# Patient Record
Sex: FEMALE | Race: BLACK OR AFRICAN AMERICAN | NOT HISPANIC OR LATINO | Employment: OTHER | URBAN - METROPOLITAN AREA
[De-identification: names, ages, dates, MRNs, and addresses within clinical notes are randomized per-mention and may not be internally consistent; named-entity substitution may affect disease eponyms.]

---

## 2017-01-06 ENCOUNTER — TELEPHONE (OUTPATIENT)
Dept: FAMILY MEDICINE | Facility: CLINIC | Age: 57
End: 2017-01-06

## 2017-01-06 DIAGNOSIS — E11.9 TYPE II OR UNSPECIFIED TYPE DIABETES MELLITUS WITHOUT MENTION OF COMPLICATION, NOT STATED AS UNCONTROLLED: ICD-10-CM

## 2017-01-06 DIAGNOSIS — E78.5 ELEVATED LIPIDS: Primary | ICD-10-CM

## 2017-01-06 RX ORDER — PRAVASTATIN SODIUM 20 MG/1
20 TABLET ORAL DAILY
COMMUNITY
End: 2017-01-11 | Stop reason: SDUPTHER

## 2017-01-06 NOTE — TELEPHONE ENCOUNTER
Blood tests okay except lipids elevated for diabetic.  Recommend start pravastatin 20 mg daily.  Continue other medication as currently.  Repeat lipid panel, ALT, urine microalbumin , BMP in 3 months.  Repeat hemoglobin A1c end of June. My nurse will contact you to arrange.   Thanks,   Dr. Peterson

## 2017-01-11 RX ORDER — PRAVASTATIN SODIUM 20 MG/1
20 TABLET ORAL DAILY
Qty: 30 TABLET | Refills: 11 | Status: SHIPPED | OUTPATIENT
Start: 2017-01-11 | End: 2017-02-01 | Stop reason: SDUPTHER

## 2017-01-11 NOTE — TELEPHONE ENCOUNTER
This was ordered through a result note   She is currently taking the lipitor  Please review results to see what changes she should make

## 2017-01-25 RX ORDER — AMLODIPINE BESYLATE 5 MG/1
5 TABLET ORAL DAILY
Qty: 90 TABLET | Refills: 0 | Status: SHIPPED | OUTPATIENT
Start: 2017-01-25 | End: 2017-02-01 | Stop reason: SDUPTHER

## 2017-01-25 NOTE — TELEPHONE ENCOUNTER
----- Message from [place] sent at 1/25/2017 12:53 PM CST -----  Contact: Debbie jenkins Mail order [phone]   Would  Like a refill for Rx Medication on patient's Amlodipine 5 mg.  Please call back  @ [phone].  Thanks-[facility]

## 2017-01-26 RX ORDER — AMLODIPINE BESYLATE 5 MG/1
TABLET ORAL
Qty: 28 TABLET | OUTPATIENT
Start: 2017-01-26

## 2017-01-27 ENCOUNTER — PATIENT MESSAGE (OUTPATIENT)
Dept: ORTHOPEDICS | Facility: CLINIC | Age: 57
End: 2017-01-27

## 2017-01-27 ENCOUNTER — PATIENT MESSAGE (OUTPATIENT)
Dept: FAMILY MEDICINE | Facility: CLINIC | Age: 57
End: 2017-01-27

## 2017-01-30 DIAGNOSIS — M25.561 CHRONIC PAIN OF BOTH KNEES: ICD-10-CM

## 2017-01-30 DIAGNOSIS — M17.0 PRIMARY OSTEOARTHRITIS OF BOTH KNEES: ICD-10-CM

## 2017-01-30 DIAGNOSIS — M21.169 ACQUIRED VARUS DEFORMITY OF KNEE, UNSPECIFIED LATERALITY: ICD-10-CM

## 2017-01-30 DIAGNOSIS — M19.012 ARTHRITIS OF LEFT ACROMIOCLAVICULAR JOINT: ICD-10-CM

## 2017-01-30 DIAGNOSIS — M25.562 CHRONIC PAIN OF BOTH KNEES: ICD-10-CM

## 2017-01-30 DIAGNOSIS — G89.29 CHRONIC PAIN OF BOTH KNEES: ICD-10-CM

## 2017-01-30 RX ORDER — NAPROXEN 500 MG/1
500 TABLET ORAL 2 TIMES DAILY WITH MEALS
Qty: 60 TABLET | Refills: 3 | Status: SHIPPED | OUTPATIENT
Start: 2017-01-30 | End: 2017-04-05 | Stop reason: SDUPTHER

## 2017-01-30 RX ORDER — AMLODIPINE BESYLATE 5 MG/1
5 TABLET ORAL DAILY
Qty: 90 TABLET | Refills: 0 | OUTPATIENT
Start: 2017-01-30

## 2017-02-01 DIAGNOSIS — M17.0 PRIMARY OSTEOARTHRITIS OF BOTH KNEES: ICD-10-CM

## 2017-02-01 DIAGNOSIS — M25.562 CHRONIC PAIN OF BOTH KNEES: ICD-10-CM

## 2017-02-01 DIAGNOSIS — M21.169 ACQUIRED VARUS DEFORMITY OF KNEE, UNSPECIFIED LATERALITY: ICD-10-CM

## 2017-02-01 DIAGNOSIS — M19.012 ARTHRITIS OF LEFT ACROMIOCLAVICULAR JOINT: ICD-10-CM

## 2017-02-01 DIAGNOSIS — G89.29 CHRONIC PAIN OF BOTH KNEES: ICD-10-CM

## 2017-02-01 DIAGNOSIS — M25.561 CHRONIC PAIN OF BOTH KNEES: ICD-10-CM

## 2017-02-01 RX ORDER — NAPROXEN 500 MG/1
500 TABLET ORAL 2 TIMES DAILY WITH MEALS
Qty: 60 TABLET | Refills: 3 | Status: CANCELLED | OUTPATIENT
Start: 2017-02-01

## 2017-02-01 RX ORDER — CYCLOBENZAPRINE HCL 10 MG
10 TABLET ORAL 3 TIMES DAILY PRN
Qty: 30 TABLET | Refills: 1 | Status: SHIPPED | OUTPATIENT
Start: 2017-02-01 | End: 2017-03-02 | Stop reason: SDUPTHER

## 2017-02-01 RX ORDER — METFORMIN HYDROCHLORIDE 1000 MG/1
1000 TABLET ORAL 2 TIMES DAILY WITH MEALS
Qty: 180 TABLET | Refills: 1 | Status: SHIPPED | OUTPATIENT
Start: 2017-02-01 | End: 2017-12-22 | Stop reason: SDUPTHER

## 2017-02-01 RX ORDER — PRAVASTATIN SODIUM 20 MG/1
20 TABLET ORAL DAILY
Qty: 90 TABLET | Refills: 1 | Status: SHIPPED | OUTPATIENT
Start: 2017-02-01 | End: 2017-05-01 | Stop reason: DRUGHIGH

## 2017-02-01 RX ORDER — FLUTICASONE PROPIONATE 50 MCG
SPRAY, SUSPENSION (ML) NASAL
Qty: 16 G | Refills: 1 | Status: SHIPPED | OUTPATIENT
Start: 2017-02-01 | End: 2017-03-02 | Stop reason: SDUPTHER

## 2017-02-01 RX ORDER — LANCETS
EACH MISCELLANEOUS
Qty: 100 EACH | Refills: 1 | Status: SHIPPED | OUTPATIENT
Start: 2017-02-01 | End: 2017-07-07

## 2017-02-01 RX ORDER — AMLODIPINE BESYLATE 5 MG/1
5 TABLET ORAL DAILY
Qty: 90 TABLET | Refills: 1 | Status: SHIPPED | OUTPATIENT
Start: 2017-02-01 | End: 2017-07-30 | Stop reason: SDUPTHER

## 2017-02-01 RX ORDER — GABAPENTIN 600 MG/1
600 TABLET ORAL 3 TIMES DAILY
Qty: 270 TABLET | Refills: 1 | Status: SHIPPED | OUTPATIENT
Start: 2017-02-01 | End: 2017-12-22 | Stop reason: SDUPTHER

## 2017-02-01 NOTE — TELEPHONE ENCOUNTER
Zakia Price would like a refill of the following medications:   gabapentin (NEURONTIN) 600 MG tablet [Jessika Quintero, NP]   lancets Misc [Jessika Quintero NP]   amlodipine (NORVASC) 5 MG tablet [Jessika Quintero NP]     Preferred pharmacy: [address]

## 2017-02-08 ENCOUNTER — OFFICE VISIT (OUTPATIENT)
Dept: ORTHOPEDICS | Facility: CLINIC | Age: 57
End: 2017-02-08
Payer: COMMERCIAL

## 2017-02-08 VITALS
HEART RATE: 86 BPM | HEIGHT: 68 IN | BODY MASS INDEX: 44.41 KG/M2 | WEIGHT: 293 LBS | DIASTOLIC BLOOD PRESSURE: 88 MMHG | SYSTOLIC BLOOD PRESSURE: 146 MMHG

## 2017-02-08 DIAGNOSIS — M25.562 CHRONIC PAIN OF BOTH KNEES: ICD-10-CM

## 2017-02-08 DIAGNOSIS — E11.42 DIABETIC POLYNEUROPATHY ASSOCIATED WITH TYPE 2 DIABETES MELLITUS: ICD-10-CM

## 2017-02-08 DIAGNOSIS — E66.01 OBESITY, MORBID, BMI 50 OR HIGHER: Primary | ICD-10-CM

## 2017-02-08 DIAGNOSIS — E11.40 TYPE 2 DIABETES MELLITUS WITH DIABETIC NEUROPATHY, WITHOUT LONG-TERM CURRENT USE OF INSULIN: ICD-10-CM

## 2017-02-08 DIAGNOSIS — E78.2 COMBINED HYPERLIPIDEMIA ASSOCIATED WITH TYPE 2 DIABETES MELLITUS: ICD-10-CM

## 2017-02-08 DIAGNOSIS — G89.29 CHRONIC PAIN OF BOTH KNEES: ICD-10-CM

## 2017-02-08 DIAGNOSIS — I15.2 HYPERTENSION ASSOCIATED WITH DIABETES: ICD-10-CM

## 2017-02-08 DIAGNOSIS — M25.561 CHRONIC PAIN OF BOTH KNEES: ICD-10-CM

## 2017-02-08 DIAGNOSIS — E11.69 COMBINED HYPERLIPIDEMIA ASSOCIATED WITH TYPE 2 DIABETES MELLITUS: ICD-10-CM

## 2017-02-08 DIAGNOSIS — M17.0 PRIMARY OSTEOARTHRITIS OF BOTH KNEES: ICD-10-CM

## 2017-02-08 DIAGNOSIS — E11.59 HYPERTENSION ASSOCIATED WITH DIABETES: ICD-10-CM

## 2017-02-08 PROCEDURE — 3077F SYST BP >= 140 MM HG: CPT | Mod: S$GLB,,, | Performed by: ORTHOPAEDIC SURGERY

## 2017-02-08 PROCEDURE — 3079F DIAST BP 80-89 MM HG: CPT | Mod: S$GLB,,, | Performed by: ORTHOPAEDIC SURGERY

## 2017-02-08 PROCEDURE — 99213 OFFICE O/P EST LOW 20 MIN: CPT | Mod: 25,S$GLB,, | Performed by: ORTHOPAEDIC SURGERY

## 2017-02-08 PROCEDURE — 3044F HG A1C LEVEL LT 7.0%: CPT | Mod: S$GLB,,, | Performed by: ORTHOPAEDIC SURGERY

## 2017-02-08 PROCEDURE — 3060F POS MICROALBUMINURIA REV: CPT | Mod: S$GLB,,, | Performed by: ORTHOPAEDIC SURGERY

## 2017-02-08 PROCEDURE — 99999 PR PBB SHADOW E&M-EST. PATIENT-LVL III: CPT | Mod: PBBFAC,,, | Performed by: ORTHOPAEDIC SURGERY

## 2017-02-08 PROCEDURE — 2022F DILAT RTA XM EVC RTNOPTHY: CPT | Mod: S$GLB,,, | Performed by: ORTHOPAEDIC SURGERY

## 2017-02-08 PROCEDURE — 20610 DRAIN/INJ JOINT/BURSA W/O US: CPT | Mod: RT,S$GLB,, | Performed by: ORTHOPAEDIC SURGERY

## 2017-02-08 RX ORDER — TRIAMCINOLONE ACETONIDE 40 MG/ML
80 INJECTION, SUSPENSION INTRA-ARTICULAR; INTRAMUSCULAR
Status: DISCONTINUED | OUTPATIENT
Start: 2017-02-08 | End: 2017-02-08 | Stop reason: HOSPADM

## 2017-02-08 RX ADMIN — TRIAMCINOLONE ACETONIDE 80 MG: 40 INJECTION, SUSPENSION INTRA-ARTICULAR; INTRAMUSCULAR at 11:02

## 2017-02-08 NOTE — PROCEDURES
Large Joint Aspiration/Injection  Date/Time: 2/8/2017 11:35 AM  Performed by: CAMILA MARKHAM  Authorized by: CAMILA MARKHAM     Consent Done?:  Yes (Verbal)  Indications:  Pain  Procedure site marked: Yes    Timeout: Prior to procedure the correct patient, procedure, and site was verified      Location:  Knee  Site:  R knee  Prep: Patient was prepped and draped in usual sterile fashion    Ultrasonic Guidance for needle placement: No  Needle size:  22 G  Approach:  Anterolateral  Medications:  80 mg triamcinolone acetonide 40 mg/mL  Patient tolerance:  Patient tolerated the procedure well with no immediate complications

## 2017-02-08 NOTE — MR AVS SNAPSHOT
[facility]  [address] [address]  Phone: [phone]                  Zakia Price   2017 10:45 AM   Office Visit    Description:  Female : 1960   Provider:  Gonzalo Roberts MD   Department:  [facility]           Reason for Visit     Left Knee - Pain     Right Knee - Pain           Diagnoses this Visit        Comments    Obesity, morbid, BMI 50 or higher    -  Primary     Diabetic polyneuropathy associated with type 2 diabetes mellitus         Type 2 diabetes mellitus with diabetic neuropathy, without long-term current use of insulin         Hypertension associated with diabetes         Combined hyperlipidemia associated with type 2 diabetes mellitus         Primary osteoarthritis of both knees                To Do List           Future Appointments        Provider Department Dept Phone    2017 10:45 AM Gonzalo Roberts MD [facility] [phone]    2/15/2017 10:00 AM Gonzalo Roberts MD [facility] [phone]    3/20/2017 9:00 AM Gurdeep Fontana DO [facility] Endocrinology [phone]    2017 9:15 AM LABORATORY, [place] [facility] [phone]    2017 9:20 AM SPECIMEN, [place] [facility] [phone]      Goals (5 Years of Data)     None      [facility]     [facility] -  Assistance  Registered nurses in the [facility] provide clinical advisement, health education, appointment booking, and other advisory services.  Call for this free service at [phone].             Medications           Message regarding Medications     Verify the changes and/or additions to your medication regime listed below are the same as discussed with your clinician today.  If any of these changes or additions are incorrect, please notify your healthcare provider.             Verify that the below list of medications is an accurate  "representation of the medications you are currently taking.  If none reported, the list may be blank. If incorrect, please contact your healthcare provider. Carry this list with you in case of emergency.           Current Medications     acetaminophen (TYLENOL) 500 MG tablet Take 2 tablets (1,000 mg total) by mouth every 6 (six) hours as needed for Pain.    amlodipine (NORVASC) 5 MG tablet Take 1 tablet (5 mg total) by mouth once daily.    blood sugar diagnostic (CLEVER CHOICE VOICE+ TEST) Strp TEST BLOOD SUGARS ONE TIME DAILY    cyclobenzaprine (FLEXERIL) 10 MG tablet Take 1 tablet (10 mg total) by mouth 3 (three) times daily as needed.    diphenhydrAMINE (BENADRYL) 25 mg capsule Take 1 each (25 mg total) by mouth every 6 (six) hours as needed for Itching.    fluticasone (FLONASE) 50 mcg/actuation nasal spray Use 2 sprays to each nostril daily    gabapentin (NEURONTIN) 600 MG tablet Take 1 tablet (600 mg total) by mouth 3 (three) times daily.    lancets Misc As directed    levothyroxine (SYNTHROID) 100 MCG tablet Take 100 mcg by mouth once daily.    metformin (GLUCOPHAGE) 1000 MG tablet Take 1 tablet (1,000 mg total) by mouth 2 (two) times daily with meals.    naproxen (NAPROSYN) 500 MG tablet Take 1 tablet (500 mg total) by mouth 2 (two) times daily with meals.    pravastatin (PRAVACHOL) 20 MG tablet Take 1 tablet (20 mg total) by mouth once daily.           Clinical Reference Information           Your Vitals Were     BP Pulse Height Weight BMI    146/88 86 5' 8" (1.727 m) 166.4 kg (366 lb 12.8 oz) 55.77 kg/m2      Blood Pressure          Most Recent Value    BP  (!)  146/88      Allergies as of 2/8/2017     Lisinopril    Codeine      Immunizations Administered on Date of Encounter - 2/8/2017     None      Orders Placed During Today's Visit      Normal Orders This Visit    Ambulatory consult to Nutrition Services       Language Assistance Services     ATTENTION: Language assistance services are available, free of " charge. Please call [phone].      ATENCIÓN: Si habla español, tiene a blanco disposición servicios gratuitos de asistencia lingüística. Llame al [phone].     CHÚ Ý: N?u b?n nói Ti?ng Vi?t, có các d?ch v? h? tr? ngôn ng? mi?n phí dành cho b?n. G?i s? [phone].         [facility] complies with applicable Federal civil rights laws and does not discriminate on the basis of race, color, national origin, age, disability, or sex.

## 2017-02-08 NOTE — PROGRESS NOTES
CC:Bilateral KNEE pain    HISTORY OF PRESENT ILLNESS:  Zakia Price is a 57 y.o. right hand dominant Female who is here for her f/u with right > left knee pain.     History ispositive fortrauma with falls in the past. No surgeries in the past.    Today the patient rates pain at a 0-5/10 on visual analog scale.     She reports that the pain is worse with standing, walking, stairs, rising from a seated position.  It does currently wake the patient at night.    negative for mechanical symptoms, +/-instability, swelling at times and activity related    It does affect the performance of ADLs.     Occupation:   With some limitations secondary to knee pain    Past Medical History   Diagnosis Date    Diabetes mellitus, type 2     Diabetic neuropathy 1/27/2014    DJD (degenerative joint disease) of knee     DVT (deep venous thrombosis) around 1990's     in leg, is on no anticoagulant therapy presently    GERD (gastroesophageal reflux disease)     Hypertension associated with diabetes     Multinodular goiter     Multinodular goiter     Obesity, morbid, BMI 50 or higher     Sleep apnea      has no CPAP       Past Surgical History   Procedure Laterality Date    Hysterectomy      Tonsillectomy      Wrist fracture surgery       left    Shoulder arthroscopy      Thyroidectomy, partial Right      and transplatation of right superior parathyroid gland to the sternocleidomastoid muscle        Family History   Problem Relation Age of Onset    Leukemia Son     Diabetes Mother     Hypertension Mother     Heart disease Mother 50    Cancer Father     Cancer Brother          Current Outpatient Prescriptions:     acetaminophen (TYLENOL) 500 MG tablet, Take 2 tablets (1,000 mg total) by mouth every 6 (six) hours as needed for Pain., Disp: , Rfl:     amlodipine (NORVASC) 5 MG tablet, Take 1 tablet (5 mg total) by mouth once daily., Disp: 90 tablet, Rfl: 1    blood sugar diagnostic (CLEVER CHOICE  VOICE+ TEST) Strp, TEST BLOOD SUGARS ONE TIME DAILY, Disp: 100 strip, Rfl: 1    cyclobenzaprine (FLEXERIL) 10 MG tablet, Take 1 tablet (10 mg total) by mouth 3 (three) times daily as needed., Disp: 30 tablet, Rfl: 1    diphenhydrAMINE (BENADRYL) 25 mg capsule, Take 1 each (25 mg total) by mouth every 6 (six) hours as needed for Itching., Disp: , Rfl: 0    fluticasone (FLONASE) 50 mcg/actuation nasal spray, Use 2 sprays to each nostril daily, Disp: 16 g, Rfl: 1    gabapentin (NEURONTIN) 600 MG tablet, Take 1 tablet (600 mg total) by mouth 3 (three) times daily., Disp: 270 tablet, Rfl: 1    lancets Misc, As directed, Disp: 100 each, Rfl: 1    levothyroxine (SYNTHROID) 100 MCG tablet, Take 100 mcg by mouth once daily., Disp: , Rfl:     metformin (GLUCOPHAGE) 1000 MG tablet, Take 1 tablet (1,000 mg total) by mouth 2 (two) times daily with meals., Disp: 180 tablet, Rfl: 1    naproxen (NAPROSYN) 500 MG tablet, Take 1 tablet (500 mg total) by mouth 2 (two) times daily with meals., Disp: 60 tablet, Rfl: 3    pravastatin (PRAVACHOL) 20 MG tablet, Take 1 tablet (20 mg total) by mouth once daily., Disp: 90 tablet, Rfl: 1    Review of patient's allergies indicates:   Allergen Reactions    Lisinopril Swelling     angioedema    Codeine Nausea Only and Rash       Review of Systems   HENT:        ETHAN currently not on CPAP   Cardiovascular:        HTN   Gastrointestinal: Positive for heartburn.   Musculoskeletal: Positive for joint pain.   Endo/Heme/Allergies:        Diabetes Type II   All other systems reviewed and are negative.      OBJECTIVE:  Vitals:    02/08/17 1016   BP: (!) 146/88   Pulse: 86       PHYSICAL EXAMINATION    General:  The patient is alert and oriented x 3.  Mood is pleasant.  Observation of ears, eyes and nose reveal no gross abnormalities.  No labored breathing observed.    Both KNEE EXAMINATION     OBSERVATION / INSPECTION   Gait:   Antalgic   Alignment:  Varus  Scars:   None   Muscle  atrophy: Mild  Effusion:  None   Warmth:  None   Discoloration:   none     TENDERNESS / CREPITUS (T / C):  R>L         T / C      T / C   Patella   ++ / -   Lateral joint line   - / -    Peripatellar medial  ++  Medial joint line    + / -    Peripatellar lateral ++  Medial plica   - / -    Patellar tendon -   Popliteal fossa   - / -    Quad tendon   -   Gastrocnemius   -   Prepatellar Bursa - / -   Quadricep   -   Tibial tubercle  -  Thigh/hamstring  -   Pes anserine/HS -  Fibula    -   ITB   - / -  Tibia     -   Tib/fib joint  - / -  LCL    -     MFC   - / -   MCL: Proximal  -    LFC   - / -    Distal   -          ROM: (* = pain)  PASSIVE  ACTIVE    Left :   0 / 100   0 / 100  *   Right :    0 / 110   0 / 110  **    Patellofemoral examination:  See above noted areas of tenderness.   Patella position    Subluxation / dislocation: Centered           Sup. / Inf;   Normal   Crepitus (PF):    +++   Patellar Mobility:       Medial-lateral:   absent   Superior-inferior:  absent    Inferior tilt   Normal    Patellar tendon:  Normal   Lateral tilt:    Normal   J-sign:     None   Patellofemoral grind:   +++      MENISCAL SIGNS:     Pain on terminal extension:  -  Pain on terminal flexion:  +  Ashs maneuver:  - for pain      LIGAMENT EXAMINATION:  ACL / Lachman:  normal (-1 to 2mm)    PCL-Post.  drawer: normal 0 to 2mm  MCL- Valgus:  Tight MCL  LCL- Varus:  normal 0 to 2mm  Pivot shift: normal (Equal)       STRENGTH: (* = with pain) PAINFUL SIDE   Quadricep   5/5   Hamstrin/5    EXTREMITY NEURO-VASCULAR EXAMINATION:   Sensation:  Grossly intact to light touch all dermatomal regions.   Motor Function:  Fully intact motor function at hip, knee, foot and ankle     Vascular status:  DP and PT pulses 2+, brisk capillary refill, symmetric.     Xrays: Reviewed personally by me (standing AP/flexion, lateral, sunrise) show:   Findings: Findings:  AP and PA flexion standing views of both knees as well as a lateral and  Merchant views of the both knees were obtained.  Compared to January 27, 2014. Again noted is advanced tricompartment degenerative change in both knees without superimposed acute osseous abnormalities. Stable calcific densities in the suprapatellar spaces and popliteal fossae suggestive of osteocartilaginous loose bodies.     ASSESSMENT:    Bilateral Knee Osteoarthritis Endstage  Varus Deformity    PLAN:   Right knee injection  Naproxen 500mg PO BIDWM  The patient was counseled today regarding her diagnostic study results and the different treatment options. I spent >50% of the time discussing conservative vs. Operative options with the patient as well as risks and benefits of the different options.  I spent 15 minutes with the patient discussing the need to reduce weight and maintain weight loss. Obesity can exacerbate the musculoskeletal symptoms that the patient is having. We discussed some initial dietary and activity options, but we will place a nutrition consult as well as have the patient f/u with their PCP in order to develop a plan to assist with developing a healthy weight loss plan.  Physical Therapy/Wellness program referral to [facility]  F/U next week for left knee injection  All questions were answered, pt will contact us for questions or concerns in the interim.

## 2017-02-15 ENCOUNTER — OFFICE VISIT (OUTPATIENT)
Dept: ORTHOPEDICS | Facility: CLINIC | Age: 57
End: 2017-02-15
Payer: COMMERCIAL

## 2017-02-15 VITALS
HEART RATE: 92 BPM | BODY MASS INDEX: 44.41 KG/M2 | DIASTOLIC BLOOD PRESSURE: 86 MMHG | WEIGHT: 293 LBS | SYSTOLIC BLOOD PRESSURE: 134 MMHG | HEIGHT: 68 IN

## 2017-02-15 DIAGNOSIS — M25.561 CHRONIC PAIN OF BOTH KNEES: ICD-10-CM

## 2017-02-15 DIAGNOSIS — G89.29 CHRONIC PAIN OF BOTH KNEES: ICD-10-CM

## 2017-02-15 DIAGNOSIS — M17.0 PRIMARY OSTEOARTHRITIS OF BOTH KNEES: Primary | ICD-10-CM

## 2017-02-15 DIAGNOSIS — M25.562 CHRONIC PAIN OF BOTH KNEES: ICD-10-CM

## 2017-02-15 PROCEDURE — 3079F DIAST BP 80-89 MM HG: CPT | Mod: S$GLB,,, | Performed by: ORTHOPAEDIC SURGERY

## 2017-02-15 PROCEDURE — 99213 OFFICE O/P EST LOW 20 MIN: CPT | Mod: 25,S$GLB,, | Performed by: ORTHOPAEDIC SURGERY

## 2017-02-15 PROCEDURE — 3075F SYST BP GE 130 - 139MM HG: CPT | Mod: S$GLB,,, | Performed by: ORTHOPAEDIC SURGERY

## 2017-02-15 PROCEDURE — 99999 PR PBB SHADOW E&M-EST. PATIENT-LVL III: CPT | Mod: PBBFAC,,, | Performed by: ORTHOPAEDIC SURGERY

## 2017-02-15 PROCEDURE — 20610 DRAIN/INJ JOINT/BURSA W/O US: CPT | Mod: LT,S$GLB,, | Performed by: ORTHOPAEDIC SURGERY

## 2017-02-15 RX ORDER — TRIAMCINOLONE ACETONIDE 40 MG/ML
80 INJECTION, SUSPENSION INTRA-ARTICULAR; INTRAMUSCULAR
Status: DISCONTINUED | OUTPATIENT
Start: 2017-02-15 | End: 2017-02-15 | Stop reason: HOSPADM

## 2017-02-15 RX ADMIN — TRIAMCINOLONE ACETONIDE 80 MG: 40 INJECTION, SUSPENSION INTRA-ARTICULAR; INTRAMUSCULAR at 10:02

## 2017-02-15 NOTE — PROGRESS NOTES
CC:Bilateral KNEE pain    HISTORY OF PRESENT ILLNESS:  Zakia Price is a 57 y.o. right hand dominant Female who is here for her f/u with left > right knee pain. The right knee was injected last week and feels much better. She is here for her scheduled left knee injection.    History ispositive fortrauma with falls in the past. No surgeries in the past.    Today the patient rates pain at a 0-8/10 on visual analog scale.     She reports that the pain is worse with standing, walking, stairs, rising from a seated position.  It does currently wake the patient at night.    negative for mechanical symptoms, +/-instability, swelling at times and activity related    It does affect the performance of ADLs.     Occupation:   With some limitations secondary to knee pain    Past Medical History   Diagnosis Date    Diabetes mellitus, type 2     Diabetic neuropathy 1/27/2014    DJD (degenerative joint disease) of knee     DVT (deep venous thrombosis) around 1990's     in leg, is on no anticoagulant therapy presently    GERD (gastroesophageal reflux disease)     Hypertension associated with diabetes     Multinodular goiter     Multinodular goiter     Obesity, morbid, BMI 50 or higher     Sleep apnea      has no CPAP       Past Surgical History   Procedure Laterality Date    Hysterectomy      Tonsillectomy      Wrist fracture surgery       left    Shoulder arthroscopy      Thyroidectomy, partial Right      and transplatation of right superior parathyroid gland to the sternocleidomastoid muscle        Family History   Problem Relation Age of Onset    Leukemia Son     Diabetes Mother     Hypertension Mother     Heart disease Mother 50    Cancer Father     Cancer Brother          Current Outpatient Prescriptions:     acetaminophen (TYLENOL) 500 MG tablet, Take 2 tablets (1,000 mg total) by mouth every 6 (six) hours as needed for Pain., Disp: , Rfl:     amlodipine (NORVASC) 5 MG tablet, Take 1  tablet (5 mg total) by mouth once daily., Disp: 90 tablet, Rfl: 1    blood sugar diagnostic (CLEVER CHOICE VOICE+ TEST) Strp, TEST BLOOD SUGARS ONE TIME DAILY, Disp: 100 strip, Rfl: 1    cyclobenzaprine (FLEXERIL) 10 MG tablet, Take 1 tablet (10 mg total) by mouth 3 (three) times daily as needed., Disp: 30 tablet, Rfl: 1    diphenhydrAMINE (BENADRYL) 25 mg capsule, Take 1 each (25 mg total) by mouth every 6 (six) hours as needed for Itching., Disp: , Rfl: 0    fluticasone (FLONASE) 50 mcg/actuation nasal spray, Use 2 sprays to each nostril daily, Disp: 16 g, Rfl: 1    gabapentin (NEURONTIN) 600 MG tablet, Take 1 tablet (600 mg total) by mouth 3 (three) times daily., Disp: 270 tablet, Rfl: 1    lancets Misc, As directed, Disp: 100 each, Rfl: 1    levothyroxine (SYNTHROID) 100 MCG tablet, Take 100 mcg by mouth once daily., Disp: , Rfl:     metformin (GLUCOPHAGE) 1000 MG tablet, Take 1 tablet (1,000 mg total) by mouth 2 (two) times daily with meals., Disp: 180 tablet, Rfl: 1    naproxen (NAPROSYN) 500 MG tablet, Take 1 tablet (500 mg total) by mouth 2 (two) times daily with meals., Disp: 60 tablet, Rfl: 3    pravastatin (PRAVACHOL) 20 MG tablet, Take 1 tablet (20 mg total) by mouth once daily., Disp: 90 tablet, Rfl: 1    Review of patient's allergies indicates:   Allergen Reactions    Lisinopril Swelling     angioedema    Codeine Nausea Only and Rash       Review of Systems   HENT:        ETHAN currently not on CPAP   Cardiovascular:        HTN   Gastrointestinal: Positive for heartburn.   Musculoskeletal: Positive for joint pain.   Endo/Heme/Allergies:        Diabetes Type II   All other systems reviewed and are negative.      OBJECTIVE:  Vitals:    02/15/17 1006   BP: 134/86   Pulse: 92       PHYSICAL EXAMINATION    General:  The patient is alert and oriented x 3.  Mood is pleasant.  Observation of ears, eyes and nose reveal no gross abnormalities.  No labored breathing observed.    Both KNEE EXAMINATION      OBSERVATION / INSPECTION   Gait:   Antalgic   Alignment:  Varus  Scars:   None   Muscle atrophy: Mild  Effusion:  None   Warmth:  None   Discoloration:   none     TENDERNESS / CREPITUS (T / C):  L>R on exam today        T / C      T / C   Patella   ++ / -   Lateral joint line   - / -    Peripatellar medial  ++  Medial joint line    + / -    Peripatellar lateral ++  Medial plica   - / -    Patellar tendon -   Popliteal fossa   - / -    Quad tendon   -   Gastrocnemius   -   Prepatellar Bursa - / -   Quadricep   -   Tibial tubercle  -  Thigh/hamstring  -   Pes anserine/HS -  Fibula    -   ITB   - / -  Tibia     -   Tib/fib joint  - / -  LCL    -     MFC   - / -   MCL: Proximal  -    LFC   - / -    Distal   -          ROM: (* = pain)  PASSIVE  ACTIVE    Left :   0 / 100   0 / 100  **   Right :    0 / 110   0 / 110  *    Patellofemoral examination:  See above noted areas of tenderness.   Patella position    Subluxation / dislocation: Centered           Sup. / Inf;   Normal   Crepitus (PF):    +++   Patellar Mobility:       Medial-lateral:   absent   Superior-inferior:  absent    Inferior tilt   Normal    Patellar tendon:  Normal   Lateral tilt:    Normal   J-sign:     None   Patellofemoral grind:   +++      MENISCAL SIGNS:     Pain on terminal extension:  -  Pain on terminal flexion:  +  Ashs maneuver:  - for pain      LIGAMENT EXAMINATION:  ACL / Lachman:  normal (-1 to 2mm)    PCL-Post.  drawer: normal 0 to 2mm  MCL- Valgus:  Tight MCL  LCL- Varus:  normal 0 to 2mm  Pivot shift: normal (Equal)       STRENGTH: (* = with pain) PAINFUL SIDE   Quadricep   5/5   Hamstrin/5    EXTREMITY NEURO-VASCULAR EXAMINATION:   Sensation:  Grossly intact to light touch all dermatomal regions.   Motor Function:  Fully intact motor function at hip, knee, foot and ankle     Vascular status:  DP and PT pulses 2+, brisk capillary refill, symmetric.     Xrays: Reviewed personally by me (standing AP/flexion, lateral,  sunrise) show:   Findings: Findings:  AP and PA flexion standing views of both knees as well as a lateral and Merchant views of the both knees were obtained.  Compared to January 27, 2014. Again noted is advanced tricompartment degenerative change in both knees without superimposed acute osseous abnormalities. Stable calcific densities in the suprapatellar spaces and popliteal fossae suggestive of osteocartilaginous loose bodies.     ASSESSMENT:    Bilateral Knee Osteoarthritis Endstage  Varus Deformity    PLAN:   left knee injection  Naproxen 500mg PO BIDWM  F/U in 3 months or sooner if neded  All questions were answered, pt will contact us for questions or concerns in the interim.

## 2017-02-15 NOTE — MR AVS SNAPSHOT
[facility]  [address] [address]  Phone: [phone]                  Zakia Price   2/15/2017 10:00 AM   Office Visit    Description:  Female : 1960   Provider:  Gonzalo Roberts MD   Department:  [facility]           Reason for Visit     Left Knee - Pain                To Do List           Future Appointments        Provider Department Dept Phone    3/20/2017 9:00 AM Gurdeep Fontana DO [place] [phone]    2017 9:15 AM LABORATORY, [place] [facility] [phone]    2017 9:20 AM SPECIMEN, [place] [facility] [phone]    2017 9:45 AM Gonzalo Roberts MD [facility] [phone]    2017 9:45 AM LABORATORY, [place] [facility] [phone]      Goals (5 Years of Data)     None      [facility]     [facility] Care [facility] -  Assistance  Registered nurses in the [facility] provide clinical advisement, health education, appointment booking, and other advisory services.  Call for this free service at [phone].             Medications           Message regarding Medications     Verify the changes and/or additions to your medication regime listed below are the same as discussed with your clinician today.  If any of these changes or additions are incorrect, please notify your healthcare provider.             Verify that the below list of medications is an accurate representation of the medications you are currently taking.  If none reported, the list may be blank. If incorrect, please contact your healthcare provider. Carry this list with you in case of emergency.           Current Medications     acetaminophen (TYLENOL) 500 MG tablet Take 2 tablets (1,000 mg total) by mouth every 6 (six) hours as needed for Pain.    amlodipine (NORVASC) 5 MG tablet Take 1 tablet (5 mg total) by mouth once daily.    blood  "sugar diagnostic (CLEVER CHOICE VOICE+ TEST) Strp TEST BLOOD SUGARS ONE TIME DAILY    cyclobenzaprine (FLEXERIL) 10 MG tablet Take 1 tablet (10 mg total) by mouth 3 (three) times daily as needed.    diphenhydrAMINE (BENADRYL) 25 mg capsule Take 1 each (25 mg total) by mouth every 6 (six) hours as needed for Itching.    fluticasone (FLONASE) 50 mcg/actuation nasal spray Use 2 sprays to each nostril daily    gabapentin (NEURONTIN) 600 MG tablet Take 1 tablet (600 mg total) by mouth 3 (three) times daily.    lancets Misc As directed    levothyroxine (SYNTHROID) 100 MCG tablet Take 100 mcg by mouth once daily.    metformin (GLUCOPHAGE) 1000 MG tablet Take 1 tablet (1,000 mg total) by mouth 2 (two) times daily with meals.    naproxen (NAPROSYN) 500 MG tablet Take 1 tablet (500 mg total) by mouth 2 (two) times daily with meals.    pravastatin (PRAVACHOL) 20 MG tablet Take 1 tablet (20 mg total) by mouth once daily.           Clinical Reference Information           Your Vitals Were     Height Weight BMI          5' 8" (1.727 m) 166 kg (366 lb) 55.65 kg/m2        Allergies as of 2/15/2017     Lisinopril    Codeine      Immunizations Administered on Date of Encounter - 2/15/2017     None      Language Assistance Services     ATTENTION: Language assistance services are available, free of charge. Please call [phone].      ATENCIÓN: Si habla jose g, tiene a blanco disposición servicios gratuitos de asistencia lingüística. Llame al [phone].     [facility] Ý: N?u b?n nói Ti?ng Vi?t, có các d?ch v? h? tr? ngôn ng? mi?n phí dành cho b?n. G?i s? [phone].         [facility] complies with applicable Federal civil rights laws and does not discriminate on the basis of race, color, national origin, age, disability, or sex.        "

## 2017-02-15 NOTE — PROCEDURES
Large Joint Aspiration/Injection  Date/Time: 2/15/2017 10:21 AM  Performed by: CAMILA MARKHAM  Authorized by: CAMILA MARKHAM     Consent Done?:  Yes (Verbal)  Indications:  Pain  Procedure site marked: Yes    Timeout: Prior to procedure the correct patient, procedure, and site was verified      Location:  Knee  Site:  L knee  Prep: Patient was prepped and draped in usual sterile fashion    Ultrasonic Guidance for needle placement: No  Needle size:  22 G  Approach:  Anterolateral  Medications:  80 mg triamcinolone acetonide 40 mg/mL  Patient tolerance:  Patient tolerated the procedure well with no immediate complications

## 2017-03-02 RX ORDER — CYCLOBENZAPRINE HCL 10 MG
10 TABLET ORAL 3 TIMES DAILY PRN
Qty: 30 TABLET | Refills: 5 | Status: SHIPPED | OUTPATIENT
Start: 2017-03-02 | End: 2018-04-27 | Stop reason: SDUPTHER

## 2017-03-02 RX ORDER — FLUTICASONE PROPIONATE 50 MCG
SPRAY, SUSPENSION (ML) NASAL
Qty: 16 G | Refills: 5 | Status: SHIPPED | OUTPATIENT
Start: 2017-03-02 | End: 2019-12-04 | Stop reason: SDUPTHER

## 2017-03-03 ENCOUNTER — TELEPHONE (OUTPATIENT)
Dept: FAMILY MEDICINE | Facility: CLINIC | Age: 57
End: 2017-03-03

## 2017-03-03 NOTE — TELEPHONE ENCOUNTER
----- Message from Nick Avila sent at 3/3/2017  8:50 AM CST -----  Contact: [facility]  Caller request call from nurse to get the brand of lancets pt needs and the directions, ..    [facility] - [address]  [facility] [address]  Phone: [phone] Fax: [phone]

## 2017-03-20 ENCOUNTER — OFFICE VISIT (OUTPATIENT)
Dept: ENDOCRINOLOGY | Facility: CLINIC | Age: 57
End: 2017-03-20
Payer: COMMERCIAL

## 2017-03-20 ENCOUNTER — PATIENT MESSAGE (OUTPATIENT)
Dept: ADMINISTRATIVE | Facility: OTHER | Age: 57
End: 2017-03-20

## 2017-03-20 ENCOUNTER — LAB VISIT (OUTPATIENT)
Dept: LAB | Facility: HOSPITAL | Age: 57
End: 2017-03-20
Attending: INTERNAL MEDICINE
Payer: COMMERCIAL

## 2017-03-20 VITALS
WEIGHT: 293 LBS | SYSTOLIC BLOOD PRESSURE: 128 MMHG | BODY MASS INDEX: 43.4 KG/M2 | HEART RATE: 65 BPM | HEIGHT: 69 IN | DIASTOLIC BLOOD PRESSURE: 70 MMHG

## 2017-03-20 DIAGNOSIS — E11.9 TYPE 2 DIABETES MELLITUS WITHOUT COMPLICATION, WITHOUT LONG-TERM CURRENT USE OF INSULIN: ICD-10-CM

## 2017-03-20 DIAGNOSIS — E03.9 HYPOTHYROIDISM, UNSPECIFIED TYPE: Primary | ICD-10-CM

## 2017-03-20 DIAGNOSIS — E03.9 HYPOTHYROIDISM, UNSPECIFIED TYPE: ICD-10-CM

## 2017-03-20 LAB
ALBUMIN SERPL BCP-MCNC: 3.3 G/DL
ALP SERPL-CCNC: 77 U/L
ALT SERPL W/O P-5'-P-CCNC: 14 U/L
ANION GAP SERPL CALC-SCNC: 10 MMOL/L
AST SERPL-CCNC: 15 U/L
BILIRUB SERPL-MCNC: 0.4 MG/DL
BUN SERPL-MCNC: 18 MG/DL
CALCIUM SERPL-MCNC: 8.5 MG/DL
CHLORIDE SERPL-SCNC: 109 MMOL/L
CO2 SERPL-SCNC: 23 MMOL/L
CREAT SERPL-MCNC: 0.8 MG/DL
EST. GFR  (AFRICAN AMERICAN): >60 ML/MIN/1.73 M^2
EST. GFR  (NON AFRICAN AMERICAN): >60 ML/MIN/1.73 M^2
GLUCOSE SERPL-MCNC: 88 MG/DL
POTASSIUM SERPL-SCNC: 4.2 MMOL/L
PROT SERPL-MCNC: 6.7 G/DL
SODIUM SERPL-SCNC: 142 MMOL/L
TSH SERPL DL<=0.005 MIU/L-ACNC: 2.4 UIU/ML

## 2017-03-20 PROCEDURE — 3078F DIAST BP <80 MM HG: CPT | Mod: S$GLB,,, | Performed by: INTERNAL MEDICINE

## 2017-03-20 PROCEDURE — 3044F HG A1C LEVEL LT 7.0%: CPT | Mod: S$GLB,,, | Performed by: INTERNAL MEDICINE

## 2017-03-20 PROCEDURE — 99999 PR PBB SHADOW E&M-EST. PATIENT-LVL II: CPT | Mod: PBBFAC,,, | Performed by: INTERNAL MEDICINE

## 2017-03-20 PROCEDURE — 84443 ASSAY THYROID STIM HORMONE: CPT

## 2017-03-20 PROCEDURE — 99204 OFFICE O/P NEW MOD 45 MIN: CPT | Mod: S$GLB,,, | Performed by: INTERNAL MEDICINE

## 2017-03-20 PROCEDURE — 3074F SYST BP LT 130 MM HG: CPT | Mod: S$GLB,,, | Performed by: INTERNAL MEDICINE

## 2017-03-20 PROCEDURE — 2022F DILAT RTA XM EVC RTNOPTHY: CPT | Mod: S$GLB,,, | Performed by: INTERNAL MEDICINE

## 2017-03-20 PROCEDURE — 1160F RVW MEDS BY RX/DR IN RCRD: CPT | Mod: S$GLB,,, | Performed by: INTERNAL MEDICINE

## 2017-03-20 PROCEDURE — 80053 COMPREHEN METABOLIC PANEL: CPT

## 2017-03-20 PROCEDURE — 3060F POS MICROALBUMINURIA REV: CPT | Mod: S$GLB,,, | Performed by: INTERNAL MEDICINE

## 2017-03-20 PROCEDURE — 36415 COLL VENOUS BLD VENIPUNCTURE: CPT | Mod: PO

## 2017-03-20 RX ORDER — PANTOPRAZOLE SODIUM 40 MG/1
40 TABLET, DELAYED RELEASE ORAL DAILY
COMMUNITY
End: 2017-04-12 | Stop reason: SDUPTHER

## 2017-03-20 RX ORDER — LEVOTHYROXINE SODIUM 100 UG/1
100 TABLET ORAL DAILY
Qty: 30 TABLET | Refills: 6 | Status: SHIPPED | OUTPATIENT
Start: 2017-03-20 | End: 2017-04-12 | Stop reason: SDUPTHER

## 2017-03-20 NOTE — MR AVS SNAPSHOT
[facility]  [address]  [facility] [address]  Phone: [phone]  Fax: [phone]                  Zakia Price   3/20/2017 9:00 AM   Office Visit    Description:  Female : 1960   Provider:  Gurdeep Fontana DO   Department:  [place] [facility]           Diagnoses this Visit        Comments    Hypothyroidism, unspecified type    -  Primary     Type 2 diabetes mellitus without complication, without long-term current use of insulin                To Do List           Future Appointments        Provider Department Dept Phone    3/20/2017 10:45 AM LAB, [place] [facility] [phone]    3/20/2017 11:00 AM URINE [facility] [phone]    2017 9:15 AM LABORATORY, [place] [facility] [phone]    2017 9:20 AM SPECIMEN, [place] [facility] [phone]    2017 9:45 AM Gonzalo Roberts MD [facility] [phone]      Goals (5 Years of Data)     None      [facility]     [facility] Care [facility] -  Assistance  Registered nurses in the [facility] provide clinical advisement, health education, appointment booking, and other advisory services.  Call for this free service at [phone].             Medications           Message regarding Medications     Verify the changes and/or additions to your medication regime listed below are the same as discussed with your clinician today.  If any of these changes or additions are incorrect, please notify your healthcare provider.             Verify that the below list of medications is an accurate representation of the medications you are currently taking.  If none reported, the list may be blank. If incorrect, please contact your healthcare provider. Carry this list with you in case of emergency.           Current Medications     acetaminophen (TYLENOL) 500 MG tablet Take 2 tablets (1,000 mg  "total) by mouth every 6 (six) hours as needed for Pain.    amlodipine (NORVASC) 5 MG tablet Take 1 tablet (5 mg total) by mouth once daily.    blood sugar diagnostic (CLEVER CHOICE VOICE+ TEST) Strp TEST BLOOD SUGARS ONE TIME DAILY    cyclobenzaprine (FLEXERIL) 10 MG tablet Take 1 tablet (10 mg total) by mouth 3 (three) times daily as needed.    diphenhydrAMINE (BENADRYL) 25 mg capsule Take 1 each (25 mg total) by mouth every 6 (six) hours as needed for Itching.    fluticasone (FLONASE) 50 mcg/actuation nasal spray Use 2 sprays to each nostril daily    gabapentin (NEURONTIN) 600 MG tablet Take 1 tablet (600 mg total) by mouth 3 (three) times daily.    lancets Misc As directed    levothyroxine (SYNTHROID) 100 MCG tablet Take 100 mcg by mouth once daily.    metformin (GLUCOPHAGE) 1000 MG tablet Take 1 tablet (1,000 mg total) by mouth 2 (two) times daily with meals.    naproxen (NAPROSYN) 500 MG tablet Take 1 tablet (500 mg total) by mouth 2 (two) times daily with meals.    pantoprazole (PROTONIX) 40 MG tablet Take 40 mg by mouth once daily.    pravastatin (PRAVACHOL) 20 MG tablet Take 1 tablet (20 mg total) by mouth once daily.           Clinical Reference Information           Your Vitals Were     BP Pulse Height Weight BMI    128/70 (BP Location: Left arm, Patient Position: Sitting, BP Method: Manual) 65 5' 8.5" (1.74 m) 165.6 kg (365 lb 1.3 oz) 54.7 kg/m2      Blood Pressure          Most Recent Value    BP  128/70      Allergies as of 3/20/2017     Lisinopril    Codeine      Immunizations Administered on Date of Encounter - 3/20/2017     None      Orders Placed During Today's Visit     Future Labs/Procedures Expected by Expires    Comprehensive metabolic panel  3/20/2017 3/21/2018    Microalbumin/creatinine urine ratio  3/20/2017 3/21/2018    TSH  3/20/2017 3/20/2018    TSH  3/20/2017 3/20/2018      Language Assistance Services     ATTENTION: Language assistance services are available, free of charge. Please call " [phone].      ATENCIÓN: Si habla español, tiene a blanco disposición servicios gratuitos de asistencia lingüística. Llame al [phone].     CHÚ Ý: N?u b?n nói Ti?ng Vi?t, có các d?ch v? h? tr? ngôn ng? mi?n phí dành cho b?n. G?i s? [phone].         [facility] complies with applicable Federal civil rights laws and does not discriminate on the basis of race, color, national origin, age, disability, or sex.

## 2017-03-20 NOTE — PROGRESS NOTES
CHIEF COMPLAINT: Hypothyroid  57 year old being seen as a new patient. On synthroid 100 mcg. RIGHT LOBECTOMY 7/15/16 with implant of parathyroid in right SCM. Surgery due to dominant right nodule. She does take synthroid by itself. Occasional palpitations. Overall feeling well after surgery. No difficulty swallowing.     States had an eye exam in July at [facility].       PAST MEDICAL HISTORY/PAST SURGICAL HISTORY:  Reviewed in [facility]    SOCIAL HISTORY: No T/A    FAMILY HISTORY:  No known thyroid disease. + Dm 2    MEDICATIONS/ALLERGIES: The patient's MedCard has been updated and reviewed.      ROS:   Constitutional: No recent significant weight change  Eyes: No recent visual changes  ENT: No dysphagia  Cardiovascular: Denies current anginal symptoms  Respiratory: Denies current respiratory difficulty  Gastrointestinal: Denies recent bowel disturbances  GenitoUrinary - No dysuria  Skin: No new skin rash  Neurologic: No focal neurologic complaints  Remainder ROS negative        PE:    GENERAL: Well developed, well nourished.  PSYCH:  appropriate mood and affect  EYES:  PERRL, EOM intact.  ENT: Nares patent, oropharynx clear, mucosa pink,   NECK: Supple, trachea midline, No palpable thyroid nodules. No LAD. Scar intact  CHEST: Resp even and unlabored, CTA bilateral.  CARDIAC: RRR, S1, S2 heard, no murmurs, rubs, S3, or S4  ABDOMEN: Soft, non-tender, non-distended;  No organomegaly  VASCULAR:  DP pulses +2/4 bilaterally, no edema  NEURO: Gait steady, CN II-VII grossly intact  SKIN: No areas of breakdown, no acanthosis nigracans.    LABS   1/31/17  TSH 3.73    9/14/16  TSH 14.02  FT4 0.7  FT3 2.6      Pre Surgical US:  Findings: The left lobe measure 1.3 x 1.2 x 1.1 cm in diameter.  It appeared to be homogeneous in echogenicity without a focal mass.  The right lobe is heterogeneous in echogenicity and there was a 5.6 x 2.7 x 4 cm predominantly solid nodule in occupying most of the right lobe of the thyroid.  The  right lobe measures 6.6 x 2.9 x 4 cm in diameter.  The isthmus is 7 mm in AP diameter.       Impression       Dominant nodule in the right lobe of the thyroid gland.  Further evaluation with thyroid scan and uptake to exclude a cold nodule is suggested.           ASSESSMENT/PLAN:  1. Hypothyroid- S/P Right lobectomy. ALso had autotransplant of parathyroid in [facility]. Discussed how to take thyroid medication. She is having some palpitations and hair loss. Check TSH. Also check CMP    2. DM 2- being followed by PCP. Updated HM with eye exam. Will also get urine micro since she is due      FOLLOWUP  Today TSH, CMP, urine micro  F/U 6 months- TSH

## 2017-03-20 NOTE — TELEPHONE ENCOUNTER
----- Message from Dinah Olguin sent at 3/20/2017 10:38 AM CDT -----  Contact: [phone]  pt at check out desk  [3/20/2017 10:36 AM]   Zakia Price saw Dr Fontana this morning and she forgot to ask for a refill on her Synthroid. [facility] in [place] is her pharmacy.  her phone # is  [phone] if you need to call her

## 2017-03-20 NOTE — LETTER
March 20, 2017      Rafa Peterson MD  [address] [facility] [address]           [place] [facility] [address] [address] [place] [address]  Phone: [phone]  Fax: [phone]          Patient: Zakia Price   MR Number: [id number]   YOB: 1960   Date of Visit: 3/20/2017       Dear Dr. Rafa Peterson:    Thank you for referring Zakia Price to me for evaluation. Attached you will find relevant portions of my assessment and plan of care.    If you have questions, please do not hesitate to call me. I look forward to following Zakia Price along with you.    Sincerely,    DO Marga Ellsworthosure  CC:  No Recipients    If you would like to receive this communication electronically, please contact [email or web address] or [phone] to request more information on [facility] access.    For providers and/or their staff who would like to refer a patient to [facility], please contact us through our one-stop-shop provider referral line, [facility], at [phone].    If you feel you have received this communication in error or would no longer like to receive these types of communications, please e-mail [email or web address]

## 2017-03-22 ENCOUNTER — TELEPHONE (OUTPATIENT)
Dept: ENDOCRINOLOGY | Facility: CLINIC | Age: 57
End: 2017-03-22

## 2017-04-05 DIAGNOSIS — M25.561 CHRONIC PAIN OF BOTH KNEES: ICD-10-CM

## 2017-04-05 DIAGNOSIS — M17.0 PRIMARY OSTEOARTHRITIS OF BOTH KNEES: ICD-10-CM

## 2017-04-05 DIAGNOSIS — M19.012 ARTHRITIS OF LEFT ACROMIOCLAVICULAR JOINT: ICD-10-CM

## 2017-04-05 DIAGNOSIS — M21.169 ACQUIRED VARUS DEFORMITY OF KNEE, UNSPECIFIED LATERALITY: ICD-10-CM

## 2017-04-05 DIAGNOSIS — G89.29 CHRONIC PAIN OF BOTH KNEES: ICD-10-CM

## 2017-04-05 DIAGNOSIS — M25.562 CHRONIC PAIN OF BOTH KNEES: ICD-10-CM

## 2017-04-05 RX ORDER — NAPROXEN 500 MG/1
500 TABLET ORAL 2 TIMES DAILY WITH MEALS
Qty: 60 TABLET | Refills: 3 | Status: SHIPPED | OUTPATIENT
Start: 2017-04-05 | End: 2017-08-29 | Stop reason: SDUPTHER

## 2017-04-11 ENCOUNTER — LAB VISIT (OUTPATIENT)
Dept: LAB | Facility: HOSPITAL | Age: 57
End: 2017-04-11
Attending: FAMILY MEDICINE
Payer: COMMERCIAL

## 2017-04-11 ENCOUNTER — TELEPHONE (OUTPATIENT)
Dept: FAMILY MEDICINE | Facility: CLINIC | Age: 57
End: 2017-04-11

## 2017-04-11 DIAGNOSIS — E11.9 TYPE II OR UNSPECIFIED TYPE DIABETES MELLITUS WITHOUT MENTION OF COMPLICATION, NOT STATED AS UNCONTROLLED: ICD-10-CM

## 2017-04-11 DIAGNOSIS — L60.0 INGROWING TOENAIL: Primary | ICD-10-CM

## 2017-04-11 DIAGNOSIS — E78.5 ELEVATED LIPIDS: ICD-10-CM

## 2017-04-11 LAB
ALT SERPL W/O P-5'-P-CCNC: 16 U/L
ANION GAP SERPL CALC-SCNC: 9 MMOL/L
BUN SERPL-MCNC: 16 MG/DL
CALCIUM SERPL-MCNC: 9.2 MG/DL
CHLORIDE SERPL-SCNC: 106 MMOL/L
CHOLEST/HDLC SERPL: 2.7 {RATIO}
CO2 SERPL-SCNC: 27 MMOL/L
CREAT SERPL-MCNC: 0.8 MG/DL
EST. GFR  (AFRICAN AMERICAN): >60 ML/MIN/1.73 M^2
EST. GFR  (NON AFRICAN AMERICAN): >60 ML/MIN/1.73 M^2
GLUCOSE SERPL-MCNC: 79 MG/DL
HDL/CHOLESTEROL RATIO: 37.7 %
HDLC SERPL-MCNC: 191 MG/DL
HDLC SERPL-MCNC: 72 MG/DL
LDLC SERPL CALC-MCNC: 105 MG/DL
NONHDLC SERPL-MCNC: 119 MG/DL
POTASSIUM SERPL-SCNC: 4.1 MMOL/L
SODIUM SERPL-SCNC: 142 MMOL/L
TRIGL SERPL-MCNC: 70 MG/DL

## 2017-04-11 PROCEDURE — 80061 LIPID PANEL: CPT

## 2017-04-11 PROCEDURE — 36415 COLL VENOUS BLD VENIPUNCTURE: CPT | Mod: PO

## 2017-04-11 PROCEDURE — 80048 BASIC METABOLIC PNL TOTAL CA: CPT

## 2017-04-11 PROCEDURE — 84460 ALANINE AMINO (ALT) (SGPT): CPT

## 2017-04-11 NOTE — TELEPHONE ENCOUNTER
Patient has an infected ingrown toenail and is asking for a podiatry referral, next podiatry appt is not until 4/28/17, do you want to work her in one day this week?

## 2017-04-11 NOTE — TELEPHONE ENCOUNTER
If this is recurrent recommend see podiatry otherwise can schedule appointment here with me or podiatry.  May want to see if podiatry has appointment elsewhere sooner as well.

## 2017-04-11 NOTE — TELEPHONE ENCOUNTER
----- Message from Peggy Almonte sent at 4/11/2017  8:58 AM CDT -----  Contact: Patient  Patient would like a referral for [facility] here in [facility].  Please call and advise @ [phone].  Lea/ROOPA

## 2017-04-12 RX ORDER — PANTOPRAZOLE SODIUM 40 MG/1
40 TABLET, DELAYED RELEASE ORAL DAILY
Qty: 90 TABLET | Refills: 3 | Status: SHIPPED | OUTPATIENT
Start: 2017-04-12 | End: 2018-04-27 | Stop reason: SDUPTHER

## 2017-04-12 RX ORDER — LEVOTHYROXINE SODIUM 100 UG/1
100 TABLET ORAL DAILY
Qty: 90 TABLET | Refills: 3 | Status: SHIPPED | OUTPATIENT
Start: 2017-04-12 | End: 2018-03-26 | Stop reason: SDUPTHER

## 2017-04-12 NOTE — TELEPHONE ENCOUNTER
Patient informed, she will find a podiatrist in [place] and call with information as next available DC is 5/16 and she does not want to go to [place].

## 2017-05-01 RX ORDER — PRAVASTATIN SODIUM 40 MG/1
40 TABLET ORAL DAILY
Qty: 90 TABLET | Refills: 3 | Status: SHIPPED | OUTPATIENT
Start: 2017-05-01 | End: 2017-07-07 | Stop reason: DRUGHIGH

## 2017-05-01 NOTE — TELEPHONE ENCOUNTER
----- Message from Mariel Josue sent at 5/1/2017  2:32 PM CDT -----  Patient calling to get test results. Please adv/call [phone].[email or web address]

## 2017-05-01 NOTE — TELEPHONE ENCOUNTER
----- Message from Rafa Peterson MD sent at 4/28/2017  3:24 PM CDT -----  Lipids above goal.  Recommend increase pravastatin 40 mg daily.  Check lipid, ALT with her next hemoglobin A1c in June. My nurse will contact you to arrange.  Thanks,  Dr. Peterson    Results released on [facility]

## 2017-05-10 ENCOUNTER — TELEPHONE (OUTPATIENT)
Dept: FAMILY MEDICINE | Facility: CLINIC | Age: 57
End: 2017-05-10

## 2017-05-10 NOTE — TELEPHONE ENCOUNTER
----- Message from Bela Silva sent at 5/10/2017 10:27 AM CDT -----  Contact: [facility]  Altaf called to speak with the nurse; the patient states that she is having issues with her current diabetic meter. The patient wants the doctor to pick one for her. He can be contacted at [phone].    Fax number [phone].    Thanks,  Bela

## 2017-05-10 NOTE — TELEPHONE ENCOUNTER
Left message on voice mail to return call, she needs to check with her insurance company to see which meter is covered and then call with information so RX can be sent to pharmacy.  (attempted to call pharmacy, kept transferring to different people and was placed on hold too long)

## 2017-05-17 ENCOUNTER — OFFICE VISIT (OUTPATIENT)
Dept: ORTHOPEDICS | Facility: CLINIC | Age: 57
End: 2017-05-17
Payer: COMMERCIAL

## 2017-05-17 VITALS
BODY MASS INDEX: 43.4 KG/M2 | HEART RATE: 70 BPM | SYSTOLIC BLOOD PRESSURE: 136 MMHG | WEIGHT: 293 LBS | HEIGHT: 69 IN | DIASTOLIC BLOOD PRESSURE: 82 MMHG

## 2017-05-17 DIAGNOSIS — M21.169 ACQUIRED VARUS DEFORMITY OF KNEE, UNSPECIFIED LATERALITY: ICD-10-CM

## 2017-05-17 DIAGNOSIS — M17.0 PRIMARY OSTEOARTHRITIS OF BOTH KNEES: Primary | ICD-10-CM

## 2017-05-17 DIAGNOSIS — M25.561 CHRONIC PAIN OF BOTH KNEES: ICD-10-CM

## 2017-05-17 DIAGNOSIS — G89.29 CHRONIC PAIN OF BOTH KNEES: ICD-10-CM

## 2017-05-17 DIAGNOSIS — M25.562 CHRONIC PAIN OF BOTH KNEES: ICD-10-CM

## 2017-05-17 PROCEDURE — 1160F RVW MEDS BY RX/DR IN RCRD: CPT | Mod: S$GLB,,, | Performed by: ORTHOPAEDIC SURGERY

## 2017-05-17 PROCEDURE — 3075F SYST BP GE 130 - 139MM HG: CPT | Mod: S$GLB,,, | Performed by: ORTHOPAEDIC SURGERY

## 2017-05-17 PROCEDURE — 99999 PR PBB SHADOW E&M-EST. PATIENT-LVL III: CPT | Mod: PBBFAC,,, | Performed by: ORTHOPAEDIC SURGERY

## 2017-05-17 PROCEDURE — 20610 DRAIN/INJ JOINT/BURSA W/O US: CPT | Mod: LT,S$GLB,, | Performed by: ORTHOPAEDIC SURGERY

## 2017-05-17 PROCEDURE — 99213 OFFICE O/P EST LOW 20 MIN: CPT | Mod: 25,S$GLB,, | Performed by: ORTHOPAEDIC SURGERY

## 2017-05-17 PROCEDURE — 3079F DIAST BP 80-89 MM HG: CPT | Mod: S$GLB,,, | Performed by: ORTHOPAEDIC SURGERY

## 2017-05-17 RX ORDER — TRIAMCINOLONE ACETONIDE 40 MG/ML
80 INJECTION, SUSPENSION INTRA-ARTICULAR; INTRAMUSCULAR
Status: DISCONTINUED | OUTPATIENT
Start: 2017-05-17 | End: 2017-05-17 | Stop reason: HOSPADM

## 2017-05-17 RX ADMIN — TRIAMCINOLONE ACETONIDE 80 MG: 40 INJECTION, SUSPENSION INTRA-ARTICULAR; INTRAMUSCULAR at 10:05

## 2017-05-17 NOTE — PROCEDURES
Large Joint Aspiration/Injection  Date/Time: 5/17/2017 10:03 AM  Performed by: CAMILA MARKHAM  Authorized by: CAMILA MARKHAM     Consent Done?:  Yes (Verbal)  Indications:  Pain  Procedure site marked: Yes    Timeout: Prior to procedure the correct patient, procedure, and site was verified      Location:  Knee  Site:  L knee  Prep: Patient was prepped and draped in usual sterile fashion    Ultrasonic Guidance for needle placement: No  Needle size:  22 G  Approach:  Anterolateral  Medications:  80 mg triamcinolone acetonide 40 mg/mL  Patient tolerance:  Patient tolerated the procedure well with no immediate complications

## 2017-05-17 NOTE — MR AVS SNAPSHOT
[facility]  [address] [facility] [address]  Phone: [phone]                  Zakia Price   2017 9:45 AM   Office Visit    Description:  Female : 1960   Provider:  Gonzalo Roberts MD   Department:  [facility]           Reason for Visit     Left Knee - Pain     Right Knee - Pain           Diagnoses this Visit        Comments    Primary osteoarthritis of both knees    -  Primary     Chronic pain of both knees         Acquired varus deformity of knee, unspecified laterality                To Do List           Future Appointments        Provider Department Dept Phone    2017 9:30 AM Gonzalo Roberts MD [facility] [phone]    2017 10:30 AM Atul Glynn MD [facility] [phone]    2017 9:45 AM LABORATORY, [place] [facility] [phone]    2017 8:25 AM LABORATORY, [place] [facility] [phone]    2017 9:30 AM Gurdeep Fontana DO [facility] [phone]      Goals (5 Years of Data)     None      Follow-Up and Disposition     Return in about 1 week (around 2017) for right knee injection.    Follow-up and Disposition History      [facility]     [facility] Nurse Care [facility] -  Assistance  Unless otherwise directed by your provider, please contact [facility], our nurse care line that is available for  assistance.     Registered nurses in the [facility] provide: appointment scheduling, clinical advisement, health education, and other advisory services.  Call: [phone] (toll free)               Medications           Message regarding Medications     Verify the changes and/or additions to your medication regime listed below are the same as discussed with your clinician today.  If any of these changes or additions are incorrect, please notify your healthcare provider.        These medications were  "administered today        Dose Freq    triamcinolone acetonide injection 80 mg 80 mg     Sig: Inject 80 mg into the articular space.    Class: Normal    Route: Intra-articular           Verify that the below list of medications is an accurate representation of the medications you are currently taking.  If none reported, the list may be blank. If incorrect, please contact your healthcare provider. Carry this list with you in case of emergency.           Current Medications     acetaminophen (TYLENOL) 500 MG tablet Take 2 tablets (1,000 mg total) by mouth every 6 (six) hours as needed for Pain.    amlodipine (NORVASC) 5 MG tablet Take 1 tablet (5 mg total) by mouth once daily.    blood sugar diagnostic (CLEVER CHOICE VOICE+ TEST) Strp TEST BLOOD SUGARS ONE TIME DAILY    cyclobenzaprine (FLEXERIL) 10 MG tablet Take 1 tablet (10 mg total) by mouth 3 (three) times daily as needed.    diphenhydrAMINE (BENADRYL) 25 mg capsule Take 1 each (25 mg total) by mouth every 6 (six) hours as needed for Itching.    fluticasone (FLONASE) 50 mcg/actuation nasal spray Use 2 sprays to each nostril daily    gabapentin (NEURONTIN) 600 MG tablet Take 1 tablet (600 mg total) by mouth 3 (three) times daily.    lancets Misc As directed    levothyroxine (SYNTHROID) 100 MCG tablet Take 1 tablet (100 mcg total) by mouth once daily.    metformin (GLUCOPHAGE) 1000 MG tablet Take 1 tablet (1,000 mg total) by mouth 2 (two) times daily with meals.    naproxen (NAPROSYN) 500 MG tablet Take 1 tablet (500 mg total) by mouth 2 (two) times daily with meals.    pantoprazole (PROTONIX) 40 MG tablet Take 1 tablet (40 mg total) by mouth once daily.    pravastatin (PRAVACHOL) 40 MG tablet Take 1 tablet (40 mg total) by mouth once daily.           Clinical Reference Information           Your Vitals Were     BP Pulse Height Weight BMI    136/82 70 5' 8.5" (1.74 m) 162.4 kg (358 lb) 53.64 kg/m2      Blood Pressure          Most Recent Value    BP  136/82    "   Allergies as of 5/17/2017     Lisinopril    Codeine      Immunizations Administered on Date of Encounter - 5/17/2017     None      Orders Placed During Today's Visit      Normal Orders This Visit    Large Joint Aspiration/Injection       Administrations This Visit     triamcinolone acetonide injection 80 mg     Admin Date Action Dose Route Administered By             05/17/2017 Given 80 mg Intra-articular Gonzalo Roberts MD                      Language Assistance Services     ATTENTION: Language assistance services are available, free of charge. Please call [phone].      ATENCIÓN: Si ashia araiza, tiene a blanco disposición servicios gratuitos de asistencia lingüística. Llame al [phone].     CHÚ Ý: N?u b?n nói Ti?ng Vi?t, có các d?ch v? h? tr? ngôn ng? mi?n phí dành cho b?n. G?i s? [phone].         [facility] complies with applicable Federal civil rights laws and does not discriminate on the basis of race, color, national origin, age, disability, or sex.

## 2017-05-17 NOTE — PROGRESS NOTES
CC:Bilateral KNEE pain    HISTORY OF PRESENT ILLNESS:  Zakia Price is a 57 y.o. right hand dominant Female who is here for her f/u with left > right knee pain. She would like to have her left knee injected today.    History ispositive fortrauma with falls in the past. No surgeries in the past.    Today the patient rates pain at a 0-8/10 on visual analog scale.     She reports that the pain is worse with standing, walking, stairs, rising from a seated position.  It does currently wake the patient at night.    negative for mechanical symptoms, +/-instability, swelling at times and activity related    It does affect the performance of ADLs.     Occupation:   With some limitations secondary to knee pain    Past Medical History:   Diagnosis Date    Diabetes mellitus, type 2     Diabetic neuropathy 1/27/2014    DJD (degenerative joint disease) of knee     DVT (deep venous thrombosis) around 1990's    in leg, is on no anticoagulant therapy presently    GERD (gastroesophageal reflux disease)     Hypertension associated with diabetes     Multinodular goiter     Obesity, morbid, BMI 50 or higher     Sleep apnea     has no CPAP       Past Surgical History:   Procedure Laterality Date    HYSTERECTOMY      SHOULDER ARTHROSCOPY      THYROIDECTOMY, PARTIAL Right     and transplatation of right superior parathyroid gland to the sternocleidomastoid muscle     TONSILLECTOMY      WRIST FRACTURE SURGERY      left       Family History   Problem Relation Age of Onset    Leukemia Son     Diabetes Mother     Hypertension Mother     Heart disease Mother 50    Cancer Father     Cancer Brother          Current Outpatient Prescriptions:     acetaminophen (TYLENOL) 500 MG tablet, Take 2 tablets (1,000 mg total) by mouth every 6 (six) hours as needed for Pain., Disp: , Rfl:     amlodipine (NORVASC) 5 MG tablet, Take 1 tablet (5 mg total) by mouth once daily., Disp: 90 tablet, Rfl: 1    blood sugar  diagnostic (CLEVER CHOICE VOICE+ TEST) Strp, TEST BLOOD SUGARS ONE TIME DAILY, Disp: 100 strip, Rfl: 1    cyclobenzaprine (FLEXERIL) 10 MG tablet, Take 1 tablet (10 mg total) by mouth 3 (three) times daily as needed., Disp: 30 tablet, Rfl: 5    diphenhydrAMINE (BENADRYL) 25 mg capsule, Take 1 each (25 mg total) by mouth every 6 (six) hours as needed for Itching., Disp: , Rfl: 0    fluticasone (FLONASE) 50 mcg/actuation nasal spray, Use 2 sprays to each nostril daily, Disp: 16 g, Rfl: 5    gabapentin (NEURONTIN) 600 MG tablet, Take 1 tablet (600 mg total) by mouth 3 (three) times daily., Disp: 270 tablet, Rfl: 1    lancets Misc, As directed, Disp: 100 each, Rfl: 1    levothyroxine (SYNTHROID) 100 MCG tablet, Take 1 tablet (100 mcg total) by mouth once daily., Disp: 90 tablet, Rfl: 3    metformin (GLUCOPHAGE) 1000 MG tablet, Take 1 tablet (1,000 mg total) by mouth 2 (two) times daily with meals., Disp: 180 tablet, Rfl: 1    naproxen (NAPROSYN) 500 MG tablet, Take 1 tablet (500 mg total) by mouth 2 (two) times daily with meals., Disp: 60 tablet, Rfl: 3    pantoprazole (PROTONIX) 40 MG tablet, Take 1 tablet (40 mg total) by mouth once daily., Disp: 90 tablet, Rfl: 3    pravastatin (PRAVACHOL) 40 MG tablet, Take 1 tablet (40 mg total) by mouth once daily., Disp: 90 tablet, Rfl: 3    Review of patient's allergies indicates:   Allergen Reactions    Lisinopril Swelling     angioedema    Codeine Nausea Only and Rash       Review of Systems   Constitutional: Positive for weight loss. Negative for diaphoresis and fever.   HENT: Positive for ear pain. Negative for hearing loss, nosebleeds and tinnitus.         ETHAN currently not on CPAP   Eyes: Positive for redness. Negative for pain.   Respiratory: Negative for cough and shortness of breath.    Cardiovascular: Negative for chest pain and palpitations.        HTN   Gastrointestinal: Positive for heartburn. Negative for blood in stool, constipation, diarrhea, nausea  and vomiting.   Genitourinary: Negative for dysuria, frequency and hematuria.   Musculoskeletal: Positive for joint pain and myalgias. Negative for back pain.   Skin: Negative for itching and rash.   Neurological: Positive for headaches. Negative for dizziness, tingling, seizures and weakness.   Endo/Heme/Allergies: Positive for environmental allergies.        Diabetes Type II   Psychiatric/Behavioral: The patient is not nervous/anxious.    All other systems reviewed and are negative.      OBJECTIVE:  Vitals:    05/17/17 0949   BP: 136/82   Pulse: 70       PHYSICAL EXAMINATION    General:  The patient is alert and oriented x 3.  Mood is pleasant.  Observation of ears, eyes and nose reveal no gross abnormalities.  No labored breathing observed.    Both KNEE EXAMINATION     OBSERVATION / INSPECTION   Gait:   Antalgic   Alignment:  Varus  Scars:   None   Muscle atrophy: Mild  Effusion:  None   Warmth:  None   Discoloration:   none     TENDERNESS / CREPITUS (T / C):  L>R on exam today        T / C      T / C   Patella   ++ / -   Lateral joint line   - / -    Peripatellar medial  ++  Medial joint line    + / -    Peripatellar lateral ++  Medial plica   - / -    Patellar tendon -   Popliteal fossa   - / -    Quad tendon   -   Gastrocnemius   -   Prepatellar Bursa - / -   Quadricep   -   Tibial tubercle  -  Thigh/hamstring  -   Pes anserine/HS -  Fibula    -   ITB   - / -  Tibia     -   Tib/fib joint  - / -  LCL    -     MFC   - / -   MCL: Proximal  -    LFC   - / -    Distal   -          ROM: (* = pain)  PASSIVE  ACTIVE    Left :   0 / 100   0 / 100  **   Right :    0 / 110   0 / 110  *    Patellofemoral examination:  See above noted areas of tenderness.   Patella position    Subluxation / dislocation: Centered           Sup. / Inf;   Normal   Crepitus (PF):    +++   Patellar Mobility:       Medial-lateral:   absent   Superior-inferior:  absent    Inferior tilt   Normal    Patellar tendon:  Normal   Lateral  tilt:    Normal   J-sign:     None   Patellofemoral grind:   +++      MENISCAL SIGNS:     Pain on terminal extension:  -  Pain on terminal flexion:  +  Ashs maneuver:  - for pain      LIGAMENT EXAMINATION:  ACL / Lachman:  normal (-1 to 2mm)    PCL-Post.  drawer: normal 0 to 2mm  MCL- Valgus:  Tight MCL  LCL- Varus:  normal 0 to 2mm  Pivot shift: normal (Equal)       STRENGTH: (* = with pain) PAINFUL SIDE   Quadricep   5/5   Hamstrin/5    EXTREMITY NEURO-VASCULAR EXAMINATION:   Sensation:  Grossly intact to light touch all dermatomal regions.   Motor Function:  Fully intact motor function at hip, knee, foot and ankle     Vascular status:  DP and PT pulses 2+, brisk capillary refill, symmetric.     Xrays: Reviewed personally by me (standing AP/flexion, lateral, sunrise) show:   Findings: Findings:  AP and PA flexion standing views of both knees as well as a lateral and Merchant views of the both knees were obtained.  Compared to 2014. Again noted is advanced tricompartment degenerative change in both knees without superimposed acute osseous abnormalities. Stable calcific densities in the suprapatellar spaces and popliteal fossae suggestive of osteocartilaginous loose bodies.     ASSESSMENT:    Bilateral Knee Osteoarthritis Endstage  Varus Deformity    PLAN:   left knee injection  Naproxen 500mg PO BIDWM  F/U in one week for right knee  All questions were answered, pt will contact us for questions or concerns in the interim.      Answers for HPI/ROS submitted by the patient on 2017   Leg pain  unexpected weight change: No  appetite change : No  sleep disturbance: No  IMMUNOCOMPROMISED: No  dysphoric mood: No  visual disturbance: No  sinus pressure : Yes  food allergies: No  difficulty urinating: No  painful intercourse: No  numbness: Yes  joint swelling: Yes  Pain Chronicity: chronic  History of trauma: Yes  Onset: 1 to 4 weeks ago  Frequency: constantly  Progression since onset:  unchanged  Injury mechanism: falling  injury location: at work  pain- numeric: 4/10  pain location: left knee, right knee  pain quality: aching, tight  Radiating Pain: No  If your pain is radiating, to what part of the body?: left knee, right knee  Aggravating factors: walking  inability to bear weight: Yes  joint locking: No  limited range of motion: Yes  stiffness: No  Treatments tried: injection treatment, OTC pain meds  physical therapy: not tried  Improvement on treatment: moderate

## 2017-05-22 ENCOUNTER — TELEPHONE (OUTPATIENT)
Dept: FAMILY MEDICINE | Facility: CLINIC | Age: 57
End: 2017-05-22

## 2017-05-23 ENCOUNTER — TELEPHONE (OUTPATIENT)
Dept: FAMILY MEDICINE | Facility: CLINIC | Age: 57
End: 2017-05-23

## 2017-05-23 DIAGNOSIS — E11.9 TYPE 2 DIABETES MELLITUS WITHOUT COMPLICATION, WITHOUT LONG-TERM CURRENT USE OF INSULIN: Primary | ICD-10-CM

## 2017-05-23 NOTE — TELEPHONE ENCOUNTER
----- Message from [place] sent at 5/23/2017 10:33 AM CDT -----  Contact: [facility] [phone] Ref#[id number]  Would like to verify if fax was received for a New Rx request for test strips, lancets and meter.  Please fax request to Fax to [phone].  Please call back  [phone]- Ref# [id number]. x-[facility]

## 2017-05-24 ENCOUNTER — OFFICE VISIT (OUTPATIENT)
Dept: ORTHOPEDICS | Facility: CLINIC | Age: 57
End: 2017-05-24
Payer: COMMERCIAL

## 2017-05-24 VITALS
SYSTOLIC BLOOD PRESSURE: 140 MMHG | DIASTOLIC BLOOD PRESSURE: 88 MMHG | HEIGHT: 69 IN | BODY MASS INDEX: 43.4 KG/M2 | WEIGHT: 293 LBS | HEART RATE: 90 BPM

## 2017-05-24 DIAGNOSIS — M17.0 PRIMARY OSTEOARTHRITIS OF BOTH KNEES: Primary | ICD-10-CM

## 2017-05-24 DIAGNOSIS — M25.562 CHRONIC PAIN OF BOTH KNEES: ICD-10-CM

## 2017-05-24 DIAGNOSIS — M25.561 CHRONIC PAIN OF BOTH KNEES: ICD-10-CM

## 2017-05-24 DIAGNOSIS — G89.29 CHRONIC PAIN OF BOTH KNEES: ICD-10-CM

## 2017-05-24 DIAGNOSIS — M21.169 ACQUIRED VARUS DEFORMITY OF KNEE, UNSPECIFIED LATERALITY: ICD-10-CM

## 2017-05-24 PROCEDURE — 1160F RVW MEDS BY RX/DR IN RCRD: CPT | Mod: S$GLB,,, | Performed by: ORTHOPAEDIC SURGERY

## 2017-05-24 PROCEDURE — 20610 DRAIN/INJ JOINT/BURSA W/O US: CPT | Mod: S$GLB,,, | Performed by: ORTHOPAEDIC SURGERY

## 2017-05-24 PROCEDURE — 3079F DIAST BP 80-89 MM HG: CPT | Mod: S$GLB,,, | Performed by: ORTHOPAEDIC SURGERY

## 2017-05-24 PROCEDURE — 99999 PR PBB SHADOW E&M-EST. PATIENT-LVL III: CPT | Mod: PBBFAC,,, | Performed by: ORTHOPAEDIC SURGERY

## 2017-05-24 PROCEDURE — 99213 OFFICE O/P EST LOW 20 MIN: CPT | Mod: 25,S$GLB,, | Performed by: ORTHOPAEDIC SURGERY

## 2017-05-24 PROCEDURE — 3077F SYST BP >= 140 MM HG: CPT | Mod: S$GLB,,, | Performed by: ORTHOPAEDIC SURGERY

## 2017-05-24 RX ORDER — TRIAMCINOLONE ACETONIDE 40 MG/ML
80 INJECTION, SUSPENSION INTRA-ARTICULAR; INTRAMUSCULAR
Status: DISCONTINUED | OUTPATIENT
Start: 2017-05-24 | End: 2017-05-24 | Stop reason: HOSPADM

## 2017-05-24 RX ADMIN — TRIAMCINOLONE ACETONIDE 80 MG: 40 INJECTION, SUSPENSION INTRA-ARTICULAR; INTRAMUSCULAR at 10:05

## 2017-05-24 NOTE — PROCEDURES
Large Joint Aspiration/Injection  Date/Time: 5/24/2017 10:19 AM  Performed by: CAMILA MARKHAM  Authorized by: CAMILA MARKHAM     Consent Done?:  Yes (Verbal)  Indications:  Pain  Procedure site marked: Yes    Timeout: Prior to procedure the correct patient, procedure, and site was verified      Location:  Knee  Site:  R knee  Prep: Patient was prepped and draped in usual sterile fashion    Ultrasonic Guidance for needle placement: No  Needle size:  22 G  Approach:  Anterolateral  Medications:  80 mg triamcinolone acetonide 40 mg/mL  Patient tolerance:  Patient tolerated the procedure well with no immediate complications

## 2017-05-24 NOTE — TELEPHONE ENCOUNTER
Fax received, will get RX printed, signed, then will fax to [facility], patient not using [facility] anymore

## 2017-05-24 NOTE — PROGRESS NOTES
CC:Bilateral KNEE pain (right knee is worse today)    HISTORY OF PRESENT ILLNESS:  Zakia Price is a 57 y.o. right hand dominant Female who is here for her f/u. She would like to have her right knee injected today. She had her left knee injected last week and is doing better in regards to that.    History ispositive fortrauma with falls in the past. No surgeries in the past.    Today the patient rates pain at a 0-10/10 on visual analog scale.     She reports that the pain is worse with standing, walking, stairs, rising from a seated position.  It does currently wake the patient at night.    negative for mechanical symptoms, +/-instability, swelling at times and activity related    It does affect the performance of ADLs.     Occupation:   With some limitations secondary to knee pain    Past Medical History:   Diagnosis Date    Diabetes mellitus, type 2     Diabetic neuropathy 1/27/2014    DJD (degenerative joint disease) of knee     DVT (deep venous thrombosis) around 1990's    in leg, is on no anticoagulant therapy presently    GERD (gastroesophageal reflux disease)     Hypertension associated with diabetes     Multinodular goiter     Obesity, morbid, BMI 50 or higher     Sleep apnea     has no CPAP       Past Surgical History:   Procedure Laterality Date    HYSTERECTOMY      SHOULDER ARTHROSCOPY      THYROIDECTOMY, PARTIAL Right     and transplatation of right superior parathyroid gland to the sternocleidomastoid muscle     TONSILLECTOMY      WRIST FRACTURE SURGERY      left       Family History   Problem Relation Age of Onset    Leukemia Son     Diabetes Mother     Hypertension Mother     Heart disease Mother 50    Cancer Father     Cancer Brother          Current Outpatient Prescriptions:     acetaminophen (TYLENOL) 500 MG tablet, Take 2 tablets (1,000 mg total) by mouth every 6 (six) hours as needed for Pain., Disp: , Rfl:     amlodipine (NORVASC) 5 MG tablet, Take 1  tablet (5 mg total) by mouth once daily., Disp: 90 tablet, Rfl: 1    blood sugar diagnostic (CLEVER CHOICE VOICE+ TEST) Strp, TEST BLOOD SUGARS ONE TIME DAILY, Disp: 100 strip, Rfl: 1    cyclobenzaprine (FLEXERIL) 10 MG tablet, Take 1 tablet (10 mg total) by mouth 3 (three) times daily as needed., Disp: 30 tablet, Rfl: 5    diphenhydrAMINE (BENADRYL) 25 mg capsule, Take 1 each (25 mg total) by mouth every 6 (six) hours as needed for Itching., Disp: , Rfl: 0    fluticasone (FLONASE) 50 mcg/actuation nasal spray, Use 2 sprays to each nostril daily, Disp: 16 g, Rfl: 5    gabapentin (NEURONTIN) 600 MG tablet, Take 1 tablet (600 mg total) by mouth 3 (three) times daily., Disp: 270 tablet, Rfl: 1    lancets Misc, As directed, Disp: 100 each, Rfl: 1    levothyroxine (SYNTHROID) 100 MCG tablet, Take 1 tablet (100 mcg total) by mouth once daily., Disp: 90 tablet, Rfl: 3    metformin (GLUCOPHAGE) 1000 MG tablet, Take 1 tablet (1,000 mg total) by mouth 2 (two) times daily with meals., Disp: 180 tablet, Rfl: 1    naproxen (NAPROSYN) 500 MG tablet, Take 1 tablet (500 mg total) by mouth 2 (two) times daily with meals., Disp: 60 tablet, Rfl: 3    pantoprazole (PROTONIX) 40 MG tablet, Take 1 tablet (40 mg total) by mouth once daily., Disp: 90 tablet, Rfl: 3    pravastatin (PRAVACHOL) 40 MG tablet, Take 1 tablet (40 mg total) by mouth once daily., Disp: 90 tablet, Rfl: 3    Review of patient's allergies indicates:   Allergen Reactions    Lisinopril Swelling     angioedema    Codeine Nausea Only and Rash       Review of Systems   Constitutional: Positive for weight loss. Negative for diaphoresis and fever.   HENT: Positive for ear pain. Negative for hearing loss, nosebleeds and tinnitus.         EHTAN currently not on CPAP   Eyes: Positive for redness. Negative for pain.   Respiratory: Negative for cough and shortness of breath.    Cardiovascular: Negative for chest pain and palpitations.        HTN   Gastrointestinal:  Positive for heartburn. Negative for blood in stool, constipation, diarrhea, nausea and vomiting.   Genitourinary: Negative for dysuria, frequency and hematuria.   Musculoskeletal: Positive for joint pain and myalgias. Negative for back pain.   Skin: Negative for itching and rash.   Neurological: Positive for headaches. Negative for dizziness, tingling, seizures and weakness.   Endo/Heme/Allergies: Positive for environmental allergies.        Diabetes Type II   Psychiatric/Behavioral: The patient is not nervous/anxious.    All other systems reviewed and are negative.      OBJECTIVE:  Vitals:    05/24/17 0937   BP: (!) 140/88   Pulse: 90       PHYSICAL EXAMINATION    General:  The patient is alert and oriented x 3.  Mood is pleasant.  Observation of ears, eyes and nose reveal no gross abnormalities.  No labored breathing observed.    Both KNEE EXAMINATION     OBSERVATION / INSPECTION   Gait:   Antalgic   Alignment:  Varus  Scars:   None   Muscle atrophy: Mild  Effusion:  None   Warmth:  None   Discoloration:   none     TENDERNESS / CREPITUS (T / C):  R>L on exam today        T / C      T / C   Patella   ++ / -   Lateral joint line   - / -    Peripatellar medial  ++  Medial joint line    + / -    Peripatellar lateral ++  Medial plica   - / -    Patellar tendon -   Popliteal fossa   - / -    Quad tendon   -   Gastrocnemius   -   Prepatellar Bursa - / -   Quadricep   -   Tibial tubercle  -  Thigh/hamstring  -   Pes anserine/HS -  Fibula    -   ITB   - / -  Tibia     -   Tib/fib joint  - / -  LCL    -     MFC   - / -   MCL: Proximal  -    LFC   - / -    Distal   -          ROM: (* = pain)  PASSIVE  ACTIVE    Left :   0 / 100   0 / 100  **   Right :    0 / 110   0 / 110  *    Patellofemoral examination:  See above noted areas of tenderness.   Patella position    Subluxation / dislocation: Centered           Sup. / Inf;   Normal   Crepitus (PF):    +++   Patellar  Mobility:       Medial-lateral:   absent   Superior-inferior:  absent    Inferior tilt   Normal    Patellar tendon:  Normal   Lateral tilt:    Normal   J-sign:     None   Patellofemoral grind:   +++      MENISCAL SIGNS:     Pain on terminal extension:  -  Pain on terminal flexion:  +  Ashs maneuver:  - for pain      LIGAMENT EXAMINATION:  ACL / Lachman:  normal (-1 to 2mm)    PCL-Post.  drawer: normal 0 to 2mm  MCL- Valgus:  Tight MCL  LCL- Varus:  normal 0 to 2mm  Pivot shift: normal (Equal)       STRENGTH: (* = with pain) PAINFUL SIDE   Quadricep   5/5   Hamstrin/5    EXTREMITY NEURO-VASCULAR EXAMINATION:   Sensation:  Grossly intact to light touch all dermatomal regions.   Motor Function:  Fully intact motor function at hip, knee, foot and ankle     Vascular status:  DP and PT pulses 2+, brisk capillary refill, symmetric.     Xrays: Reviewed personally by me (standing AP/flexion, lateral, sunrise) show:   Findings: Findings:  AP and PA flexion standing views of both knees as well as a lateral and Merchant views of the both knees were obtained.  Compared to 2014. Again noted is advanced tricompartment degenerative change in both knees without superimposed acute osseous abnormalities. Stable calcific densities in the suprapatellar spaces and popliteal fossae suggestive of osteocartilaginous loose bodies.     ASSESSMENT:    Bilateral Knee Osteoarthritis Endstage  Varus Deformity    PLAN:   Right knee injection  Naproxen 500mg PO BIDWM  F/U PRN; can have another injection in three months  All questions were answered, pt will contact us for questions or concerns in the interim.

## 2017-05-26 DIAGNOSIS — M79.671 BILATERAL FOOT PAIN: Primary | ICD-10-CM

## 2017-05-26 DIAGNOSIS — M79.672 BILATERAL FOOT PAIN: Primary | ICD-10-CM

## 2017-05-30 ENCOUNTER — OFFICE VISIT (OUTPATIENT)
Dept: ORTHOPEDICS | Facility: CLINIC | Age: 57
End: 2017-05-30
Payer: COMMERCIAL

## 2017-05-30 ENCOUNTER — HOSPITAL ENCOUNTER (OUTPATIENT)
Dept: RADIOLOGY | Facility: HOSPITAL | Age: 57
Discharge: HOME | End: 2017-05-30
Attending: ORTHOPAEDIC SURGERY
Payer: COMMERCIAL

## 2017-05-30 VITALS — BODY MASS INDEX: 43.4 KG/M2 | WEIGHT: 293 LBS | HEIGHT: 69 IN

## 2017-05-30 DIAGNOSIS — M20.11 HALLUX VALGUS, ACQUIRED, BILATERAL: ICD-10-CM

## 2017-05-30 DIAGNOSIS — M79.671 BILATERAL FOOT PAIN: ICD-10-CM

## 2017-05-30 DIAGNOSIS — E11.42 DIABETIC POLYNEUROPATHY ASSOCIATED WITH TYPE 2 DIABETES MELLITUS: Primary | ICD-10-CM

## 2017-05-30 DIAGNOSIS — M79.672 BILATERAL FOOT PAIN: ICD-10-CM

## 2017-05-30 DIAGNOSIS — M20.12 HALLUX VALGUS, ACQUIRED, BILATERAL: ICD-10-CM

## 2017-05-30 PROCEDURE — 73630 X-RAY EXAM OF FOOT: CPT | Mod: 26,50,, | Performed by: RADIOLOGY

## 2017-05-30 PROCEDURE — 99244 OFF/OP CNSLTJ NEW/EST MOD 40: CPT | Mod: S$GLB,,, | Performed by: ORTHOPAEDIC SURGERY

## 2017-05-30 PROCEDURE — 99999 PR PBB SHADOW E&M-EST. PATIENT-LVL II: CPT | Mod: PBBFAC,,, | Performed by: ORTHOPAEDIC SURGERY

## 2017-05-30 PROCEDURE — 73630 X-RAY EXAM OF FOOT: CPT | Mod: 50,TC,PO

## 2017-05-30 NOTE — LETTER
May 30, 2017      Gonzalo Roberts MD  [address] [facility] [address] [place] [address]           [facility]  [address] [facility] [address] [place] [address]  Phone: [phone]          Patient: Zakia Price   MR Number: [id number]   YOB: 1960   Date of Visit: 5/30/2017       Dear Dr. Gonzalo Roberts:    Thank you for referring Zakia Price to me for evaluation. Attached you will find relevant portions of my assessment and plan of care.    If you have questions, please do not hesitate to call me. I look forward to following Zakia Price along with you.    Sincerely,    Atul Glynn MD    Enclosure  CC:  No Recipients    If you would like to receive this communication electronically, please contact [email or web address] or [phone] to request more information on [employer] access.    For providers and/or their staff who would like to refer a patient to [facility], please contact us through our one-stop-shop provider referral line, [facility], at [phone].    If you feel you have received this communication in error or would no longer like to receive these types of communications, please e-mail [email or web address]

## 2017-06-23 ENCOUNTER — LAB VISIT (OUTPATIENT)
Dept: LAB | Facility: HOSPITAL | Age: 57
End: 2017-06-23
Attending: FAMILY MEDICINE
Payer: COMMERCIAL

## 2017-06-23 ENCOUNTER — OFFICE VISIT (OUTPATIENT)
Dept: FAMILY MEDICINE | Facility: CLINIC | Age: 57
End: 2017-06-23
Payer: OTHER MISCELLANEOUS

## 2017-06-23 VITALS
DIASTOLIC BLOOD PRESSURE: 88 MMHG | HEART RATE: 84 BPM | TEMPERATURE: 98 F | WEIGHT: 293 LBS | SYSTOLIC BLOOD PRESSURE: 138 MMHG | BODY MASS INDEX: 44.41 KG/M2 | HEIGHT: 68 IN

## 2017-06-23 DIAGNOSIS — E11.9 TYPE 2 DIABETES MELLITUS WITHOUT COMPLICATIONS: ICD-10-CM

## 2017-06-23 DIAGNOSIS — E78.5 ELEVATED LIPIDS: ICD-10-CM

## 2017-06-23 DIAGNOSIS — S80.12XA TRAUMATIC HEMATOMA OF LEFT LOWER LEG, INITIAL ENCOUNTER: ICD-10-CM

## 2017-06-23 DIAGNOSIS — S80.12XA CONTUSION OF LEFT LOWER LEG, INITIAL ENCOUNTER: Primary | ICD-10-CM

## 2017-06-23 LAB
ALT SERPL W/O P-5'-P-CCNC: 14 U/L
CHOLEST/HDLC SERPL: 3 {RATIO}
ESTIMATED AVG GLUCOSE: 111 MG/DL
HBA1C MFR BLD HPLC: 5.5 %
HDL/CHOLESTEROL RATIO: 33.5 %
HDLC SERPL-MCNC: 188 MG/DL
HDLC SERPL-MCNC: 63 MG/DL
LDLC SERPL CALC-MCNC: 110.8 MG/DL
NONHDLC SERPL-MCNC: 125 MG/DL
TRIGL SERPL-MCNC: 71 MG/DL

## 2017-06-23 PROCEDURE — 99999 PR PBB SHADOW E&M-EST. PATIENT-LVL III: CPT | Mod: PBBFAC,,, | Performed by: FAMILY MEDICINE

## 2017-06-23 PROCEDURE — 80061 LIPID PANEL: CPT

## 2017-06-23 PROCEDURE — 36415 COLL VENOUS BLD VENIPUNCTURE: CPT | Mod: PO

## 2017-06-23 PROCEDURE — 83036 HEMOGLOBIN GLYCOSYLATED A1C: CPT

## 2017-06-23 PROCEDURE — 84460 ALANINE AMINO (ALT) (SGPT): CPT

## 2017-06-23 PROCEDURE — 99203 OFFICE O/P NEW LOW 30 MIN: CPT | Mod: S$GLB,,, | Performed by: FAMILY MEDICINE

## 2017-06-23 NOTE — PROGRESS NOTES
Workman's comp visit, date of injury June 2 2017    On June 2 patient states that she fell at work and knocked over a school desk which hit her left shin.  Since then she's had some swelling at the area.  She did go to the emergency room and had negative x-ray and ultrasound for DVT.  They felt she had a hematoma at the area.  Area does seem to be decreasing in size slowly.  Slight discomfort with direct palpitation otherwise asymptomatic.  She has been working.    ED Provider Notes - Rhiannon Wilhelm MD - 06/08/2017 6:17 PM CDT  Formatting of this note may be different from the original.    Triage Note Reviewed    History     Chief Complaint   Patient presents with    Leg Pain     Patient is a 57 y.o. female presenting with leg pain. The history is provided by the patient.   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 She don't be fine. There is some more bruising left lower leg, this increasing swelling, is still throbbing, it hurts to walk somewhat at times, and is more bruising of the left side, everybody said that she needs to come in to make sure she doesn't have a blood clot  She says she has walked on a broken bone in the past without being aware of it    No fever or chills shortness of breath chest pain abdominal pain no numbness, no cellulitis, no open wound, awake alert    Review of Systems   Constitutional: Negative for chills and fever.   Respiratory: Negative for shortness of breath.   Cardiovascular: Negative for chest pain.   Gastrointestinal: Negative for abdominal pain.   Musculoskeletal: Positive for arthralgias.   Skin: Positive for color change (bruising of different colors left lower leg medial aspect).   Neurological: Negative for numbness.   Hematological: Bruises/bleeds easily.         Past Medical History:  "  Diagnosis Date    Arthritis    Diabetes mellitus    GERD (gastroesophageal reflux disease)    Hypertension    Sleep apnea   not bad; no machine was ordered    Type II diabetes mellitus     Past Surgical History:   Procedure Laterality Date    Hand surgery    Hysterectomy    Thyroidectomy Right 07/2016    Tonsillectomy     Family History   Problem Relation Age of Onset    Leukemia Son    Diabetes Mother     Social History   Substance Use Topics    Smoking status: Never Smoker    Smokeless tobacco: Never Used    Alcohol use No     Physical Exam     Visit Vitals    /80    Pulse 82    Temp 98.2 °F (36.8 °C)    Resp 18    Ht 5' 8.5" (1.74 m)    Wt (!) 346 lb 9.6 oz (157.2 kg)    SpO2 98%    BMI 51.93 kg/m2     Physical Exam   Constitutional: She is oriented to person, place, and time. She appears well-developed and well-nourished.   Adult female awake alert cooperative and excellent historian   Cardiovascular: Normal rate, regular rhythm and normal heart sounds.   Pulmonary/Chest: Effort normal and breath sounds normal.   Musculoskeletal:   Marked subacute bruising left medial lower leg, with a large area of some increasing swelling, not fluctuant, no abscess, no pulsatile mass noted. No lateral calf pain or swelling. No neck back and hip thigh the left ankle or foot pain, just pain in the left lower leg   Neurological: She is alert and oriented to person, place, and time.   Skin: Skin is warm and dry.   Psychiatric: Her behavior is normal.   Nursing note and vitals reviewed.    ED Course   Labs Reviewed - No data to display    Lab Results for last 36Hrs:  No results found for this or any previous visit (from the past 36 hour(s)).    Diagnostic Results for last 36Hrs:  Us Lower Extremity Veins Left  XR Tibia Fibula Left AP And Lateral6/8/2017  [facility]  Result Narrative   REASON FOR EXAM: desk fell on left lower leg in the past week, increasing bruising and swelling and some " "discomfort     TECHNICAL FACTORS: Two views    COMPARISON: None    FINDINGS: There is no evidence of acute fracture. There is no evidence of subluxation. Moderate osteoarthritis is noted in the knee with mild osteoarthritis in the ankle. No significant soft tissue swelling is identified. The chronic ossification is   incidentally noted at the tip of the lateral malleolus. Calcaneal enthesophytes are noted.    IMPRESSION:  No acute findings.         Electronically signed by Joon Rider MD on 6/8/2017 8:52 PM   Status Results Details       Hospital Encounter on 6/8/2017  [facility]")' href="[email or web address]/">Encounter Summary   US Lower Extremity Veins Left6/8/2017  [facility]  Result Narrative   REASON FOR EXAM: Fell on her left lower leg about a week ago, increasing lower leg swelling and bruising     TECHNICAL FACTORS: B-mode and Doppler sonographic images were obtained of the left common femoral, femoral, popliteal, posterior tibial, peroneal, and proximal great saphenous and profunda femoral veins and right common femoral vein for assessment of   patency.    TECHNOLOGIST: Analilia Ballard    COMPARISON: None    FINDINGS:   Within the left lower extremity and contralateral right common femoral vein, all visualized veins demonstrate compressibility, no internal echoes, and phasic flow with normal augmentation. Study was limited by body habitus.    IMPRESSION:  No evidence of deep venous thrombosis of the left lower extremity.               Electronically signed by Joon Rider MD on 6/8/2017 7:51 PM       Ultrasound left lower leg and x-ray left tib-fib    Ultrasound left lower leg read by radiologist as no DVT    Procedures    ED Course     MDM  Number of Diagnoses or Management Options    Amount and/or Complexity of Data Reviewed  Tests in the radiology section of CPT®: ordered and reviewed    ED Critical Care " Time    Diagnosis:    Subacute left lower leg hematoma. Muscle strain. Arthritis, arthralgia      Rhiannon Wilhelm MD  06/08/17 2058       Past Medical History:  Past Medical History:   Diagnosis Date    Diabetes mellitus, type 2     Diabetic neuropathy 1/27/2014    DJD (degenerative joint disease) of knee     DVT (deep venous thrombosis) around 1990's    in leg, is on no anticoagulant therapy presently    GERD (gastroesophageal reflux disease)     Hypertension associated with diabetes     Multinodular goiter     Obesity, morbid, BMI 50 or higher     Sleep apnea     has no CPAP     Past Surgical History:   Procedure Laterality Date    HYSTERECTOMY      SHOULDER ARTHROSCOPY      THYROIDECTOMY, PARTIAL Right     and transplatation of right superior parathyroid gland to the sternocleidomastoid muscle     TONSILLECTOMY      WRIST FRACTURE SURGERY      left     Social History     Social History    Marital status: Single     Spouse name: N/A    Number of children: N/A    Years of education: N/A     Occupational History    Not on file.     Social History Main Topics    Smoking status: Never Smoker    Smokeless tobacco: Never Used    Alcohol use No    Drug use: No    Sexual activity: No     Other Topics Concern    Not on file     Social History Narrative    No narrative on file     Family History   Problem Relation Age of Onset    Leukemia Son     Diabetes Mother     Hypertension Mother     Heart disease Mother 50    Cancer Father     Cancer Brother      Review of patient's allergies indicates:   Allergen Reactions    Lisinopril Swelling     angioedema    Codeine Nausea Only and Rash     Current Outpatient Prescriptions on File Prior to Visit   Medication Sig Dispense Refill    acetaminophen (TYLENOL) 500 MG tablet Take 2 tablets (1,000 mg total) by mouth every 6 (six) hours as needed for Pain.      amlodipine (NORVASC) 5 MG tablet Take 1 tablet (5 mg total) by mouth once daily. 90 tablet 1  "   blood sugar diagnostic (CLEVER CHOICE VOICE+ TEST) Strp TEST BLOOD SUGARS ONE TIME DAILY 100 strip 1    cyclobenzaprine (FLEXERIL) 10 MG tablet Take 1 tablet (10 mg total) by mouth 3 (three) times daily as needed. 30 tablet 5    diphenhydrAMINE (BENADRYL) 25 mg capsule Take 1 each (25 mg total) by mouth every 6 (six) hours as needed for Itching.  0    fluticasone (FLONASE) 50 mcg/actuation nasal spray Use 2 sprays to each nostril daily 16 g 5    gabapentin (NEURONTIN) 600 MG tablet Take 1 tablet (600 mg total) by mouth 3 (three) times daily. 270 tablet 1    lancets Misc As directed 100 each 1    levothyroxine (SYNTHROID) 100 MCG tablet Take 1 tablet (100 mcg total) by mouth once daily. 90 tablet 3    metformin (GLUCOPHAGE) 1000 MG tablet Take 1 tablet (1,000 mg total) by mouth 2 (two) times daily with meals. 180 tablet 1    naproxen (NAPROSYN) 500 MG tablet Take 1 tablet (500 mg total) by mouth 2 (two) times daily with meals. 60 tablet 3    pantoprazole (PROTONIX) 40 MG tablet Take 1 tablet (40 mg total) by mouth once daily. 90 tablet 3    pravastatin (PRAVACHOL) 40 MG tablet Take 1 tablet (40 mg total) by mouth once daily. 90 tablet 3     No current facility-administered medications on file prior to visit.            ROS:  GENERAL: No fever, chills,  or significant weight changes.   CARDIOVASCULAR: Denies chest pain, PND, orthopnea or reduced exercise tolerance.  ABDOMEN: Appetite fine. Denies diarrhea, abdominal pain, hematemesis or blood in stool.  URINARY: No flank pain, dysuria or hematuria.      OBJECTIVE:     Vitals:    06/23/17 1057   BP: 138/88   Pulse: 84   Temp: 98.3 °F (36.8 °C)   Weight: (!) 157.5 kg (347 lb 1.8 oz)   Height: 5' 8" (1.727 m)     Wt Readings from Last 3 Encounters:   06/23/17 (!) 157.5 kg (347 lb 1.8 oz)   05/30/17 (!) 158.8 kg (350 lb)   05/24/17 (!) 158.9 kg (350 lb 6.4 oz)     APPEARANCE: Well nourished, well developed, in no acute distress.    HEAD: Normocephalic.  " Atraumatic.   EYES:   Right eye: Pupil reactive.  Conjunctiva clear.    Left eye: Pupil reactive.  Conjunctiva clear.    CARDIOVASCULAR: Normal rate.  Regular rhythm.  No murmurs.  No rub.  No gallops.  PERIPHERAL VASCULAR: No cyanosis.  No clubbing.  Trace left lower extremity edema.  She does have palpable hematoma over the shin.  Nontender.  No fluctuance.  NEUROLOGIC: No focal findings.  MENTAL STATUS: Alert.  Oriented x 3.      Zakia was seen today for leg swelling.    Diagnoses and all orders for this visit:    Contusion of left lower leg, initial encounter    Traumatic hematoma of left lower leg, initial encounter      Advised that it may take several months for the hematoma to resolve.  No restrictions with regards to work.  Follow-up as needed.

## 2017-07-07 RX ORDER — LANCETS
EACH MISCELLANEOUS
Qty: 100 EACH | Status: SHIPPED | OUTPATIENT
Start: 2017-07-07 | End: 2020-10-28

## 2017-07-07 RX ORDER — PRAVASTATIN SODIUM 80 MG/1
80 TABLET ORAL DAILY
Qty: 90 TABLET | Refills: 3 | Status: SHIPPED | OUTPATIENT
Start: 2017-07-07 | End: 2018-06-22 | Stop reason: SDUPTHER

## 2017-07-07 RX ORDER — LANCETS
EACH MISCELLANEOUS
COMMUNITY
End: 2017-07-07 | Stop reason: SDUPTHER

## 2017-07-11 ENCOUNTER — TELEPHONE (OUTPATIENT)
Dept: FAMILY MEDICINE | Facility: CLINIC | Age: 57
End: 2017-07-11

## 2017-07-11 NOTE — TELEPHONE ENCOUNTER
----- Message from Jessica Martin sent at 7/11/2017 10:16 AM CDT -----  Contact: Lindy/Rosemarie  Calling to verify dosage of Pravastatin if it is 20 mg or 40 mg for patient. Please call Lindy @ [phone]. Thanks, tiffany

## 2017-07-12 ENCOUNTER — PATIENT MESSAGE (OUTPATIENT)
Dept: FAMILY MEDICINE | Facility: CLINIC | Age: 57
End: 2017-07-12

## 2017-07-12 ENCOUNTER — TELEPHONE (OUTPATIENT)
Dept: FAMILY MEDICINE | Facility: CLINIC | Age: 57
End: 2017-07-12

## 2017-07-12 NOTE — TELEPHONE ENCOUNTER
----- Message from Bela Silva sent at 7/12/2017 11:45 AM CDT -----  Contact: Patient  Patient called to speak with the nurse; she stated that the pharmacy has been trying to call the office about a medication where the dosage was changed.    [address]  [facility] [address]  Phone: [phone] Fax: [phone]    She can be contacted at [phone].    Thanks,  Bela

## 2017-07-13 ENCOUNTER — PATIENT MESSAGE (OUTPATIENT)
Dept: ORTHOPEDICS | Facility: CLINIC | Age: 57
End: 2017-07-13

## 2017-07-20 ENCOUNTER — PATIENT MESSAGE (OUTPATIENT)
Dept: ORTHOPEDICS | Facility: CLINIC | Age: 57
End: 2017-07-20

## 2017-07-31 RX ORDER — AMLODIPINE BESYLATE 5 MG/1
5 TABLET ORAL DAILY
Qty: 90 TABLET | Refills: 1 | Status: SHIPPED | OUTPATIENT
Start: 2017-07-31 | End: 2018-01-25 | Stop reason: SDUPTHER

## 2017-08-29 DIAGNOSIS — M21.169 ACQUIRED VARUS DEFORMITY OF KNEE, UNSPECIFIED LATERALITY: ICD-10-CM

## 2017-08-29 DIAGNOSIS — M25.561 CHRONIC PAIN OF BOTH KNEES: ICD-10-CM

## 2017-08-29 DIAGNOSIS — G89.29 CHRONIC PAIN OF BOTH KNEES: ICD-10-CM

## 2017-08-29 DIAGNOSIS — M17.0 PRIMARY OSTEOARTHRITIS OF BOTH KNEES: ICD-10-CM

## 2017-08-29 DIAGNOSIS — M19.012 ARTHRITIS OF LEFT ACROMIOCLAVICULAR JOINT: ICD-10-CM

## 2017-08-29 DIAGNOSIS — M79.642 LEFT HAND PAIN: Primary | ICD-10-CM

## 2017-08-29 DIAGNOSIS — M25.562 CHRONIC PAIN OF BOTH KNEES: ICD-10-CM

## 2017-08-30 ENCOUNTER — OFFICE VISIT (OUTPATIENT)
Dept: ORTHOPEDICS | Facility: CLINIC | Age: 57
End: 2017-08-30
Payer: COMMERCIAL

## 2017-08-30 ENCOUNTER — HOSPITAL ENCOUNTER (OUTPATIENT)
Dept: RADIOLOGY | Facility: HOSPITAL | Age: 57
Discharge: HOME | End: 2017-08-30
Attending: ORTHOPAEDIC SURGERY
Payer: COMMERCIAL

## 2017-08-30 VITALS
HEIGHT: 68 IN | SYSTOLIC BLOOD PRESSURE: 132 MMHG | BODY MASS INDEX: 44.41 KG/M2 | DIASTOLIC BLOOD PRESSURE: 86 MMHG | HEART RATE: 78 BPM | WEIGHT: 293 LBS

## 2017-08-30 DIAGNOSIS — M17.11 PRIMARY OSTEOARTHRITIS OF RIGHT KNEE: ICD-10-CM

## 2017-08-30 DIAGNOSIS — M21.161 ACQUIRED VARUS DEFORMITY KNEE, RIGHT: ICD-10-CM

## 2017-08-30 DIAGNOSIS — G89.29 CHRONIC PAIN OF BOTH KNEES: ICD-10-CM

## 2017-08-30 DIAGNOSIS — M79.642 LEFT HAND PAIN: ICD-10-CM

## 2017-08-30 DIAGNOSIS — M79.642 LEFT HAND PAIN: Primary | ICD-10-CM

## 2017-08-30 DIAGNOSIS — M25.562 CHRONIC PAIN OF BOTH KNEES: ICD-10-CM

## 2017-08-30 DIAGNOSIS — M19.032 ARTHRITIS OF LEFT WRIST: ICD-10-CM

## 2017-08-30 DIAGNOSIS — M25.561 CHRONIC PAIN OF BOTH KNEES: ICD-10-CM

## 2017-08-30 DIAGNOSIS — Z98.890 S/P WRIST SURGERY: ICD-10-CM

## 2017-08-30 DIAGNOSIS — M17.12 PRIMARY OSTEOARTHRITIS OF LEFT KNEE: ICD-10-CM

## 2017-08-30 DIAGNOSIS — M21.162 ACQUIRED VARUS DEFORMITY KNEE, LEFT: ICD-10-CM

## 2017-08-30 DIAGNOSIS — M18.12 ARTHRITIS OF CARPOMETACARPAL (CMC) JOINT OF LEFT THUMB: ICD-10-CM

## 2017-08-30 PROCEDURE — 73110 X-RAY EXAM OF WRIST: CPT | Mod: TC,PO,LT

## 2017-08-30 PROCEDURE — 3008F BODY MASS INDEX DOCD: CPT | Mod: S$GLB,,, | Performed by: ORTHOPAEDIC SURGERY

## 2017-08-30 PROCEDURE — 73130 X-RAY EXAM OF HAND: CPT | Mod: 26,LT,, | Performed by: RADIOLOGY

## 2017-08-30 PROCEDURE — 20610 DRAIN/INJ JOINT/BURSA W/O US: CPT | Mod: LT,S$GLB,, | Performed by: ORTHOPAEDIC SURGERY

## 2017-08-30 PROCEDURE — 99999 PR PBB SHADOW E&M-EST. PATIENT-LVL IV: CPT | Mod: PBBFAC,,, | Performed by: ORTHOPAEDIC SURGERY

## 2017-08-30 PROCEDURE — 99213 OFFICE O/P EST LOW 20 MIN: CPT | Mod: 25,S$GLB,, | Performed by: ORTHOPAEDIC SURGERY

## 2017-08-30 PROCEDURE — 73130 X-RAY EXAM OF HAND: CPT | Mod: TC,PO,LT

## 2017-08-30 PROCEDURE — 3079F DIAST BP 80-89 MM HG: CPT | Mod: S$GLB,,, | Performed by: ORTHOPAEDIC SURGERY

## 2017-08-30 PROCEDURE — 3075F SYST BP GE 130 - 139MM HG: CPT | Mod: S$GLB,,, | Performed by: ORTHOPAEDIC SURGERY

## 2017-08-30 PROCEDURE — 73110 X-RAY EXAM OF WRIST: CPT | Mod: 26,LT,, | Performed by: RADIOLOGY

## 2017-08-30 RX ORDER — NAPROXEN 500 MG/1
500 TABLET ORAL 2 TIMES DAILY WITH MEALS
Qty: 60 TABLET | Refills: 2 | Status: SHIPPED | OUTPATIENT
Start: 2017-08-30 | End: 2017-11-24 | Stop reason: SDUPTHER

## 2017-08-30 RX ADMIN — TRIAMCINOLONE ACETONIDE 80 MG: 40 INJECTION, SUSPENSION INTRA-ARTICULAR; INTRAMUSCULAR at 03:08

## 2017-08-31 RX ORDER — TRIAMCINOLONE ACETONIDE 40 MG/ML
80 INJECTION, SUSPENSION INTRA-ARTICULAR; INTRAMUSCULAR
Status: DISCONTINUED | OUTPATIENT
Start: 2017-08-30 | End: 2018-03-14

## 2017-08-31 NOTE — PROGRESS NOTES
CC:Bilateral KNEE pain    HISTORY OF PRESENT ILLNESS:  Zakia Price is a 57 y.o. right hand dominant Female who is here for her f/u with left > right knee pain. She would like to have her left knee injected today. She is also wanting some information regarding her left wrist/thumb pain. X-rays were done today. She has had surgery in the past for the left wrist.    History ispositive fortrauma with falls in the past. No surgeries in the past.    Today the patient rates pain at a 10/10 on visual analog scale.     She reports that the pain is worse with standing, walking, stairs, rising from a seated position.  It does currently wake the patient at night.    negative for mechanical symptoms, +/-instability, swelling at times and activity related    It does affect the performance of ADLs.     Occupation:   With some limitations secondary to knee pain    Past Medical History:   Diagnosis Date    Diabetes mellitus, type 2     Diabetic neuropathy 1/27/2014    DJD (degenerative joint disease) of knee     DVT (deep venous thrombosis) around 1990's    in leg, is on no anticoagulant therapy presently    GERD (gastroesophageal reflux disease)     Hypertension associated with diabetes     Multinodular goiter     Obesity, morbid, BMI 50 or higher     Sleep apnea     has no CPAP       Past Surgical History:   Procedure Laterality Date    HYSTERECTOMY      SHOULDER ARTHROSCOPY      THYROIDECTOMY, PARTIAL Right     and transplatation of right superior parathyroid gland to the sternocleidomastoid muscle     TONSILLECTOMY      WRIST FRACTURE SURGERY      left       Family History   Problem Relation Age of Onset    Leukemia Son     Diabetes Mother     Hypertension Mother     Heart disease Mother 50    Cancer Father     Cancer Brother          Current Outpatient Prescriptions:     acetaminophen (TYLENOL) 500 MG tablet, Take 2 tablets (1,000 mg total) by mouth every 6 (six) hours as needed for  Pain., Disp: , Rfl:     amlodipine (NORVASC) 5 MG tablet, Take 1 tablet (5 mg total) by mouth once daily., Disp: 90 tablet, Rfl: 1    blood sugar diagnostic (CLEVER CHOICE VOICE+ TEST) Strp, TEST BLOOD SUGARS ONE TIME DAILY, Disp: 100 strip, Rfl: 1    cyclobenzaprine (FLEXERIL) 10 MG tablet, Take 1 tablet (10 mg total) by mouth 3 (three) times daily as needed., Disp: 30 tablet, Rfl: 5    diphenhydrAMINE (BENADRYL) 25 mg capsule, Take 1 each (25 mg total) by mouth every 6 (six) hours as needed for Itching., Disp: , Rfl: 0    fluticasone (FLONASE) 50 mcg/actuation nasal spray, Use 2 sprays to each nostril daily, Disp: 16 g, Rfl: 5    gabapentin (NEURONTIN) 600 MG tablet, Take 1 tablet (600 mg total) by mouth 3 (three) times daily., Disp: 270 tablet, Rfl: 1    lancets Misc, Misc. route As directed, Disp: 100 each, Rfl: prn    levothyroxine (SYNTHROID) 100 MCG tablet, Take 1 tablet (100 mcg total) by mouth once daily., Disp: 90 tablet, Rfl: 3    metformin (GLUCOPHAGE) 1000 MG tablet, Take 1 tablet (1,000 mg total) by mouth 2 (two) times daily with meals., Disp: 180 tablet, Rfl: 1    pantoprazole (PROTONIX) 40 MG tablet, Take 1 tablet (40 mg total) by mouth once daily., Disp: 90 tablet, Rfl: 3    pravastatin (PRAVACHOL) 80 MG tablet, Take 1 tablet (80 mg total) by mouth once daily., Disp: 90 tablet, Rfl: 3    PRODIGY LANCETS MISC, As  Directed, Disp: , Rfl:     naproxen (NAPROSYN) 500 MG tablet, Take 1 tablet (500 mg total) by mouth 2 (two) times daily with meals., Disp: 60 tablet, Rfl: 2    Review of patient's allergies indicates:   Allergen Reactions    Lisinopril Swelling     angioedema    Codeine Nausea Only and Rash       Review of Systems   Constitutional: Positive for weight loss. Negative for diaphoresis and fever.   HENT: Positive for ear pain. Negative for hearing loss, nosebleeds and tinnitus.         ETHAN currently not on CPAP   Eyes: Positive for redness. Negative for pain.   Respiratory:  Negative for cough and shortness of breath.    Cardiovascular: Negative for chest pain and palpitations.        HTN   Gastrointestinal: Positive for heartburn. Negative for blood in stool, constipation, diarrhea, nausea and vomiting.   Genitourinary: Negative for dysuria, frequency and hematuria.   Musculoskeletal: Positive for joint pain and myalgias. Negative for back pain.   Skin: Negative for itching and rash.   Neurological: Positive for headaches. Negative for dizziness, tingling, seizures and weakness.   Endo/Heme/Allergies: Positive for environmental allergies.        Diabetes Type II   Psychiatric/Behavioral: The patient is not nervous/anxious.    All other systems reviewed and are negative.      OBJECTIVE:  Vitals:    08/30/17 1044   BP: 132/86   Pulse: 78       PHYSICAL EXAMINATION    General:  The patient is alert and oriented x 3.  Mood is pleasant.  Observation of ears, eyes and nose reveal no gross abnormalities.  No labored breathing observed.    Both KNEE EXAMINATION     OBSERVATION / INSPECTION   Gait:   Antalgic   Alignment:  Varus  Scars:   None   Muscle atrophy: Mild  Effusion:  None   Warmth:  None   Discoloration:   none     TENDERNESS / CREPITUS (T / C):  L>R on exam today        T / C      T / C   Patella   ++ / -   Lateral joint line   - / -    Peripatellar medial  ++  Medial joint line    + / -    Peripatellar lateral ++  Medial plica   - / -    Patellar tendon -   Popliteal fossa   - / -    Quad tendon   -   Gastrocnemius   -   Prepatellar Bursa - / -   Quadricep   -   Tibial tubercle  -  Thigh/hamstring  -   Pes anserine/HS -  Fibula    -   ITB   - / -  Tibia     -   Tib/fib joint  - / -  LCL    -     MFC   - / -   MCL: Proximal  -    LFC   - / -    Distal   -          ROM: (* = pain)  PASSIVE  ACTIVE    Left :   0 / 100   0 / 100  **   Right :    0 / 110   0 / 110  *    Patellofemoral examination:  See above noted areas of tenderness.   Patella position    Subluxation /  dislocation: Centered           Sup. / Inf;   Normal   Crepitus (PF):    +++   Patellar Mobility:       Medial-lateral:   absent   Superior-inferior:  absent    Inferior tilt   Normal    Patellar tendon:  Normal   Lateral tilt:    Normal   J-sign:     None   Patellofemoral grind:   +++      MENISCAL SIGNS:     Pain on terminal extension:  -  Pain on terminal flexion:  +  Ashs maneuver:  - for pain      LIGAMENT EXAMINATION:  ACL / Lachman:  normal (-1 to 2mm)    PCL-Post.  drawer: normal 0 to 2mm  MCL- Valgus:  Tight MCL  LCL- Varus:  normal 0 to 2mm  Pivot shift: normal (Equal)       STRENGTH: (* = with pain) PAINFUL SIDE   Quadricep   5/5   Hamstrin/5    EXTREMITY NEURO-VASCULAR EXAMINATION:   Sensation:  Grossly intact to light touch all dermatomal regions.   Motor Function:  Fully intact motor function at hip, knee, foot and ankle     Vascular status:  DP and PT pulses 2+, brisk capillary refill, symmetric.     Wrist: left with dorsal scar and deformity noted of wrist and thumb; decreased thumb and wrist ROM noted on exam; decreased strength on exam; pain over CMC with palpation    Xrays: Reviewed personally by me (standing AP/flexion, lateral, sunrise) show:   Findings: Findings:  AP and PA flexion standing views of both knees as well as a lateral and Merchant views of the both knees were obtained.  Compared to 2014. Again noted is advanced tricompartment degenerative change in both knees without superimposed acute osseous abnormalities. Stable calcific densities in the suprapatellar spaces and popliteal fossae suggestive of osteocartilaginous loose bodies.     ASSESSMENT:    Bilateral Knee Osteoarthritis End-stage  Varus Deformity  Left wrist and thumb osteoarthritis    PLAN:   left knee injection  Naproxen 500mg PO BIDWM PRN  F/U for right knee injection  Will refer to Dr. Webster for left wrist/thumb evaluation  All questions were answered, pt will contact us for questions or  concerns in the interim.

## 2017-08-31 NOTE — PROCEDURES
Large Joint Aspiration/Injection  Date/Time: 8/30/2017 3:30 PM  Performed by: CAMILA MARKHAM  Authorized by: CAMILA MARKHAM     Consent Done?:  Yes (Verbal)  Indications:  Pain  Procedure site marked: Yes    Timeout: Prior to procedure the correct patient, procedure, and site was verified      Location:  Knee  Site:  L knee  Prep: Patient was prepped and draped in usual sterile fashion    Ultrasonic Guidance for needle placement: No  Needle size:  22 G  Approach:  Anterolateral  Medications:  80 mg triamcinolone acetonide 40 mg/mL  Patient tolerance:  Patient tolerated the procedure well with no immediate complications

## 2017-09-13 ENCOUNTER — OFFICE VISIT (OUTPATIENT)
Dept: ORTHOPEDICS | Facility: CLINIC | Age: 57
End: 2017-09-13
Payer: COMMERCIAL

## 2017-09-13 ENCOUNTER — TELEPHONE (OUTPATIENT)
Dept: FAMILY MEDICINE | Facility: CLINIC | Age: 57
End: 2017-09-13

## 2017-09-13 VITALS
DIASTOLIC BLOOD PRESSURE: 64 MMHG | HEIGHT: 68 IN | WEIGHT: 293 LBS | SYSTOLIC BLOOD PRESSURE: 124 MMHG | HEART RATE: 82 BPM | BODY MASS INDEX: 44.41 KG/M2

## 2017-09-13 DIAGNOSIS — Z12.31 SCREENING MAMMOGRAM, ENCOUNTER FOR: Primary | ICD-10-CM

## 2017-09-13 DIAGNOSIS — M25.562 CHRONIC PAIN OF BOTH KNEES: ICD-10-CM

## 2017-09-13 DIAGNOSIS — M17.11 PRIMARY OSTEOARTHRITIS OF RIGHT KNEE: Primary | ICD-10-CM

## 2017-09-13 DIAGNOSIS — G89.29 CHRONIC PAIN OF BOTH KNEES: ICD-10-CM

## 2017-09-13 DIAGNOSIS — M25.561 CHRONIC PAIN OF BOTH KNEES: ICD-10-CM

## 2017-09-13 PROCEDURE — 3078F DIAST BP <80 MM HG: CPT | Mod: S$GLB,,, | Performed by: ORTHOPAEDIC SURGERY

## 2017-09-13 PROCEDURE — 99999 PR PBB SHADOW E&M-EST. PATIENT-LVL III: CPT | Mod: PBBFAC,,, | Performed by: ORTHOPAEDIC SURGERY

## 2017-09-13 PROCEDURE — 3074F SYST BP LT 130 MM HG: CPT | Mod: S$GLB,,, | Performed by: ORTHOPAEDIC SURGERY

## 2017-09-13 PROCEDURE — 99213 OFFICE O/P EST LOW 20 MIN: CPT | Mod: 25,S$GLB,, | Performed by: ORTHOPAEDIC SURGERY

## 2017-09-13 PROCEDURE — 3008F BODY MASS INDEX DOCD: CPT | Mod: S$GLB,,, | Performed by: ORTHOPAEDIC SURGERY

## 2017-09-13 PROCEDURE — 20610 DRAIN/INJ JOINT/BURSA W/O US: CPT | Mod: RT,S$GLB,, | Performed by: ORTHOPAEDIC SURGERY

## 2017-09-13 RX ADMIN — TRIAMCINOLONE ACETONIDE 80 MG: 40 INJECTION, SUSPENSION INTRA-ARTICULAR; INTRAMUSCULAR at 04:09

## 2017-09-15 RX ORDER — TRIAMCINOLONE ACETONIDE 40 MG/ML
80 INJECTION, SUSPENSION INTRA-ARTICULAR; INTRAMUSCULAR
Status: DISCONTINUED | OUTPATIENT
Start: 2017-09-13 | End: 2017-09-15 | Stop reason: HOSPADM

## 2017-09-15 NOTE — PROCEDURES
Large Joint Aspiration/Injection  Date/Time: 9/13/2017 4:02 PM  Performed by: CAMILA MARKHAM  Authorized by: CAMILA MARKHAM     Consent Done?:  Yes (Verbal)  Indications:  Pain  Procedure site marked: Yes    Timeout: Prior to procedure the correct patient, procedure, and site was verified      Location:  Knee  Site:  R knee  Prep: Patient was prepped and draped in usual sterile fashion    Ultrasonic Guidance for needle placement: No  Needle size:  22 G  Approach:  Anterolateral  Medications:  80 mg triamcinolone acetonide 40 mg/mL  Patient tolerance:  Patient tolerated the procedure well with no immediate complications

## 2017-09-15 NOTE — PROGRESS NOTES
CC:Bilateral KNEE pain (right knee is worse today)    HISTORY OF PRESENT ILLNESS:  Zakia Price is a 57 y.o. right hand dominant Female who is here for her f/u. She would like to have her right knee injected today. She had her left knee injected previously and it is better.    History ispositive fortrauma with falls in the past. No surgeries in the past.    Today the patient rates pain at a 0-10/10 on visual analog scale.     She reports that the pain is worse with standing, walking, stairs, rising from a seated position.  It does currently wake the patient at night.    negative for mechanical symptoms, +/-instability, swelling at times and activity related    It does affect the performance of ADLs.     Occupation:   With some limitations secondary to knee pain    Past Medical History:   Diagnosis Date    Diabetes mellitus, type 2     Diabetic neuropathy 1/27/2014    DJD (degenerative joint disease) of knee     DVT (deep venous thrombosis) around 1990's    in leg, is on no anticoagulant therapy presently    GERD (gastroesophageal reflux disease)     Hypertension associated with diabetes     Multinodular goiter     Obesity, morbid, BMI 50 or higher     Sleep apnea     has no CPAP       Past Surgical History:   Procedure Laterality Date    HYSTERECTOMY      SHOULDER ARTHROSCOPY      THYROIDECTOMY, PARTIAL Right     and transplatation of right superior parathyroid gland to the sternocleidomastoid muscle     TONSILLECTOMY      WRIST FRACTURE SURGERY      left       Family History   Problem Relation Age of Onset    Leukemia Son     Diabetes Mother     Hypertension Mother     Heart disease Mother 50    Cancer Father     Cancer Brother          Current Outpatient Prescriptions:     acetaminophen (TYLENOL) 500 MG tablet, Take 2 tablets (1,000 mg total) by mouth every 6 (six) hours as needed for Pain., Disp: , Rfl:     amlodipine (NORVASC) 5 MG tablet, Take 1 tablet (5 mg total) by  mouth once daily., Disp: 90 tablet, Rfl: 1    blood sugar diagnostic (CLEVER CHOICE VOICE+ TEST) Strp, TEST BLOOD SUGARS ONE TIME DAILY, Disp: 100 strip, Rfl: 1    cyclobenzaprine (FLEXERIL) 10 MG tablet, Take 1 tablet (10 mg total) by mouth 3 (three) times daily as needed., Disp: 30 tablet, Rfl: 5    diphenhydrAMINE (BENADRYL) 25 mg capsule, Take 1 each (25 mg total) by mouth every 6 (six) hours as needed for Itching., Disp: , Rfl: 0    fluticasone (FLONASE) 50 mcg/actuation nasal spray, Use 2 sprays to each nostril daily, Disp: 16 g, Rfl: 5    gabapentin (NEURONTIN) 600 MG tablet, Take 1 tablet (600 mg total) by mouth 3 (three) times daily., Disp: 270 tablet, Rfl: 1    lancets Misc, Misc. route As directed, Disp: 100 each, Rfl: prn    levothyroxine (SYNTHROID) 100 MCG tablet, Take 1 tablet (100 mcg total) by mouth once daily., Disp: 90 tablet, Rfl: 3    metformin (GLUCOPHAGE) 1000 MG tablet, Take 1 tablet (1,000 mg total) by mouth 2 (two) times daily with meals., Disp: 180 tablet, Rfl: 1    naproxen (NAPROSYN) 500 MG tablet, Take 1 tablet (500 mg total) by mouth 2 (two) times daily with meals., Disp: 60 tablet, Rfl: 2    pantoprazole (PROTONIX) 40 MG tablet, Take 1 tablet (40 mg total) by mouth once daily., Disp: 90 tablet, Rfl: 3    pravastatin (PRAVACHOL) 80 MG tablet, Take 1 tablet (80 mg total) by mouth once daily., Disp: 90 tablet, Rfl: 3    PRODIGY LANCETS MISC, As  Directed, Disp: , Rfl:   No current facility-administered medications for this visit.     Facility-Administered Medications Ordered in Other Visits:     triamcinolone acetonide injection 80 mg, 80 mg, Intra-articular, , Gonzalo Roberts MD, 80 mg at 08/30/17 1530    Review of patient's allergies indicates:   Allergen Reactions    Lisinopril Swelling     angioedema    Codeine Nausea Only and Rash       Review of Systems   Constitutional: Positive for weight loss. Negative for diaphoresis and fever.   HENT: Positive for ear pain.  Negative for hearing loss, nosebleeds and tinnitus.         ETHAN currently not on CPAP   Eyes: Positive for redness. Negative for pain.   Respiratory: Negative for cough and shortness of breath.    Cardiovascular: Negative for chest pain and palpitations.        HTN   Gastrointestinal: Positive for heartburn. Negative for blood in stool, constipation, diarrhea, nausea and vomiting.   Genitourinary: Negative for dysuria, frequency and hematuria.   Musculoskeletal: Positive for joint pain and myalgias. Negative for back pain.   Skin: Negative for itching and rash.   Neurological: Positive for headaches. Negative for dizziness, tingling, seizures and weakness.   Endo/Heme/Allergies: Positive for environmental allergies.        Diabetes Type II   Psychiatric/Behavioral: The patient is not nervous/anxious.    All other systems reviewed and are negative.      OBJECTIVE:  Vitals:    09/13/17 1132   BP: 124/64   Pulse: 82       PHYSICAL EXAMINATION    General:  The patient is alert and oriented x 3.  Mood is pleasant.  Observation of ears, eyes and nose reveal no gross abnormalities.  No labored breathing observed.    Both KNEE EXAMINATION     OBSERVATION / INSPECTION   Gait:   Antalgic   Alignment:  Varus  Scars:   None   Muscle atrophy: Mild  Effusion:  None   Warmth:  None   Discoloration:   none     TENDERNESS / CREPITUS (T / C):  R>L on exam today        T / C      T / C   Patella   ++ / -   Lateral joint line   - / -    Peripatellar medial  ++  Medial joint line    + / -    Peripatellar lateral ++  Medial plica   - / -    Patellar tendon -   Popliteal fossa   - / -    Quad tendon   -   Gastrocnemius   -   Prepatellar Bursa - / -   Quadricep   -   Tibial tubercle  -  Thigh/hamstring  -   Pes anserine/HS -  Fibula    -   ITB   - / -  Tibia     -   Tib/fib joint  - / -  LCL    -     MFC   - / -   MCL: Proximal  -    LFC   - / -    Distal   -          ROM: (* = pain)  PASSIVE  ACTIVE    Left :   0 / 100   0 / 100   **   Right :    0 / 110   0 / 110  *    Patellofemoral examination:  See above noted areas of tenderness.   Patella position    Subluxation / dislocation: Centered           Sup. / Inf;   Normal   Crepitus (PF):    +++   Patellar Mobility:       Medial-lateral:   absent   Superior-inferior:  absent    Inferior tilt   Normal    Patellar tendon:  Normal   Lateral tilt:    Normal   J-sign:     None   Patellofemoral grind:   +++      MENISCAL SIGNS:     Pain on terminal extension:  -  Pain on terminal flexion:  +  Ashs maneuver:  - for pain      LIGAMENT EXAMINATION:  ACL / Lachman:  normal (-1 to 2mm)    PCL-Post.  drawer: normal 0 to 2mm  MCL- Valgus:  Tight MCL  LCL- Varus:  normal 0 to 2mm  Pivot shift: normal (Equal)       STRENGTH: (* = with pain) PAINFUL SIDE   Quadricep   5/5   Hamstrin/5    EXTREMITY NEURO-VASCULAR EXAMINATION:   Sensation:  Grossly intact to light touch all dermatomal regions.   Motor Function:  Fully intact motor function at hip, knee, foot and ankle     Vascular status:  DP and PT pulses 2+, brisk capillary refill, symmetric.     Xrays: Reviewed personally by me (standing AP/flexion, lateral, sunrise) show:   Findings: Findings:  AP and PA flexion standing views of both knees as well as a lateral and Merchant views of the both knees were obtained.  Compared to 2014. Again noted is advanced tricompartment degenerative change in both knees without superimposed acute osseous abnormalities. Stable calcific densities in the suprapatellar spaces and popliteal fossae suggestive of osteocartilaginous loose bodies.     ASSESSMENT:    Bilateral Knee Osteoarthritis Endstage  Varus Deformity    PLAN:   Right knee injection  Continue Naproxen 500mg PO BIDWM  F/U PRN; can have another injection in three months  All questions were answered, pt will contact us for questions or concerns in the interim.

## 2017-10-03 ENCOUNTER — PATIENT MESSAGE (OUTPATIENT)
Dept: ORTHOPEDICS | Facility: CLINIC | Age: 57
End: 2017-10-03

## 2017-11-24 DIAGNOSIS — M25.569 KNEE PAIN, UNSPECIFIED CHRONICITY, UNSPECIFIED LATERALITY: Primary | ICD-10-CM

## 2017-11-24 DIAGNOSIS — M25.561 CHRONIC PAIN OF BOTH KNEES: ICD-10-CM

## 2017-11-24 DIAGNOSIS — M25.562 CHRONIC PAIN OF BOTH KNEES: ICD-10-CM

## 2017-11-24 DIAGNOSIS — M21.169 ACQUIRED VARUS DEFORMITY OF KNEE, UNSPECIFIED LATERALITY: ICD-10-CM

## 2017-11-24 DIAGNOSIS — G89.29 CHRONIC PAIN OF BOTH KNEES: ICD-10-CM

## 2017-11-24 DIAGNOSIS — M17.0 PRIMARY OSTEOARTHRITIS OF BOTH KNEES: ICD-10-CM

## 2017-11-24 DIAGNOSIS — M19.012 ARTHRITIS OF LEFT ACROMIOCLAVICULAR JOINT: ICD-10-CM

## 2017-11-27 ENCOUNTER — HOSPITAL ENCOUNTER (OUTPATIENT)
Dept: RADIOLOGY | Facility: HOSPITAL | Age: 57
Discharge: HOME | End: 2017-11-27
Attending: FAMILY MEDICINE
Payer: COMMERCIAL

## 2017-11-27 ENCOUNTER — HOSPITAL ENCOUNTER (OUTPATIENT)
Dept: RADIOLOGY | Facility: HOSPITAL | Age: 57
Discharge: HOME | End: 2017-11-27
Attending: ORTHOPAEDIC SURGERY
Payer: COMMERCIAL

## 2017-11-27 ENCOUNTER — OFFICE VISIT (OUTPATIENT)
Dept: ORTHOPEDICS | Facility: CLINIC | Age: 57
End: 2017-11-27
Payer: COMMERCIAL

## 2017-11-27 VITALS — WEIGHT: 293 LBS | HEIGHT: 68 IN | BODY MASS INDEX: 44.41 KG/M2

## 2017-11-27 VITALS
HEART RATE: 87 BPM | DIASTOLIC BLOOD PRESSURE: 81 MMHG | HEIGHT: 68 IN | BODY MASS INDEX: 44.41 KG/M2 | WEIGHT: 293 LBS | SYSTOLIC BLOOD PRESSURE: 118 MMHG

## 2017-11-27 DIAGNOSIS — M21.162 ACQUIRED VARUS DEFORMITY KNEE, LEFT: ICD-10-CM

## 2017-11-27 DIAGNOSIS — G89.29 CHRONIC PAIN OF BOTH KNEES: ICD-10-CM

## 2017-11-27 DIAGNOSIS — M21.161 ACQUIRED VARUS DEFORMITY KNEE, RIGHT: ICD-10-CM

## 2017-11-27 DIAGNOSIS — M25.562 CHRONIC PAIN OF BOTH KNEES: ICD-10-CM

## 2017-11-27 DIAGNOSIS — M17.12 PRIMARY OSTEOARTHRITIS OF LEFT KNEE: Primary | ICD-10-CM

## 2017-11-27 DIAGNOSIS — M25.569 KNEE PAIN, UNSPECIFIED CHRONICITY, UNSPECIFIED LATERALITY: ICD-10-CM

## 2017-11-27 DIAGNOSIS — Z12.31 SCREENING MAMMOGRAM, ENCOUNTER FOR: ICD-10-CM

## 2017-11-27 DIAGNOSIS — M17.11 PRIMARY OSTEOARTHRITIS OF RIGHT KNEE: ICD-10-CM

## 2017-11-27 DIAGNOSIS — M25.561 CHRONIC PAIN OF BOTH KNEES: ICD-10-CM

## 2017-11-27 PROCEDURE — 20610 DRAIN/INJ JOINT/BURSA W/O US: CPT | Mod: LT,S$GLB,, | Performed by: ORTHOPAEDIC SURGERY

## 2017-11-27 PROCEDURE — 77067 SCR MAMMO BI INCL CAD: CPT | Mod: TC,PO

## 2017-11-27 PROCEDURE — 73564 X-RAY EXAM KNEE 4 OR MORE: CPT | Mod: 26,50,, | Performed by: RADIOLOGY

## 2017-11-27 PROCEDURE — 73564 X-RAY EXAM KNEE 4 OR MORE: CPT | Mod: TC,50,PO

## 2017-11-27 PROCEDURE — 99999 PR PBB SHADOW E&M-EST. PATIENT-LVL III: CPT | Mod: PBBFAC,,, | Performed by: ORTHOPAEDIC SURGERY

## 2017-11-27 PROCEDURE — 99213 OFFICE O/P EST LOW 20 MIN: CPT | Mod: S$GLB,,, | Performed by: ORTHOPAEDIC SURGERY

## 2017-11-27 PROCEDURE — 77067 SCR MAMMO BI INCL CAD: CPT | Mod: 26,,, | Performed by: RADIOLOGY

## 2017-11-27 RX ORDER — TRIAMCINOLONE ACETONIDE 40 MG/ML
80 INJECTION, SUSPENSION INTRA-ARTICULAR; INTRAMUSCULAR
Status: DISCONTINUED | OUTPATIENT
Start: 2017-11-27 | End: 2017-11-27 | Stop reason: HOSPADM

## 2017-11-27 RX ORDER — NAPROXEN 500 MG/1
500 TABLET ORAL 2 TIMES DAILY WITH MEALS
Qty: 60 TABLET | Refills: 1 | Status: SHIPPED | OUTPATIENT
Start: 2017-11-27 | End: 2018-01-30 | Stop reason: SDUPTHER

## 2017-11-27 RX ADMIN — TRIAMCINOLONE ACETONIDE 80 MG: 40 INJECTION, SUSPENSION INTRA-ARTICULAR; INTRAMUSCULAR at 01:11

## 2017-11-27 NOTE — PROGRESS NOTES
CC:Bilateral KNEE pain    HISTORY OF PRESENT ILLNESS:  Zakia Price is a 57 y.o. right hand dominant Female who is here for her f/u with left > right knee pain. She would like to have her left knee injected today.    History ispositive fortrauma with falls in the past. No surgeries in the past.    Today the patient rates pain at a 10/10 on visual analog scale.     She reports that the pain is worse with standing, walking, stairs, rising from a seated position.  It does currently wake the patient at night.    negative for mechanical symptoms, +/-instability, swelling at times and activity related    It does affect the performance of ADLs.     Occupation:   With some limitations secondary to knee pain    Past Medical History:   Diagnosis Date    Diabetes mellitus, type 2     Diabetic neuropathy 1/27/2014    DJD (degenerative joint disease) of knee     DVT (deep venous thrombosis) around 1990's    in leg, is on no anticoagulant therapy presently    GERD (gastroesophageal reflux disease)     Hypertension associated with diabetes     Multinodular goiter     Obesity, morbid, BMI 50 or higher     Sleep apnea     has no CPAP       Past Surgical History:   Procedure Laterality Date    HYSTERECTOMY      SHOULDER ARTHROSCOPY      THYROIDECTOMY, PARTIAL Right     and transplatation of right superior parathyroid gland to the sternocleidomastoid muscle     TONSILLECTOMY      WRIST FRACTURE SURGERY      left       Family History   Problem Relation Age of Onset    Leukemia Son     Diabetes Mother     Hypertension Mother     Heart disease Mother 50    Cancer Father     Cancer Brother          Current Outpatient Prescriptions:     acetaminophen (TYLENOL) 500 MG tablet, Take 2 tablets (1,000 mg total) by mouth every 6 (six) hours as needed for Pain., Disp: , Rfl:     amlodipine (NORVASC) 5 MG tablet, Take 1 tablet (5 mg total) by mouth once daily., Disp: 90 tablet, Rfl: 1    blood sugar  diagnostic (CLEVER CHOICE VOICE+ TEST) Strp, TEST BLOOD SUGARS ONE TIME DAILY, Disp: 100 strip, Rfl: 1    cyclobenzaprine (FLEXERIL) 10 MG tablet, Take 1 tablet (10 mg total) by mouth 3 (three) times daily as needed., Disp: 30 tablet, Rfl: 5    diphenhydrAMINE (BENADRYL) 25 mg capsule, Take 1 each (25 mg total) by mouth every 6 (six) hours as needed for Itching., Disp: , Rfl: 0    fluticasone (FLONASE) 50 mcg/actuation nasal spray, Use 2 sprays to each nostril daily, Disp: 16 g, Rfl: 5    gabapentin (NEURONTIN) 600 MG tablet, Take 1 tablet (600 mg total) by mouth 3 (three) times daily., Disp: 270 tablet, Rfl: 1    lancets Misc, Misc. route As directed, Disp: 100 each, Rfl: prn    levothyroxine (SYNTHROID) 100 MCG tablet, Take 1 tablet (100 mcg total) by mouth once daily., Disp: 90 tablet, Rfl: 3    metformin (GLUCOPHAGE) 1000 MG tablet, Take 1 tablet (1,000 mg total) by mouth 2 (two) times daily with meals., Disp: 180 tablet, Rfl: 1    naproxen (NAPROSYN) 500 MG tablet, Take 1 tablet (500 mg total) by mouth 2 (two) times daily with meals., Disp: 60 tablet, Rfl: 1    pantoprazole (PROTONIX) 40 MG tablet, Take 1 tablet (40 mg total) by mouth once daily., Disp: 90 tablet, Rfl: 3    pravastatin (PRAVACHOL) 80 MG tablet, Take 1 tablet (80 mg total) by mouth once daily., Disp: 90 tablet, Rfl: 3    PRODIGY LANCETS MISC, As  Directed, Disp: , Rfl:   No current facility-administered medications for this visit.     Facility-Administered Medications Ordered in Other Visits:     triamcinolone acetonide injection 80 mg, 80 mg, Intra-articular, , Gonzalo Roberts MD, 80 mg at 08/30/17 1530    Review of patient's allergies indicates:   Allergen Reactions    Lisinopril Swelling     angioedema    Codeine Nausea Only and Rash       Review of Systems   Constitutional: Positive for weight loss. Negative for diaphoresis and fever.   HENT: Positive for ear pain. Negative for hearing loss, nosebleeds and tinnitus.          ETHAN currently not on CPAP   Eyes: Positive for redness. Negative for pain.   Respiratory: Negative for cough and shortness of breath.    Cardiovascular: Negative for chest pain and palpitations.        HTN   Gastrointestinal: Positive for heartburn. Negative for blood in stool, constipation, diarrhea, nausea and vomiting.   Genitourinary: Negative for dysuria, frequency and hematuria.   Musculoskeletal: Positive for joint pain and myalgias. Negative for back pain.   Skin: Negative for itching and rash.   Neurological: Positive for headaches. Negative for dizziness, tingling, seizures and weakness.   Endo/Heme/Allergies: Positive for environmental allergies.        Diabetes Type II   Psychiatric/Behavioral: The patient is not nervous/anxious.    All other systems reviewed and are negative.      OBJECTIVE:  Vitals:    11/27/17 1300   BP: 118/81   Pulse: 87       PHYSICAL EXAMINATION    General:  The patient is alert and oriented x 3.  Mood is pleasant.  Observation of ears, eyes and nose reveal no gross abnormalities.  No labored breathing observed.    Both KNEE EXAMINATION     OBSERVATION / INSPECTION   Gait:   Antalgic   Alignment:  Varus  Scars:   None   Muscle atrophy: Mild  Effusion:  None   Warmth:  None   Discoloration:   none     TENDERNESS / CREPITUS (T / C):  L>R on exam today        T / C      T / C   Patella   ++ / -   Lateral joint line   - / -    Peripatellar medial  ++  Medial joint line    + / -    Peripatellar lateral ++  Medial plica   - / -    Patellar tendon -   Popliteal fossa   - / -    Quad tendon   -   Gastrocnemius   -   Prepatellar Bursa - / -   Quadricep   -   Tibial tubercle  -  Thigh/hamstring  -   Pes anserine/HS -  Fibula    -   ITB   - / -  Tibia     -   Tib/fib joint  - / -  LCL    -     MFC   - / -   MCL: Proximal  -    LFC   - / -    Distal   -          ROM: (* = pain)  PASSIVE  ACTIVE    Left :   0 / 100   0 / 100  **   Right :    0 / 110   0 / 110  *    Patellofemoral  examination:  See above noted areas of tenderness.   Patella position    Subluxation / dislocation: Centered           Sup. / Inf;   Normal   Crepitus (PF):    +++   Patellar Mobility:       Medial-lateral:   absent   Superior-inferior:  absent    Inferior tilt   Normal    Patellar tendon:  Normal   Lateral tilt:    Normal   J-sign:     None   Patellofemoral grind:   +++      MENISCAL SIGNS:     Pain on terminal extension:  -  Pain on terminal flexion:  +  Ashs maneuver:  - for pain      LIGAMENT EXAMINATION:  ACL / Lachman:  normal (-1 to 2mm)    PCL-Post.  drawer: normal 0 to 2mm  MCL- Valgus:  Tight MCL  LCL- Varus:  normal 0 to 2mm  Pivot shift: normal (Equal)       STRENGTH: (* = with pain) PAINFUL SIDE   Quadricep   5/5   Hamstrin/5    EXTREMITY NEURO-VASCULAR EXAMINATION:   Sensation:  Grossly intact to light touch all dermatomal regions.   Motor Function:  Fully intact motor function at hip, knee, foot and ankle     Vascular status:  DP and PT pulses 2+, brisk capillary refill, symmetric.       Xrays: Reviewed personally by me (standing AP/flexion, lateral, sunrise) show:   Findings: Findings:  AP and PA flexion standing views of both knees as well as a lateral and Merchant views of the both knees were obtained.  Compared to 2014. Again noted is advanced tricompartment degenerative change in both knees without superimposed acute osseous abnormalities. Stable calcific densities in the suprapatellar spaces and popliteal fossae suggestive of osteocartilaginous loose bodies.     ASSESSMENT:    Bilateral Knee Osteoarthritis End-stage  Varus Deformity    PLAN:   left knee injection  Naproxen 500mg PO BIDWM PRN  F/U for right knee injection  All questions were answered, pt will contact us for questions or concerns in the interim.

## 2017-11-27 NOTE — PROCEDURES
Large Joint Aspiration/Injection  Date/Time: 11/27/2017 1:23 PM  Performed by: CAMILA MARKHAM  Authorized by: CAMILA MARKHAM     Consent Done?:  Yes (Verbal)  Indications:  Pain  Procedure site marked: Yes    Timeout: Prior to procedure the correct patient, procedure, and site was verified      Location:  Knee  Site:  L knee  Prep: Patient was prepped and draped in usual sterile fashion    Ultrasonic Guidance for needle placement: No  Needle size:  22 G  Approach:  Anterolateral  Medications:  80 mg triamcinolone acetonide 40 mg/mL  Patient tolerance:  Patient tolerated the procedure well with no immediate complications

## 2017-12-01 ENCOUNTER — PATIENT MESSAGE (OUTPATIENT)
Dept: FAMILY MEDICINE | Facility: CLINIC | Age: 57
End: 2017-12-01

## 2017-12-23 RX ORDER — GABAPENTIN 600 MG/1
600 TABLET ORAL 3 TIMES DAILY
Qty: 270 TABLET | Refills: 1 | Status: SHIPPED | OUTPATIENT
Start: 2017-12-23 | End: 2018-06-22 | Stop reason: SDUPTHER

## 2017-12-23 RX ORDER — METFORMIN HYDROCHLORIDE 1000 MG/1
1000 TABLET ORAL 2 TIMES DAILY WITH MEALS
Qty: 180 TABLET | Refills: 1 | Status: SHIPPED | OUTPATIENT
Start: 2017-12-23 | End: 2018-06-22 | Stop reason: SDUPTHER

## 2018-01-26 RX ORDER — AMLODIPINE BESYLATE 5 MG/1
5 TABLET ORAL DAILY
Qty: 90 TABLET | Refills: 0 | Status: SHIPPED | OUTPATIENT
Start: 2018-01-26 | End: 2018-04-25 | Stop reason: SDUPTHER

## 2018-01-30 DIAGNOSIS — M25.562 CHRONIC PAIN OF BOTH KNEES: ICD-10-CM

## 2018-01-30 DIAGNOSIS — M21.169 ACQUIRED VARUS DEFORMITY OF KNEE, UNSPECIFIED LATERALITY: ICD-10-CM

## 2018-01-30 DIAGNOSIS — M25.561 CHRONIC PAIN OF BOTH KNEES: ICD-10-CM

## 2018-01-30 DIAGNOSIS — M17.0 PRIMARY OSTEOARTHRITIS OF BOTH KNEES: ICD-10-CM

## 2018-01-30 DIAGNOSIS — G89.29 CHRONIC PAIN OF BOTH KNEES: ICD-10-CM

## 2018-01-30 DIAGNOSIS — M19.012 ARTHRITIS OF LEFT ACROMIOCLAVICULAR JOINT: ICD-10-CM

## 2018-01-30 RX ORDER — AMLODIPINE BESYLATE 5 MG/1
5 TABLET ORAL DAILY
Qty: 90 TABLET | Refills: 0 | OUTPATIENT
Start: 2018-01-30

## 2018-01-30 RX ORDER — NAPROXEN 500 MG/1
500 TABLET ORAL 2 TIMES DAILY WITH MEALS
Qty: 60 TABLET | Refills: 0 | Status: SHIPPED | OUTPATIENT
Start: 2018-01-30 | End: 2018-03-05 | Stop reason: SDUPTHER

## 2018-02-05 ENCOUNTER — PATIENT MESSAGE (OUTPATIENT)
Dept: FAMILY MEDICINE | Facility: CLINIC | Age: 58
End: 2018-02-05

## 2018-02-26 ENCOUNTER — TELEPHONE (OUTPATIENT)
Dept: ORTHOPEDICS | Facility: CLINIC | Age: 58
End: 2018-02-26

## 2018-02-26 NOTE — TELEPHONE ENCOUNTER
----- Message from Nick Avila sent at 2/26/2018 11:41 AM CST -----  Contact: Pt  Pt request a call from the nurse to get apt for her knee injection, please contact the pt at [phone]

## 2018-03-01 ENCOUNTER — HOSPITAL ENCOUNTER (OUTPATIENT)
Dept: RADIOLOGY | Facility: HOSPITAL | Age: 58
Discharge: HOME | End: 2018-03-01
Attending: NURSE PRACTITIONER
Payer: COMMERCIAL

## 2018-03-01 ENCOUNTER — OFFICE VISIT (OUTPATIENT)
Dept: FAMILY MEDICINE | Facility: CLINIC | Age: 58
End: 2018-03-01
Payer: COMMERCIAL

## 2018-03-01 VITALS
HEART RATE: 98 BPM | HEIGHT: 68 IN | SYSTOLIC BLOOD PRESSURE: 116 MMHG | DIASTOLIC BLOOD PRESSURE: 66 MMHG | TEMPERATURE: 98 F | WEIGHT: 293 LBS | BODY MASS INDEX: 44.41 KG/M2

## 2018-03-01 DIAGNOSIS — M25.561 ACUTE PAIN OF RIGHT KNEE: ICD-10-CM

## 2018-03-01 DIAGNOSIS — S86.911A KNEE STRAIN, RIGHT, INITIAL ENCOUNTER: Primary | ICD-10-CM

## 2018-03-01 DIAGNOSIS — S86.911A KNEE STRAIN, RIGHT, INITIAL ENCOUNTER: ICD-10-CM

## 2018-03-01 PROCEDURE — 99999 PR PBB SHADOW E&M-EST. PATIENT-LVL III: CPT | Mod: PBBFAC,,, | Performed by: NURSE PRACTITIONER

## 2018-03-01 PROCEDURE — 3078F DIAST BP <80 MM HG: CPT | Mod: S$GLB,,, | Performed by: NURSE PRACTITIONER

## 2018-03-01 PROCEDURE — 99213 OFFICE O/P EST LOW 20 MIN: CPT | Mod: S$GLB,,, | Performed by: NURSE PRACTITIONER

## 2018-03-01 PROCEDURE — 3074F SYST BP LT 130 MM HG: CPT | Mod: S$GLB,,, | Performed by: NURSE PRACTITIONER

## 2018-03-01 PROCEDURE — 73564 X-RAY EXAM KNEE 4 OR MORE: CPT | Mod: 26,RT,, | Performed by: RADIOLOGY

## 2018-03-01 PROCEDURE — 73562 X-RAY EXAM OF KNEE 3: CPT | Mod: 26,59,LT, | Performed by: RADIOLOGY

## 2018-03-01 PROCEDURE — 73562 X-RAY EXAM OF KNEE 3: CPT | Mod: TC,PO,LT

## 2018-03-01 RX ORDER — MELOXICAM 15 MG/1
15 TABLET ORAL DAILY
Qty: 30 TABLET | Refills: 0 | Status: SHIPPED | OUTPATIENT
Start: 2018-03-01 | End: 2018-03-05

## 2018-03-01 NOTE — PATIENT INSTRUCTIONS
Do not take naproxen while on Mobic    R.I.C.E.    R.I.C.E. stands for Rest, Ice, Compression, and Elevation. Doing these things helps limit pain and swelling after an injury. R.I.C.E. also helps injuries heal faster. Use R.I.C.E. for sprains, strains, and severe bruises or bumps. Follow the tips on this handout and begin R.I.C.E. as soon as possible after an injury.  ? Rest  Pain is your bodys way of telling you to rest an injured area. Whether you have hurt an elbow, hand, foot, or knee, limiting its use will prevent further injury and help you heal.  ? Ice  Applying ice right after an injury helps prevent swelling and reduce pain. Dont place ice directly on your skin.  · Wrap a cold pack or bag of ice in a thin cloth. Place it over the injured area.  · Ice for 10 minutes every 3 hours. Dont ice for more than 20 minutes at a time.  ? Compression  Putting pressure (compression) on an injury helps prevent swelling and provides support.  · Wrap the injured area firmly with an elastic bandage. If your hand or foot tingles, becomes discolored, or feels cold to the touch, the bandage may be too tight. Rewrap it more loosely.  · If your bandage becomes too loose, rewrap it.  · Do not wear an elastic bandage overnight.  ? Elevation  Keeping an injury elevated helps reduce swelling, pain, and throbbing. Elevation is most effective when the injury is kept elevated higher than the heart.     Call your healthcare provider if you notice any of the following:  · Fingers or toes feel numb, are cold to the touch, or change color  · Skin looks shiny or tight  · Pain, swelling, or bruising worsens and is not improved with elevation   Date Last Reviewed: 9/3/2015  © 3017-7093 [employer]. [address] [place] [address], [place] [address]. All rights reserved. This information is not intended as a substitute for professional medical care. Always follow your healthcare professional's instructions.

## 2018-03-01 NOTE — PROGRESS NOTES
"Subjective:      Patient ID: Zakia Price is a 58 y.o. female.    Chief Complaint: Leg Pain    Leg Pain    The incident occurred at home. The injury mechanism was a twisting injury. The pain is present in the right leg (right knee). Quality: she reports it "Just hurts" The pain is at a severity of 8/10. The pain is severe. The pain has been constant since onset. Associated symptoms include an inability to bear weight. Pertinent negatives include no loss of motion, loss of sensation, muscle weakness, numbness or tingling. She reports no foreign bodies present. The symptoms are aggravated by movement, weight bearing and palpation. She has tried NSAIDs (gabapentin; flexeril) for the symptoms. The treatment provided no relief.    Reports when she was getting up out of chair yesterday, knee when one way rest of body other way and then she began with the pain.    Review of Systems   Constitutional: Negative for chills, fatigue and fever.   Respiratory: Negative for cough, shortness of breath and wheezing.    Cardiovascular: Negative for chest pain, palpitations and leg swelling.   Musculoskeletal: Positive for arthralgias.   Skin: Negative for rash and wound.   Neurological: Negative for tingling, weakness and numbness.   Psychiatric/Behavioral: The patient is not nervous/anxious.        Objective:     /66   Pulse 98   Temp 98.2 °F (36.8 °C) (Oral)   Ht 5' 8" (1.727 m)   Wt (!) 162 kg (357 lb 2.3 oz)   BMI 54.30 kg/m²     Physical Exam   Constitutional: She is oriented to person, place, and time. She appears well-developed and well-nourished.   HENT:   Head: Normocephalic.   Eyes: Pupils are equal, round, and reactive to light.   Cardiovascular: Normal rate, regular rhythm and normal heart sounds.    Pulmonary/Chest: Effort normal and breath sounds normal. No respiratory distress. She has no decreased breath sounds. She has no wheezes. She has no rhonchi. She has no rales.   Musculoskeletal:        " Right knee: She exhibits decreased range of motion (limited rom due to pain) and swelling (minimal to mild). She exhibits no effusion, no ecchymosis, no deformity, no laceration, no erythema, normal alignment, no LCL laxity and normal patellar mobility. Tenderness found. Medial joint line tenderness noted.   Lymphadenopathy:     She has no cervical adenopathy.   Neurological: She is alert and oriented to person, place, and time.   Skin: Skin is warm and dry. No rash noted.   Psychiatric: She has a normal mood and affect. Her behavior is normal. Judgment and thought content normal.   Vitals reviewed.    Assessment:     1. Knee strain, right, initial encounter    2. Acute pain of right knee        Plan:     Problem List Items Addressed This Visit     None      Visit Diagnoses     Knee strain, right, initial encounter    -  Primary    Relevant Medications    meloxicam (MOBIC) 15 MG tablet    Other Relevant Orders    X-ray Knee Ortho Right with Flexion    Acute pain of right knee        Relevant Medications    meloxicam (MOBIC) 15 MG tablet    Other Relevant Orders    X-ray Knee Ortho Right with Flexion      Discussed with patient to d/c Naprosyn while taking Mobic  Encouraged patient to rest, ice, compress, and elevate right leg  Symptomatic care discussed and educational handout provided  Report to ER if symptoms worsen    Follow-up if symptoms worsen or fail to improve.

## 2018-03-05 DIAGNOSIS — M21.169 ACQUIRED VARUS DEFORMITY OF KNEE, UNSPECIFIED LATERALITY: ICD-10-CM

## 2018-03-05 DIAGNOSIS — M19.012 ARTHRITIS OF LEFT ACROMIOCLAVICULAR JOINT: ICD-10-CM

## 2018-03-05 DIAGNOSIS — M25.562 CHRONIC PAIN OF BOTH KNEES: ICD-10-CM

## 2018-03-05 DIAGNOSIS — M25.561 CHRONIC PAIN OF BOTH KNEES: ICD-10-CM

## 2018-03-05 DIAGNOSIS — M17.0 PRIMARY OSTEOARTHRITIS OF BOTH KNEES: ICD-10-CM

## 2018-03-05 DIAGNOSIS — G89.29 CHRONIC PAIN OF BOTH KNEES: ICD-10-CM

## 2018-03-05 RX ORDER — NAPROXEN 500 MG/1
500 TABLET ORAL 2 TIMES DAILY WITH MEALS
Qty: 180 TABLET | Refills: 0 | Status: SHIPPED | OUTPATIENT
Start: 2018-03-05 | End: 2018-05-11 | Stop reason: SDUPTHER

## 2018-03-05 NOTE — TELEPHONE ENCOUNTER
Patient reports she is not taking the meloxicam and wants the naprosyn refilled.  She has an appointment with kwabena and Dr Peterson on 3/14/18

## 2018-03-14 ENCOUNTER — LAB VISIT (OUTPATIENT)
Dept: LAB | Facility: HOSPITAL | Age: 58
End: 2018-03-14
Attending: FAMILY MEDICINE
Payer: COMMERCIAL

## 2018-03-14 ENCOUNTER — OFFICE VISIT (OUTPATIENT)
Dept: ORTHOPEDICS | Facility: CLINIC | Age: 58
End: 2018-03-14
Payer: COMMERCIAL

## 2018-03-14 ENCOUNTER — OFFICE VISIT (OUTPATIENT)
Dept: FAMILY MEDICINE | Facility: CLINIC | Age: 58
End: 2018-03-14
Payer: COMMERCIAL

## 2018-03-14 VITALS
DIASTOLIC BLOOD PRESSURE: 70 MMHG | SYSTOLIC BLOOD PRESSURE: 109 MMHG | BODY MASS INDEX: 44.41 KG/M2 | WEIGHT: 293 LBS | TEMPERATURE: 98 F | HEART RATE: 68 BPM | HEIGHT: 68 IN

## 2018-03-14 VITALS — HEIGHT: 68 IN | WEIGHT: 293 LBS | BODY MASS INDEX: 44.41 KG/M2

## 2018-03-14 DIAGNOSIS — E04.2 MULTINODULAR GOITER: ICD-10-CM

## 2018-03-14 DIAGNOSIS — E78.2 COMBINED HYPERLIPIDEMIA ASSOCIATED WITH TYPE 2 DIABETES MELLITUS: ICD-10-CM

## 2018-03-14 DIAGNOSIS — R23.3 EASY BRUISING: ICD-10-CM

## 2018-03-14 DIAGNOSIS — K21.9 GASTROESOPHAGEAL REFLUX DISEASE, ESOPHAGITIS PRESENCE NOT SPECIFIED: ICD-10-CM

## 2018-03-14 DIAGNOSIS — E11.59 HYPERTENSION ASSOCIATED WITH DIABETES: ICD-10-CM

## 2018-03-14 DIAGNOSIS — E11.40 TYPE 2 DIABETES MELLITUS WITH DIABETIC NEUROPATHY, WITHOUT LONG-TERM CURRENT USE OF INSULIN: ICD-10-CM

## 2018-03-14 DIAGNOSIS — E11.69 COMBINED HYPERLIPIDEMIA ASSOCIATED WITH TYPE 2 DIABETES MELLITUS: ICD-10-CM

## 2018-03-14 DIAGNOSIS — E66.01 OBESITY, MORBID, BMI 50 OR HIGHER: ICD-10-CM

## 2018-03-14 DIAGNOSIS — I15.2 HYPERTENSION ASSOCIATED WITH DIABETES: ICD-10-CM

## 2018-03-14 DIAGNOSIS — R09.A2 GLOBUS SENSATION: Primary | ICD-10-CM

## 2018-03-14 DIAGNOSIS — M17.11 OSTEOARTHROSIS, LOCALIZED, PRIMARY, KNEE, RIGHT: ICD-10-CM

## 2018-03-14 DIAGNOSIS — M17.12 OSTEOARTHROSIS, LOCALIZED, PRIMARY, KNEE, LEFT: Primary | ICD-10-CM

## 2018-03-14 LAB
ALBUMIN SERPL BCP-MCNC: 3.7 G/DL
ALP SERPL-CCNC: 88 U/L
ALT SERPL W/O P-5'-P-CCNC: 15 U/L
ANION GAP SERPL CALC-SCNC: 10 MMOL/L
AST SERPL-CCNC: 19 U/L
BASOPHILS # BLD AUTO: 0.02 K/UL
BASOPHILS NFR BLD: 0.4 %
BILIRUB SERPL-MCNC: 0.5 MG/DL
BUN SERPL-MCNC: 15 MG/DL
CALCIUM SERPL-MCNC: 9.5 MG/DL
CHLORIDE SERPL-SCNC: 106 MMOL/L
CHOLEST SERPL-MCNC: 171 MG/DL
CHOLEST/HDLC SERPL: 2.5 {RATIO}
CO2 SERPL-SCNC: 26 MMOL/L
CREAT SERPL-MCNC: 0.7 MG/DL
DIFFERENTIAL METHOD: ABNORMAL
EOSINOPHIL # BLD AUTO: 0.2 K/UL
EOSINOPHIL NFR BLD: 3.7 %
ERYTHROCYTE [DISTWIDTH] IN BLOOD BY AUTOMATED COUNT: 13.9 %
EST. GFR  (AFRICAN AMERICAN): >60 ML/MIN/1.73 M^2
EST. GFR  (NON AFRICAN AMERICAN): >60 ML/MIN/1.73 M^2
ESTIMATED AVG GLUCOSE: 114 MG/DL
GLUCOSE SERPL-MCNC: 89 MG/DL
HBA1C MFR BLD HPLC: 5.6 %
HCT VFR BLD AUTO: 36.2 %
HDLC SERPL-MCNC: 68 MG/DL
HDLC SERPL: 39.8 %
HGB BLD-MCNC: 11.6 G/DL
IMM GRANULOCYTES # BLD AUTO: 0.01 K/UL
IMM GRANULOCYTES NFR BLD AUTO: 0.2 %
LDLC SERPL CALC-MCNC: 76 MG/DL
LYMPHOCYTES # BLD AUTO: 1.4 K/UL
LYMPHOCYTES NFR BLD: 29 %
MCH RBC QN AUTO: 28.6 PG
MCHC RBC AUTO-ENTMCNC: 32 G/DL
MCV RBC AUTO: 89 FL
MONOCYTES # BLD AUTO: 0.3 K/UL
MONOCYTES NFR BLD: 6.4 %
NEUTROPHILS # BLD AUTO: 2.9 K/UL
NEUTROPHILS NFR BLD: 60.3 %
NONHDLC SERPL-MCNC: 103 MG/DL
NRBC BLD-RTO: 0 /100 WBC
PLATELET # BLD AUTO: 330 K/UL
PMV BLD AUTO: 10.2 FL
POTASSIUM SERPL-SCNC: 4.3 MMOL/L
PROT SERPL-MCNC: 7.5 G/DL
RBC # BLD AUTO: 4.05 M/UL
SODIUM SERPL-SCNC: 142 MMOL/L
TRIGL SERPL-MCNC: 135 MG/DL
WBC # BLD AUTO: 4.87 K/UL

## 2018-03-14 PROCEDURE — 90471 IMMUNIZATION ADMIN: CPT | Mod: S$GLB,,, | Performed by: FAMILY MEDICINE

## 2018-03-14 PROCEDURE — 99999 PR PBB SHADOW E&M-EST. PATIENT-LVL II: CPT | Mod: PBBFAC,,, | Performed by: ORTHOPAEDIC SURGERY

## 2018-03-14 PROCEDURE — 85025 COMPLETE CBC W/AUTO DIFF WBC: CPT

## 2018-03-14 PROCEDURE — 80053 COMPREHEN METABOLIC PANEL: CPT

## 2018-03-14 PROCEDURE — 80061 LIPID PANEL: CPT

## 2018-03-14 PROCEDURE — 90715 TDAP VACCINE 7 YRS/> IM: CPT | Mod: S$GLB,,, | Performed by: FAMILY MEDICINE

## 2018-03-14 PROCEDURE — 20610 DRAIN/INJ JOINT/BURSA W/O US: CPT | Mod: 50,S$GLB,, | Performed by: ORTHOPAEDIC SURGERY

## 2018-03-14 PROCEDURE — 83036 HEMOGLOBIN GLYCOSYLATED A1C: CPT

## 2018-03-14 PROCEDURE — 99214 OFFICE O/P EST MOD 30 MIN: CPT | Mod: 25,S$GLB,, | Performed by: FAMILY MEDICINE

## 2018-03-14 PROCEDURE — 3074F SYST BP LT 130 MM HG: CPT | Mod: CPTII,S$GLB,, | Performed by: FAMILY MEDICINE

## 2018-03-14 PROCEDURE — 99999 PR PBB SHADOW E&M-EST. PATIENT-LVL III: CPT | Mod: PBBFAC,,, | Performed by: FAMILY MEDICINE

## 2018-03-14 PROCEDURE — 3078F DIAST BP <80 MM HG: CPT | Mod: CPTII,S$GLB,, | Performed by: FAMILY MEDICINE

## 2018-03-14 PROCEDURE — 99214 OFFICE O/P EST MOD 30 MIN: CPT | Mod: 25,S$GLB,, | Performed by: ORTHOPAEDIC SURGERY

## 2018-03-14 PROCEDURE — 36415 COLL VENOUS BLD VENIPUNCTURE: CPT | Mod: PO

## 2018-03-14 RX ORDER — TRIAMCINOLONE ACETONIDE 40 MG/ML
40 INJECTION, SUSPENSION INTRA-ARTICULAR; INTRAMUSCULAR
Status: DISCONTINUED | OUTPATIENT
Start: 2018-03-14 | End: 2018-03-14 | Stop reason: HOSPADM

## 2018-03-14 RX ADMIN — TRIAMCINOLONE ACETONIDE 40 MG: 40 INJECTION, SUSPENSION INTRA-ARTICULAR; INTRAMUSCULAR at 10:03

## 2018-03-14 NOTE — LETTER
March 14, 2018      Gonzalo Roberts MD  [address] [facility] [address] [place] [address]           [facility]  [address] [facility] [address]  Phone: [phone]          Patient: Zakia Price   MR Number: [id number]   YOB: 1960   Date of Visit: 3/14/2018       Dear Dr. Gonzalo Roberts:    Thank you for referring Zakia Price to me for evaluation. Attached you will find relevant portions of my assessment and plan of care.    If you have questions, please do not hesitate to call me. I look forward to following Zakia Price along with you.    Sincerely,    Zenon Griggs MD    Enclosure  CC:  No Recipients    If you would like to receive this communication electronically, please contact [email or web address] or [phone] to request more information on [employer] access.    For providers and/or their staff who would like to refer a patient to [facility], please contact us through our one-stop-shop provider referral line, [facility], at [phone].    If you feel you have received this communication in error or would no longer like to receive these types of communications, please e-mail [email or web address]

## 2018-03-14 NOTE — PROGRESS NOTES
Presents with complaint of sensation of something in her throat at least during the past week.  Some slight sore throat.  Denies upper respiratory symptoms no fever.  She does occasionally get heartburn, but not every day.  Throat does feel dry.  She did have previous issues with a large goiter and had partial thyroidectomy.  She's not seen ENT for a while.  She is concerned whether she may get some easy bruising as she occasionally notes small bruises on the extremities mostly, occasionally on the trunk.  Hypertension controlled.  Diabetes reviewed.  Morbid obesity reviewed and encourage weight loss.    Diabetes Management Status    Statin: Taking  ACE/ARB: Not taking    Screening or Prevention Patient's value Goal Complete/Controlled?   HgA1C Testing and Control   Lab Results   Component Value Date    HGBA1C 5.6 03/14/2018      Annually/Less than 8% Yes   Lipid profile : 06/23/2017 Annually Yes   LDL control Lab Results   Component Value Date    LDLCALC 76.0 03/14/2018    Annually/Less than 100 mg/dl  Yes   Nephropathy screening Lab Results   Component Value Date    LABMICR 18.0 03/14/2018     Lab Results   Component Value Date    PROTEINUA Negative 10/28/2015    Annually Yes   Blood pressure BP Readings from Last 1 Encounters:   03/14/18 109/70    Less than 140/90 Yes   Dilated retinal exam : 07/28/2017 Annually Yes   Foot exam   : 03/14/2018 Annually Yes         Past Medical History:  Past Medical History:   Diagnosis Date    Diabetes mellitus, type 2     Diabetic neuropathy 1/27/2014    DJD (degenerative joint disease) of knee     DVT (deep venous thrombosis) around 1990's    in leg, is on no anticoagulant therapy presently    GERD (gastroesophageal reflux disease)     Hypertension associated with diabetes     Multinodular goiter     Obesity, morbid, BMI 50 or higher     Sleep apnea     has no CPAP     Past Surgical History:   Procedure Laterality Date    HYSTERECTOMY      SHOULDER ARTHROSCOPY       THYROIDECTOMY, PARTIAL Right     and transplatation of right superior parathyroid gland to the sternocleidomastoid muscle     TONSILLECTOMY      WRIST FRACTURE SURGERY      left     Social History     Social History    Marital status: Single     Spouse name: N/A    Number of children: N/A    Years of education: N/A     Occupational History    Not on file.     Social History Main Topics    Smoking status: Never Smoker    Smokeless tobacco: Never Used    Alcohol use No    Drug use: No    Sexual activity: No     Other Topics Concern    Not on file     Social History Narrative    No narrative on file     Family History   Problem Relation Age of Onset    Leukemia Son     Diabetes Mother     Hypertension Mother     Heart disease Mother 50    Cancer Father     Cancer Brother      Review of patient's allergies indicates:   Allergen Reactions    Codeine sulfate      Nausea^    Lisinopril Swelling     angioedema    Codeine Nausea Only and Rash     Current Outpatient Prescriptions on File Prior to Visit   Medication Sig Dispense Refill    acetaminophen (TYLENOL) 500 MG tablet Take 2 tablets (1,000 mg total) by mouth every 6 (six) hours as needed for Pain.      amLODIPine (NORVASC) 5 MG tablet Take 1 tablet (5 mg total) by mouth once daily. 90 tablet 0    blood sugar diagnostic (CLEVER CHOICE VOICE+ TEST) Strp TEST BLOOD SUGARS ONE TIME DAILY 100 strip 1    cyclobenzaprine (FLEXERIL) 10 MG tablet Take 1 tablet (10 mg total) by mouth 3 (three) times daily as needed. 30 tablet 5    fluticasone (FLONASE) 50 mcg/actuation nasal spray Use 2 sprays to each nostril daily 16 g 5    gabapentin (NEURONTIN) 600 MG tablet Take 1 tablet (600 mg total) by mouth 3 (three) times daily. 270 tablet 1    lancets [facility]. route As directed 100 each prn    levothyroxine (SYNTHROID) 100 MCG tablet Take 1 tablet (100 mcg total) by mouth once daily. 90 tablet 3    metFORMIN (GLUCOPHAGE) 1000 MG tablet Take 1 tablet  "(1,000 mg total) by mouth 2 (two) times daily with meals. 180 tablet 1    naproxen (NAPROSYN) 500 MG tablet Take 1 tablet (500 mg total) by mouth 2 (two) times daily with meals. 180 tablet 0    pantoprazole (PROTONIX) 40 MG tablet Take 1 tablet (40 mg total) by mouth once daily. 90 tablet 3    pravastatin (PRAVACHOL) 80 MG tablet Take 1 tablet (80 mg total) by mouth once daily. 90 tablet 3    PRODIGY LANCETS MISC As  Directed      [DISCONTINUED] diphenhydrAMINE (BENADRYL) 25 mg capsule Take 1 each (25 mg total) by mouth every 6 (six) hours as needed for Itching.  0     Current Facility-Administered Medications on File Prior to Visit   Medication Dose Route Frequency Provider Last Rate Last Dose    [DISCONTINUED] triamcinolone acetonide injection 40 mg  40 mg Intra-articular  Zenon Griggs MD   40 mg at 03/14/18 1001    [DISCONTINUED] triamcinolone acetonide injection 40 mg  40 mg Intra-articular  Zenon Griggs MD   40 mg at 03/14/18 1001    [DISCONTINUED] triamcinolone acetonide injection 80 mg  80 mg Intra-articular  Gonzalo Roberts MD   80 mg at 08/30/17 1530           ROS:  GENERAL: No fever, chills,  or significant weight changes.   CARDIOVASCULAR: Denies chest pain, PND, orthopnea or reduced exercise tolerance.  ABDOMEN: Appetite fine. Denies diarrhea, abdominal pain, hematemesis or blood in stool.  URINARY: No flank pain, dysuria or hematuria.    OBJECTIVE:   Vitals:    03/14/18 1010   BP: 109/70   Pulse: 68   Temp: 98 °F (36.7 °C)   Weight: (!) 161.5 kg (356 lb)   Height: 5' 8" (1.727 m)     Wt Readings from Last 3 Encounters:   03/14/18 (!) 161.5 kg (356 lb)   03/14/18 (!) 161.9 kg (357 lb)   03/01/18 (!) 162 kg (357 lb 2.3 oz)       APPEARANCE: Well nourished, well developed, in no acute distress.   HEAD: Normocephalic. Atraumatic. No sinus tenderness.   EYES:   Right eye: Pupil reactive. Conjunctiva clear.   Left eye: Pupil reactive. Conjunctiva clear.   Both fundi: Grossly normal to " nondilated exam. EOMI.   EARS: TM's intact. Light reflex normal. No retraction or perforation.   NOSE: clear.   MOUTH & THROAT: No pharyngeal erythema or exudate. No lesions.   NECK: No bruits. No JVD. No cervical lymphadenopathy. No thyromegaly.   CHEST: Breath sounds clear bilaterally. Normal respiratory effort   CARDIOVASCULAR: Normal rate. Regular rhythm. No murmurs. No rub. No gallops.   ABDOMEN: Bowel sounds normal. Soft. No tenderness. No organomegaly.   PERIPHERAL VASCULAR: No cyanosis. No clubbing. No edema.   NEUROLOGIC: No focal findings.    Feet:Sensation in the feet intact to monofilament testing.   No significant foot lesions.Pulses palpable.  MENTAL STATUS: Alert. Oriented x 3.         Zakia was seen today for sore throat and bleeding/bruising.    Diagnoses and all orders for this visit:    Globus sensation  -     Ambulatory referral to ENT    Multinodular goiter  -     Ambulatory referral to ENT    Easy bruising  -     CBC auto differential; Future    Gastroesophageal reflux disease, esophagitis presence not specified    Type 2 diabetes mellitus with diabetic neuropathy, without long-term current use of insulin  -     Comprehensive metabolic panel; Standing  -     Hemoglobin A1c; Standing  -     Microalbumin/creatinine urine ratio; Standing    Hypertension associated with diabetes    Combined hyperlipidemia associated with type 2 diabetes mellitus  -     Lipid panel; Standing    Obesity, morbid, BMI 50 or higher    Other orders  -     Tdap Vaccine; Future  -     Tdap Vaccine      Further follow-up pending above.  Encourage weight loss.

## 2018-03-14 NOTE — PROGRESS NOTES
DATE: 3/14/2018  PATIENT: Zakia Price  REFERRING MD:  CHIEF COMPLAINT:   Chief Complaint   Patient presents with    Knee Pain     christina       HISTORY:  Zakia Price is a 58 y.o. female  who presents for initial evaluation of a new problem regarding her bilateral knees.  She's been previously treated by Dr. Roberts.  She is severe osteoarthrosis.  She's been receiving cortisone injections which she reports last approximately 3 months.  She states that her pain is returned and is now 10/10.  She is employed as a .  He presents today requesting repeat injections.      PAST MEDICAL/SURGICAL HISTORY:  Past Medical History:   Diagnosis Date    Diabetes mellitus, type 2     Diabetic neuropathy 1/27/2014    DJD (degenerative joint disease) of knee     DVT (deep venous thrombosis) around 1990's    in leg, is on no anticoagulant therapy presently    GERD (gastroesophageal reflux disease)     Hypertension associated with diabetes     Multinodular goiter     Obesity, morbid, BMI 50 or higher     Sleep apnea     has no CPAP     Past Surgical History:   Procedure Laterality Date    HYSTERECTOMY      SHOULDER ARTHROSCOPY      THYROIDECTOMY, PARTIAL Right     and transplatation of right superior parathyroid gland to the sternocleidomastoid muscle     TONSILLECTOMY      WRIST FRACTURE SURGERY      left       Current Medications:   Current Outpatient Prescriptions:     acetaminophen (TYLENOL) 500 MG tablet, Take 2 tablets (1,000 mg total) by mouth every 6 (six) hours as needed for Pain., Disp: , Rfl:     amLODIPine (NORVASC) 5 MG tablet, Take 1 tablet (5 mg total) by mouth once daily., Disp: 90 tablet, Rfl: 0    blood sugar diagnostic (CLEVER CHOICE VOICE+ TEST) Strp, TEST BLOOD SUGARS ONE TIME DAILY, Disp: 100 strip, Rfl: 1    cyclobenzaprine (FLEXERIL) 10 MG tablet, Take 1 tablet (10 mg total) by mouth 3 (three) times daily as needed., Disp: 30 tablet, Rfl: 5    diphenhydrAMINE  (BENADRYL) 25 mg capsule, Take 1 each (25 mg total) by mouth every 6 (six) hours as needed for Itching., Disp: , Rfl: 0    fluticasone (FLONASE) 50 mcg/actuation nasal spray, Use 2 sprays to each nostril daily, Disp: 16 g, Rfl: 5    gabapentin (NEURONTIN) 600 MG tablet, Take 1 tablet (600 mg total) by mouth 3 (three) times daily., Disp: 270 tablet, Rfl: 1    lancets Misc, Misc. route As directed, Disp: 100 each, Rfl: prn    levothyroxine (SYNTHROID) 100 MCG tablet, Take 1 tablet (100 mcg total) by mouth once daily., Disp: 90 tablet, Rfl: 3    metFORMIN (GLUCOPHAGE) 1000 MG tablet, Take 1 tablet (1,000 mg total) by mouth 2 (two) times daily with meals., Disp: 180 tablet, Rfl: 1    naproxen (NAPROSYN) 500 MG tablet, Take 1 tablet (500 mg total) by mouth 2 (two) times daily with meals., Disp: 180 tablet, Rfl: 0    pantoprazole (PROTONIX) 40 MG tablet, Take 1 tablet (40 mg total) by mouth once daily., Disp: 90 tablet, Rfl: 3    pravastatin (PRAVACHOL) 80 MG tablet, Take 1 tablet (80 mg total) by mouth once daily., Disp: 90 tablet, Rfl: 3    PRODIGY LANCETS MISC, As  Directed, Disp: , Rfl:   No current facility-administered medications for this visit.     Facility-Administered Medications Ordered in Other Visits:     triamcinolone acetonide injection 80 mg, 80 mg, Intra-articular, , Gonzalo Roberts MD, 80 mg at 08/30/17 1530    Family History: family history was reviewed and is noncontributory  Social History:   Social History     Social History    Marital status: Single     Spouse name: N/A    Number of children: N/A    Years of education: N/A     Occupational History    Not on file.     Social History Main Topics    Smoking status: Never Smoker    Smokeless tobacco: Never Used    Alcohol use No    Drug use: No    Sexual activity: No     Other Topics Concern    Not on file     Social History Narrative    No narrative on file       ROS:  Constitution: Negative for chills, fever, and sweats.  "Negative for unexplained weight loss.  HENT: Negative for headaches and blurry vision.   Cardiovascular: Negative for chest pain, irregular heartbeat, leg swelling and palpitations.   Respiratory: Negative for cough and shortness of breath.   Gastrointestinal: Negative for abdominal pain, heartburn, nausea and vomiting.   Genitourinary: Negative for bladder incontinence and dysuria.   Musculoskeletal: Negative for systemic arthritis, muscle weakness and myalgias.   Neurological: Negative for numbness.   Psychiatric/Behavioral: Negative for depression.  Endocrine: Negative for polyuria.   Hematologic/Lymphatic: Negative for bleeding disorders.   Skin: Negative for poor wound healing.        PHYSICAL EXAM:  Ht 5' 8" (1.727 m)   Wt (!) 161.9 kg (357 lb)   BMI 54.28 kg/m²   Zakia Price is a well developed, well nourished female in no acute distress. Physical examination of the bilateral knee evaluated the following:    Gait and Alignment  Inspection for ecchymosis, swelling, atrophy, or deformity  Inspection for intra-articular and/or bursal effusions  Tenderness to palpation over the bony and soft tissue structures around the knee  Range of Motion and presence of extensor lag/contractures  Sensation and motor strength  Varus/valgus or anterior/posterior/rotatory instability  Flexion pinch and Ash's Tests  Patellar alignment/tracking/pain to palpation  Vascular exam to include skin temperature/color/capillary refill    Remarkable findings included:  Elevated BMI.  No effusion of either knee.  Range of motion 0-120° of flexion.  Crepitus with range of motion of both knees.  No instability on exam.  Positive flexion pinch bilaterally        IMAGING:   X-rays of both knees are personally reviewed.  No acute fractures are seen.  Medial compartment bone-on-bone with varus deformities noted.     ASSESSMENT:   Severe osteoarthrosis both knees    PLAN:  The nature of the diagnosis, using models and diagrams " when appropriate, was explained to the patient in detail.  As she's had a good response to injections in the past, repeat cortisone injection performed today (see procedure note).  She'll monitor her symptoms.  Weight loss was also discussed.  Should she have further problems, she'll return for reexam and consideration of repeat injection versus referral for arthroplasty.     This note was dictated using voice recognition software. Please excuse any grammatical or typographical errors.

## 2018-03-26 RX ORDER — LEVOTHYROXINE SODIUM 100 UG/1
100 TABLET ORAL DAILY
Qty: 90 TABLET | Refills: 1 | Status: SHIPPED | OUTPATIENT
Start: 2018-03-26 | End: 2018-09-09 | Stop reason: SDUPTHER

## 2018-04-11 ENCOUNTER — TELEPHONE (OUTPATIENT)
Dept: FAMILY MEDICINE | Facility: CLINIC | Age: 58
End: 2018-04-11

## 2018-04-11 ENCOUNTER — PATIENT MESSAGE (OUTPATIENT)
Dept: ENDOCRINOLOGY | Facility: CLINIC | Age: 58
End: 2018-04-11

## 2018-04-11 DIAGNOSIS — D64.9 ANEMIA, UNSPECIFIED TYPE: Primary | ICD-10-CM

## 2018-04-11 NOTE — TELEPHONE ENCOUNTER
----- Message from Rafa Peterson MD sent at 4/9/2018  7:13 PM CDT -----  Borderline for anemia otherwise lab okay.  Recommend check hemoglobin A1c, repeat CBC beginning of September. My nurse will contact you to arrange.  Thanks,  Dr. Peterson    Results released on [facility]

## 2018-04-13 ENCOUNTER — TELEPHONE (OUTPATIENT)
Dept: FAMILY MEDICINE | Facility: CLINIC | Age: 58
End: 2018-04-13

## 2018-04-13 NOTE — TELEPHONE ENCOUNTER
----- Message from Mason Newell sent at 4/12/2018  3:59 PM CDT -----  Contact: pt  Call pt at [phone] (home) [phone] (work) pt was returning a missed call

## 2018-04-26 RX ORDER — AMLODIPINE BESYLATE 5 MG/1
5 TABLET ORAL DAILY
Qty: 90 TABLET | Refills: 3 | Status: SHIPPED | OUTPATIENT
Start: 2018-04-26 | End: 2019-04-18 | Stop reason: SDUPTHER

## 2018-04-27 DIAGNOSIS — M25.561 CHRONIC PAIN OF BOTH KNEES: ICD-10-CM

## 2018-04-27 DIAGNOSIS — M17.0 PRIMARY OSTEOARTHRITIS OF BOTH KNEES: ICD-10-CM

## 2018-04-27 DIAGNOSIS — M21.169 ACQUIRED VARUS DEFORMITY OF KNEE, UNSPECIFIED LATERALITY: ICD-10-CM

## 2018-04-27 DIAGNOSIS — M19.012 ARTHRITIS OF LEFT ACROMIOCLAVICULAR JOINT: ICD-10-CM

## 2018-04-27 DIAGNOSIS — G89.29 CHRONIC PAIN OF BOTH KNEES: ICD-10-CM

## 2018-04-27 DIAGNOSIS — M25.562 CHRONIC PAIN OF BOTH KNEES: ICD-10-CM

## 2018-04-27 RX ORDER — PANTOPRAZOLE SODIUM 40 MG/1
40 TABLET, DELAYED RELEASE ORAL DAILY
Qty: 90 TABLET | Refills: 3 | Status: SHIPPED | OUTPATIENT
Start: 2018-04-27 | End: 2019-04-07 | Stop reason: SDUPTHER

## 2018-04-27 RX ORDER — AMLODIPINE BESYLATE 5 MG/1
5 TABLET ORAL DAILY
Qty: 90 TABLET | Refills: 3 | Status: CANCELLED | OUTPATIENT
Start: 2018-04-27

## 2018-04-27 RX ORDER — METFORMIN HYDROCHLORIDE 1000 MG/1
1000 TABLET ORAL 2 TIMES DAILY WITH MEALS
Qty: 180 TABLET | Refills: 1 | Status: CANCELLED | OUTPATIENT
Start: 2018-04-27

## 2018-04-27 RX ORDER — PRAVASTATIN SODIUM 80 MG/1
80 TABLET ORAL DAILY
Qty: 90 TABLET | Refills: 3 | Status: CANCELLED | OUTPATIENT
Start: 2018-04-27

## 2018-04-27 RX ORDER — LEVOTHYROXINE SODIUM 100 UG/1
100 TABLET ORAL DAILY
Qty: 90 TABLET | Refills: 1 | Status: CANCELLED | OUTPATIENT
Start: 2018-04-27

## 2018-04-27 RX ORDER — GABAPENTIN 600 MG/1
600 TABLET ORAL 3 TIMES DAILY
Qty: 270 TABLET | Refills: 1 | Status: CANCELLED | OUTPATIENT
Start: 2018-04-27 | End: 2019-04-27

## 2018-04-27 RX ORDER — CYCLOBENZAPRINE HCL 10 MG
10 TABLET ORAL 3 TIMES DAILY PRN
Qty: 30 TABLET | Refills: 5 | Status: SHIPPED | OUTPATIENT
Start: 2018-04-27 | End: 2018-06-04 | Stop reason: SDUPTHER

## 2018-04-27 RX ORDER — NAPROXEN 500 MG/1
500 TABLET ORAL 2 TIMES DAILY WITH MEALS
Qty: 180 TABLET | Refills: 0 | Status: CANCELLED | OUTPATIENT
Start: 2018-04-27 | End: 2019-04-22

## 2018-04-30 RX ORDER — PANTOPRAZOLE SODIUM 40 MG/1
40 TABLET, DELAYED RELEASE ORAL DAILY
Refills: 2 | OUTPATIENT
Start: 2018-04-30

## 2018-05-02 ENCOUNTER — PATIENT MESSAGE (OUTPATIENT)
Dept: ORTHOPEDICS | Facility: CLINIC | Age: 58
End: 2018-05-02

## 2018-05-03 ENCOUNTER — PATIENT MESSAGE (OUTPATIENT)
Dept: ORTHOPEDICS | Facility: CLINIC | Age: 58
End: 2018-05-03

## 2018-05-11 DIAGNOSIS — M17.0 PRIMARY OSTEOARTHRITIS OF BOTH KNEES: ICD-10-CM

## 2018-05-11 DIAGNOSIS — M25.562 CHRONIC PAIN OF BOTH KNEES: ICD-10-CM

## 2018-05-11 DIAGNOSIS — M21.169 ACQUIRED VARUS DEFORMITY OF KNEE, UNSPECIFIED LATERALITY: ICD-10-CM

## 2018-05-11 DIAGNOSIS — G89.29 CHRONIC PAIN OF BOTH KNEES: ICD-10-CM

## 2018-05-11 DIAGNOSIS — M19.012 ARTHRITIS OF LEFT ACROMIOCLAVICULAR JOINT: ICD-10-CM

## 2018-05-11 DIAGNOSIS — M25.561 CHRONIC PAIN OF BOTH KNEES: ICD-10-CM

## 2018-05-14 RX ORDER — NAPROXEN 500 MG/1
500 TABLET ORAL 2 TIMES DAILY WITH MEALS
Qty: 60 TABLET | Refills: 0 | Status: SHIPPED | OUTPATIENT
Start: 2018-05-14 | End: 2018-06-25 | Stop reason: SDUPTHER

## 2018-05-16 ENCOUNTER — OFFICE VISIT (OUTPATIENT)
Dept: ORTHOPEDICS | Facility: CLINIC | Age: 58
End: 2018-05-16
Payer: COMMERCIAL

## 2018-05-16 VITALS — BODY MASS INDEX: 44.41 KG/M2 | WEIGHT: 293 LBS | HEIGHT: 68 IN

## 2018-05-16 DIAGNOSIS — M17.11 OSTEOARTHROSIS, LOCALIZED, PRIMARY, KNEE, RIGHT: ICD-10-CM

## 2018-05-16 DIAGNOSIS — M17.12 OSTEOARTHROSIS, LOCALIZED, PRIMARY, KNEE, LEFT: Primary | ICD-10-CM

## 2018-05-16 PROCEDURE — 99999 PR PBB SHADOW E&M-EST. PATIENT-LVL II: CPT | Mod: PBBFAC,,, | Performed by: ORTHOPAEDIC SURGERY

## 2018-05-16 PROCEDURE — 3008F BODY MASS INDEX DOCD: CPT | Mod: CPTII,S$GLB,, | Performed by: ORTHOPAEDIC SURGERY

## 2018-05-16 PROCEDURE — 99213 OFFICE O/P EST LOW 20 MIN: CPT | Mod: S$GLB,,, | Performed by: ORTHOPAEDIC SURGERY

## 2018-05-16 RX ORDER — DICLOFENAC SODIUM 10 MG/G
2 GEL TOPICAL 4 TIMES DAILY
Qty: 1 TUBE | Refills: 1 | Status: SHIPPED | OUTPATIENT
Start: 2018-05-16 | End: 2018-05-26

## 2018-05-16 NOTE — PROGRESS NOTES
"DATE: 5/16/2018  PATIENT: Zakia Price    Attending Physician: Zenon Griggs M.D.    HISTORY:  Zakia Price is a 58 y.o. female who returns for follow up evaluation of  her bilateral knee pain.  She has significant osteoarthrosis.  She underwent cortisone injection to both knees on 3/14.  She presents today stating her pain is 10/10 and is requesting repeat cortisone injections today.    PMH/PSH/FamHx/SocHx:  Reviewed and unchanged from prior visit    ROS:  Constitution: Negative for chills, fever, and sweats. Negative for unexplained weight loss.  HENT: Negative for headaches and blurry vision.   Cardiovascular: Negative for chest pain, irregular heartbeat, leg swelling and palpitations.   Respiratory: Negative for cough and shortness of breath.   Gastrointestinal: Negative for abdominal pain, heartburn, nausea and vomiting.   Genitourinary: Negative for bladder incontinence and dysuria.   Musculoskeletal: Negative for systemic arthritis, joint swelling, muscle weakness and myalgias.   Neurological: Negative for numbness.   Psychiatric/Behavioral: Negative for depression.   Endocrine: Negative for polyuria.   Hematologic/Lymphatic: Negative for bleeding disorders.  Skin: Negative for poor wound healing.       EXAM:  Ht 5' 8" (1.727 m)   Wt (!) 161.5 kg (356 lb)   BMI 54.13 kg/m²   Zakia Price is a well developed, well nourished female in no acute distress. Physical examination of the bilateral knee evaluated the following:     Gait and Alignment  Inspection for ecchymosis, swelling, atrophy, or deformity  Inspection for intra-articular and/or bursal effusions  Tenderness to palpation over the bony and soft tissue structures around the knee  Range of Motion and presence of extensor lag/contractures  Sensation and motor strength  Varus/valgus or anterior/posterior/rotatory instability  Flexion pinch and Ash's Tests  Patellar alignment/tracking/pain to palpation  Vascular exam " to include skin temperature/color/capillary refill     Remarkable findings included:  Elevated BMI.  No effusion of either knee.  Range of motion 0-120° of flexion.  Crepitus with range of motion of both knees.  No instability on exam.  Positive flexion pinch bilaterally       IMAGING:   No new x-rays are performed today.    ASSESSMENT:  Severe osteoarthrosis both knees    PLAN:  The implications of the patient's evolution of symptoms and findings were explained to the patient in detail.  I have explained the Zakia that she only had her cortisone injections 2 months ago.  I think is too early to repeat the injections.  I like to go at least 3 months if not longer.  Again discussed weight loss program and referral for arthroplasty.  At the present time I recommended Voltaren gel and waiting until next month.  She would like to have the injections when possible.  She will return in 4 weeks' time to see Marcelina Hastings NP for bilateral cortisone injections.          This note was dictated using voice recognition software. Please excuse any grammatical or typographical errors.

## 2018-06-04 RX ORDER — CYCLOBENZAPRINE HCL 10 MG
10 TABLET ORAL 3 TIMES DAILY PRN
Qty: 270 TABLET | Refills: 1 | Status: SHIPPED | OUTPATIENT
Start: 2018-06-04 | End: 2019-05-07 | Stop reason: SDUPTHER

## 2018-06-20 ENCOUNTER — TELEPHONE (OUTPATIENT)
Dept: ORTHOPEDICS | Facility: CLINIC | Age: 58
End: 2018-06-20

## 2018-06-20 ENCOUNTER — OFFICE VISIT (OUTPATIENT)
Dept: ORTHOPEDICS | Facility: CLINIC | Age: 58
End: 2018-06-20
Payer: COMMERCIAL

## 2018-06-20 VITALS — WEIGHT: 293 LBS | HEIGHT: 68 IN | BODY MASS INDEX: 44.41 KG/M2

## 2018-06-20 DIAGNOSIS — M17.0 BILATERAL PRIMARY OSTEOARTHRITIS OF KNEE: Primary | ICD-10-CM

## 2018-06-20 PROCEDURE — 99213 OFFICE O/P EST LOW 20 MIN: CPT | Mod: 25,S$GLB,, | Performed by: NURSE PRACTITIONER

## 2018-06-20 PROCEDURE — 99999 PR PBB SHADOW E&M-EST. PATIENT-LVL II: CPT | Mod: PBBFAC,,, | Performed by: NURSE PRACTITIONER

## 2018-06-20 PROCEDURE — 20610 DRAIN/INJ JOINT/BURSA W/O US: CPT | Mod: 50,S$GLB,, | Performed by: NURSE PRACTITIONER

## 2018-06-20 RX ORDER — TRIAMCINOLONE ACETONIDE 40 MG/ML
40 INJECTION, SUSPENSION INTRA-ARTICULAR; INTRAMUSCULAR
Status: DISCONTINUED | OUTPATIENT
Start: 2018-06-20 | End: 2018-06-20 | Stop reason: HOSPADM

## 2018-06-20 RX ADMIN — TRIAMCINOLONE ACETONIDE 40 MG: 40 INJECTION, SUSPENSION INTRA-ARTICULAR; INTRAMUSCULAR at 10:06

## 2018-06-20 NOTE — PROGRESS NOTES
"DATE: 6/20/2018  PATIENT: Zakia Price    Attending Physician: Zenon Griggs M.D.    HISTORY:  Zakia Price is a 58 y.o. female who returns for follow up evaluation of  Her bilateral knee pain. She has been diagnosed with severe knee osteoarthritis with bone on bone changes and osteophyte formations.  She presents today requesting bilateral cortisone injections.  She works as a  at a school.    PMH/PSH/FamHx/SocHx:  Reviewed and unchanged from prior visit    ROS:  Constitution: Negative for chills, fever, and sweats. Negative for unexplained weight loss.  HENT: Negative for headaches and blurry vision.   Cardiovascular: Negative for chest pain, irregular heartbeat, leg swelling and palpitations.   Respiratory: Negative for cough and shortness of breath.   Gastrointestinal: Negative for abdominal pain, heartburn, nausea and vomiting.   Genitourinary: Negative for bladder incontinence and dysuria.   Musculoskeletal: Negative for systemic arthritis, joint swelling, muscle weakness and myalgias.   Neurological: Negative for numbness.   Psychiatric/Behavioral: Negative for depression.   Endocrine: Negative for polyuria.   Hematologic/Lymphatic: Negative for bleeding disorders.  Skin: Negative for poor wound healing.       EXAM:  Ht 5' 8" (1.727 m)   Wt (!) 161.5 kg (356 lb)   BMI 54.13 kg/m²   Zakia Price is a healthy appearing 58 year old female in no acute distress. Physical examination of the bilateral knee evaluated the following:    Gait and Alignment  Inspection for ecchymosis, swelling, atrophy, or deformity  Inspection for intra-articular and/or bursal effusions  Tenderness to palpation over the bony and soft tissue structures around the knee  Range of Motion and presence of extensor lag/contractures  Sensation and motor strength  Varus/valgus or anterior/posterior/rotatory instability  Flexion pinch and Ash's Tests  Patellar alignment/tracking/pain to " palpation  Vascular exam to include skin temperature/color/capillary refill    Remarkable findings included:  Morbidly elevated BMI  No edema or effusion of knees, large joint due to habitus  Tender to palpation about the jointline bilaterally  ROM 0-120  Strength 5/5  Sensation intact  Skin warm, dry, intact      IMAGING:   X-ray obtained Bilateral knee performed 11/27/17 personally reviewed with patient. Radiologist report as follows:     Interval progression bilateral tricompartment degenerative changes with bilateral persistent increased involvement of the medial and patellofemoral joints. Prominent osteophytes bilaterally in the patellofemoral joints. Cannot exclude bilateral loose   bodies within the suprapatellar bursa. Additional calcific densities project along the posterior margin of the knees bilaterally. No definite effusions. Bilateral patellar tilt bilaterally. No acute fracture or dislocation.     ASSESSMENT:  Severe bilateral knee osteoarthritis  Morbid obesity, BMI 54    PLAN:  The implications of the patient's evolution of symptoms and findings were explained to the patient in detail. Treatment option discussed included non-operative measures of rest, modification of activities, application of ice, elevation of extremity, compression, over the counter pain/antiinflammatory relief, physical therapy, cortisone injection, or visco supplementation. All questions answered and the patient wishes to proceed with bilateral knee cortisone injections today (see procedure documentation). I have given her a handout on Euflexxa injections.   I have discussed weight loss benefits with Mrs Price including health benefit of improving her blood sugar and blood pressure and helping her to become a better surgical candidate for knee arthroplasty.  I have referred her to bariatric medicine and scheduled her an appointment for consultation.

## 2018-06-20 NOTE — TELEPHONE ENCOUNTER
Patient informed neurology with [facility] does not come to [place], she wants to see someone in [place], advised to contact her insurance company to see who is available and then call information to us so we can do a referral.

## 2018-06-20 NOTE — PROCEDURES
Large Joint Aspiration/Injection  Date/Time: 6/20/2018 10:00 AM  Performed by: JORGE GUZMAN  Authorized by: JORGE GUZMAN     Consent Done?:  Yes (Verbal)  Indications:  Pain  Timeout: Prior to procedure the correct patient, procedure, and site was verified      Location:  Knee  Site:  R knee and L knee  Prep: Patient was prepped and draped in usual sterile fashion    Ultrasonic Guidance for needle placement: No  Needle size:  22 G  Approach:  Anterolateral  Medications:  40 mg triamcinolone acetonide 40 mg/mL; 40 mg triamcinolone acetonide 40 mg/mL  Patient tolerance:  Patient tolerated the procedure well with no immediate complications

## 2018-06-20 NOTE — TELEPHONE ENCOUNTER
Patient in clinic for bilateral knee pain - states that she had referral for neurology in the past for arm paraesthesia, however was unable to see anyone for this. Requesting new referral for neurology, however she is unsure if she can see someone in the [place] area. I am not sure if there are any within [facility] that come to [place]. Please review and advise if okay for referral and to which provider.      Thanks!

## 2018-06-22 RX ORDER — METFORMIN HYDROCHLORIDE 1000 MG/1
1000 TABLET ORAL 2 TIMES DAILY WITH MEALS
Qty: 180 TABLET | Refills: 3 | Status: SHIPPED | OUTPATIENT
Start: 2018-06-22 | End: 2019-08-12 | Stop reason: SDUPTHER

## 2018-06-22 RX ORDER — GABAPENTIN 600 MG/1
600 TABLET ORAL 3 TIMES DAILY
Qty: 90 TABLET | Refills: 3 | Status: SHIPPED | OUTPATIENT
Start: 2018-06-22 | End: 2019-04-18 | Stop reason: SDUPTHER

## 2018-06-22 RX ORDER — PRAVASTATIN SODIUM 80 MG/1
80 TABLET ORAL DAILY
Qty: 90 TABLET | Refills: 3 | Status: SHIPPED | OUTPATIENT
Start: 2018-06-22 | End: 2019-08-12 | Stop reason: SDUPTHER

## 2018-06-25 DIAGNOSIS — G89.29 CHRONIC PAIN OF BOTH KNEES: ICD-10-CM

## 2018-06-25 DIAGNOSIS — M19.012 ARTHRITIS OF LEFT ACROMIOCLAVICULAR JOINT: ICD-10-CM

## 2018-06-25 DIAGNOSIS — M17.0 PRIMARY OSTEOARTHRITIS OF BOTH KNEES: ICD-10-CM

## 2018-06-25 DIAGNOSIS — M25.561 CHRONIC PAIN OF BOTH KNEES: ICD-10-CM

## 2018-06-25 DIAGNOSIS — M25.562 CHRONIC PAIN OF BOTH KNEES: ICD-10-CM

## 2018-06-25 DIAGNOSIS — M21.169 ACQUIRED VARUS DEFORMITY OF KNEE, UNSPECIFIED LATERALITY: ICD-10-CM

## 2018-06-25 RX ORDER — NAPROXEN 500 MG/1
500 TABLET ORAL 2 TIMES DAILY WITH MEALS
Qty: 60 TABLET | Refills: 0 | Status: SHIPPED | OUTPATIENT
Start: 2018-06-25 | End: 2018-06-28 | Stop reason: SDUPTHER

## 2018-06-28 ENCOUNTER — TELEPHONE (OUTPATIENT)
Dept: FAMILY MEDICINE | Facility: CLINIC | Age: 58
End: 2018-06-28

## 2018-06-28 DIAGNOSIS — M25.562 CHRONIC PAIN OF BOTH KNEES: ICD-10-CM

## 2018-06-28 DIAGNOSIS — G89.29 CHRONIC PAIN OF BOTH KNEES: ICD-10-CM

## 2018-06-28 DIAGNOSIS — M19.012 ARTHRITIS OF LEFT ACROMIOCLAVICULAR JOINT: ICD-10-CM

## 2018-06-28 DIAGNOSIS — M21.169 ACQUIRED VARUS DEFORMITY OF KNEE, UNSPECIFIED LATERALITY: ICD-10-CM

## 2018-06-28 DIAGNOSIS — M17.0 PRIMARY OSTEOARTHRITIS OF BOTH KNEES: ICD-10-CM

## 2018-06-28 DIAGNOSIS — M25.561 CHRONIC PAIN OF BOTH KNEES: ICD-10-CM

## 2018-06-28 RX ORDER — NAPROXEN 500 MG/1
500 TABLET ORAL 2 TIMES DAILY WITH MEALS
Qty: 60 TABLET | Refills: 0 | Status: SHIPPED | OUTPATIENT
Start: 2018-06-28 | End: 2018-07-15 | Stop reason: SDUPTHER

## 2018-06-28 NOTE — TELEPHONE ENCOUNTER
----- Message from Candelaria Lozano sent at 6/28/2018 11:36 AM CDT -----  Contact: [facility])[phone]  would like to consult with nurse regarding medication(Naproxen 500mg), please call back at [phone]. Thanks/ar

## 2018-06-28 NOTE — TELEPHONE ENCOUNTER
----- Message from Candelaria Lozano sent at 6/28/2018 11:36 AM CDT -----  Contact: [facility])[phone]  would like to consult with nurse regarding medication(Naproxen 500mg), please call back at [phone]. Thanks/ar

## 2018-07-15 DIAGNOSIS — M21.169 ACQUIRED VARUS DEFORMITY OF KNEE, UNSPECIFIED LATERALITY: ICD-10-CM

## 2018-07-15 DIAGNOSIS — M25.561 CHRONIC PAIN OF BOTH KNEES: ICD-10-CM

## 2018-07-15 DIAGNOSIS — G89.29 CHRONIC PAIN OF BOTH KNEES: ICD-10-CM

## 2018-07-15 DIAGNOSIS — M25.562 CHRONIC PAIN OF BOTH KNEES: ICD-10-CM

## 2018-07-15 DIAGNOSIS — M17.0 PRIMARY OSTEOARTHRITIS OF BOTH KNEES: ICD-10-CM

## 2018-07-15 DIAGNOSIS — M19.012 ARTHRITIS OF LEFT ACROMIOCLAVICULAR JOINT: ICD-10-CM

## 2018-07-17 RX ORDER — NAPROXEN 500 MG/1
500 TABLET ORAL 2 TIMES DAILY WITH MEALS
Qty: 180 TABLET | Refills: 0 | Status: SHIPPED | OUTPATIENT
Start: 2018-07-17 | End: 2018-10-09 | Stop reason: SDUPTHER

## 2018-09-05 ENCOUNTER — LAB VISIT (OUTPATIENT)
Dept: LAB | Facility: HOSPITAL | Age: 58
End: 2018-09-05
Attending: FAMILY MEDICINE
Payer: COMMERCIAL

## 2018-09-05 DIAGNOSIS — D64.9 ANEMIA, UNSPECIFIED TYPE: ICD-10-CM

## 2018-09-05 DIAGNOSIS — E11.40 TYPE 2 DIABETES MELLITUS WITH DIABETIC NEUROPATHY, WITHOUT LONG-TERM CURRENT USE OF INSULIN: ICD-10-CM

## 2018-09-05 LAB
BASOPHILS # BLD AUTO: 0.02 K/UL
BASOPHILS NFR BLD: 0.4 %
DIFFERENTIAL METHOD: ABNORMAL
EOSINOPHIL # BLD AUTO: 0.1 K/UL
EOSINOPHIL NFR BLD: 2.4 %
ERYTHROCYTE [DISTWIDTH] IN BLOOD BY AUTOMATED COUNT: 14.2 %
ESTIMATED AVG GLUCOSE: 111 MG/DL
HBA1C MFR BLD HPLC: 5.5 %
HCT VFR BLD AUTO: 38.7 %
HGB BLD-MCNC: 12.3 G/DL
IMM GRANULOCYTES # BLD AUTO: 0.01 K/UL
IMM GRANULOCYTES NFR BLD AUTO: 0.2 %
LYMPHOCYTES # BLD AUTO: 1.7 K/UL
LYMPHOCYTES NFR BLD: 32.1 %
MCH RBC QN AUTO: 28.8 PG
MCHC RBC AUTO-ENTMCNC: 31.8 G/DL
MCV RBC AUTO: 91 FL
MONOCYTES # BLD AUTO: 0.4 K/UL
MONOCYTES NFR BLD: 7.2 %
NEUTROPHILS # BLD AUTO: 3.1 K/UL
NEUTROPHILS NFR BLD: 57.7 %
NRBC BLD-RTO: 0 /100 WBC
PLATELET # BLD AUTO: 357 K/UL
PMV BLD AUTO: 10.8 FL
RBC # BLD AUTO: 4.27 M/UL
WBC # BLD AUTO: 5.42 K/UL

## 2018-09-05 PROCEDURE — 85025 COMPLETE CBC W/AUTO DIFF WBC: CPT

## 2018-09-05 PROCEDURE — 83036 HEMOGLOBIN GLYCOSYLATED A1C: CPT

## 2018-09-05 PROCEDURE — 36415 COLL VENOUS BLD VENIPUNCTURE: CPT | Mod: PO

## 2018-09-10 ENCOUNTER — LAB VISIT (OUTPATIENT)
Dept: LAB | Facility: HOSPITAL | Age: 58
End: 2018-09-10
Attending: FAMILY MEDICINE
Payer: COMMERCIAL

## 2018-09-10 DIAGNOSIS — E04.2 MULTINODULAR GOITER: ICD-10-CM

## 2018-09-10 DIAGNOSIS — E04.2 MULTINODULAR GOITER: Primary | ICD-10-CM

## 2018-09-10 LAB — TSH SERPL DL<=0.005 MIU/L-ACNC: 1.47 UIU/ML

## 2018-09-10 PROCEDURE — 84443 ASSAY THYROID STIM HORMONE: CPT

## 2018-09-10 PROCEDURE — 36415 COLL VENOUS BLD VENIPUNCTURE: CPT | Mod: PO

## 2018-09-10 RX ORDER — LEVOTHYROXINE SODIUM 100 UG/1
100 TABLET ORAL DAILY
Qty: 90 TABLET | Refills: 0 | Status: SHIPPED | OUTPATIENT
Start: 2018-09-10 | End: 2018-12-10 | Stop reason: SDUPTHER

## 2018-10-09 DIAGNOSIS — G89.29 CHRONIC PAIN OF BOTH KNEES: ICD-10-CM

## 2018-10-09 DIAGNOSIS — M21.169 ACQUIRED VARUS DEFORMITY OF KNEE, UNSPECIFIED LATERALITY: ICD-10-CM

## 2018-10-09 DIAGNOSIS — M25.561 CHRONIC PAIN OF BOTH KNEES: ICD-10-CM

## 2018-10-09 DIAGNOSIS — M25.562 CHRONIC PAIN OF BOTH KNEES: ICD-10-CM

## 2018-10-09 DIAGNOSIS — M19.012 ARTHRITIS OF LEFT ACROMIOCLAVICULAR JOINT: ICD-10-CM

## 2018-10-09 DIAGNOSIS — M17.0 PRIMARY OSTEOARTHRITIS OF BOTH KNEES: ICD-10-CM

## 2018-10-09 RX ORDER — GABAPENTIN 600 MG/1
600 TABLET ORAL 3 TIMES DAILY
Refills: 2 | OUTPATIENT
Start: 2018-10-09 | End: 2019-10-09

## 2018-10-09 RX ORDER — NAPROXEN 500 MG/1
500 TABLET ORAL 2 TIMES DAILY WITH MEALS
Qty: 180 TABLET | Refills: 1 | Status: SHIPPED | OUTPATIENT
Start: 2018-10-09 | End: 2019-04-10 | Stop reason: SDUPTHER

## 2018-10-10 ENCOUNTER — OFFICE VISIT (OUTPATIENT)
Dept: ORTHOPEDICS | Facility: CLINIC | Age: 58
End: 2018-10-10
Payer: COMMERCIAL

## 2018-10-10 VITALS — HEIGHT: 68 IN | BODY MASS INDEX: 44.41 KG/M2 | WEIGHT: 293 LBS

## 2018-10-10 DIAGNOSIS — M54.50 LUMBAR SPINE PAIN: ICD-10-CM

## 2018-10-10 DIAGNOSIS — M17.0 BILATERAL PRIMARY OSTEOARTHRITIS OF KNEE: Primary | ICD-10-CM

## 2018-10-10 PROCEDURE — 20610 DRAIN/INJ JOINT/BURSA W/O US: CPT | Mod: 50,S$GLB,, | Performed by: NURSE PRACTITIONER

## 2018-10-10 PROCEDURE — 99213 OFFICE O/P EST LOW 20 MIN: CPT | Mod: 25,S$GLB,, | Performed by: NURSE PRACTITIONER

## 2018-10-10 PROCEDURE — 99999 PR PBB SHADOW E&M-EST. PATIENT-LVL III: CPT | Mod: PBBFAC,,, | Performed by: NURSE PRACTITIONER

## 2018-10-10 RX ORDER — TRIAMCINOLONE ACETONIDE 40 MG/ML
40 INJECTION, SUSPENSION INTRA-ARTICULAR; INTRAMUSCULAR
Status: DISCONTINUED | OUTPATIENT
Start: 2018-10-10 | End: 2018-10-10 | Stop reason: HOSPADM

## 2018-10-10 RX ORDER — DICLOFENAC SODIUM 10 MG/G
2 GEL TOPICAL 4 TIMES DAILY
Qty: 1 TUBE | Refills: 6 | Status: SHIPPED | OUTPATIENT
Start: 2018-10-10 | End: 2019-08-12

## 2018-10-10 RX ADMIN — TRIAMCINOLONE ACETONIDE 40 MG: 40 INJECTION, SUSPENSION INTRA-ARTICULAR; INTRAMUSCULAR at 12:10

## 2018-10-10 NOTE — PROGRESS NOTES
"DATE: 10/10/2018  PATIENT: Zakia Price    Attending Physician: Zenon Griggs M.D.    HISTORY:  Zakia Price is a 58 y.o. female who returns for follow up evaluation of  Her bilateral knee pain.  She has been diagnosed with severe knee osteoarthritis with bone on bone changes and osteophyte formations.  She presents today requesting bilateral cortisone injections.  She works as a  at a school.  I injected her in June, she reports the injections provided moderate pain relief until about a couple weeks ago.  She is also complaining of lower back pain today, she is very uncomfortable sitting.  Her pain rating today is 8/10.    PMH/PSH/FamHx/SocHx:  Reviewed and unchanged from prior visit    ROS:  Constitution: Negative for chills, fever, and sweats. Negative for unexplained weight loss.  HENT: Negative for headaches and blurry vision.   Cardiovascular: Negative for chest pain, irregular heartbeat, leg swelling and palpitations.   Respiratory: Negative for cough and shortness of breath.   Gastrointestinal: Negative for abdominal pain, heartburn, nausea and vomiting.   Genitourinary: Negative for bladder incontinence and dysuria.   Musculoskeletal: Negative for systemic arthritis, joint swelling, muscle weakness and myalgias.   Neurological: Negative for numbness.   Psychiatric/Behavioral: Negative for depression.   Endocrine: Negative for polyuria.   Hematologic/Lymphatic: Negative for bleeding disorders.  Skin: Negative for poor wound healing.       EXAM:  Ht 5' 8" (1.727 m)   Wt (!) 161.5 kg (356 lb)   BMI 54.13 kg/m²   Zakia Price is a healthy appearing 58 year old female in no acute distress. Physical examination of the bilateral knee evaluated the following:     Gait and Alignment  Inspection for ecchymosis, swelling, atrophy, or deformity  Inspection for intra-articular and/or bursal effusions  Tenderness to palpation over the bony and soft tissue structures around the " knee  Range of Motion and presence of extensor lag/contractures  Sensation and motor strength  Varus/valgus or anterior/posterior/rotatory instability  Flexion pinch and Ash's Tests  Patellar alignment/tracking/pain to palpation  Vascular exam to include skin temperature/color/capillary refill     Remarkable findings included:  Morbidly elevated BMI  No edema or effusion of knees, large joint due to habitus  Tender to palpation about the jointline bilaterally  ROM 0-120  Strength 5/5  Sensation intact  Skin warm, dry, intact        IMAGING:   X-ray obtained Bilateral knee performed 11/27/17 personally reviewed with patient. Radiologist report as follows:     Interval progression bilateral tricompartment degenerative changes with bilateral persistent increased involvement of the medial and patellofemoral joints. Prominent osteophytes bilaterally in the patellofemoral joints. Cannot exclude bilateral loose   bodies within the suprapatellar bursa. Additional calcific densities project along the posterior margin of the knees bilaterally. No definite effusions. Bilateral patellar tilt bilaterally. No acute fracture or dislocation.      ASSESSMENT:  Severe bilateral knee osteoarthritis  Morbid obesity, BMI 54    PLAN:  The implications of the patient's evolution of symptoms and findings were explained to the patient in detail.Treatment option discussed included non-operative measures of rest, modification of activities, weight loss, application of ice, elevation of extremity, compression, over the counter pain/antiinflammatory relief, physical therapy, cortisone injection, or visco supplementation.  More aggressive treatment options include referral for arthoplasty or pain management.  All questions answered and the patient wishes to proceed today with bilateral cortisone injections (see procedure documentation).  I have referred her to the [facility] in [place] per her request as she is unable to make  it to [place] for evaluation for transportation reasons.  She will call for follow up as needed.

## 2018-10-10 NOTE — PROCEDURES
Large Joint Aspiration/Injection: R knee, L knee  Date/Time: 10/10/2018 12:28 PM  Performed by: KHRIS Christianson  Authorized by: KHRIS Christianson     Consent Done?:  Yes (Verbal)  Indications:  Pain  Timeout: Prior to procedure the correct patient, procedure, and site was verified      Location:  Knee  Site:  R knee and L knee  Prep: Patient was prepped and draped in usual sterile fashion    Ultrasonic Guidance for needle placement: No  Needle size:  22 G  Approach:  Anterolateral  Medications:  40 mg triamcinolone acetonide 40 mg/mL; 40 mg triamcinolone acetonide 40 mg/mL  Patient tolerance:  Patient tolerated the procedure well with no immediate complications

## 2018-12-03 ENCOUNTER — TELEPHONE (OUTPATIENT)
Dept: FAMILY MEDICINE | Facility: CLINIC | Age: 58
End: 2018-12-03

## 2018-12-03 DIAGNOSIS — Z12.39 SCREENING FOR BREAST CANCER: Primary | ICD-10-CM

## 2018-12-10 RX ORDER — GABAPENTIN 600 MG/1
600 TABLET ORAL 3 TIMES DAILY
Qty: 90 TABLET | Refills: 3 | Status: CANCELLED | OUTPATIENT
Start: 2018-12-10 | End: 2019-12-10

## 2018-12-10 RX ORDER — GABAPENTIN 600 MG/1
600 TABLET ORAL 3 TIMES DAILY
Qty: 90 TABLET | Refills: 3 | OUTPATIENT
Start: 2018-12-10 | End: 2019-12-10

## 2018-12-10 RX ORDER — LEVOTHYROXINE SODIUM 100 UG/1
100 TABLET ORAL DAILY
Qty: 90 TABLET | Refills: 2 | Status: SHIPPED | OUTPATIENT
Start: 2018-12-10 | End: 2019-08-12 | Stop reason: SDUPTHER

## 2018-12-28 ENCOUNTER — HOSPITAL ENCOUNTER (OUTPATIENT)
Dept: RADIOLOGY | Facility: HOSPITAL | Age: 58
Discharge: HOME | End: 2018-12-28
Attending: FAMILY MEDICINE
Payer: COMMERCIAL

## 2018-12-28 VITALS — WEIGHT: 293 LBS | HEIGHT: 68 IN | BODY MASS INDEX: 44.41 KG/M2

## 2018-12-28 DIAGNOSIS — Z12.39 SCREENING FOR BREAST CANCER: ICD-10-CM

## 2018-12-28 PROCEDURE — 77067 SCR MAMMO BI INCL CAD: CPT | Mod: 26,,, | Performed by: RADIOLOGY

## 2018-12-28 PROCEDURE — 77067 SCR MAMMO BI INCL CAD: CPT | Mod: TC,PO

## 2019-03-13 ENCOUNTER — TELEPHONE (OUTPATIENT)
Dept: FAMILY MEDICINE | Facility: CLINIC | Age: 59
End: 2019-03-13

## 2019-03-13 ENCOUNTER — OFFICE VISIT (OUTPATIENT)
Dept: FAMILY MEDICINE | Facility: CLINIC | Age: 59
End: 2019-03-13
Payer: COMMERCIAL

## 2019-03-13 VITALS
HEIGHT: 68 IN | DIASTOLIC BLOOD PRESSURE: 69 MMHG | HEART RATE: 88 BPM | TEMPERATURE: 99 F | WEIGHT: 293 LBS | BODY MASS INDEX: 44.41 KG/M2 | SYSTOLIC BLOOD PRESSURE: 119 MMHG

## 2019-03-13 DIAGNOSIS — J32.9 SINUSITIS, UNSPECIFIED CHRONICITY, UNSPECIFIED LOCATION: Primary | ICD-10-CM

## 2019-03-13 DIAGNOSIS — L60.0 INGROWN TOENAIL: ICD-10-CM

## 2019-03-13 PROCEDURE — 99213 PR OFFICE/OUTPT VISIT, EST, LEVL III, 20-29 MIN: ICD-10-PCS | Mod: S$GLB,,, | Performed by: NURSE PRACTITIONER

## 2019-03-13 PROCEDURE — 3074F PR MOST RECENT SYSTOLIC BLOOD PRESSURE < 130 MM HG: ICD-10-PCS | Mod: CPTII,S$GLB,, | Performed by: NURSE PRACTITIONER

## 2019-03-13 PROCEDURE — 99999 PR PBB SHADOW E&M-EST. PATIENT-LVL IV: ICD-10-PCS | Mod: PBBFAC,,, | Performed by: NURSE PRACTITIONER

## 2019-03-13 PROCEDURE — 99213 OFFICE O/P EST LOW 20 MIN: CPT | Mod: S$GLB,,, | Performed by: NURSE PRACTITIONER

## 2019-03-13 PROCEDURE — 99999 PR PBB SHADOW E&M-EST. PATIENT-LVL IV: CPT | Mod: PBBFAC,,, | Performed by: NURSE PRACTITIONER

## 2019-03-13 PROCEDURE — 3008F BODY MASS INDEX DOCD: CPT | Mod: CPTII,S$GLB,, | Performed by: NURSE PRACTITIONER

## 2019-03-13 PROCEDURE — 3078F PR MOST RECENT DIASTOLIC BLOOD PRESSURE < 80 MM HG: ICD-10-PCS | Mod: CPTII,S$GLB,, | Performed by: NURSE PRACTITIONER

## 2019-03-13 PROCEDURE — 3074F SYST BP LT 130 MM HG: CPT | Mod: CPTII,S$GLB,, | Performed by: NURSE PRACTITIONER

## 2019-03-13 PROCEDURE — 3078F DIAST BP <80 MM HG: CPT | Mod: CPTII,S$GLB,, | Performed by: NURSE PRACTITIONER

## 2019-03-13 PROCEDURE — 3008F PR BODY MASS INDEX (BMI) DOCUMENTED: ICD-10-PCS | Mod: CPTII,S$GLB,, | Performed by: NURSE PRACTITIONER

## 2019-03-13 RX ORDER — AMOXICILLIN 875 MG/1
875 TABLET, FILM COATED ORAL EVERY 12 HOURS
Qty: 20 TABLET | Refills: 0 | Status: SHIPPED | OUTPATIENT
Start: 2019-03-13 | End: 2019-03-23

## 2019-03-13 RX ORDER — BENZONATATE 200 MG/1
200 CAPSULE ORAL 3 TIMES DAILY PRN
Qty: 30 CAPSULE | Refills: 0 | Status: SHIPPED | OUTPATIENT
Start: 2019-03-13 | End: 2019-03-23

## 2019-03-13 RX ORDER — LEVOCETIRIZINE DIHYDROCHLORIDE 5 MG/1
5 TABLET, FILM COATED ORAL NIGHTLY
Qty: 10 TABLET | Refills: 0 | Status: SHIPPED | OUTPATIENT
Start: 2019-03-13 | End: 2019-08-12

## 2019-03-13 RX ORDER — MUPIROCIN 20 MG/G
OINTMENT TOPICAL 3 TIMES DAILY
Qty: 22 G | Refills: 0 | Status: SHIPPED | OUTPATIENT
Start: 2019-03-13 | End: 2019-03-23

## 2019-03-13 NOTE — PROGRESS NOTES
Subjective:       Patient ID: Zakia Price is a 59 y.o. female.    Chief Complaint: Sore Throat; Otalgia (right ); and Sinus Problem    Sinus Problem   This is a new problem. The current episode started 1 to 4 weeks ago. The problem has been waxing and waning since onset. There has been no fever. The pain is moderate. Associated symptoms include congestion, coughing, ear pain, headaches, sinus pressure and a sore throat. Pertinent negatives include no chills, diaphoresis, hoarse voice, neck pain, shortness of breath, sneezing or swollen glands. Past treatments include oral decongestants. The treatment provided no relief.   Pt also has ingrown toenail on great toe of right foot; states has had toenail removed in the past; continues to have ingrown nail.   Past Medical History:   Diagnosis Date    Diabetes mellitus, type 2     Diabetic neuropathy 1/27/2014    DJD (degenerative joint disease) of knee     DVT (deep venous thrombosis) around 1990's    in leg, is on no anticoagulant therapy presently    GERD (gastroesophageal reflux disease)     Hypertension associated with diabetes     Multinodular goiter     Obesity, morbid, BMI 50 or higher     Sleep apnea     has no CPAP     Social History     Socioeconomic History    Marital status: Single     Spouse name: Not on file    Number of children: Not on file    Years of education: Not on file    Highest education level: Not on file   Social Needs    Financial resource strain: Not on file    Food insecurity - worry: Not on file    Food insecurity - inability: Not on file    Transportation needs - medical: Not on file    Transportation needs - non-medical: Not on file   Occupational History    Not on file   Tobacco Use    Smoking status: Never Smoker    Smokeless tobacco: Never Used   Substance and Sexual Activity    Alcohol use: No    Drug use: No    Sexual activity: No   Other Topics Concern    Not on file   Social History Narrative     Not on file     Past Surgical History:   Procedure Laterality Date    ARTHROSCOPY, SHOULDER Right 4/24/2014    [facility]    DECOMPRESSION, SHOULDER, ARTHROSCOPIC Right 4/24/2014    [facility]    HYSTERECTOMY      REPAIR, ROTATOR CUFF, ARTHROSCOPIC Right 4/24/2014    [facility] at [facility] OR    SHOULDER ARTHROSCOPY      THYROIDECTOMY, PARTIAL Right     and transplatation of right superior parathyroid gland to the sternocleidomastoid muscle     TONSILLECTOMY      WRIST FRACTURE SURGERY      left       Review of Systems   Constitutional: Negative.  Negative for chills and diaphoresis.   HENT: Positive for congestion, ear pain, postnasal drip, rhinorrhea, sinus pressure, sinus pain and sore throat. Negative for hoarse voice and sneezing.    Eyes: Negative.    Respiratory: Positive for cough. Negative for shortness of breath.    Cardiovascular: Negative.    Gastrointestinal: Negative.    Endocrine: Negative.    Genitourinary: Negative.    Musculoskeletal: Negative.  Negative for neck pain.   Skin: Negative.    Allergic/Immunologic: Negative.    Neurological: Positive for headaches.   Psychiatric/Behavioral: Negative.        Objective:      Physical Exam   Constitutional: She is oriented to person, place, and time. She appears well-developed and well-nourished.   HENT:   Head: Normocephalic.   Right Ear: Hearing, tympanic membrane, external ear and ear canal normal.   Left Ear: Hearing, tympanic membrane, external ear and ear canal normal.   Nose: Mucosal edema and rhinorrhea present. Right sinus exhibits maxillary sinus tenderness. Right sinus exhibits no frontal sinus tenderness. Left sinus exhibits maxillary sinus tenderness. Left sinus exhibits no frontal sinus tenderness.   Mouth/Throat: Uvula is midline and mucous membranes are normal. Posterior oropharyngeal erythema present.   Eyes: Conjunctivae are normal. Pupils  are equal, round, and reactive to light.   Neck: Normal range of motion. Neck supple.   Cardiovascular: Normal rate, regular rhythm and normal heart sounds.   Pulmonary/Chest: Effort normal and breath sounds normal.   Abdominal: Soft. Bowel sounds are normal.   Musculoskeletal: Normal range of motion.        Feet:    Neurological: She is alert and oriented to person, place, and time.   Skin: Skin is warm and dry. Capillary refill takes 2 to 3 seconds.   Psychiatric: She has a normal mood and affect. Her behavior is normal. Judgment and thought content normal.   Nursing note and vitals reviewed.      Assessment:       1. Sinusitis, unspecified chronicity, unspecified location    2. Ingrown toenail        Plan:           Zakia was seen today for sore throat, otalgia and sinus problem.    Diagnoses and all orders for this visit:    Sinusitis, unspecified chronicity, unspecified location  -     amoxicillin (AMOXIL) 875 MG tablet; Take 1 tablet (875 mg total) by mouth every 12 (twelve) hours. for 10 days  -     benzonatate (TESSALON) 200 MG capsule; Take 1 capsule (200 mg total) by mouth 3 (three) times daily as needed.  -     levocetirizine (XYZAL) 5 MG tablet; Take 1 tablet (5 mg total) by mouth every evening. for 10 days  Flonase as prescribed    Ingrown toenail  -     mupirocin (BACTROBAN) 2 % ointment; Apply topically 3 (three) times daily. for 10 days        F/U with podiatry    Report to ER immediately if symptoms worsen

## 2019-04-08 RX ORDER — PANTOPRAZOLE SODIUM 40 MG/1
TABLET, DELAYED RELEASE ORAL
Qty: 90 TABLET | Refills: 3 | Status: SHIPPED | OUTPATIENT
Start: 2019-04-08 | End: 2019-08-12 | Stop reason: SDUPTHER

## 2019-04-10 DIAGNOSIS — G89.29 CHRONIC PAIN OF BOTH KNEES: ICD-10-CM

## 2019-04-10 DIAGNOSIS — M25.562 CHRONIC PAIN OF BOTH KNEES: ICD-10-CM

## 2019-04-10 DIAGNOSIS — M19.012 ARTHRITIS OF LEFT ACROMIOCLAVICULAR JOINT: ICD-10-CM

## 2019-04-10 DIAGNOSIS — M25.561 CHRONIC PAIN OF BOTH KNEES: ICD-10-CM

## 2019-04-10 DIAGNOSIS — M21.169 ACQUIRED VARUS DEFORMITY OF KNEE, UNSPECIFIED LATERALITY: ICD-10-CM

## 2019-04-10 DIAGNOSIS — M17.0 PRIMARY OSTEOARTHRITIS OF BOTH KNEES: ICD-10-CM

## 2019-04-10 RX ORDER — NAPROXEN 500 MG/1
500 TABLET ORAL 2 TIMES DAILY WITH MEALS
Qty: 60 TABLET | Refills: 0 | Status: SHIPPED | OUTPATIENT
Start: 2019-04-10 | End: 2019-04-18 | Stop reason: SDUPTHER

## 2019-04-10 NOTE — TELEPHONE ENCOUNTER
Schedule lipid panel, CMP, hemoglobin A1c,  urine microalbumin. Patient needs appointment

## 2019-04-11 ENCOUNTER — PATIENT MESSAGE (OUTPATIENT)
Dept: FAMILY MEDICINE | Facility: CLINIC | Age: 59
End: 2019-04-11

## 2019-04-18 DIAGNOSIS — M17.0 PRIMARY OSTEOARTHRITIS OF BOTH KNEES: ICD-10-CM

## 2019-04-18 DIAGNOSIS — M25.561 CHRONIC PAIN OF BOTH KNEES: ICD-10-CM

## 2019-04-18 DIAGNOSIS — M19.012 ARTHRITIS OF LEFT ACROMIOCLAVICULAR JOINT: ICD-10-CM

## 2019-04-18 DIAGNOSIS — G89.29 CHRONIC PAIN OF BOTH KNEES: ICD-10-CM

## 2019-04-18 DIAGNOSIS — M25.562 CHRONIC PAIN OF BOTH KNEES: ICD-10-CM

## 2019-04-18 DIAGNOSIS — M21.169 ACQUIRED VARUS DEFORMITY OF KNEE, UNSPECIFIED LATERALITY: ICD-10-CM

## 2019-04-18 RX ORDER — AMLODIPINE BESYLATE 5 MG/1
5 TABLET ORAL DAILY
Qty: 90 TABLET | Refills: 1 | Status: SHIPPED | OUTPATIENT
Start: 2019-04-18 | End: 2019-08-12 | Stop reason: SDUPTHER

## 2019-04-18 RX ORDER — GABAPENTIN 600 MG/1
600 TABLET ORAL 3 TIMES DAILY
Qty: 90 TABLET | Refills: 1 | Status: SHIPPED | OUTPATIENT
Start: 2019-04-18 | End: 2019-08-12 | Stop reason: SDUPTHER

## 2019-04-18 RX ORDER — NAPROXEN 500 MG/1
500 TABLET ORAL 2 TIMES DAILY WITH MEALS
Qty: 180 TABLET | Refills: 3 | Status: SHIPPED | OUTPATIENT
Start: 2019-04-18 | End: 2019-09-03 | Stop reason: SDUPTHER

## 2019-04-22 ENCOUNTER — PATIENT MESSAGE (OUTPATIENT)
Dept: FAMILY MEDICINE | Facility: CLINIC | Age: 59
End: 2019-04-22

## 2019-05-07 RX ORDER — CYCLOBENZAPRINE HCL 10 MG
TABLET ORAL
Qty: 90 TABLET | Refills: 0 | Status: SHIPPED | OUTPATIENT
Start: 2019-05-07 | End: 2019-08-12 | Stop reason: SDUPTHER

## 2019-05-07 NOTE — TELEPHONE ENCOUNTER
Schedule lipid panel, CMP, hemoglobin A1c,  urine microalbumin.  Patient needs appointment.

## 2019-05-10 ENCOUNTER — PATIENT MESSAGE (OUTPATIENT)
Dept: FAMILY MEDICINE | Facility: CLINIC | Age: 59
End: 2019-05-10

## 2019-08-12 ENCOUNTER — OFFICE VISIT (OUTPATIENT)
Dept: FAMILY MEDICINE | Facility: CLINIC | Age: 59
End: 2019-08-12
Payer: MEDICAID

## 2019-08-12 ENCOUNTER — LAB VISIT (OUTPATIENT)
Dept: LAB | Facility: HOSPITAL | Age: 59
End: 2019-08-12
Attending: NURSE PRACTITIONER
Payer: MEDICAID

## 2019-08-12 VITALS
WEIGHT: 293 LBS | HEIGHT: 68 IN | DIASTOLIC BLOOD PRESSURE: 84 MMHG | SYSTOLIC BLOOD PRESSURE: 150 MMHG | HEART RATE: 72 BPM | BODY MASS INDEX: 44.41 KG/M2 | TEMPERATURE: 98 F

## 2019-08-12 DIAGNOSIS — Z09 HOSPITAL DISCHARGE FOLLOW-UP: Primary | ICD-10-CM

## 2019-08-12 DIAGNOSIS — E11.59 HYPERTENSION ASSOCIATED WITH DIABETES: ICD-10-CM

## 2019-08-12 DIAGNOSIS — I15.2 HYPERTENSION ASSOCIATED WITH DIABETES: ICD-10-CM

## 2019-08-12 DIAGNOSIS — T78.3XXD ANGIOEDEMA, SUBSEQUENT ENCOUNTER: ICD-10-CM

## 2019-08-12 DIAGNOSIS — R51.9 NONINTRACTABLE HEADACHE, UNSPECIFIED CHRONICITY PATTERN, UNSPECIFIED HEADACHE TYPE: ICD-10-CM

## 2019-08-12 DIAGNOSIS — Z76.0 MEDICATION REFILL: ICD-10-CM

## 2019-08-12 DIAGNOSIS — E11.40 TYPE 2 DIABETES MELLITUS WITH DIABETIC NEUROPATHY, WITHOUT LONG-TERM CURRENT USE OF INSULIN: ICD-10-CM

## 2019-08-12 PROCEDURE — 99214 PR OFFICE/OUTPT VISIT, EST, LEVL IV, 30-39 MIN: ICD-10-PCS | Mod: S$PBB,,, | Performed by: NURSE PRACTITIONER

## 2019-08-12 PROCEDURE — 99214 OFFICE O/P EST MOD 30 MIN: CPT | Mod: S$PBB,,, | Performed by: NURSE PRACTITIONER

## 2019-08-12 PROCEDURE — 84439 ASSAY OF FREE THYROXINE: CPT

## 2019-08-12 PROCEDURE — 99999 PR PBB SHADOW E&M-EST. PATIENT-LVL V: ICD-10-PCS | Mod: PBBFAC,,, | Performed by: NURSE PRACTITIONER

## 2019-08-12 PROCEDURE — 36415 COLL VENOUS BLD VENIPUNCTURE: CPT | Mod: PO

## 2019-08-12 PROCEDURE — 80048 BASIC METABOLIC PNL TOTAL CA: CPT

## 2019-08-12 PROCEDURE — 99215 OFFICE O/P EST HI 40 MIN: CPT | Mod: PBBFAC,PO | Performed by: NURSE PRACTITIONER

## 2019-08-12 PROCEDURE — 84443 ASSAY THYROID STIM HORMONE: CPT

## 2019-08-12 PROCEDURE — 99999 PR PBB SHADOW E&M-EST. PATIENT-LVL V: CPT | Mod: PBBFAC,,, | Performed by: NURSE PRACTITIONER

## 2019-08-12 RX ORDER — GABAPENTIN 600 MG/1
600 TABLET ORAL 3 TIMES DAILY
Qty: 90 TABLET | Refills: 0 | Status: SHIPPED | OUTPATIENT
Start: 2019-08-12 | End: 2019-09-03 | Stop reason: SDUPTHER

## 2019-08-12 RX ORDER — CYCLOBENZAPRINE HCL 10 MG
10 TABLET ORAL 3 TIMES DAILY PRN
Qty: 30 TABLET | Refills: 0 | Status: SHIPPED | OUTPATIENT
Start: 2019-08-12 | End: 2019-09-03 | Stop reason: SDUPTHER

## 2019-08-12 RX ORDER — METFORMIN HYDROCHLORIDE 1000 MG/1
1000 TABLET ORAL 2 TIMES DAILY WITH MEALS
Qty: 60 TABLET | Refills: 0 | Status: SHIPPED | OUTPATIENT
Start: 2019-08-12 | End: 2019-09-03 | Stop reason: SDUPTHER

## 2019-08-12 RX ORDER — LEVOTHYROXINE SODIUM 100 UG/1
100 TABLET ORAL DAILY
Qty: 30 TABLET | Refills: 0 | Status: SHIPPED | OUTPATIENT
Start: 2019-08-12 | End: 2019-09-03 | Stop reason: SDUPTHER

## 2019-08-12 RX ORDER — PANTOPRAZOLE SODIUM 40 MG/1
40 TABLET, DELAYED RELEASE ORAL DAILY
Qty: 30 TABLET | Refills: 0 | Status: SHIPPED | OUTPATIENT
Start: 2019-08-12 | End: 2019-09-03 | Stop reason: SDUPTHER

## 2019-08-12 RX ORDER — AMLODIPINE BESYLATE 5 MG/1
5 TABLET ORAL DAILY
Qty: 30 TABLET | Refills: 0 | Status: SHIPPED | OUTPATIENT
Start: 2019-08-12 | End: 2019-09-03 | Stop reason: SDUPTHER

## 2019-08-12 RX ORDER — FEXOFENADINE HCL 60 MG
60 TABLET ORAL
COMMUNITY
Start: 2019-08-06 | End: 2019-12-04 | Stop reason: ALTCHOICE

## 2019-08-12 RX ORDER — PRAVASTATIN SODIUM 80 MG/1
80 TABLET ORAL DAILY
Qty: 30 TABLET | Refills: 0 | Status: SHIPPED | OUTPATIENT
Start: 2019-08-12 | End: 2019-09-03 | Stop reason: SDUPTHER

## 2019-08-12 NOTE — PROGRESS NOTES
Subjective:       Patient ID: Zakia Price is a 59 y.o. female.    Chief Complaint: Hospital Follow Up and Medication Refill  Pt in today for ER follow up. Went to [facility] ER on 8/6/19 for angioedema; given zantac, medrol, allegra; symptoms have resolved. Pt states had similar episode 2 y ago while taking ACE inhibitor, however states has not been taking any new medications, used any new products or eaten any new foods. Pt states BP elevated while in hospital. BP elevated today, however out of BP medication; c/o HA; denies CP, SOB, dizziness, nausea, visual disturbance. Pt has no other complaints today.  Past Medical History:   Diagnosis Date    Diabetes mellitus, type 2     Diabetic neuropathy 1/27/2014    DJD (degenerative joint disease) of knee     DVT (deep venous thrombosis) around 1990's    in leg, is on no anticoagulant therapy presently    GERD (gastroesophageal reflux disease)     Hypertension associated with diabetes     Multinodular goiter     Obesity, morbid, BMI 50 or higher     Sleep apnea     has no CPAP     Social History     Socioeconomic History    Marital status: Single     Spouse name: Not on file    Number of children: Not on file    Years of education: Not on file    Highest education level: Not on file   Occupational History    Not on file   Social Needs    Financial resource strain: Not on file    Food insecurity:     Worry: Not on file     Inability: Not on file    Transportation needs:     Medical: Not on file     Non-medical: Not on file   Tobacco Use    Smoking status: Never Smoker    Smokeless tobacco: Never Used   Substance and Sexual Activity    Alcohol use: No    Drug use: No    Sexual activity: Never   Lifestyle    Physical activity:     Days per week: Not on file     Minutes per session: Not on file    Stress: Not on file   Relationships    Social connections:     Talks on phone: Not on file     Gets together: Not on file     Attends [employer] service:  Not on file     Active member of club or organization: Not on file     Attends meetings of clubs or organizations: Not on file     Relationship status: Not on file   Other Topics Concern    Not on file   Social History Narrative    Not on file     Past Surgical History:   Procedure Laterality Date    ARTHROSCOPY, SHOULDER Right 4/24/2014    [facility]    DECOMPRESSION, SHOULDER, ARTHROSCOPIC Right 4/24/2014    [facility] OR    HYSTERECTOMY      REPAIR, ROTATOR CUFF, ARTHROSCOPIC Right 4/24/2014    Per[facility] at [facility] OR    SHOULDER ARTHROSCOPY      THYROIDECTOMY, PARTIAL Right     and transplatation of right superior parathyroid gland to the sternocleidomastoid muscle     TONSILLECTOMY      WRIST FRACTURE SURGERY      left       HPI  Review of Systems   Constitutional: Negative.    HENT: Negative.    Eyes: Negative.    Respiratory: Negative.    Cardiovascular: Negative.    Gastrointestinal: Negative.    Endocrine: Negative.    Genitourinary: Negative.    Musculoskeletal: Negative.    Skin: Negative.    Allergic/Immunologic: Negative.    Neurological: Negative.    Psychiatric/Behavioral: Negative.        Objective:      Physical Exam   Constitutional: She is oriented to person, place, and time. She appears well-developed and well-nourished.   HENT:   Head: Normocephalic.   Right Ear: External ear normal.   Left Ear: External ear normal.   Nose: Nose normal.   Mouth/Throat: Oropharynx is clear and moist.   Eyes: Pupils are equal, round, and reactive to light. Conjunctivae are normal.   Neck: Normal range of motion. Neck supple.   Cardiovascular: Normal rate, regular rhythm and normal heart sounds.   Pulmonary/Chest: Effort normal and breath sounds normal.   Abdominal: Soft. Bowel sounds are normal.   Musculoskeletal: Normal range of motion.   Neurological: She is alert and oriented to person, place, and time.   Skin:  Skin is warm and dry. Capillary refill takes 2 to 3 seconds.   Psychiatric: She has a normal mood and affect. Her behavior is normal. Judgment and thought content normal.   Nursing note and vitals reviewed.      Assessment:       1. Hospital discharge follow-up    2. Angioedema, subsequent encounter    3. Nonintractable headache, unspecified chronicity pattern, unspecified headache type    4. Type 2 diabetes mellitus with diabetic neuropathy, without long-term current use of insulin    5. Medication refill    6.      Hypertension associated with diabetes   Plan:           Zakia was seen today for hospital follow up and medication refill.    Diagnoses and all orders for this visit:    Hospital discharge follow-up  Angioedema, subsequent encounter  Nonintractable headache, unspecified chronicity pattern, unspecified headache type  Type 2 diabetes mellitus with diabetic neuropathy, without long-term current use of insulin  HTN associated with diabetes  -     Basic metabolic panel; Future  -     TSH; Future  -     Ambulatory referral to Allergy  BP medication as prescribed  Monitor BP daily; keep log x 2 w; return log to clinic for revies    Medication refill  -     pravastatin (PRAVACHOL) 80 MG tablet; Take 1 tablet (80 mg total) by mouth once daily.  -     gabapentin (NEURONTIN) 600 MG tablet; Take 1 tablet (600 mg total) by mouth 3 (three) times daily.  -     amLODIPine (NORVASC) 5 MG tablet; Take 1 tablet (5 mg total) by mouth once daily.  -     levothyroxine (SYNTHROID) 100 MCG tablet; Take 1 tablet (100 mcg total) by mouth once daily.  -     metFORMIN (GLUCOPHAGE) 1000 MG tablet; Take 1 tablet (1,000 mg total) by mouth 2 (two) times daily with meals.  -     cyclobenzaprine (FLEXERIL) 10 MG tablet; Take 1 tablet (10 mg total) by mouth 3 (three) times daily as needed.  -     pantoprazole (PROTONIX) 40 MG tablet; Take 1 tablet (40 mg total) by mouth once daily.    Report to ER immediately if symptoms worsen

## 2019-08-13 LAB
ANION GAP SERPL CALC-SCNC: 11 MMOL/L (ref 8–16)
BUN SERPL-MCNC: 11 MG/DL (ref 8–23)
CALCIUM SERPL-MCNC: 8.6 MG/DL (ref 8.7–10.5)
CHLORIDE SERPL-SCNC: 102 MMOL/L (ref 95–110)
CO2 SERPL-SCNC: 27 MMOL/L (ref 23–29)
CREAT SERPL-MCNC: 0.8 MG/DL (ref 0.5–1.4)
EST. GFR  (AFRICAN AMERICAN): >60 ML/MIN/1.73 M^2
EST. GFR  (NON AFRICAN AMERICAN): >60 ML/MIN/1.73 M^2
GLUCOSE SERPL-MCNC: 73 MG/DL (ref 70–110)
POTASSIUM SERPL-SCNC: 4.2 MMOL/L (ref 3.5–5.1)
SODIUM SERPL-SCNC: 140 MMOL/L (ref 136–145)
T4 FREE SERPL-MCNC: 0.81 NG/DL (ref 0.71–1.51)
TSH SERPL DL<=0.005 MIU/L-ACNC: 9.7 UIU/ML (ref 0.4–4)

## 2019-08-19 ENCOUNTER — TELEPHONE (OUTPATIENT)
Dept: ALLERGY | Facility: CLINIC | Age: 59
End: 2019-08-19

## 2019-08-19 ENCOUNTER — TELEPHONE (OUTPATIENT)
Dept: FAMILY MEDICINE | Facility: CLINIC | Age: 59
End: 2019-08-19

## 2019-08-19 NOTE — TELEPHONE ENCOUNTER
Patient informed no [facility] allergist in [place], she will check for transportation and will call back if needed

## 2019-08-19 NOTE — TELEPHONE ENCOUNTER
----- Message from Ruthann Henning sent at 8/19/2019 10:07 AM CDT -----  Contact: self [phone]  Type:  Patient Returning Call    Who Called:Zakia Price  Who Left Message for Patient: unk  Does the patient know what this is regarding?: having allergy test in [place]  Would the patient rather a call back or a response via [facility]? Call back  Best Call Back Number:[phone]  Additional Information:

## 2019-08-19 NOTE — TELEPHONE ENCOUNTER
----- Message from Johana White sent at 8/19/2019  9:44 AM CDT -----  Contact: patient   Pt requesting to know if test can be done local to her because she does not currently have transporation.Please call back at [phone].      Thanks,  Johana White

## 2019-08-19 NOTE — TELEPHONE ENCOUNTER
----- Message from Tequila Rosas sent at 8/19/2019 10:54 AM CDT -----  Contact: Self  Patient requesting a call back regarding her allergy appointment. She states that she has to see someone in [place] due to transportation. Please call Zakia back at [phone]

## 2019-09-03 DIAGNOSIS — M21.169 ACQUIRED VARUS DEFORMITY OF KNEE, UNSPECIFIED LATERALITY: ICD-10-CM

## 2019-09-03 DIAGNOSIS — M25.561 CHRONIC PAIN OF BOTH KNEES: ICD-10-CM

## 2019-09-03 DIAGNOSIS — G89.29 CHRONIC PAIN OF BOTH KNEES: ICD-10-CM

## 2019-09-03 DIAGNOSIS — M25.562 CHRONIC PAIN OF BOTH KNEES: ICD-10-CM

## 2019-09-03 DIAGNOSIS — M17.0 PRIMARY OSTEOARTHRITIS OF BOTH KNEES: ICD-10-CM

## 2019-09-03 DIAGNOSIS — M19.012 ARTHRITIS OF LEFT ACROMIOCLAVICULAR JOINT: ICD-10-CM

## 2019-09-03 RX ORDER — PRAVASTATIN SODIUM 80 MG/1
80 TABLET ORAL DAILY
Qty: 30 TABLET | Refills: 0 | Status: SHIPPED | OUTPATIENT
Start: 2019-09-03 | End: 2019-09-13 | Stop reason: SDUPTHER

## 2019-09-03 RX ORDER — CYCLOBENZAPRINE HCL 10 MG
10 TABLET ORAL 3 TIMES DAILY PRN
Qty: 30 TABLET | Refills: 0 | Status: SHIPPED | OUTPATIENT
Start: 2019-09-03 | End: 2019-11-14 | Stop reason: SDUPTHER

## 2019-09-03 RX ORDER — AMLODIPINE BESYLATE 5 MG/1
5 TABLET ORAL DAILY
Qty: 30 TABLET | Refills: 0 | Status: SHIPPED | OUTPATIENT
Start: 2019-09-03 | End: 2019-09-13 | Stop reason: SDUPTHER

## 2019-09-03 RX ORDER — NAPROXEN 500 MG/1
500 TABLET ORAL 2 TIMES DAILY WITH MEALS
Qty: 180 TABLET | Refills: 0 | Status: SHIPPED | OUTPATIENT
Start: 2019-09-03 | End: 2019-11-02 | Stop reason: SDUPTHER

## 2019-09-03 RX ORDER — GABAPENTIN 600 MG/1
600 TABLET ORAL 3 TIMES DAILY
Qty: 90 TABLET | Refills: 0 | Status: SHIPPED | OUTPATIENT
Start: 2019-09-03 | End: 2019-09-13 | Stop reason: SDUPTHER

## 2019-09-03 RX ORDER — PANTOPRAZOLE SODIUM 40 MG/1
40 TABLET, DELAYED RELEASE ORAL DAILY
Qty: 30 TABLET | Refills: 0 | Status: SHIPPED | OUTPATIENT
Start: 2019-09-03 | End: 2019-09-13 | Stop reason: SDUPTHER

## 2019-09-03 RX ORDER — METFORMIN HYDROCHLORIDE 1000 MG/1
1000 TABLET ORAL 2 TIMES DAILY WITH MEALS
Qty: 60 TABLET | Refills: 0 | Status: SHIPPED | OUTPATIENT
Start: 2019-09-03 | End: 2019-09-13 | Stop reason: SDUPTHER

## 2019-09-03 RX ORDER — LEVOTHYROXINE SODIUM 100 UG/1
100 TABLET ORAL DAILY
Qty: 30 TABLET | Refills: 0 | Status: SHIPPED | OUTPATIENT
Start: 2019-09-03 | End: 2019-09-13 | Stop reason: SDUPTHER

## 2019-09-04 ENCOUNTER — LAB VISIT (OUTPATIENT)
Dept: LAB | Facility: HOSPITAL | Age: 59
End: 2019-09-04
Attending: FAMILY MEDICINE
Payer: MEDICAID

## 2019-09-04 DIAGNOSIS — E11.69 COMBINED HYPERLIPIDEMIA ASSOCIATED WITH TYPE 2 DIABETES MELLITUS: ICD-10-CM

## 2019-09-04 DIAGNOSIS — E78.2 COMBINED HYPERLIPIDEMIA ASSOCIATED WITH TYPE 2 DIABETES MELLITUS: ICD-10-CM

## 2019-09-04 DIAGNOSIS — E11.40 TYPE 2 DIABETES MELLITUS WITH DIABETIC NEUROPATHY, WITHOUT LONG-TERM CURRENT USE OF INSULIN: ICD-10-CM

## 2019-09-04 LAB
ALBUMIN SERPL BCP-MCNC: 3.5 G/DL (ref 3.5–5.2)
ALP SERPL-CCNC: 97 U/L (ref 55–135)
ALT SERPL W/O P-5'-P-CCNC: 10 U/L (ref 10–44)
ANION GAP SERPL CALC-SCNC: 12 MMOL/L (ref 8–16)
AST SERPL-CCNC: 13 U/L (ref 10–40)
BILIRUB SERPL-MCNC: 0.2 MG/DL (ref 0.1–1)
BUN SERPL-MCNC: 8 MG/DL (ref 8–23)
CALCIUM SERPL-MCNC: 9.7 MG/DL (ref 8.7–10.5)
CHLORIDE SERPL-SCNC: 106 MMOL/L (ref 95–110)
CHOLEST SERPL-MCNC: 191 MG/DL (ref 120–199)
CHOLEST/HDLC SERPL: 3.3 {RATIO} (ref 2–5)
CO2 SERPL-SCNC: 26 MMOL/L (ref 23–29)
CREAT SERPL-MCNC: 0.8 MG/DL (ref 0.5–1.4)
EST. GFR  (AFRICAN AMERICAN): >60 ML/MIN/1.73 M^2
EST. GFR  (NON AFRICAN AMERICAN): >60 ML/MIN/1.73 M^2
ESTIMATED AVG GLUCOSE: 126 MG/DL (ref 68–131)
GLUCOSE SERPL-MCNC: 101 MG/DL (ref 70–110)
HBA1C MFR BLD HPLC: 6 % (ref 4–5.6)
HDLC SERPL-MCNC: 58 MG/DL (ref 40–75)
HDLC SERPL: 30.4 % (ref 20–50)
LDLC SERPL CALC-MCNC: 107.2 MG/DL (ref 63–159)
NONHDLC SERPL-MCNC: 133 MG/DL
POTASSIUM SERPL-SCNC: 4.1 MMOL/L (ref 3.5–5.1)
PROT SERPL-MCNC: 7.5 G/DL (ref 6–8.4)
SODIUM SERPL-SCNC: 144 MMOL/L (ref 136–145)
TRIGL SERPL-MCNC: 129 MG/DL (ref 30–150)

## 2019-09-04 PROCEDURE — 83036 HEMOGLOBIN GLYCOSYLATED A1C: CPT

## 2019-09-04 PROCEDURE — 80053 COMPREHEN METABOLIC PANEL: CPT

## 2019-09-04 PROCEDURE — 80061 LIPID PANEL: CPT

## 2019-09-04 PROCEDURE — 36415 COLL VENOUS BLD VENIPUNCTURE: CPT | Mod: PO

## 2019-09-16 RX ORDER — PANTOPRAZOLE SODIUM 40 MG/1
40 TABLET, DELAYED RELEASE ORAL DAILY
Qty: 90 TABLET | Refills: 0 | Status: SHIPPED | OUTPATIENT
Start: 2019-09-16 | End: 2019-12-02 | Stop reason: SDUPTHER

## 2019-09-16 RX ORDER — LEVOTHYROXINE SODIUM 100 UG/1
100 TABLET ORAL DAILY
Qty: 90 TABLET | Refills: 0 | Status: SHIPPED | OUTPATIENT
Start: 2019-09-16 | End: 2019-12-02 | Stop reason: SDUPTHER

## 2019-09-16 RX ORDER — AMLODIPINE BESYLATE 5 MG/1
5 TABLET ORAL DAILY
Qty: 90 TABLET | Refills: 0 | Status: SHIPPED | OUTPATIENT
Start: 2019-09-16 | End: 2019-12-02 | Stop reason: SDUPTHER

## 2019-09-16 RX ORDER — METFORMIN HYDROCHLORIDE 1000 MG/1
1000 TABLET ORAL 2 TIMES DAILY WITH MEALS
Qty: 180 TABLET | Refills: 0 | Status: SHIPPED | OUTPATIENT
Start: 2019-09-16 | End: 2019-12-02 | Stop reason: SDUPTHER

## 2019-09-16 RX ORDER — PRAVASTATIN SODIUM 80 MG/1
80 TABLET ORAL DAILY
Qty: 90 TABLET | Refills: 0 | Status: SHIPPED | OUTPATIENT
Start: 2019-09-16 | End: 2019-12-02 | Stop reason: SDUPTHER

## 2019-09-16 RX ORDER — GABAPENTIN 600 MG/1
600 TABLET ORAL 3 TIMES DAILY
Qty: 270 TABLET | Refills: 0 | Status: SHIPPED | OUTPATIENT
Start: 2019-09-16 | End: 2019-12-02 | Stop reason: SDUPTHER

## 2019-11-02 DIAGNOSIS — M25.561 CHRONIC PAIN OF BOTH KNEES: ICD-10-CM

## 2019-11-02 DIAGNOSIS — G89.29 CHRONIC PAIN OF BOTH KNEES: ICD-10-CM

## 2019-11-02 DIAGNOSIS — M25.562 CHRONIC PAIN OF BOTH KNEES: ICD-10-CM

## 2019-11-02 DIAGNOSIS — M19.012 ARTHRITIS OF LEFT ACROMIOCLAVICULAR JOINT: ICD-10-CM

## 2019-11-02 DIAGNOSIS — M21.169 ACQUIRED VARUS DEFORMITY OF KNEE, UNSPECIFIED LATERALITY: ICD-10-CM

## 2019-11-02 DIAGNOSIS — M17.0 PRIMARY OSTEOARTHRITIS OF BOTH KNEES: ICD-10-CM

## 2019-11-04 RX ORDER — NAPROXEN 500 MG/1
500 TABLET ORAL 2 TIMES DAILY WITH MEALS
Qty: 180 TABLET | Refills: 0 | Status: SHIPPED | OUTPATIENT
Start: 2019-11-04 | End: 2019-11-14

## 2019-11-08 ENCOUNTER — PATIENT OUTREACH (OUTPATIENT)
Dept: ADMINISTRATIVE | Facility: HOSPITAL | Age: 59
End: 2019-11-08

## 2019-11-08 NOTE — PROGRESS NOTES
Health Maintenance reviewed. Colonoscopy dated 12/10/2009 performed at [facility] in media linked to .  updated.

## 2019-11-14 ENCOUNTER — HOSPITAL ENCOUNTER (OUTPATIENT)
Dept: RADIOLOGY | Facility: HOSPITAL | Age: 59
Discharge: HOME | End: 2019-11-14
Attending: FAMILY MEDICINE
Payer: MEDICAID

## 2019-11-14 ENCOUNTER — TELEPHONE (OUTPATIENT)
Dept: SPORTS MEDICINE | Facility: CLINIC | Age: 59
End: 2019-11-14

## 2019-11-14 ENCOUNTER — OFFICE VISIT (OUTPATIENT)
Dept: FAMILY MEDICINE | Facility: CLINIC | Age: 59
End: 2019-11-14
Payer: MEDICAID

## 2019-11-14 VITALS
HEART RATE: 98 BPM | TEMPERATURE: 98 F | BODY MASS INDEX: 43.4 KG/M2 | WEIGHT: 293 LBS | HEIGHT: 69 IN | DIASTOLIC BLOOD PRESSURE: 86 MMHG | SYSTOLIC BLOOD PRESSURE: 132 MMHG

## 2019-11-14 DIAGNOSIS — E66.2 CLASS 3 OBESITY WITH ALVEOLAR HYPOVENTILATION, SERIOUS COMORBIDITY, AND BODY MASS INDEX (BMI) OF 50.0 TO 59.9 IN ADULT: ICD-10-CM

## 2019-11-14 DIAGNOSIS — M25.561 CHRONIC PAIN OF BOTH KNEES: ICD-10-CM

## 2019-11-14 DIAGNOSIS — E11.59 HYPERTENSION ASSOCIATED WITH DIABETES: ICD-10-CM

## 2019-11-14 DIAGNOSIS — I15.2 HYPERTENSION ASSOCIATED WITH DIABETES: ICD-10-CM

## 2019-11-14 DIAGNOSIS — Z23 FLU VACCINE NEED: ICD-10-CM

## 2019-11-14 DIAGNOSIS — G89.29 CHRONIC PAIN OF BOTH KNEES: ICD-10-CM

## 2019-11-14 DIAGNOSIS — M25.562 CHRONIC PAIN OF BOTH KNEES: ICD-10-CM

## 2019-11-14 DIAGNOSIS — E11.40 TYPE 2 DIABETES MELLITUS WITH DIABETIC NEUROPATHY, WITHOUT LONG-TERM CURRENT USE OF INSULIN: Primary | ICD-10-CM

## 2019-11-14 DIAGNOSIS — K21.00 GERD WITH ESOPHAGITIS: ICD-10-CM

## 2019-11-14 PROBLEM — R00.1 BRADYCARDIA: Status: ACTIVE | Noted: 2018-08-01

## 2019-11-14 PROBLEM — E66.813 CLASS 3 OBESITY WITH ALVEOLAR HYPOVENTILATION, SERIOUS COMORBIDITY, AND BODY MASS INDEX (BMI) OF 50.0 TO 59.9 IN ADULT: Status: ACTIVE | Noted: 2018-08-01

## 2019-11-14 PROBLEM — E66.01 MORBIDLY OBESE: Status: ACTIVE | Noted: 2018-08-01

## 2019-11-14 PROBLEM — E66.813 CLASS 3 OBESITY WITH ALVEOLAR HYPOVENTILATION, SERIOUS COMORBIDITY, AND BODY MASS INDEX (BMI) OF 50.0 TO 59.9 IN ADULT: Chronic | Status: ACTIVE | Noted: 2018-08-01

## 2019-11-14 PROBLEM — R01.1 SYSTOLIC MURMUR: Status: ACTIVE | Noted: 2018-08-01

## 2019-11-14 PROCEDURE — 73562 X-RAY EXAM OF KNEE 3: CPT | Mod: TC,50,PO

## 2019-11-14 PROCEDURE — 73562 XR KNEE ORTHO BILAT: ICD-10-PCS | Mod: 26,50,, | Performed by: RADIOLOGY

## 2019-11-14 PROCEDURE — 73562 X-RAY EXAM OF KNEE 3: CPT | Mod: 26,50,, | Performed by: RADIOLOGY

## 2019-11-14 PROCEDURE — 99214 OFFICE O/P EST MOD 30 MIN: CPT | Mod: PBBFAC,25,PO | Performed by: FAMILY MEDICINE

## 2019-11-14 PROCEDURE — 99999 PR PBB SHADOW E&M-EST. PATIENT-LVL IV: ICD-10-PCS | Mod: PBBFAC,,, | Performed by: FAMILY MEDICINE

## 2019-11-14 PROCEDURE — 99214 PR OFFICE/OUTPT VISIT, EST, LEVL IV, 30-39 MIN: ICD-10-PCS | Mod: S$PBB,,, | Performed by: FAMILY MEDICINE

## 2019-11-14 PROCEDURE — 90471 IMMUNIZATION ADMIN: CPT | Mod: PBBFAC,PO

## 2019-11-14 PROCEDURE — 99999 PR PBB SHADOW E&M-EST. PATIENT-LVL IV: CPT | Mod: PBBFAC,,, | Performed by: FAMILY MEDICINE

## 2019-11-14 PROCEDURE — 99214 OFFICE O/P EST MOD 30 MIN: CPT | Mod: S$PBB,,, | Performed by: FAMILY MEDICINE

## 2019-11-14 RX ORDER — FAMOTIDINE 40 MG/1
40 TABLET, FILM COATED ORAL DAILY
Qty: 30 TABLET | Refills: 11 | Status: SHIPPED | OUTPATIENT
Start: 2019-11-14 | End: 2020-02-10 | Stop reason: SDUPTHER

## 2019-11-14 RX ORDER — IBUPROFEN 800 MG/1
800 TABLET ORAL EVERY 8 HOURS PRN
Qty: 45 TABLET | Refills: 1 | Status: SHIPPED | OUTPATIENT
Start: 2019-11-14 | End: 2019-12-16 | Stop reason: SDUPTHER

## 2019-11-14 RX ORDER — LORATADINE 10 MG/1
TABLET ORAL
COMMUNITY
Start: 2019-11-10 | End: 2019-12-04 | Stop reason: ALTCHOICE

## 2019-11-14 RX ORDER — CYCLOBENZAPRINE HCL 10 MG
10 TABLET ORAL 3 TIMES DAILY PRN
Qty: 30 TABLET | Refills: 0 | Status: SHIPPED | OUTPATIENT
Start: 2019-11-14 | End: 2019-11-30 | Stop reason: SDUPTHER

## 2019-11-14 NOTE — PATIENT INSTRUCTIONS
No follow-ups on file.     If no improvement in symptoms or symptoms worsen, please be advised to call MD, follow-up at clinic and/or go to ER if becomes severe.    Oleksandr Nagel M.D.         We Offer TELEHEATLH & Same Day Appointments!   Book your Telehealth appointment with me through my nurse or   Clinic appointments on [employer]!    [address] [place] [address]    Office: [phone]     FAX: [phone]    Check out my Facebook Page and Follow Me at: CLICK HERE    Check out my website at [employer] by clicking on: CLICK HERE    To Schedule appointments online, go to [employer]: CLICK HERE     Location: [email or web address]

## 2019-11-14 NOTE — PROGRESS NOTES
======================================================  GOAL of current visit: [facility], Multiple Joint pain, bilateral hands, left shoulder and bilateral knees.  Patient would like knee injections.    Previous PCP: Dr. Rafa Peterson  Specialists: Marcelina Hastings  Recent lab work: 19  Recent imagin2018  Colonoscopy History: Dr. Erickson 12/10/2009     Health Maintenance Due   Topic Date Due    Foot Exam  10/31/2017     ======================================================    PLAN:      Problem List Items Addressed This Visit     Diabetes mellitus - Primary (Chronic)     Patient has appointment podiatrist soon.  LDL is near 100.  Continue statin.  Recheck lipid panel A1c.  Blood pressure is controlled at this time on amlodipine.  Patient had allergy to ACE-inhibitor.  Monitor hemoglobin A1c.  Discussed diabetic diet and exercise protocol.  Continue medications.  Monitor for side effects.  Discussed checking blood glucose.  Discussed symptoms to monitor for and to notify me immediately if persistent or worsening.  Follow up with Ophthalmology/Optometry and Podiatry.           Relevant Orders    Ambulatory referral to Podiatry    Hypertension associated with diabetes (Chronic)     Discussed hypertension disease course, DASH-diet and exercise.  Discussed medication regimen importance of treating high blood pressure.  Patient advised of risk of untreated blood pressure.    ER precautions were given for symptoms of hypertensive urgency and emergency.           Relevant Orders    [facility] Enroll[facility] (Completed)    [facility] Assign Onboarding Questionnaires (Completed)    Class 3 obesity with alveolar hypoventilation, serious comorbidity, and body mass index (BMI) of 50.0 to 59.9 in adult (Chronic)     Discussed diet exercise.  Extensive lifestyle modification is needed.  Contributing to multiple comorbidities at this time.         Chronic pain of both  knees (Chronic)     Patient has moderate to severe tricompartmental osteoarthritis.  Changing to ibuprofen 800 mg.  Patient advised to try Tylenol with anti-inflammatories.  Patient advised on weight loss including extensive lifestyle modification diet and exercise.  Will defer steroid shot to Orthopedics at this time.    Discussed condition course and signs and symptoms to expect.  Patient advised take anti-inflammatories and or Tylenol for pain or fever.  ER precautions.  Call MD or follow-up to clinic if not improving or worsening symptoms.           Relevant Medications    cyclobenzaprine (FLEXERIL) 10 MG tablet    ibuprofen (ADVIL,MOTRIN) 800 MG tablet    Other Relevant Orders    X-ray Knee Ortho Bilateral (Completed)    Ambulatory referral/consult to Orthopedics    GERD with esophagitis (Chronic)     Stop Zantac.  Start famotidine.  Continue Protonix.GERD RECOMMENDATIONS  caffeine reduction dietary changes elevate HOB  - Take PPI in the morning 30-60 minutes before breakfast  - Educated patient on lifestyle modifications to help control/reduce reflux/abdominal pain including: avoid large meals, avoid eating within 2-3 hours of bedtime (avoid late night eating & lying down soon after eating), elevate head of bed if nocturnal symptoms are present, smoking cessation (if current smoker), & weight loss (if overweight).   - Educated to avoid known foods which trigger reflux symptoms & to minimize/avoid high-fat foods, chocolate, caffeine, citrus, alcohol, & tomato products.  - Advised to avoid/limit use of NSAID's, since they can cause GI upset, bleeding, and/or ulcers. If needed, take with food.            Relevant Medications    famotidine (PEPCID) 40 MG tablet    Flu vaccine need    Relevant Orders    Influenza - Quadrivalent (6 months+) (PF) (Completed)        Future Appointments     Date Provider Specialty Appt Notes    12/4/2019 Oleksandr Nagel MD Family Medicine Chronic pain of both knees    3/2/2020  Lab  .           Medication List with Changes/Refills   New Medications    FAMOTIDINE (PEPCID) 40 MG TABLET    Take 1 tablet (40 mg total) by mouth once daily.    IBUPROFEN (ADVIL,MOTRIN) 800 MG TABLET    Take 1 tablet (800 mg total) by mouth every 8 (eight) hours as needed for Pain.   Current Medications    ACETAMINOPHEN (TYLENOL) 500 MG TABLET    Take 2 tablets (1,000 mg total) by mouth every 6 (six) hours as needed for Pain.    AMLODIPINE (NORVASC) 5 MG TABLET    Take 1 tablet (5 mg total) by mouth once daily.    BLOOD SUGAR DIAGNOSTIC (CLEVER CHOICE VOICE+ TEST) STRP    TEST BLOOD SUGARS ONE TIME DAILY    FEXOFENADINE (ALLEGRA) 60 MG TABLET    Take 60 mg by mouth.    FLUTICASONE (FLONASE) 50 MCG/ACTUATION NASAL SPRAY    Use 2 sprays to each nostril daily    GABAPENTIN (NEURONTIN) 600 MG TABLET    Take 1 tablet (600 mg total) by mouth 3 (three) times daily.    LANCETS MISC    Misc. route As directed    LEVOTHYROXINE (SYNTHROID) 100 MCG TABLET    Take 1 tablet (100 mcg total) by mouth once daily.    LORATADINE (CLARITIN) 10 MG TABLET        METFORMIN (GLUCOPHAGE) 1000 MG TABLET    Take 1 tablet (1,000 mg total) by mouth 2 (two) times daily with meals.    PANTOPRAZOLE (PROTONIX) 40 MG TABLET    Take 1 tablet (40 mg total) by mouth once daily.    PRAVASTATIN (PRAVACHOL) 80 MG TABLET    Take 1 tablet (80 mg total) by mouth once daily.    PRODIGY LANCETS MISC    As  Directed   Changed and/or Refilled Medications    Modified Medication Previous Medication    CYCLOBENZAPRINE (FLEXERIL) 10 MG TABLET cyclobenzaprine (FLEXERIL) 10 MG tablet       Take 1 tablet (10 mg total) by mouth 3 (three) times daily as needed.    Take 1 tablet (10 mg total) by mouth 3 (three) times daily as needed.   Discontinued Medications    NAPROXEN (NAPROSYN) 500 MG TABLET    Take 1 tablet (500 mg total) by mouth 2 (two) times daily with meals.    RANITIDINE (ZANTAC) 150 MG CAPSULE    Take 150 mg by mouth.       Oleksandr Nagel M.D.      ==========================================================================  Subjective:      Patient ID: Zakia Price is a 59 y.o. female.  has a past medical history of Diabetes mellitus, type 2, Diabetic neuropathy (1/27/2014), DJD (degenerative joint disease) of knee, DVT (deep venous thrombosis) (around 1990's), GERD (gastroesophageal reflux disease), Hypertension associated with diabetes, Multinodular goiter, Obesity, morbid, BMI 50 or higher, and Sleep apnea.     Chief Complaint: Establish Care (bilateral hand pain and left shoulder pain); Knee Pain (bilateral knee pain, patient requesting injections); Gastroesophageal Reflux (change Zantac to something else); and Flu Vaccine      Problem List Items Addressed This Visit     Diabetes mellitus - Primary (Chronic)    Overview     =======================================================  November 2019:    Diabetes Management Status    Statin: Taking  ACE/ARB: Not taking, patient had reaction day ACE-inhibitor    Screening or Prevention Patient's value Goal Complete/Controlled?   HgA1C Testing and Control   Lab Results   Component Value Date    HGBA1C 6.0 (H) 09/04/2019      Annually/Less than 8% Yes   Lipid profile : 09/04/2019 Annually Yes   LDL control Lab Results   Component Value Date    LDLCALC 107.2 09/04/2019    Annually/Less than 100 mg/dl  No   Nephropathy screening Lab Results   Component Value Date    LABMICR 4.0 09/04/2019     Lab Results   Component Value Date    PROTEINUA Negative 10/28/2015    Annually Yes   Blood pressure BP Readings from Last 1 Encounters:   11/14/19 132/86    Less than 140/90 Yes   Dilated retinal exam : 10/02/2019 Annually Yes   Foot exam   Most Recent Foot Exam Date: Not Found Annually No       ======================================================         Current Assessment & Plan     Patient has appointment podiatrist soon.  LDL is near 100.  Continue statin.  Recheck lipid panel A1c.  Blood pressure is controlled  at this time on amlodipine.  Patient had allergy to ACE-inhibitor.  Monitor hemoglobin A1c.  Discussed diabetic diet and exercise protocol.  Continue medications.  Monitor for side effects.  Discussed checking blood glucose.  Discussed symptoms to monitor for and to notify me immediately if persistent or worsening.  Follow up with Ophthalmology/Optometry and Podiatry.           Hypertension associated with diabetes (Chronic)    Overview     CHRONIC. STABLE. BP Reviewed.  Compliant with BP medications. No SE reported.  Patient taking amlodipine 5 mg.  (-) CP, SOB, palpitations, dizziness, lightheadedness, HA, arm numbness, tingling or weakness, syncope.  Creatinine   Date Value Ref Range Status   09/04/2019 0.8 0.5 - 1.4 mg/dL Final       History of hypertension currently on amlodipine with previous reported allergy to ACE inhibitor    Last Assessment & Plan:   Blood pressure stable on current medicine regimen. Continue current medicine regimen and follow low salt diet.         Current Assessment & Plan     Discussed hypertension disease course, DASH-diet and exercise.  Discussed medication regimen importance of treating high blood pressure.  Patient advised of risk of untreated blood pressure.    ER precautions were given for symptoms of hypertensive urgency and emergency.           Class 3 obesity with alveolar hypoventilation, serious comorbidity, and body mass index (BMI) of 50.0 to 59.9 in adult (Chronic)    Overview     Chronic.  Uncontrolled.  Worsening.  Associated with serious comorbidities including severe osteoarthritis, diabetes, hypertension etc.    Reviewed previous Assessment & Plan:   Likely contributing to patient's overall shortness of breath and fatigue, recommend significant weight loss.  Patient agreeable to start Contrave to help with weight loss.         Current Assessment & Plan     Discussed diet exercise.  Extensive lifestyle modification is needed.  Contributing to multiple comorbidities at  this time.         Chronic pain of both knees (Chronic)    Overview     Chronic.  Uncontrolled.  Patient taking anti-inflammatory.  Patient has tried Mobic, naproxen.  Patient is not taking Tylenol.  Patient does take gabapentin.  Patient has had steroid injections in the past.  Patient's BMI 54  X-ray Knee Ortho Bilateral  Narrative: EXAMINATION:  XR KNEE ORTHO BILAT    CLINICAL HISTORY:  Pain in right knee    TECHNIQUE:  AP standing of both knees, PA flexion standing views of both knees, and Merchant views of both knees were performed.  Lateral views of both knees were also performed.    COMPARISON:  03/01/2018    FINDINGS:  Right knee: There is no radiographic evidence of acute osseous, articular, or soft tissue abnormality. There is severe patellofemoral, severe medial, and moderate lateral tricompartmental osteoarthritis.  No appreciable change from prior.    Left knee:  No acute fractures or dislocations visualized.  Suggestion of multiple possible ossified intra-articular bodies in the suprapatellar recess.  There is severe patellofemoral, severe medial, and moderate lateral tricompartmental osteoarthritis.  No appreciable change from prior.  Impression: Degenerative changes as above.    Electronically signed by: Cordell Jamison MD  Date:    11/14/2019  Time:    15:34             Current Assessment & Plan     Patient has moderate to severe tricompartmental osteoarthritis.  Changing to ibuprofen 800 mg.  Patient advised to try Tylenol with anti-inflammatories.  Patient advised on weight loss including extensive lifestyle modification diet and exercise.  Will defer steroid shot to Orthopedics at this time.    Discussed condition course and signs and symptoms to expect.  Patient advised take anti-inflammatories and or Tylenol for pain or fever.  ER precautions.  Call MD or follow-up to clinic if not improving or worsening symptoms.           GERD with esophagitis (Chronic)    Overview     Chronic.  Controlled  on Zantac.  However medication has been pharmacy recall.  Patient needing replacement medication for this condition.  Reported compliance.  Symptoms are controlled.  No side effects reported.         Current Assessment & Plan     Stop Zantac.  Start famotidine.  Continue Protonix.GERD RECOMMENDATIONS  caffeine reduction dietary changes elevate HOB  - Take PPI in the morning 30-60 minutes before breakfast  - Educated patient on lifestyle modifications to help control/reduce reflux/abdominal pain including: avoid large meals, avoid eating within 2-3 hours of bedtime (avoid late night eating & lying down soon after eating), elevate head of bed if nocturnal symptoms are present, smoking cessation (if current smoker), & weight loss (if overweight).   - Educated to avoid known foods which trigger reflux symptoms & to minimize/avoid high-fat foods, chocolate, caffeine, citrus, alcohol, & tomato products.  - Advised to avoid/limit use of NSAID's, since they can cause GI upset, bleeding, and/or ulcers. If needed, take with food.            Flu vaccine need           Past Medical History:  Past Medical History:   Diagnosis Date    Diabetes mellitus, type 2     Diabetic neuropathy 1/27/2014    DJD (degenerative joint disease) of knee     DVT (deep venous thrombosis) around 1990's    in leg, is on no anticoagulant therapy presently    GERD (gastroesophageal reflux disease)     Hypertension associated with diabetes     Multinodular goiter     Obesity, morbid, BMI 50 or higher     Sleep apnea     has no CPAP     Past Surgical History:   Procedure Laterality Date    HYSTERECTOMY      SHOULDER ARTHROSCOPY      THYROIDECTOMY, PARTIAL Right     and transplatation of right superior parathyroid gland to the sternocleidomastoid muscle     TONSILLECTOMY      WRIST FRACTURE SURGERY      left     Review of patient's allergies indicates:   Allergen Reactions    Codeine sulfate      Nausea^    Lisinopril Swelling      angioedema    Codeine Nausea Only and Rash     Medication List with Changes/Refills   New Medications    FAMOTIDINE (PEPCID) 40 MG TABLET    Take 1 tablet (40 mg total) by mouth once daily.    IBUPROFEN (ADVIL,MOTRIN) 800 MG TABLET    Take 1 tablet (800 mg total) by mouth every 8 (eight) hours as needed for Pain.   Current Medications    ACETAMINOPHEN (TYLENOL) 500 MG TABLET    Take 2 tablets (1,000 mg total) by mouth every 6 (six) hours as needed for Pain.    AMLODIPINE (NORVASC) 5 MG TABLET    Take 1 tablet (5 mg total) by mouth once daily.    BLOOD SUGAR DIAGNOSTIC (CLEVER CHOICE VOICE+ TEST) STRP    TEST BLOOD SUGARS ONE TIME DAILY    FEXOFENADINE (ALLEGRA) 60 MG TABLET    Take 60 mg by mouth.    FLUTICASONE (FLONASE) 50 MCG/ACTUATION NASAL SPRAY    Use 2 sprays to each nostril daily    GABAPENTIN (NEURONTIN) 600 MG TABLET    Take 1 tablet (600 mg total) by mouth 3 (three) times daily.    LANCETS MISC    Misc. route As directed    LEVOTHYROXINE (SYNTHROID) 100 MCG TABLET    Take 1 tablet (100 mcg total) by mouth once daily.    LORATADINE (CLARITIN) 10 MG TABLET        METFORMIN (GLUCOPHAGE) 1000 MG TABLET    Take 1 tablet (1,000 mg total) by mouth 2 (two) times daily with meals.    PANTOPRAZOLE (PROTONIX) 40 MG TABLET    Take 1 tablet (40 mg total) by mouth once daily.    PRAVASTATIN (PRAVACHOL) 80 MG TABLET    Take 1 tablet (80 mg total) by mouth once daily.    PRODIGY LANCETS MISC    As  Directed   Changed and/or Refilled Medications    Modified Medication Previous Medication    CYCLOBENZAPRINE (FLEXERIL) 10 MG TABLET cyclobenzaprine (FLEXERIL) 10 MG tablet       Take 1 tablet (10 mg total) by mouth 3 (three) times daily as needed.    Take 1 tablet (10 mg total) by mouth 3 (three) times daily as needed.   Discontinued Medications    NAPROXEN (NAPROSYN) 500 MG TABLET    Take 1 tablet (500 mg total) by mouth 2 (two) times daily with meals.    RANITIDINE (ZANTAC) 150 MG CAPSULE    Take 150 mg by mouth.     "  Social History     Tobacco Use    Smoking status: Never Smoker    Smokeless tobacco: Never Used   Substance Use Topics    Alcohol use: No      Family History   Problem Relation Age of Onset    Leukemia Son     Diabetes Mother     Hypertension Mother     Heart disease Mother 50    Cancer Father     Cancer Brother        I have reviewed the complete PMH, social history, surgical history, allergies and medications.  As well as family history.    Review of Systems   Constitutional: Negative for chills, fatigue, fever and unexpected weight change.   HENT: Negative for ear pain and sore throat.    Eyes: Negative for redness and visual disturbance.   Respiratory: Negative for cough and shortness of breath.    Cardiovascular: Positive for leg swelling. Negative for chest pain and palpitations.   Gastrointestinal: Negative for nausea and vomiting.   Endocrine: Positive for polyphagia and polyuria.   Genitourinary: Negative for difficulty urinating and hematuria.   Musculoskeletal: Positive for arthralgias. Negative for myalgias.   Skin: Negative for rash and wound.   Neurological: Positive for weakness and headaches.   Psychiatric/Behavioral: Negative for sleep disturbance. The patient is not nervous/anxious.        Objective:     /86   Pulse 98   Temp 98.1 °F (36.7 °C) (Oral)   Ht 5' 8.5" (1.74 m)   Wt (!) 164.1 kg (361 lb 12.8 oz)   BMI 54.21 kg/m²     Physical Exam   Constitutional: She is oriented to person, place, and time. She appears well-developed and well-nourished. No distress.   Morbid obesity   HENT:   Head: Normocephalic and atraumatic.   Eyes: Pupils are equal, round, and reactive to light. EOM are normal.   Neck: Normal range of motion. Neck supple.   Cardiovascular: Normal rate, regular rhythm, normal heart sounds and intact distal pulses.   No murmur heard.  Pulmonary/Chest: Effort normal and breath sounds normal. No respiratory distress. She has no wheezes.   Abdominal: Soft. Bowel " sounds are normal.   Morbidly obese abdomen   Musculoskeletal: Normal range of motion. She exhibits tenderness (Bilateral knee tenderness, crepitus noted, limited range of motion due to pain). She exhibits no edema or deformity.        Right foot: There is normal range of motion and no deformity.        Left foot: There is normal range of motion and no deformity.   Feet:   Right Foot:   Protective Sensation: 10 sites tested. 10 sites sensed.   Skin Integrity: Positive for callus and dry skin. Negative for ulcer, blister, skin breakdown, erythema or warmth.   Left Foot:   Protective Sensation: 10 sites tested. 10 sites sensed.   Skin Integrity: Positive for callus and dry skin. Negative for ulcer, blister, skin breakdown, erythema or warmth.   Neurological: She is alert and oriented to person, place, and time. No cranial nerve deficit.   Skin: Skin is warm and dry. Capillary refill takes less than 2 seconds.   Psychiatric: She has a normal mood and affect. Her behavior is normal.   Nursing note and vitals reviewed.      Assessment:     1. Type 2 diabetes mellitus with diabetic neuropathy, without long-term current use of insulin    2. Chronic pain of both knees    3. Flu vaccine need    4. Class 3 obesity with alveolar hypoventilation, serious comorbidity, and body mass index (BMI) of 50.0 to 59.9 in adult    5. GERD with esophagitis    6. Hypertension associated with diabetes        MDM:   Patient is moderate to high complexity with multiple comorbidities and acute problem.  I have Reviewed and summarized old records.  I have performed thorough medication reconciliation today and discussed risk and benefits of each medication.  I have reviewed imaging and labs and discussed with patient.  All questions were answered.  I am requesting old records and will review them once they are available.    Follow up in about 2 weeks (around 11/28/2019), or if symptoms worsen or fail to improve, for follow up knee pain.    If no  improvement in symptoms or symptoms worsen, advised to call/follow-up at clinic or go to ER. Patient voiced understanding and all questions/concerns were addressed.     DISCLAIMER: This note was compiled by using a speech recognition dictation system and therefore please be aware that typographical / speech recognition errors can and do occur.  Please contact me if you see any errors specifically.    Oleksandr Nagel M.D.         We Offer Telehealth & Same Day Appointments!   Book your Telehealth appointment with me through my nurse or   Clinic appointments on [employer]!    Office: [phone]     Check out my Facebook Page and Follow Me at: CLICK HERE    Check out my website at [employer] by clicking on: CLICK HERE    To Schedule appointments online, go to [employer]: CLICK HERE     Location: [email or web address]    [address] [place] [address]    FAX: [phone]

## 2019-11-15 PROBLEM — K21.00 GERD WITH ESOPHAGITIS: Chronic | Status: ACTIVE | Noted: 2019-11-14

## 2019-11-15 NOTE — ASSESSMENT & PLAN NOTE
Stop Zantac.  Start famotidine.  Continue Protonix.GERD RECOMMENDATIONS  caffeine reduction dietary changes elevate HOB  - Take PPI in the morning 30-60 minutes before breakfast  - Educated patient on lifestyle modifications to help control/reduce reflux/abdominal pain including: avoid large meals, avoid eating within 2-3 hours of bedtime (avoid late night eating & lying down soon after eating), elevate head of bed if nocturnal symptoms are present, smoking cessation (if current smoker), & weight loss (if overweight).   - Educated to avoid known foods which trigger reflux symptoms & to minimize/avoid high-fat foods, chocolate, caffeine, citrus, alcohol, & tomato products.  - Advised to avoid/limit use of NSAID's, since they can cause GI upset, bleeding, and/or ulcers. If needed, take with food.

## 2019-11-15 NOTE — ASSESSMENT & PLAN NOTE
Patient has moderate to severe tricompartmental osteoarthritis.  Changing to ibuprofen 800 mg.  Patient advised to try Tylenol with anti-inflammatories.  Patient advised on weight loss including extensive lifestyle modification diet and exercise.  Will defer steroid shot to Orthopedics at this time.    Discussed condition course and signs and symptoms to expect.  Patient advised take anti-inflammatories and or Tylenol for pain or fever.  ER precautions.  Call MD or follow-up to clinic if not improving or worsening symptoms.

## 2019-11-15 NOTE — ASSESSMENT & PLAN NOTE
Patient has appointment podiatrist soon.  LDL is near 100.  Continue statin.  Recheck lipid panel A1c.  Blood pressure is controlled at this time on amlodipine.  Patient had allergy to ACE-inhibitor.  Monitor hemoglobin A1c.  Discussed diabetic diet and exercise protocol.  Continue medications.  Monitor for side effects.  Discussed checking blood glucose.  Discussed symptoms to monitor for and to notify me immediately if persistent or worsening.  Follow up with Ophthalmology/Optometry and Podiatry.

## 2019-11-15 NOTE — ASSESSMENT & PLAN NOTE
Discussed diet exercise.  Extensive lifestyle modification is needed.  Contributing to multiple comorbidities at this time.

## 2019-11-23 ENCOUNTER — PATIENT MESSAGE (OUTPATIENT)
Dept: ADMINISTRATIVE | Facility: OTHER | Age: 59
End: 2019-11-23

## 2019-11-25 DIAGNOSIS — E11.9 TYPE 2 DIABETES MELLITUS: ICD-10-CM

## 2019-11-30 DIAGNOSIS — G89.29 CHRONIC PAIN OF BOTH KNEES: ICD-10-CM

## 2019-11-30 DIAGNOSIS — M25.561 CHRONIC PAIN OF BOTH KNEES: ICD-10-CM

## 2019-11-30 DIAGNOSIS — M25.562 CHRONIC PAIN OF BOTH KNEES: ICD-10-CM

## 2019-11-30 RX ORDER — CYCLOBENZAPRINE HCL 10 MG
10 TABLET ORAL 3 TIMES DAILY PRN
Qty: 30 TABLET | Refills: 1 | Status: SHIPPED | OUTPATIENT
Start: 2019-11-30 | End: 2019-12-30

## 2019-12-02 DIAGNOSIS — I15.2 HYPERTENSION ASSOCIATED WITH DIABETES: Primary | Chronic | ICD-10-CM

## 2019-12-02 DIAGNOSIS — Z76.0 MEDICATION REFILL: ICD-10-CM

## 2019-12-02 DIAGNOSIS — E11.59 HYPERTENSION ASSOCIATED WITH DIABETES: Primary | Chronic | ICD-10-CM

## 2019-12-02 DIAGNOSIS — K21.00 GERD WITH ESOPHAGITIS: ICD-10-CM

## 2019-12-02 DIAGNOSIS — E78.5 HYPERLIPIDEMIA, UNSPECIFIED HYPERLIPIDEMIA TYPE: ICD-10-CM

## 2019-12-02 DIAGNOSIS — E04.2 MULTINODULAR GOITER: ICD-10-CM

## 2019-12-02 RX ORDER — AMLODIPINE BESYLATE 5 MG/1
5 TABLET ORAL DAILY
Qty: 90 TABLET | Refills: 3 | Status: SHIPPED | OUTPATIENT
Start: 2019-12-02 | End: 2020-10-24 | Stop reason: SDUPTHER

## 2019-12-02 RX ORDER — PANTOPRAZOLE SODIUM 40 MG/1
40 TABLET, DELAYED RELEASE ORAL DAILY
Qty: 90 TABLET | Refills: 3 | Status: SHIPPED | OUTPATIENT
Start: 2019-12-02 | End: 2020-02-10 | Stop reason: SDUPTHER

## 2019-12-02 RX ORDER — METFORMIN HYDROCHLORIDE 1000 MG/1
1000 TABLET ORAL 2 TIMES DAILY WITH MEALS
Qty: 180 TABLET | Refills: 3 | Status: SHIPPED | OUTPATIENT
Start: 2019-12-02 | End: 2020-10-24 | Stop reason: SDUPTHER

## 2019-12-02 RX ORDER — GABAPENTIN 600 MG/1
600 TABLET ORAL 3 TIMES DAILY
Qty: 270 TABLET | Refills: 3 | Status: SHIPPED | OUTPATIENT
Start: 2019-12-02 | End: 2020-10-24 | Stop reason: SDUPTHER

## 2019-12-02 RX ORDER — PRAVASTATIN SODIUM 80 MG/1
80 TABLET ORAL DAILY
Qty: 90 TABLET | Refills: 3 | Status: SHIPPED | OUTPATIENT
Start: 2019-12-02 | End: 2020-10-24 | Stop reason: SDUPTHER

## 2019-12-02 RX ORDER — LEVOTHYROXINE SODIUM 100 UG/1
100 TABLET ORAL DAILY
Qty: 90 TABLET | Refills: 3 | Status: SHIPPED | OUTPATIENT
Start: 2019-12-02 | End: 2019-12-16 | Stop reason: SDUPTHER

## 2019-12-04 ENCOUNTER — OFFICE VISIT (OUTPATIENT)
Dept: FAMILY MEDICINE | Facility: CLINIC | Age: 59
End: 2019-12-04
Payer: MEDICAID

## 2019-12-04 ENCOUNTER — PATIENT MESSAGE (OUTPATIENT)
Dept: ADMINISTRATIVE | Facility: OTHER | Age: 59
End: 2019-12-04

## 2019-12-04 ENCOUNTER — LAB VISIT (OUTPATIENT)
Dept: LAB | Facility: HOSPITAL | Age: 59
End: 2019-12-04
Attending: FAMILY MEDICINE
Payer: MEDICAID

## 2019-12-04 VITALS
HEART RATE: 91 BPM | WEIGHT: 293 LBS | DIASTOLIC BLOOD PRESSURE: 88 MMHG | SYSTOLIC BLOOD PRESSURE: 138 MMHG | BODY MASS INDEX: 44.41 KG/M2 | HEIGHT: 68 IN | TEMPERATURE: 98 F

## 2019-12-04 DIAGNOSIS — Z12.31 ENCOUNTER FOR SCREENING MAMMOGRAM FOR BREAST CANCER: ICD-10-CM

## 2019-12-04 DIAGNOSIS — R05.9 COUGH: Primary | ICD-10-CM

## 2019-12-04 DIAGNOSIS — I15.2 HYPERTENSION ASSOCIATED WITH DIABETES: Chronic | ICD-10-CM

## 2019-12-04 DIAGNOSIS — E89.0 POSTOPERATIVE HYPOTHYROIDISM: ICD-10-CM

## 2019-12-04 DIAGNOSIS — E11.40 TYPE 2 DIABETES MELLITUS WITH DIABETIC NEUROPATHY, WITHOUT LONG-TERM CURRENT USE OF INSULIN: ICD-10-CM

## 2019-12-04 DIAGNOSIS — E11.59 HYPERTENSION ASSOCIATED WITH DIABETES: Chronic | ICD-10-CM

## 2019-12-04 LAB
ESTIMATED AVG GLUCOSE: 120 MG/DL (ref 68–131)
HBA1C MFR BLD HPLC: 5.8 % (ref 4–5.6)
T3FREE SERPL-MCNC: 2.7 PG/ML (ref 2.3–4.2)
T4 FREE SERPL-MCNC: 1.06 NG/DL (ref 0.71–1.51)
TSH SERPL DL<=0.005 MIU/L-ACNC: 2.51 UIU/ML (ref 0.4–4)

## 2019-12-04 PROCEDURE — 99999 PR PBB SHADOW E&M-EST. PATIENT-LVL III: ICD-10-PCS | Mod: PBBFAC,,, | Performed by: FAMILY MEDICINE

## 2019-12-04 PROCEDURE — 84439 ASSAY OF FREE THYROXINE: CPT

## 2019-12-04 PROCEDURE — 99999 PR PBB SHADOW E&M-EST. PATIENT-LVL III: CPT | Mod: PBBFAC,,, | Performed by: FAMILY MEDICINE

## 2019-12-04 PROCEDURE — 99214 PR OFFICE/OUTPT VISIT, EST, LEVL IV, 30-39 MIN: ICD-10-PCS | Mod: S$PBB,,, | Performed by: FAMILY MEDICINE

## 2019-12-04 PROCEDURE — 83036 HEMOGLOBIN GLYCOSYLATED A1C: CPT

## 2019-12-04 PROCEDURE — 99213 OFFICE O/P EST LOW 20 MIN: CPT | Mod: PBBFAC,PO | Performed by: FAMILY MEDICINE

## 2019-12-04 PROCEDURE — 84481 FREE ASSAY (FT-3): CPT

## 2019-12-04 PROCEDURE — 36415 COLL VENOUS BLD VENIPUNCTURE: CPT | Mod: PO

## 2019-12-04 PROCEDURE — 84443 ASSAY THYROID STIM HORMONE: CPT

## 2019-12-04 PROCEDURE — 99214 OFFICE O/P EST MOD 30 MIN: CPT | Mod: S$PBB,,, | Performed by: FAMILY MEDICINE

## 2019-12-04 RX ORDER — FLUTICASONE PROPIONATE 50 MCG
SPRAY, SUSPENSION (ML) NASAL
Qty: 16 G | Refills: 5 | Status: SHIPPED | OUTPATIENT
Start: 2019-12-04 | End: 2020-10-28 | Stop reason: SDUPTHER

## 2019-12-04 RX ORDER — LEVOCETIRIZINE DIHYDROCHLORIDE 5 MG/1
5 TABLET, FILM COATED ORAL NIGHTLY
Qty: 30 TABLET | Refills: 11 | Status: SHIPPED | OUTPATIENT
Start: 2019-12-04 | End: 2019-12-16 | Stop reason: SDUPTHER

## 2019-12-04 RX ORDER — MONTELUKAST SODIUM 10 MG/1
10 TABLET ORAL NIGHTLY
Qty: 30 TABLET | Refills: 0 | Status: SHIPPED | OUTPATIENT
Start: 2019-12-04 | End: 2019-12-16 | Stop reason: SDUPTHER

## 2019-12-04 RX ORDER — INSULIN PUMP SYRINGE, 3 ML
EACH MISCELLANEOUS
Qty: 1 EACH | Refills: 0 | Status: SHIPPED | OUTPATIENT
Start: 2019-12-04 | End: 2020-06-13 | Stop reason: SDUPTHER

## 2019-12-04 NOTE — PATIENT INSTRUCTIONS
Follow up in about 3 months (around 3/4/2020), or if symptoms worsen or fail to improve, for HTN, DM, Med refills, LAB RESULTS.     If no improvement in symptoms or symptoms worsen, please be advised to call MD, follow-up at clinic and/or go to ER if becomes severe.    Oleksandr Nagel M.D.         We Offer TELEHEATLH & Same Day Appointments!   Book your Telehealth appointment with me through my nurse or   Clinic appointments on [employer]!    [address] [place] [address]    Office: [phone]     FAX: [phone]    Check out my Facebook Page and Follow Me at: CLICK HERE    Check out my website at [employer] by clicking on: CLICK HERE    To Schedule appointments online, go to [employer]: CLICK HERE     Location: [email or web address]

## 2019-12-04 NOTE — PROGRESS NOTES
PLAN:      Problem List Items Addressed This Visit     Diabetes mellitus (Chronic)     =======================================================  DECEMBER 2019:  Consider reintroducing another ACE-inhibitor/ARB since patient's blood pressure is borderline control today.  Verses increase in her amlodipine.  LDL slightly above goal patient counseled on diet exercise.  Repeat lipid panel.  ======================================================  Previous plan:  Patient has appointment podiatrist soon.  LDL is near 100.  Continue statin.  Recheck lipid panel A1c.  Blood pressure is controlled at this time on amlodipine.  Patient had allergy to ACE-inhibitor.  Monitor hemoglobin A1c.  Discussed diabetic diet and exercise protocol.  Continue medications.  Monitor for side effects.  Discussed checking blood glucose.  Discussed symptoms to monitor for and to notify me immediately if persistent or worsening.  Follow up with Ophthalmology/Optometry and Podiatry.           Relevant Medications    blood-glucose meter kit    Other Relevant Orders    Hemoglobin A1c (Completed)    Lipid panel    Hypertension associated with diabetes (Chronic)     Discussed hypertension disease course, DASH-diet and exercise.  Discussed medication regimen importance of treating high blood pressure.  Patient advised of risk of untreated blood pressure.    ER precautions were given for symptoms of hypertensive urgency and emergency.           Relevant Orders    Hypertension Digital Medicine (HDMP) Enrollment Order (Completed)    Lipid panel    Postoperative hypothyroidism (Chronic)     Discussed hypothyroidism disease course.  Discussed risks and benefits of medication use.  ER precautions.           Relevant Orders    TSH (Completed)    T3, free (Completed)    T4, free (Completed)    Cough - Primary     Discussed condition course and signs and symptoms to expect.  Patient advised take anti-inflammatories and or Tylenol for pain or fever.  ER  precautions.  Call MD or follow-up to clinic if not improving or worsening symptoms.           Relevant Medications    fluticasone propionate (FLONASE) 50 mcg/actuation nasal spray    montelukast (SINGULAIR) 10 mg tablet    levocetirizine (XYZAL) 5 MG tablet    Encounter for screening mammogram for breast cancer    Relevant Orders    Mammo Digital Screening Bilat        Future Appointments     Date Provider Specialty Appt Notes    12/10/2019 Brandie Rey DPM Podiatry Diabetic Foot Exam    12/30/2019  Radiology     1/9/2020 Faheem Key MD Orthopedics XRAY DONE//Chronic pain of both knees    3/2/2020  Lab .    3/4/2020 Oleksandr Nagel MD Family Medicine 3 month follow up           Medication List with Changes/Refills   New Medications    BLOOD-GLUCOSE METER KIT    Use before meals and before bed when the glucoses are not in control.  Otherwise, test it daily once a day before meals randomly to ensure    LEVOCETIRIZINE (XYZAL) 5 MG TABLET    Take 1 tablet (5 mg total) by mouth every evening.    MONTELUKAST (SINGULAIR) 10 MG TABLET    Take 1 tablet (10 mg total) by mouth every evening.   Current Medications    ACETAMINOPHEN (TYLENOL) 500 MG TABLET    Take 2 tablets (1,000 mg total) by mouth every 6 (six) hours as needed for Pain.    AMLODIPINE (NORVASC) 5 MG TABLET    Take 1 tablet (5 mg total) by mouth once daily.    BLOOD SUGAR DIAGNOSTIC (CLEVER CHOICE VOICE+ TEST) STRP    TEST BLOOD SUGARS ONE TIME DAILY    CYCLOBENZAPRINE (FLEXERIL) 10 MG TABLET    Take 1 tablet (10 mg total) by mouth 3 (three) times daily as needed.    FAMOTIDINE (PEPCID) 40 MG TABLET    Take 1 tablet (40 mg total) by mouth once daily.    GABAPENTIN (NEURONTIN) 600 MG TABLET    Take 1 tablet (600 mg total) by mouth 3 (three) times daily.    IBUPROFEN (ADVIL,MOTRIN) 800 MG TABLET    Take 1 tablet (800 mg total) by mouth every 8 (eight) hours as needed for Pain.    LANCETS MISC    Misc. route As directed    LEVOTHYROXINE (SYNTHROID)  100 MCG TABLET    Take 1 tablet (100 mcg total) by mouth once daily.    METFORMIN (GLUCOPHAGE) 1000 MG TABLET    Take 1 tablet (1,000 mg total) by mouth 2 (two) times daily with meals.    PANTOPRAZOLE (PROTONIX) 40 MG TABLET    Take 1 tablet (40 mg total) by mouth once daily.    PRAVASTATIN (PRAVACHOL) 80 MG TABLET    Take 1 tablet (80 mg total) by mouth once daily.    PRODIGY LANCETS MISC    As  Directed   Changed and/or Refilled Medications    Modified Medication Previous Medication    FLUTICASONE PROPIONATE (FLONASE) 50 MCG/ACTUATION NASAL SPRAY fluticasone (FLONASE) 50 mcg/actuation nasal spray       Use 2 sprays to each nostril daily    Use 2 sprays to each nostril daily   Discontinued Medications    FEXOFENADINE (ALLEGRA) 60 MG TABLET    Take 60 mg by mouth.    LORATADINE (CLARITIN) 10 MG TABLET           Oleksandr Nagel M.D.     ==========================================================================  Subjective:      Patient ID: Zakia Price is a 59 y.o. female.  has a past medical history of Diabetes mellitus, type 2, Diabetic neuropathy (1/27/2014), DJD (degenerative joint disease) of knee, DVT (deep venous thrombosis) (around 1990's), GERD (gastroesophageal reflux disease), Hypertension associated with diabetes, Multinodular goiter, Obesity, morbid, BMI 50 or higher, and Sleep apnea.     Chief Complaint: Cough; Diabetes; Hypertension; Medication Refill; and Mammogram      Problem List Items Addressed This Visit     Diabetes mellitus (Chronic)    Overview     =======================================================  November 2019:  Reviewed diabetes management status.  Patient was due for LDL less than 100 at that time.  Also needing foot exam.  =======================================================  DECEMBER 2019:  Patient reports issues with ACE-inhibitor.  Currently having cough.  Only on amlodipine.  Diabetes Management Status    Statin: Taking  ACE/ARB: Not taking    Screening or  Prevention Patient's value Goal Complete/Controlled?   HgA1C Testing and Control   Lab Results   Component Value Date    HGBA1C 5.8 (H) 12/04/2019      Annually/Less than 8% Yes   Lipid profile : 09/04/2019 Annually Yes   LDL control Lab Results   Component Value Date    LDLCALC 107.2 09/04/2019    Annually/Less than 100 mg/dl  No   Nephropathy screening Lab Results   Component Value Date    LABMICR 4.0 09/04/2019     Lab Results   Component Value Date    PROTEINUA Negative 10/28/2015    Annually Yes   Blood pressure BP Readings from Last 1 Encounters:   12/04/19 138/88    Less than 140/90 Yes   Dilated retinal exam : 10/02/2019 Annually Yes   Foot exam   : 11/14/2019 Annually Yes       ======================================================         Current Assessment & Plan     =======================================================  DECEMBER 2019:  Consider reintroducing another ACE-inhibitor/ARB since patient's blood pressure is borderline control today.  Verses increase in her amlodipine.  LDL slightly above goal patient counseled on diet exercise.  Repeat lipid panel.  ======================================================  Previous plan:  Patient has appointment podiatrist soon.  LDL is near 100.  Continue statin.  Recheck lipid panel A1c.  Blood pressure is controlled at this time on amlodipine.  Patient had allergy to ACE-inhibitor.  Monitor hemoglobin A1c.  Discussed diabetic diet and exercise protocol.  Continue medications.  Monitor for side effects.  Discussed checking blood glucose.  Discussed symptoms to monitor for and to notify me immediately if persistent or worsening.  Follow up with Ophthalmology/Optometry and Podiatry.           Hypertension associated with diabetes (Chronic)    Overview     CHRONIC. STABLE. BP Reviewed.  Compliant with BP medications. No SE reported.  Patient taking amlodipine 5 mg.  (-) CP, SOB, palpitations, dizziness, lightheadedness, HA, arm numbness, tingling or weakness,  syncope.  Creatinine   Date Value Ref Range Status   09/04/2019 0.8 0.5 - 1.4 mg/dL Final       History of hypertension currently on amlodipine with previous reported allergy to ACE inhibitor    Last Assessment & Plan:   Blood pressure stable on current medicine regimen. Continue current medicine regimen and follow low salt diet.         Current Assessment & Plan     Discussed hypertension disease course, DASH-diet and exercise.  Discussed medication regimen importance of treating high blood pressure.  Patient advised of risk of untreated blood pressure.    ER precautions were given for symptoms of hypertensive urgency and emergency.           Postoperative hypothyroidism (Chronic)    Overview     Chronic.  Stable.  Patient reports compliance with Synthroid 100 mcg.  No side effects reported.  Symptoms are fairly well controlled.  Due for thyroid labs.  Lab Results   Component Value Date    TSH 2.507 12/04/2019    FREET4 1.06 12/04/2019        Lab Results   Component Value Date    T3FREE 2.7 12/04/2019               Current Assessment & Plan     Discussed hypothyroidism disease course.  Discussed risks and benefits of medication use.  ER precautions.           Cough - Primary    Overview     Answers for HPI/ROS submitted by the patient on 12/2/2019   Cough  Frequency: hourly  Cough characteristics: non-productive  heartburn: No  hemoptysis: No  nasal congestion: Yes  sweats: No  weight loss: No  asthma: No  bronchiectasis: No  bronchitis: No  COPD: No  emphysema: No  pneumonia: No  Treatments tried: nothing  Improvement on treatment: no relief         Current Assessment & Plan     Discussed condition course and signs and symptoms to expect.  Patient advised take anti-inflammatories and or Tylenol for pain or fever.  ER precautions.  Call MD or follow-up to clinic if not improving or worsening symptoms.           Encounter for screening mammogram for breast cancer           Past Medical History:  Past Medical  History:   Diagnosis Date    Diabetes mellitus, type 2     Diabetic neuropathy 1/27/2014    DJD (degenerative joint disease) of knee     DVT (deep venous thrombosis) around 1990's    in leg, is on no anticoagulant therapy presently    GERD (gastroesophageal reflux disease)     Hypertension associated with diabetes     Multinodular goiter     Obesity, morbid, BMI 50 or higher     Sleep apnea     has no CPAP     Past Surgical History:   Procedure Laterality Date    HYSTERECTOMY      SHOULDER ARTHROSCOPY      THYROIDECTOMY, PARTIAL Right     and transplatation of right superior parathyroid gland to the sternocleidomastoid muscle     TONSILLECTOMY      WRIST FRACTURE SURGERY      left     Review of patient's allergies indicates:   Allergen Reactions    Codeine sulfate      Nausea^    Lisinopril Swelling     angioedema    Codeine Nausea Only and Rash     Medication List with Changes/Refills   New Medications    BLOOD-GLUCOSE METER KIT    Use before meals and before bed when the glucoses are not in control.  Otherwise, test it daily once a day before meals randomly to ensure    LEVOCETIRIZINE (XYZAL) 5 MG TABLET    Take 1 tablet (5 mg total) by mouth every evening.    MONTELUKAST (SINGULAIR) 10 MG TABLET    Take 1 tablet (10 mg total) by mouth every evening.   Current Medications    ACETAMINOPHEN (TYLENOL) 500 MG TABLET    Take 2 tablets (1,000 mg total) by mouth every 6 (six) hours as needed for Pain.    AMLODIPINE (NORVASC) 5 MG TABLET    Take 1 tablet (5 mg total) by mouth once daily.    BLOOD SUGAR DIAGNOSTIC (CLEVER CHOICE VOICE+ TEST) STRP    TEST BLOOD SUGARS ONE TIME DAILY    CYCLOBENZAPRINE (FLEXERIL) 10 MG TABLET    Take 1 tablet (10 mg total) by mouth 3 (three) times daily as needed.    FAMOTIDINE (PEPCID) 40 MG TABLET    Take 1 tablet (40 mg total) by mouth once daily.    GABAPENTIN (NEURONTIN) 600 MG TABLET    Take 1 tablet (600 mg total) by mouth 3 (three) times daily.    IBUPROFEN  (ADVIL,MOTRIN) 800 MG TABLET    Take 1 tablet (800 mg total) by mouth every 8 (eight) hours as needed for Pain.    LANCETS MISC    Misc. route As directed    LEVOTHYROXINE (SYNTHROID) 100 MCG TABLET    Take 1 tablet (100 mcg total) by mouth once daily.    METFORMIN (GLUCOPHAGE) 1000 MG TABLET    Take 1 tablet (1,000 mg total) by mouth 2 (two) times daily with meals.    PANTOPRAZOLE (PROTONIX) 40 MG TABLET    Take 1 tablet (40 mg total) by mouth once daily.    PRAVASTATIN (PRAVACHOL) 80 MG TABLET    Take 1 tablet (80 mg total) by mouth once daily.    PRODIGY LANCETS MISC    As  Directed   Changed and/or Refilled Medications    Modified Medication Previous Medication    FLUTICASONE PROPIONATE (FLONASE) 50 MCG/ACTUATION NASAL SPRAY fluticasone (FLONASE) 50 mcg/actuation nasal spray       Use 2 sprays to each nostril daily    Use 2 sprays to each nostril daily   Discontinued Medications    FEXOFENADINE (ALLEGRA) 60 MG TABLET    Take 60 mg by mouth.    LORATADINE (CLARITIN) 10 MG TABLET          Social History     Tobacco Use    Smoking status: Never Smoker    Smokeless tobacco: Never Used   Substance Use Topics    Alcohol use: No     Frequency: Never      Family History   Problem Relation Age of Onset    Leukemia Son     Diabetes Mother     Hypertension Mother     Heart disease Mother 50    Cancer Father     Cancer Brother        I have reviewed the complete PMH, social history, surgical history, allergies and medications.  As well as family history.    Review of Systems   Constitutional: Negative for chills, fatigue, fever and unexpected weight change.   HENT: Negative for ear pain, postnasal drip, rhinorrhea and sore throat.    Eyes: Negative for redness and visual disturbance.   Respiratory: Positive for cough. Negative for shortness of breath.    Cardiovascular: Negative for chest pain and palpitations.   Gastrointestinal: Negative for nausea and vomiting.   Genitourinary: Negative for difficulty urinating  "and hematuria.   Musculoskeletal: Negative for arthralgias and myalgias.   Skin: Negative for rash and wound.   Allergic/Immunologic: Negative for environmental allergies.   Neurological: Negative for weakness and headaches.   Psychiatric/Behavioral: Negative for sleep disturbance. The patient is not nervous/anxious.      Objective:   /88   Pulse 91   Temp 98.1 °F (36.7 °C) (Oral)   Ht 5' 8" (1.727 m)   Wt (!) 163.4 kg (360 lb 3.2 oz)   BMI 54.77 kg/m²   Physical Exam   Constitutional: She is oriented to person, place, and time. She appears well-developed and well-nourished. No distress.   HENT:   Head: Normocephalic and atraumatic.   Eyes: Pupils are equal, round, and reactive to light. EOM are normal.   Neck: Normal range of motion. Neck supple.   Cardiovascular: Normal rate, regular rhythm, normal heart sounds and intact distal pulses.   No murmur heard.  Pulmonary/Chest: Effort normal and breath sounds normal. No respiratory distress. She has no wheezes.   Abdominal: Soft. Bowel sounds are normal. She exhibits no distension. There is no tenderness.   Musculoskeletal: Normal range of motion. She exhibits no edema.   Neurological: She is alert and oriented to person, place, and time. No cranial nerve deficit.   Skin: Skin is warm and dry. Capillary refill takes less than 2 seconds.   Psychiatric: She has a normal mood and affect. Her behavior is normal.   Nursing note and vitals reviewed.      Assessment:     1. Cough    2. Type 2 diabetes mellitus with diabetic neuropathy, without long-term current use of insulin    3. Postoperative hypothyroidism    4. Encounter for screening mammogram for breast cancer    5. Hypertension associated with diabetes      MDM:   Patient is moderate to high complexity place of multiple chronic conditions and subacute cough today.  I have Reviewed and summarized old records.  I have performed thorough medication reconciliation today and discussed risk and benefits of each " medication.  I have reviewed labs and discussed with patient.  All questions were answered.  I am requesting old records and will review them once they are available.    I have signed for the following orders AND/OR meds.  Orders Placed This Encounter   Procedures    Mammo Digital Screening Bilat     Standing Status:   Future     Standing Expiration Date:   2/4/2021    Hemoglobin A1c     Standing Status:   Future     Number of Occurrences:   1     Standing Expiration Date:   2/1/2021    TSH     Standing Status:   Future     Number of Occurrences:   1     Standing Expiration Date:   2/1/2021    T3, free     Standing Status:   Future     Number of Occurrences:   1     Standing Expiration Date:   2/1/2021    T4, free     Standing Status:   Future     Number of Occurrences:   1     Standing Expiration Date:   2/1/2021    Lipid panel     Standing Status:   Future     Standing Expiration Date:   2/3/2021    Hypertension Digital Medicine (HDMP) Enrollment Order     I. PURPOSE  To provide [facility] patients with innovative, specialized blood pressure monitoring and optimal dosing of antihypertensive therapy to improve health outcomes and decrease microvascular and macrovascular complications    II. GOALS  To maintain a systematic, coordinated and cost-effective process to monitor blood pressure in patients with hypertension  To attain and maintain optimal antihypertensive therapy while ensuring patient safety using the most recent evidence-based guidelines for the management of hypertension as reported by AHA/ACC in 2017.   Provide consultative services to providers, patients and caregivers regarding optimal antihypertensive therapy  To improve patient/caregiver understanding and compliance related to antihypertensive therapies by providing continuous patient and caregiver education about their prescribed medications and associated disease state  To provide education, guidance and reinforcement regarding  lifestyle modifications including weight loss, adopting and maintaining the Dietary Approaches to Stop Hypertension or DASH diet, sodium restriction, physical activity, and moderation of alcohol consumption to patients and caregivers  Allow providers increased availability of clinic time for direct patient care   To provide patient care and education within an interdisciplinary framework and enhance partnering with other members of health care team  Improve continuity of care for high blood pressure patients and improve patient engagement  Collect and utilize pharmacy related outcomes to improve quality of patient care    III. COLLABORATIVE PRACTICE AGREEMENT    A.  Under this collaborative practice agreement, an [facility] pharmacist according to and in compliance with [place] Board of Pharmacy Title 46, Part LIII, section 523 and [place] Board of Medical Examiners definition of the Collaborative Drug Therapy Management (CDTM) may initiate, implement, alter and monitor a therapeutic drug plan intended to manage antihypertensive therapy.  Services offered by the hypertension pharmacy specialist may include education on disease state and lifestyle modification, in addition to the drug therapy services listed above. Written and audio/visual educational materials and patient specific information may be provided to improve quality of care.    B. Primary Collaborating Physician:    Primary Physician: Hermes Carlson M.D., [facility], [facility]  , Cardiology  License number: [id number]  CDTM number:  [id number]  Telephone number: [phone]   E-mail address: [email or web address]  Emergency Contact Information: [phone]    Back-Up Physician:  Joon French M.D.  Cardiology  License number:  [id number]  CDTM number:  [id number]  Telephone number:  [phone]  E-mail address: [email or web address]  Emergency Contact Information: [phone]    C.  Pharmacists:   Each of the following pharmacists will serve as the  primary pharmacist on CDTM and any pharmacist may serve as the secondary pharmacist for another pharmacists agreement.  Each of the pharmacists listed below has a Pharm.D degree, with completion of an accredited pharmacy practice residency and as well as experience with managing patients along with a physician.  The outpatient monitoring service will be provided under the Cardiology Department at [facility] [place] location in [place] at [address] [place] [address]. The pharmacist will contact patients via telephone from a remote location.    Pharmacist: Alana Franco PharmD  This pharmacist has completed a pharmacy practice residency and has practiced clinical pharmacy for 7 years. She has experience in the areas of cardiology, acute care, and managed care. She started a pharmacist run hypertension clinic at [facility] during her residency and was published in The American Journal of Health-System Pharmacists.     [place] Pharmacist License Number: [id number]  CDTM number:  [id number]  Contact Number:  [phone]  Contact E-mail: [email or web address]  Emergency Contact Number:  [phone]    Pharmacist:  Stefanie Cameron PharmD  This pharmacist has completed a pharmacy practice residency and has practiced clinical pharmacy for 17 years in the areas of cardiology, geriatric medicine, anticoagulation management, retail and ambulatory care.  She served as a pharmacy practice clinical preceptor and lead pharmacist in anticoagulation clinic for 10 years.  [place] Pharmacist License # [id number]  CDTM number:  [id number]  Contact Number:  [phone]  Contact E-mail:  [email or web address]   Emergency Contact Number:  [phone]    Pharmacist: Katelin Lee PharmD, BCACP, CDE  This pharmacist has completed a pharmacy practice residency with emphasis in ambulatory care and has practiced clinical pharmacy for 8 years in the areas of dyslipidemia,  hypertension, diabetes, and anticoagulation. She is also a Certified Diabetes Educator.      [place] Pharmacist License # [id number]  CDTM number: [id number]  Contact number: [phone]  Contact E-mail:  [email or web address]  Emergency Contact Number: [phone]    Pharmacist: Alexandrea Hernandez PharmD  This pharmacist started practicing at [facility] in 2011 following her one year residency at [facility]. She serves as an Adjunct Clinical  in the College of Pharmacy at [employer]. She served as the lead pharmacist for the Coumadin Clinic team for 4 years.   [place] Pharmacist License: [id number]  CDTM number: [id number]  Contact number: [phone]  Contact E-mail: [email or web address]   Emergency Contact Number: [phone]    Pharmacist:  Chloe Sifuentes PharmD  This pharmacist has completed a pharmacy practice residency (PGY-1) and has practiced clinical pharmacy for 16  years in the areas of heart and abdominal transplant, retail and ambulatory care.  She was directly involved in the care of congestive heart failure, left ventricular assist device and heart transplant patients from 2069-3919.  She is currently practicing as a digital medicine clinical specialist in heart failure.    [place] Pharmacist License # [id number]  CDTM number:  [id number]  Contact Number:  [phone]  Contact E-mail:  [email or web address]   Emergency Contact Number:  [phone]    Pharmacist: Alexandrea Garcia PharmD  This pharmacist obtained her Pharm.D. from [employer], and has completed an Ambulatory Care Pharmacy Practice residency at [facility]. She has practiced clinical pharmacy for 9  years and has experience in the areas of Hypertension and Diabetes.      [place] Pharmacist License: [id number]  CDTM Number: [id number]  Telephone number: [phone]  E-mail: [email or web address]  Emergency Contact Number:  [phone]      Pharmacist: Krupa Childers, [place]  This pharmacist has completed a pharmacy practice residency with an emphasis in ambulatory care and has practiced clinical pharmacy for one year. She has experience in the areas of diabetes, hypertension and dyslipidemia.   [place] Pharmacist License: [id number]  CDTM Number: [id number]  Contact Number:   Contact Email: [email or web address]   Emergency Contact: [phone]    Pharmacist: Sera Josue PharmD, BCPS  This pharmacist has completed a pharmacy practice residency with an emphasis in ambulatory care and is a Board Certified Pharmacotherapy Specialist (BCPS). She has practiced clinical pharmacy for  years in areas of ambulatory care, internal medicine, cardiology and specialty pharmacy.    [place] Pharmacist License Number: [id number]  CDTM number:  [id number]  Contact Number:  [phone]  Contact E-mail:  [email or web address]   Emergency Contact Number:  [phone] d.  Eligible Patients  Patients whose antihypertensive therapy is managed under this agreement must have a diagnosis of hypertension as documented in the patient record, and have established care with a provider within [facility]. All aspects of the patients hypertension medication management will be followed in collaboration with the physician treating the patients hypertension.  The patient will be seen by his or her physician per their discretion or by the recommendation by the hypertension pharmacist.  The patients case may be reviewed with clinic medical personnel as needed, but will be reported at least every 30 days to the collaborating physician regarding the patients drug therapy management. All decisions made by the pharmacist will be recorded in [facility] and are readily available for physician review. All issues outside of the scope of antihypertensive therapy shall be reported to the collaborating physician.     JAMES   Medications in CDTM  This collaborative practice proposal involves the management of patients who are receiving antihypertensive therapy, specifically, thiazide-type diuretics, angiotensin-converting enzyme inhibtors (ACE-I), angiotensin receptor blockers (ARB), calcium channel blockers (CCB), beta-blockers, loop diuretics, potassium-sparing diuretics, potassium supplementation, aldosterone antagonists, direct renin inhibitors, alpha-1 receptor blockers, central alpha-2 agonists, direct arterial vasodilators and peripheral adrenergic antoagonists, or those patients in which hypertension is controlled by lifestyle modifcation alone.      F.  Clinical Procedure  All patients will be notified that a hypertension CDTM exists.  A signed physician order will be required for each patient to be enrolled into the hypertension CDTM.  Informed consent and an electronic signature will be obtained from each patient and documented in the patients record.  This patient signature will be valid for 1 year, requiring a new physician order and patient consent yearly.  The patient must also be enrolled in the [employer] communication program ([facility]) to be elegible for the monitoring program.  The following management plan will be utilized for CDTM:    Patients must submit blood pressures at a minimum of once weekly from the patient- purchased wireless blood pressure cuff. Patients who fail to comply with this requirement are subject to removal from [facility].   Evaluate the change in blood pressure, if any, from previous measurements performed on the same cuff and arm.  Determine and document contributing factors for blood pressure change.    Review initial drug therapy made by clinic physician upon program enrollment with patient to ensure compliance.    Identify target blood pressure based on age and comorbidities. Therapeutic changes will be made in collaboration with PharmD and collaborating physician based on the AHA/ACC 2017  guidelines for the treatment of hypertension.  Guide drug optimization based on patient response at 2-4 week intervals until control is achieved.  The PharmD will continue to monitor blood pressure and make adjustments to hypertension medications in order to achieve optimal blood pressure.   Order follow up labs when changing or adding ACE inhibitors and diuretics.    Refer to hypertension specialist if  blood pressure goals cannot be achieved using the noted algorithm.  Notify physician with specific problems or request clinic visit if deemed necessary.  Document antihypertensive therapy changes, interventions, and outcomes in EMR.   Provide patient/caregiver continuous education or reinforcement regarding hypertension management,  medications and lifestyle modifications.    Laboratory Tests  Pharmacists will be authorized to order and evaluate laboratory tests directly related to the disease specific drug therapy being managed. Pharmacists will also be authorized to order additional specific labs based on patient clinical presentation or description. Pharmacists may also review other lab data available in the patient record which may be necessary for the evaluation and assessment of the impact on antihypertensive therapy (i.e. drug interaction, disease interaction, etc.).     b.  Documentation   Documentation of patient encounters and lab results will be permanently placed in the [facility] EMR.  Laboratory results will automatically populate prompting review and assessment of each patient.  Laboratory results obtained from outside laboratories will be entered into EMR manually.  This documentation will include lab values, medication changes, identification and assessment of adverse events related to therapy, antihypertensive medication dose adjustments, therapeutic management plan and follow up as well as any other information given to the patient during the telemedicine visit.    c.     1.  Quality  assurance will be carried out through quarterly reports tracking following statistics:   a. Percent of patients requiring a change to antihypertensive medications   b. Number of emergency visits due to hypertensive urgency or emergency, hypotension or medication side effects for participating patients  Percent of patients who are controlled (BP <130/80 mmHg)  and uncontrolled (BP>130 mmHg)     2.  Patient specific quality indicators will be identified through quarterly review of the following data:   a.  Average change in blood pressure after a dose adjument by the hypertension pharmacist    3.  A random sample of patient records shall be reviewed by the primary physician quarterly to ensure adherence by the hypertension clinical specialists to the collaborative practice agreement.     4.   measures will be reported to Pharmacy & Therapeutics Committee yearly.     References:    CHAS Garcia, et al. 2017. 2017. Guidelines for the Prevention, Detection, Evaluation and Management of High Blood Pressure in Adults.      Order Specific Question:   BP Control Goal     Answer:   Current (2017) AHA Guidelines     Medications Ordered This Encounter   Medications    blood-glucose meter kit     Sig: Use before meals and before bed when the glucoses are not in control.  Otherwise, test it daily once a day before meals randomly to ensure     Dispense:  1 each     Refill:  0    fluticasone propionate (FLONASE) 50 mcg/actuation nasal spray     Sig: Use 2 sprays to each nostril daily     Dispense:  16 g     Refill:  5    levocetirizine (XYZAL) 5 MG tablet     Sig: Take 1 tablet (5 mg total) by mouth every evening.     Dispense:  30 tablet     Refill:  11    montelukast (SINGULAIR) 10 mg tablet     Sig: Take 1 tablet (10 mg total) by mouth every evening.     Dispense:  30 tablet     Refill:  0        Follow up in about 3 months (around 3/4/2020), or if symptoms worsen or fail to improve, for HTN, DM, Med refills,  LAB RESULTS.    If no improvement in symptoms or symptoms worsen, advised to call/follow-up at clinic or go to ER. Patient voiced understanding and all questions/concerns were addressed.     DISCLAIMER: This note was compiled by using a speech recognition dictation system and therefore please be aware that typographical / speech recognition errors can and do occur.  Please contact me if you see any errors specifically.    Oleksandr Nagel M.D.       Office: [phone] [address] [place] [address]  FAX: [phone]

## 2019-12-05 NOTE — PROGRESS NOTES
CALL THE PATIENT to discuss results and document verification.    I have sent a message to them with the interpretation (see below).  See if they have any questions and make a follow-up appointment if not already schedule and if needed.    Also please address any outstanding health maintenance that may be due: There are no preventive care reminders to display for this patient.  ====================================    My interpretation that was sent to them through [employer]:    I have reviewed your recent blood work.     Thyroid studies are improved stable and within normal limits.  Continue current dosing of thyroid medication.  Recheck level in six months..   Your hemoglobin A1c is improved from previous three months down to 5.8.  Goal is less than six.  Continue medications for diabetes.  Continue to work on diet and increased exercise.  Recheck level in three months..  This test is gold standard screening test for diabetes.  It is a measures 3 months of your average blood sugar.

## 2019-12-06 NOTE — ASSESSMENT & PLAN NOTE
Discussed hypothyroidism disease course.  Discussed risks and benefits of medication use.  ER precautions.

## 2019-12-06 NOTE — ASSESSMENT & PLAN NOTE
=======================================================  DECEMBER 2019:  Consider reintroducing another ACE-inhibitor/ARB since patient's blood pressure is borderline control today.  Verses increase in her amlodipine.  LDL slightly above goal patient counseled on diet exercise.  Repeat lipid panel.  ======================================================  Previous plan:  Patient has appointment podiatrist soon.  LDL is near 100.  Continue statin.  Recheck lipid panel A1c.  Blood pressure is controlled at this time on amlodipine.  Patient had allergy to ACE-inhibitor.  Monitor hemoglobin A1c.  Discussed diabetic diet and exercise protocol.  Continue medications.  Monitor for side effects.  Discussed checking blood glucose.  Discussed symptoms to monitor for and to notify me immediately if persistent or worsening.  Follow up with Ophthalmology/Optometry and Podiatry.

## 2019-12-10 ENCOUNTER — OFFICE VISIT (OUTPATIENT)
Dept: PODIATRY | Facility: CLINIC | Age: 59
End: 2019-12-10
Payer: MEDICAID

## 2019-12-10 VITALS — WEIGHT: 293 LBS | HEIGHT: 68 IN | BODY MASS INDEX: 44.41 KG/M2

## 2019-12-10 DIAGNOSIS — E11.9 ENCOUNTER FOR DIABETIC FOOT EXAM: Primary | ICD-10-CM

## 2019-12-10 DIAGNOSIS — M20.11 HALLUX VALGUS, ACQUIRED, BILATERAL: ICD-10-CM

## 2019-12-10 DIAGNOSIS — E11.49 TYPE II DIABETES MELLITUS WITH NEUROLOGICAL MANIFESTATIONS: ICD-10-CM

## 2019-12-10 DIAGNOSIS — M20.12 HALLUX VALGUS, ACQUIRED, BILATERAL: ICD-10-CM

## 2019-12-10 DIAGNOSIS — E66.01 MORBID OBESITY: ICD-10-CM

## 2019-12-10 PROCEDURE — 99212 OFFICE O/P EST SF 10 MIN: CPT | Mod: PBBFAC,PO | Performed by: PODIATRIST

## 2019-12-10 PROCEDURE — 99999 PR PBB SHADOW E&M-EST. PATIENT-LVL II: ICD-10-PCS | Mod: PBBFAC,,, | Performed by: PODIATRIST

## 2019-12-10 PROCEDURE — 99999 PR PBB SHADOW E&M-EST. PATIENT-LVL II: CPT | Mod: PBBFAC,,, | Performed by: PODIATRIST

## 2019-12-10 PROCEDURE — 99203 PR OFFICE/OUTPT VISIT, NEW, LEVL III, 30-44 MIN: ICD-10-PCS | Mod: S$PBB,,, | Performed by: PODIATRIST

## 2019-12-10 PROCEDURE — 99203 OFFICE O/P NEW LOW 30 MIN: CPT | Mod: S$PBB,,, | Performed by: PODIATRIST

## 2019-12-10 NOTE — PROGRESS NOTES
Subjective:       Patient ID: Zakia Price is a 59 y.o. female.    Chief Complaint: Diabetic Foot Exam (Pt c/o tinging sensation in the ball of foot. Pt states tingling sensation is worse on the left foot. Wears slip on shoes. Last seen by Dr Nagel on 12/04/19.)      HPI: Zakia Price presents to the office today, under referral by their Primary Care Provider, Oleksandr Nagel MD, for her annual diabetic foot assessment and risk evaluation.  She reports that she has a tingling sensation in the ball of her foot which has been ongoing for several years.  She states that the tingling is worse on the left foot compared to the right foot.  Patient is a DMII. Patient states neuropathy, hyperlipidemia, hypertension.  She does have bilateral hallux valgus deformities, however these are nonpainful for her.  Her last A1c was 5.8.  This patient last saw his/her primary care provider on 12/04/2019.      Hemoglobin A1C   Date Value Ref Range Status   12/04/2019 5.8 (H) 4.0 - 5.6 % Final     Comment:     ADA Screening Guidelines:  5.7-6.4%  Consistent with prediabetes  >or=6.5%  Consistent with diabetes  High levels of fetal hemoglobin interfere with the HbA1C  assay. Heterozygous hemoglobin variants (HbS, HgC, etc)do  not significantly interfere with this assay.   However, presence of multiple variants may affect accuracy.     09/04/2019 6.0 (H) 4.0 - 5.6 % Final     Comment:     ADA Screening Guidelines:  5.7-6.4%  Consistent with prediabetes  >or=6.5%  Consistent with diabetes  High levels of fetal hemoglobin interfere with the HbA1C  assay. Heterozygous hemoglobin variants (HbS, HgC, etc)do  not significantly interfere with this assay.   However, presence of multiple variants may affect accuracy.     09/05/2018 5.5 4.0 - 5.6 % Final     Comment:     ADA Screening Guidelines:  5.7-6.4%  Consistent with prediabetes  >or=6.5%  Consistent with diabetes  High levels of fetal hemoglobin interfere with the  HbA1C  assay. Heterozygous hemoglobin variants (HbS, HgC, etc)do  not significantly interfere with this assay.   However, presence of multiple variants may affect accuracy.     .    Review of patient's allergies indicates:   Allergen Reactions    Codeine sulfate      Nausea^    Lisinopril Swelling     angioedema    Codeine Nausea Only and Rash       Past Medical History:   Diagnosis Date    Diabetes mellitus, type 2     Diabetic neuropathy 1/27/2014    DJD (degenerative joint disease) of knee     DVT (deep venous thrombosis) around 1990's    in leg, is on no anticoagulant therapy presently    GERD (gastroesophageal reflux disease)     Hypertension associated with diabetes     Multinodular goiter     Obesity, morbid, BMI 50 or higher     Sleep apnea     has no CPAP       Family History   Problem Relation Age of Onset    Leukemia Son     Diabetes Mother     Hypertension Mother     Heart disease Mother 50    Cancer Father     Cancer Brother        Social History     Socioeconomic History    Marital status: Single     Spouse name: Not on file    Number of children: Not on file    Years of education: Not on file    Highest education level: Not on file   Occupational History    Not on file   Social Needs    Financial resource strain: Somewhat hard    Food insecurity:     Worry: Sometimes true     Inability: Sometimes true    Transportation needs:     Medical: Yes     Non-medical: Yes   Tobacco Use    Smoking status: Never Smoker    Smokeless tobacco: Never Used   Substance and Sexual Activity    Alcohol use: No     Frequency: Never    Drug use: No    Sexual activity: Never   Lifestyle    Physical activity:     Days per week: 0 days     Minutes per session: 0 min    Stress: Only a little   Relationships    Social connections:     Talks on phone: More than three times a week     Gets together: More than three times a week     Attends Yazidism service: More than 4 times per year     Active  "member of club or organization: No     Attends meetings of clubs or organizations: Never     Relationship status: Never    Other Topics Concern    Not on file   Social History Narrative    Not on file       Past Surgical History:   Procedure Laterality Date    HYSTERECTOMY      SHOULDER ARTHROSCOPY      THYROIDECTOMY, PARTIAL Right     and transplatation of right superior parathyroid gland to the sternocleidomastoid muscle     TONSILLECTOMY      WRIST FRACTURE SURGERY      left       Review of Systems   Constitutional: Negative for activity change, appetite change, chills and fever.   HENT: Negative for sinus pain, sore throat and voice change.    Eyes: Negative for pain, redness and visual disturbance.   Respiratory: Negative for cough and shortness of breath.    Cardiovascular: Negative for chest pain and palpitations.   Gastrointestinal: Negative for diarrhea, nausea and vomiting.   Musculoskeletal: Positive for back pain. Negative for joint swelling.   Skin: Negative for color change and wound.   Neurological: Positive for numbness. Negative for dizziness and weakness.   Psychiatric/Behavioral: The patient is not nervous/anxious.          Objective:   Ht 5' 8" (1.727 m)   Wt (!) 163.9 kg (361 lb 6.4 oz)   BMI 54.95 kg/m²     Physical Exam  LOWER EXTREMITY PHYSICAL EXAMINATION    ORTHOPEDIC:  Hallux abductovalgus deformity noted to the right left foot. These are nonpainful.  There is no hypermobility at the 1st metatarsal cuneiform joint. The 1st metatarsophalangeal joint does have nonpainful range of motion with tracking.  The hallux is track bound.  There is no hallux interphalangeus.  There is no pain on palpation of the right left foot. No pain with range of motion of the lesser metatarsophalangeal joints, hindfoot, and ankle joint. Pes planus foot type.  Equinus is noted. Patient ambulates with a nonantalgic gait and does not utilize an assistive device    VASCULAR:  Dorsalis pedis pulse on " the right 2/4, on the left 2/4.  Posterior tibial pulse on the right 2/4, on the left 2/4.  Capillary refill intact less than 3 sec of bilateral lower extremities.  Pedal hair growth is present to the dorsal aspect of the foot and digits bilaterally.  No presence of edema to lower extremities.    NEUROLOGY: Proprioception is intact. Sensation to light touch is intact. Sensation to pin prick is intact. Vibratory sensation is absent to the left and right lower extremity. Examination with 5.07 Jelm Cele monofilament reveals that protective sensation is not intact to the left and right plantar surfaces of the foot and digits, as the patient has no sensation/detection at greater than 4 distinct points of contact.     DERMATOLOGY: Skin is supple, moist, intact. There is no ulceration, calluses, or of the lesions identified to the dorsal or plantar aspect of the right left foot. The nails 1, 2, 3, 4, 5 on the right and left foot are normal thickness, color, and texture.  The web spaces 1, 2, 3, 4 on the right and left foot are clean, dry, intact without evidence of maceration or fissuring.  Assessment:     1. Encounter for diabetic foot exam    2. Type II diabetes mellitus with neurological manifestations    3. Morbid obesity    4. Hallux valgus, acquired, bilateral        Plan:     Encounter for diabetic foot exam    Type II diabetes mellitus with neurological manifestations  I counseled the patient on his/her Diabetic Mellitus regarding today's clinical examination and objection findings. We did also discuss recent medication changes, pertinent labs and imaging evaluations and other medical consultation notes and progress notes. Greater than 50% of this visit was spent on counseling and coordination of care. Greater than 20 minutes of this appt. was spent on education about the diabetic foot, in relation to PVD and/or neuropathy, and the prevention of limb loss.     Shoe gear is inspected and wear and proper  fit/type. Patient is reminded of the importance of good nutrition and blood sugar control to help prevent podiatric complications of diabetes. Patient instructed on proper foot hygeine. We discussed wearing proper shoe gear, daily foot inspections, never walking without protective shoe gear, never putting sharp instruments to feet.  Patient  will continue to monitor the areas daily, inspect feet, wear protective shoe gear when ambulatory, moisturizer to maintain skin integrity.     Patient's DMI/DMII is managed by Primary Care Provider and/or Endocrinology Advanced Practice Provider.    Morbid obesity  Patient is counseled and reminded concerning the importance of good nutrition and healthy eating habits, which may include blood sugar control, to prevent and/or help podiatric foot and ankle complications.     Acquired hallux valgus, bilateral  I discussed with patient that with her acquired hallux valgus deformity, with only discuss treatment if they were painful.  Patient states they are nonpainful this time has been dealing with this problem for multiple years.  I encouraged her to utilize toe spacers if needed to prevent further deformity as these are not painful at this time.

## 2019-12-10 NOTE — LETTER
December 10, 2019      Oleksandr Nagel MD  [address]  [place] [address]           [facility]  [address]  [place] [address]  Phone: [phone]  Fax: [phone]          Patient: Zakia Price   MR Number: [id number]   YOB: 1960   Date of Visit: 12/10/2019       Dear Dr. Oleksandr Nagel:    Thank you for referring Zakia Price to me for evaluation. Attached you will find relevant portions of my assessment and plan of care.    If you have questions, please do not hesitate to call me. I look forward to following Zakia Price along with you.    Sincerely,    Brandie Rey, CAROL    Enclosure  CC:  No Recipients    If you would like to receive this communication electronically, please contact [email or web address] or [phone] to request more information on [facility] Link access.    For providers and/or their staff who would like to refer a patient to [facility], please contact us through our one-stop-shop provider referral line, [facility], at [phone].    If you feel you have received this communication in error or would no longer like to receive these types of communications, please e-mail [email or web address]

## 2019-12-15 DIAGNOSIS — G89.29 CHRONIC PAIN OF BOTH KNEES: ICD-10-CM

## 2019-12-15 DIAGNOSIS — M25.561 CHRONIC PAIN OF BOTH KNEES: ICD-10-CM

## 2019-12-15 DIAGNOSIS — M25.562 CHRONIC PAIN OF BOTH KNEES: ICD-10-CM

## 2019-12-16 DIAGNOSIS — E04.2 MULTINODULAR GOITER: ICD-10-CM

## 2019-12-16 DIAGNOSIS — Z76.0 MEDICATION REFILL: ICD-10-CM

## 2019-12-16 DIAGNOSIS — R05.9 COUGH: ICD-10-CM

## 2019-12-16 RX ORDER — MONTELUKAST SODIUM 10 MG/1
10 TABLET ORAL NIGHTLY
Qty: 30 TABLET | Refills: 0 | Status: SHIPPED | OUTPATIENT
Start: 2019-12-16 | End: 2020-01-15

## 2019-12-16 RX ORDER — IBUPROFEN 800 MG/1
800 TABLET ORAL EVERY 8 HOURS PRN
Qty: 90 TABLET | Refills: 0 | Status: SHIPPED | OUTPATIENT
Start: 2019-12-16 | End: 2020-01-15

## 2019-12-16 RX ORDER — LEVOTHYROXINE SODIUM 100 UG/1
100 TABLET ORAL DAILY
Qty: 90 TABLET | Refills: 3 | Status: SHIPPED | OUTPATIENT
Start: 2019-12-16 | End: 2020-11-25

## 2019-12-16 RX ORDER — LEVOCETIRIZINE DIHYDROCHLORIDE 5 MG/1
5 TABLET, FILM COATED ORAL NIGHTLY
Qty: 30 TABLET | Refills: 11 | Status: SHIPPED | OUTPATIENT
Start: 2019-12-16 | End: 2020-02-10 | Stop reason: SDUPTHER

## 2019-12-16 NOTE — TELEPHONE ENCOUNTER
----- Message from Chetan Mauricio sent at 12/16/2019  1:41 PM CST -----  Contact: Kristyn - [facility]-[phone]  .Type:  Pharmacy Calling to Clarify an RX    Name of Caller:Kristyn   [facility]armacy Name:[facility]   Prescription Name:Montelukast 10mg   What do they need to clarify?:Direction   Best Call Back Number:[phone]  Additional Information:     .Type:  Pharmacy Calling to Clarify an RX    Name of Caller:Kristyn  [facility] Name[facility]   Prescription Name:Levocetirizine 5mg   What do they need to clarify?:Directions   Best Call Back Number:[phone]  Additional Information:

## 2019-12-16 NOTE — TELEPHONE ENCOUNTER
Returned call to Kristyn at [facility], medication clarification performed per medication list in patient's chart.  Kristyn verbalized understanding of this.

## 2019-12-24 ENCOUNTER — TELEPHONE (OUTPATIENT)
Dept: ADMINISTRATIVE | Facility: HOSPITAL | Age: 59
End: 2019-12-24

## 2019-12-24 NOTE — TELEPHONE ENCOUNTER
Left reminder VMbox message for pt's scheduled mammogram appt on Monday, 12/30/19 in [place]. lw

## 2019-12-30 ENCOUNTER — PATIENT OUTREACH (OUTPATIENT)
Dept: OTHER | Facility: OTHER | Age: 59
End: 2019-12-30

## 2019-12-30 ENCOUNTER — HOSPITAL ENCOUNTER (OUTPATIENT)
Dept: RADIOLOGY | Facility: HOSPITAL | Age: 59
Discharge: HOME | End: 2019-12-30
Attending: FAMILY MEDICINE
Payer: MEDICAID

## 2019-12-30 VITALS — BODY MASS INDEX: 44.41 KG/M2 | WEIGHT: 293 LBS | HEIGHT: 68 IN

## 2019-12-30 DIAGNOSIS — Z12.31 ENCOUNTER FOR SCREENING MAMMOGRAM FOR BREAST CANCER: ICD-10-CM

## 2019-12-30 PROCEDURE — 77067 SCR MAMMO BI INCL CAD: CPT | Mod: 26,,, | Performed by: RADIOLOGY

## 2019-12-30 PROCEDURE — 77067 MAMMO DIGITAL SCREENING BILAT WITH CAD: ICD-10-PCS | Mod: 26,,, | Performed by: RADIOLOGY

## 2019-12-30 PROCEDURE — 77067 SCR MAMMO BI INCL CAD: CPT | Mod: TC,PO

## 2019-12-30 NOTE — PROGRESS NOTES
Digital Medicine Program Enrollment      Our goal is to decrease A1c within patient-specific target levels and make the process convenient so patient can avoid extra trips to the office. Reducing A1C by merely 1% results in a decreased risk of complications of at least 10%. For example, an A1C reduced from 8.5% to 7.5% results in almost 40% lower risk of kidney, eye, and nerve disease      Reviewed that the Digital Medicine care team - consisting of a clinician and a health  - will follow the most current evidence-based national guidelines for treating your condition.  The health  will focus on lifestyle modifications and motivation while the clinician will focus on medication therapy.  The care team will review all data on a regular basis and reach out as needed.      Explained that one of the key parts of the program is communication with the care team.  Asked patient to respond to outreach attempts and complete questionnaires.  Stressed importance of medication adherence.        Reviewed that the Digital Medicine care team - consisting of a clinician and a health  - will follow the most current evidence-based national guidelines for treating your condition.  The health  will focus on lifestyle modifications and motivation while the clinician will focus on medication therapy.  The care team will review all data on a regular basis and reach out as needed.      Explained that one of the key parts of the program is communication with the care team.  Asked patient to respond to outreach attempts and complete questionnaires.  Stressed importance of medication adherence.      Instructed patient not to allow anyone else to use phone and monitoring device.  Explained that we expect patient to test their blood sugar as prescribed by their physician or Digital Medicine Clinician.      Explained to patient that the digital medicine team is not available for emergencies.  Patient will call [facility] on-call  ([phone] or [phone]) or [phone] if needed.        Pt stated she was never advised on the digital monitor and how it works said she was advised to get a glucometer from [place] [employer] and she is currently manually entering her readings.  Did educate the Pt on the digital glucometer and how they can be purchased and that she can speak to her clinician on how she would like to proceed from here in reference to the equipment she is currently using..  Also, Clinician photo was not available so welcome letter was not generated. Please check back and if available, please send out welcome letter.

## 2020-01-02 NOTE — PROGRESS NOTES
Normal mammogram, repeat in 1 year, result released through [employer] [facility].  Please call the patient if not enrolled with my chart.

## 2020-01-06 RX ORDER — CYCLOBENZAPRINE HCL 10 MG
10 TABLET ORAL 3 TIMES DAILY PRN
Qty: 60 TABLET | Refills: 5 | Status: SHIPPED | OUTPATIENT
Start: 2020-01-06 | End: 2020-06-30

## 2020-01-06 RX ORDER — LORATADINE 10 MG/1
TABLET ORAL
COMMUNITY
Start: 2019-12-06 | End: 2020-03-06

## 2020-01-06 NOTE — TELEPHONE ENCOUNTER
Pill pack has been trying to get this medication refilled for Ms. Price.  I was unsure of the quantity, so I pended the prescription without it or refills.

## 2020-01-08 ENCOUNTER — PATIENT OUTREACH (OUTPATIENT)
Dept: OTHER | Facility: OTHER | Age: 60
End: 2020-01-08

## 2020-01-08 NOTE — PROGRESS NOTES
Digital Medicine: Health  Follow-Up    Patient did not have the devices needed to be apart of program. Told patient where she can go to get these devices and will follow up with patient once she has received them.          Intervention/Plan    There are no preventive care reminders to display for this patient.           Screenings    SDOH

## 2020-01-15 ENCOUNTER — PATIENT OUTREACH (OUTPATIENT)
Dept: OTHER | Facility: OTHER | Age: 60
End: 2020-01-15

## 2020-01-15 NOTE — PROGRESS NOTES
LVM 1/15  LVM 3/24 - I spoke with the patient today.  She says that she was never able to get a cuff from [facility].  I did give her a number to call to hopefully be able to get a cuff. However right now she does not appear to be active in the diabetes programs so I will need to look further in to whether she is discharged.

## 2020-01-29 ENCOUNTER — PATIENT OUTREACH (OUTPATIENT)
Dept: OTHER | Facility: OTHER | Age: 60
End: 2020-01-29

## 2020-01-29 NOTE — PROGRESS NOTES
Digital Medicine: Health  Follow-Up    The history is provided by the patient. No  was used.     Follow Up  Patient reports that she still does not have the devices for either the Hypertension or the Diabetes Digital Medicine Programs. She reports that she had tried to purchase the devices but her insurance will not cover them and she cannot afford the devices. Expressed to the patient that in order to remain in the program, she needs to have both the blood pressure cuff and the glucometer. Patient reported understanding and does not have a deadline as to when she would have the devices in hand.    Patient reported that she was not educated on the devices that she needed to purchase in order to be apart of the digital medicine program. Asked patient if she would be able to afford both the glucometer and the blood pressure cuff and the patient stated that she should be able to. Gave the prices for the devices to the patient and the number to the billing department for her to get a total price on how much it would cost her. Asked patient to give me a call back after she spoke with a  to let me know if she would follow through with purchasing the devices to remain in the program.    Will push patient outreach one week and if patient fails to purchase devices, will follow through with the unenrollment process.      Intervention/Plan    There are no preventive care reminders to display for this patient.         Screenings    SDOH

## 2020-02-01 RX ORDER — IBUPROFEN 800 MG/1
800 TABLET ORAL EVERY 8 HOURS PRN
Qty: 270 TABLET | Refills: 3 | Status: SHIPPED | OUTPATIENT
Start: 2020-02-01 | End: 2020-09-04

## 2020-02-05 ENCOUNTER — PATIENT OUTREACH (OUTPATIENT)
Dept: OTHER | Facility: OTHER | Age: 60
End: 2020-02-05

## 2020-02-10 DIAGNOSIS — Z76.0 MEDICATION REFILL: ICD-10-CM

## 2020-02-10 DIAGNOSIS — R05.9 COUGH: ICD-10-CM

## 2020-02-10 DIAGNOSIS — K21.00 GERD WITH ESOPHAGITIS: ICD-10-CM

## 2020-02-10 RX ORDER — PANTOPRAZOLE SODIUM 40 MG/1
40 TABLET, DELAYED RELEASE ORAL DAILY
Qty: 90 TABLET | Refills: 3 | Status: SHIPPED | OUTPATIENT
Start: 2020-02-10 | End: 2020-05-27

## 2020-02-10 RX ORDER — FAMOTIDINE 40 MG/1
40 TABLET, FILM COATED ORAL DAILY
Qty: 30 TABLET | Refills: 11 | Status: SHIPPED | OUTPATIENT
Start: 2020-02-10 | End: 2020-05-27

## 2020-02-10 RX ORDER — LEVOCETIRIZINE DIHYDROCHLORIDE 5 MG/1
5 TABLET, FILM COATED ORAL NIGHTLY
Qty: 30 TABLET | Refills: 11 | Status: SHIPPED | OUTPATIENT
Start: 2020-02-10 | End: 2021-01-21

## 2020-02-20 NOTE — PROGRESS NOTES
Patient reported that she was entering the data into her [employer] account using another blood sugar monitor. Expressed to patient that we require our patients to purchase the glucometer through us and she reported that she does not want to worry about that program at the current time.    She did express interest in remaining apart of the blood pressure program and educated her on the date and time that the [facility] will be in [place]. Told the patient that the [facility] would be in [place] on 3/10/2020 from 9:45am-2:00pm. Patient reported that she would try and make it there to purchase the blood pressure cuff.    Will follow up with the patient after March 10. If patient failed to purchase the cuff, will follow through with un-enrollment process.

## 2020-03-02 ENCOUNTER — LAB VISIT (OUTPATIENT)
Dept: LAB | Facility: HOSPITAL | Age: 60
End: 2020-03-02
Attending: FAMILY MEDICINE
Payer: MEDICAID

## 2020-03-02 DIAGNOSIS — I15.2 HYPERTENSION ASSOCIATED WITH DIABETES: Chronic | ICD-10-CM

## 2020-03-02 DIAGNOSIS — E11.59 HYPERTENSION ASSOCIATED WITH DIABETES: Chronic | ICD-10-CM

## 2020-03-02 DIAGNOSIS — E11.40 TYPE 2 DIABETES MELLITUS WITH DIABETIC NEUROPATHY, WITHOUT LONG-TERM CURRENT USE OF INSULIN: ICD-10-CM

## 2020-03-02 LAB
CHOLEST SERPL-MCNC: 153 MG/DL (ref 120–199)
CHOLEST/HDLC SERPL: 2.9 {RATIO} (ref 2–5)
ESTIMATED AVG GLUCOSE: 131 MG/DL (ref 68–131)
HBA1C MFR BLD HPLC: 6.2 % (ref 4–5.6)
HDLC SERPL-MCNC: 53 MG/DL (ref 40–75)
HDLC SERPL: 34.6 % (ref 20–50)
LDLC SERPL CALC-MCNC: 83.2 MG/DL (ref 63–159)
NONHDLC SERPL-MCNC: 100 MG/DL
TRIGL SERPL-MCNC: 84 MG/DL (ref 30–150)

## 2020-03-02 PROCEDURE — 36415 COLL VENOUS BLD VENIPUNCTURE: CPT | Mod: PO

## 2020-03-02 PROCEDURE — 83036 HEMOGLOBIN GLYCOSYLATED A1C: CPT

## 2020-03-02 PROCEDURE — 80061 LIPID PANEL: CPT

## 2020-03-03 DIAGNOSIS — E11.40 TYPE 2 DIABETES MELLITUS WITH DIABETIC NEUROPATHY, WITHOUT LONG-TERM CURRENT USE OF INSULIN: Chronic | ICD-10-CM

## 2020-03-03 DIAGNOSIS — E78.5 HYPERLIPIDEMIA, UNSPECIFIED HYPERLIPIDEMIA TYPE: Primary | ICD-10-CM

## 2020-03-03 NOTE — PROGRESS NOTES
#CALL THE PATIENT# to discuss results/see if they have questions and document verification. Make FU appt if needed.    #Pend me the orders in my interpretation below.#    I have sent a message to them with the interpretation (see below).  See if they have any questions and make a follow-up appointment if not already schedule and if needed.    Also please address any outstanding health maintenance that may be due: Colonoscopy due on 12/10/2019  ====================================    My interpretation that was sent to them through [employer]:    Zakia, I have reviewed your recent blood work.     Your cholesterol is stable and at goal.  Continue current medication regimen.  Your hemoglobin A1c is 6.2 which is slightly elevated from previous.  Continue to work on diet and increased exercise.  Continue current medication regimen until follow-up appointment.  This test is gold standard screening test for diabetes.  It is a measures 3 months of your average blood sugar.    Repeat A1c in six months.  Repeat lipids in one year    Please send us a message so that we know that you have received this message.

## 2020-03-04 ENCOUNTER — OFFICE VISIT (OUTPATIENT)
Dept: FAMILY MEDICINE | Facility: CLINIC | Age: 60
End: 2020-03-04
Payer: MEDICAID

## 2020-03-04 VITALS
BODY MASS INDEX: 44.41 KG/M2 | TEMPERATURE: 98 F | WEIGHT: 293 LBS | DIASTOLIC BLOOD PRESSURE: 84 MMHG | SYSTOLIC BLOOD PRESSURE: 122 MMHG | HEIGHT: 68 IN | HEART RATE: 79 BPM

## 2020-03-04 DIAGNOSIS — E11.65 TYPE 2 DIABETES MELLITUS WITH HYPERGLYCEMIA, WITHOUT LONG-TERM CURRENT USE OF INSULIN: ICD-10-CM

## 2020-03-04 DIAGNOSIS — E11.49 TYPE II DIABETES MELLITUS WITH NEUROLOGICAL MANIFESTATIONS: ICD-10-CM

## 2020-03-04 DIAGNOSIS — R05.9 COUGH: ICD-10-CM

## 2020-03-04 DIAGNOSIS — E11.40 TYPE 2 DIABETES MELLITUS WITH DIABETIC NEUROPATHY, WITHOUT LONG-TERM CURRENT USE OF INSULIN: Primary | ICD-10-CM

## 2020-03-04 DIAGNOSIS — Z79.899 ENCOUNTER FOR LONG-TERM (CURRENT) USE OF MEDICATIONS: ICD-10-CM

## 2020-03-04 DIAGNOSIS — E66.2 CLASS 3 OBESITY WITH ALVEOLAR HYPOVENTILATION, SERIOUS COMORBIDITY, AND BODY MASS INDEX (BMI) OF 50.0 TO 59.9 IN ADULT: Chronic | ICD-10-CM

## 2020-03-04 DIAGNOSIS — J06.9 UPPER RESPIRATORY TRACT INFECTION, UNSPECIFIED TYPE: ICD-10-CM

## 2020-03-04 PROCEDURE — 99213 OFFICE O/P EST LOW 20 MIN: CPT | Mod: PBBFAC,PO | Performed by: FAMILY MEDICINE

## 2020-03-04 PROCEDURE — 99214 PR OFFICE/OUTPT VISIT, EST, LEVL IV, 30-39 MIN: ICD-10-PCS | Mod: S$PBB,,, | Performed by: FAMILY MEDICINE

## 2020-03-04 PROCEDURE — 99214 OFFICE O/P EST MOD 30 MIN: CPT | Mod: S$PBB,,, | Performed by: FAMILY MEDICINE

## 2020-03-04 PROCEDURE — 99999 PR PBB SHADOW E&M-EST. PATIENT-LVL III: ICD-10-PCS | Mod: PBBFAC,,, | Performed by: FAMILY MEDICINE

## 2020-03-04 PROCEDURE — 99999 PR PBB SHADOW E&M-EST. PATIENT-LVL III: CPT | Mod: PBBFAC,,, | Performed by: FAMILY MEDICINE

## 2020-03-04 RX ORDER — LANCETS 28 GAUGE
1 EACH MISCELLANEOUS DAILY
Qty: 100 EACH | Refills: 11 | Status: SHIPPED | OUTPATIENT
Start: 2020-03-04 | End: 2020-06-13 | Stop reason: SDUPTHER

## 2020-03-04 RX ORDER — NAPROXEN 500 MG/1
500 TABLET ORAL 2 TIMES DAILY WITH MEALS
Qty: 60 TABLET | Refills: 11 | Status: SHIPPED | OUTPATIENT
Start: 2020-03-04 | End: 2020-09-04

## 2020-03-04 NOTE — PROGRESS NOTES
PLAN:      Problem List Items Addressed This Visit     Type 2 diabetes mellitus with hyperglycemia, without long-term current use of insulin - Primary (Chronic)     March 2020:  Meeting all diabetic metrics at this time.  Blood pressure well controlled on amlodipine.    =======================================================  DECEMBER 2019:  Consider reintroducing another ACE-inhibitor/ARB since patient's blood pressure is borderline control today.  Verses increase in her amlodipine.  LDL slightly above goal patient counseled on diet exercise.  Repeat lipid panel.  ======================================================  Previous plan:  Patient has appointment podiatrist soon.  LDL is near 100.  Continue statin.  Recheck lipid panel A1c.  Blood pressure is controlled at this time on amlodipine.  Patient had allergy to ACE-inhibitor.  Monitor hemoglobin A1c.  Discussed diabetic diet and exercise protocol.  Continue medications.  Monitor for side effects.  Discussed checking blood glucose.  Discussed symptoms to monitor for and to notify me immediately if persistent or worsening.  Follow up with Ophthalmology/Optometry and Podiatry.           Relevant Medications    blood sugar diagnostic (CLEVER CHOICE VOICE+ TEST) Strp    lancets (PRODIGY LANCETS) 28 gauge Misc    Class 3 obesity with alveolar hypoventilation, serious comorbidity, and body mass index (BMI) of 50.0 to 59.9 in adult (Chronic)     Discussed diet exercise.  Extensive lifestyle modification is needed.  Contributing to multiple comorbidities at this time.    Weight is slightly improved from previous         Type II diabetes mellitus with neurological manifestations (Chronic)     Continue gabapentin.         Encounter for long-term (current) use of medications (Chronic)     Reviewed labs with the patient.  Updated lab orders placed.Complete history and physical was completed today.  Complete and thorough medication reconciliation was performed.  Discussed  risks and benefits of medications.  Advised patient on orders and health maintenance.  We discussed old records and old labs if available.  Will request any records not available through epic.  Continue current medications listed on your summary sheet.           Relevant Medications    naproxen (NAPROSYN) 500 MG tablet    Other Relevant Orders    CBC Without Differential    TSH    Comprehensive metabolic panel    Cough    Upper respiratory tract infection     Increase Flonase to twice a day.  Continue Xyzal.  Consider adding Singulair.  Discussed condition course and signs and symptoms to expect.  Patient advised take anti-inflammatories and or Tylenol for pain or fever.  ER precautions.  Call MD or follow-up to clinic if not improving or worsening symptoms.               Future Appointments     Date Provider Specialty Appt Notes    8/26/2020  Lab     9/4/2020 Oleksandr Nagel MD Family Medicine 6 mo lab f/u           Medication List with Changes/Refills   New Medications    NAPROXEN (NAPROSYN) 500 MG TABLET    Take 1 tablet (500 mg total) by mouth 2 (two) times daily with meals.   Current Medications    ACETAMINOPHEN (TYLENOL) 500 MG TABLET    Take 2 tablets (1,000 mg total) by mouth every 6 (six) hours as needed for Pain.    AMLODIPINE (NORVASC) 5 MG TABLET    Take 1 tablet (5 mg total) by mouth once daily.    BLOOD-GLUCOSE METER KIT    Use before meals and before bed when the glucoses are not in control.  Otherwise, test it daily once a day before meals randomly to ensure    CYCLOBENZAPRINE (FLEXERIL) 10 MG TABLET    Take 1 tablet (10 mg total) by mouth 3 (three) times daily as needed for Muscle spasms.    FAMOTIDINE (PEPCID) 40 MG TABLET    Take 1 tablet (40 mg total) by mouth once daily.    FLUTICASONE PROPIONATE (FLONASE) 50 MCG/ACTUATION NASAL SPRAY    Use 2 sprays to each nostril daily    GABAPENTIN (NEURONTIN) 600 MG TABLET    Take 1 tablet (600 mg total) by mouth 3 (three) times daily.    IBUPROFEN  (ADVIL,MOTRIN) 800 MG TABLET    Take 1 tablet (800 mg total) by mouth every 8 (eight) hours as needed for Pain.    LANCETS MISC    Misc. route As directed    LEVOCETIRIZINE (XYZAL) 5 MG TABLET    Take 1 tablet (5 mg total) by mouth every evening.    LEVOTHYROXINE (SYNTHROID) 100 MCG TABLET    Take 1 tablet (100 mcg total) by mouth once daily.    METFORMIN (GLUCOPHAGE) 1000 MG TABLET    Take 1 tablet (1,000 mg total) by mouth 2 (two) times daily with meals.    PANTOPRAZOLE (PROTONIX) 40 MG TABLET    Take 1 tablet (40 mg total) by mouth once daily.    PRAVASTATIN (PRAVACHOL) 80 MG TABLET    Take 1 tablet (80 mg total) by mouth once daily.   Changed and/or Refilled Medications    Modified Medication Previous Medication    BLOOD SUGAR DIAGNOSTIC (CLEVER CHOICE VOICE+ TEST) STRP blood sugar diagnostic (CLEVER CHOICE VOICE+ TEST) Strp       TEST BLOOD SUGARS ONE TIME DAILY    TEST BLOOD SUGARS ONE TIME DAILY    LANCETS (PRODIGY LANCETS) 28 GAUGE MISC PRODIGY LANCETS MISC       1 lancet by Misc.(Non-Drug; Combo Route) route once daily. As  Directed    As  Directed   Discontinued Medications    LORATADINE (CLARITIN) 10 MG TABLET           Oleksandr Nagel M.D.     ==========================================================================  Subjective:      Patient ID: Zakia Price is a 60 y.o. female.  has a past medical history of Diabetes mellitus, type 2, Diabetic neuropathy (1/27/2014), DJD (degenerative joint disease) of knee, DVT (deep venous thrombosis) (around 1990's), GERD (gastroesophageal reflux disease), Hypertension associated with diabetes, Multinodular goiter, Obesity, morbid, BMI 50 or higher, and Sleep apnea.     Chief Complaint: Follow-up (3 month f/u review labs) and Foot Pain (right heel pain and burning sensation)    Patient needs HIV screening, shingles vaccine and colonoscopy to satisfy health maintenance.  Problem List Items Addressed This Visit     Type 2 diabetes mellitus with  hyperglycemia, without long-term current use of insulin - Primary (Chronic)    Overview     =======================================================  November 2019:  Reviewed diabetes management status.  Patient was due for LDL less than 100 at that time.  Also needing foot exam.  =======================================================  DECEMBER 2019:  Patient reports issues with ACE-inhibitor.  Currently having cough.  Only on amlodipine.  Diabetes Management Status  Statin: TakingACE/ARB: Not taking  =======================================================  MARCH 2020:    Diabetes Management Status    Statin: Taking  ACE/ARB: Not taking    Screening or Prevention Patient's value Goal Complete/Controlled?   HgA1C Testing and Control   Lab Results   Component Value Date    HGBA1C 6.2 (H) 03/02/2020      Annually/Less than 8% Yes   Lipid profile : 03/02/2020 Annually Yes   LDL control Lab Results   Component Value Date    LDLCALC 83.2 03/02/2020    Annually/Less than 100 mg/dl  Yes   Nephropathy screening Lab Results   Component Value Date    LABMICR 4.0 09/04/2019     Lab Results   Component Value Date    PROTEINUA Negative 10/28/2015    Annually Yes   Blood pressure BP Readings from Last 1 Encounters:   03/04/20 122/84    Less than 140/90 Yes   Dilated retinal exam : 10/02/2019 Annually Yes   Foot exam   : 12/10/2019 Annually Yes       ======================================================         Current Assessment & Plan     March 2020:  Meeting all diabetic metrics at this time.  Blood pressure well controlled on amlodipine.    =======================================================  DECEMBER 2019:  Consider reintroducing another ACE-inhibitor/ARB since patient's blood pressure is borderline control today.  Verses increase in her amlodipine.  LDL slightly above goal patient counseled on diet exercise.  Repeat lipid panel.  ======================================================  Previous plan:  Patient has  appointment podiatrist soon.  LDL is near 100.  Continue statin.  Recheck lipid panel A1c.  Blood pressure is controlled at this time on amlodipine.  Patient had allergy to ACE-inhibitor.  Monitor hemoglobin A1c.  Discussed diabetic diet and exercise protocol.  Continue medications.  Monitor for side effects.  Discussed checking blood glucose.  Discussed symptoms to monitor for and to notify me immediately if persistent or worsening.  Follow up with Ophthalmology/Optometry and Podiatry.           Class 3 obesity with alveolar hypoventilation, serious comorbidity, and body mass index (BMI) of 50.0 to 59.9 in adult (Chronic)    Overview     Chronic..  Associated with serious comorbidities including severe osteoarthritis, diabetes, hypertension etc.    BMI Readings from Last 4 Encounters:   03/04/20 54.07 kg/m²   12/30/19 54.89 kg/m²   12/10/19 54.95 kg/m²   12/04/19 54.77 kg/m²         Reviewed previous Assessment & Plan:   Likely contributing to patient's overall shortness of breath and fatigue, recommend significant weight loss.  Patient agreeable to start Contrave to help with weight loss.         Current Assessment & Plan     Discussed diet exercise.  Extensive lifestyle modification is needed.  Contributing to multiple comorbidities at this time.    Weight is slightly improved from previous         Type II diabetes mellitus with neurological manifestations (Chronic)    Overview     See other diabetes problem         Current Assessment & Plan     Continue gabapentin.         Encounter for long-term (current) use of medications (Chronic)    Overview     CHRONIC. Stable. Compliant with medications for managed conditions. See medication list. No SE reported.   Routine lab analysis is being monitored. Refills were addressed.  Lab Results   Component Value Date    WBC 5.42 09/05/2018    HGB 12.3 09/05/2018    HCT 38.7 09/05/2018    MCV 91 09/05/2018     (H) 09/05/2018         Chemistry        Component Value  Date/Time     09/04/2019 1020    K 4.1 09/04/2019 1020     09/04/2019 1020    CO2 26 09/04/2019 1020    BUN 8 09/04/2019 1020    CREATININE 0.8 09/04/2019 1020     09/04/2019 1020        Component Value Date/Time    CALCIUM 9.7 09/04/2019 1020    ALKPHOS 97 09/04/2019 1020    AST 13 09/04/2019 1020    ALT 10 09/04/2019 1020    BILITOT 0.2 09/04/2019 1020    ESTGFRAFRICA >60.0 09/04/2019 1020    EGFRNONAA >60.0 09/04/2019 1020          Lab Results   Component Value Date    TSH 2.507 12/04/2019    FREET4 1.06 12/04/2019    T3FREE 2.7 12/04/2019              Current Assessment & Plan     Reviewed labs with the patient.  Updated lab orders placed.Complete history and physical was completed today.  Complete and thorough medication reconciliation was performed.  Discussed risks and benefits of medications.  Advised patient on orders and health maintenance.  We discussed old records and old labs if available.  Will request any records not available through epic.  Continue current medications listed on your summary sheet.           Cough    Overview     Answers for HPI/ROS submitted by the patient on 12/2/2019   Cough  Frequency: hourly  Cough characteristics: non-productive  heartburn: No  hemoptysis: No  nasal congestion: Yes  sweats: No  weight loss: No  asthma: No  bronchiectasis: No  bronchitis: No  COPD: No  emphysema: No  pneumonia: No  Treatments tried: nothing  Improvement on treatment: no relief         Upper respiratory tract infection    Overview       Answers for HPI/ROS submitted by the patient on 2/29/2020   Cough  Chronicity: recurrent  Onset: more than 1 month ago  Progression since onset: unchanged  Frequency: constantly  Cough characteristics: productive of brown sputum  ear congestion: No  heartburn: No  hemoptysis: No  sweats: No  weight loss: No  Aggravated by: nothing, dust  Risk factors for lung disease: smoking/tobacco exposure  asthma: No  bronchiectasis: No  bronchitis:  No  COPD: No  emphysema: No  pneumonia: No  Treatments tried: leukotriene antagonists  Improvement on treatment: no relief           Current Assessment & Plan     Increase Flonase to twice a day.  Continue Xyzal.  Consider adding Singulair.  Discussed condition course and signs and symptoms to expect.  Patient advised take anti-inflammatories and or Tylenol for pain or fever.  ER precautions.  Call MD or follow-up to clinic if not improving or worsening symptoms.                  Past Medical History:  Past Medical History:   Diagnosis Date    Diabetes mellitus, type 2     Diabetic neuropathy 1/27/2014    DJD (degenerative joint disease) of knee     DVT (deep venous thrombosis) around 1990's    in leg, is on no anticoagulant therapy presently    GERD (gastroesophageal reflux disease)     Hypertension associated with diabetes     Multinodular goiter     Obesity, morbid, BMI 50 or higher     Sleep apnea     has no CPAP     Past Surgical History:   Procedure Laterality Date    FRACTURE SURGERY      HYSTERECTOMY      SHOULDER ARTHROSCOPY      THYROIDECTOMY, PARTIAL Right     and transplatation of right superior parathyroid gland to the sternocleidomastoid muscle     TONSILLECTOMY      TUBAL LIGATION  1984    WRIST FRACTURE SURGERY      left     Review of patient's allergies indicates:   Allergen Reactions    Codeine sulfate      Nausea^    Lisinopril Swelling     angioedema    Codeine Nausea Only and Rash     Medication List with Changes/Refills   New Medications    NAPROXEN (NAPROSYN) 500 MG TABLET    Take 1 tablet (500 mg total) by mouth 2 (two) times daily with meals.   Current Medications    ACETAMINOPHEN (TYLENOL) 500 MG TABLET    Take 2 tablets (1,000 mg total) by mouth every 6 (six) hours as needed for Pain.    AMLODIPINE (NORVASC) 5 MG TABLET    Take 1 tablet (5 mg total) by mouth once daily.    BLOOD-GLUCOSE METER KIT    Use before meals and before bed when the glucoses are not in control.   Otherwise, test it daily once a day before meals randomly to ensure    CYCLOBENZAPRINE (FLEXERIL) 10 MG TABLET    Take 1 tablet (10 mg total) by mouth 3 (three) times daily as needed for Muscle spasms.    FAMOTIDINE (PEPCID) 40 MG TABLET    Take 1 tablet (40 mg total) by mouth once daily.    FLUTICASONE PROPIONATE (FLONASE) 50 MCG/ACTUATION NASAL SPRAY    Use 2 sprays to each nostril daily    GABAPENTIN (NEURONTIN) 600 MG TABLET    Take 1 tablet (600 mg total) by mouth 3 (three) times daily.    IBUPROFEN (ADVIL,MOTRIN) 800 MG TABLET    Take 1 tablet (800 mg total) by mouth every 8 (eight) hours as needed for Pain.    LANCETS [facility]. route As directed    LEVOCETIRIZINE (XYZAL) 5 MG TABLET    Take 1 tablet (5 mg total) by mouth every evening.    LEVOTHYROXINE (SYNTHROID) 100 MCG TABLET    Take 1 tablet (100 mcg total) by mouth once daily.    METFORMIN (GLUCOPHAGE) 1000 MG TABLET    Take 1 tablet (1,000 mg total) by mouth 2 (two) times daily with meals.    PANTOPRAZOLE (PROTONIX) 40 MG TABLET    Take 1 tablet (40 mg total) by mouth once daily.    PRAVASTATIN (PRAVACHOL) 80 MG TABLET    Take 1 tablet (80 mg total) by mouth once daily.   Changed and/or Refilled Medications    Modified Medication Previous Medication    BLOOD SUGAR DIAGNOSTIC (CLEVER CHOICE VOICE+ TEST) STRP blood sugar diagnostic (CLEVER CHOICE VOICE+ TEST) Strp       TEST BLOOD SUGARS ONE TIME DAILY    TEST BLOOD SUGARS ONE TIME DAILY    LANCETS (PRODIGY LANCETS) 28 GAUGE MISC PRODIGY LANCETS MISC       1 lancet by Misc.(Non-Drug; Combo Route) route once daily. As  Directed    As  Directed   Discontinued Medications    LORATADINE (CLARITIN) 10 MG TABLET          Social History     Tobacco Use    Smoking status: Never Smoker    Smokeless tobacco: Never Used   Substance Use Topics    Alcohol use: No     Frequency: Never      Family History   Problem Relation Age of Onset    Leukemia Son     Cancer Son     Diabetes Mother     Hypertension  "Mother     Heart disease Mother 50    Cancer Father     Cancer Brother     Cancer Brother        I have reviewed the complete PMH, social history, surgical history, allergies and medications.  As well as family history.    Review of Systems   Constitutional: Negative for chills and fever.   HENT: Positive for postnasal drip. Negative for ear pain, rhinorrhea and sore throat.    Respiratory: Positive for cough and shortness of breath. Negative for wheezing.    Cardiovascular: Negative for chest pain.   Musculoskeletal: Positive for arthralgias and myalgias.   Skin: Negative for rash.   Allergic/Immunologic: Positive for environmental allergies.   Neurological: Positive for headaches.     Objective:   /84   Pulse 79   Temp 98 °F (36.7 °C) (Oral)   Ht 5' 8" (1.727 m)   Wt (!) 161.3 kg (355 lb 9.6 oz)   BMI 54.07 kg/m²   Physical Exam   Constitutional: She is oriented to person, place, and time. She appears well-developed and well-nourished. No distress.   HENT:   Head: Normocephalic and atraumatic.   Enlarged neck circumference   Eyes: Pupils are equal, round, and reactive to light. EOM are normal.   Neck: Normal range of motion. Neck supple.   Cardiovascular: Normal rate, regular rhythm, normal heart sounds and intact distal pulses.   No murmur heard.  Pulmonary/Chest: Effort normal and breath sounds normal. No respiratory distress. She has no wheezes.   Abdominal: Soft. Bowel sounds are normal. She exhibits no distension. There is no tenderness.   Class three obese abdomen   Musculoskeletal: Normal range of motion. She exhibits no edema.   Neurological: She is alert and oriented to person, place, and time. No cranial nerve deficit.   Skin: Skin is warm and dry. Capillary refill takes less than 2 seconds.   Psychiatric: She has a normal mood and affect. Her behavior is normal.   Nursing note and vitals reviewed.      Assessment:     1. Type 2 diabetes mellitus with diabetic neuropathy, without long-term " current use of insulin    2. Type II diabetes mellitus with neurological manifestations    3. Type 2 diabetes mellitus with hyperglycemia, without long-term current use of insulin    4. Encounter for long-term (current) use of medications    5. Class 3 obesity with alveolar hypoventilation, serious comorbidity, and body mass index (BMI) of 50.0 to 59.9 in adult    6. Cough    7. Upper respiratory tract infection, unspecified type      MDM:   Moderate complexity.  Moderate risk  I have Reviewed and summarized old records.  I have performed thorough medication reconciliation today and discussed risk and benefits of each medication.  I have reviewed labs and discussed with patient.  All questions were answered.  I am requesting old records and will review them once they are available.    I have signed for the following orders AND/OR meds.  Orders Placed This Encounter   Procedures    CBC Without Differential     Standing Status:   Standing     Number of Occurrences:   99     Standing Expiration Date:   2021    TSH     Standing Status:   Standing     Number of Occurrences:   99     Standing Expiration Date:   2021    Comprehensive metabolic panel     Standing Status:   Standing     Number of Occurrences:   99     Standing Expiration Date:   3/1/2040     Medications Ordered This Encounter   Medications    blood sugar diagnostic (CLEVER CHOICE VOICE+ TEST) Strp     Sig: TEST BLOOD SUGARS ONE TIME DAILY     Dispense:  100 strip     Refill:  1    lancets (PRODIGY LANCETS) 28 gauge Misc     Si lancet by Misc.(Non-Drug; Combo Route) route once daily. As  Directed     Dispense:  100 each     Refill:  11    naproxen (NAPROSYN) 500 MG tablet     Sig: Take 1 tablet (500 mg total) by mouth 2 (two) times daily with meals.     Dispense:  60 tablet     Refill:  11        Follow up in about 6 months (around 2020) for DM, Med refills, LAB RESULTS.    If no improvement in symptoms or symptoms worsen, advised to  call/follow-up at clinic or go to ER. Patient voiced understanding and all questions/concerns were addressed.     DISCLAIMER: This note was compiled by using a speech recognition dictation system and therefore please be aware that typographical / speech recognition errors can and do occur.  Please contact me if you see any errors specifically.    Oleksandr Nagel M.D.       Office: [phone] [address] [place] [address]  FAX: [phone]

## 2020-03-04 NOTE — PATIENT INSTRUCTIONS
Follow up in about 6 months (around 9/4/2020) for DM, Med refills, LAB RESULTS.     If no improvement in symptoms or symptoms worsen, please be advised to call MD, follow-up at clinic and/or go to ER if becomes severe.    Oleksandr Nagel M.D.        We Offer TELEHEALTH & Same Day Appointments!   Book your Telehealth appointment with me through my nurse or   Clinic appointments on [employer]!    [address] [place] [address]    Office: [phone]   FAX: [phone]    Check out my Facebook Page and Follow Me at: [email or web address]    Check out my website at [facility] by clicking on: [email or web address]    To Schedule appointments online, go to [email or web address]: [email or web address]

## 2020-03-06 PROBLEM — J06.9 UPPER RESPIRATORY TRACT INFECTION: Status: ACTIVE | Noted: 2020-03-06

## 2020-03-06 PROBLEM — Z79.899 ENCOUNTER FOR LONG-TERM (CURRENT) USE OF MEDICATIONS: Chronic | Status: ACTIVE | Noted: 2020-03-06

## 2020-03-06 PROBLEM — E11.49 TYPE II DIABETES MELLITUS WITH NEUROLOGICAL MANIFESTATIONS: Chronic | Status: ACTIVE | Noted: 2019-12-10

## 2020-03-06 PROBLEM — Z79.899 ENCOUNTER FOR LONG-TERM (CURRENT) USE OF MEDICATIONS: Status: ACTIVE | Noted: 2020-03-06

## 2020-03-06 NOTE — ASSESSMENT & PLAN NOTE
Reviewed labs with the patient.  Updated lab orders placed.Complete history and physical was completed today.  Complete and thorough medication reconciliation was performed.  Discussed risks and benefits of medications.  Advised patient on orders and health maintenance.  We discussed old records and old labs if available.  Will request any records not available through epic.  Continue current medications listed on your summary sheet.

## 2020-03-06 NOTE — ASSESSMENT & PLAN NOTE
March 2020:  Meeting all diabetic metrics at this time.  Blood pressure well controlled on amlodipine.    =======================================================  DECEMBER 2019:  Consider reintroducing another ACE-inhibitor/ARB since patient's blood pressure is borderline control today.  Verses increase in her amlodipine.  LDL slightly above goal patient counseled on diet exercise.  Repeat lipid panel.  ======================================================  Previous plan:  Patient has appointment podiatrist soon.  LDL is near 100.  Continue statin.  Recheck lipid panel A1c.  Blood pressure is controlled at this time on amlodipine.  Patient had allergy to ACE-inhibitor.  Monitor hemoglobin A1c.  Discussed diabetic diet and exercise protocol.  Continue medications.  Monitor for side effects.  Discussed checking blood glucose.  Discussed symptoms to monitor for and to notify me immediately if persistent or worsening.  Follow up with Ophthalmology/Optometry and Podiatry.

## 2020-03-06 NOTE — ASSESSMENT & PLAN NOTE
Discussed diet exercise.  Extensive lifestyle modification is needed.  Contributing to multiple comorbidities at this time.    Weight is slightly improved from previous

## 2020-03-06 NOTE — ASSESSMENT & PLAN NOTE
Increase Flonase to twice a day.  Continue Xyzal.  Consider adding Singulair.  Discussed condition course and signs and symptoms to expect.  Patient advised take anti-inflammatories and or Tylenol for pain or fever.  ER precautions.  Call MD or follow-up to clinic if not improving or worsening symptoms.

## 2020-03-27 ENCOUNTER — TELEPHONE (OUTPATIENT)
Dept: FAMILY MEDICINE | Facility: CLINIC | Age: 60
End: 2020-03-27

## 2020-03-27 NOTE — TELEPHONE ENCOUNTER
Attempted to return call, on hold for 20 minutes, so I left message asking Iman to give me a call back.

## 2020-03-27 NOTE — TELEPHONE ENCOUNTER
----- Message from Anamaria Drewite sent at 3/27/2020 11:44 AM CDT -----  Contact: Iman with [facility]   Iman called and stated she needs clarity on a prescription. She can be reached at [phone] opt [phone].    Thanks,  TF

## 2020-05-14 ENCOUNTER — PATIENT OUTREACH (OUTPATIENT)
Dept: OTHER | Facility: OTHER | Age: 60
End: 2020-05-14

## 2020-05-14 NOTE — PROGRESS NOTES
Digital Medicine: Health  Follow-Up    The history is provided by the patient. No  was used.     Follow Up  Called patient for routine follow up.    Patient has been manually entering her readings using a device that she received through a prescription given to her from her doctor. Patient reported that she could not afford the glucometer that we provide for the program when her enrollment took place on 12/13/19.    Patient reports that she could afford to purchase the device outright during this current time, but after speaking with the patients clinician, the clinician, Claudy Monique, is okay with the patient continuing to manually enter her blood sugar readings.    Will follow up with the patient in a few weeks.    Intervention/Plan    There are no preventive care reminders to display for this patient.     Last 6 Patient Entered Readings                                          Most Recent A1c: 6.2% on 3/2/2020  (Goal: 7%)     Recent Readings 4/30/2020    Blood Glucose (mg/dL) 102             Screenings    SDOH

## 2020-05-21 ENCOUNTER — PATIENT OUTREACH (OUTPATIENT)
Dept: OTHER | Facility: OTHER | Age: 60
End: 2020-05-21

## 2020-05-21 DIAGNOSIS — E11.49 TYPE II DIABETES MELLITUS WITH NEUROLOGICAL MANIFESTATIONS: Primary | Chronic | ICD-10-CM

## 2020-05-21 NOTE — PROGRESS NOTES
Digital Medicine: Clinician Introduction    Zakia Price is a 60 y.o. female who is newly enrolled in the Digital Medicine Clinic.    The following information was reviewed and updated:  Preferred pharmacy   [facility] [address] [place] [address], [place] - [address] [facility]  [address] [facility] [address]  Phone: [phone] Fax: [phone]    [facility] - [place], [place] - [address] [facility]  [address] [facility] [place] [address]  Phone: [phone] Fax: [phone]    [facility] - [place], [place] - [address]  [address] [facility] [place] [address]  Phone: [phone] Fax: [phone]    [place], [place] - [address]  [address]  [facility] [address]  [facility] [address]  Phone: [phone] Fax: [phone]    [facility] - [place], [place] - [address]  [address]  [facility] [address]  Phone: [phone] Fax: [phone]      Patient prefers a 90 days supply.     Review of patient's allergies indicates:   Allergen Reactions    Codeine sulfate      Nausea^    Lisinopril Swelling     angioedema    Codeine Nausea Only and Rash       I spoke with the patient today for the initial enrollment call.  Blood sugars are currently controlled.  She is entering her readings manually due to being unable to afford the [employer] glucometer    The history is provided by the patient. No  was used.     DIABETES  Instructed patient not to allow anyone else to use phone and monitoring device.  Explained that we expect patient to test their blood sugar as prescribed by their physician or Digital Medicine Clinician.      Reviewed general Self-Monitoring of Blood Glucose (SMBG) goals:  · FPG: <  mg/dL  · 1h PPG: < 180 mg/dL  · 2h PPG: < 160 mg/dL  · Bedtime: < 150 mg/dL    Explained to patient that the digital medicine team is not available for emergencies.  Patient will call [facility] on-call ([phone] or [phone]) or [phone] if needed.      Expected  SMBG schedule: Daily  Patient does not have history of hypoglycemia.    Reviewed signs and symptoms of hypoglycemia (weakness, dizziness, hunger, shakiness, nausea, headache, heart palpitations, sweating, fatigue, anxiety, etc.).  Reviewed treatment of hypoglycemia (15/15 rule).      Reviewed signs or symptoms of hyperglycemia (headache, increased thirst, increased urination, fatigue, blurred vision, etc.).      Patient is not on ace inhibitor or angiotensin II receptor blocker because ADR of swelling in face.    Patient is on statin.     Patient's A1C goals is less than or equal to 7. Patient's most recent A1C result is at or below goal. Lab Results     Component                Value               Date                     HGBA1C                   6.2 (H)             03/02/2020             Allergies reviewed.          Last 6 Patient Entered Readings                                          Most Recent A1c: 6.2% on 3/2/2020  (Goal: 7%)     Recent Readings 4/30/2020    Blood Glucose (mg/dL) 102          INTERVENTION(S)  reviewed appropriate dose schedule, recommended diet modifications, recommended physical activity and encouragement/support    PLAN  patient verbalizes understanding and additional monitoring needed    Last A1c 6.2 %  No med changes today.  Follow-up in 5 months.      There are no preventive care reminders to display for this patient.    Current Medication Regimen:    Diabetes Medications             metFORMIN (GLUCOPHAGE) 1000 MG tablet Take 1 tablet (1,000 mg total) by mouth 2 (two) times daily with meals.          Reviewed the importance of self-monitoring, medication adherence, and that the health  can be used as a resource for lifestyle modifications to help reduce or maintain a healthy lifestyle.    Sent link to [facility] webpages and my contact information via [employer] for future questions. Follow up scheduled.             Sleep Apnea Screening  Patient previously diagnosed  with ETHAN She reports she is not currently using CPAP. She is not using CPAP because: she says a test revealed she does not need a CPAP.     Medication Affordability Screening  Patient is currently not having problems affording medications    Medication Adherence Screening   She did not miss a dose this month.

## 2020-05-27 DIAGNOSIS — K21.00 GERD WITH ESOPHAGITIS: Primary | ICD-10-CM

## 2020-05-27 RX ORDER — OMEPRAZOLE 40 MG/1
40 CAPSULE, DELAYED RELEASE ORAL DAILY
Qty: 90 CAPSULE | Refills: 3 | Status: SHIPPED | OUTPATIENT
Start: 2020-05-27 | End: 2020-06-13 | Stop reason: SDUPTHER

## 2020-06-22 ENCOUNTER — PATIENT OUTREACH (OUTPATIENT)
Dept: OTHER | Facility: OTHER | Age: 60
End: 2020-06-22

## 2020-06-22 NOTE — PROGRESS NOTES
Digital Medicine: Health  Follow-Up    The history is provided by the patient. No  was used.   Follow Up  Called the patient for routine follow up.    Patient reported that she is doing well and that she is happy with the way that her blood sugar readings have been looking lately. She expressed that she had her grand son with her and that she was not able to continue talking.    She denies any further questions or concerns at the current time.    Will follow up with the patient in a few weeks.      Intervention/Plan    There are no preventive care reminders to display for this patient.      Last 6 Patient Entered Readings                                          Most Recent A1c: 6.2% on 3/2/2020  (Goal: 7%)     Recent Readings 4/30/2020    Blood Glucose (mg/dL) 102                Screenings    SDOH

## 2020-08-05 ENCOUNTER — PATIENT OUTREACH (OUTPATIENT)
Dept: OTHER | Facility: OTHER | Age: 60
End: 2020-08-05

## 2020-08-05 NOTE — PROGRESS NOTES
Digital Medicine: Health  Follow-Up    The history is provided by the patient.                   Diet-no change to diet    No change to diet.        Physical Activity-no change to routine  No change to exercise routine.     Medication Adherence-Medication adherence was assessed.        Substance, Sleep, Stress-No change  stress-  Details:  Intervention(s):    Sleep-  Details:  Intervention(s):    Alcohol -  Details:  Intervention(s):    Tobacco-  Details:  Intervention(s):          Continue current diet/physical activity routine.       Patient verbalizes understanding.      Explained the importance of self-monitoring and medication adherence. Encouraged the patient to communicate with their health  for lifestyle modifications to help improve or maintain a healthy lifestyle.                Topic    Urine Protein Check          Last 6 Patient Entered Readings                                          Most Recent A1c: 6.2% on 3/2/2020  (Goal: 7%)     Recent Readings 8/4/2020 4/30/2020    Blood Glucose (mg/dL) 106 102

## 2020-09-02 ENCOUNTER — PATIENT OUTREACH (OUTPATIENT)
Dept: OTHER | Facility: OTHER | Age: 60
End: 2020-09-02

## 2020-09-04 ENCOUNTER — OFFICE VISIT (OUTPATIENT)
Dept: FAMILY MEDICINE | Facility: CLINIC | Age: 60
End: 2020-09-04
Payer: MEDICAID

## 2020-09-04 ENCOUNTER — TELEPHONE (OUTPATIENT)
Dept: FAMILY MEDICINE | Facility: CLINIC | Age: 60
End: 2020-09-04

## 2020-09-04 VITALS
HEIGHT: 68 IN | DIASTOLIC BLOOD PRESSURE: 81 MMHG | HEART RATE: 84 BPM | TEMPERATURE: 98 F | BODY MASS INDEX: 44.41 KG/M2 | WEIGHT: 293 LBS | SYSTOLIC BLOOD PRESSURE: 126 MMHG

## 2020-09-04 DIAGNOSIS — M54.42 CHRONIC RIGHT-SIDED LOW BACK PAIN WITH BILATERAL SCIATICA: Primary | ICD-10-CM

## 2020-09-04 DIAGNOSIS — M54.9 CHRONIC NECK AND BACK PAIN: ICD-10-CM

## 2020-09-04 DIAGNOSIS — G89.29 CHRONIC NECK AND BACK PAIN: ICD-10-CM

## 2020-09-04 DIAGNOSIS — G89.29 CHRONIC RIGHT-SIDED LOW BACK PAIN WITH BILATERAL SCIATICA: Primary | ICD-10-CM

## 2020-09-04 DIAGNOSIS — G89.29 CHRONIC LEFT SHOULDER PAIN: ICD-10-CM

## 2020-09-04 DIAGNOSIS — M25.512 CHRONIC LEFT SHOULDER PAIN: ICD-10-CM

## 2020-09-04 DIAGNOSIS — M54.41 CHRONIC RIGHT-SIDED LOW BACK PAIN WITH BILATERAL SCIATICA: Primary | ICD-10-CM

## 2020-09-04 DIAGNOSIS — E11.65 TYPE 2 DIABETES MELLITUS WITH HYPERGLYCEMIA, WITHOUT LONG-TERM CURRENT USE OF INSULIN: Chronic | ICD-10-CM

## 2020-09-04 DIAGNOSIS — M54.2 CHRONIC NECK AND BACK PAIN: ICD-10-CM

## 2020-09-04 PROCEDURE — 99999 PR PBB SHADOW E&M-EST. PATIENT-LVL V: ICD-10-PCS | Mod: PBBFAC,,, | Performed by: FAMILY MEDICINE

## 2020-09-04 PROCEDURE — 99999 PR PBB SHADOW E&M-EST. PATIENT-LVL V: CPT | Mod: PBBFAC,,, | Performed by: FAMILY MEDICINE

## 2020-09-04 PROCEDURE — 99214 OFFICE O/P EST MOD 30 MIN: CPT | Mod: S$PBB,,, | Performed by: FAMILY MEDICINE

## 2020-09-04 PROCEDURE — 99214 PR OFFICE/OUTPT VISIT, EST, LEVL IV, 30-39 MIN: ICD-10-PCS | Mod: S$PBB,,, | Performed by: FAMILY MEDICINE

## 2020-09-04 PROCEDURE — 99215 OFFICE O/P EST HI 40 MIN: CPT | Mod: PBBFAC,PO | Performed by: FAMILY MEDICINE

## 2020-09-04 RX ORDER — MELOXICAM 15 MG/1
15 TABLET ORAL DAILY
Qty: 30 TABLET | Refills: 0 | Status: SHIPPED | OUTPATIENT
Start: 2020-09-04 | End: 2020-09-28 | Stop reason: SDUPTHER

## 2020-09-04 RX ORDER — SEMAGLUTIDE 1.34 MG/ML
INJECTION, SOLUTION SUBCUTANEOUS
Qty: 1 SYRINGE | Refills: 1 | Status: SHIPPED | OUTPATIENT
Start: 2020-09-04 | End: 2020-10-02

## 2020-09-04 RX ORDER — MONTELUKAST SODIUM 10 MG/1
TABLET ORAL
COMMUNITY
Start: 2020-08-22 | End: 2020-11-25

## 2020-09-04 RX ORDER — FAMOTIDINE 40 MG/1
40 TABLET, FILM COATED ORAL DAILY
COMMUNITY
Start: 2020-08-22 | End: 2021-01-21

## 2020-09-04 RX ORDER — BETAMETHASONE SODIUM PHOSPHATE AND BETAMETHASONE ACETATE 3; 3 MG/ML; MG/ML
9 INJECTION, SUSPENSION INTRA-ARTICULAR; INTRALESIONAL; INTRAMUSCULAR; SOFT TISSUE
Status: COMPLETED | OUTPATIENT
Start: 2020-09-04 | End: 2020-09-04

## 2020-09-04 RX ORDER — KETOROLAC TROMETHAMINE 30 MG/ML
60 INJECTION, SOLUTION INTRAMUSCULAR; INTRAVENOUS
Status: COMPLETED | OUTPATIENT
Start: 2020-09-04 | End: 2020-09-04

## 2020-09-04 RX ADMIN — KETOROLAC TROMETHAMINE 60 MG: 60 INJECTION, SOLUTION INTRAMUSCULAR at 10:09

## 2020-09-04 RX ADMIN — BETAMETHASONE ACETATE AND BETAMETHASONE SODIUM PHOSPHATE 9 MG: 3; 3 INJECTION, SUSPENSION INTRA-ARTICULAR; INTRALESIONAL; INTRAMUSCULAR; SOFT TISSUE at 10:09

## 2020-09-04 NOTE — TELEPHONE ENCOUNTER
Made appt on 09/11, [facility] , 1 pm . Thanks for referral.  Pt understood. All questions answered.   Gerard Barragan MA  [facility]

## 2020-09-04 NOTE — TELEPHONE ENCOUNTER
----- Message from Lillian Byrne LPN sent at 9/4/2020 10:09 AM CDT -----  Regarding: appointment  Can someone please help schedule an appointment with Dr. Ba per Dr. Nagel's referral.  Due to patient's insurance I was unable to get the patient scheduled.  Thank you for all of your help,  MINA Oliver Dr., MD

## 2020-09-04 NOTE — PATIENT INSTRUCTIONS
Follow up in about 6 months (around 3/4/2021), or if symptoms worsen or fail to improve, for Med refills, LAB RESULTS.     If no improvement in symptoms or symptoms worsen, please be advised to call MD, follow-up at clinic and/or go to ER if becomes severe.    Oleksandr Nagel M.D.        We Offer TELEHEALTH & Same Day Appointments!   Book your Telehealth appointment with me through my nurse or   Clinic appointments on [facility]!    [address] [place] [address]    Office: [phone]   FAX: [phone]    Check out my Facebook Page and Follow Me at: [email or web address]    Check out my website at [employer] by clicking on: [email or web address]    To Schedule appointments online, go to [facility]: [email or web address]

## 2020-09-04 NOTE — PROGRESS NOTES
PLAN:      Problem List Items Addressed This Visit     Chronic right-sided low back pain with bilateral sciatica - Primary (Chronic)     Referral to physical therapy, referral start meloxicam 15 daily.  Discussed condition course and signs and symptoms to expect.  Patient advised take anti-inflammatories and or Tylenol for pain.  ER precautions.  Call MD or follow-up to clinic if not improving or worsening symptoms.           Relevant Medications    meloxicam (MOBIC) 15 MG tablet    Other Relevant Orders    Ambulatory referral/consult to Physical/Occupational Therapy    Ambulatory referral/consult to Pain Clinic    Type 2 diabetes mellitus with hyperglycemia, without long-term current use of insulin (Chronic)     September 2020:  Patient's weight remains uncontrolled.  Start OZEMPIC.  Patient will taper off of metformin if needed.  Patient had allergy to lisinopril.    March 2020:  Meeting all diabetic metrics at this time.  Blood pressure well controlled on amlodipine.    =======================================================  DECEMBER 2019:  Consider reintroducing another ACE-inhibitor/ARB since patient's blood pressure is borderline control today.  Verses increase in her amlodipine.  LDL slightly above goal patient counseled on diet exercise.  Repeat lipid panel.  ======================================================  Previous plan:  Patient has appointment podiatrist soon.  LDL is near 100.  Continue statin.  Recheck lipid panel A1c.  Blood pressure is controlled at this time on amlodipine.  Patient had allergy to ACE-inhibitor.  Monitor hemoglobin A1c.  Discussed diabetic diet and exercise protocol.  Continue medications.  Monitor for side effects.  Discussed checking blood glucose.  Discussed symptoms to monitor for and to notify me immediately if persistent or worsening.  Follow up with Ophthalmology/Optometry and Podiatry.           Relevant Medications    semaglutide (OZEMPIC) 0.25 mg or 0.5 mg(2 mg/1.5  mL) PnIj    Chronic left shoulder pain (Chronic)     Referral to physical therapy and pain management.  Start Mobic.         Chronic neck and back pain (Chronic)     Toradol injection and steroid injection performed today.  Patient tolerated well.  Referral to physical therapy and pain management.    Reviewed x-ray.                 Future Appointments     Date Provider Specialty Appt Notes    9/11/2020 Bladimir Ba MD Pain Medicine low back pain , new pt     10/2/2020 Oleksandr Nagel MD Family Medicine 4week f/u           Medication List with Changes/Refills   New Medications    MELOXICAM (MOBIC) 15 MG TABLET    Take 1 tablet (15 mg total) by mouth once daily.    SEMAGLUTIDE (OZEMPIC) 0.25 MG OR 0.5 MG(2 MG/1.5 ML) PNIJ    Inject 0.25 mg into the skin once a week for 30 days, THEN 0.5 mg once a week.   Current Medications    ACETAMINOPHEN (TYLENOL) 500 MG TABLET    Take 2 tablets (1,000 mg total) by mouth every 6 (six) hours as needed for Pain.    AMLODIPINE (NORVASC) 5 MG TABLET    Take 1 tablet (5 mg total) by mouth once daily.    BLOOD SUGAR DIAGNOSTIC (CL[employer]Act CHOICE VOICE+ TEST) STRP    TEST BLOOD SUGARS ONE TIME DAILY    BLOOD-GLUCOSE METER KIT    Use before meals and before bed when the glucoses are not in control.  Otherwise, test it daily once a day before meals randomly to ensure    CYCLOBENZAPRINE (FLEXERIL) 10 MG TABLET    Take 1 tablet (10 mg total) by mouth 3 (three) times daily as needed for Muscle spasms.    FAMOTIDINE (PEPCID) 40 MG TABLET        FLUTICASONE PROPIONATE (FLONASE) 50 MCG/ACTUATION NASAL SPRAY    Use 2 sprays to each nostril daily    GABAPENTIN (NEURONTIN) 600 MG TABLET    Take 1 tablet (600 mg total) by mouth 3 (three) times daily.    LANCETS (PRODIGY LANCETS) 28 GAUGE MISC    1 lancet by Misc.(Non-Drug; Combo Route) route once daily. As  Directed    LANCETS MISC    Misc. route As directed    LEVOCETIRIZINE (XYZAL) 5 MG TABLET    Take 1 tablet (5 mg total) by mouth every  evening.    LEVOTHYROXINE (SYNTHROID) 100 MCG TABLET    Take 1 tablet (100 mcg total) by mouth once daily.    METFORMIN (GLUCOPHAGE) 1000 MG TABLET    Take 1 tablet (1,000 mg total) by mouth 2 (two) times daily with meals.    MONTELUKAST (SINGULAIR) 10 MG TABLET        OMEPRAZOLE (PRILOSEC) 40 MG CAPSULE    Take 1 capsule (40 mg total) by mouth once daily.    PRAVASTATIN (PRAVACHOL) 80 MG TABLET    Take 1 tablet (80 mg total) by mouth once daily.   Discontinued Medications    IBUPROFEN (ADVIL,MOTRIN) 800 MG TABLET    Take 1 tablet (800 mg total) by mouth every 8 (eight) hours as needed for Pain.    NAPROXEN (NAPROSYN) 500 MG TABLET    Take 1 tablet (500 mg total) by mouth 2 (two) times daily with meals.       Oleksandr Nagel M.D.     ==========================================================================  Subjective:      Patient ID: Zakia Price is a 60 y.o. female.  has a past medical history of Diabetes mellitus, type 2, Diabetic neuropathy (1/27/2014), DJD (degenerative joint disease) of knee, DVT (deep venous thrombosis) (around 1990's), GERD (gastroesophageal reflux disease), Hypertension associated with diabetes, Multinodular goiter, Obesity, morbid, BMI 50 or higher, and Sleep apnea.     Chief Complaint: Follow-up (6 month f/u)      Problem List Items Addressed This Visit     Chronic right-sided low back pain with bilateral sciatica - Primary (Chronic)    Overview      Answers for HPI/ROS submitted by the patient on 9/4/2020   Back pain  Chronicity: recurrent  Onset: 1 to 4 weeks ago  Frequency: 2 to 4 times per day  Progression since onset: waxing and waning  Pain location: sacro-iliac  Pain quality: aching, shooting  Radiates to: right thigh  Pain - numeric: 10/10  Pain is: the same all the time  Aggravated by: lying down, sitting, standing  Stiffness is present: all day  bladder incontinence: No  bowel incontinence: No  leg pain: No  paresis: No  paresthesias: Yes  perianal numbness:  No  tingling: Yes  weight loss: No  genital pain: No  Pain severity: moderate  Improvement on treatment: moderate         Current Assessment & Plan     Referral to physical therapy, referral start meloxicam 15 daily.  Discussed condition course and signs and symptoms to expect.  Patient advised take anti-inflammatories and or Tylenol for pain.  ER precautions.  Call MD or follow-up to clinic if not improving or worsening symptoms.           Type 2 diabetes mellitus with hyperglycemia, without long-term current use of insulin (Chronic)    Overview     ====================================================  November 2019:  Reviewed diabetes management status.  Patient was due for LDL less than 100 at that time.  Also needing foot exam.  =====================================================  DECEMBER 2019:  Patient reports issues with ACE-inhibitor.  Currently having cough.  Only on amlodipine.  Diabetes Management Status  Statin: TakingACE/ARB: Not taking  =====================================================  MARCH 2020:  Diabetes Management StatusStatin: Taking  ACE/ARB: Not taking  ==================================================  SEPTEMBER 2020:  Reviewed Diabetes Management Status.  Patient is taking statin but not Ace or Arb    Screening or Prevention Patient's value Goal Complete/Controlled?   HgA1C Testing and Control   Lab Results   Component Value Date    HGBA1C 6.0 (H) 09/04/2020      Annually/Less than 8% Yes   Lipid profile : 09/04/2020 Annually Yes   LDL control Lab Results   Component Value Date    LDLCALC 88.6 09/04/2020    Annually/Less than 100 mg/dl  Yes   Nephropathy screening Lab Results   Component Value Date    LABMICR 18.0 09/04/2020     Lab Results   Component Value Date    PROTEINUA Negative 10/28/2015    Annually Yes   Blood pressure BP Readings from Last 1 Encounters:   09/04/20 126/81    Less than 140/90 Yes   Dilated retinal exam : 10/02/2019 Annually Yes   Foot exam   : 12/10/2019  Annually Yes     =================================================         Current Assessment & Plan     September 2020:  Patient's weight remains uncontrolled.  Start OZEMPIC.  Patient will taper off of metformin if needed.  Patient had allergy to lisinopril.    March 2020:  Meeting all diabetic metrics at this time.  Blood pressure well controlled on amlodipine.    =======================================================  DECEMBER 2019:  Consider reintroducing another ACE-inhibitor/ARB since patient's blood pressure is borderline control today.  Verses increase in her amlodipine.  LDL slightly above goal patient counseled on diet exercise.  Repeat lipid panel.  ======================================================  Previous plan:  Patient has appointment podiatrist soon.  LDL is near 100.  Continue statin.  Recheck lipid panel A1c.  Blood pressure is controlled at this time on amlodipine.  Patient had allergy to ACE-inhibitor.  Monitor hemoglobin A1c.  Discussed diabetic diet and exercise protocol.  Continue medications.  Monitor for side effects.  Discussed checking blood glucose.  Discussed symptoms to monitor for and to notify me immediately if persistent or worsening.  Follow up with Ophthalmology/Optometry and Podiatry.           Chronic left shoulder pain (Chronic)    Overview     Chronic.  Uncontrolled.  Patient is not currently taking any medications for this.         Current Assessment & Plan     Referral to physical therapy and pain management.  Start Mobic.         Chronic neck and back pain (Chronic)    Overview     ==================================================  SEPTEMBER 2020:  Chronic.  Uncontrolled.  ================================================= REASON FOR EXAM: [M54.12]-Radiculopathy, cervical region      TECHNICAL FACTORS: Sagittal T2, sagittal T1, sagittal STIR, axial T2 and axial 2-D MERGE sequences were obtained of the cervical spine without administration of intravenous  contrast.    COMPARISON: Cervical spine radiograph 12/14/2018     FINDINGS: There is a 3 mm retrolisthesis of C4 on C5. Alignment appears otherwise satisfactory.  Bone marrow is normal in signal intensity without focal lesion.  The spinal cord is normal in course, caliber, and signal intensity throughout.  The   vertebral body heights are maintained. There is intervertebral disc space narrowing and desiccation at C2-3, C4-5, and C5-6.     At the C2-3 level, there is no evidence of central canal stenosis or neural foraminal narrowing.    At the C3-4 level, mild broad-based posterior disc bulge. There is uncovertebral arthropathy on the right. Moderate right foraminal narrowing. Left neural foramen is maintained. Central spinal canal appears satisfactory.     At the C4-5 level, there is a broad-based posterior circumferential disc bulge which effaces the ventral aspect of the thecal sac. There is uncovertebral arthropathy on the right. There is moderate to severe right foraminal narrowing. Mild left foraminal   narrowing. There is also moderate canal narrowing.     At the C5-6 level, there is a mild broad-based posterior disc bulge. Mild central canal compromise. There is mild right foraminal narrowing. The left neural foramen appears maintained.     At the C6-7 level, there is a broad-based posterior disc bulge which effaces the ventral aspect of thecal sac. Mild canal compromise. Neural foramina are satisfactory.     At the C7-T1 level, there is no evidence of central canal stenosis or neural foraminal narrowing.    IMPRESSION:  Multilevel cervical spondylosis as described above. Findings appear most notable at C4-5 with there is moderate canal narrowing and moderate to severe right foraminal narrowing.         Electronically signed by Sergio Hope MD on 1/9/2019 3:23 PM         Current Assessment & Plan     Toradol injection and steroid injection performed today.  Patient tolerated well.  Referral to physical  therapy and pain management.    Reviewed x-ray.                     Past Medical History:  Past Medical History:   Diagnosis Date    Diabetes mellitus, type 2     Diabetic neuropathy 1/27/2014    DJD (degenerative joint disease) of knee     DVT (deep venous thrombosis) around 1990's    in leg, is on no anticoagulant therapy presently    GERD (gastroesophageal reflux disease)     Hypertension associated with diabetes     Multinodular goiter     Obesity, morbid, BMI 50 or higher     Sleep apnea     has no CPAP     Past Surgical History:   Procedure Laterality Date    FRACTURE SURGERY      HYSTERECTOMY      SHOULDER ARTHROSCOPY      THYROIDECTOMY, PARTIAL Right     and transplatation of right superior parathyroid gland to the sternocleidomastoid muscle     TONSILLECTOMY      TUBAL LIGATION  1984    WRIST FRACTURE SURGERY      left     Review of patient's allergies indicates:   Allergen Reactions    Codeine sulfate      Nausea^    Lisinopril Swelling     angioedema    Codeine Nausea Only and Rash     Medication List with Changes/Refills   New Medications    MELOXICAM (MOBIC) 15 MG TABLET    Take 1 tablet (15 mg total) by mouth once daily.    SEMAGLUTIDE (OZEMPIC) 0.25 MG OR 0.5 MG(2 MG/1.5 ML) PNIJ    Inject 0.25 mg into the skin once a week for 30 days, THEN 0.5 mg once a week.   Current Medications    ACETAMINOPHEN (TYLENOL) 500 MG TABLET    Take 2 tablets (1,000 mg total) by mouth every 6 (six) hours as needed for Pain.    AMLODIPINE (NORVASC) 5 MG TABLET    Take 1 tablet (5 mg total) by mouth once daily.    BLOOD SUGAR DIAGNOSTIC (CLEVER CHOICE VOICE+ TEST) STRP    TEST BLOOD SUGARS ONE TIME DAILY    BLOOD-GLUCOSE METER KIT    Use before meals and before bed when the glucoses are not in control.  Otherwise, test it daily once a day before meals randomly to ensure    CYCLOBENZAPRINE (FLEXERIL) 10 MG TABLET    Take 1 tablet (10 mg total) by mouth 3 (three) times daily as needed for Muscle spasms.     FAMOTIDINE (PEPCID) 40 MG TABLET        FLUTICASONE PROPIONATE (FLONASE) 50 MCG/ACTUATION NASAL SPRAY    Use 2 sprays to each nostril daily    GABAPENTIN (NEURONTIN) 600 MG TABLET    Take 1 tablet (600 mg total) by mouth 3 (three) times daily.    LANCETS (PRODIGY LANCETS) 28 GAUGE MISC    1 lancet by Misc.(Non-Drug; Combo Route) route once daily. As  Directed    LANCETS [facility]. route As directed    LEVOCETIRIZINE (XYZAL) 5 MG TABLET    Take 1 tablet (5 mg total) by mouth every evening.    LEVOTHYROXINE (SYNTHROID) 100 MCG TABLET    Take 1 tablet (100 mcg total) by mouth once daily.    METFORMIN (GLUCOPHAGE) 1000 MG TABLET    Take 1 tablet (1,000 mg total) by mouth 2 (two) times daily with meals.    MONTELUKAST (SINGULAIR) 10 MG TABLET        OMEPRAZOLE (PRILOSEC) 40 MG CAPSULE    Take 1 capsule (40 mg total) by mouth once daily.    PRAVASTATIN (PRAVACHOL) 80 MG TABLET    Take 1 tablet (80 mg total) by mouth once daily.   Discontinued Medications    IBUPROFEN (ADVIL,MOTRIN) 800 MG TABLET    Take 1 tablet (800 mg total) by mouth every 8 (eight) hours as needed for Pain.    NAPROXEN (NAPROSYN) 500 MG TABLET    Take 1 tablet (500 mg total) by mouth 2 (two) times daily with meals.      Social History     Tobacco Use    Smoking status: Never Smoker    Smokeless tobacco: Never Used   Substance Use Topics    Alcohol use: No     Frequency: Never      Family History   Problem Relation Age of Onset    Leukemia Son     Cancer Son     Diabetes Mother     Hypertension Mother     Heart disease Mother 50    Cancer Father     Cancer Brother     Cancer Brother        I have reviewed the complete PMH, social history, surgical history, allergies and medications.  As well as family history.    Review of Systems   Constitutional: Negative for chills, fatigue, fever and unexpected weight change.   HENT: Negative for ear pain and sore throat.    Eyes: Negative for redness and visual disturbance.   Respiratory: Negative for  "cough and shortness of breath.    Cardiovascular: Negative for chest pain and palpitations.   Gastrointestinal: Negative for nausea and vomiting.   Genitourinary: Negative for difficulty urinating, dysuria, hematuria and pelvic pain.   Musculoskeletal: Positive for arthralgias, back pain and myalgias.   Skin: Negative for rash and wound.   Neurological: Negative for weakness and headaches.   Psychiatric/Behavioral: Negative for sleep disturbance. The patient is not nervous/anxious.      Objective:   /81   Pulse 84   Temp 98.1 °F (36.7 °C) (Temporal)   Ht 5' 8" (1.727 m)   Wt (!) 163.3 kg (360 lb)   BMI 54.74 kg/m²   Physical Exam  Vitals signs and nursing note reviewed.   Constitutional:       General: She is not in acute distress.     Appearance: She is well-developed.   HENT:      Head: Normocephalic and atraumatic.   Eyes:      Pupils: Pupils are equal, round, and reactive to light.   Neck:      Musculoskeletal: Normal range of motion and neck supple.   Cardiovascular:      Rate and Rhythm: Normal rate and regular rhythm.      Heart sounds: Normal heart sounds. No murmur.   Pulmonary:      Effort: Pulmonary effort is normal. No respiratory distress.      Breath sounds: Normal breath sounds. No wheezing.   Abdominal:      General: Bowel sounds are normal. There is no distension.      Palpations: Abdomen is soft.      Tenderness: There is no abdominal tenderness.      Comments: Class three obese abdomen   Musculoskeletal:         General: Tenderness and deformity present.      Left shoulder: She exhibits decreased range of motion and tenderness.      Lumbar back: She exhibits decreased range of motion, tenderness, pain and spasm. She exhibits no bony tenderness, no swelling and no edema.   Skin:     General: Skin is warm and dry.      Capillary Refill: Capillary refill takes less than 2 seconds.   Neurological:      Mental Status: She is alert and oriented to person, place, and time.      Cranial Nerves: " No cranial nerve deficit.   Psychiatric:         Behavior: Behavior normal.         Assessment:     1. Chronic right-sided low back pain with bilateral sciatica    2. Chronic left shoulder pain    3. Chronic neck and back pain    4. Type 2 diabetes mellitus with hyperglycemia, without long-term current use of insulin      MDM:   Moderate complexity.  Moderate risk.  I have Reviewed and summarized old records.  I have performed thorough medication reconciliation today and discussed risk and benefits of each medication.  I have reviewed imaging, labs and discussed with patient.  All questions were answered.  I am requesting old records and will review them once they are available.  Pain management    I have signed for the following orders AND/OR meds.  Orders Placed This Encounter   Procedures    Ambulatory referral/consult to Physical/Occupational Therapy     Standing Status:   Future     Standing Expiration Date:   10/4/2021     Referral Priority:   Routine     Referral Type:   Physical Medicine     Referral Reason:   Specialty Services Required     Referred to Provider:   [facility]     Requested Specialty:   Physical Therapy     Number of Visits Requested:   1    Ambulatory referral/consult to Pain Clinic     Standing Status:   Future     Standing Expiration Date:   10/4/2021     Referral Priority:   Routine     Referral Type:   Consultation     Referral Reason:   Specialty Services Required     Referred to Provider:   Bladimir Ba MD     Requested Specialty:   Pain Medicine     Number of Visits Requested:   1     Medications Ordered This Encounter   Medications    betamethasone acetate-betamethasone sodium phosphate injection 9 mg    ketorolac injection 60 mg    meloxicam (MOBIC) 15 MG tablet     Sig: Take 1 tablet (15 mg total) by mouth once daily.     Dispense:  30 tablet     Refill:  0    semaglutide (OZEMPIC) 0.25 mg or 0.5 mg(2 mg/1.5 mL) PnIj     Sig: Inject 0.25 mg into the skin  once a week for 30 days, THEN 0.5 mg once a week.     Dispense:  1 Syringe     Refill:  1        Follow up in about 6 months (around 3/4/2021), or if symptoms worsen or fail to improve, for Med refills, LAB RESULTS.    If no improvement in symptoms or symptoms worsen, advised to call/follow-up at clinic or go to ER. Patient voiced understanding and all questions/concerns were addressed.     DISCLAIMER: This note was compiled by using a speech recognition dictation system and therefore please be aware that typographical / speech recognition errors can and do occur.  Please contact me if you see any errors specifically.    Oleksandr Nagel M.D.       Office: [phone] [address] [place] [address]  FAX: [phone]

## 2020-09-08 PROBLEM — M25.512 CHRONIC LEFT SHOULDER PAIN: Chronic | Status: ACTIVE | Noted: 2020-09-04

## 2020-09-08 PROBLEM — G89.29 CHRONIC NECK AND BACK PAIN: Chronic | Status: ACTIVE | Noted: 2020-09-04

## 2020-09-08 PROBLEM — M54.2 CHRONIC NECK AND BACK PAIN: Chronic | Status: ACTIVE | Noted: 2020-09-04

## 2020-09-08 PROBLEM — G89.29 CHRONIC LEFT SHOULDER PAIN: Chronic | Status: ACTIVE | Noted: 2020-09-04

## 2020-09-08 PROBLEM — M54.9 CHRONIC NECK AND BACK PAIN: Chronic | Status: ACTIVE | Noted: 2020-09-04

## 2020-09-08 NOTE — ASSESSMENT & PLAN NOTE
Toradol injection and steroid injection performed today.  Patient tolerated well.  Referral to physical therapy and pain management.    Reviewed x-ray.

## 2020-09-08 NOTE — ASSESSMENT & PLAN NOTE
September 2020:  Patient's weight remains uncontrolled.  Start OZEMPIC.  Patient will taper off of metformin if needed.  Patient had allergy to lisinopril.    March 2020:  Meeting all diabetic metrics at this time.  Blood pressure well controlled on amlodipine.    =======================================================  DECEMBER 2019:  Consider reintroducing another ACE-inhibitor/ARB since patient's blood pressure is borderline control today.  Verses increase in her amlodipine.  LDL slightly above goal patient counseled on diet exercise.  Repeat lipid panel.  ======================================================  Previous plan:  Patient has appointment podiatrist soon.  LDL is near 100.  Continue statin.  Recheck lipid panel A1c.  Blood pressure is controlled at this time on amlodipine.  Patient had allergy to ACE-inhibitor.  Monitor hemoglobin A1c.  Discussed diabetic diet and exercise protocol.  Continue medications.  Monitor for side effects.  Discussed checking blood glucose.  Discussed symptoms to monitor for and to notify me immediately if persistent or worsening.  Follow up with Ophthalmology/Optometry and Podiatry.

## 2020-09-08 NOTE — ASSESSMENT & PLAN NOTE
Referral to physical therapy, referral start meloxicam 15 daily.  Discussed condition course and signs and symptoms to expect.  Patient advised take anti-inflammatories and or Tylenol for pain.  ER precautions.  Call MD or follow-up to clinic if not improving or worsening symptoms.

## 2020-09-11 ENCOUNTER — OFFICE VISIT (OUTPATIENT)
Dept: PAIN MEDICINE | Facility: CLINIC | Age: 60
End: 2020-09-11
Payer: MEDICAID

## 2020-09-11 VITALS
TEMPERATURE: 98 F | HEART RATE: 82 BPM | HEIGHT: 68 IN | DIASTOLIC BLOOD PRESSURE: 78 MMHG | SYSTOLIC BLOOD PRESSURE: 124 MMHG | WEIGHT: 293 LBS | BODY MASS INDEX: 44.41 KG/M2

## 2020-09-11 DIAGNOSIS — E66.01 MORBID OBESITY WITH BMI OF 50.0-59.9, ADULT: ICD-10-CM

## 2020-09-11 DIAGNOSIS — M46.1 SACROILIITIS: ICD-10-CM

## 2020-09-11 DIAGNOSIS — M79.18 CHRONIC MYOFASCIAL PAIN: ICD-10-CM

## 2020-09-11 DIAGNOSIS — M25.812 SHOULDER IMPINGEMENT, LEFT: ICD-10-CM

## 2020-09-11 DIAGNOSIS — G89.29 CHRONIC MYOFASCIAL PAIN: ICD-10-CM

## 2020-09-11 DIAGNOSIS — G89.29 CHRONIC RIGHT-SIDED LOW BACK PAIN WITH BILATERAL SCIATICA: ICD-10-CM

## 2020-09-11 DIAGNOSIS — M79.12 MYALGIA OF AUXILIARY MUSCLES, HEAD AND NECK: Primary | ICD-10-CM

## 2020-09-11 DIAGNOSIS — M54.42 CHRONIC RIGHT-SIDED LOW BACK PAIN WITH BILATERAL SCIATICA: ICD-10-CM

## 2020-09-11 DIAGNOSIS — M54.41 CHRONIC RIGHT-SIDED LOW BACK PAIN WITH BILATERAL SCIATICA: ICD-10-CM

## 2020-09-11 PROCEDURE — 99999 PR PBB SHADOW E&M-EST. PATIENT-LVL V: CPT | Mod: PBBFAC,,, | Performed by: PHYSICAL MEDICINE & REHABILITATION

## 2020-09-11 PROCEDURE — 99215 OFFICE O/P EST HI 40 MIN: CPT | Mod: PBBFAC,PO | Performed by: PHYSICAL MEDICINE & REHABILITATION

## 2020-09-11 PROCEDURE — 99999 PR PBB SHADOW E&M-EST. PATIENT-LVL V: ICD-10-PCS | Mod: PBBFAC,,, | Performed by: PHYSICAL MEDICINE & REHABILITATION

## 2020-09-11 PROCEDURE — 99203 OFFICE O/P NEW LOW 30 MIN: CPT | Mod: S$PBB,,, | Performed by: PHYSICAL MEDICINE & REHABILITATION

## 2020-09-11 PROCEDURE — 99203 PR OFFICE/OUTPT VISIT, NEW, LEVL III, 30-44 MIN: ICD-10-PCS | Mod: S$PBB,,, | Performed by: PHYSICAL MEDICINE & REHABILITATION

## 2020-09-11 NOTE — PROGRESS NOTES
New Patient Chronic Pain Note (Initial Visit)    Referring Physician: Oleksandr Nagel MD    PCP: Oleksandr Nagel MD    Chief Complaint:   Chief Complaint   Patient presents with    Low-back Pain    Leg Pain    Shoulder Pain        SUBJECTIVE:    Zakia Price is a 60 y.o. female who presents to the clinic for the evaluation of chronic neck and lower back pain.  She was referred by his primary care provider for further evaluation management of this pain.  Of note, patient has past medical history of hypertension, hyperlipidemia, DM2, diabetic neuropathy, history of DVT, hypothyroidism, obesity, GERD, multiple other medical comorbidities as listed below.  The pain started about 2 years ago without any specific injury or trauma and symptoms have been worsening.  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  She denies any recent falls.  The pain is located in the lower lumbar area and radiates to the bilateral lower extremities mostly into the hip and buttock posteriorly.  She also reports neck pain that originates in the lower cervical area with radiation of pain down the bilateral lower extremities.  The pain is described as Aching, numbness, tingling, dull and is rated as 6/10. The pain is rated with a score of  1/10 on the BEST day and a score of 10/10 on the WORST day.  Symptoms interfere with daily activity and sleeping. The pain is exacerbated by daily activities.  The pain is mitigated by nothing. The patient reports spending 4-6 hours per day reclining. The patient reports 6-8 hours of uninterrupted sleep per night.    Of note, his primary care provider placed referral to physical therapy, started meloxicam 15 mg daily, and provided a Toradol and steroid IM injection.    Patient denies night fever/night sweats, urinary incontinence, bowel incontinence, significant weight loss,  significant motor weakness and loss of sensations.    Pain Disability Index Review:   No flowsheet data found.    Non-Pharmacologic Treatments:  Physical Therapy/Home Exercise: no  Ice/Heat:yes  TENS: no  Acupuncture: no  Massage: no  Chiropractic: no    Other: no      Pain Medications:  - Opioids: Tramadol, Norco  - Adjuvant Medications: Tylenol, Flexeril, gabapentin, Mobic  - Anti-Coagulants: None     report:  Reviewed and consistent with medication use as prescribed.        Pain Procedures:   - previous lumbar ESIs      Imaging:   X-ray of bilateral knees 11/14/2019:  Right knee: There is no radiographic evidence of acute osseous, articular, or soft tissue abnormality. There is severe patellofemoral, severe medial, and moderate lateral tricompartmental osteoarthritis.  No appreciable change from prior.     Left knee:  No acute fractures or dislocations visualized.  Suggestion of multiple possible ossified intra-articular bodies in the suprapatellar recess.  There is severe patellofemoral, severe medial, and moderate lateral tricompartmental osteoarthritis.  No appreciable change from prior.    X-ray left hip 10/29/2018:  There is no evidence of acute fracture. There is no evidence of subluxation. There is mild to moderate femoral acetabular degeneration, similar to prior. No significant soft tissue swelling is identified.    No pertinent imaging available of the lumbar spine.    Past Medical History:   Diagnosis Date    Diabetes mellitus, type 2     Diabetic neuropathy 1/27/2014    DJD (degenerative joint disease) of knee     DVT (deep venous thrombosis) around 1990's    in leg, is on no anticoagulant therapy presently    GERD (gastroesophageal reflux disease)     Hypertension associated with diabetes     Multinodular goiter     Obesity, morbid, BMI 50 or higher     Sleep apnea     has no CPAP     Past Surgical History:   Procedure Laterality Date    FRACTURE SURGERY      HYSTERECTOMY      SHOULDER  ARTHROSCOPY      THYROIDECTOMY, PARTIAL Right     and transplatation of right superior parathyroid gland to the sternocleidomastoid muscle     TONSILLECTOMY      TUBAL LIGATION  1984    WRIST FRACTURE SURGERY      left     Social History     Socioeconomic History    Marital status: Single     Spouse name: Not on file    Number of children: Not on file    Years of education: Not on file    Highest education level: Not on file   Occupational History    Not on file   Social Needs    Financial resource strain: Somewhat hard    Food insecurity     Worry: Sometimes true     Inability: Sometimes true    Transportation needs     Medical: Yes     Non-medical: Yes   Tobacco Use    Smoking status: Never Smoker    Smokeless tobacco: Never Used   Substance and Sexual Activity    Alcohol use: No     Frequency: Never    Drug use: No    Sexual activity: Not Currently   Lifestyle    Physical activity     Days per week: 0 days     Minutes per session: 0 min    Stress: Only a little   Relationships    Social connections     Talks on phone: More than three times a week     Gets together: More than three times a week     Attends Christian service: More than 4 times per year     Active member of club or organization: No     Attends meetings of clubs or organizations: Never     Relationship status: Never    Other Topics Concern    Not on file   Social History Narrative    Not on file     Family History   Problem Relation Age of Onset    Leukemia Son     Cancer Son     Diabetes Mother     Hypertension Mother     Heart disease Mother 50    Cancer Father     Cancer Brother     Cancer Brother        Review of patient's allergies indicates:   Allergen Reactions    Codeine sulfate      Nausea^    Lisinopril Swelling     angioedema    Codeine Nausea Only and Rash       Current Outpatient Medications   Medication Sig    acetaminophen (TYLENOL) 500 MG tablet Take 2 tablets (1,000 mg total) by mouth every 6  (six) hours as needed for Pain.    amLODIPine (NORVASC) 5 MG tablet Take 1 tablet (5 mg total) by mouth once daily.    blood sugar diagnostic (CLEVER CHOICE VOICE+ TEST) Strp TEST BLOOD SUGARS ONE TIME DAILY    blood-glucose meter kit Use before meals and before bed when the glucoses are not in control.  Otherwise, test it daily once a day before meals randomly to ensure    cyclobenzaprine (FLEXERIL) 10 MG tablet Take 1 tablet (10 mg total) by mouth 3 (three) times daily as needed for Muscle spasms.    famotidine (PEPCID) 40 MG tablet     fluticasone propionate (FLONASE) 50 mcg/actuation nasal spray Use 2 sprays to each nostril daily    gabapentin (NEURONTIN) 600 MG tablet Take 1 tablet (600 mg total) by mouth 3 (three) times daily.    lancets (PRODIGY LANCETS) 28 gauge Misc 1 lancet by Misc.(Non-Drug; Combo Route) route once daily. As  Directed    lancets [facility]. route As directed    levocetirizine (XYZAL) 5 MG tablet Take 1 tablet (5 mg total) by mouth every evening.    levothyroxine (SYNTHROID) 100 MCG tablet Take 1 tablet (100 mcg total) by mouth once daily.    meloxicam (MOBIC) 15 MG tablet Take 1 tablet (15 mg total) by mouth once daily.    metFORMIN (GLUCOPHAGE) 1000 MG tablet Take 1 tablet (1,000 mg total) by mouth 2 (two) times daily with meals.    montelukast (SINGULAIR) 10 mg tablet     omeprazole (PRILOSEC) 40 MG capsule Take 1 capsule (40 mg total) by mouth once daily.    pravastatin (PRAVACHOL) 80 MG tablet Take 1 tablet (80 mg total) by mouth once daily.    semaglutide (OZEMPIC) 0.25 mg or 0.5 mg(2 mg/1.5 mL) PnIj Inject 0.25 mg into the skin once a week for 30 days, THEN 0.5 mg once a week.     No current facility-administered medications for this visit.        Review of Systems     GENERAL:  No weight loss, malaise or fevers.  HEENT:   No recent changes in vision or hearing  NECK:  Negative for lumps, no difficulty with swallowing.  RESPIRATORY:  Negative for cough, wheezing or  "shortness of breath, patient denies any recent URI.  CARDIOVASCULAR:  Negative for chest pain, leg swelling or palpitations.  GI:  Negative for abdominal discomfort, blood in stools or black stools or change in bowel habits.  MUSCULOSKELETAL:  See HPI.  SKIN:  Negative for lesions, rash, and itching.  PSYCH:  No mood disorder or recent psychosocial stressors.  Patients sleep is not disturbed secondary to pain.  HEMATOLOGY/LYMPHOLOGY:  Negative for prolonged bleeding, bruising easily or swollen nodes.  Patient is not currently taking any anti-coagulants  NEURO:   No history of headaches, syncope, paralysis, seizures or tremors.  All other reviewed and negative other than HPI.    OBJECTIVE:    /78   Pulse 82   Temp 98.1 °F (36.7 °C)   Ht 5' 8" (1.727 m)   Wt (!) 164.7 kg (363 lb 1.6 oz)   BMI 55.21 kg/m²         Physical Exam    GENERAL: Well appearing, in no acute distress, alert and oriented x3.  Morbidly obese  PSYCH:  Mood and affect appropriate.  SKIN: Skin color, texture, turgor normal, no rashes or lesions.  HEAD/FACE:  Normocephalic, atraumatic. Cranial nerves grossly intact.  NECK:  Mild pain to palpation over the cervical paraspinous muscles. Spurling Negative.  Minimal pain with neck flexion, extension, and lateral flexion.   CV: RRR with palpation of the radial artery.  PULM: No evidence of respiratory difficulty, symmetric chest rise.  GI:  Soft and non-tender.  BACK: Straight leg raising in the sitting and supine positions is negative to radicular pain. No pain to palpation over the facet joints of the lumbar spine or spinous processes.  Fairly limited range of motion with a mild amount of pain reproduction.  EXTREMITIES: Peripheral joint ROM is full and pain free without obvious instability or laxity in all four extremities. No deformities, edema, or skin discoloration. Good capillary refill.  MUSCULOSKELETAL: Shoulder, hip, and knee provocative maneuvers are negative with the exception of " positive impingement signs of left shoulder with Todd, Neer's, and empty can..  There is moderate pain with palpation over the sacroiliac joint on the right.  FABERs test is positive on the right.  FADIRs test is negative.   Bilateral upper and lower extremity strength is normal and symmetric.  No atrophy or tone abnormalities are noted.  NEURO: Bilateral upper and lower extremity coordination and muscle stretch reflexes are physiologic and symmetric.  Plantar response are downgoing. No clonus.  No loss of sensation is noted.  GAIT:  Slow, antalgic.      LABS:  Lab Results   Component Value Date    WBC 6.17 09/04/2020    HGB 12.1 09/04/2020    HCT 40.5 09/04/2020    MCV 92 09/04/2020     (H) 09/04/2020       CMP  Sodium   Date Value Ref Range Status   09/04/2020 142 136 - 145 mmol/L Final     Potassium   Date Value Ref Range Status   09/04/2020 3.7 3.5 - 5.1 mmol/L Final     Chloride   Date Value Ref Range Status   09/04/2020 105 95 - 110 mmol/L Final     CO2   Date Value Ref Range Status   09/04/2020 27 23 - 29 mmol/L Final     Glucose   Date Value Ref Range Status   09/04/2020 102 70 - 110 mg/dL Final     BUN, Bld   Date Value Ref Range Status   09/04/2020 10 8 - 23 mg/dL Final     Creatinine   Date Value Ref Range Status   09/04/2020 0.8 0.5 - 1.4 mg/dL Final     Calcium   Date Value Ref Range Status   09/04/2020 9.2 8.7 - 10.5 mg/dL Final     Total Protein   Date Value Ref Range Status   09/04/2020 8.1 6.0 - 8.4 g/dL Final     Albumin   Date Value Ref Range Status   09/04/2020 3.9 3.5 - 5.2 g/dL Final     Total Bilirubin   Date Value Ref Range Status   09/04/2020 0.4 0.1 - 1.0 mg/dL Final     Comment:     For infants and newborns, interpretation of results should be based  on gestational age, weight and in agreement with clinical  observations.  Premature Infant recommended reference ranges:  Up to 24 hours.............<8.0 mg/dL  Up to 48 hours............<12.0 mg/dL  3-5 days..................<15.0  mg/dL  6-29 days.................<15.0 mg/dL       Alkaline Phosphatase   Date Value Ref Range Status   09/04/2020 103 55 - 135 U/L Final     AST   Date Value Ref Range Status   09/04/2020 17 10 - 40 U/L Final     ALT   Date Value Ref Range Status   09/04/2020 14 10 - 44 U/L Final     Anion Gap   Date Value Ref Range Status   09/04/2020 10 8 - 16 mmol/L Final     eGFR if    Date Value Ref Range Status   09/04/2020 >60.0 >60 mL/min/1.73 m^2 Final     eGFR if non    Date Value Ref Range Status   09/04/2020 >60.0 >60 mL/min/1.73 m^2 Final     Comment:     Calculation used to obtain the estimated glomerular filtration  rate (eGFR) is the CKD-EPI equation.          Lab Results   Component Value Date    HGBA1C 6.0 (H) 09/04/2020             ASSESSMENT: 60 y.o. year old female with chronic neck and lower back pain, consistent with     1. Myalgia of auxiliary muscles, head and neck     2. Chronic right-sided low back pain with bilateral sciatica  Ambulatory referral/consult to Pain Clinic    Ambulatory referral/consult to Physical/Occupational Therapy   3. Chronic myofascial pain     4. Morbid obesity with BMI of 50.0-59.9, adult     5. Sacroiliitis     6. Shoulder impingement, left           PLAN:   - Interventions: None at this time.    - Anticoagulation use: no     - Medications: I have stressed the importance of physical activity and a home exercise plan to help with pain and improve health. and Patient can continue with medications for now since they are providing benefits, using them appropriately, and without side effects.     - Therapy:  Agree physical therapy, will change order to be conducted at [facility] facility.    - Psychological:  Discussed coping mechanisms to help address chronic pain issues    - Labs:  Reviewed    - Imaging: Reviewed available imaging with patient and answered any questions they had regarding study.    - Consults/Referrals:  Physical therapy    -  Records:  Complete release of information form in order to obtain records [facility].  Reviewed/Obtain old records from outside physicians and imaging    - Follow up visit: return to clinic in 10-12 weeks or as needed    - Counseled patient regarding the importance of activity modification, weight loss strategies, and physical therapy    - This condition does not require this patient to take time off of work, and the primary goal of our Pain Management services is to improve the patient's functional capacity.    - Patient Questions: Answered all of the patient's questions regarding diagnosis, therapy, and treatment        The above plan and management options were discussed at length with patient. Patient is in agreement with the above and verbalized understanding.    I discussed the goals of interventional chronic pain management with the patient on today's visit.  I explained the utility of injections for diagnostic and therapeutic purposes.  We discussed a multimodal approach to pain including treating the patient's given worst pain at any given time.  We will use a systematic approach to addressing pain.  We will also adopt a multimodal approach that includes injections, adjuvant medications, physical therapy, at times psychiatry.  There may be a limited role for opioid use intermittently in the treatment of pain, more particularly for acute pain although no one approach can be used as a sole treatment modality.    I emphasized the importance of regular exercise, core strengthening and stretching, diet and weight loss as a cornerstone of long-term pain management.      Bladimir Ba MD  Interventional Pain Management  [facility]    Disclaimer:  This note was prepared using voice recognition system and is likely to have sound alike errors that may have been overlooked even after proof reading.  Please call me with any questions

## 2020-09-11 NOTE — LETTER
September 11, 2020      Oleksandr Nagel MD  [id number] [facility]  [place] [id number]           [place] [facility] Interventional Pain Medicine  [id number] [facility]  [place] [address]  Phone: [phone]  Fax: [phone]          Patient: Zakia Price   MR Number: [id number]   YOB: 1960   Date of Visit: 9/11/2020       Dear Dr. Oleksandr Nagel:    Thank you for referring Zakia Price to me for evaluation. Attached you will find relevant portions of my assessment and plan of care.    If you have questions, please do not hesitate to call me. I look forward to following Zakia Price along with you.    Sincerely,    Bladimir Ba MD    Enclosure  CC:  No Recipients    If you would like to receive this communication electronically, please contact [email or web address] or [phone] to request more information on [employer] access.    For providers and/or their staff who would like to refer a patient to [facility], please contact us through our one-stop-shop provider referral line, [facility], at [phone].    If you feel you have received this communication in error or would no longer like to receive these types of communications, please e-mail [email or web address]

## 2020-09-21 ENCOUNTER — CLINICAL SUPPORT (OUTPATIENT)
Dept: REHABILITATION | Facility: HOSPITAL | Age: 60
End: 2020-09-21
Attending: PHYSICAL MEDICINE & REHABILITATION
Payer: MEDICAID

## 2020-09-21 DIAGNOSIS — M54.41 CHRONIC RIGHT-SIDED LOW BACK PAIN WITH BILATERAL SCIATICA: ICD-10-CM

## 2020-09-21 DIAGNOSIS — G89.29 CHRONIC RIGHT-SIDED LOW BACK PAIN WITH BILATERAL SCIATICA: ICD-10-CM

## 2020-09-21 DIAGNOSIS — R29.3 POSTURE ABNORMALITY: ICD-10-CM

## 2020-09-21 DIAGNOSIS — R29.898 WEAKNESS OF BOTH LOWER EXTREMITIES: ICD-10-CM

## 2020-09-21 DIAGNOSIS — M54.42 CHRONIC RIGHT-SIDED LOW BACK PAIN WITH BILATERAL SCIATICA: ICD-10-CM

## 2020-09-21 DIAGNOSIS — M53.86 DECREASED ROM OF LUMBAR SPINE: ICD-10-CM

## 2020-09-21 PROCEDURE — 97110 THERAPEUTIC EXERCISES: CPT | Mod: PN

## 2020-09-21 PROCEDURE — 97161 PT EVAL LOW COMPLEX 20 MIN: CPT | Mod: PN

## 2020-09-21 NOTE — PLAN OF CARE
"[facility]  Physical Therapy Initial Evaluation    Name: Zakia Price  Clinic Number: [id number]    Therapy Diagnosis:   Encounter Diagnoses   Name Primary?    Chronic right-sided low back pain with bilateral sciatica     Decreased ROM of lumbar spine     Weakness of both lower extremities     Posture abnormality      Physician: Bladimir Ba MD    Physician Orders: PT Eval and Treat   Medical Diagnosis from Referral: M54.41,M54.42,G89.29 (ICD-10-CM) - Chronic right-sided low back pain with bilateral sciatica  Evaluation Date: 9/21/2020  Authorization Period Expiration: 10/21/2020  Plan of Care Expiration: 11/2/2020  Visit # / Visits authorized: 1/ 1    Time In: 1105 AM (Pt arrived late)  Time Out: 1150 AM  Total Billable Time: 45 minutes    Precautions: Diabetes, HTN, obesity    Subjective     Date of onset: Chronic  History of current condition - Zakia reports: chronic pain in low back with symptoms that "come and go". Symptoms appear in both legs but usually right leg is worse. She reports history of neuropathy and occasional "tingling" down to her toes. She is retired at this time and sued to work as a . Mopping, lifting, bending and prolonged sitting while at home usually cause symptoms to worsen. Taking her prescribed medicine improved symptoms when they are severe. Currently the pain is minimal but at times it can reach 10/10. There is no specific GILLIAN and no recent falls.        Past Medical History:   Diagnosis Date    Diabetes mellitus, type 2     Diabetic neuropathy 1/27/2014    DJD (degenerative joint disease) of knee     DVT (deep venous thrombosis) around 1990's    in leg, is on no anticoagulant therapy presently    GERD (gastroesophageal reflux disease)     Hypertension associated with diabetes     Multinodular goiter     Obesity, morbid, BMI 50 or higher     Sleep apnea     has no CPAP     Zakia Price  has a past surgical history " "that includes Hysterectomy; Tonsillectomy; Wrist fracture surgery; Shoulder arthroscopy; Thyroidectomy, partial (Right); Fracture surgery; and Tubal ligation (1984).    Zakia has a current medication list which includes the following prescription(s): acetaminophen, amlodipine, blood sugar diagnostic, blood-glucose meter, cyclobenzaprine, famotidine, fluticasone propionate, gabapentin, lancets, lancets, levocetirizine, levothyroxine, meloxicam, metformin, montelukast, omeprazole, pravastatin, and ozempic.    Review of patient's allergies indicates:   Allergen Reactions    Codeine sulfate      Nausea^    Lisinopril Swelling     angioedema    Codeine Nausea Only and Rash        Imaging, none    Pain:  Current 3/10, worst 10/10, best 0/10   Location: right and left back  and lower legs  Description: Aching, Dull and Shooting  Aggravating Factors: Standing, Bending, Morning and Lifting  Easing Factors: pain medication, heating pad and rest    Pts goals: "get rid of the pain"    History     Prior Therapy: yes, but not for back  Social History: live with grand daughter  Previous functional status: independent  Current functional status: independent  Work: retired    Objective     Mental status: alert  Posture/ Alignment: Fair    GAIT DEVIATIONS: Zakia amb with decreased nu, decreased step length, decreased stride length and increased stride width.    ROM: Lumbar  AROM  Comment   Flexion: To ankle  Min pain returning to neutral   Extension: 50% pain   Lat Flex R: 75%    Lat Flex L: 75%    Rotation R: 75%    Rotation L: 75%        Strength: Dermatomes:   Right Left Comment   L2 intact intact    L3 intact intact    L4 impaired - minimum intact    L5 impaired - minimum intact    S1 intact intact    S2 intact intact    Saddle intact intact      Myotomes:   Right Left Comment   Hip flexion (L2-3): 4+/5 4+/5    Knee extension (L3-4): 4+/5 4+/5    DF (L4-5): 5/5 5/5    Great Toe Ext (L5-S1): 5/5 5/5    Great Toe Flex " (S1-S2): 5/5 5/5      BLE hamstring MMT: 3+/5  Core stability: poor           Right Left   L2/3 Iliacus Hip flexion  4+ 4+   L3/4 Qudratus Femoris Knee Extension 4+ 4+   L4/5 Tib Anterior Ankle Dorsiflexion  5 5        DTR:   Right Left Comment   Patellar (L3-4) 2+ 2+    Achilles (S1) 2+ 2+        Special Tests:  Repeated ROM ROM (% of normal) Pain? Radiation?   Flex Stand: 75% Minimal  yes   Ext Stand: 50% decreases centralizes   Flex Supine: BISHNU     Ext Prone: BISHNU       Functional Tests (* indicates w/ pain)   Gait: mild antalgic gait, decreased nu and step length   Sit<>stand: antalgic    Palpation:  Minimal, will further assess at next visit    Joint Play:  UT, will further assess at next visit    Pt/family was provided educational information, including: role of PT, goals for PT, scheduling - pt verbalized understanding. Discussed insurance plan with pt.       CMS Impairment/Limitation/Restriction for FOTO Survey    Therapist reviewed FOTO scores for Zakia Price on 9/21/2020.   FOTO documents entered into [employer] - see Media section.    Limitation Score: Will assess ASAP once system is in place       TREATMENT     Treatment Time In: 1135  Treatment Time Out: 1145  Total Treatment time separate from Evaluation: 10 minutes    Zakia received therapeutic exercises to develop strength, endurance, ROM, flexibility, posture and core stabilization for 10 minutes including:    HEP review and education  Seated hamstring stretch 10 reps, 10 second hold  Supine sciatic nerve glide 30 reps  Standing lumbar extension 3x10    Possible next visit: NuStep, pelvic tilts, hamstring stretch with strap, piriformis stretch, DKTC, lower trunk rotations, glute sets     Home Exercises and Patient Education Provided    Education provided:   - HEP provided and reviewed  - Anatomy and physiology relevant to current condition  - Postural correction  - Possible response to PT and post exercise    Written Home Exercises  Provided: yes.  Exercises were reviewed and Zakia was able to demonstrate them prior to the end of the session.  Zakia demonstrated good  understanding of the education provided.     See EMR under Patient Instructions for exercises provided 9/21/2020.      Assessment   Pt presents with medical diagnosis of Chronic right-sided low back pain with bilateral sciatica. Pt currently demonstrates decreased lumbar ROM, bilateral lower extremity weakness, impaired posture and gait, and decreased functional mobility. Due to her symptoms, her ADL tolerance has decreased and her overall mobility is frequently impaired due to pain. Based on these limitations she would greatly benefit from skilled PT at this time.     Pt prognosis is Fair.   Pt will benefit from skilled outpatient Physical Therapy to address the deficits stated above and in the chart below, provide pt/family education, and to maximize pt's level of independence.     Plan of care discussed with patient: Yes  Pt's spiritual, cultural and educational needs considered and patient is agreeable to the plan of care and goals as stated below:     Anticipated Barriers for therapy: dependent on transportation     Medical Necessity is demonstrated by the following  History  Co-morbidities and personal factors that may impact the plan of care Co-morbidities:   diabetes, high BMI and HTN    Personal Factors:   no deficits     moderate   Examination  Body Structures and Functions, activity limitations and participation restrictions that may impact the plan of care Body Regions:   back    Body Systems:    ROM  strength  gross coordinated movement  gait  transfers    Participation Restrictions:   ADLs. Recreational     Activity limitations:   Learning and applying knowledge  no deficits    General Tasks and Commands  no deficits    Communication  no deficits    Mobility  lifting and carrying objects  walking  driving (bike, car, motorcycle)    Self care  no  deficits    Domestic Life  doing house work (cleaning house, washing dishes, laundry)  assisting others    Interactions/Relationships  no deficits    Life Areas  no deficits    Community and Social Life  community life  recreation and leisure         moderate   Clinical Presentation stable and uncomplicated low   Decision Making/ Complexity Score: low     Goals:  Short Term Goals: 3 weeks   - Pt will be independent with HEP to facilitate healing and improve outcome measures  - Pt will increase bilateral hamstring strength by at least a half grade to improve stability and prevent fatigue.  - Pt will be able to assume prone position and tolerate lumbar extension exercises.     Long Term Goals: 6 weeks   - Pt will be jaspreet to tolerate ADLs with pain no greater than 2/10 without radicular symptoms into both legs.   - Pt will increase lumbar extension to at least 90% of normal range with min discomfort in order to improve posture and overall mobility.   - Pt will increase bilateral LE gross MMT to at least 5-/5 in order to tolerate increased ADLs and begin light recreational activities.       Plan   Plan of care Certification: 9/21/2020 to 11/2/2020.    Possible next visit: NuStep, pelvic tilts, hamstring stretch with strap, piriformis stretch, DKTC, lower trunk rotations, glute sets     Outpatient Physical Therapy 2 times weekly for 6 weeks to include the following interventions: Cervical/Lumbar Traction, Electrical Stimulation TENS, IFC, Premod, Manual Therapy, Moist Heat/ Ice, Patient Education, Therapeutic Activites and Therapeutic Exercise.     Sameer Salazar, PT

## 2020-09-28 DIAGNOSIS — M54.41 CHRONIC RIGHT-SIDED LOW BACK PAIN WITH BILATERAL SCIATICA: ICD-10-CM

## 2020-09-28 DIAGNOSIS — M54.42 CHRONIC RIGHT-SIDED LOW BACK PAIN WITH BILATERAL SCIATICA: ICD-10-CM

## 2020-09-28 DIAGNOSIS — G89.29 CHRONIC RIGHT-SIDED LOW BACK PAIN WITH BILATERAL SCIATICA: ICD-10-CM

## 2020-09-28 RX ORDER — MELOXICAM 15 MG/1
15 TABLET ORAL DAILY
Qty: 90 TABLET | Refills: 3 | Status: SHIPPED | OUTPATIENT
Start: 2020-09-28 | End: 2020-10-19

## 2020-09-29 ENCOUNTER — CLINICAL SUPPORT (OUTPATIENT)
Dept: REHABILITATION | Facility: HOSPITAL | Age: 60
End: 2020-09-29
Attending: PHYSICAL MEDICINE & REHABILITATION
Payer: MEDICAID

## 2020-09-29 DIAGNOSIS — R29.3 POSTURE ABNORMALITY: ICD-10-CM

## 2020-09-29 DIAGNOSIS — M53.86 DECREASED ROM OF LUMBAR SPINE: ICD-10-CM

## 2020-09-29 DIAGNOSIS — R29.898 WEAKNESS OF BOTH LOWER EXTREMITIES: ICD-10-CM

## 2020-09-29 PROCEDURE — 97110 THERAPEUTIC EXERCISES: CPT | Mod: PN

## 2020-09-29 NOTE — PROGRESS NOTES
Physical Therapy Daily Treatment Note     Name: Zakia Buchanan [place]  Clinic Number: [id number]    Therapy Diagnosis:   Encounter Diagnoses   Name Primary?    Decreased ROM of lumbar spine     Weakness of both lower extremities     Posture abnormality      Physician: Bladimir Ba MD    Visit Date: 9/29/2020    Physician Orders: PT Eval and Treat   Medical Diagnosis from Referral: M54.41,M54.42,G89.29 (ICD-10-CM) - Chronic right-sided low back pain with bilateral sciatica  Evaluation Date: 9/21/2020  Authorization Period Expiration: 10/21/2020  Plan of Care Expiration: 11/2/2020  Visit # / Visits authorized: 2/10    Time In: 12:45 PM  Time Out: 1:25 PM  Total Billable Time: 40 minutes    Precautions: Diabetes, HTN, obesity    Subjective     Pt reports: minimal change since initial eval. There has been no change in symptoms or onset of additional symptoms during this time. She has no pain this morning but some movements or positions cause spasms / sharp pain in back.     She was compliant with home exercise program.  Response to previous treatment: slight improvement, symptoms returned  Functional change: no significant change    Pain: 0/10  Location: none    Objective     Zakia received therapeutic exercises to develop strength, endurance, ROM, flexibility, posture and core stabilization for 40 minutes including:    NuStep 7 min  Seated pelvic tilts x30  Hamstring stretch with strap, supine 3 min each  Piriformis stretch, bilateral 2 min each  DKTC stretch with SB 3 min  Glute sets, bilateral x30 each    Possible next visit: Lower trunk rotations with stability ball, knee fall out, prone progression, bridging    Home Exercises Provided and Patient Education Provided     Education provided:   - HEP provided and reviewed  - Anatomy and physiology relevant to current condition  - Postural correction  - Possible response to PT and post exercise    Written Home Exercises Provided: yes.  Exercises were reviewed  and Zakia was able to demonstrate them prior to the end of the session.  Zakia demonstrated good  understanding of the education provided.      See EMR under Patient Instructions for exercises provided 9/21/2020.      Assessment     Pt performs prescribed therex very well with minimal fatigue or complaints. During treatment, she has more difficulty with her right side of back and right lower extremity. She would benefit from continuance at this time to address limitations and progress as tolerated. PT today was focused on general strengthening and gentle lumbar/pelvic mobility.     Zakia is progressing well towards her goals.   Pt prognosis is Fair.     Pt will continue to benefit from skilled outpatient physical therapy to address the deficits listed in the problem list box on initial evaluation, provide pt/family education and to maximize pt's level of independence in the home and community environment.     Pt's spiritual, cultural and educational needs considered and pt agreeable to plan of care and goals.    Anticipated barriers to physical therapy: dependent on transportation     Goals:  Short Term Goals: 3 weeks   - Pt will be independent with HEP to facilitate healing and improve outcome measures (progressing, not met)  - Pt will increase bilateral hamstring strength by at least a half grade to improve stability and prevent fatigue. (progressing, not met)  - Pt will be able to assume prone position and tolerate lumbar extension exercises. (progressing, not met)     Long Term Goals: 6 weeks   - Pt will be jaspreet to tolerate ADLs with pain no greater than 2/10 without radicular symptoms into both legs. (progressing, not met)  - Pt will increase lumbar extension to at least 90% of normal range with min discomfort in order to improve posture and overall mobility. (progressing, not met)  - Pt will increase bilateral LE gross MMT to at least 5-/5 in order to tolerate increased ADLs and begin light recreational  activities. (progressing, not met)    Plan     Continue with current PT POC.    Possible next visit: Lower trunk rotations with stability ball, knee fall out, prone progression, bridging    Sameer Salazar, PT

## 2020-10-02 ENCOUNTER — OFFICE VISIT (OUTPATIENT)
Dept: FAMILY MEDICINE | Facility: CLINIC | Age: 60
End: 2020-10-02
Payer: MEDICAID

## 2020-10-02 VITALS
DIASTOLIC BLOOD PRESSURE: 69 MMHG | TEMPERATURE: 98 F | HEART RATE: 88 BPM | BODY MASS INDEX: 44.41 KG/M2 | SYSTOLIC BLOOD PRESSURE: 132 MMHG | HEIGHT: 68 IN | WEIGHT: 293 LBS

## 2020-10-02 DIAGNOSIS — E11.65 TYPE 2 DIABETES MELLITUS WITH HYPERGLYCEMIA, WITHOUT LONG-TERM CURRENT USE OF INSULIN: Chronic | ICD-10-CM

## 2020-10-02 DIAGNOSIS — Z23 FLU VACCINE NEED: ICD-10-CM

## 2020-10-02 DIAGNOSIS — Z12.11 COLON CANCER SCREENING: ICD-10-CM

## 2020-10-02 DIAGNOSIS — Z00.00 WELL ADULT EXAM: Primary | ICD-10-CM

## 2020-10-02 DIAGNOSIS — Z79.899 ENCOUNTER FOR LONG-TERM (CURRENT) USE OF MEDICATIONS: ICD-10-CM

## 2020-10-02 PROCEDURE — 99215 OFFICE O/P EST HI 40 MIN: CPT | Mod: PBBFAC,PO,25 | Performed by: FAMILY MEDICINE

## 2020-10-02 PROCEDURE — 99999 PR PBB SHADOW E&M-EST. PATIENT-LVL V: CPT | Mod: PBBFAC,,, | Performed by: FAMILY MEDICINE

## 2020-10-02 PROCEDURE — 90471 IMMUNIZATION ADMIN: CPT | Mod: PBBFAC,PO

## 2020-10-02 PROCEDURE — 99396 PREV VISIT EST AGE 40-64: CPT | Mod: S$PBB,,, | Performed by: FAMILY MEDICINE

## 2020-10-02 PROCEDURE — 99999 PR PBB SHADOW E&M-EST. PATIENT-LVL V: ICD-10-PCS | Mod: PBBFAC,,, | Performed by: FAMILY MEDICINE

## 2020-10-02 PROCEDURE — 99396 PR PREVENTIVE VISIT,EST,40-64: ICD-10-PCS | Mod: S$PBB,,, | Performed by: FAMILY MEDICINE

## 2020-10-02 RX ORDER — PANTOPRAZOLE SODIUM 40 MG/1
40 TABLET, DELAYED RELEASE ORAL 2 TIMES DAILY
COMMUNITY
End: 2020-11-24 | Stop reason: SDUPTHER

## 2020-10-02 RX ORDER — SEMAGLUTIDE 1.34 MG/ML
0.75 INJECTION, SOLUTION SUBCUTANEOUS
Qty: 2 SYRINGE | Refills: 11 | Status: SHIPPED | OUTPATIENT
Start: 2020-10-02 | End: 2021-10-02

## 2020-10-02 NOTE — PROGRESS NOTES
PLAN:      Problem List Items Addressed This Visit     Type 2 diabetes mellitus with hyperglycemia, without long-term current use of insulin (Chronic)     .  Increase OZEMPIC.  Stop metformin.    Monitor hemoglobin A1c.  Discussed diabetic diet and exercise protocol.  Continue medications.  Monitor for side effects.  Discussed checking blood glucose.  Discussed symptoms to monitor for and to notify me immediately if persistent or worsening.  Follow up with Ophthalmology/Optometry and Podiatry.           Relevant Medications    semaglutide (OZEMPIC) 1 mg/dose (2 mg/1.5 mL) PnIj    Encounter for long-term (current) use of medications (Chronic)    Flu vaccine need    Relevant Orders    Influenza - Quadrivalent (PF) (Completed)      Other Visit Diagnoses     Well adult exam    -  Primary    Colon cancer screening        Relevant Orders    Case request GI: COLONOSCOPY (Completed)        Future Appointments     Date Provider Specialty Appt Notes    10/6/2020 Sameer Salazar, PT Outpatient Rehab 2x a week    10/8/2020 Sameer Salazar, PT Outpatient Rehab 2x a week    11/20/2020 Bladimir Ba MD Pain Medicine 10 F/U h    12/8/2020  Lab     1/11/2021 Oleksandr Nagel MD Family Medicine f/u           Medication List with Changes/Refills   New Medications    SEMAGLUTIDE (OZEMPIC) 1 MG/DOSE (2 MG/1.5 ML) PNIJ    Inject 0.75 mLs into the skin every 7 days.   Current Medications    ACETAMINOPHEN (TYLENOL) 500 MG TABLET    Take 2 tablets (1,000 mg total) by mouth every 6 (six) hours as needed for Pain.    AMLODIPINE (NORVASC) 5 MG TABLET    Take 1 tablet (5 mg total) by mouth once daily.    BLOOD SUGAR DIAGNOSTIC (CLEVER CHOICE VOICE+ TEST) STRP    TEST BLOOD SUGARS ONE TIME DAILY    BLOOD-GLUCOSE METER KIT    Use before meals and before bed when the glucoses are not in control.  Otherwise, test it daily once a day before meals randomly to ensure    CYCLOBENZAPRINE (FLEXERIL) 10 MG TABLET    Take 1 tablet by mouth 3 times  daily as needed for muscle spasms.    FAMOTIDINE (PEPCID) 40 MG TABLET        FLUTICASONE PROPIONATE (FLONASE) 50 MCG/ACTUATION NASAL SPRAY    Use 2 sprays to each nostril daily    GABAPENTIN (NEURONTIN) 600 MG TABLET    Take 1 tablet (600 mg total) by mouth 3 (three) times daily.    LANCETS (PRODIGY LANCETS) 28 GAUGE MISC    1 lancet by Misc.(Non-Drug; Combo Route) route once daily. As  Directed    LANCETS [facility]. route As directed    LEVOCETIRIZINE (XYZAL) 5 MG TABLET    Take 1 tablet (5 mg total) by mouth every evening.    LEVOTHYROXINE (SYNTHROID) 100 MCG TABLET    Take 1 tablet (100 mcg total) by mouth once daily.    MELOXICAM (MOBIC) 15 MG TABLET    Take 1 tablet (15 mg total) by mouth once daily.    MONTELUKAST (SINGULAIR) 10 MG TABLET        PANTOPRAZOLE (PROTONIX) 40 MG TABLET    1 tablet    PRAVASTATIN (PRAVACHOL) 80 MG TABLET    Take 1 tablet (80 mg total) by mouth once daily.   Discontinued Medications    METFORMIN (GLUCOPHAGE) 1000 MG TABLET    Take 1 tablet (1,000 mg total) by mouth 2 (two) times daily with meals.    OMEPRAZOLE (PRILOSEC) 40 MG CAPSULE    Take 1 capsule (40 mg total) by mouth once daily.    SEMAGLUTIDE (OZEMPIC) 0.25 MG OR 0.5 MG(2 MG/1.5 ML) PNJOSESITO    Inject 0.25 mg into the skin once a week for 30 days, THEN 0.5 mg once a week.       Oleksandr Nagel M.D.     ==========================================================================  Subjective:      Patient ID: Zakia Price is a 60 y.o. female.  has a past medical history of Diabetes mellitus, type 2, Diabetic neuropathy (1/27/2014), DJD (degenerative joint disease) of knee, DVT (deep venous thrombosis) (around 1990's), GERD (gastroesophageal reflux disease), Hypertension associated with diabetes, Multinodular goiter, Obesity, morbid, BMI 50 or higher, and Sleep apnea.     Chief Complaint: Annual Exam      Problem List Items Addressed This Visit     Type 2 diabetes mellitus with hyperglycemia, without long-term  current use of insulin (Chronic)    Overview     ====================================================  November 2019:  Reviewed diabetes management status.  Patient was due for LDL less than 100 at that time.  Also needing foot exam.  =========================================  DECEMBER 2019:  Patient reports issues with ACE-inhibitor.  Currently having cough.  Only on amlodipine.  Diabetes Management StatusStatin: TakingACE/ARB: Not taking  =========================================  MARCH 2020:  Diabetes Management StatusStatin: Taking  ACE/ARB: Not taking  ==================================================  SEPTEMBER 2020:  Reviewed Diabetes Management Status.  Patient is taking statin but not Ace or Arb    Screening or Prevention Patient's value Goal Complete/Controlled?   HgA1C Testing and Control   Lab Results   Component Value Date    HGBA1C 6.0 (H) 09/04/2020      Annually/Less than 8% Yes   Lipid profile : 09/04/2020 Annually Yes   LDL control Lab Results   Component Value Date    LDLCALC 88.6 09/04/2020    Annually/Less than 100 mg/dl  Yes   Nephropathy screening Lab Results   Component Value Date    LABMICR 18.0 09/04/2020     Lab Results   Component Value Date    PROTEINUA Negative 10/28/2015    Annually Yes   Blood pressure BP Readings from Last 1 Encounters:   09/04/20 126/81    Less than 140/90 Yes   Dilated retinal exam : 10/02/2019 Annually Yes   Foot exam   : 12/10/2019 Annually Yes     =================================================         Current Assessment & Plan     .  Increase OZEMPIC.  Stop metformin.    Monitor hemoglobin A1c.  Discussed diabetic diet and exercise protocol.  Continue medications.  Monitor for side effects.  Discussed checking blood glucose.  Discussed symptoms to monitor for and to notify me immediately if persistent or worsening.  Follow up with Ophthalmology/Optometry and Podiatry.           Encounter for long-term (current) use of medications (Chronic)    Overview      CHRONIC. Stable. Compliant with medications for managed conditions. See medication list. No SE reported.   Routine lab analysis is being monitored. Refills were addressed.  Lab Results   Component Value Date    WBC 5.42 09/05/2018    HGB 12.3 09/05/2018    HCT 38.7 09/05/2018    MCV 91 09/05/2018     (H) 09/05/2018         Chemistry        Component Value Date/Time     09/04/2019 1020    K 4.1 09/04/2019 1020     09/04/2019 1020    CO2 26 09/04/2019 1020    BUN 8 09/04/2019 1020    CREATININE 0.8 09/04/2019 1020     09/04/2019 1020        Component Value Date/Time    CALCIUM 9.7 09/04/2019 1020    ALKPHOS 97 09/04/2019 1020    AST 13 09/04/2019 1020    ALT 10 09/04/2019 1020    BILITOT 0.2 09/04/2019 1020    ESTGFRAFRICA >60.0 09/04/2019 1020    EGFRNONAA >60.0 09/04/2019 1020          Lab Results   Component Value Date    TSH 2.507 12/04/2019    FREET4 1.06 12/04/2019    T3FREE 2.7 12/04/2019              Flu vaccine need      Other Visit Diagnoses     Well adult exam    -  Primary    Colon cancer screening               Past Medical History:  Past Medical History:   Diagnosis Date    Diabetes mellitus, type 2     Diabetic neuropathy 1/27/2014    DJD (degenerative joint disease) of knee     DVT (deep venous thrombosis) around 1990's    in leg, is on no anticoagulant therapy presently    GERD (gastroesophageal reflux disease)     Hypertension associated with diabetes     Multinodular goiter     Obesity, morbid, BMI 50 or higher     Sleep apnea     has no CPAP     Past Surgical History:   Procedure Laterality Date    FRACTURE SURGERY      HYSTERECTOMY      SHOULDER ARTHROSCOPY      THYROIDECTOMY, PARTIAL Right     and transplatation of right superior parathyroid gland to the sternocleidomastoid muscle     TONSILLECTOMY      TUBAL LIGATION  1984    WRIST FRACTURE SURGERY      left     Review of patient's allergies indicates:   Allergen Reactions    Codeine sulfate       Nausea^    Lisinopril Swelling     angioedema    Codeine Nausea Only and Rash     Medication List with Changes/Refills   New Medications    SEMAGLUTIDE (OZEMPIC) 1 MG/DOSE (2 MG/1.5 ML) PNIJ    Inject 0.75 mLs into the skin every 7 days.   Current Medications    ACETAMINOPHEN (TYLENOL) 500 MG TABLET    Take 2 tablets (1,000 mg total) by mouth every 6 (six) hours as needed for Pain.    AMLODIPINE (NORVASC) 5 MG TABLET    Take 1 tablet (5 mg total) by mouth once daily.    BLOOD SUGAR DIAGNOSTIC ([employer] CHOICE VOICE+ TEST) STRP    TEST BLOOD SUGARS ONE TIME DAILY    BLOOD-GLUCOSE METER KIT    Use before meals and before bed when the glucoses are not in control.  Otherwise, test it daily once a day before meals randomly to ensure    CYCLOBENZAPRINE (FLEXERIL) 10 MG TABLET    Take 1 tablet by mouth 3 times daily as needed for muscle spasms.    FAMOTIDINE (PEPCID) 40 MG TABLET        FLUTICASONE PROPIONATE (FLONASE) 50 MCG/ACTUATION NASAL SPRAY    Use 2 sprays to each nostril daily    GABAPENTIN (NEURONTIN) 600 MG TABLET    Take 1 tablet (600 mg total) by mouth 3 (three) times daily.    LANCETS (PRODIGY LANCETS) 28 GAUGE MISC    1 lancet by Misc.(Non-Drug; Combo Route) route once daily. As  Directed    LANCETS [facility]. route As directed    LEVOCETIRIZINE (XYZAL) 5 MG TABLET    Take 1 tablet (5 mg total) by mouth every evening.    LEVOTHYROXINE (SYNTHROID) 100 MCG TABLET    Take 1 tablet (100 mcg total) by mouth once daily.    MELOXICAM (MOBIC) 15 MG TABLET    Take 1 tablet (15 mg total) by mouth once daily.    MONTELUKAST (SINGULAIR) 10 MG TABLET        PANTOPRAZOLE (PROTONIX) 40 MG TABLET    1 tablet    PRAVASTATIN (PRAVACHOL) 80 MG TABLET    Take 1 tablet (80 mg total) by mouth once daily.   Discontinued Medications    METFORMIN (GLUCOPHAGE) 1000 MG TABLET    Take 1 tablet (1,000 mg total) by mouth 2 (two) times daily with meals.    OMEPRAZOLE (PRILOSEC) 40 MG CAPSULE    Take 1 capsule (40 mg total) by mouth once  "daily.    SEMAGLUTIDE (OZEMPIC) 0.25 MG OR 0.5 MG(2 MG/1.5 ML) PNIJ    Inject 0.25 mg into the skin once a week for 30 days, THEN 0.5 mg once a week.      Social History     Tobacco Use    Smoking status: Never Smoker    Smokeless tobacco: Never Used   Substance Use Topics    Alcohol use: No     Frequency: Never      Family History   Problem Relation Age of Onset    Leukemia Son     Cancer Son     Diabetes Mother     Hypertension Mother     Heart disease Mother 50    Cancer Father     Cancer Brother     Cancer Brother        I have reviewed the complete PMH, social history, surgical history, allergies and medications.  As well as family history.    Review of Systems   Constitutional: Negative for activity change and fatigue.   HENT: Negative for congestion and sinus pain.    Eyes: Negative for visual disturbance.   Respiratory: Negative for chest tightness and shortness of breath.    Cardiovascular: Negative for palpitations and leg swelling.   Gastrointestinal: Negative for abdominal pain, diarrhea and nausea.   Endocrine: Negative for polyuria.   Genitourinary: Negative for difficulty urinating and frequency.   Musculoskeletal: Negative for arthralgias and joint swelling.   Skin: Negative for rash.   Neurological: Negative for dizziness and headaches.   Psychiatric/Behavioral: Negative for agitation. The patient is not nervous/anxious.      Objective:   /69   Pulse 88   Temp 98.4 °F (36.9 °C) (Temporal)   Ht 5' 8" (1.727 m)   Wt (!) 162.8 kg (358 lb 12.8 oz)   BMI 54.56 kg/m²   Physical Exam  Vitals signs and nursing note reviewed.   Constitutional:       General: She is not in acute distress.     Appearance: She is well-developed.   HENT:      Head: Normocephalic and atraumatic.   Eyes:      Pupils: Pupils are equal, round, and reactive to light.   Neck:      Musculoskeletal: Normal range of motion and neck supple.   Cardiovascular:      Rate and Rhythm: Normal rate and regular rhythm.      " Heart sounds: Normal heart sounds. No murmur.   Pulmonary:      Effort: Pulmonary effort is normal. No respiratory distress.      Breath sounds: Normal breath sounds. No wheezing.   Musculoskeletal: Normal range of motion.   Skin:     General: Skin is warm and dry.      Capillary Refill: Capillary refill takes less than 2 seconds.   Neurological:      Mental Status: She is alert and oriented to person, place, and time.      Cranial Nerves: No cranial nerve deficit.   Psychiatric:         Behavior: Behavior normal.         Assessment:     1. Well adult exam    2. Type 2 diabetes mellitus with hyperglycemia, without long-term current use of insulin    3. Flu vaccine need    4. Colon cancer screening    5. Encounter for long-term (current) use of medications      MDM:   Moderate complexity.  Moderate risk.  Patient here for annual wellness visit.  See other note.  I have Reviewed and summarized old records.  I have performed thorough medication reconciliation today and discussed risk and benefits of each medication.  I have reviewed labs and discussed with patient.  All questions were answered.  I am requesting old records and will review them once they are available.    I have signed for the following orders AND/OR meds.  Orders Placed This Encounter   Procedures    Influenza - Quadrivalent (PF)    Case request GI: COLONOSCOPY     Order Specific Question:   Pre-op Diagnosis     Answer:   Colon cancer screening [911715]     Order Specific Question:   CPT Code:     Answer:   AK COLORECTAL CANCER SCREEN RESULTS DOCUMENT/REVIEW [3017F]     Order Specific Question:   Case Referring Provider     Answer:   JUDAH PATRICIA [[id number]]     Order Specific Question:   CPT Code:     Answer:   AK COLON CA SCRN NOT HI RSK IND []     Order Specific Question:   Post-Procedure Disposition:     Answer:   Amb Surgery/DOSC [2]     Order Specific Question:   Medical Necessity:     Answer:   Medically Non-Urgent [100]     Order  Specific Question:   Is an on-site pathologist required for this procedure?     Answer:   N/A     Medications Ordered This Encounter   Medications    semaglutide (OZEMPIC) 1 mg/dose (2 mg/1.5 mL) PnIj     Sig: Inject 0.75 mLs into the skin every 7 days.     Dispense:  2 Syringe     Refill:  11        Follow up in about 6 months (around 4/2/2021), or if symptoms worsen or fail to improve, for Med refills, LAB RESULTS.    If no improvement in symptoms or symptoms worsen, advised to call/follow-up at clinic or go to ER. Patient voiced understanding and all questions/concerns were addressed.     DISCLAIMER: This note was compiled by using a speech recognition dictation system and therefore please be aware that typographical / speech recognition errors can and do occur.  Please contact me if you see any errors specifically.    Oleksandr Nagel M.D.       Office: [phone] [address] [place] [address]  FAX: [phone]

## 2020-10-03 PROBLEM — R51.9 HEADACHE: Status: ACTIVE | Noted: 2020-10-03

## 2020-10-03 PROBLEM — T78.3XXA ANGIOEDEMA: Status: ACTIVE | Noted: 2020-10-03

## 2020-10-03 PROBLEM — J30.2 SEASONAL ALLERGIC RHINITIS: Status: ACTIVE | Noted: 2020-10-03

## 2020-10-03 PROBLEM — R06.00 DYSPNEA: Status: ACTIVE | Noted: 2020-10-03

## 2020-10-03 PROBLEM — L85.3 DRY SKIN DERMATITIS: Status: ACTIVE | Noted: 2020-10-03

## 2020-10-03 NOTE — ASSESSMENT & PLAN NOTE
.  Increase OZEMPIC.  Stop metformin.    Monitor hemoglobin A1c.  Discussed diabetic diet and exercise protocol.  Continue medications.  Monitor for side effects.  Discussed checking blood glucose.  Discussed symptoms to monitor for and to notify me immediately if persistent or worsening.  Follow up with Ophthalmology/Optometry and Podiatry.

## 2020-10-03 NOTE — PATIENT INSTRUCTIONS
Follow up in about 6 months (around 4/2/2021), or if symptoms worsen or fail to improve, for Med refills, LAB RESULTS.     If no improvement in symptoms or symptoms worsen, please be advised to call MD, follow-up at clinic and/or go to ER if becomes severe.    Oleksandr Nagel M.D.        We Offer TELEHEALTH & Same Day Appointments!   Book your Telehealth appointment with me through my nurse or   Clinic appointments on [employer]!    [address] [place] [address]    Office: [phone]   FAX: [phone]    Check out my Facebook Page and Follow Me at: [email or web address]    Check out my website at [employer] by clicking on: [email or web address]    To Schedule appointments online, go to [employer]: [email or web address]

## 2020-10-04 NOTE — PROGRESS NOTES
This note is specifically for wellness visit performed today.     WELLNESS EXAM      Patient ID: Zakia Price is a 60 y.o. female with  has a past medical history of Diabetes mellitus, type 2, Diabetic neuropathy (1/27/2014), DJD (degenerative joint disease) of knee, DVT (deep venous thrombosis) (around 1990's), GERD (gastroesophageal reflux disease), Hypertension associated with diabetes, Multinodular goiter, Obesity, morbid, BMI 50 or higher, and Sleep apnea.     Chief Complaint:  Encounter for wellness exam    Well Adult Physical: Patient here for a comprehensive physical exam.The patient reports chronic problems.  Do you take any herbs or supplements that were not prescribed by a doctor? no   Are you taking calcium supplements? no   Are you taking aspirin daily? no       History:  Hysterectomy  OBGYN:  Requesting records    LMP: No LMP recorded. Patient has had a hysterectomy.   MMG:  Up-to-date  PAP:  Not indicated  Colonoscopy:  Patient is due    Health Maintenance Topics with due status: Not Due       Topic Last Completion Date    TETANUS VACCINE 10/29/2018    Foot Exam 12/10/2019    Mammogram 12/30/2019    Lipid Panel 09/04/2020    Hemoglobin A1c 09/04/2020    Urine Microalbumin 09/04/2020        ==============================================  History reviewed.   Health Maintenance Due   Topic Date Due    HIV Screening  01/14/1973    Shingles Vaccine (1 of 2) 01/14/2008    Colorectal Cancer Screening  12/10/2019    Eye Exam  10/02/2020       Past Medical History:  Past Medical History:   Diagnosis Date    Diabetes mellitus, type 2     Diabetic neuropathy 1/27/2014    DJD (degenerative joint disease) of knee     DVT (deep venous thrombosis) around 1990's    in leg, is on no anticoagulant therapy presently    GERD (gastroesophageal reflux disease)     Hypertension associated with diabetes     Multinodular goiter     Obesity, morbid, BMI 50 or higher     Sleep apnea     has no CPAP      Past Surgical History:   Procedure Laterality Date    FRACTURE SURGERY      HYSTERECTOMY      SHOULDER ARTHROSCOPY      THYROIDECTOMY, PARTIAL Right     and transplatation of right superior parathyroid gland to the sternocleidomastoid muscle     TONSILLECTOMY      TUBAL LIGATION  1984    WRIST FRACTURE SURGERY      left     Review of patient's allergies indicates:   Allergen Reactions    Codeine sulfate      Nausea^    Lisinopril Swelling     angioedema    Codeine Nausea Only and Rash     Current Outpatient Medications on File Prior to Visit   Medication Sig Dispense Refill    acetaminophen (TYLENOL) 500 MG tablet Take 2 tablets (1,000 mg total) by mouth every 6 (six) hours as needed for Pain.      amLODIPine (NORVASC) 5 MG tablet Take 1 tablet (5 mg total) by mouth once daily. 90 tablet 3    blood sugar diagnostic ([employer] VOICE+ TEST) Strp TEST BLOOD SUGARS ONE TIME DAILY 100 strip 1    blood-glucose meter kit Use before meals and before bed when the glucoses are not in control.  Otherwise, test it daily once a day before meals randomly to ensure 1 each 0    cyclobenzaprine (FLEXERIL) 10 MG tablet Take 1 tablet by mouth 3 times daily as needed for muscle spasms. 60 tablet 1    famotidine (PEPCID) 40 MG tablet       fluticasone propionate (FLONASE) 50 mcg/actuation nasal spray Use 2 sprays to each nostril daily 16 g 5    gabapentin (NEURONTIN) 600 MG tablet Take 1 tablet (600 mg total) by mouth 3 (three) times daily. 270 tablet 3    lancets (PRODIGY LANCETS) 28 gauge Misc 1 lancet by Misc.(Non-Drug; Combo Route) route once daily. As  Directed 100 each 11    lancets [facility]. route As directed 100 each prn    levocetirizine (XYZAL) 5 MG tablet Take 1 tablet (5 mg total) by mouth every evening. 30 tablet 11    levothyroxine (SYNTHROID) 100 MCG tablet Take 1 tablet (100 mcg total) by mouth once daily. 90 tablet 3    meloxicam (MOBIC) 15 MG tablet Take 1 tablet (15 mg total) by mouth  once daily. 90 tablet 3    montelukast (SINGULAIR) 10 mg tablet       pantoprazole (PROTONIX) 40 MG tablet 1 tablet      pravastatin (PRAVACHOL) 80 MG tablet Take 1 tablet (80 mg total) by mouth once daily. 90 tablet 3     No current facility-administered medications on file prior to visit.      Social History     Socioeconomic History    Marital status: Single     Spouse name: Not on file    Number of children: Not on file    Years of education: Not on file    Highest education level: Not on file   Occupational History    Not on file   Social Needs    Financial resource strain: Somewhat hard    Food insecurity     Worry: Sometimes true     Inability: Sometimes true    Transportation needs     Medical: Yes     Non-medical: Yes   Tobacco Use    Smoking status: Never Smoker    Smokeless tobacco: Never Used   Substance and Sexual Activity    Alcohol use: No     Frequency: Never    Drug use: No    Sexual activity: Not Currently   Lifestyle    Physical activity     Days per week: 0 days     Minutes per session: 0 min    Stress: Only a little   Relationships    Social connections     Talks on phone: More than three times a week     Gets together: More than three times a week     Attends Jew service: More than 4 times per year     Active member of club or organization: No     Attends meetings of clubs or organizations: Never     Relationship status: Never    Other Topics Concern    Not on file   Social History Narrative    Not on file     Family History   Problem Relation Age of Onset    Leukemia Son     Cancer Son     Diabetes Mother     Hypertension Mother     Heart disease Mother 50    Cancer Father     Cancer Brother     Cancer Brother        Review of Systems   See other note for full review of systems.  Otherwise negative.  Objective:    Nursing note and vitals reviewed.  Vitals:    10/02/20 0939   BP: 132/69   Pulse: 88   Temp: 98.4 °F (36.9 °C)     Body mass index is 54.56  kg/m².     Physical Exam  Vitals signs and nursing note reviewed.   Constitutional:       General: She is not in acute distress.     Appearance: She is well-developed.   HENT:      Head: Normocephalic and atraumatic.   Eyes:      Pupils: Pupils are equal, round, and reactive to light.   Neck:      Musculoskeletal: Normal range of motion and neck supple.   Cardiovascular:      Rate and Rhythm: Normal rate and regular rhythm.      Heart sounds: Normal heart sounds. No murmur.   Pulmonary:      Effort: Pulmonary effort is normal. No respiratory distress.      Breath sounds: Normal breath sounds. No wheezing.   Musculoskeletal: Normal range of motion.   Skin:     General: Skin is warm and dry.      Capillary Refill: Capillary refill takes less than 2 seconds.   Neurological:      Mental Status: She is alert and oriented to person, place, and time.      Cranial Nerves: No cranial nerve deficit.   Psychiatric:         Behavior: Behavior normal.                 Assessment / Plan:      1.  ANNUAL WELLNESS EXAM -patient here for annual wellness exam.  Labs ordered.  Health maintenance was reviewed and ordered.  Medications were reviewed and reconciled.    Complete history and physical was completed today.  Complete and thorough medication reconciliation was performed.  Discussed risks and benefits of medications.  Advised patient on orders and health maintenance.  We discussed old records and old labs if available.  Will request any records not available through epic.  Continue current medications listed on your summary sheet.    All questions were answered. Patient had no further concerns. Advised of Wellness plan. Follow up in 1 year for ANNUAL WELLNESS EXAM    Orders Placed This Encounter   Procedures    Influenza - Quadrivalent (PF)    Case request GI: COLONOSCOPY     Order Specific Question:   Pre-op Diagnosis     Answer:   Colon cancer screening [[id number]]     Order Specific Question:   CPT Code:     Answer:   [place]  COLORECTAL CANCER SCREEN RESULTS DOCUMENT/REVIEW [3017F]     Order Specific Question:   Case Referring Provider     Answer:   JUDAH NAGEL [[id number]]     Order Specific Question:   CPT Code:     Answer:   TX COLON CA SCRN NOT HI RSK IND []     Order Specific Question:   Post-Procedure Disposition:     Answer:   Amb Surgery/DOSC [2]     Order Specific Question:   Medical Necessity:     Answer:   Medically Non-Urgent [100]     Order Specific Question:   Is an on-site pathologist required for this procedure?     Answer:   N/A       Medications Ordered This Encounter   Medications    semaglutide (OZEMPIC) 1 mg/dose (2 mg/1.5 mL) PnIj     Sig: Inject 0.75 mLs into the skin every 7 days.     Dispense:  2 Syringe     Refill:  11          Judah Nagel MD

## 2020-10-05 ENCOUNTER — TELEPHONE (OUTPATIENT)
Dept: ENDOSCOPY | Facility: HOSPITAL | Age: 60
End: 2020-10-05

## 2020-10-05 DIAGNOSIS — Z13.9 SCREENING PROCEDURE: Primary | ICD-10-CM

## 2020-10-05 RX ORDER — SODIUM, POTASSIUM,MAG SULFATES 17.5-3.13G
1 SOLUTION, RECONSTITUTED, ORAL ORAL DAILY
Qty: 1 KIT | Refills: 0 | Status: SHIPPED | OUTPATIENT
Start: 2020-10-05 | End: 2020-10-07

## 2020-10-05 NOTE — TELEPHONE ENCOUNTER
Location Screening:    If answers yes to any of the following, schedule at [facility] ONLY. If No, OK for either location.    1. Is there a diagnosis of heart failure, severe heart valve disease (aortic stenosis) or mechanical valve? no  a. Is the Left Ventricle Ejection Fraction <30% ? no    2. Does the pt have pulmonary hypertension? no   a. Is pulmonary arterial pressure gradient >50mmHg? no   b. Is there evidence of right ventricular dysfunction? no    3. Does the pt have achalasia? no    4. Any history of negative reaction to anesthesia? no   a. Myasthenia gravis? no   b. Malignant hyperthermia? no   c. Other? no    5. Is procedure for esophageal banding? no      COVID Screening    1. Have you had a fever in the last 7 days or have you used fever reducing medicines for a fever in the last 7 days?  no    2. Are you experiencing shortness of breath, cough, muscle aches, loss of taste or loss of smell?  no    If answered yes to questions 1 and 2, the patient must seek medical attention with their PCP.  Do not schedule their procedure.     3. Are you residing with anyone who has tested positive for Covid?  no    If answered yes to question 3, recommend 14 day self-quarantine from the date of relative's positive test and place special needs note in the depot.  Wait to schedule.     ENDO screening    1. Have you been admitted for cardiac, kidney or pulmonary causes to the hospital in the past 3 months? no   If yes, schedule an appointment with PCP before scheduling endoscopic procedure.     2. Have you had a stent placed in the last 12 months? no   If yes, for a screening visit, cancel and message the ordering provider.  The patient will need a new order when the time is appropriate.     3. Have you had a stroke or heart attack in the past 6 months? no   If yes, cancel and refer patient to ordering provider for clearance, also message ordering provider to inform.     4. Have you had any chest pain in the past 3 months?  "no   If yes, Have you been evaluated by your PCP and/or cardiologist and it was determined to not be heart related? not applicable   If No, Pt needs to be seen by PCP or Cardiologist .  Pt can be scheduled once clearance obtained by either of those providers.     5. Do you take prescription weight loss medications?  no   If yes, must stop for 2 weeks prior to procedure.     6. Have you been diagnosed with diverticulitis within the past 3 months? no   If yes, must have been seen by GI within the last 3 months, if not schedule with GI KIMBER.    If pt has been seen by GI, schedule procedure 8-12 weeks post antibiotic treatment.     7. Are you on Dialysis? no  If yes, schedule procedure for the day AFTER dialysis.  Appt time should be 9am or later, patient arrival time is 2 hours prior.  Nulytely or miralax prep for all patients with kidney disease.     8. Are you diabetic?  yes   If yes, schedule morning appt. Advise pt to hold all diabetic meds day of procedure.     9. If pt is older than 80 years of age and HAS NOT been seen by GI or PCP within the last 6 months, needs appt with GI KIMBER.   If pt has been seen by the GI provider or PCP within the past 6  months AND meets criteria, schedule procedure AND send message to the endoscopist.     10. Is patient on a "high risk" medication (blood thinner/antiplatelet agent)?  no   If yes, has cardiac clearance been obtained within the last 60 days? N/A   If no, a new clearance needs to be obtained.     Final Questions:    1.I have reviewed the last colonoscopy for recommendations regarding next procedure bowel prep.  no  2. I have reviewed medications and allergies.  yes  3. I have verified the pharmacy information and appropriate prep sent if needed. yes  4. Prep instructions have been mailed or sent to portal per patient request. yes    Instructions per portal and mail    All schedulers will have ability to reach out to the ordering GI provider to clarify any issues.     "

## 2020-10-06 ENCOUNTER — CLINICAL SUPPORT (OUTPATIENT)
Dept: REHABILITATION | Facility: HOSPITAL | Age: 60
End: 2020-10-06
Attending: PHYSICAL MEDICINE & REHABILITATION
Payer: MEDICAID

## 2020-10-06 DIAGNOSIS — R29.898 WEAKNESS OF BOTH LOWER EXTREMITIES: ICD-10-CM

## 2020-10-06 DIAGNOSIS — M53.86 DECREASED ROM OF LUMBAR SPINE: ICD-10-CM

## 2020-10-06 DIAGNOSIS — R29.3 POSTURE ABNORMALITY: ICD-10-CM

## 2020-10-06 PROCEDURE — 97110 THERAPEUTIC EXERCISES: CPT | Mod: PN

## 2020-10-06 NOTE — PROGRESS NOTES
Physical Therapy Daily Treatment Note     Name: Zakia Buchanan [place]  Clinic Number: [id number]    Therapy Diagnosis:   Encounter Diagnoses   Name Primary?    Decreased ROM of lumbar spine     Weakness of both lower extremities     Posture abnormality      Physician: Bladimir Ba MD    Visit Date: 10/6/2020    Physician Orders: PT Eval and Treat   Medical Diagnosis from Referral: M54.41,M54.42,G89.29 (ICD-10-CM) - Chronic right-sided low back pain with bilateral sciatica  Evaluation Date: 9/21/2020  Authorization Period Expiration: 10/21/2020  Plan of Care Expiration: 11/2/2020  Visit # / Visits authorized: 3/10    Time In: 12:45 PM  Time Out: 1:25 PM  Total Billable Time: 40 minutes    Precautions: Diabetes, HTN, obesity    Subjective     Pt reports: some increases in pain and discomfort of low back due to level of soreness. She believes it is due to increasing exercise and having a few bad nights of sleep. She remains motivated with PT. No other complaint or symptoms today.     She was somewhat compliant with home exercise program.  Response to previous treatment: onset of soreness  Functional change: slightly improved ADL tolerance    Pain: 5/10  Location: none    Objective     Zakia received therapeutic exercises to develop strength, endurance, ROM, flexibility, posture and core stabilization for 40 minutes including:    NuStep 6 min  Seated pelvic tilts x30  Hamstring stretch with strap, supine 3 min each  Piriformis stretch, bilateral 2 min each  DKTC stretch with SB 3 min  Glute sets, bilateral x30 each    Possible next visit: Lower trunk rotations with stability ball, knee fall out, prone progression, bridging      Pt received 5 minutes of moist heat to lumbar region in supine position.       Home Exercises Provided and Patient Education Provided     Education provided:   - HEP provided and reviewed  - Anatomy and physiology relevant to current condition  - Postural correction  - Possible response  to PT and post exercise    Written Home Exercises Provided: yes.  Exercises were reviewed and Zakia was able to demonstrate them prior to the end of the session.  Zakia demonstrated good  understanding of the education provided.      See EMR under Patient Instructions for exercises provided 9/21/2020.      Assessment     Pt unable to progress therex today due to the amount of soreness since last visit. She is compliant with all PT's requests. She was educated on possible symptoms following exercise, especially soreness and fatigue. We will continue to monitor change in symptoms and progress as tolerated.      Zakia is progressing well towards her goals.   Pt prognosis is Fair.     Pt will continue to benefit from skilled outpatient physical therapy to address the deficits listed in the problem list box on initial evaluation, provide pt/family education and to maximize pt's level of independence in the home and community environment.     Pt's spiritual, cultural and educational needs considered and pt agreeable to plan of care and goals.    Anticipated barriers to physical therapy: dependent on transportation     Goals:  Short Term Goals: 3 weeks   - Pt will be independent with HEP to facilitate healing and improve outcome measures (progressing, not met)  - Pt will increase bilateral hamstring strength by at least a half grade to improve stability and prevent fatigue. (progressing, not met)  - Pt will be able to assume prone position and tolerate lumbar extension exercises. (progressing, not met)     Long Term Goals: 6 weeks   - Pt will be jaspreet to tolerate ADLs with pain no greater than 2/10 without radicular symptoms into both legs. (progressing, not met)  - Pt will increase lumbar extension to at least 90% of normal range with min discomfort in order to improve posture and overall mobility. (progressing, not met)  - Pt will increase bilateral LE gross MMT to at least 5-/5 in order to tolerate increased ADLs and  begin light recreational activities. (progressing, not met)    Plan     Continue with current PT POC.    Possible next visit: Lower trunk rotations with stability ball, knee fall out, prone progression, bridging    Sameer Salazar, PT

## 2020-10-08 ENCOUNTER — CLINICAL SUPPORT (OUTPATIENT)
Dept: REHABILITATION | Facility: HOSPITAL | Age: 60
End: 2020-10-08
Attending: PHYSICAL MEDICINE & REHABILITATION
Payer: MEDICAID

## 2020-10-08 DIAGNOSIS — M53.86 DECREASED ROM OF LUMBAR SPINE: ICD-10-CM

## 2020-10-08 DIAGNOSIS — R29.3 POSTURE ABNORMALITY: ICD-10-CM

## 2020-10-08 DIAGNOSIS — R29.898 WEAKNESS OF BOTH LOWER EXTREMITIES: ICD-10-CM

## 2020-10-08 PROCEDURE — 97110 THERAPEUTIC EXERCISES: CPT | Mod: PN

## 2020-10-08 NOTE — PROGRESS NOTES
"  Physical Therapy Daily Treatment Note     Name: Zakia Buchanan [place]  Clinic Number: [id number]    Therapy Diagnosis:   Encounter Diagnoses   Name Primary?    Decreased ROM of lumbar spine     Weakness of both lower extremities     Posture abnormality      Physician: Bladimir Ba MD    Visit Date: 10/8/2020    Physician Orders: PT Eval and Treat   Medical Diagnosis from Referral: M54.41,M54.42,G89.29 (ICD-10-CM) - Chronic right-sided low back pain with bilateral sciatica  Evaluation Date: 9/21/2020  Authorization Period Expiration: 10/21/2020  Plan of Care Expiration: 11/2/2020  Visit # / Visits authorized: 4 (3/12 auth)    Time In: 12:45 PM  Time Out: 1:30 PM  Total Billable Time: 45 minutes    Precautions: Diabetes, HTN, obesity    Subjective     Pt reports: no new complaints or symptoms today. Soreness is much more tolerable than it has been in the last few days. She is highly motivated with PT.     She was somewhat compliant with home exercise program.  Response to previous treatment: min soreness, "felt better"  Functional change: Moving more around the house without discomfort.     Pain: 0/10  Location: none    Objective     Zakia received therapeutic exercises to develop strength, endurance, ROM, flexibility, posture and core stabilization for 45 minutes including:    NuStep 6 min  Ball flexion stretch 3 min  Seated pelvic tilts x30  Hamstring stretch with strap, supine 3 min each  Piriformis stretch, bilateral 2 min each  DKTC stretch with SB 3 min  Lower trunk rotations, no SB 3 min  Knee fall out, core stability x30  Bridges x20    Possible next visit: prone progression if possible, progression of current therex, LAQ/HSC      Pt received 5 minutes of moist heat to lumbar region in supine position.       Home Exercises Provided and Patient Education Provided     Education provided:   - HEP provided and reviewed  - Anatomy and physiology relevant to current condition  - Postural correction  - " Possible response to PT and post exercise    Written Home Exercises Provided: yes.  Exercises were reviewed and Zakia was able to demonstrate them prior to the end of the session.  Zakia demonstrated good  understanding of the education provided.      See EMR under Patient Instructions for exercises provided 9/21/2020.      Assessment     Pt tolerates therex progression without complaints today. She notes a positive response to early PT treatments and has been increasing her activity levels at home. At this time, she would benefit from continuance of PT to address the remaining deficits and further improve posture/core stability.     Zakia is progressing well towards her goals.   Pt prognosis is Fair.     Pt will continue to benefit from skilled outpatient physical therapy to address the deficits listed in the problem list box on initial evaluation, provide pt/family education and to maximize pt's level of independence in the home and community environment.     Pt's spiritual, cultural and educational needs considered and pt agreeable to plan of care and goals.    Anticipated barriers to physical therapy: dependent on transportation     Goals:  Short Term Goals: 3 weeks   - Pt will be independent with HEP to facilitate healing and improve outcome measures (progressing, not met)  - Pt will increase bilateral hamstring strength by at least a half grade to improve stability and prevent fatigue. (progressing, not met)  - Pt will be able to assume prone position and tolerate lumbar extension exercises. (progressing, not met)     Long Term Goals: 6 weeks   - Pt will be jaspreet to tolerate ADLs with pain no greater than 2/10 without radicular symptoms into both legs. (progressing, not met)  - Pt will increase lumbar extension to at least 90% of normal range with min discomfort in order to improve posture and overall mobility. (progressing, not met)  - Pt will increase bilateral LE gross MMT to at least 5-/5 in order to  tolerate increased ADLs and begin light recreational activities. (progressing, not met)    Plan     Continue with current PT POC.    Possible next visit: prone progression if possible, progression of current therex, LAQ/HSC      Sameer Salazar, PT

## 2020-10-13 ENCOUNTER — CLINICAL SUPPORT (OUTPATIENT)
Dept: REHABILITATION | Facility: HOSPITAL | Age: 60
End: 2020-10-13
Attending: PHYSICAL MEDICINE & REHABILITATION
Payer: MEDICAID

## 2020-10-13 DIAGNOSIS — M53.86 DECREASED ROM OF LUMBAR SPINE: ICD-10-CM

## 2020-10-13 DIAGNOSIS — R29.898 WEAKNESS OF BOTH LOWER EXTREMITIES: ICD-10-CM

## 2020-10-13 DIAGNOSIS — R29.3 POSTURE ABNORMALITY: ICD-10-CM

## 2020-10-13 PROCEDURE — 97110 THERAPEUTIC EXERCISES: CPT | Mod: PN

## 2020-10-13 NOTE — PROGRESS NOTES
Physical Therapy Daily Treatment Note     Name: Zakia Buchanan [place]  Clinic Number: [id number]    Therapy Diagnosis:   Encounter Diagnoses   Name Primary?    Decreased ROM of lumbar spine     Weakness of both lower extremities     Posture abnormality      Physician: Bladimir Ba MD    Visit Date: 10/13/2020    Physician Orders: PT Eval and Treat   Medical Diagnosis from Referral: M54.41,M54.42,G89.29 (ICD-10-CM) - Chronic right-sided low back pain with bilateral sciatica  Evaluation Date: 9/21/2020  Authorization Period Expiration: 10/21/2020  Plan of Care Expiration: 11/2/2020  Visit # / Visits authorized: 5 (4/12 auth)    Time In: 12:50 PM  Time Out: 1:30 PM  Total Billable Time: 40 minutes    Precautions: Diabetes, HTN, obesity    Subjective     Pt reports: no new complaints or symptoms today. She continues to do very well with PT and at home. There is no pain today and has improved greatly since beginning PT. She is highly motivated.     She was somewhat compliant with home exercise program.  Response to previous treatment: no soreness, continued relief   Functional change: Increased ADL tolerance    Pain: 0/10  Location: none    Objective     Zakia received therapeutic exercises to develop strength, endurance, ROM, flexibility, posture and core stabilization for 40 minutes including:    NuStep 5 min  Ball flexion stretch 2 min  LAQ/HSC, 3# RTB, x30 each  Hamstring stretch with strap, supine 3 min each  Piriformis stretch, bilateral 2 min each  DKTC stretch with SB 2 min  Lower trunk rotations, no SB 2 min  Knee fall out, core stability x30  Bridges x20    Possible next visit: prone progression if possible, progression of current therex      Pt received 5 minutes of moist heat to lumbar region in supine position.       Home Exercises Provided and Patient Education Provided     Education provided:   - HEP provided and reviewed  - Anatomy and physiology relevant to current condition  - Postural  correction  - Possible response to PT and post exercise    Written Home Exercises Provided: yes.  Exercises were reviewed and Zakia was able to demonstrate them prior to the end of the session.  Zakia demonstrated good  understanding of the education provided.      See EMR under Patient Instructions for exercises provided 9/21/2020.      Assessment     Pt tolerates progression of therex today including LAQ and hamstring curls with light resistance. Pain levels and strength have improve in recent weeks allowing for increased activity levels at home. She would benefit from continuance to further progression as tolerated and address remaining deficits.     Zakia is progressing well towards her goals.   Pt prognosis is Fair.     Pt will continue to benefit from skilled outpatient physical therapy to address the deficits listed in the problem list box on initial evaluation, provide pt/family education and to maximize pt's level of independence in the home and community environment.     Pt's spiritual, cultural and educational needs considered and pt agreeable to plan of care and goals.    Anticipated barriers to physical therapy: dependent on transportation     Goals:  Short Term Goals: 3 weeks   - Pt will be independent with HEP to facilitate healing and improve outcome measures (progressing, not met)  - Pt will increase bilateral hamstring strength by at least a half grade to improve stability and prevent fatigue. (progressing, not met)  - Pt will be able to assume prone position and tolerate lumbar extension exercises. (progressing, not met)     Long Term Goals: 6 weeks   - Pt will be jaspreet to tolerate ADLs with pain no greater than 2/10 without radicular symptoms into both legs. (progressing, not met)  - Pt will increase lumbar extension to at least 90% of normal range with min discomfort in order to improve posture and overall mobility. (progressing, not met)  - Pt will increase bilateral LE gross MMT to at least  5-/5 in order to tolerate increased ADLs and begin light recreational activities. (progressing, not met)    Plan     Continue with current PT POC.    Possible next visit: prone progression if possible, progression of current therex      Sameer Salazar, PT

## 2020-10-19 ENCOUNTER — TELEPHONE (OUTPATIENT)
Dept: FAMILY MEDICINE | Facility: CLINIC | Age: 60
End: 2020-10-19

## 2020-10-19 DIAGNOSIS — M25.511 BILATERAL SHOULDER PAIN, UNSPECIFIED CHRONICITY: Primary | ICD-10-CM

## 2020-10-19 DIAGNOSIS — M25.512 BILATERAL SHOULDER PAIN, UNSPECIFIED CHRONICITY: Primary | ICD-10-CM

## 2020-10-19 RX ORDER — NABUMETONE 750 MG/1
750 TABLET, FILM COATED ORAL 2 TIMES DAILY
Qty: 60 TABLET | Refills: 0 | Status: SHIPPED | OUTPATIENT
Start: 2020-10-19 | End: 2020-11-18 | Stop reason: SDUPTHER

## 2020-10-19 NOTE — TELEPHONE ENCOUNTER
Patient requesting something other than the Meloxicam for bilateral shoulder pain.  Please advise.

## 2020-10-22 ENCOUNTER — CLINICAL SUPPORT (OUTPATIENT)
Dept: REHABILITATION | Facility: HOSPITAL | Age: 60
End: 2020-10-22
Attending: PHYSICAL MEDICINE & REHABILITATION
Payer: MEDICAID

## 2020-10-22 DIAGNOSIS — R29.898 WEAKNESS OF BOTH LOWER EXTREMITIES: ICD-10-CM

## 2020-10-22 DIAGNOSIS — R29.3 POSTURE ABNORMALITY: ICD-10-CM

## 2020-10-22 DIAGNOSIS — M53.86 DECREASED ROM OF LUMBAR SPINE: ICD-10-CM

## 2020-10-22 PROCEDURE — 97110 THERAPEUTIC EXERCISES: CPT | Mod: PN

## 2020-10-22 NOTE — PROGRESS NOTES
Physical Therapy Daily Treatment Note     Name: Zakia Buchanan [place]  Clinic Number: [id number]    Therapy Diagnosis:   Encounter Diagnoses   Name Primary?    Decreased ROM of lumbar spine     Weakness of both lower extremities     Posture abnormality      Physician: Bladimir Ba MD    Visit Date: 10/22/2020    Physician Orders: PT Eval and Treat   Medical Diagnosis from Referral: M54.41,M54.42,G89.29 (ICD-10-CM) - Chronic right-sided low back pain with bilateral sciatica  Evaluation Date: 9/21/2020  Authorization Period Expiration: 10/21/2020  Plan of Care Expiration: 11/2/2020  Visit # / Visits authorized: 6 (5/12 auth)    Time In: 0215 PM  Time Out: 0300 PM  Total Billable Time: 45 minutes    Precautions: Diabetes, HTN, obesity    Subjective     Pt reports: an increase in bilateral shoulder pain (R>L) since last week when she did some yard work that included picking up sticks. She has since changed medication and has been feeling slightly better. Overall her back is still feeling better and her mobility is slowly improving.     She was somewhat compliant with home exercise program.  Response to previous treatment: no soreness, continued relief   Functional change: Increased ADL tolerance    Pain: 2/10  Location: shoulders    Objective     Zakia received therapeutic exercises to develop strength, endurance, ROM, flexibility, posture and core stabilization for 45 minutes including:    NuStep 5 min  Ball flexion stretch 3 min  Upper trap stretch 2 min  Levator scapulae stretch 2 min  LAQ/HSC, 3# RTB, x30 each  Hamstring stretch with strap, supine 3 min each  Piriformis stretch, bilateral 2 min each  DKTC stretch with SB 2 min  Lower trunk rotations, no SB 2 min    Bridges x20  Prone lying/SUDHEER 5 min    Possible next visit: prone progression if possible, progression of current therex      Pt received 5 minutes of moist heat to lumbar/cervical region in prone position.       Home Exercises Provided and  Patient Education Provided     Education provided:   - HEP provided and reviewed  - Anatomy and physiology relevant to current condition  - Postural correction  - Possible response to PT and post exercise    Written Home Exercises Provided: yes.  Exercises were reviewed and Zakia was able to demonstrate them prior to the end of the session.  Zakia demonstrated good  understanding of the education provided.      See EMR under Patient Instructions for exercises provided 9/21/2020.      Assessment     Pt responds well to PT treatment today with no complaints. She was able to perform cervical stretches to help improve neck mobility and reduce discomfort. She was also able to assume prone position without an increase in discomfort in order to promote a proper posture. She would benefit from continuance and progression as tolerated at this time.     Zakia is progressing well towards her goals.   Pt prognosis is Fair.     Pt will continue to benefit from skilled outpatient physical therapy to address the deficits listed in the problem list box on initial evaluation, provide pt/family education and to maximize pt's level of independence in the home and community environment.     Pt's spiritual, cultural and educational needs considered and pt agreeable to plan of care and goals.    Anticipated barriers to physical therapy: dependent on transportation     Goals:  Short Term Goals: 3 weeks   - Pt will be independent with HEP to facilitate healing and improve outcome measures (met)  - Pt will increase bilateral hamstring strength by at least a half grade to improve stability and prevent fatigue. (progressing, not met)  - Pt will be able to assume prone position and tolerate lumbar extension exercises. (met)     Long Term Goals: 6 weeks   - Pt will be jaspreet to tolerate ADLs with pain no greater than 2/10 without radicular symptoms into both legs. (progressing, not met)  - Pt will increase lumbar extension to at least 90% of  normal range with min discomfort in order to improve posture and overall mobility. (progressing, not met)  - Pt will increase bilateral LE gross MMT to at least 5-/5 in order to tolerate increased ADLs and begin light recreational activities. (progressing, not met)    Plan     Continue with current PT POC.    Possible next visit: prone progression if possible, progression of current therex      Sameer Salazar, PT

## 2020-10-28 ENCOUNTER — PATIENT MESSAGE (OUTPATIENT)
Dept: FAMILY MEDICINE | Facility: CLINIC | Age: 60
End: 2020-10-28

## 2020-10-28 DIAGNOSIS — R05.9 COUGH: ICD-10-CM

## 2020-10-28 RX ORDER — LANCETS 33 GAUGE
EACH MISCELLANEOUS
COMMUNITY
Start: 2020-10-22 | End: 2021-02-23 | Stop reason: SDUPTHER

## 2020-10-28 RX ORDER — FLUTICASONE PROPIONATE 50 MCG
SPRAY, SUSPENSION (ML) NASAL
Qty: 16 G | Refills: 11 | Status: SHIPPED | OUTPATIENT
Start: 2020-10-28 | End: 2021-04-11 | Stop reason: SDUPTHER

## 2020-11-03 ENCOUNTER — PATIENT MESSAGE (OUTPATIENT)
Dept: ENDOSCOPY | Facility: HOSPITAL | Age: 60
End: 2020-11-03

## 2020-11-03 ENCOUNTER — CLINICAL SUPPORT (OUTPATIENT)
Dept: REHABILITATION | Facility: HOSPITAL | Age: 60
End: 2020-11-03
Attending: PHYSICAL MEDICINE & REHABILITATION
Payer: MEDICAID

## 2020-11-03 DIAGNOSIS — M53.86 DECREASED ROM OF LUMBAR SPINE: ICD-10-CM

## 2020-11-03 DIAGNOSIS — R29.898 WEAKNESS OF BOTH LOWER EXTREMITIES: ICD-10-CM

## 2020-11-03 DIAGNOSIS — R29.3 POSTURE ABNORMALITY: ICD-10-CM

## 2020-11-03 PROCEDURE — 97140 MANUAL THERAPY 1/> REGIONS: CPT | Mod: PN

## 2020-11-03 PROCEDURE — 97110 THERAPEUTIC EXERCISES: CPT | Mod: PN

## 2020-11-03 NOTE — PLAN OF CARE
Physical Therapy Daily Treatment Note and Reassessment     Name: Zakia Price  Clinic Number: [id number]    Therapy Diagnosis:   Encounter Diagnoses   Name Primary?    Decreased ROM of lumbar spine     Weakness of both lower extremities     Posture abnormality      Physician: Bladimir Ba MD    Visit Date: 11/3/2020    Physician Orders: PT Eval and Treat   Medical Diagnosis from Referral: M54.41,M54.42,G89.29 (ICD-10-CM) - Chronic right-sided low back pain with bilateral sciatica  Evaluation Date: 9/21/2020  Authorization Period Expiration: 10/21/2020  Plan of Care Expiration: 12/15/2020  Visit # / Visits authorized: 7 (6/12 auth) (reassessed on 11/3/20)  Time In: 1245 PM  Time Out: 0130 PM  Total Billable Time: 45 minutes    Precautions: Diabetes, HTN, obesity    Subjective     Pt reports: that she has little to no discomfort in her low back since beginning PT. However, she is limited at this time due to neck/shoulder discomfort for the last 2 weeks after working out in her yard. She is highly motivated with PT and compliant with all PT requests, including HEP.     She was somewhat compliant with home exercise program.  Response to previous treatment: no soreness, continued relief   Functional change: Increased ADL tolerance    Pain: 0/10 in back, 5/10 in shoulders  Location: shoulders    Objective     Mental status: alert  Posture/ Alignment: Fair     GAIT DEVIATIONS: Zakia amb with decreased nu, decreased step length, decreased stride length and increased stride width.     ROM: Lumbar  AROM   Comment   Flexion: To floor No pain    Extension: 75% Min pain   Lat Flex R: 100%     Lat Flex L: 100%     Rotation R: 100%     Rotation L: 100%           Strength: Dermatomes:    Right Left Comment   L2 intact intact     L3 intact intact     L4 intact intact     L5 intact intact     S1 intact intact     S2 intact intact     Saddle intact intact        Myotomes:    Right Left Comment   Hip flexion  (L2-3): 5/5 4+/5     Knee extension (L3-4): 5/5 5/5     DF (L4-5): 5/5 5/5     Great Toe Ext (L5-S1): 5/5 5/5     Great Toe Flex (S1-S2): 5/5 5/5        BLE hamstring MMT: 3+/5  Core stability: poor           Right Left   L2/3 Iliacus Hip flexion  5 4+   L3/4 Qudratus Femoris Knee Extension 5 5   L4/5 Tib Anterior Ankle Dorsiflexion  5 5         DTR:    Right Left Comment   Patellar (L3-4) 2+ 2+     Achilles (S1) 2+ 2+           Special Tests:  Repeated ROM ROM (% of normal) Pain? Radiation?   Flex Stand: 100% none no   Ext Stand: 75% decreases centralizes   Flex Supine: BISHNU       Ext Prone: BISHNU          Functional Tests (* indicates w/ pain)   Gait: decreased nu and step length   Sit<>stand: WNL     Palpation:  WNL in low back, min/mod in right upper trap     Joint Play:  Not assessed     Pt/family was provided educational information, including: role of PT, goals for PT, scheduling - pt verbalized understanding. Discussed insurance plan with pt.         CMS Impairment/Limitation/Restriction for FOTO Survey     Therapist reviewed FOTO scores for Zakia Price on 9/21/2020.   FOTO documents entered into [employer] - see Media section.     Limitation Score: Will assess ASAP once system is in place       (Remaining therex time was used for reassessment purposes)*    Zakia received therapeutic exercises to develop strength, endurance, ROM, flexibility, posture and core stabilization for 10 minutes including:    NuStep 5 min  Ball flexion stretch 5 min      Zakia received the following manual therapy techniques: Soft tissue Mobilization were applied to the: bilateral upper traps and lumbar paraspinals for 15 minutes, including:    STM, percussion gun in prone position     Possible next visit: prone progression if possible, progression of current therex      Pt received 5 minutes of moist heat to lumbar/cervical region in prone position.       Home Exercises Provided and Patient Education Provided     Education  provided:   - HEP provided and reviewed  - Anatomy and physiology relevant to current condition  - Postural correction  - Possible response to PT and post exercise    Written Home Exercises Provided: yes.  Exercises were reviewed and Zakia was able to demonstrate them prior to the end of the session.  Zakia demonstrated good  understanding of the education provided.      See EMR under Patient Instructions for exercises provided 9/21/2020.      Assessment     Zakia has progressed well with PT and has met most established goals. She demonstrates improved functional mobility, increased lumbar ROM, increased lower extremity strength and improved activity tolerance. Despite her recent exacerbation of neck pain, she has been feeling better overall and would benefit from continuance of skilled PT for her remaining authorized visits in order to achieve PT goals and address remaining deficits.    Zakia is progressing well towards her goals.   Pt prognosis is Fair.     Pt will continue to benefit from skilled outpatient physical therapy to address the deficits listed in the problem list box on initial evaluation, provide pt/family education and to maximize pt's level of independence in the home and community environment.     Pt's spiritual, cultural and educational needs considered and pt agreeable to plan of care and goals.    Anticipated barriers to physical therapy: dependent on transportation     Goals:  Short Term Goals: 3 weeks   - Pt will be independent with HEP to facilitate healing and improve outcome measures (met)  - Pt will increase bilateral hamstring strength by at least a half grade to improve stability and prevent fatigue. (met)  - Pt will be able to assume prone position and tolerate lumbar extension exercises. (met)     Long Term Goals: 6 weeks   - Pt will be jaspreet to tolerate ADLs with pain no greater than 2/10 without radicular symptoms into both legs. (progressing, not met)  - Pt will increase lumbar  extension to at least 90% of normal range with min discomfort in order to improve posture and overall mobility. (progressing, not met)  - Pt will increase bilateral LE gross MMT to at least 5-/5 in order to tolerate increased ADLs and begin light recreational activities. (met)    Plan     Continue with current PT POC for remaining authorized visits.     Plan of Care Expiration: 12/15/2020 (updated)    Possible next visit: prone progression if possible, progression of current therex      Sameer Salazar, PT

## 2020-11-03 NOTE — PROGRESS NOTES
Physical Therapy Daily Treatment Note and Reassessment     Name: Zakia Price  Clinic Number: [id number]    Therapy Diagnosis:   Encounter Diagnoses   Name Primary?    Decreased ROM of lumbar spine     Weakness of both lower extremities     Posture abnormality      Physician: Bladimir Ba MD    Visit Date: 11/3/2020    Physician Orders: PT Eval and Treat   Medical Diagnosis from Referral: M54.41,M54.42,G89.29 (ICD-10-CM) - Chronic right-sided low back pain with bilateral sciatica  Evaluation Date: 9/21/2020  Authorization Period Expiration: 10/21/2020  Plan of Care Expiration: 12/15/2020  Visit # / Visits authorized: 7 (6/12 auth) (reassessed on 11/3/20)  Time In: 1245 PM  Time Out: 0130 PM  Total Billable Time: 45 minutes    Precautions: Diabetes, HTN, obesity    Subjective     Pt reports: that she has little to no discomfort in her low back since beginning PT. However, she is limited at this time due to neck/shoulder discomfort for the last 2 weeks after working out in her yard. She is highly motivated with PT and compliant with all PT requests, including HEP.     She was somewhat compliant with home exercise program.  Response to previous treatment: no soreness, continued relief   Functional change: Increased ADL tolerance    Pain: 0/10 in back, 5/10 in shoulders  Location: shoulders    Objective     Mental status: alert  Posture/ Alignment: Fair     GAIT DEVIATIONS: Zakia amb with decreased nu, decreased step length, decreased stride length and increased stride width.     ROM: Lumbar  AROM   Comment   Flexion: To floor No pain    Extension: 75% Min pain   Lat Flex R: 100%     Lat Flex L: 100%     Rotation R: 100%     Rotation L: 100%           Strength: Dermatomes:    Right Left Comment   L2 intact intact     L3 intact intact     L4 intact intact     L5 intact intact     S1 intact intact     S2 intact intact     Saddle intact intact        Myotomes:    Right Left Comment   Hip flexion  (L2-3): 5/5 4+/5     Knee extension (L3-4): 5/5 5/5     DF (L4-5): 5/5 5/5     Great Toe Ext (L5-S1): 5/5 5/5     Great Toe Flex (S1-S2): 5/5 5/5        BLE hamstring MMT: 3+/5  Core stability: poor           Right Left   L2/3 Iliacus Hip flexion  5 4+   L3/4 Qudratus Femoris Knee Extension 5 5   L4/5 Tib Anterior Ankle Dorsiflexion  5 5         DTR:    Right Left Comment   Patellar (L3-4) 2+ 2+     Achilles (S1) 2+ 2+           Special Tests:  Repeated ROM ROM (% of normal) Pain? Radiation?   Flex Stand: 100% none no   Ext Stand: 75% decreases centralizes   Flex Supine: BISHNU       Ext Prone: BISHNU          Functional Tests (* indicates w/ pain)   Gait: decreased nu and step length   Sit<>stand: WNL     Palpation:  WNL in low back, min/mod in right upper trap     Joint Play:  Not assessed     Pt/family was provided educational information, including: role of PT, goals for PT, scheduling - pt verbalized understanding. Discussed insurance plan with pt.         CMS Impairment/Limitation/Restriction for FOTO Survey     Therapist reviewed FOTO scores for Zakia Price on 9/21/2020.   FOTO documents entered into [employer] - see Media section.     Limitation Score: Will assess ASAP once system is in place       (Remaining therex time was used for reassessment purposes)*    Zakia received therapeutic exercises to develop strength, endurance, ROM, flexibility, posture and core stabilization for 10 minutes including:    NuStep 5 min  Ball flexion stretch 5 min      Zakia received the following manual therapy techniques: Soft tissue Mobilization were applied to the: bilateral upper traps and lumbar paraspinals for 15 minutes, including:    STM, percussion gun in prone position     Possible next visit: prone progression if possible, progression of current therex      Pt received 5 minutes of moist heat to lumbar/cervical region in prone position.       Home Exercises Provided and Patient Education Provided     Education  provided:   - HEP provided and reviewed  - Anatomy and physiology relevant to current condition  - Postural correction  - Possible response to PT and post exercise    Written Home Exercises Provided: yes.  Exercises were reviewed and Zakia was able to demonstrate them prior to the end of the session.  Zakia demonstrated good  understanding of the education provided.      See EMR under Patient Instructions for exercises provided 9/21/2020.      Assessment     Zakia has progressed well with PT and has met most established goals. She demonstrates improved functional mobility, increased lumbar ROM, increased lower extremity strength and improved activity tolerance. Despite her recent exacerbation of neck pain, she has been feeling better overall and would benefit from continuance of skilled PT for her remaining authorized visits in order to achieve PT goals and address remaining deficits.    Zakia is progressing well towards her goals.   Pt prognosis is Fair.     Pt will continue to benefit from skilled outpatient physical therapy to address the deficits listed in the problem list box on initial evaluation, provide pt/family education and to maximize pt's level of independence in the home and community environment.     Pt's spiritual, cultural and educational needs considered and pt agreeable to plan of care and goals.    Anticipated barriers to physical therapy: dependent on transportation     Goals:  Short Term Goals: 3 weeks   - Pt will be independent with HEP to facilitate healing and improve outcome measures (met)  - Pt will increase bilateral hamstring strength by at least a half grade to improve stability and prevent fatigue. (met)  - Pt will be able to assume prone position and tolerate lumbar extension exercises. (met)     Long Term Goals: 6 weeks   - Pt will be jaspreet to tolerate ADLs with pain no greater than 2/10 without radicular symptoms into both legs. (progressing, not met)  - Pt will increase lumbar  extension to at least 90% of normal range with min discomfort in order to improve posture and overall mobility. (progressing, not met)  - Pt will increase bilateral LE gross MMT to at least 5-/5 in order to tolerate increased ADLs and begin light recreational activities. (met)    Plan     Continue with current PT POC for remaining authorized visits.     Plan of Care Expiration: 12/15/2020 (updated)    Possible next visit: prone progression if possible, progression of current therex      Sameer Salazar, PT

## 2020-11-05 ENCOUNTER — TELEPHONE (OUTPATIENT)
Dept: FAMILY MEDICINE | Facility: CLINIC | Age: 60
End: 2020-11-05

## 2020-11-05 NOTE — TELEPHONE ENCOUNTER
Returned call to patient, patient states that she can not get to [place] for the colonoscopy and states taht she may need to go to [facility] and get the colonoscopy due to transportation.  Patient requested that we send the orders to [facility].  Notified patient that [facility] would have to order the colonoscopy if performed there and she would have to have a physician there order it.  Patient states that she will just keep it at [place] [facility] and get someone to bring her for the colonoscopy.

## 2020-11-05 NOTE — TELEPHONE ENCOUNTER
----- Message from Deidre Josue sent at 11/5/2020  1:05 PM CST -----  Contact: pt  The pt wants to cancel her 11/12/2020 appt, no call back is needed//Gelacio

## 2020-11-05 NOTE — TELEPHONE ENCOUNTER
----- Message from Deidre Josue sent at 11/5/2020  1:05 PM CST -----  Contact: pt  The pt request a return call, no additional info given and can be reached at [phone]///[email or web address]

## 2020-11-18 DIAGNOSIS — M25.511 BILATERAL SHOULDER PAIN, UNSPECIFIED CHRONICITY: ICD-10-CM

## 2020-11-18 DIAGNOSIS — M25.512 BILATERAL SHOULDER PAIN, UNSPECIFIED CHRONICITY: ICD-10-CM

## 2020-11-18 RX ORDER — NABUMETONE 750 MG/1
750 TABLET, FILM COATED ORAL 2 TIMES DAILY
Qty: 60 TABLET | Refills: 5 | Status: SHIPPED | OUTPATIENT
Start: 2020-11-18 | End: 2021-03-22 | Stop reason: SDUPTHER

## 2020-11-20 ENCOUNTER — HOSPITAL ENCOUNTER (OUTPATIENT)
Dept: RADIOLOGY | Facility: HOSPITAL | Age: 60
Discharge: HOME | End: 2020-11-20
Attending: PHYSICAL MEDICINE & REHABILITATION
Payer: MEDICAID

## 2020-11-20 ENCOUNTER — TELEPHONE (OUTPATIENT)
Dept: PAIN MEDICINE | Facility: CLINIC | Age: 60
End: 2020-11-20

## 2020-11-20 ENCOUNTER — OFFICE VISIT (OUTPATIENT)
Dept: PAIN MEDICINE | Facility: CLINIC | Age: 60
End: 2020-11-20
Payer: MEDICAID

## 2020-11-20 VITALS
SYSTOLIC BLOOD PRESSURE: 124 MMHG | WEIGHT: 293 LBS | HEART RATE: 96 BPM | HEIGHT: 68 IN | DIASTOLIC BLOOD PRESSURE: 84 MMHG | BODY MASS INDEX: 44.41 KG/M2

## 2020-11-20 DIAGNOSIS — M79.12 MYALGIA OF AUXILIARY MUSCLES, HEAD AND NECK: Primary | ICD-10-CM

## 2020-11-20 DIAGNOSIS — M47.812 CERVICAL SPONDYLOSIS: ICD-10-CM

## 2020-11-20 DIAGNOSIS — M46.1 SACROILIITIS: ICD-10-CM

## 2020-11-20 DIAGNOSIS — E66.01 MORBID OBESITY WITH BMI OF 50.0-59.9, ADULT: ICD-10-CM

## 2020-11-20 DIAGNOSIS — M79.12 MYALGIA OF AUXILIARY MUSCLES, HEAD AND NECK: ICD-10-CM

## 2020-11-20 PROCEDURE — 99214 OFFICE O/P EST MOD 30 MIN: CPT | Mod: S$PBB,,, | Performed by: PHYSICAL MEDICINE & REHABILITATION

## 2020-11-20 PROCEDURE — 72052 X-RAY EXAM NECK SPINE 6/>VWS: CPT | Mod: 26,,, | Performed by: RADIOLOGY

## 2020-11-20 PROCEDURE — 72052 XR CERVICAL SPINE 5 VIEW WITH FLEX AND EXT: ICD-10-PCS | Mod: 26,,, | Performed by: RADIOLOGY

## 2020-11-20 PROCEDURE — 72052 X-RAY EXAM NECK SPINE 6/>VWS: CPT | Mod: TC,PO

## 2020-11-20 PROCEDURE — 99214 OFFICE O/P EST MOD 30 MIN: CPT | Mod: PBBFAC,PO,25 | Performed by: PHYSICAL MEDICINE & REHABILITATION

## 2020-11-20 PROCEDURE — 99999 PR PBB SHADOW E&M-EST. PATIENT-LVL IV: CPT | Mod: PBBFAC,,, | Performed by: PHYSICAL MEDICINE & REHABILITATION

## 2020-11-20 PROCEDURE — 99999 PR PBB SHADOW E&M-EST. PATIENT-LVL IV: ICD-10-PCS | Mod: PBBFAC,,, | Performed by: PHYSICAL MEDICINE & REHABILITATION

## 2020-11-20 PROCEDURE — 99214 PR OFFICE/OUTPT VISIT, EST, LEVL IV, 30-39 MIN: ICD-10-PCS | Mod: S$PBB,,, | Performed by: PHYSICAL MEDICINE & REHABILITATION

## 2020-11-20 RX ORDER — TIZANIDINE 4 MG/1
TABLET ORAL
Qty: 60 TABLET | Refills: 1 | Status: SHIPPED | OUTPATIENT
Start: 2020-11-20 | End: 2021-01-12 | Stop reason: SDUPTHER

## 2020-11-20 NOTE — PROGRESS NOTES
Established Patient Chronic Pain Note (Follow up visit)    Chief Complaint:   Chief Complaint   Patient presents with    Follow-up       SUBJECTIVE:    Zakia Price is a 60 y.o. female who presents to the clinic for a follow-up appointment for chronic neck and lower back pain.  In the interim, she has been participating in physical therapy and taking Mobic as needed.  Since the last visit, Zakia Price states the lower back pain has been improving.  However, she reports that her neck and right shoulder pain has been worsening.  Current pain intensity is 6/10.    Patient denies night fever/night sweats, urinary incontinence, bowel incontinence, significant weight loss, significant motor weakness and loss of sensations.    Pain Disability Index Review:   No flowsheet data found.    Initial HPI 09/11/2020:  Zakia Price is a 60 y.o. female who presents to the clinic for the evaluation of chronic neck and lower back pain.  She was referred by his primary care provider for further evaluation management of this pain.  Of note, patient has past medical history of hypertension, hyperlipidemia, DM2, diabetic neuropathy, history of DVT, hypothyroidism, obesity, GERD, multiple other medical comorbidities as listed below.  The pain started about 2 years ago without any specific injury or trauma and symptoms have been worsening.  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  She denies any recent falls.  The pain is located in the lower lumbar area and radiates to the bilateral lower extremities mostly into the hip and buttock posteriorly.  She also reports neck pain that originates in the lower cervical area with radiation of pain down the bilateral lower extremities.  The pain is described as Aching, numbness, tingling, dull and is rated as 6/10. The pain is rated with a score of   1/10 on the BEST day and a score of 10/10 on the WORST day.  Symptoms interfere with daily activity and sleeping. The pain is exacerbated by daily activities.  The pain is mitigated by nothing. The patient reports spending 4-6 hours per day reclining. The patient reports 6-8 hours of uninterrupted sleep per night.     Of note, his primary care provider placed referral to physical therapy, started meloxicam 15 mg daily, and provided a Toradol and steroid IM injection.          Non-Pharmacologic Treatments:  Physical Therapy/Home Exercise: yes, currently active  Ice/Heat:yes  TENS: no  Acupuncture: no  Massage: no  Chiropractic: no    Other: no        Pain Medications:  - Opioids: Tramadol, Norco  - Adjuvant Medications: Tylenol, Flexeril, gabapentin, Mobic  - Anti-Coagulants: None      report:  Reviewed and consistent with medication use as prescribed.          Pain Procedures:   - previous lumbar ESIs        Imaging:   X-ray of bilateral knees 11/14/2019:  Right knee: There is no radiographic evidence of acute osseous, articular, or soft tissue abnormality. There is severe patellofemoral, severe medial, and moderate lateral tricompartmental osteoarthritis.  No appreciable change from prior.     Left knee:  No acute fractures or dislocations visualized.  Suggestion of multiple possible ossified intra-articular bodies in the suprapatellar recess.  There is severe patellofemoral, severe medial, and moderate lateral tricompartmental osteoarthritis.  No appreciable change from prior.     X-ray left hip 10/29/2018:  There is no evidence of acute fracture. There is no evidence of subluxation. There is mild to moderate femoral acetabular degeneration, similar to prior. No significant soft tissue swelling is identified.     No pertinent imaging available of the lumbar spine.        PMHx,PSHx, Social history, and Family history:  I have reviewed the patient's medical, surgical, social, and family history in detail and  updated the computerized patient record.        Review of patient's allergies indicates:   Allergen Reactions    Codeine sulfate      Nausea^    Lisinopril Swelling     angioedema    Codeine Nausea Only and Rash       Current Outpatient Medications   Medication Sig    acetaminophen (TYLENOL) 500 MG tablet Take 2 tablets (1,000 mg total) by mouth every 6 (six) hours as needed for Pain.    amLODIPine (NORVASC) 5 MG tablet Take 1 tablet by mouth daily.    blood sugar diagnostic (CLEVER CHOICE VOICE+ TEST) Strp TEST BLOOD SUGARS ONE TIME DAILY    blood-glucose meter kit Use before meals and before bed when the glucoses are not in control.  Otherwise, test it daily once a day before meals randomly to ensure    famotidine (PEPCID) 40 MG tablet     fluticasone propionate (FLONASE) 50 mcg/actuation nasal spray Use 2 sprays to each nostril daily    gabapentin (NEURONTIN) 600 MG tablet Take 1 tablet by mouth three times daily.    levocetirizine (XYZAL) 5 MG tablet Take 1 tablet (5 mg total) by mouth every evening.    levothyroxine (SYNTHROID) 100 MCG tablet Take 1 tablet (100 mcg total) by mouth once daily.    metFORMIN (GLUCOPHAGE) 1000 MG tablet Take 1 tablet by mouth twice daily with meals.    montelukast (SINGULAIR) 10 mg tablet     nabumetone (RELAFEN) 750 MG tablet Take 1 tablet (750 mg total) by mouth 2 (two) times daily.    pantoprazole (PROTONIX) 40 MG tablet 1 tablet    pravastatin (PRAVACHOL) 80 MG tablet Take 1 tablet by mouth daily.    semaglutide (OZEMPIC) 1 mg/dose (2 mg/1.5 mL) PnIj Inject 0.75 mLs into the skin every 7 days.    tiZANidine (ZANAFLEX) 4 MG tablet Take 1/2 to 1 tab BID PRN muscle spasms. May cause drowsiness.    TRUEPLUS LANCETS 33 gauge Misc      No current facility-administered medications for this visit.          REVIEW OF SYSTEMS:    GENERAL:  No weight loss, malaise or fevers.  HEENT:   No recent changes in vision or hearing  NECK:  Negative for lumps, no difficulty with  "swallowing.  RESPIRATORY:  Negative for cough, wheezing or shortness of breath, patient denies any recent URI.  CARDIOVASCULAR:  Negative for chest pain, leg swelling or palpitations.  GI:  Negative for abdominal discomfort, blood in stools or black stools or change in bowel habits.  MUSCULOSKELETAL:  See HPI.  SKIN:  Negative for lesions, rash, and itching.  PSYCH:  No mood disorder or recent psychosocial stressors.  Patients sleep is not disturbed secondary to pain.  HEMATOLOGY/LYMPHOLOGY:  Negative for prolonged bleeding, bruising easily or swollen nodes.  Patient is not currently taking any anti-coagulants  NEURO:   No history of headaches, syncope, paralysis, seizures or tremors.  All other reviewed and negative other than HPI.    OBJECTIVE:    /84   Pulse 96   Ht 5' 8" (1.727 m)   Wt (!) 162.9 kg (359 lb 2.1 oz)   BMI 54.61 kg/m²     PHYSICAL EXAMINATION:    GENERAL: Well appearing, in no acute distress, alert and oriented x3.  Morbidly obese  PSYCH:  Mood and affect appropriate.  SKIN: Skin color, texture, turgor normal, no rashes or lesions.  HEAD/FACE:  Normocephalic, atraumatic. Cranial nerves grossly intact.  NECK:   moderate  pain to palpation over the cervical paraspinous and upper trapezius muscles, mostly on the right. Spurling Negative.   mild-to-moderate  pain with neck flexion, extension, and lateral flexion.   CV: RRR with palpation of the radial artery.  PULM: No evidence of respiratory difficulty, symmetric chest rise.  GI:  Soft and non-tender.  BACK: Straight leg raising in the sitting and supine positions is negative to radicular pain. No pain to palpation over the facet joints of the lumbar spine or spinous processes.  Fairly limited range of motion with a mild amount of pain reproduction.  EXTREMITIES: Peripheral joint ROM is full and pain free without obvious instability or laxity in all four extremities. No deformities, edema, or skin discoloration. Good capillary " refill.  MUSCULOSKELETAL: Shoulder, hip, and knee provocative maneuvers are negative with the exception of positive impingement signs of left shoulder with Todd, Neer's, and empty can..  There is moderate pain with palpation over the sacroiliac joint on the right.  FABERs test is positive on the right.  FADIRs test is negative.   Bilateral upper and lower extremity strength is normal and symmetric.  No atrophy or tone abnormalities are noted.  NEURO: Bilateral upper and lower extremity coordination and muscle stretch reflexes are physiologic and symmetric.  Plantar response are downgoing. No clonus.  No loss of sensation is noted.  GAIT:  Slow, antalgic.      LABS:  Lab Results   Component Value Date    WBC 6.17 09/04/2020    HGB 12.1 09/04/2020    HCT 40.5 09/04/2020    MCV 92 09/04/2020     (H) 09/04/2020       CMP  Sodium   Date Value Ref Range Status   09/04/2020 142 136 - 145 mmol/L Final     Potassium   Date Value Ref Range Status   09/04/2020 3.7 3.5 - 5.1 mmol/L Final     Chloride   Date Value Ref Range Status   09/04/2020 105 95 - 110 mmol/L Final     CO2   Date Value Ref Range Status   09/04/2020 27 23 - 29 mmol/L Final     Glucose   Date Value Ref Range Status   09/04/2020 102 70 - 110 mg/dL Final     BUN   Date Value Ref Range Status   09/04/2020 10 8 - 23 mg/dL Final     Creatinine   Date Value Ref Range Status   09/04/2020 0.8 0.5 - 1.4 mg/dL Final     Calcium   Date Value Ref Range Status   09/04/2020 9.2 8.7 - 10.5 mg/dL Final     Total Protein   Date Value Ref Range Status   09/04/2020 8.1 6.0 - 8.4 g/dL Final     Albumin   Date Value Ref Range Status   09/04/2020 3.9 3.5 - 5.2 g/dL Final     Total Bilirubin   Date Value Ref Range Status   09/04/2020 0.4 0.1 - 1.0 mg/dL Final     Comment:     For infants and newborns, interpretation of results should be based  on gestational age, weight and in agreement with clinical  observations.  Premature Infant recommended reference ranges:  Up to 24  hours.............<8.0 mg/dL  Up to 48 hours............<12.0 mg/dL  3-5 days..................<15.0 mg/dL  6-29 days.................<15.0 mg/dL       Alkaline Phosphatase   Date Value Ref Range Status   09/04/2020 103 55 - 135 U/L Final     AST   Date Value Ref Range Status   09/04/2020 17 10 - 40 U/L Final     ALT   Date Value Ref Range Status   09/04/2020 14 10 - 44 U/L Final     Anion Gap   Date Value Ref Range Status   09/04/2020 10 8 - 16 mmol/L Final     eGFR if    Date Value Ref Range Status   09/04/2020 >60.0 >60 mL/min/1.73 m^2 Final     eGFR if non    Date Value Ref Range Status   09/04/2020 >60.0 >60 mL/min/1.73 m^2 Final     Comment:     Calculation used to obtain the estimated glomerular filtration  rate (eGFR) is the CKD-EPI equation.          Lab Results   Component Value Date    HGBA1C 6.0 (H) 09/04/2020             ASSESSMENT: 60 y.o. year old female with neck and shoulder pain, consistent with     1. Myalgia of auxiliary muscles, head and neck  X-Ray Cervical Spine 5 View W Flex Extxt   2. Cervical spondylosis  X-Ray Cervical Spine 5 View W Flex Extxt   3. Morbid obesity with BMI of 50.0-59.9, adult     4. Sacroiliitis           PLAN:   - Interventions: None at this time.     - Anticoagulation use: no      - Medications: I have stressed the importance of physical activity and a home exercise plan to help with pain and improve health. and Patient can continue with medications for now since they are providing benefits, using them appropriately, and without side effects.  will change muscle relaxant from Flexeril to tizanidine 2-4 mg b.i.d. p.r.n..     - Therapy:   continue physical therapy, will contact therapist to target more the cervical myofascial pain.     - Psychological:  Discussed coping mechanisms to help address chronic pain issues     - Labs:  Reviewed     - Imaging: Reviewed available imaging with patient and answered any questions they had regarding  study.  X-ray of the cervical spine to further evaluate     - Consults/Referrals:   none at this time, continue physical therapy     - Records: Reviewed/Obtain old records from outside physicians and imaging     - Follow up visit: return to clinic in  6-8 weeks weeks or as needed     - Counseled patient regarding the importance of activity modification, weight loss strategies, and physical therapy     - This condition does not require this patient to take time off of work, and the primary goal of our Pain Management services is to improve the patient's functional capacity.     - Patient Questions: Answered all of the patient's questions regarding diagnosis, therapy, and treatment    The above plan and management options were discussed at length with patient. Patient is in agreement with the above and verbalized understanding.      Bladimir Ba MD  Interventional Pain Management  [facility]    Disclaimer:  This note was prepared using voice recognition system and is likely to have sound alike errors that may have been overlooked even after proof reading.  Please call me with any questions

## 2020-11-20 NOTE — TELEPHONE ENCOUNTER
----- Message from Bladimir Ba MD sent at 11/20/2020  1:50 PM CST -----  Please call patient and inform them of the xray results that there is evidence of degenerative disc disease and arthritis which is not unexpected.  Continue with the plan of care with physical therapy which I contacted her therapist said they can target the neck and shoulder area.

## 2020-11-24 ENCOUNTER — OFFICE VISIT (OUTPATIENT)
Dept: PODIATRY | Facility: CLINIC | Age: 60
End: 2020-11-24
Payer: MEDICAID

## 2020-11-24 ENCOUNTER — TELEPHONE (OUTPATIENT)
Dept: FAMILY MEDICINE | Facility: CLINIC | Age: 60
End: 2020-11-24

## 2020-11-24 ENCOUNTER — PATIENT MESSAGE (OUTPATIENT)
Dept: ADMINISTRATIVE | Facility: OTHER | Age: 60
End: 2020-11-24

## 2020-11-24 VITALS — WEIGHT: 293 LBS | BODY MASS INDEX: 53.83 KG/M2

## 2020-11-24 DIAGNOSIS — K21.00 GASTROESOPHAGEAL REFLUX DISEASE WITH ESOPHAGITIS, UNSPECIFIED WHETHER HEMORRHAGE: Primary | ICD-10-CM

## 2020-11-24 DIAGNOSIS — E11.9 ENCOUNTER FOR COMPREHENSIVE DIABETIC FOOT EXAMINATION, TYPE 2 DIABETES MELLITUS: Primary | ICD-10-CM

## 2020-11-24 DIAGNOSIS — E11.49 TYPE II DIABETES MELLITUS WITH NEUROLOGICAL MANIFESTATIONS: ICD-10-CM

## 2020-11-24 DIAGNOSIS — E66.01 MORBID OBESITY: ICD-10-CM

## 2020-11-24 PROCEDURE — 99213 PR OFFICE/OUTPT VISIT, EST, LEVL III, 20-29 MIN: ICD-10-PCS | Mod: S$PBB,,, | Performed by: PODIATRIST

## 2020-11-24 PROCEDURE — 99213 OFFICE O/P EST LOW 20 MIN: CPT | Mod: S$PBB,,, | Performed by: PODIATRIST

## 2020-11-24 PROCEDURE — 99999 PR PBB SHADOW E&M-EST. PATIENT-LVL III: CPT | Mod: PBBFAC,,, | Performed by: PODIATRIST

## 2020-11-24 PROCEDURE — 99999 PR PBB SHADOW E&M-EST. PATIENT-LVL III: ICD-10-PCS | Mod: PBBFAC,,, | Performed by: PODIATRIST

## 2020-11-24 PROCEDURE — 99213 OFFICE O/P EST LOW 20 MIN: CPT | Mod: PBBFAC,PO | Performed by: PODIATRIST

## 2020-11-24 RX ORDER — PANTOPRAZOLE SODIUM 40 MG/1
40 TABLET, DELAYED RELEASE ORAL 2 TIMES DAILY
Qty: 60 TABLET | Refills: 0 | Status: SHIPPED | OUTPATIENT
Start: 2020-11-24 | End: 2020-12-24 | Stop reason: SDUPTHER

## 2020-11-24 NOTE — PROGRESS NOTES
Subjective:       Patient ID: Zakia Price is a 60 y.o. female.    Chief Complaint: Diabetic Foot Exam (patient is a diabetic and sees Dr. Oleksandr Nagel and last visit was on 10/2/2020. patient denies pain at present. She is wearing slip on open toes sandals for todays visit. )      HPI: Zakia Price presents to the office today, under referral by their Primary Care Provider, Oleksandr Nagel MD, for her annual diabetic foot assessment and risk evaluation.  Patient is a DMII. Patient reports no pain at pathologies the right foot of the left foot.This patient last saw his/her primary care provider on 10/02/2020.  Reporting 0/10 pain presently     Hemoglobin A1C   Date Value Ref Range Status   09/04/2020 6.0 (H) 4.0 - 5.6 % Final     Comment:     ADA Screening Guidelines:  5.7-6.4%  Consistent with prediabetes  >or=6.5%  Consistent with diabetes  High levels of fetal hemoglobin interfere with the HbA1C  assay. Heterozygous hemoglobin variants (HbS, HgC, etc)do  not significantly interfere with this assay.   However, presence of multiple variants may affect accuracy.     03/02/2020 6.2 (H) 4.0 - 5.6 % Final     Comment:     ADA Screening Guidelines:  5.7-6.4%  Consistent with prediabetes  >or=6.5%  Consistent with diabetes  High levels of fetal hemoglobin interfere with the HbA1C  assay. Heterozygous hemoglobin variants (HbS, HgC, etc)do  not significantly interfere with this assay.   However, presence of multiple variants may affect accuracy.     12/04/2019 5.8 (H) 4.0 - 5.6 % Final     Comment:     ADA Screening Guidelines:  5.7-6.4%  Consistent with prediabetes  >or=6.5%  Consistent with diabetes  High levels of fetal hemoglobin interfere with the HbA1C  assay. Heterozygous hemoglobin variants (HbS, HgC, etc)do  not significantly interfere with this assay.   However, presence of multiple variants may affect accuracy.     .    Review of patient's allergies indicates:   Allergen Reactions     Codeine sulfate      Nausea^    Lisinopril Swelling     angioedema    Codeine Nausea Only and Rash       Past Medical History:   Diagnosis Date    Diabetes mellitus, type 2     Diabetic neuropathy 1/27/2014    DJD (degenerative joint disease) of knee     DVT (deep venous thrombosis) around 1990's    in leg, is on no anticoagulant therapy presently    GERD (gastroesophageal reflux disease)     Hypertension associated with diabetes     Multinodular goiter     Obesity, morbid, BMI 50 or higher     Sleep apnea     has no CPAP       Family History   Problem Relation Age of Onset    Leukemia Son     Cancer Son     Diabetes Mother     Hypertension Mother     Heart disease Mother 50    Cancer Father     Cancer Brother     Cancer Brother        Social History     Socioeconomic History    Marital status: Single     Spouse name: Not on file    Number of children: Not on file    Years of education: Not on file    Highest education level: Not on file   Occupational History    Not on file   Social Needs    Financial resource strain: Somewhat hard    Food insecurity     Worry: Sometimes true     Inability: Sometimes true    Transportation needs     Medical: Yes     Non-medical: Yes   Tobacco Use    Smoking status: Never Smoker    Smokeless tobacco: Never Used   Substance and Sexual Activity    Alcohol use: No     Frequency: Never    Drug use: No    Sexual activity: Not Currently   Lifestyle    Physical activity     Days per week: 0 days     Minutes per session: 0 min    Stress: Only a little   Relationships    Social connections     Talks on phone: More than three times a week     Gets together: More than three times a week     Attends Protestant service: More than 4 times per year     Active member of club or organization: No     Attends meetings of clubs or organizations: Never     Relationship status: Never    Other Topics Concern    Not on file   Social History Narrative    Not on  file       Past Surgical History:   Procedure Laterality Date    FRACTURE SURGERY      HYSTERECTOMY      SHOULDER ARTHROSCOPY      THYROIDECTOMY, PARTIAL Right     and transplatation of right superior parathyroid gland to the sternocleidomastoid muscle     TONSILLECTOMY      TUBAL LIGATION  1984    WRIST FRACTURE SURGERY      left       Review of Systems   Constitutional: Negative for activity change, appetite change, chills and fever.   HENT: Negative for sinus pain, sore throat and voice change.    Eyes: Negative for pain, redness and visual disturbance.   Respiratory: Negative for cough and shortness of breath.    Cardiovascular: Negative for chest pain and palpitations.   Gastrointestinal: Negative for diarrhea, nausea and vomiting.   Musculoskeletal: Negative for back pain and joint swelling.   Skin: Negative for color change and wound.   Neurological: Negative for dizziness, weakness and numbness.   Psychiatric/Behavioral: The patient is not nervous/anxious.          Objective:   Wt (!) 160.6 kg (354 lb)   BMI 53.83 kg/m²     Physical Exam  LOWER EXTREMITY PHYSICAL EXAMINATION    ORTHOPEDIC: No pain on palpation of the foot or ankle.   Range of motion within normal limits of the ankle joint, rearfoot, and forefoot.  Manual muscle strength testing is 5/5 with dorsiflexion, plantar flexion, abduction and abduction of the lower extremity.  No pain with or without resistance.  The patient is a full to ambulate without pain or discomfort.  The patient's gait is non antalgic.  The patient does not utilize any assistive device for ambulation.    VASCULAR:   Dorsalis pedis pulse on the right 2/4, on the left 2/4.  Posterior tibial pulse on the right 2/4, on the left 2/4.  Capillary refill intact less than 3 sec of bilateral lower extremities.  Pedal hair growth is present to the dorsal aspect of the foot and digits bilaterally.  No presence of edema to lower extremities. Rubor on dependency is noted.      NEUROLOGY: Proprioception is intact. Sensation to light touch is intact. Sensation to pin prick is intact. Vibratory sensation is diminished to the left and right lower extremity. Examination with 5.07 [place] Cele monofilament reveals that protective sensation is reduced     DERMATOLOGY: Skin is supple, moist, intact.  There is no callusing, ulceration, or other lesions identified to the dorsal plantar aspect of the right left foot.  The web spaces are clean, dry, intact.  The nails are normal color, thickness, and texture.  There is no signs ingrowing into the medial lateral borders.  There is no erythema or edema.  No open wounds.    Assessment:     1. Encounter for comprehensive diabetic foot examination, type 2 diabetes mellitus    2. Type II diabetes mellitus with neurological manifestations    3. Morbid obesity        Plan:     Encounter for comprehensive diabetic foot examination, type 2 diabetes mellitus  I counseled the patient on his/her Diabetic Mellitus regarding today's clinical examination and objection findings. We did also discuss recent medication changes, pertinent labs and imaging evaluations and other medical consultation notes and progress notes. Greater than 50% of this visit was spent on counseling and coordination of care. Greater than 20 minutes of this appt. was spent on education about the diabetic foot, in relation to PVD and/or neuropathy, and the prevention of limb loss.     Shoe gear is inspected and wear and proper fit/type. Patient is reminded of the importance of good nutrition and blood sugar control to help prevent podiatric complications of diabetes. Patient instructed on proper foot hygeine. We discussed wearing proper shoe gear, daily foot inspections, never walking without protective shoe gear, never putting sharp instruments to feet.  Patient  will continue to monitor the areas daily, inspect feet, wear protective shoe gear when ambulatory, moisturizer to maintain skin  integrity.     Patient's DMI/DMII is managed by Primary Care Provider and/or Endocrinology Advanced Practice Provider.    Type II diabetes mellitus with neurological manifestations  Currently controlled neurological symptoms.  No evidence of burning or tingling to the digits.  Do not walk barefoot, or with just socks, even when indoors.  Be sure to check and inspect the inside of the shoe before putting them on her feet.  Protect your feet at all times.  Walking shoes and/or athletic shoes are the best types of shoe gear. Do not wear vinyl or plastic type shoe gear, as they do not stretch and/or breathe.  Protect your feet from hot and/or cold. Elevate the extremities when sitting.  Do not wear excessively tight socks and/or shoe gear. Wiggle your toes for a few minutes throughout the day. Move your ankles up and down, in and out, to help blood flow in your lower extremity.     Morbid obesity  Patient is counseled and reminded concerning the importance of good nutrition and healthy eating habits, which may include blood sugar control, to prevent and/or help podiatric foot and ankle complications.

## 2020-11-24 NOTE — TELEPHONE ENCOUNTER
Patient states that she is having some really bad heartburn and needs something for it.  Please advise.

## 2020-11-24 NOTE — TELEPHONE ENCOUNTER
It looks like patient is on pantoprazole and Pepcid.  Please confirm that she is taking this medication.  She can increase dosage to twice daily on pantoprazole if not improved.

## 2020-11-25 DIAGNOSIS — E04.2 MULTINODULAR GOITER: ICD-10-CM

## 2020-11-25 DIAGNOSIS — Z76.0 MEDICATION REFILL: ICD-10-CM

## 2020-11-25 RX ORDER — MONTELUKAST SODIUM 10 MG/1
TABLET ORAL
Qty: 30 TABLET | Refills: 3 | Status: SHIPPED | OUTPATIENT
Start: 2020-11-25 | End: 2021-03-22 | Stop reason: SDUPTHER

## 2020-11-25 RX ORDER — LEVOTHYROXINE SODIUM 100 UG/1
TABLET ORAL
Qty: 90 TABLET | Refills: 3 | Status: SHIPPED | OUTPATIENT
Start: 2020-11-25 | End: 2021-10-18

## 2020-12-02 NOTE — PROGRESS NOTES
Physical Therapy Daily Treatment Note and Re-assessment     Name: Zakia Price  Clinic Number: [id number]    Therapy Diagnosis:   Encounter Diagnoses   Name Primary?    Myalgia of auxiliary muscles, head and neck     Decreased ROM of lumbar spine     Weakness of both lower extremities     Posture abnormality      Physician: Bladimir Ba MD    Visit Date: 12/3/2020    Physician Orders: PT Eval and Treat   Medical Diagnosis from Referral: M54.41,M54.42,G89.29 (ICD-10-CM) - Chronic right-sided low back pain with bilateral sciatica  M79.12 (ICD-10-CM) - Myalgia of auxiliary muscles, head and neck  Evaluation Date: 9/21/2020  Authorization Period Expiration: 12/31/2020  Plan of Care Expiration: 1/29/2021 = NEW POC DATE  Visit # / Visits authorized: Need Auth (1 / 1 Cervical Auth) (6/12 auth) (reassessed on 11/3/20) (re-assessed on 12/3/2020)    Time In: 9:55 AM  Time Out: 10:40 AM  Total Billable Time: 45 minutes    Precautions: Diabetes, HTN, obesity    Subjective     Pt reports: that her back pain comes and goes. Usually it depends on how she is moving and what she is doing. Lately the pain has not been too bad in the lower back but has moved higher up into the neck so she is wanting to get treatment for both problems. The neck pain is between the shoulder blades and tingles down into her right arm and hand. The tingling is constant in nature and usually all day long. The right arm will get heavy at times and hard to hold up. The neck pain has caused her to stop driving and has progressively gotten worse over the years.    She was somewhat compliant with home exercise program.  Response to previous treatment: no soreness, continued relief   Functional change: Increased ADL tolerance    Pain: 0/10 in back, 5/10 in shoulders  Location: shoulders    Objective     Mental status: alert  Posture/ Alignment: seated with slightly rounded shoulders, anterior translation of B shoulder girdles, forward head and  flexed trunk     GAIT DEVIATIONS: Zakia ambulates with decreased nu, decreased step length, decreased stride length and increased stride width.      Cervical Range of Motion:    degrees Pain   Flexion 35 + on R neck   Extension 40 + mild in center of cervical spine     Right Rotation 50% limited + (pain and tightness on R)     Left Rotation 50% limited - (tightness only)     Right Sidebend 25 + R sided neck pain     Left Sidebend 40 - stretching only          Shoulder Passive Range of Motion: WFL B    Shoulder Active Elevation: Pain was present with active shoulder flexion (170 degrees B), pain present with active shoulder abduction (150 degrees), ER/IR ROM is WFL actively.      Upper Extremity Strength:   Right Left   Scaption: 4/5* 4/5*   Shoulder ER 4/5* 4/5*   Shoulder IR 4/5 4/5     Pain = *    Special Tests:    Ligamentous Stability    Sharp-Roxana -     Distraction + (alleviates pain   Compression + (increased pain)   Spurlings + (pain B along upper traps)       Cervical Joint Mobility: slight decrease in cervical joint mobility at C4-C7.    Palpation: TTP along B upper traps (R>L), along B cervical paraspinals, B levator scaps, and B suboccipital triangles.     Lumbar Assessment:    ROM: Lumbar  AROM  Limitation Comment   Flexion: To floor No pain    Extension: 25% No pain   Lat Flex R: WFL No pain   Lat Flex L: WFL No Pain   Rotation R: WFL No Pain   Rotation L: WFL No pain         Strength: Dermatomes:    Right Left Comment   L2 intact intact     L3 intact intact     L4 intact intact     L5 intact intact     S1 intact intact     S2 intact intact     Saddle intact intact        Myotomes:    Right Left Comment   Hip flexion (L2-3): 5/5 4+/5     Knee extension (L3-4): 5/5 5/5     DF (L4-5): 5/5 5/5     Great Toe Ext (L5-S1): 5/5 5/5     Great Toe Flex (S1-S2): 5/5 5/5        BLE hamstring MMT: 3+/5  Core stability: poor           Right Left   L2/3 Iliacus Hip flexion  5 4+   L3/4 Qudratus Femoris Knee  Extension 5 5   L4/5 Tib Anterior Ankle Dorsiflexion  5 5         DTR:    Right Left Comment   Patellar (L3-4) 2+ 2+     Achilles (S1) 2+ 2+           Special Tests:  Repeated ROM ROM (% of normal) Pain? Radiation?   Flex Stand: 100% none no   Ext Stand: 100% none no   Flex Supine: BISHNU       Ext Prone: BISHNU          Functional Tests (* indicates w/ pain)   Gait: decreased nu and step length   Sit<>stand: WNL     Palpation:  WNL in low back, min/mod in right upper trap     Joint Play:  Not assessed     Pt/family was provided educational information, including: role of PT, goals for PT, scheduling - pt verbalized understanding. Discussed insurance plan with pt.         *FOTO - was not assessed due to not being set up in system (new clinic)      Zakia received therapeutic exercises to develop strength, endurance, ROM, flexibility, posture and core stabilization for 15 minutes including: (remaining therapeutic exercise was billed for re-assessment/re-evaluation)    Seated Scap Retractions x10 reps B (3 sec holds)  Seated Stability Ball Roll Outs x2 min  Seated Upper Trap Stretch 3x15s  Seated Levator scapulae stretch 3x15s  Supine Chin Tucks x10 reps (3 sec holds)  Supine Serratus Punches x10 reps (unilateral for today)            Zakia received the following manual therapy techniques: Soft tissue Mobilization were applied to the: bilateral upper traps and lumbar paraspinals for 5 minutes, including:      Gentle Cervical Distraction   Suboccipital Release B    Possible next visit: supine shoulder horizontal abduction, UBE, supine shoulder flexion, wall slides, brugger's, SL scap retractions and ER is able to assume position      Pt received 5 minutes of moist heat to cervical region in prone position post treatment session. LE's elevated on wedge. No adverse reactions noted. (NP today - per patient request)      Home Exercises Provided and Patient Education Provided     Education provided:   - HEP Administration  and Review  - Anatomy and physiology relevant to current condition with addition of the cervical spine  - Postural correction  - Possible response to PT and post exercise    Written Home Exercises Provided: yes and for continuation of previous exercises.  Exercises were reviewed and Zakia was able to demonstrate them prior to the end of the session.  Zakia demonstrated good  understanding of the education provided.      See EMR under Patient Instructions for exercises provided 9/21/2020 and 12/3/2020.      Assessment     Pt was re-assessed this date in regards to the lumbar spine. Pt demonstrated improved lumbar spine mobility as well as decreased radiculopathy. LE strength was about the same but within functional limits. The cervical spine was also assessed this date - pt presented with decreased cervical spine mobility into all planes, decreased UE strength, increased tenderness to palpation along B upper traps R>L, and decreased endurance/toleracne to activity. Pt will continue to benefit from strengthening up and down the kinetic chain with focus on periscapular stability and improving flexibility. Dry needling and soft tissue mobilization will most likely be beneficial per patient approval/consent.     Zakia is progressing well towards her goals.   Pt prognosis is Fair.     Pt will continue to benefit from skilled outpatient physical therapy to address the deficits listed in the problem list box on initial evaluation, provide pt/family education and to maximize pt's level of independence in the home and community environment.     Pt's spiritual, cultural and educational needs considered and pt agreeable to plan of care and goals.    Anticipated barriers to physical therapy: dependent on transportation     Goals:  Short Term Goals: 3 weeks   - Pt will be independent with HEP to facilitate healing and improve outcome measures (met)  - Pt will increase bilateral hamstring strength by at least a half grade to  improve stability and prevent fatigue. (met)  - Pt will be able to assume prone position and tolerate lumbar extension exercises. (met)  NEW GOAL: Pt will demonstrate an increase in UE strength, especially into Shoulder ER by at least 1/2 grade via MMT for increased stability with functional movements.  NEW GOAL: Pt will demonstrate improved cervical mobility, especially into R side bending by at least 5 degrees for improved neck mobility with performance of daily tasks.     Long Term Goals: 6 weeks   - Pt will be jaspreet to tolerate ADLs with pain no greater than 2/10 without radicular symptoms into both legs. (progressing, not met)  - Pt will increase lumbar extension to at least 90% of normal range with min discomfort in order to improve posture and overall mobility. (progressing, not met)  - Pt will increase bilateral LE gross MMT to at least 5-/5 in order to tolerate increased ADLs and begin light recreational activities. (met)  NEW GOAL: Pt will demonstrate an increase in UE strength, especially into Shoulder scaption by at least 1/2 grade via MMT for increased stability with functional movements.  NEW GOAL: Pt will demonstrate improved cervical mobility, especially into R side bending by at least 10 degrees for improved neck mobility with performance of daily tasks.    Plan     1/29/2021 = NEW POC DATE    Continue with current PT POC with the addition of cervical spine treatment.     Outpatient Physical Therapy: 2 times weekly for 8 weeks to include the following interventions: Cervical/Lumbar Traction, Electrical Stimulation (TENS, IFC, Premod) Manual Therapy, Moist Heat/ Ice, Patient Education, Therapeutic Activites and Therapeutic Exercise, and Dry Needling (by a certified therapist)    Possible next visit: supine shoulder horizontal abduction, UBE, supine shoulder flexion, wall slides, brugger's, SL scap retractions and ER is able to assume position      Linda Horowitz, PT, DPT, Cert. DN

## 2020-12-03 ENCOUNTER — CLINICAL SUPPORT (OUTPATIENT)
Dept: REHABILITATION | Facility: HOSPITAL | Age: 60
End: 2020-12-03
Attending: PHYSICAL MEDICINE & REHABILITATION
Payer: MEDICAID

## 2020-12-03 DIAGNOSIS — R29.3 POSTURE ABNORMALITY: ICD-10-CM

## 2020-12-03 DIAGNOSIS — R29.898 WEAKNESS OF BOTH LOWER EXTREMITIES: ICD-10-CM

## 2020-12-03 DIAGNOSIS — M79.12 MYALGIA OF AUXILIARY MUSCLES, HEAD AND NECK: ICD-10-CM

## 2020-12-03 DIAGNOSIS — M53.86 DECREASED ROM OF LUMBAR SPINE: ICD-10-CM

## 2020-12-03 PROCEDURE — 97110 THERAPEUTIC EXERCISES: CPT | Mod: PN

## 2020-12-03 NOTE — PLAN OF CARE
Physical Therapy Daily Treatment Note and Re-assessment     Name: Zakia Price  Clinic Number: [id number]    Therapy Diagnosis:   Encounter Diagnoses   Name Primary?    Myalgia of auxiliary muscles, head and neck     Decreased ROM of lumbar spine     Weakness of both lower extremities     Posture abnormality      Physician: Bladimir Ba MD    Visit Date: 12/3/2020    Physician Orders: PT Eval and Treat   Medical Diagnosis from Referral: M54.41,M54.42,G89.29 (ICD-10-CM) - Chronic right-sided low back pain with bilateral sciatica  M79.12 (ICD-10-CM) - Myalgia of auxiliary muscles, head and neck  Evaluation Date: 9/21/2020  Authorization Period Expiration: 12/31/2020  Plan of Care Expiration: 1/29/2021 = NEW POC DATE  Visit # / Visits authorized: Need Auth (1 / 1 Cervical Auth) (6/12 auth) (reassessed on 11/3/20) (re-assessed on 12/3/2020)    Time In: 9:55 AM  Time Out: 10:40 AM  Total Billable Time: 45 minutes    Precautions: Diabetes, HTN, obesity    Subjective     Pt reports: that her back pain comes and goes. Usually it depends on how she is moving and what she is doing. Lately the pain has not been too bad in the lower back but has moved higher up into the neck so she is wanting to get treatment for both problems. The neck pain is between the shoulder blades and tingles down into her right arm and hand. The tingling is constant in nature and usually all day long. The right arm will get heavy at times and hard to hold up. The neck pain has caused her to stop driving and has progressively gotten worse over the years.    She was somewhat compliant with home exercise program.  Response to previous treatment: no soreness, continued relief   Functional change: Increased ADL tolerance    Pain: 0/10 in back, 5/10 in shoulders  Location: shoulders    Objective     Mental status: alert  Posture/ Alignment: seated with slightly rounded shoulders, anterior translation of B shoulder girdles, forward head and  flexed trunk     GAIT DEVIATIONS: Zakia ambulates with decreased nu, decreased step length, decreased stride length and increased stride width.      Cervical Range of Motion:    degrees Pain   Flexion 35 + on R neck   Extension 40 + mild in center of cervical spine     Right Rotation 50% limited + (pain and tightness on R)     Left Rotation 50% limited - (tightness only)     Right Sidebend 25 + R sided neck pain     Left Sidebend 40 - stretching only          Shoulder Passive Range of Motion: WFL B    Shoulder Active Elevation: Pain was present with active shoulder flexion (170 degrees B), pain present with active shoulder abduction (150 degrees), ER/IR ROM is WFL actively.      Upper Extremity Strength:   Right Left   Scaption: 4/5* 4/5*   Shoulder ER 4/5* 4/5*   Shoulder IR 4/5 4/5     Pain = *    Special Tests:    Ligamentous Stability    Sharp-Roxana -     Distraction + (alleviates pain   Compression + (increased pain)   Spurlings + (pain B along upper traps)       Cervical Joint Mobility: slight decrease in cervical joint mobility at C4-C7.    Palpation: TTP along B upper traps (R>L), along B cervical paraspinals, B levator scaps, and B suboccipital triangles.     Lumbar Assessment:    ROM: Lumbar  AROM  Limitation Comment   Flexion: To floor No pain    Extension: 25% No pain   Lat Flex R: WFL No pain   Lat Flex L: WFL No Pain   Rotation R: WFL No Pain   Rotation L: WFL No pain         Strength: Dermatomes:    Right Left Comment   L2 intact intact     L3 intact intact     L4 intact intact     L5 intact intact     S1 intact intact     S2 intact intact     Saddle intact intact        Myotomes:    Right Left Comment   Hip flexion (L2-3): 5/5 4+/5     Knee extension (L3-4): 5/5 5/5     DF (L4-5): 5/5 5/5     Great Toe Ext (L5-S1): 5/5 5/5     Great Toe Flex (S1-S2): 5/5 5/5        BLE hamstring MMT: 3+/5  Core stability: poor           Right Left   L2/3 Iliacus Hip flexion  5 4+   L3/4 Qudratus Femoris Knee  Extension 5 5   L4/5 Tib Anterior Ankle Dorsiflexion  5 5         DTR:    Right Left Comment   Patellar (L3-4) 2+ 2+     Achilles (S1) 2+ 2+           Special Tests:  Repeated ROM ROM (% of normal) Pain? Radiation?   Flex Stand: 100% none no   Ext Stand: 100% none no   Flex Supine: BISHNU       Ext Prone: BISHNU          Functional Tests (* indicates w/ pain)   Gait: decreased nu and step length   Sit<>stand: WNL     Palpation:  WNL in low back, min/mod in right upper trap     Joint Play:  Not assessed     Pt/family was provided educational information, including: role of PT, goals for PT, scheduling - pt verbalized understanding. Discussed insurance plan with pt.         *FOTO - was not assessed due to not being set up in system (new clinic)      Zakia received therapeutic exercises to develop strength, endurance, ROM, flexibility, posture and core stabilization for 15 minutes including: (remaining therapeutic exercise was billed for re-assessment/re-evaluation)    Seated Scap Retractions x10 reps B (3 sec holds)  Seated Stability Ball Roll Outs x2 min  Seated Upper Trap Stretch 3x15s  Seated Levator scapulae stretch 3x15s  Supine Chin Tucks x10 reps (3 sec holds)  Supine Serratus Punches x10 reps (unilateral for today)            Zakia received the following manual therapy techniques: Soft tissue Mobilization were applied to the: bilateral upper traps and lumbar paraspinals for 5 minutes, including:      Gentle Cervical Distraction   Suboccipital Release B    Possible next visit: supine shoulder horizontal abduction, UBE, supine shoulder flexion, wall slides, brugger's, SL scap retractions and ER is able to assume position      Pt received 5 minutes of moist heat to cervical region in prone position post treatment session. LE's elevated on wedge. No adverse reactions noted. (NP today - per patient request)      Home Exercises Provided and Patient Education Provided     Education provided:   - HEP Administration  and Review  - Anatomy and physiology relevant to current condition with addition of the cervical spine  - Postural correction  - Possible response to PT and post exercise    Written Home Exercises Provided: yes and for continuation of previous exercises.  Exercises were reviewed and Zakia was able to demonstrate them prior to the end of the session.  Zakia demonstrated good  understanding of the education provided.      See EMR under Patient Instructions for exercises provided 9/21/2020 and 12/3/2020.      Assessment     Pt was re-assessed this date in regards to the lumbar spine. Pt demonstrated improved lumbar spine mobility as well as decreased radiculopathy. LE strength was about the same but within functional limits. The cervical spine was also assessed this date - pt presented with decreased cervical spine mobility into all planes, decreased UE strength, increased tenderness to palpation along B upper traps R>L, and decreased endurance/toleracne to activity. Pt will continue to benefit from strengthening up and down the kinetic chain with focus on periscapular stability and improving flexibility. Dry needling and soft tissue mobilization will most likely be beneficial per patient approval/consent.     Zakia is progressing well towards her goals.   Pt prognosis is Fair.     Pt will continue to benefit from skilled outpatient physical therapy to address the deficits listed in the problem list box on initial evaluation, provide pt/family education and to maximize pt's level of independence in the home and community environment.     Pt's spiritual, cultural and educational needs considered and pt agreeable to plan of care and goals.    Anticipated barriers to physical therapy: dependent on transportation     Goals:  Short Term Goals: 3 weeks   - Pt will be independent with HEP to facilitate healing and improve outcome measures (met)  - Pt will increase bilateral hamstring strength by at least a half grade to  improve stability and prevent fatigue. (met)  - Pt will be able to assume prone position and tolerate lumbar extension exercises. (met)  NEW GOAL: Pt will demonstrate an increase in UE strength, especially into Shoulder ER by at least 1/2 grade via MMT for increased stability with functional movements.  NEW GOAL: Pt will demonstrate improved cervical mobility, especially into R side bending by at least 5 degrees for improved neck mobility with performance of daily tasks.     Long Term Goals: 6 weeks   - Pt will be jaspreet to tolerate ADLs with pain no greater than 2/10 without radicular symptoms into both legs. (progressing, not met)  - Pt will increase lumbar extension to at least 90% of normal range with min discomfort in order to improve posture and overall mobility. (progressing, not met)  - Pt will increase bilateral LE gross MMT to at least 5-/5 in order to tolerate increased ADLs and begin light recreational activities. (met)  NEW GOAL: Pt will demonstrate an increase in UE strength, especially into Shoulder scaption by at least 1/2 grade via MMT for increased stability with functional movements.  NEW GOAL: Pt will demonstrate improved cervical mobility, especially into R side bending by at least 10 degrees for improved neck mobility with performance of daily tasks.    Plan     1/29/2021 = NEW POC DATE    Continue with current PT POC with the addition of cervical spine treatment.     Outpatient Physical Therapy: 2 times weekly for 8 weeks to include the following interventions: Cervical/Lumbar Traction, Electrical Stimulation (TENS, IFC, Premod) Manual Therapy, Moist Heat/ Ice, Patient Education, Therapeutic Activites and Therapeutic Exercise, and Dry Needling (by a certified therapist)    Possible next visit: supine shoulder horizontal abduction, UBE, supine shoulder flexion, wall slides, brugger's, SL scap retractions and ER is able to assume position      Linda Horowitz, PT, DPT, Cert. DN

## 2020-12-04 ENCOUNTER — PATIENT OUTREACH (OUTPATIENT)
Dept: ADMINISTRATIVE | Facility: HOSPITAL | Age: 60
End: 2020-12-04

## 2020-12-04 NOTE — PROGRESS NOTES
Working colonoscopy report; Searched Care Everywhere, [employer]. I called pt to remind her she is due for colonoscopy, pt states she will schedule colonoscopy soon.

## 2020-12-08 ENCOUNTER — LAB VISIT (OUTPATIENT)
Dept: LAB | Facility: HOSPITAL | Age: 60
End: 2020-12-08
Attending: FAMILY MEDICINE
Payer: MEDICAID

## 2020-12-08 DIAGNOSIS — Z79.899 ENCOUNTER FOR LONG-TERM (CURRENT) USE OF MEDICATIONS: ICD-10-CM

## 2020-12-08 DIAGNOSIS — E11.49 TYPE II DIABETES MELLITUS WITH NEUROLOGICAL MANIFESTATIONS: Chronic | ICD-10-CM

## 2020-12-08 LAB
ERYTHROCYTE [DISTWIDTH] IN BLOOD BY AUTOMATED COUNT: 15 % (ref 11.5–14.5)
HCT VFR BLD AUTO: 38.6 % (ref 37–48.5)
HGB BLD-MCNC: 11.5 G/DL (ref 12–16)
MCH RBC QN AUTO: 26.2 PG (ref 27–31)
MCHC RBC AUTO-ENTMCNC: 29.8 G/DL (ref 32–36)
MCV RBC AUTO: 88 FL (ref 82–98)
PLATELET # BLD AUTO: 388 K/UL (ref 150–350)
PMV BLD AUTO: 10.7 FL (ref 9.2–12.9)
RBC # BLD AUTO: 4.39 M/UL (ref 4–5.4)
WBC # BLD AUTO: 5.03 K/UL (ref 3.9–12.7)

## 2020-12-08 PROCEDURE — 85027 COMPLETE CBC AUTOMATED: CPT

## 2020-12-08 PROCEDURE — 36415 COLL VENOUS BLD VENIPUNCTURE: CPT | Mod: PO

## 2020-12-08 PROCEDURE — 83036 HEMOGLOBIN GLYCOSYLATED A1C: CPT

## 2020-12-08 NOTE — PROGRESS NOTES
Physical Therapy Daily Treatment Note      Name: Zakia Buchanan [place]  Clinic Number: [id number]    Therapy Diagnosis:   Encounter Diagnoses   Name Primary?    Decreased ROM of lumbar spine     Weakness of both lower extremities     Posture abnormality      Physician: Bladimir Ba MD    Visit Date: 12/9/2020    Physician Orders: PT Eval and Treat   Medical Diagnosis from Referral: M54.41,M54.42,G89.29 (ICD-10-CM) - Chronic right-sided low back pain with bilateral sciatica  M79.12 (ICD-10-CM) - Myalgia of auxiliary muscles, head and neck  Evaluation Date: 9/21/2020  Authorization Period Expiration: 1/31/21  Plan of Care Expiration: 1/29/2021   Visit # / Visits authorized: 1 / 12 (1 / 1 Cervical Auth) (6/12 auth) (reassessed on 11/3/20) (re-assessed on 12/3/2020)    Time In: 11:30 AM  Time Out: 12:10 AM  Total Billable Time: 40 minutes    Precautions: Diabetes, HTN, obesity    Subjective     Pt reports: that she is having neck pain today and it refers back and forth to each side. She was able to perform her exercises but had trouble figuring out how to perform a couple of them. She pretty much gets muscle spasms every day. Her lower back is still feeling pretty good as well.     She was somewhat compliant with home exercise program.  Response to previous treatment: no soreness, continued relief   Functional change: Increased ADL tolerance    Pain: 0/10 in shoulders  Location: B neck    Objective     Zakia received therapeutic exercises to develop strength, endurance, ROM, flexibility, posture and core stabilization for 32 minutes including:     UBE x2 min forward / 2 min backward (Seat: 12 , Level: 1.0)  Seated Scap Retractions x15 reps B (3 sec holds)  Seated Stability Ball Roll Outs x2 min  Seated Upper Trap Stretch 3x15s  Seated Levator scapulae stretch 3x15s  Seated Brugger's x15 reps YTB B  Supine Chin Tucks x10 reps (3 sec holds)  Supine Serratus Punches 2x10 reps (unilateral for today)  Supine  Shoulder Horizontal Abduction x15 reps (no band yet)  Supine Chest Press x15 reps (#1 bar)  Supine Shoulder Flexion x15 reps (#1 bar)  Standing Shoulder Rows x15 reps B (YTB) - attempted but patient complained of increased shoulder pain B so exercise was terminated.              Possible next visit:  wall slides, SL scap retractions and ER is able to assume position      Zakia received the following manual therapy techniques: Soft tissue Mobilization were applied to the: bilateral upper traps and lumbar paraspinals for 8 minutes, including:      Gentle Cervical Distraction   Suboccipital Release B      Pt received 10 minutes of moist heat to cervical region in prone position post treatment session. No adverse reactions noted. Pt offered wedge for LE's but denied.       Home Exercises Provided and Patient Education Provided     Education provided:   - HEP Review  - Anatomy and physiology relevant to current condition with addition of the cervical spine  - Postural correction  - Possible response to PT and post exercise    Written Home Exercises Provided: continuation of previous exercises.  Exercises were reviewed and Zakia was able to demonstrate them prior to the end of the session.  Zakia demonstrated good  understanding of the education provided.      See EMR under Patient Instructions for exercises provided 9/21/2020 and 12/3/2020.      Assessment     Pt tolerated therapy fairly well this date. Verbal cues for prevention of upper trap compensation but pt improved throughout the treatment session. Increased tightness on the R side of the neck compared to the L. Attempted shoulder rows but pt reported increased shoulder pain so the exercise was terminated. Proper performance of serratus punches was noted. Pt continues to require verbal and tactile cues for proper performance of supine chin tucks. Verbal cues for maintaining a pain free ROM with activity. Soft tissue massage applied to the cervical musculature -  no provocation of pain reported but active trigger points noted R>L along the upper trap and cervical paraspinals.    Zakia is progressing well towards her goals.   Pt prognosis is Fair.     Pt will continue to benefit from skilled outpatient physical therapy to address the deficits listed in the problem list box on initial evaluation, provide pt/family education and to maximize pt's level of independence in the home and community environment.     Pt's spiritual, cultural and educational needs considered and pt agreeable to plan of care and goals.    Anticipated barriers to physical therapy: dependent on transportation     Goals:  Short Term Goals: 3 weeks   - Pt will be independent with HEP to facilitate healing and improve outcome measures (met)  - Pt will increase bilateral hamstring strength by at least a half grade to improve stability and prevent fatigue. (met)  - Pt will be able to assume prone position and tolerate lumbar extension exercises. (met)  NEW GOAL: Pt will demonstrate an increase in UE strength, especially into Shoulder ER by at least 1/2 grade via MMT for increased stability with functional movements.  NEW GOAL: Pt will demonstrate improved cervical mobility, especially into R side bending by at least 5 degrees for improved neck mobility with performance of daily tasks.     Long Term Goals: 6 weeks   - Pt will be jaspreet to tolerate ADLs with pain no greater than 2/10 without radicular symptoms into both legs. (progressing, not met)  - Pt will increase lumbar extension to at least 90% of normal range with min discomfort in order to improve posture and overall mobility. (progressing, not met)  - Pt will increase bilateral LE gross MMT to at least 5-/5 in order to tolerate increased ADLs and begin light recreational activities. (met)  NEW GOAL: Pt will demonstrate an increase in UE strength, especially into Shoulder scaption by at least 1/2 grade via MMT for increased stability with functional  movements.  NEW GOAL: Pt will demonstrate improved cervical mobility, especially into R side bending by at least 10 degrees for improved neck mobility with performance of daily tasks.    Plan     Continue with current PT POC with the addition of cervical spine treatment.     Possible next visit: wall slides, SL scap retractions and ER is able to assume position    Linda Horowitz, PT, DPT, Cert. DN

## 2020-12-09 ENCOUNTER — PATIENT MESSAGE (OUTPATIENT)
Dept: FAMILY MEDICINE | Facility: CLINIC | Age: 60
End: 2020-12-09

## 2020-12-09 ENCOUNTER — CLINICAL SUPPORT (OUTPATIENT)
Dept: REHABILITATION | Facility: HOSPITAL | Age: 60
End: 2020-12-09
Attending: PHYSICAL MEDICINE & REHABILITATION
Payer: MEDICAID

## 2020-12-09 DIAGNOSIS — D64.9 ANEMIA, UNSPECIFIED TYPE: Primary | ICD-10-CM

## 2020-12-09 DIAGNOSIS — R29.3 POSTURE ABNORMALITY: ICD-10-CM

## 2020-12-09 DIAGNOSIS — M53.86 DECREASED ROM OF LUMBAR SPINE: ICD-10-CM

## 2020-12-09 DIAGNOSIS — R29.898 WEAKNESS OF BOTH LOWER EXTREMITIES: ICD-10-CM

## 2020-12-09 LAB
ESTIMATED AVG GLUCOSE: 120 MG/DL (ref 68–131)
HBA1C MFR BLD HPLC: 5.8 % (ref 4–5.6)

## 2020-12-09 PROCEDURE — 97110 THERAPEUTIC EXERCISES: CPT | Mod: PN

## 2020-12-09 NOTE — PROGRESS NOTES
Please CALL patient with results and Document verification.   [phone]  A1c is improved from previous.  Keep up the great work.  Repeat A1c in six months.  Blood counts now showing mild anemia.  Will check for deficiencies that cause anemia on next blood work.  Platelet counts are stable.  Will discuss in detail follow-up office visit.    SET UP ANEMIA WORKUP LABS PRIOR TO NEXT VISIT.  SET UP ALL LABS IN SIX MONTHS.

## 2020-12-11 ENCOUNTER — PATIENT MESSAGE (OUTPATIENT)
Dept: OTHER | Facility: OTHER | Age: 60
End: 2020-12-11

## 2020-12-11 ENCOUNTER — PATIENT MESSAGE (OUTPATIENT)
Dept: ADMINISTRATIVE | Facility: OTHER | Age: 60
End: 2020-12-11

## 2020-12-16 NOTE — PROGRESS NOTES
Physical Therapy Daily Treatment Note      Name: Zakia Buchanan [place]  Clinic Number: [id number]    Therapy Diagnosis:   Encounter Diagnoses   Name Primary?    Decreased ROM of lumbar spine     Weakness of both lower extremities     Posture abnormality      Physician: Bladimir Ba MD    Visit Date: 12/17/2020    Physician Orders: PT Eval and Treat   Medical Diagnosis from Referral: M54.41,M54.42,G89.29 (ICD-10-CM) - Chronic right-sided low back pain with bilateral sciatica  M79.12 (ICD-10-CM) - Myalgia of auxiliary muscles, head and neck  Evaluation Date: 9/21/2020  Authorization Period Expiration: 1/31/21  Plan of Care Expiration: 1/29/2021   Visit # / Visits authorized: 2 / 12 (1 / 1 Cervical Auth) (6/12 auth) (reassessed on 11/3/20) (re-assessed on 12/3/2020)    Time In: 10:15 AM  Time Out: 11:00 AM  Total Billable Time: 45 minutes    Precautions: Diabetes, HTN, obesity    Subjective     Pt reports: that she is still having good days and bad days. The pain is on both sides of the neck and has good days and bad days. She is interested in trying dry needling this date.    She was somewhat compliant with home exercise program.  Response to previous treatment: no soreness, continued relief   Functional change: Increased ADL tolerance    Pain: 3/10 in shoulders  Location: B neck    Objective     Zakia received therapeutic exercises to develop strength, endurance, ROM, flexibility, posture and core stabilization for 30 minutes including:     UBE x2 min forward / 2 min backward (Seat: 12 , Level: 1.0) (NP today)  NuStep x5 min (#4 arms) and legs (Seat: 8 ; Level: 1.0)  Seated Scap Retractions x15 reps B (3 sec holds)  Seated Stability Ball Roll Outs x2 min  Seated Upper Trap Stretch 3x15s  Seated Levator scapulae stretch 3x15s  Seated Brugger's x15 reps YTB B  Seated Shoulder Horizontal Abduction x10 reps YTB  Supine Chin Tucks x10 reps (3 sec holds) (NP today)  Supine Serratus Punches 2x10 reps   Supine  Shoulder Horizontal Abduction x10 reps YTB  Supine Chest Press 2x10 reps (#1 bar)  Supine Shoulder Flexion x15 reps (#1 bar)  SL Scap Retractions x10 reps B   SL Shoulder ER x10 reps B (towel under arm)  Standing Shoulder Rows x15 reps B (YTB) - attempted but patient complained of increased shoulder pain B so exercise was terminated.              Possible next visit:  waldo bang, HUNTER      Zakia received the following manual therapy techniques: Soft tissue Mobilization were applied to the: bilateral upper traps and lumbar paraspinals for 00 minutes, including: (NP today)    Gentle Cervical Distraction   Suboccipital Release B      Pt received Manual Therapy techniques in the form of Dry Needling for 15 min. This was applied to B upper traps. Dry needling was performed to decrease inflammation, increase circulation, decrease pain and restore homeostasis.      .40 mm needles with .30 mm gauge were used for all insertion points. Pt prone lying with forehead on towel for comfort.    Pt received electrical stimulation while needles were in situ x2 min each. Intensity on the lowest setting and turned to patient tolerance.    Patient gave Written and Verbal consent to undergo dry needling. Written consent can be found in the media section in pts chart. All needles were removed and changes in signs and symptoms were assessed. No adverse reactions noted at the conclusion of the treatment.       Pt received 10 minutes of moist heat to cervical region in prone position post treatment session. No adverse reactions noted. Pt offered wedge for LE's but denied.       Home Exercises Provided and Patient Education Provided     Education provided:   - HEP Review  - Anatomy and physiology relevant to current condition with addition of the cervical spine  - Postural correction  - Possible response to PT and post exercise soreness - especially after DN  - DN consent form    Written Home Exercises Provided: continuation of previous  exercises.  Exercises were reviewed and Zakia was able to demonstrate them prior to the end of the session.  Zakia demonstrated good  understanding of the education provided.      See EMR under Patient Instructions for exercises provided 9/21/2020 and 12/3/2020.      Assessment     Initiated the treatment session with the NuStep for increased tissue extensibility and improved functional mobility overall. Introduced seated shoulder horizontal abduction - pt tolerated well with appropriate fatigue at the conclusion of the activity. Pt was able to perform SL scap retractions and SL shoulder ER without increased pain/symptoms. DN was introduced this date with a specific target to bilateral upper traps - no adverse reactions noted. Skin was cleaned pre and post needle insertion/removal. Moist heat was applied at the conclusion of the treatment session. Will continue to progress as able.    Zakia is progressing well towards her goals.   Pt prognosis is Fair.     Pt will continue to benefit from skilled outpatient physical therapy to address the deficits listed in the problem list box on initial evaluation, provide pt/family education and to maximize pt's level of independence in the home and community environment.     Pt's spiritual, cultural and educational needs considered and pt agreeable to plan of care and goals.    Anticipated barriers to physical therapy: dependent on transportation     Goals:  Short Term Goals: 3 weeks   - Pt will be independent with HEP to facilitate healing and improve outcome measures (met)  - Pt will increase bilateral hamstring strength by at least a half grade to improve stability and prevent fatigue. (met)  - Pt will be able to assume prone position and tolerate lumbar extension exercises. (met)  NEW GOAL: Pt will demonstrate an increase in UE strength, especially into Shoulder ER by at least 1/2 grade via MMT for increased stability with functional movements. (progressing, not met)  NEW  GOAL: Pt will demonstrate improved cervical mobility, especially into R side bending by at least 5 degrees for improved neck mobility with performance of daily tasks.  (progressing, not met)     Long Term Goals: 6 weeks   - Pt will be jaspreet to tolerate ADLs with pain no greater than 2/10 without radicular symptoms into both legs. (progressing, not met)  - Pt will increase lumbar extension to at least 90% of normal range with min discomfort in order to improve posture and overall mobility. (progressing, not met)  - Pt will increase bilateral LE gross MMT to at least 5-/5 in order to tolerate increased ADLs and begin light recreational activities. (met)  NEW GOAL: Pt will demonstrate an increase in UE strength, especially into Shoulder scaption by at least 1/2 grade via MMT for increased stability with functional movements. (progressing, not met)  NEW GOAL: Pt will demonstrate improved cervical mobility, especially into R side bending by at least 10 degrees for improved neck mobility with performance of daily tasks. (progressing, not met)    Plan     Continue with current PT POC with the addition of cervical spine treatment.     Possible next visit:  HUNTER jaffe, PT, DPT, Cert. HUNTER

## 2020-12-16 NOTE — PROGRESS NOTES
Digital Medicine: Health  Follow-Up    The history is provided by the patient.             Reason for review: Blood glucose at goal        Topics Covered on Call: physical activity and Diet            Diet-no change to diet    No change to diet.        Physical Activity-no change to routine  No change to exercise routine.     Medication Adherence-Medication adherence was assessed.        Substance, Sleep, Stress-No change  stress-  Details:  Intervention(s):    Sleep-  Details:  Intervention(s):    Alcohol -  Details:  Intervention(s):    Tobacco-  Details:  Intervention(s):          Continue current diet/physical activity routine.       Addressed patient questions and patient has my contact information if needed prior to next outreach.   Explained the importance of self-monitoring and medication adherence. Encouraged the patient to communicate with their health  for lifestyle modifications to help improve or maintain a healthy lifestyle.                   Topic    Eye Exam            Last 6 Patient Entered Readings                                          Most Recent A1c: 5.8% on 12/8/2020  (Goal: 7%)     Recent Readings 12/16/2020 12/15/2020 12/14/2020 12/13/2020 12/12/2020    Blood Glucose (mg/dL) 100 83 90 104 104

## 2020-12-17 ENCOUNTER — CLINICAL SUPPORT (OUTPATIENT)
Dept: REHABILITATION | Facility: HOSPITAL | Age: 60
End: 2020-12-17
Attending: PHYSICAL MEDICINE & REHABILITATION
Payer: MEDICAID

## 2020-12-17 DIAGNOSIS — R29.898 WEAKNESS OF BOTH LOWER EXTREMITIES: ICD-10-CM

## 2020-12-17 DIAGNOSIS — M53.86 DECREASED ROM OF LUMBAR SPINE: ICD-10-CM

## 2020-12-17 DIAGNOSIS — R29.3 POSTURE ABNORMALITY: ICD-10-CM

## 2020-12-17 PROCEDURE — 97110 THERAPEUTIC EXERCISES: CPT | Mod: PN

## 2020-12-18 ENCOUNTER — TELEPHONE (OUTPATIENT)
Dept: FAMILY MEDICINE | Facility: CLINIC | Age: 60
End: 2020-12-18

## 2020-12-18 DIAGNOSIS — Z12.31 ENCOUNTER FOR SCREENING MAMMOGRAM FOR BREAST CANCER: Primary | ICD-10-CM

## 2020-12-18 NOTE — TELEPHONE ENCOUNTER
----- Message from Lori Triana sent at 12/18/2020  2:57 PM CST -----  Regarding: order for a mammogram  Contact: pt  Caller is requesting a call back regarding  orders for a mammogram .  Please call back at [phone] . Thanks.

## 2020-12-21 NOTE — PROGRESS NOTES
Physical Therapy Daily Treatment Note      Name: Zakia Buchanan [place]  Clinic Number: [id number]    Therapy Diagnosis:   Encounter Diagnoses   Name Primary?    Decreased ROM of lumbar spine     Weakness of both lower extremities     Posture abnormality      Physician: Bladimir Ba MD    Visit Date: 12/22/2020    Physician Orders: PT Eval and Treat   Medical Diagnosis from Referral: M54.41,M54.42,G89.29 (ICD-10-CM) - Chronic right-sided low back pain with bilateral sciatica  M79.12 (ICD-10-CM) - Myalgia of auxiliary muscles, head and neck  Evaluation Date: 9/21/2020  Authorization Period Expiration: 1/31/21  Plan of Care Expiration: 1/29/2021   Visit # / Visits authorized: 3 / 12 (1 / 1 Cervical Auth) (6/12 auth) (reassessed on 11/3/20) (re-assessed on 12/3/2020)    Time In: 1020 AM  Time Out: 1110 AM  Total Billable Time: 40 minutes    Precautions: Diabetes, HTN, obesity    Subjective     Pt reports: the pain and stiffness are about the same. She wasn't too sore after the dry needling last visit and felt some relief.    She was somewhat compliant with home exercise program.  Response to previous treatment: no soreness, continued relief   Functional change: Increased ADL tolerance    Pain: 3/10 in shoulders  Location: B neck     Objective     Zakia received therapeutic exercises to develop strength, endurance, ROM, flexibility, posture and core stabilization for 30 minutes including:     UBE x2 min forward / 2 min backward (Seat: 12 , Level: 1.0) (NP today)    NuStep x5 min (#4 arms) and legs (Seat: 8 ; Level: 1.0)  Seated Scap Retractions 2 x10 reps B (3 sec holds)  Seated Stability Ball Roll Outs x2 min  Seated Upper Trap Stretch 3x15s  Seated Levator scapulae stretch 3x15s  Seated Brugger's x15 reps YTB B  Seated Shoulder Horizontal Abduction x10 reps YTB  Supine Chin Tucks x10 reps (3 sec holds) (NP today)  Supine Serratus Punches 2x10 reps   Supine Shoulder Horizontal Abduction x15 reps YTB  Supine  Chest Press 2x10 reps (#1 bar)  Supine Shoulder Flexion x15 reps (#1 bar)  SL Scap Retractions x10 reps B   SL Shoulder ER x10 reps B (towel under arm)      Standing Shoulder Rows x15 reps B (YTB) - attempted but patient complained of increased shoulder pain B so exercise was terminated.            Possible next visit:  wall beti, HUNTER      Zakia received the following manual therapy techniques: Soft tissue Mobilization were applied to the: bilateral upper traps and lumbar paraspinals for 10 minutes, including:     Gentle Cervical Distraction   Suboccipital Release B      Pt received 10 minutes of moist heat to cervical region in prone position post treatment session. No adverse reactions noted. Pt offered wedge for LE's but denied.       Home Exercises Provided and Patient Education Provided     Education provided:   - HEP Review  - Anatomy and physiology relevant to current condition with addition of the cervical spine  - Postural correction  - Possible response to PT and post exercise soreness - especially after DN  - DN consent form    Written Home Exercises Provided: continuation of previous exercises.  Exercises were reviewed and Zakia was able to demonstrate them prior to the end of the session.  Zakia demonstrated good  understanding of the education provided.      See EMR under Patient Instructions for exercises provided 9/21/2020 and 12/3/2020.      Assessment     Patient with some fatigue in the shoulder with some of the therex performed today. Increased reps on two of the exercises today with no difficulties. Performed cervical STM and distraction to relieve some of the tension in the patients neck. Overall patient did well with treatment today. Will continue to progress as tolerated.    Zakia is progressing well towards her goals.   Pt prognosis is Fair.     Pt will continue to benefit from skilled outpatient physical therapy to address the deficits listed in the problem list box on initial  evaluation, provide pt/family education and to maximize pt's level of independence in the home and community environment.     Pt's spiritual, cultural and educational needs considered and pt agreeable to plan of care and goals.    Anticipated barriers to physical therapy: dependent on transportation     Goals:  Short Term Goals: 3 weeks   - Pt will be independent with HEP to facilitate healing and improve outcome measures (met)  - Pt will increase bilateral hamstring strength by at least a half grade to improve stability and prevent fatigue. (met)  - Pt will be able to assume prone position and tolerate lumbar extension exercises. (met)  NEW GOAL: Pt will demonstrate an increase in UE strength, especially into Shoulder ER by at least 1/2 grade via MMT for increased stability with functional movements. (progressing, not met)  NEW GOAL: Pt will demonstrate improved cervical mobility, especially into R side bending by at least 5 degrees for improved neck mobility with performance of daily tasks.  (progressing, not met)     Long Term Goals: 6 weeks   - Pt will be jaspreet to tolerate ADLs with pain no greater than 2/10 without radicular symptoms into both legs. (progressing, not met)  - Pt will increase lumbar extension to at least 90% of normal range with min discomfort in order to improve posture and overall mobility. (progressing, not met)  - Pt will increase bilateral LE gross MMT to at least 5-/5 in order to tolerate increased ADLs and begin light recreational activities. (met)  NEW GOAL: Pt will demonstrate an increase in UE strength, especially into Shoulder scaption by at least 1/2 grade via MMT for increased stability with functional movements. (progressing, not met)  NEW GOAL: Pt will demonstrate improved cervical mobility, especially into R side bending by at least 10 degrees for improved neck mobility with performance of daily tasks. (progressing, not met)    Plan     Continue with current PT POC with the  addition of cervical spine treatment.     Possible next visit:  wall slides, DN    Efrain Rider, PTA

## 2020-12-22 ENCOUNTER — CLINICAL SUPPORT (OUTPATIENT)
Dept: REHABILITATION | Facility: HOSPITAL | Age: 60
End: 2020-12-22
Attending: PHYSICAL MEDICINE & REHABILITATION
Payer: MEDICAID

## 2020-12-22 DIAGNOSIS — M53.86 DECREASED ROM OF LUMBAR SPINE: ICD-10-CM

## 2020-12-22 DIAGNOSIS — R29.3 POSTURE ABNORMALITY: ICD-10-CM

## 2020-12-22 DIAGNOSIS — R29.898 WEAKNESS OF BOTH LOWER EXTREMITIES: ICD-10-CM

## 2020-12-22 PROCEDURE — 97110 THERAPEUTIC EXERCISES: CPT | Mod: PN,CQ

## 2020-12-22 PROCEDURE — 97140 MANUAL THERAPY 1/> REGIONS: CPT | Mod: PN,CQ

## 2020-12-23 ENCOUNTER — PATIENT MESSAGE (OUTPATIENT)
Dept: ADMINISTRATIVE | Facility: OTHER | Age: 60
End: 2020-12-23

## 2020-12-24 DIAGNOSIS — K21.00 GASTROESOPHAGEAL REFLUX DISEASE WITH ESOPHAGITIS, UNSPECIFIED WHETHER HEMORRHAGE: ICD-10-CM

## 2020-12-24 RX ORDER — PANTOPRAZOLE SODIUM 40 MG/1
40 TABLET, DELAYED RELEASE ORAL 2 TIMES DAILY
Qty: 60 TABLET | Refills: 0 | Status: SHIPPED | OUTPATIENT
Start: 2020-12-24 | End: 2020-12-25 | Stop reason: SDUPTHER

## 2020-12-24 RX ORDER — CYCLOBENZAPRINE HCL 10 MG
TABLET ORAL
COMMUNITY
Start: 2020-12-19 | End: 2020-12-24

## 2020-12-24 NOTE — TELEPHONE ENCOUNTER
The patient requested medicine refills and I did refill it once.    Health Maintenance Due   Topic Date Due    HIV Screening  01/14/1975    Shingles Vaccine (1 of 2) 01/14/2008    Colorectal Cancer Screening  12/10/2019    Eye Exam  10/02/2020     BP Readings from Last 3 Encounters:   11/20/20 124/84   10/02/20 132/69   09/11/20 124/78

## 2020-12-25 DIAGNOSIS — K21.00 GASTROESOPHAGEAL REFLUX DISEASE WITH ESOPHAGITIS, UNSPECIFIED WHETHER HEMORRHAGE: ICD-10-CM

## 2020-12-26 RX ORDER — PANTOPRAZOLE SODIUM 40 MG/1
40 TABLET, DELAYED RELEASE ORAL 2 TIMES DAILY
Qty: 60 TABLET | Refills: 0 | Status: SHIPPED | OUTPATIENT
Start: 2020-12-26 | End: 2021-01-12 | Stop reason: SDUPTHER

## 2020-12-28 ENCOUNTER — PATIENT MESSAGE (OUTPATIENT)
Dept: PODIATRY | Facility: CLINIC | Age: 60
End: 2020-12-28

## 2020-12-28 NOTE — PROGRESS NOTES
Physical Therapy Daily Treatment Note      Name: Zakia Buchanan Albert  Clinic Number: [id number]    Therapy Diagnosis:   No diagnosis found.  Physician: Bladimir Ba MD    Visit Date: 12/29/2020    Physician Orders: PT Eval and Treat   Medical Diagnosis from Referral: M54.41,M54.42,G89.29 (ICD-10-CM) - Chronic right-sided low back pain with bilateral sciatica  M79.12 (ICD-10-CM) - Myalgia of auxiliary muscles, head and neck  Evaluation Date: 9/21/2020  Authorization Period Expiration: 1/31/21  Plan of Care Expiration: 1/29/2021   Visit # / Visits authorized: 4 / 12 (1 / 1 Cervical Auth) (6/12 auth) (reassessed on 11/3/20) (re-assessed on 12/3/2020)    Time In: 10:45 AM  Time Out: 11:15 AM  Total Billable Time: 45 minutes    Precautions: Diabetes, HTN, obesity    Subjective     Pt reports: ***    She was somewhat compliant with home exercise program.  Response to previous treatment: no soreness, continued relief   Functional change: Increased ADL tolerance    Pain: 3/10 in shoulders  Location: B neck     Objective     Zakia received therapeutic exercises to develop strength, endurance, ROM, flexibility, posture and core stabilization for 30 minutes including:     UBE x2 min forward / 2 min backward (Seat: 12 , Level: 1.0) (NP today)    NuStep x5 min (#4 arms) and legs (Seat: 8 ; Level: 1.0)  Seated Scap Retractions 2 x10 reps B (3 sec holds)  Seated Stability Ball Roll Outs x2 min  Seated Upper Trap Stretch 3x15s  Seated Levator scapulae stretch 3x15s  Seated Brugger's x15 reps YTB B  Seated Shoulder Horizontal Abduction x10 reps YTB  Supine Chin Tucks x10 reps (3 sec holds) (NP today)  Supine Serratus Punches 2x10 reps   Supine Shoulder Horizontal Abduction x15 reps YTB  Supine Chest Press 2x10 reps (#1 bar)  Supine Shoulder Flexion x15 reps (#1 bar)  SL Scap Retractions x10 reps B   SL Shoulder ER x10 reps B (towel under arm)      Standing Shoulder Rows x15 reps B (YTB) - attempted but patient complained of  increased shoulder pain B so exercise was terminated.            Possible next visit:  HUNTER jaffe      Zakia received the following manual therapy techniques: Soft tissue Mobilization were applied to the: bilateral upper traps and lumbar paraspinals for 10 minutes, including:     Gentle Cervical Distraction   Suboccipital Release B      Pt received 10 minutes of moist heat to cervical region in prone position post treatment session. No adverse reactions noted. Pt offered wedge for LE's but denied.       Home Exercises Provided and Patient Education Provided     Education provided:   - HEP Review  - Anatomy and physiology relevant to current condition with addition of the cervical spine  - Postural correction  - Possible response to PT and post exercise soreness - especially after DN    Written Home Exercises Provided: continuation of previous exercises.  Exercises were reviewed and Zakia was able to demonstrate them prior to the end of the session.  Zakia demonstrated good  understanding of the education provided.      See EMR under Patient Instructions for exercises provided 9/21/2020 and 12/3/2020.      Assessment     Patient with some fatigue in the shoulder with some of the therex performed today. Increased reps on two of the exercises today with no difficulties. Performed cervical STM and distraction to relieve some of the tension in the patients neck. Overall patient did well with treatment today. Will continue to progress as tolerated.    Zakia is progressing well towards her goals.   Pt prognosis is Fair.     Pt will continue to benefit from skilled outpatient physical therapy to address the deficits listed in the problem list box on initial evaluation, provide pt/family education and to maximize pt's level of independence in the home and community environment.     Pt's spiritual, cultural and educational needs considered and pt agreeable to plan of care and goals.    Anticipated barriers to  physical therapy: dependent on transportation     Goals:  Short Term Goals: 3 weeks   - Pt will be independent with HEP to facilitate healing and improve outcome measures (met)  - Pt will increase bilateral hamstring strength by at least a half grade to improve stability and prevent fatigue. (met)  - Pt will be able to assume prone position and tolerate lumbar extension exercises. (met)  NEW GOAL: Pt will demonstrate an increase in UE strength, especially into Shoulder ER by at least 1/2 grade via MMT for increased stability with functional movements. (progressing, not met)  NEW GOAL: Pt will demonstrate improved cervical mobility, especially into R side bending by at least 5 degrees for improved neck mobility with performance of daily tasks.  (progressing, not met)     Long Term Goals: 6 weeks   - Pt will be jaspreet to tolerate ADLs with pain no greater than 2/10 without radicular symptoms into both legs. (progressing, not met)  - Pt will increase lumbar extension to at least 90% of normal range with min discomfort in order to improve posture and overall mobility. (progressing, not met)  - Pt will increase bilateral LE gross MMT to at least 5-/5 in order to tolerate increased ADLs and begin light recreational activities. (met)  NEW GOAL: Pt will demonstrate an increase in UE strength, especially into Shoulder scaption by at least 1/2 grade via MMT for increased stability with functional movements. (progressing, not met)  NEW GOAL: Pt will demonstrate improved cervical mobility, especially into R side bending by at least 10 degrees for improved neck mobility with performance of daily tasks. (progressing, not met)    Plan     Continue with current PT POC with the addition of cervical spine treatment.     Possible next visit:  waldo bang, HUNTER Horowitz, PT, DPT, Cert. HUNTER

## 2020-12-29 ENCOUNTER — CLINICAL SUPPORT (OUTPATIENT)
Dept: REHABILITATION | Facility: HOSPITAL | Age: 60
End: 2020-12-29
Attending: PHYSICAL MEDICINE & REHABILITATION
Payer: MEDICAID

## 2020-12-29 ENCOUNTER — TELEPHONE (OUTPATIENT)
Dept: ADMINISTRATIVE | Facility: HOSPITAL | Age: 60
End: 2020-12-29

## 2020-12-29 DIAGNOSIS — R29.3 POSTURE ABNORMALITY: ICD-10-CM

## 2020-12-29 DIAGNOSIS — R29.898 WEAKNESS OF BOTH LOWER EXTREMITIES: ICD-10-CM

## 2020-12-29 DIAGNOSIS — M53.86 DECREASED ROM OF LUMBAR SPINE: ICD-10-CM

## 2020-12-29 PROCEDURE — 97110 THERAPEUTIC EXERCISES: CPT | Mod: PN,CQ

## 2020-12-29 PROCEDURE — 97140 MANUAL THERAPY 1/> REGIONS: CPT | Mod: PN,CQ

## 2020-12-29 NOTE — PROGRESS NOTES
"  Physical Therapy Daily Treatment Note      Name: Zakia Buchanan Albert  Clinic Number: [id number]    Therapy Diagnosis:   Encounter Diagnoses   Name Primary?    Decreased ROM of lumbar spine     Weakness of both lower extremities     Posture abnormality      Physician: Bladimir Ba MD    Visit Date: 12/29/2020    Physician Orders: PT Eval and Treat   Medical Diagnosis from Referral: M54.41,M54.42,G89.29 (ICD-10-CM) - Chronic right-sided low back pain with bilateral sciatica  M79.12 (ICD-10-CM) - Myalgia of auxiliary muscles, head and neck  Evaluation Date: 9/21/2020  Authorization Period Expiration: 1/31/21  Plan of Care Expiration: 1/29/2021   Visit # / Visits authorized: 4 / 12 (1 / 1 Cervical Auth) (7/12 auth) (reassessed on 11/3/20) (re-assessed on 12/3/2020)    Time In: 1020 AM  Time Out: 1110 AM  Total Billable Time: 40 minutes    Precautions: Diabetes, HTN, obesity    Subjective     Pt reports: she is feeling pretty good "no pain other than the knees".     She was somewhat compliant with home exercise program.  Response to previous treatment: no soreness, continued relief   Functional change: Increased ADL tolerance    Pain: 3/10 in shoulders  Location: B neck    Objective     Zakia received therapeutic exercises to develop strength, endurance, ROM, flexibility, posture and core stabilization for 30 minutes including:     UBE x2 min forward / 2 min backward (Seat: 12 , Level: 1.0) (NP today)    NuStep x5 min (#4 arms) and legs (Seat: 8 ; Level: 1.0)  Seated Scap Retractions 2 x10 reps B (3 sec holds)  Seated Stability Ball Roll Outs x2 min  Seated Upper Trap Stretch 3x15s  Seated Levator scapulae stretch 3x15s  Seated Brugger's x15 reps YTB B  Seated Shoulder Horizontal Abduction x10 reps YTB  Supine Chin Tucks x10 reps (3 sec holds) (NP today)  Supine Serratus Punches 2x10 reps   Supine Shoulder Horizontal Abduction x15 reps YTB  Supine Chest Press 2x10 reps (#1 bar)  Supine Shoulder Flexion x15 " reps (#1 bar)  SL Scap Retractions x10 reps B   SL Shoulder ER x10 reps B (towel under arm)                  Possible next visit:  wall slides, DN      Zakia received the following manual therapy techniques: Soft tissue Mobilization were applied to the: bilateral upper traps and lumbar paraspinals for 10 minutes, including:     Gentle Cervical Distraction   Suboccipital Release B      Pt received 10 minutes of moist heat to cervical region in prone position post treatment session. No adverse reactions noted. Pt offered wedge for LE's but denied.       Home Exercises Provided and Patient Education Provided     Education provided:   - HEP Review  - Anatomy and physiology relevant to current condition with addition of the cervical spine  - Postural correction  - Possible response to PT and post exercise soreness - especially after DN  - DN consent form    Written Home Exercises Provided: continuation of previous exercises.  Exercises were reviewed and Zakia was able to demonstrate them prior to the end of the session.  Zakia demonstrated good  understanding of the education provided.      See EMR under Patient Instructions for exercises provided 9/21/2020 and 12/3/2020.      Assessment     Patient tolerated treatment session with some fatigue with bruggers, seated horiz abd, supine horiz abd, and serratus punches. Patient with TTP on B suboccipital musculature with STM. Will continue to progress as able.    Zakia is progressing well towards her goals.   Pt prognosis is Fair.     Pt will continue to benefit from skilled outpatient physical therapy to address the deficits listed in the problem list box on initial evaluation, provide pt/family education and to maximize pt's level of independence in the home and community environment.     Pt's spiritual, cultural and educational needs considered and pt agreeable to plan of care and goals.    Anticipated barriers to physical therapy: dependent on transportation      Goals:  Short Term Goals: 3 weeks   - Pt will be independent with HEP to facilitate healing and improve outcome measures (met)  - Pt will increase bilateral hamstring strength by at least a half grade to improve stability and prevent fatigue. (met)  - Pt will be able to assume prone position and tolerate lumbar extension exercises. (met)  NEW GOAL: Pt will demonstrate an increase in UE strength, especially into Shoulder ER by at least 1/2 grade via MMT for increased stability with functional movements. (progressing, not met)  NEW GOAL: Pt will demonstrate improved cervical mobility, especially into R side bending by at least 5 degrees for improved neck mobility with performance of daily tasks.  (progressing, not met)     Long Term Goals: 6 weeks   - Pt will be jaspreet to tolerate ADLs with pain no greater than 2/10 without radicular symptoms into both legs. (progressing, not met)  - Pt will increase lumbar extension to at least 90% of normal range with min discomfort in order to improve posture and overall mobility. (progressing, not met)  - Pt will increase bilateral LE gross MMT to at least 5-/5 in order to tolerate increased ADLs and begin light recreational activities. (met)  NEW GOAL: Pt will demonstrate an increase in UE strength, especially into Shoulder scaption by at least 1/2 grade via MMT for increased stability with functional movements. (progressing, not met)  NEW GOAL: Pt will demonstrate improved cervical mobility, especially into R side bending by at least 10 degrees for improved neck mobility with performance of daily tasks. (progressing, not met)    Plan     Continue with current PT POC with the addition of cervical spine treatment.     Possible next visit:  waldo bang, HUNTER Rider, PTA

## 2020-12-30 ENCOUNTER — HOSPITAL ENCOUNTER (OUTPATIENT)
Dept: RADIOLOGY | Facility: HOSPITAL | Age: 60
Discharge: HOME | End: 2020-12-30
Attending: FAMILY MEDICINE
Payer: MEDICAID

## 2020-12-30 VITALS — WEIGHT: 293 LBS | HEIGHT: 68 IN | BODY MASS INDEX: 44.41 KG/M2

## 2020-12-30 DIAGNOSIS — Z12.31 ENCOUNTER FOR SCREENING MAMMOGRAM FOR BREAST CANCER: ICD-10-CM

## 2020-12-30 PROCEDURE — 77067 MAMMO DIGITAL SCREENING BILAT: ICD-10-PCS | Mod: 26,,, | Performed by: RADIOLOGY

## 2020-12-30 PROCEDURE — 77067 SCR MAMMO BI INCL CAD: CPT | Mod: 26,,, | Performed by: RADIOLOGY

## 2020-12-30 PROCEDURE — 77067 SCR MAMMO BI INCL CAD: CPT | Mod: TC,PO

## 2020-12-30 RX ORDER — PEN NEEDLE, DIABETIC 30 GX3/16"
NEEDLE, DISPOSABLE MISCELLANEOUS
Qty: 100 EACH | Refills: 4 | Status: SHIPPED | OUTPATIENT
Start: 2020-12-30 | End: 2022-08-12

## 2020-12-31 NOTE — PROGRESS NOTES
Normal mammogram, repeat in 1 year, result released through [facility].  Please call the patient if not enrolled with my chart.

## 2021-01-05 ENCOUNTER — CLINICAL SUPPORT (OUTPATIENT)
Dept: REHABILITATION | Facility: HOSPITAL | Age: 61
End: 2021-01-05
Payer: MEDICAID

## 2021-01-05 DIAGNOSIS — M53.86 DECREASED ROM OF LUMBAR SPINE: Primary | ICD-10-CM

## 2021-01-05 DIAGNOSIS — R29.898 WEAKNESS OF BOTH LOWER EXTREMITIES: ICD-10-CM

## 2021-01-05 DIAGNOSIS — R29.3 POSTURE ABNORMALITY: ICD-10-CM

## 2021-01-05 PROCEDURE — 97110 THERAPEUTIC EXERCISES: CPT | Mod: PN

## 2021-01-06 ENCOUNTER — LAB VISIT (OUTPATIENT)
Dept: LAB | Facility: HOSPITAL | Age: 61
End: 2021-01-06
Attending: FAMILY MEDICINE
Payer: MEDICAID

## 2021-01-06 DIAGNOSIS — D64.9 ANEMIA, UNSPECIFIED TYPE: ICD-10-CM

## 2021-01-06 PROCEDURE — 82607 VITAMIN B-12: CPT

## 2021-01-06 PROCEDURE — 83540 ASSAY OF IRON: CPT

## 2021-01-06 PROCEDURE — 36415 COLL VENOUS BLD VENIPUNCTURE: CPT | Mod: PO

## 2021-01-06 PROCEDURE — 82746 ASSAY OF FOLIC ACID SERUM: CPT

## 2021-01-06 PROCEDURE — 82728 ASSAY OF FERRITIN: CPT

## 2021-01-07 ENCOUNTER — CLINICAL SUPPORT (OUTPATIENT)
Dept: REHABILITATION | Facility: HOSPITAL | Age: 61
End: 2021-01-07
Payer: MEDICAID

## 2021-01-07 DIAGNOSIS — M53.86 DECREASED ROM OF LUMBAR SPINE: ICD-10-CM

## 2021-01-07 DIAGNOSIS — R29.898 WEAKNESS OF BOTH LOWER EXTREMITIES: ICD-10-CM

## 2021-01-07 DIAGNOSIS — R29.3 POSTURE ABNORMALITY: ICD-10-CM

## 2021-01-07 LAB
FERRITIN SERPL-MCNC: 18 NG/ML (ref 20–300)
FOLATE SERPL-MCNC: 6.7 NG/ML (ref 4–24)
IRON SERPL-MCNC: 53 UG/DL (ref 30–160)
SATURATED IRON: 12 % (ref 20–50)
TOTAL IRON BINDING CAPACITY: 434 UG/DL (ref 250–450)
TRANSFERRIN SERPL-MCNC: 293 MG/DL (ref 200–375)
VIT B12 SERPL-MCNC: 1145 PG/ML (ref 210–950)

## 2021-01-07 PROCEDURE — 97140 MANUAL THERAPY 1/> REGIONS: CPT | Mod: PN,CQ

## 2021-01-07 PROCEDURE — 97110 THERAPEUTIC EXERCISES: CPT | Mod: PN,CQ

## 2021-01-08 ENCOUNTER — TELEPHONE (OUTPATIENT)
Dept: FAMILY MEDICINE | Facility: CLINIC | Age: 61
End: 2021-01-08

## 2021-01-08 DIAGNOSIS — D64.9 ANEMIA, UNSPECIFIED TYPE: Primary | ICD-10-CM

## 2021-01-11 ENCOUNTER — OFFICE VISIT (OUTPATIENT)
Dept: FAMILY MEDICINE | Facility: CLINIC | Age: 61
End: 2021-01-11
Payer: MEDICAID

## 2021-01-11 VITALS
WEIGHT: 293 LBS | HEART RATE: 94 BPM | HEIGHT: 68 IN | TEMPERATURE: 98 F | DIASTOLIC BLOOD PRESSURE: 80 MMHG | SYSTOLIC BLOOD PRESSURE: 132 MMHG | BODY MASS INDEX: 44.41 KG/M2

## 2021-01-11 DIAGNOSIS — M54.6 CHRONIC BILATERAL THORACIC BACK PAIN: ICD-10-CM

## 2021-01-11 DIAGNOSIS — N62 BREAST HYPERTROPHY: ICD-10-CM

## 2021-01-11 DIAGNOSIS — E11.59 HYPERTENSION ASSOCIATED WITH DIABETES: Chronic | ICD-10-CM

## 2021-01-11 DIAGNOSIS — Z79.899 ENCOUNTER FOR LONG-TERM (CURRENT) USE OF MEDICATIONS: ICD-10-CM

## 2021-01-11 DIAGNOSIS — G89.29 CHRONIC BILATERAL THORACIC BACK PAIN: ICD-10-CM

## 2021-01-11 DIAGNOSIS — D50.9 IRON DEFICIENCY ANEMIA, UNSPECIFIED IRON DEFICIENCY ANEMIA TYPE: Primary | ICD-10-CM

## 2021-01-11 DIAGNOSIS — I15.2 HYPERTENSION ASSOCIATED WITH DIABETES: Chronic | ICD-10-CM

## 2021-01-11 PROCEDURE — 99214 PR OFFICE/OUTPT VISIT, EST, LEVL IV, 30-39 MIN: ICD-10-PCS | Mod: S$PBB,,, | Performed by: FAMILY MEDICINE

## 2021-01-11 PROCEDURE — 99999 PR PBB SHADOW E&M-EST. PATIENT-LVL V: ICD-10-PCS | Mod: PBBFAC,,, | Performed by: FAMILY MEDICINE

## 2021-01-11 PROCEDURE — 99999 PR PBB SHADOW E&M-EST. PATIENT-LVL V: CPT | Mod: PBBFAC,,, | Performed by: FAMILY MEDICINE

## 2021-01-11 PROCEDURE — 99214 OFFICE O/P EST MOD 30 MIN: CPT | Mod: S$PBB,,, | Performed by: FAMILY MEDICINE

## 2021-01-11 PROCEDURE — 99215 OFFICE O/P EST HI 40 MIN: CPT | Mod: PBBFAC,PO | Performed by: FAMILY MEDICINE

## 2021-01-11 RX ORDER — FERROUS SULFATE 324(65)MG
325 TABLET, DELAYED RELEASE (ENTERIC COATED) ORAL DAILY
Qty: 365 TABLET | Refills: 0 | Status: SHIPPED | OUTPATIENT
Start: 2021-01-11 | End: 2023-10-17

## 2021-01-12 ENCOUNTER — CLINICAL SUPPORT (OUTPATIENT)
Dept: REHABILITATION | Facility: HOSPITAL | Age: 61
End: 2021-01-12
Payer: MEDICAID

## 2021-01-12 ENCOUNTER — TELEPHONE (OUTPATIENT)
Dept: FAMILY MEDICINE | Facility: CLINIC | Age: 61
End: 2021-01-12

## 2021-01-12 DIAGNOSIS — M53.86 DECREASED ROM OF LUMBAR SPINE: ICD-10-CM

## 2021-01-12 DIAGNOSIS — K21.00 GASTROESOPHAGEAL REFLUX DISEASE WITH ESOPHAGITIS, UNSPECIFIED WHETHER HEMORRHAGE: ICD-10-CM

## 2021-01-12 DIAGNOSIS — R29.3 POSTURE ABNORMALITY: ICD-10-CM

## 2021-01-12 DIAGNOSIS — R29.898 WEAKNESS OF BOTH LOWER EXTREMITIES: ICD-10-CM

## 2021-01-12 PROCEDURE — 97140 MANUAL THERAPY 1/> REGIONS: CPT | Mod: PN,CQ

## 2021-01-12 PROCEDURE — 97110 THERAPEUTIC EXERCISES: CPT | Mod: PN,CQ

## 2021-01-12 RX ORDER — TIZANIDINE 4 MG/1
TABLET ORAL
Qty: 60 TABLET | Refills: 5 | Status: SHIPPED | OUTPATIENT
Start: 2021-01-12 | End: 2021-10-18

## 2021-01-12 RX ORDER — PANTOPRAZOLE SODIUM 40 MG/1
40 TABLET, DELAYED RELEASE ORAL 2 TIMES DAILY
Qty: 60 TABLET | Refills: 5 | Status: SHIPPED | OUTPATIENT
Start: 2021-01-12 | End: 2021-04-11 | Stop reason: SDUPTHER

## 2021-01-13 ENCOUNTER — TELEPHONE (OUTPATIENT)
Dept: FAMILY MEDICINE | Facility: CLINIC | Age: 61
End: 2021-01-13

## 2021-01-14 ENCOUNTER — CLINICAL SUPPORT (OUTPATIENT)
Dept: REHABILITATION | Facility: HOSPITAL | Age: 61
End: 2021-01-14
Payer: MEDICAID

## 2021-01-14 DIAGNOSIS — R29.3 POSTURE ABNORMALITY: ICD-10-CM

## 2021-01-14 DIAGNOSIS — R29.898 WEAKNESS OF BOTH LOWER EXTREMITIES: ICD-10-CM

## 2021-01-14 DIAGNOSIS — M53.86 DECREASED ROM OF LUMBAR SPINE: Primary | ICD-10-CM

## 2021-01-14 PROCEDURE — 97110 THERAPEUTIC EXERCISES: CPT | Mod: PN

## 2021-01-16 ENCOUNTER — PATIENT MESSAGE (OUTPATIENT)
Dept: FAMILY MEDICINE | Facility: CLINIC | Age: 61
End: 2021-01-16

## 2021-01-19 ENCOUNTER — OFFICE VISIT (OUTPATIENT)
Dept: FAMILY MEDICINE | Facility: CLINIC | Age: 61
End: 2021-01-19
Payer: MEDICAID

## 2021-01-19 ENCOUNTER — TELEPHONE (OUTPATIENT)
Dept: NEUROLOGY | Facility: CLINIC | Age: 61
End: 2021-01-19

## 2021-01-19 ENCOUNTER — DOCUMENTATION ONLY (OUTPATIENT)
Dept: REHABILITATION | Facility: HOSPITAL | Age: 61
End: 2021-01-19

## 2021-01-19 VITALS
HEIGHT: 68 IN | BODY MASS INDEX: 44.41 KG/M2 | SYSTOLIC BLOOD PRESSURE: 115 MMHG | HEART RATE: 95 BPM | DIASTOLIC BLOOD PRESSURE: 67 MMHG | WEIGHT: 293 LBS | TEMPERATURE: 98 F

## 2021-01-19 DIAGNOSIS — R29.898 WEAKNESS OF BOTH LOWER EXTREMITIES: ICD-10-CM

## 2021-01-19 DIAGNOSIS — M54.41 CHRONIC RIGHT-SIDED LOW BACK PAIN WITH BILATERAL SCIATICA: Chronic | ICD-10-CM

## 2021-01-19 DIAGNOSIS — M54.42 CHRONIC RIGHT-SIDED LOW BACK PAIN WITH BILATERAL SCIATICA: Chronic | ICD-10-CM
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
## 2021-01-19 DIAGNOSIS — E11.42 DIABETIC POLYNEUROPATHY ASSOCIATED WITH TYPE 2 DIABETES MELLITUS: ICD-10-CM

## 2021-01-19 DIAGNOSIS — G89.29 CHRONIC RIGHT-SIDED LOW BACK PAIN WITH BILATERAL SCIATICA: Chronic | ICD-10-CM

## 2021-01-19 PROCEDURE — 99214 OFFICE O/P EST MOD 30 MIN: CPT | Mod: PBBFAC,PO | Performed by: FAMILY MEDICINE

## 2021-01-19 PROCEDURE — 99999 PR PBB SHADOW E&M-EST. PATIENT-LVL IV: CPT | Mod: PBBFAC,,, | Performed by: FAMILY MEDICINE

## 2021-01-19 PROCEDURE — 99214 PR OFFICE/OUTPT VISIT, EST, LEVL IV, 30-39 MIN: ICD-10-PCS | Mod: S$PBB,,, | Performed by: FAMILY MEDICINE

## 2021-01-19 PROCEDURE — 99999 PR PBB SHADOW E&M-EST. PATIENT-LVL IV: ICD-10-PCS | Mod: PBBFAC,,, | Performed by: FAMILY MEDICINE

## 2021-01-19 PROCEDURE — 99214 OFFICE O/P EST MOD 30 MIN: CPT | Mod: S$PBB,,, | Performed by: FAMILY MEDICINE

## 2021-01-21 ENCOUNTER — CLINICAL SUPPORT (OUTPATIENT)
Dept: REHABILITATION | Facility: HOSPITAL | Age: 61
End: 2021-01-21
Payer: MEDICAID

## 2021-01-21 DIAGNOSIS — R29.3 POSTURE ABNORMALITY: ICD-10-CM

## 2021-01-21 DIAGNOSIS — R29.898 WEAKNESS OF BOTH LOWER EXTREMITIES: ICD-10-CM

## 2021-01-21 DIAGNOSIS — M53.86 DECREASED ROM OF LUMBAR SPINE: ICD-10-CM

## 2021-01-21 DIAGNOSIS — R05.9 COUGH: ICD-10-CM

## 2021-01-21 PROCEDURE — 97140 MANUAL THERAPY 1/> REGIONS: CPT | Mod: PN,CQ

## 2021-01-21 PROCEDURE — 97110 THERAPEUTIC EXERCISES: CPT | Mod: PN,CQ

## 2021-01-21 RX ORDER — FAMOTIDINE 40 MG/1
TABLET, FILM COATED ORAL
Qty: 30 TABLET | Refills: 11 | Status: SHIPPED | OUTPATIENT
Start: 2021-01-21 | End: 2021-12-20 | Stop reason: SDUPTHER

## 2021-01-21 RX ORDER — LEVOCETIRIZINE DIHYDROCHLORIDE 5 MG/1
TABLET, FILM COATED ORAL
Qty: 30 TABLET | Refills: 11 | Status: SHIPPED | OUTPATIENT
Start: 2021-01-21 | End: 2021-12-20 | Stop reason: SDUPTHER

## 2021-01-22 ENCOUNTER — PATIENT MESSAGE (OUTPATIENT)
Dept: NEUROLOGY | Facility: CLINIC | Age: 61
End: 2021-01-22

## 2021-01-22 ENCOUNTER — TELEPHONE (OUTPATIENT)
Dept: NEUROLOGY | Facility: CLINIC | Age: 61
End: 2021-01-22

## 2021-01-22 ENCOUNTER — OFFICE VISIT (OUTPATIENT)
Dept: NEUROLOGY | Facility: CLINIC | Age: 61
End: 2021-01-22
Payer: MEDICAID

## 2021-01-22 VITALS
SYSTOLIC BLOOD PRESSURE: 109 MMHG | BODY MASS INDEX: 52.86 KG/M2 | TEMPERATURE: 99 F | RESPIRATION RATE: 30 BRPM | WEIGHT: 293 LBS | DIASTOLIC BLOOD PRESSURE: 71 MMHG | HEART RATE: 130 BPM

## 2021-01-22 DIAGNOSIS — M47.22 OSTEOARTHRITIS OF SPINE WITH RADICULOPATHY, CERVICAL REGION: Primary | ICD-10-CM

## 2021-01-22 DIAGNOSIS — R20.2 PARESTHESIAS: ICD-10-CM

## 2021-01-22 DIAGNOSIS — M54.42 CHRONIC RIGHT-SIDED LOW BACK PAIN WITH BILATERAL SCIATICA: Chronic | ICD-10-CM

## 2021-01-22 DIAGNOSIS — E11.49 TYPE II DIABETES MELLITUS WITH NEUROLOGICAL MANIFESTATIONS: Chronic | ICD-10-CM

## 2021-01-22 DIAGNOSIS — G89.29 CHRONIC RIGHT-SIDED LOW BACK PAIN WITH BILATERAL SCIATICA: Chronic | ICD-10-CM

## 2021-01-22 DIAGNOSIS — E11.42 DIABETIC POLYNEUROPATHY ASSOCIATED WITH TYPE 2 DIABETES MELLITUS: ICD-10-CM

## 2021-01-22 DIAGNOSIS — R00.0 TACHYCARDIA: ICD-10-CM

## 2021-01-22 DIAGNOSIS — M54.41 CHRONIC RIGHT-SIDED LOW BACK PAIN WITH BILATERAL SCIATICA: Chronic | ICD-10-CM

## 2021-01-22 DIAGNOSIS — E66.2 CLASS 3 OBESITY WITH ALVEOLAR HYPOVENTILATION, SERIOUS COMORBIDITY, AND BODY MASS INDEX (BMI) OF 50.0 TO 59.9 IN ADULT: Chronic | ICD-10-CM

## 2021-01-22 DIAGNOSIS — I73.9 CLAUDICATION OF BOTH LOWER EXTREMITIES: ICD-10-CM

## 2021-01-22 DIAGNOSIS — R26.81 GAIT INSTABILITY: ICD-10-CM

## 2021-01-22 DIAGNOSIS — R29.898 WEAKNESS OF BOTH LOWER EXTREMITIES: ICD-10-CM

## 2021-01-22 PROBLEM — R05.9 COUGH: Status: RESOLVED | Noted: 2019-12-04 | Resolved: 2021-01-22

## 2021-01-22 PROBLEM — J06.9 UPPER RESPIRATORY TRACT INFECTION: Status: RESOLVED | Noted: 2020-03-06 | Resolved: 2021-01-22

## 2021-01-22 PROCEDURE — 99215 OFFICE O/P EST HI 40 MIN: CPT | Mod: PBBFAC,PN | Performed by: NURSE PRACTITIONER

## 2021-01-22 PROCEDURE — 99204 OFFICE O/P NEW MOD 45 MIN: CPT | Mod: S$PBB,,, | Performed by: NURSE PRACTITIONER

## 2021-01-22 PROCEDURE — 99204 PR OFFICE/OUTPT VISIT, NEW, LEVL IV, 45-59 MIN: ICD-10-PCS | Mod: S$PBB,,, | Performed by: NURSE PRACTITIONER

## 2021-01-22 PROCEDURE — 99999 PR PBB SHADOW E&M-EST. PATIENT-LVL V: CPT | Mod: PBBFAC,,, | Performed by: NURSE PRACTITIONER

## 2021-01-22 PROCEDURE — 99999 PR PBB SHADOW E&M-EST. PATIENT-LVL V: ICD-10-PCS | Mod: PBBFAC,,, | Performed by: NURSE PRACTITIONER

## 2021-01-22 RX ORDER — GABAPENTIN 800 MG/1
800 TABLET ORAL 3 TIMES DAILY
Qty: 90 TABLET | Refills: 11 | Status: SHIPPED | OUTPATIENT
Start: 2021-01-22 | End: 2022-01-06

## 2021-01-26 ENCOUNTER — TELEPHONE (OUTPATIENT)
Dept: FAMILY MEDICINE | Facility: CLINIC | Age: 61
End: 2021-01-26

## 2021-01-26 ENCOUNTER — OFFICE VISIT (OUTPATIENT)
Dept: PODIATRY | Facility: CLINIC | Age: 61
End: 2021-01-26
Payer: MEDICAID

## 2021-01-26 DIAGNOSIS — E66.01 MORBID OBESITY: ICD-10-CM

## 2021-01-26 DIAGNOSIS — L60.8 DISCOLORATION OF NAIL: ICD-10-CM

## 2021-01-26 DIAGNOSIS — I10 ORTHOSTATIC HYPERTENSION: ICD-10-CM

## 2021-01-26 DIAGNOSIS — R55 SYNCOPE, UNSPECIFIED SYNCOPE TYPE: ICD-10-CM

## 2021-01-26 DIAGNOSIS — R42 DIZZINESS ON STANDING: ICD-10-CM

## 2021-01-26 DIAGNOSIS — R55 SYNCOPE, UNSPECIFIED SYNCOPE TYPE: Primary | ICD-10-CM

## 2021-01-26 DIAGNOSIS — E11.49 TYPE II DIABETES MELLITUS WITH NEUROLOGICAL MANIFESTATIONS: Primary | ICD-10-CM

## 2021-01-26 PROCEDURE — 99214 PR OFFICE/OUTPT VISIT, EST, LEVL IV, 30-39 MIN: ICD-10-PCS | Mod: S$PBB,,, | Performed by: PODIATRIST

## 2021-01-26 PROCEDURE — 93010 EKG 12-LEAD: ICD-10-PCS | Mod: S$PBB,,, | Performed by: INTERNAL MEDICINE

## 2021-01-26 PROCEDURE — 93005 ELECTROCARDIOGRAM TRACING: CPT | Mod: PBBFAC,PO | Performed by: INTERNAL MEDICINE

## 2021-01-26 PROCEDURE — 99213 OFFICE O/P EST LOW 20 MIN: CPT | Mod: PBBFAC,PO | Performed by: PODIATRIST

## 2021-01-26 PROCEDURE — 99214 OFFICE O/P EST MOD 30 MIN: CPT | Mod: S$PBB,,, | Performed by: PODIATRIST

## 2021-01-26 PROCEDURE — 99999 PR PBB SHADOW E&M-EST. PATIENT-LVL III: ICD-10-PCS | Mod: PBBFAC,,, | Performed by: PODIATRIST

## 2021-01-26 PROCEDURE — 99999 PR PBB SHADOW E&M-EST. PATIENT-LVL III: CPT | Mod: PBBFAC,,, | Performed by: PODIATRIST

## 2021-01-26 PROCEDURE — 93010 ELECTROCARDIOGRAM REPORT: CPT | Mod: S$PBB,,, | Performed by: INTERNAL MEDICINE

## 2021-01-27 ENCOUNTER — TELEPHONE (OUTPATIENT)
Dept: FAMILY MEDICINE | Facility: CLINIC | Age: 61
End: 2021-01-27

## 2021-01-27 ENCOUNTER — OFFICE VISIT (OUTPATIENT)
Dept: PAIN MEDICINE | Facility: CLINIC | Age: 61
End: 2021-01-27
Payer: MEDICAID

## 2021-01-27 VITALS — HEIGHT: 68 IN | WEIGHT: 293 LBS | RESPIRATION RATE: 17 BRPM | BODY MASS INDEX: 44.41 KG/M2

## 2021-01-27 DIAGNOSIS — G89.29 CHRONIC RIGHT SHOULDER PAIN: ICD-10-CM

## 2021-01-27 DIAGNOSIS — M47.812 CERVICAL SPONDYLOSIS: Primary | ICD-10-CM

## 2021-01-27 DIAGNOSIS — M79.12 MYALGIA OF AUXILIARY MUSCLES, HEAD AND NECK: ICD-10-CM

## 2021-01-27 DIAGNOSIS — M25.812 SHOULDER IMPINGEMENT, LEFT: ICD-10-CM

## 2021-01-27 DIAGNOSIS — M25.511 CHRONIC RIGHT SHOULDER PAIN: ICD-10-CM

## 2021-01-27 PROCEDURE — 99214 OFFICE O/P EST MOD 30 MIN: CPT | Mod: 95,,, | Performed by: PHYSICIAN ASSISTANT

## 2021-01-27 PROCEDURE — 99214 PR OFFICE/OUTPT VISIT, EST, LEVL IV, 30-39 MIN: ICD-10-PCS | Mod: 95,,, | Performed by: PHYSICIAN ASSISTANT

## 2021-01-29 ENCOUNTER — TELEPHONE (OUTPATIENT)
Dept: FAMILY MEDICINE | Facility: CLINIC | Age: 61
End: 2021-01-29

## 2021-01-29 ENCOUNTER — TELEPHONE (OUTPATIENT)
Dept: NEUROLOGY | Facility: CLINIC | Age: 61
End: 2021-01-29

## 2021-01-29 ENCOUNTER — TELEPHONE (OUTPATIENT)
Dept: CARDIOLOGY | Facility: CLINIC | Age: 61
End: 2021-01-29

## 2021-01-29 ENCOUNTER — TELEPHONE (OUTPATIENT)
Dept: ORTHOPEDICS | Facility: CLINIC | Age: 61
End: 2021-01-29

## 2021-01-29 DIAGNOSIS — M54.9 BACK PAIN, UNSPECIFIED BACK LOCATION, UNSPECIFIED BACK PAIN LATERALITY, UNSPECIFIED CHRONICITY: Primary | ICD-10-CM

## 2021-01-29 DIAGNOSIS — M25.519 SHOULDER PAIN, UNSPECIFIED CHRONICITY, UNSPECIFIED LATERALITY: ICD-10-CM

## 2021-02-02 ENCOUNTER — DOCUMENTATION ONLY (OUTPATIENT)
Dept: REHABILITATION | Facility: HOSPITAL | Age: 61
End: 2021-02-02

## 2021-02-05 ENCOUNTER — TELEPHONE (OUTPATIENT)
Dept: FAMILY MEDICINE | Facility: CLINIC | Age: 61
End: 2021-02-05

## 2021-02-08 ENCOUNTER — TELEPHONE (OUTPATIENT)
Dept: FAMILY MEDICINE | Facility: CLINIC | Age: 61
End: 2021-02-08

## 2021-02-08 RX ORDER — DEXAMETHASONE 6 MG/1
TABLET ORAL
COMMUNITY
Start: 2021-02-03 | End: 2021-05-13

## 2021-02-08 RX ORDER — BENZONATATE 100 MG/1
CAPSULE ORAL
COMMUNITY
Start: 2021-02-03 | End: 2021-02-23 | Stop reason: SDUPTHER

## 2021-02-08 RX ORDER — ALBUTEROL SULFATE 90 UG/1
AEROSOL, METERED RESPIRATORY (INHALATION)
COMMUNITY
Start: 2021-02-03 | End: 2021-04-08 | Stop reason: SDUPTHER

## 2021-02-15 ENCOUNTER — TELEPHONE (OUTPATIENT)
Dept: NEUROLOGY | Facility: CLINIC | Age: 61
End: 2021-02-15

## 2021-02-18 ENCOUNTER — TELEPHONE (OUTPATIENT)
Dept: FAMILY MEDICINE | Facility: CLINIC | Age: 61
End: 2021-02-18

## 2021-02-18 ENCOUNTER — OFFICE VISIT (OUTPATIENT)
Dept: FAMILY MEDICINE | Facility: CLINIC | Age: 61
End: 2021-02-18
Payer: MEDICAID

## 2021-02-18 DIAGNOSIS — J12.82 PNEUMONIA DUE TO 2019 NOVEL CORONAVIRUS: ICD-10-CM

## 2021-02-18 DIAGNOSIS — R74.8 ELEVATED LIVER ENZYMES: ICD-10-CM

## 2021-02-18 DIAGNOSIS — U07.1 COVID-19: ICD-10-CM

## 2021-02-18 DIAGNOSIS — Z99.81 OXYGEN DEPENDENT: ICD-10-CM

## 2021-02-18 DIAGNOSIS — J96.01 ACUTE RESPIRATORY FAILURE WITH HYPOXIA: ICD-10-CM

## 2021-02-18 DIAGNOSIS — U07.1 PNEUMONIA DUE TO 2019 NOVEL CORONAVIRUS: ICD-10-CM

## 2021-02-18 DIAGNOSIS — Z09 HOSPITAL DISCHARGE FOLLOW-UP: Primary | ICD-10-CM

## 2021-02-18 DIAGNOSIS — K76.0 FATTY LIVER: ICD-10-CM

## 2021-02-18 PROCEDURE — 99214 PR OFFICE/OUTPT VISIT, EST, LEVL IV, 30-39 MIN: ICD-10-PCS | Mod: 95,,, | Performed by: NURSE PRACTITIONER

## 2021-02-18 PROCEDURE — 99214 OFFICE O/P EST MOD 30 MIN: CPT | Mod: 95,,, | Performed by: NURSE PRACTITIONER

## 2021-02-19 ENCOUNTER — TELEPHONE (OUTPATIENT)
Dept: FAMILY MEDICINE | Facility: CLINIC | Age: 61
End: 2021-02-19

## 2021-02-22 DIAGNOSIS — R06.02 SHORTNESS OF BREATH: ICD-10-CM

## 2021-02-22 DIAGNOSIS — R06.02 SOB (SHORTNESS OF BREATH): Primary | ICD-10-CM

## 2021-02-23 ENCOUNTER — TELEPHONE (OUTPATIENT)
Dept: FAMILY MEDICINE | Facility: CLINIC | Age: 61
End: 2021-02-23

## 2021-02-23 DIAGNOSIS — E11.65 TYPE 2 DIABETES MELLITUS WITH HYPERGLYCEMIA, WITHOUT LONG-TERM CURRENT USE OF INSULIN: Primary | ICD-10-CM

## 2021-02-23 RX ORDER — LANCETS 33 GAUGE
EACH MISCELLANEOUS
Qty: 100 EACH | Refills: 99 | Status: SHIPPED | OUTPATIENT
Start: 2021-02-23 | End: 2021-06-15 | Stop reason: SDUPTHER

## 2021-02-23 RX ORDER — BENZONATATE 100 MG/1
CAPSULE ORAL
Qty: 21 CAPSULE | Refills: 0 | Status: SHIPPED | OUTPATIENT
Start: 2021-02-23 | End: 2021-03-01

## 2021-02-26 ENCOUNTER — LAB VISIT (OUTPATIENT)
Dept: LAB | Facility: HOSPITAL | Age: 61
End: 2021-02-26
Attending: NURSE PRACTITIONER
Payer: MEDICAID

## 2021-02-26 ENCOUNTER — OFFICE VISIT (OUTPATIENT)
Dept: CARDIOLOGY | Facility: CLINIC | Age: 61
End: 2021-02-26
Payer: MEDICAID

## 2021-02-26 VITALS
HEART RATE: 96 BPM | DIASTOLIC BLOOD PRESSURE: 78 MMHG | WEIGHT: 293 LBS | BODY MASS INDEX: 52.5 KG/M2 | TEMPERATURE: 98 F | SYSTOLIC BLOOD PRESSURE: 118 MMHG

## 2021-02-26 DIAGNOSIS — E11.69 COMBINED HYPERLIPIDEMIA ASSOCIATED WITH TYPE 2 DIABETES MELLITUS: ICD-10-CM

## 2021-02-26 DIAGNOSIS — I73.9 CLAUDICATION OF BOTH LOWER EXTREMITIES: ICD-10-CM

## 2021-02-26 DIAGNOSIS — R55 SYNCOPE, UNSPECIFIED SYNCOPE TYPE: ICD-10-CM

## 2021-02-26 DIAGNOSIS — E78.2 COMBINED HYPERLIPIDEMIA ASSOCIATED WITH TYPE 2 DIABETES MELLITUS: ICD-10-CM

## 2021-02-26 DIAGNOSIS — R06.09 DOE (DYSPNEA ON EXERTION): Primary | ICD-10-CM

## 2021-02-26 DIAGNOSIS — E78.5 HYPERLIPIDEMIA, UNSPECIFIED HYPERLIPIDEMIA TYPE: ICD-10-CM

## 2021-02-26 DIAGNOSIS — I15.2 HYPERTENSION ASSOCIATED WITH DIABETES: Chronic | ICD-10-CM

## 2021-02-26 DIAGNOSIS — R74.8 ELEVATED LIVER ENZYMES: ICD-10-CM

## 2021-02-26 DIAGNOSIS — R01.1 SYSTOLIC MURMUR: ICD-10-CM

## 2021-02-26 DIAGNOSIS — E11.59 HYPERTENSION ASSOCIATED WITH DIABETES: Chronic | ICD-10-CM

## 2021-02-26 PROCEDURE — 80076 HEPATIC FUNCTION PANEL: CPT

## 2021-02-26 PROCEDURE — 99213 OFFICE O/P EST LOW 20 MIN: CPT | Mod: PBBFAC,PO | Performed by: INTERNAL MEDICINE

## 2021-02-26 PROCEDURE — 99205 OFFICE O/P NEW HI 60 MIN: CPT | Mod: S$PBB,,, | Performed by: INTERNAL MEDICINE

## 2021-02-26 PROCEDURE — 36415 COLL VENOUS BLD VENIPUNCTURE: CPT | Mod: PO

## 2021-02-26 PROCEDURE — 99205 PR OFFICE/OUTPT VISIT, NEW, LEVL V, 60-74 MIN: ICD-10-PCS | Mod: S$PBB,,, | Performed by: INTERNAL MEDICINE

## 2021-02-26 PROCEDURE — 99999 PR PBB SHADOW E&M-EST. PATIENT-LVL III: ICD-10-PCS | Mod: PBBFAC,,, | Performed by: INTERNAL MEDICINE

## 2021-02-26 PROCEDURE — 99999 PR PBB SHADOW E&M-EST. PATIENT-LVL III: CPT | Mod: PBBFAC,,, | Performed by: INTERNAL MEDICINE

## 2021-02-27 LAB
ALBUMIN SERPL BCP-MCNC: 3.4 G/DL (ref 3.5–5.2)
ALP SERPL-CCNC: 109 U/L (ref 55–135)
ALT SERPL W/O P-5'-P-CCNC: 14 U/L (ref 10–44)
AST SERPL-CCNC: 12 U/L (ref 10–40)
BILIRUB DIRECT SERPL-MCNC: 0.1 MG/DL (ref 0.1–0.3)
BILIRUB SERPL-MCNC: 0.2 MG/DL (ref 0.1–1)
PROT SERPL-MCNC: 7.2 G/DL (ref 6–8.4)

## 2021-03-01 ENCOUNTER — PATIENT MESSAGE (OUTPATIENT)
Dept: FAMILY MEDICINE | Facility: CLINIC | Age: 61
End: 2021-03-01

## 2021-03-01 DIAGNOSIS — R05.9 COUGH: Primary | ICD-10-CM

## 2021-03-01 RX ORDER — BENZONATATE 200 MG/1
200 CAPSULE ORAL 3 TIMES DAILY PRN
Qty: 60 CAPSULE | Refills: 0 | Status: SHIPPED | OUTPATIENT
Start: 2021-03-01 | End: 2021-03-21

## 2021-03-01 RX ORDER — DEXTROMETHORPHAN HYDROBROMIDE, GUAIFENESIN, PHENYLEPHRINE HYDROCHLORIDE 17.5; 400; 1 MG/1; MG/1; MG/1
1 TABLET ORAL EVERY 6 HOURS PRN
Qty: 30 TABLET | Refills: 0 | Status: SHIPPED | OUTPATIENT
Start: 2021-03-01 | End: 2021-03-11

## 2021-03-07 ENCOUNTER — PATIENT OUTREACH (OUTPATIENT)
Dept: ADMINISTRATIVE | Facility: OTHER | Age: 61
End: 2021-03-07

## 2021-03-08 ENCOUNTER — PROCEDURE VISIT (OUTPATIENT)
Dept: NEUROLOGY | Facility: CLINIC | Age: 61
End: 2021-03-08
Payer: MEDICAID

## 2021-03-08 DIAGNOSIS — G60.9 IDIOPATHIC PERIPHERAL NEUROPATHY: ICD-10-CM

## 2021-03-08 DIAGNOSIS — R20.2 PARESTHESIAS: ICD-10-CM

## 2021-03-08 DIAGNOSIS — G56.01 RIGHT CARPAL TUNNEL SYNDROME: ICD-10-CM

## 2021-03-08 PROCEDURE — 95886 MUSC TEST DONE W/N TEST COMP: CPT | Mod: 26,S$PBB,, | Performed by: PSYCHIATRY & NEUROLOGY

## 2021-03-08 PROCEDURE — 95911 PR NERVE CONDUCTION STUDY; 9-10 STUDIES: ICD-10-PCS | Mod: 26,S$PBB,, | Performed by: PSYCHIATRY & NEUROLOGY

## 2021-03-08 PROCEDURE — 95911 NRV CNDJ TEST 9-10 STUDIES: CPT | Mod: 26,S$PBB,, | Performed by: PSYCHIATRY & NEUROLOGY

## 2021-03-08 PROCEDURE — 95886 PR EMG COMPLETE, W/ NERVE CONDUCTION STUDIES, 5+ MUSCLES: ICD-10-PCS | Mod: 26,S$PBB,, | Performed by: PSYCHIATRY & NEUROLOGY

## 2021-03-08 PROCEDURE — 95886 MUSC TEST DONE W/N TEST COMP: CPT | Mod: PBBFAC,PO,RT | Performed by: PSYCHIATRY & NEUROLOGY

## 2021-03-08 PROCEDURE — 95911 NRV CNDJ TEST 9-10 STUDIES: CPT | Mod: PBBFAC,PO | Performed by: PSYCHIATRY & NEUROLOGY

## 2021-03-11 ENCOUNTER — HOSPITAL ENCOUNTER (OUTPATIENT)
Dept: CARDIOLOGY | Facility: HOSPITAL | Age: 61
Discharge: HOME | End: 2021-03-11
Attending: INTERNAL MEDICINE
Payer: MEDICAID

## 2021-03-11 VITALS
DIASTOLIC BLOOD PRESSURE: 78 MMHG | TEMPERATURE: 98 F | HEIGHT: 68 IN | WEIGHT: 293 LBS | SYSTOLIC BLOOD PRESSURE: 118 MMHG | BODY MASS INDEX: 44.41 KG/M2 | HEART RATE: 85 BPM

## 2021-03-11 DIAGNOSIS — E11.69 COMBINED HYPERLIPIDEMIA ASSOCIATED WITH TYPE 2 DIABETES MELLITUS: ICD-10-CM

## 2021-03-11 DIAGNOSIS — R01.1 SYSTOLIC MURMUR: ICD-10-CM

## 2021-03-11 DIAGNOSIS — E78.5 HYPERLIPIDEMIA, UNSPECIFIED HYPERLIPIDEMIA TYPE: ICD-10-CM

## 2021-03-11 DIAGNOSIS — E11.59 HYPERTENSION ASSOCIATED WITH DIABETES: ICD-10-CM

## 2021-03-11 DIAGNOSIS — R55 SYNCOPE, UNSPECIFIED SYNCOPE TYPE: ICD-10-CM

## 2021-03-11 DIAGNOSIS — I15.2 HYPERTENSION ASSOCIATED WITH DIABETES: ICD-10-CM

## 2021-03-11 DIAGNOSIS — I73.9 CLAUDICATION OF BOTH LOWER EXTREMITIES: ICD-10-CM

## 2021-03-11 DIAGNOSIS — R06.09 DOE (DYSPNEA ON EXERTION): ICD-10-CM

## 2021-03-11 DIAGNOSIS — E78.2 COMBINED HYPERLIPIDEMIA ASSOCIATED WITH TYPE 2 DIABETES MELLITUS: ICD-10-CM

## 2021-03-11 LAB
AORTIC ROOT ANNULUS: 2.62 CM
AV INDEX (PROSTH): 0.8
AV MEAN GRADIENT: 6 MMHG
AV PEAK GRADIENT: 10 MMHG
AV VALVE AREA: 3.23 CM2
AV VELOCITY RATIO: 0.75
BSA FOR ECHO PROCEDURE: 2.74 M2
CV ECHO LV RWT: 0.53 CM
DOP CALC AO PEAK VEL: 1.6 M/S
DOP CALC AO VTI: 26.57 CM
DOP CALC LVOT AREA: 4 CM2
DOP CALC LVOT DIAMETER: 2.27 CM
DOP CALC LVOT PEAK VEL: 1.2 M/S
DOP CALC LVOT STROKE VOLUME: 85.71 CM3
DOP CALC RVOT PEAK VEL: 1.05 M/S
DOP CALC RVOT VTI: 20.04 CM
DOP CALCLVOT PEAK VEL VTI: 21.19 CM
E WAVE DECELERATION TIME: 169.31 MSEC
E/A RATIO: 0.87
E/E' RATIO: 8.83 M/S
ECHO LV POSTERIOR WALL: 1.1 CM (ref 0.6–1.1)
FRACTIONAL SHORTENING: 32 % (ref 28–44)
INTERVENTRICULAR SEPTUM: 1.1 CM (ref 0.6–1.1)
IVRT: 99.9 MSEC
LA MAJOR: 4.5 CM
LA MINOR: 4.85 CM
LA WIDTH: 3.31 CM
LEFT ATRIUM SIZE: 3.63 CM
LEFT ATRIUM VOLUME INDEX: 18.5 ML/M2
LEFT ATRIUM VOLUME: 47.68 CM3
LEFT INTERNAL DIMENSION IN SYSTOLE: 2.83 CM (ref 2.1–4)
LEFT VENTRICLE DIASTOLIC VOLUME INDEX: 30.21 ML/M2
LEFT VENTRICLE DIASTOLIC VOLUME: 77.94 ML
LEFT VENTRICLE MASS INDEX: 61 G/M2
LEFT VENTRICLE SYSTOLIC VOLUME INDEX: 11.7 ML/M2
LEFT VENTRICLE SYSTOLIC VOLUME: 30.21 ML
LEFT VENTRICULAR INTERNAL DIMENSION IN DIASTOLE: 4.19 CM (ref 3.5–6)
LEFT VENTRICULAR MASS: 156.48 G
LV LATERAL E/E' RATIO: 7.57 M/S
LV SEPTAL E/E' RATIO: 10.6 M/S
MV A" WAVE DURATION": 11.13 MSEC
MV PEAK A VEL: 0.61 M/S
MV PEAK E VEL: 0.53 M/S
MV STENOSIS PRESSURE HALF TIME: 49.1 MS
MV VALVE AREA P 1/2 METHOD: 4.48 CM2
PISA TR MAX VEL: 1.82 M/S
PULM VEIN S/D RATIO: 1.62
PV MEAN GRADIENT: 2 MMHG
PV PEAK D VEL: 0.45 M/S
PV PEAK S VEL: 0.73 M/S
PV PEAK VELOCITY: 1.26 CM/S
RA MAJOR: 3.42 CM
RA WIDTH: 2.94 CM
RIGHT VENTRICULAR END-DIASTOLIC DIMENSION: 2.43 CM
SINUS: 2.94 CM
STJ: 2.9 CM
TDI LATERAL: 0.07 M/S
TDI SEPTAL: 0.05 M/S
TDI: 0.06 M/S
TR MAX PG: 13 MMHG
TRICUSPID ANNULAR PLANE SYSTOLIC EXCURSION: 2.01 CM

## 2021-03-11 PROCEDURE — 93306 ECHO (CUPID ONLY): ICD-10-PCS | Mod: 26,,, | Performed by: INTERNAL MEDICINE

## 2021-03-11 PROCEDURE — 93306 TTE W/DOPPLER COMPLETE: CPT | Mod: PO

## 2021-03-11 PROCEDURE — 93306 TTE W/DOPPLER COMPLETE: CPT | Mod: 26,,, | Performed by: INTERNAL MEDICINE

## 2021-03-12 ENCOUNTER — TELEPHONE (OUTPATIENT)
Dept: CARDIOLOGY | Facility: CLINIC | Age: 61
End: 2021-03-12

## 2021-03-15 ENCOUNTER — TELEPHONE (OUTPATIENT)
Dept: FAMILY MEDICINE | Facility: CLINIC | Age: 61
End: 2021-03-15

## 2021-03-15 ENCOUNTER — TELEPHONE (OUTPATIENT)
Dept: NEUROLOGY | Facility: CLINIC | Age: 61
End: 2021-03-15

## 2021-03-19 ENCOUNTER — TELEPHONE (OUTPATIENT)
Dept: FAMILY MEDICINE | Facility: CLINIC | Age: 61
End: 2021-03-19

## 2021-03-22 DIAGNOSIS — M25.512 BILATERAL SHOULDER PAIN, UNSPECIFIED CHRONICITY: ICD-10-CM

## 2021-03-22 DIAGNOSIS — M25.511 BILATERAL SHOULDER PAIN, UNSPECIFIED CHRONICITY: ICD-10-CM

## 2021-03-22 RX ORDER — MONTELUKAST SODIUM 10 MG/1
10 TABLET ORAL NIGHTLY
Qty: 30 TABLET | Refills: 12 | Status: SHIPPED | OUTPATIENT
Start: 2021-03-22 | End: 2021-06-22 | Stop reason: SDUPTHER

## 2021-03-22 RX ORDER — NABUMETONE 750 MG/1
750 TABLET, FILM COATED ORAL 2 TIMES DAILY
Qty: 60 TABLET | Refills: 12 | Status: ON HOLD | OUTPATIENT
Start: 2021-03-22 | End: 2021-07-08 | Stop reason: HOSPADM

## 2021-03-23 LAB
LEFT EYE DM RETINOPATHY: NEGATIVE
RIGHT EYE DM RETINOPATHY: NEGATIVE

## 2021-03-25 ENCOUNTER — TELEPHONE (OUTPATIENT)
Dept: ENDOSCOPY | Facility: HOSPITAL | Age: 61
End: 2021-03-25

## 2021-03-26 RX ORDER — SODIUM, POTASSIUM,MAG SULFATES 17.5-3.13G
1 SOLUTION, RECONSTITUTED, ORAL ORAL DAILY
Qty: 1 KIT | Refills: 0 | Status: SHIPPED | OUTPATIENT
Start: 2021-03-26 | End: 2021-03-28

## 2021-03-29 ENCOUNTER — PATIENT OUTREACH (OUTPATIENT)
Dept: ADMINISTRATIVE | Facility: HOSPITAL | Age: 61
End: 2021-03-29

## 2021-03-30 ENCOUNTER — DOCUMENTATION ONLY (OUTPATIENT)
Dept: REHABILITATION | Facility: HOSPITAL | Age: 61
End: 2021-03-30

## 2021-03-30 PROBLEM — R29.898 WEAKNESS OF BOTH LOWER EXTREMITIES: Status: RESOLVED | Noted: 2020-09-21 | Resolved: 2021-03-30

## 2021-03-30 PROBLEM — R29.3 POSTURE ABNORMALITY: Status: RESOLVED | Noted: 2020-09-21 | Resolved: 2021-03-30

## 2021-03-30 PROBLEM — M53.86 DECREASED ROM OF LUMBAR SPINE: Status: RESOLVED | Noted: 2020-09-21 | Resolved: 2021-03-30

## 2021-03-31 ENCOUNTER — TELEPHONE (OUTPATIENT)
Dept: NEUROLOGY | Facility: CLINIC | Age: 61
End: 2021-03-31

## 2021-04-01 ENCOUNTER — OFFICE VISIT (OUTPATIENT)
Dept: NEUROLOGY | Facility: CLINIC | Age: 61
End: 2021-04-01
Payer: MEDICAID

## 2021-04-01 ENCOUNTER — TELEPHONE (OUTPATIENT)
Dept: NEUROLOGY | Facility: CLINIC | Age: 61
End: 2021-04-01

## 2021-04-01 ENCOUNTER — HOSPITAL ENCOUNTER (OUTPATIENT)
Dept: RADIOLOGY | Facility: HOSPITAL | Age: 61
Discharge: HOME | End: 2021-04-01
Attending: PSYCHIATRY & NEUROLOGY
Payer: MEDICAID

## 2021-04-01 VITALS
WEIGHT: 293 LBS | HEIGHT: 68 IN | BODY MASS INDEX: 44.41 KG/M2 | DIASTOLIC BLOOD PRESSURE: 94 MMHG | HEART RATE: 93 BPM | SYSTOLIC BLOOD PRESSURE: 133 MMHG

## 2021-04-01 DIAGNOSIS — M54.9 DORSALGIA, UNSPECIFIED: ICD-10-CM

## 2021-04-01 DIAGNOSIS — G56.03 BILATERAL CARPAL TUNNEL SYNDROME: Primary | ICD-10-CM

## 2021-04-01 DIAGNOSIS — R26.9 ABNORMAL GAIT: ICD-10-CM

## 2021-04-01 DIAGNOSIS — E11.42 DIABETIC POLYNEUROPATHY ASSOCIATED WITH TYPE 2 DIABETES MELLITUS: ICD-10-CM

## 2021-04-01 PROCEDURE — 99215 OFFICE O/P EST HI 40 MIN: CPT | Mod: PBBFAC,25,PO | Performed by: PSYCHIATRY & NEUROLOGY

## 2021-04-01 PROCEDURE — 99999 PR PBB SHADOW E&M-EST. PATIENT-LVL V: ICD-10-PCS | Mod: PBBFAC,,, | Performed by: PSYCHIATRY & NEUROLOGY

## 2021-04-01 PROCEDURE — 99215 PR OFFICE/OUTPT VISIT, EST, LEVL V, 40-54 MIN: ICD-10-PCS | Mod: S$PBB,,, | Performed by: PSYCHIATRY & NEUROLOGY

## 2021-04-01 PROCEDURE — 99215 OFFICE O/P EST HI 40 MIN: CPT | Mod: S$PBB,,, | Performed by: PSYCHIATRY & NEUROLOGY

## 2021-04-01 PROCEDURE — 99999 PR PBB SHADOW E&M-EST. PATIENT-LVL V: CPT | Mod: PBBFAC,,, | Performed by: PSYCHIATRY & NEUROLOGY

## 2021-04-01 PROCEDURE — 72110 X-RAY EXAM L-2 SPINE 4/>VWS: CPT | Mod: 26,,, | Performed by: RADIOLOGY

## 2021-04-01 PROCEDURE — 72110 X-RAY EXAM L-2 SPINE 4/>VWS: CPT | Mod: TC,FY,PO

## 2021-04-01 PROCEDURE — 72110 XR LUMBAR SPINE COMPLETE 5 VIEW: ICD-10-PCS | Mod: 26,,, | Performed by: RADIOLOGY

## 2021-04-06 ENCOUNTER — LAB VISIT (OUTPATIENT)
Dept: LAB | Facility: HOSPITAL | Age: 61
End: 2021-04-06
Attending: FAMILY MEDICINE
Payer: MEDICAID

## 2021-04-06 ENCOUNTER — CLINICAL SUPPORT (OUTPATIENT)
Dept: FAMILY MEDICINE | Facility: CLINIC | Age: 61
End: 2021-04-06
Payer: MEDICAID

## 2021-04-06 DIAGNOSIS — D64.9 ANEMIA, UNSPECIFIED TYPE: ICD-10-CM

## 2021-04-06 DIAGNOSIS — Z79.899 ENCOUNTER FOR LONG-TERM (CURRENT) USE OF MEDICATIONS: ICD-10-CM

## 2021-04-06 DIAGNOSIS — R06.02 SHORTNESS OF BREATH: ICD-10-CM

## 2021-04-06 DIAGNOSIS — E11.40 TYPE 2 DIABETES MELLITUS WITH DIABETIC NEUROPATHY, WITHOUT LONG-TERM CURRENT USE OF INSULIN: Chronic | ICD-10-CM

## 2021-04-06 LAB
ERYTHROCYTE [DISTWIDTH] IN BLOOD BY AUTOMATED COUNT: 16.5 % (ref 11.5–14.5)
ESTIMATED AVG GLUCOSE: 117 MG/DL (ref 68–131)
HBA1C MFR BLD: 5.7 % (ref 4–5.6)
HCT VFR BLD AUTO: 35 % (ref 37–48.5)
HGB BLD-MCNC: 10.5 G/DL (ref 12–16)
MCH RBC QN AUTO: 27.1 PG (ref 27–31)
MCHC RBC AUTO-ENTMCNC: 30 G/DL (ref 32–36)
MCV RBC AUTO: 90 FL (ref 82–98)
PLATELET # BLD AUTO: 383 K/UL (ref 150–450)
PMV BLD AUTO: 10.4 FL (ref 9.2–12.9)
RBC # BLD AUTO: 3.88 M/UL (ref 4–5.4)
WBC # BLD AUTO: 6.05 K/UL (ref 3.9–12.7)

## 2021-04-06 PROCEDURE — 84443 ASSAY THYROID STIM HORMONE: CPT | Performed by: FAMILY MEDICINE

## 2021-04-06 PROCEDURE — 83540 ASSAY OF IRON: CPT | Performed by: FAMILY MEDICINE

## 2021-04-06 PROCEDURE — 80053 COMPREHEN METABOLIC PANEL: CPT | Performed by: FAMILY MEDICINE

## 2021-04-06 PROCEDURE — 82607 VITAMIN B-12: CPT | Performed by: FAMILY MEDICINE

## 2021-04-06 PROCEDURE — 83036 HEMOGLOBIN GLYCOSYLATED A1C: CPT | Performed by: FAMILY MEDICINE

## 2021-04-06 PROCEDURE — U0005 INFEC AGEN DETEC AMPLI PROBE: HCPCS | Performed by: INTERNAL MEDICINE

## 2021-04-06 PROCEDURE — 85027 COMPLETE CBC AUTOMATED: CPT | Performed by: FAMILY MEDICINE

## 2021-04-06 PROCEDURE — 82746 ASSAY OF FOLIC ACID SERUM: CPT | Performed by: FAMILY MEDICINE

## 2021-04-06 PROCEDURE — U0003 INFECTIOUS AGENT DETECTION BY NUCLEIC ACID (DNA OR RNA); SEVERE ACUTE RESPIRATORY SYNDROME CORONAVIRUS 2 (SARS-COV-2) (CORONAVIRUS DISEASE [COVID-19]), AMPLIFIED PROBE TECHNIQUE, MAKING USE OF HIGH THROUGHPUT TECHNOLOGIES AS DESCRIBED BY CMS-2020-01-R: HCPCS | Performed by: INTERNAL MEDICINE

## 2021-04-06 PROCEDURE — 36415 COLL VENOUS BLD VENIPUNCTURE: CPT | Mod: PO | Performed by: FAMILY MEDICINE

## 2021-04-07 ENCOUNTER — PATIENT MESSAGE (OUTPATIENT)
Dept: FAMILY MEDICINE | Facility: CLINIC | Age: 61
End: 2021-04-07

## 2021-04-07 ENCOUNTER — TELEPHONE (OUTPATIENT)
Dept: FAMILY MEDICINE | Facility: CLINIC | Age: 61
End: 2021-04-07

## 2021-04-07 DIAGNOSIS — Z11.59 ENCOUNTER FOR SCREENING FOR OTHER VIRAL DISEASES: Primary | ICD-10-CM

## 2021-04-07 DIAGNOSIS — U07.1 COVID-19 VIRUS DETECTED: ICD-10-CM

## 2021-04-07 LAB — SARS-COV-2 RNA RESP QL NAA+PROBE: DETECTED

## 2021-04-08 ENCOUNTER — TELEPHONE (OUTPATIENT)
Dept: INTERNAL MEDICINE | Facility: CLINIC | Age: 61
End: 2021-04-08

## 2021-04-08 ENCOUNTER — TELEPHONE (OUTPATIENT)
Dept: FAMILY MEDICINE | Facility: CLINIC | Age: 61
End: 2021-04-08

## 2021-04-08 ENCOUNTER — TELEPHONE (OUTPATIENT)
Dept: PULMONOLOGY | Facility: CLINIC | Age: 61
End: 2021-04-08

## 2021-04-08 ENCOUNTER — PATIENT OUTREACH (OUTPATIENT)
Dept: INFECTIOUS DISEASES | Facility: HOSPITAL | Age: 61
End: 2021-04-08

## 2021-04-08 ENCOUNTER — PATIENT MESSAGE (OUTPATIENT)
Dept: FAMILY MEDICINE | Facility: CLINIC | Age: 61
End: 2021-04-08

## 2021-04-08 DIAGNOSIS — R06.02 SOB (SHORTNESS OF BREATH): ICD-10-CM

## 2021-04-08 DIAGNOSIS — J45.998 POST VIRAL ASTHMA: Primary | ICD-10-CM

## 2021-04-08 LAB
ALBUMIN SERPL BCP-MCNC: 3.4 G/DL (ref 3.5–5.2)
ALP SERPL-CCNC: 68 U/L (ref 55–135)
ALT SERPL W/O P-5'-P-CCNC: 10 U/L (ref 10–44)
ANION GAP SERPL CALC-SCNC: 10 MMOL/L (ref 8–16)
AST SERPL-CCNC: 13 U/L (ref 10–40)
BILIRUB SERPL-MCNC: 0.2 MG/DL (ref 0.1–1)
BUN SERPL-MCNC: 11 MG/DL (ref 8–23)
CALCIUM SERPL-MCNC: 8.9 MG/DL (ref 8.7–10.5)
CHLORIDE SERPL-SCNC: 106 MMOL/L (ref 95–110)
CO2 SERPL-SCNC: 27 MMOL/L (ref 23–29)
CREAT SERPL-MCNC: 0.8 MG/DL (ref 0.5–1.4)
EST. GFR  (AFRICAN AMERICAN): >60 ML/MIN/1.73 M^2
EST. GFR  (NON AFRICAN AMERICAN): >60 ML/MIN/1.73 M^2
FOLATE SERPL-MCNC: 5.1 NG/ML (ref 4–24)
GLUCOSE SERPL-MCNC: 81 MG/DL (ref 70–110)
IRON SERPL-MCNC: 40 UG/DL (ref 30–160)
POTASSIUM SERPL-SCNC: 4.1 MMOL/L (ref 3.5–5.1)
PROT SERPL-MCNC: 6.9 G/DL (ref 6–8.4)
SATURATED IRON: 9 % (ref 20–50)
SODIUM SERPL-SCNC: 143 MMOL/L (ref 136–145)
TOTAL IRON BINDING CAPACITY: 423 UG/DL (ref 250–450)
TRANSFERRIN SERPL-MCNC: 286 MG/DL (ref 200–375)
TSH SERPL DL<=0.005 MIU/L-ACNC: 2.46 UIU/ML (ref 0.4–4)
VIT B12 SERPL-MCNC: 800 PG/ML (ref 210–950)

## 2021-04-08 RX ORDER — ALBUTEROL SULFATE 0.83 MG/ML
2.5 SOLUTION RESPIRATORY (INHALATION)
Qty: 270 ML | Refills: 11 | Status: SHIPPED | OUTPATIENT
Start: 2021-04-08 | End: 2022-04-08

## 2021-04-08 RX ORDER — ALBUTEROL SULFATE 90 UG/1
AEROSOL, METERED RESPIRATORY (INHALATION)
Qty: 18 G | Refills: 11 | Status: SHIPPED | OUTPATIENT
Start: 2021-04-08 | End: 2022-08-12

## 2021-04-09 ENCOUNTER — TELEPHONE (OUTPATIENT)
Dept: NEUROLOGY | Facility: CLINIC | Age: 61
End: 2021-04-09

## 2021-04-11 DIAGNOSIS — K21.00 GASTROESOPHAGEAL REFLUX DISEASE WITH ESOPHAGITIS, UNSPECIFIED WHETHER HEMORRHAGE: ICD-10-CM

## 2021-04-11 DIAGNOSIS — R05.9 COUGH: ICD-10-CM

## 2021-04-12 RX ORDER — FLUTICASONE PROPIONATE 50 MCG
SPRAY, SUSPENSION (ML) NASAL
Qty: 16 G | Refills: 12 | Status: SHIPPED | OUTPATIENT
Start: 2021-04-12 | End: 2022-03-17 | Stop reason: SDUPTHER

## 2021-04-12 RX ORDER — PANTOPRAZOLE SODIUM 40 MG/1
40 TABLET, DELAYED RELEASE ORAL 2 TIMES DAILY
Qty: 60 TABLET | Refills: 12 | Status: SHIPPED | OUTPATIENT
Start: 2021-04-12 | End: 2022-03-17 | Stop reason: SDUPTHER

## 2021-04-14 ENCOUNTER — TELEPHONE (OUTPATIENT)
Dept: FAMILY MEDICINE | Facility: CLINIC | Age: 61
End: 2021-04-14

## 2021-04-15 ENCOUNTER — TELEPHONE (OUTPATIENT)
Dept: PULMONOLOGY | Facility: CLINIC | Age: 61
End: 2021-04-15

## 2021-04-23 ENCOUNTER — OFFICE VISIT (OUTPATIENT)
Dept: CARDIOLOGY | Facility: CLINIC | Age: 61
End: 2021-04-23
Payer: MEDICAID

## 2021-04-23 VITALS
RESPIRATION RATE: 16 BRPM | DIASTOLIC BLOOD PRESSURE: 88 MMHG | HEART RATE: 123 BPM | WEIGHT: 293 LBS | OXYGEN SATURATION: 95 % | HEIGHT: 68 IN | SYSTOLIC BLOOD PRESSURE: 126 MMHG | BODY MASS INDEX: 44.41 KG/M2

## 2021-04-23 DIAGNOSIS — R00.0 TACHYCARDIA: Primary | ICD-10-CM

## 2021-04-23 DIAGNOSIS — E78.2 COMBINED HYPERLIPIDEMIA ASSOCIATED WITH TYPE 2 DIABETES MELLITUS: ICD-10-CM

## 2021-04-23 DIAGNOSIS — E11.69 COMBINED HYPERLIPIDEMIA ASSOCIATED WITH TYPE 2 DIABETES MELLITUS: ICD-10-CM

## 2021-04-23 DIAGNOSIS — R01.1 SYSTOLIC MURMUR: ICD-10-CM

## 2021-04-23 DIAGNOSIS — I15.2 HYPERTENSION ASSOCIATED WITH DIABETES: ICD-10-CM

## 2021-04-23 DIAGNOSIS — E78.5 HYPERLIPIDEMIA, UNSPECIFIED HYPERLIPIDEMIA TYPE: ICD-10-CM

## 2021-04-23 DIAGNOSIS — R06.09 DOE (DYSPNEA ON EXERTION): ICD-10-CM

## 2021-04-23 DIAGNOSIS — I73.9 CLAUDICATION OF BOTH LOWER EXTREMITIES: ICD-10-CM

## 2021-04-23 DIAGNOSIS — E11.59 HYPERTENSION ASSOCIATED WITH DIABETES: ICD-10-CM

## 2021-04-23 DIAGNOSIS — R55 SYNCOPE, UNSPECIFIED SYNCOPE TYPE: ICD-10-CM

## 2021-04-23 PROCEDURE — 99214 PR OFFICE/OUTPT VISIT, EST, LEVL IV, 30-39 MIN: ICD-10-PCS | Mod: S$PBB,25,, | Performed by: INTERNAL MEDICINE

## 2021-04-23 PROCEDURE — 99215 OFFICE O/P EST HI 40 MIN: CPT | Mod: PBBFAC,PO | Performed by: INTERNAL MEDICINE

## 2021-04-23 PROCEDURE — 99999 PR PBB SHADOW E&M-EST. PATIENT-LVL V: CPT | Mod: PBBFAC,,, | Performed by: INTERNAL MEDICINE

## 2021-04-23 PROCEDURE — 99214 OFFICE O/P EST MOD 30 MIN: CPT | Mod: S$PBB,25,, | Performed by: INTERNAL MEDICINE

## 2021-04-23 PROCEDURE — 99999 PR PBB SHADOW E&M-EST. PATIENT-LVL V: ICD-10-PCS | Mod: PBBFAC,,, | Performed by: INTERNAL MEDICINE

## 2021-04-23 RX ORDER — DILTIAZEM HYDROCHLORIDE 120 MG/1
120 CAPSULE, EXTENDED RELEASE ORAL DAILY
Qty: 30 CAPSULE | Refills: 11 | Status: SHIPPED | OUTPATIENT
Start: 2021-04-23 | End: 2022-01-21 | Stop reason: ALTCHOICE

## 2021-04-26 ENCOUNTER — PATIENT MESSAGE (OUTPATIENT)
Dept: ENDOSCOPY | Facility: HOSPITAL | Age: 61
End: 2021-04-26

## 2021-04-26 ENCOUNTER — TELEPHONE (OUTPATIENT)
Dept: ENDOSCOPY | Facility: HOSPITAL | Age: 61
End: 2021-04-26

## 2021-04-27 ENCOUNTER — LAB VISIT (OUTPATIENT)
Dept: LAB | Facility: HOSPITAL | Age: 61
End: 2021-04-27
Attending: FAMILY MEDICINE
Payer: MEDICAID

## 2021-04-27 DIAGNOSIS — Z11.59 ENCOUNTER FOR SCREENING FOR OTHER VIRAL DISEASES: ICD-10-CM

## 2021-04-27 DIAGNOSIS — D50.9 IRON DEFICIENCY ANEMIA, UNSPECIFIED IRON DEFICIENCY ANEMIA TYPE: ICD-10-CM

## 2021-04-27 PROCEDURE — 36415 COLL VENOUS BLD VENIPUNCTURE: CPT | Mod: PO | Performed by: FAMILY MEDICINE

## 2021-04-27 PROCEDURE — 82728 ASSAY OF FERRITIN: CPT | Performed by: FAMILY MEDICINE

## 2021-04-27 PROCEDURE — 83540 ASSAY OF IRON: CPT | Performed by: FAMILY MEDICINE

## 2021-04-27 PROCEDURE — 86769 SARS-COV-2 COVID-19 ANTIBODY: CPT | Performed by: FAMILY MEDICINE

## 2021-04-28 DIAGNOSIS — D50.9 IRON DEFICIENCY ANEMIA, UNSPECIFIED IRON DEFICIENCY ANEMIA TYPE: Primary | ICD-10-CM

## 2021-04-28 LAB
FERRITIN SERPL-MCNC: 20 NG/ML (ref 20–300)
IRON SERPL-MCNC: 35 UG/DL (ref 30–160)
SARS-COV-2 IGG SERPLBLD QL IA.RAPID: POSITIVE
SATURATED IRON: 8 % (ref 20–50)
TOTAL IRON BINDING CAPACITY: 429 UG/DL (ref 250–450)
TRANSFERRIN SERPL-MCNC: 290 MG/DL (ref 200–375)

## 2021-04-29 ENCOUNTER — TELEPHONE (OUTPATIENT)
Dept: HEMATOLOGY/ONCOLOGY | Facility: CLINIC | Age: 61
End: 2021-04-29

## 2021-04-30 ENCOUNTER — CLINICAL SUPPORT (OUTPATIENT)
Dept: CARDIOLOGY | Facility: CLINIC | Age: 61
End: 2021-04-30
Attending: NURSE PRACTITIONER
Payer: MEDICAID

## 2021-04-30 DIAGNOSIS — I73.9 CLAUDICATION OF BOTH LOWER EXTREMITIES: ICD-10-CM

## 2021-04-30 LAB
LEFT ANT TIBIAL SYS PSV: 61 CM/S
LEFT CFA PSV: 117 CM/S
LEFT PERONEAL SYS PSV: 85 CM/S
LEFT POPLITEAL PSV: 47 CM/S
LEFT POST TIBIAL SYS PSV: 103 CM/S
LEFT PROFUNDA SYS PSV: 80 CM/S
LEFT SUPER FEMORAL DIST SYS PSV: 73 CM/S
LEFT SUPER FEMORAL MID SYS PSV: 82 CM/S
LEFT SUPER FEMORAL OSTIAL SYS PSV: 95 CM/S
LEFT SUPER FEMORAL PROX SYS PSV: 109 CM/S
LEFT TIB/PER TRUNK SYS PSV: 75 CM/S
OHS CV LEFT LOWER EXTREMITY ABI (NO CALC): 1
OHS CV RIGHT ABI LOWER EXTREMITY (NO CALC): 1
RIGHT ANT TIBIAL SYS PSV: 104 CM/S
RIGHT CFA PSV: 126 CM/S
RIGHT PERONEAL SYS PSV: 67 CM/S
RIGHT POPLITEAL PSV: 64 CM/S
RIGHT POST TIBIAL SYS PSV: 64 CM/S
RIGHT PROFUNDA SYS PSV: 61 CM/S
RIGHT SUPER FEMORAL DIST SYS PSV: 68 CM/S
RIGHT SUPER FEMORAL MID SYS PSV: 73 CM/S
RIGHT SUPER FEMORAL OSTIAL SYS PSV: 98 CM/S
RIGHT SUPER FEMORAL PROX SYS PSV: 108 CM/S
RIGHT TIB/PER TRUNK SYS PSV: 97 CM/S

## 2021-04-30 PROCEDURE — 93925 LOWER EXTREMITY STUDY: CPT | Mod: PBBFAC,PO | Performed by: INTERNAL MEDICINE

## 2021-04-30 PROCEDURE — 93925 CV US DOPPLER ARTERIAL LEGS BILATERAL (CUPID ONLY): ICD-10-PCS | Mod: 26,S$PBB,, | Performed by: INTERNAL MEDICINE

## 2021-05-03 DIAGNOSIS — D50.9 IRON DEFICIENCY ANEMIA, UNSPECIFIED IRON DEFICIENCY ANEMIA TYPE: Primary | ICD-10-CM

## 2021-05-03 RX ORDER — POLYETHYLENE GLYCOL 3350, SODIUM SULFATE ANHYDROUS, SODIUM BICARBONATE, SODIUM CHLORIDE, POTASSIUM CHLORIDE 236; 22.74; 6.74; 5.86; 2.97 G/4L; G/4L; G/4L; G/4L; G/4L
4 POWDER, FOR SOLUTION ORAL ONCE
Qty: 4000 ML | Refills: 0 | Status: SHIPPED | OUTPATIENT
Start: 2021-05-03 | End: 2021-05-03

## 2021-05-07 ENCOUNTER — TELEPHONE (OUTPATIENT)
Dept: GASTROENTEROLOGY | Facility: CLINIC | Age: 61
End: 2021-05-07

## 2021-05-10 ENCOUNTER — PATIENT MESSAGE (OUTPATIENT)
Dept: RESEARCH | Facility: HOSPITAL | Age: 61
End: 2021-05-10

## 2021-05-12 ENCOUNTER — ANESTHESIA EVENT (OUTPATIENT)
Dept: ENDOSCOPY | Facility: HOSPITAL | Age: 61
End: 2021-05-12
Payer: MEDICAID

## 2021-05-12 ENCOUNTER — ANESTHESIA (OUTPATIENT)
Dept: ENDOSCOPY | Facility: HOSPITAL | Age: 61
End: 2021-05-12
Payer: MEDICAID

## 2021-05-12 ENCOUNTER — HOSPITAL ENCOUNTER (OUTPATIENT)
Facility: HOSPITAL | Age: 61
Discharge: HOME | End: 2021-05-12
Attending: INTERNAL MEDICINE | Admitting: INTERNAL MEDICINE
Payer: MEDICAID

## 2021-05-12 DIAGNOSIS — D50.0 IRON DEFICIENCY ANEMIA DUE TO CHRONIC BLOOD LOSS: Primary | ICD-10-CM

## 2021-05-12 LAB — POCT GLUCOSE: 92 MG/DL (ref 70–110)

## 2021-05-12 PROCEDURE — 45378 DIAGNOSTIC COLONOSCOPY: CPT | Mod: ,,, | Performed by: INTERNAL MEDICINE

## 2021-05-12 PROCEDURE — 45378 PR COLONOSCOPY,DIAGNOSTIC: ICD-10-PCS | Mod: ,,, | Performed by: INTERNAL MEDICINE

## 2021-05-12 PROCEDURE — 00811 ANES LWR INTST NDSC NOS: CPT | Performed by: INTERNAL MEDICINE

## 2021-05-12 PROCEDURE — 63600175 PHARM REV CODE 636 W HCPCS: Performed by: NURSE ANESTHETIST, CERTIFIED REGISTERED

## 2021-05-12 PROCEDURE — 37000008 HC ANESTHESIA 1ST 15 MINUTES: Performed by: INTERNAL MEDICINE

## 2021-05-12 PROCEDURE — 37000009 HC ANESTHESIA EA ADD 15 MINS: Performed by: INTERNAL MEDICINE

## 2021-05-12 PROCEDURE — 25000003 PHARM REV CODE 250: Performed by: NURSE ANESTHETIST, CERTIFIED REGISTERED

## 2021-05-12 PROCEDURE — 45378 DIAGNOSTIC COLONOSCOPY: CPT | Performed by: INTERNAL MEDICINE

## 2021-05-12 RX ORDER — PROPOFOL 10 MG/ML
VIAL (ML) INTRAVENOUS
Status: DISCONTINUED | OUTPATIENT
Start: 2021-05-12 | End: 2021-05-12

## 2021-05-12 RX ORDER — SODIUM CHLORIDE, SODIUM LACTATE, POTASSIUM CHLORIDE, CALCIUM CHLORIDE 600; 310; 30; 20 MG/100ML; MG/100ML; MG/100ML; MG/100ML
INJECTION, SOLUTION INTRAVENOUS CONTINUOUS
Status: DISCONTINUED | OUTPATIENT
Start: 2021-05-12 | End: 2021-05-12 | Stop reason: HOSPADM

## 2021-05-12 RX ORDER — SODIUM CHLORIDE, SODIUM LACTATE, POTASSIUM CHLORIDE, CALCIUM CHLORIDE 600; 310; 30; 20 MG/100ML; MG/100ML; MG/100ML; MG/100ML
INJECTION, SOLUTION INTRAVENOUS CONTINUOUS PRN
Status: DISCONTINUED | OUTPATIENT
Start: 2021-05-12 | End: 2021-05-12

## 2021-05-12 RX ORDER — SODIUM CHLORIDE 0.9 % (FLUSH) 0.9 %
10 SYRINGE (ML) INJECTION
Status: DISCONTINUED | OUTPATIENT
Start: 2021-05-12 | End: 2021-05-12 | Stop reason: HOSPADM

## 2021-05-12 RX ORDER — LIDOCAINE HYDROCHLORIDE 10 MG/ML
INJECTION, SOLUTION EPIDURAL; INFILTRATION; INTRACAUDAL; PERINEURAL
Status: DISCONTINUED | OUTPATIENT
Start: 2021-05-12 | End: 2021-05-12

## 2021-05-12 RX ADMIN — PROPOFOL 40 MG: 10 INJECTION, EMULSION INTRAVENOUS at 09:05

## 2021-05-12 RX ADMIN — PROPOFOL 30 MG: 10 INJECTION, EMULSION INTRAVENOUS at 09:05

## 2021-05-12 RX ADMIN — SODIUM CHLORIDE, SODIUM LACTATE, POTASSIUM CHLORIDE, AND CALCIUM CHLORIDE: 600; 310; 30; 20 INJECTION, SOLUTION INTRAVENOUS at 09:05

## 2021-05-12 RX ADMIN — PROPOFOL 70 MG: 10 INJECTION, EMULSION INTRAVENOUS at 09:05

## 2021-05-12 RX ADMIN — LIDOCAINE HYDROCHLORIDE 50 MG: 10 INJECTION, SOLUTION EPIDURAL; INFILTRATION; INTRACAUDAL; PERINEURAL at 09:05

## 2021-05-13 ENCOUNTER — OFFICE VISIT (OUTPATIENT)
Dept: HEMATOLOGY/ONCOLOGY | Facility: CLINIC | Age: 61
End: 2021-05-13
Payer: MEDICAID

## 2021-05-13 VITALS
HEART RATE: 74 BPM | BODY MASS INDEX: 53.47 KG/M2 | WEIGHT: 293 LBS | OXYGEN SATURATION: 97 % | SYSTOLIC BLOOD PRESSURE: 112 MMHG | DIASTOLIC BLOOD PRESSURE: 66 MMHG | TEMPERATURE: 98 F

## 2021-05-13 VITALS
DIASTOLIC BLOOD PRESSURE: 69 MMHG | WEIGHT: 293 LBS | HEIGHT: 68 IN | TEMPERATURE: 98 F | SYSTOLIC BLOOD PRESSURE: 124 MMHG | RESPIRATION RATE: 11 BRPM | HEART RATE: 71 BPM | BODY MASS INDEX: 44.41 KG/M2 | OXYGEN SATURATION: 100 %

## 2021-05-13 DIAGNOSIS — D50.9 IRON DEFICIENCY ANEMIA, UNSPECIFIED IRON DEFICIENCY ANEMIA TYPE: ICD-10-CM

## 2021-05-13 DIAGNOSIS — D50.0 IRON DEFICIENCY ANEMIA DUE TO CHRONIC BLOOD LOSS: ICD-10-CM

## 2021-05-13 PROCEDURE — 99215 OFFICE O/P EST HI 40 MIN: CPT | Mod: PBBFAC | Performed by: INTERNAL MEDICINE

## 2021-05-13 PROCEDURE — 99999 PR PBB SHADOW E&M-EST. PATIENT-LVL V: CPT | Mod: PBBFAC,,, | Performed by: INTERNAL MEDICINE

## 2021-05-13 PROCEDURE — 99204 PR OFFICE/OUTPT VISIT, NEW, LEVL IV, 45-59 MIN: ICD-10-PCS | Mod: S$PBB,,, | Performed by: INTERNAL MEDICINE

## 2021-05-13 PROCEDURE — 99204 OFFICE O/P NEW MOD 45 MIN: CPT | Mod: S$PBB,,, | Performed by: INTERNAL MEDICINE

## 2021-05-13 PROCEDURE — 99999 PR PBB SHADOW E&M-EST. PATIENT-LVL V: ICD-10-PCS | Mod: PBBFAC,,, | Performed by: INTERNAL MEDICINE

## 2021-05-13 RX ORDER — HEPARIN 100 UNIT/ML
500 SYRINGE INTRAVENOUS
Status: CANCELLED | OUTPATIENT
Start: 2021-05-20

## 2021-05-13 RX ORDER — DIPHENHYDRAMINE HYDROCHLORIDE 50 MG/ML
50 INJECTION INTRAMUSCULAR; INTRAVENOUS ONCE AS NEEDED
Status: CANCELLED | OUTPATIENT
Start: 2021-05-20

## 2021-05-13 RX ORDER — METHYLPREDNISOLONE SOD SUCC 125 MG
125 VIAL (EA) INJECTION ONCE AS NEEDED
Status: CANCELLED | OUTPATIENT
Start: 2021-05-20

## 2021-05-13 RX ORDER — SODIUM CHLORIDE 0.9 % (FLUSH) 0.9 %
10 SYRINGE (ML) INJECTION
Status: CANCELLED | OUTPATIENT
Start: 2021-05-20

## 2021-05-13 RX ORDER — EPINEPHRINE 0.3 MG/.3ML
0.3 INJECTION SUBCUTANEOUS ONCE AS NEEDED
Status: CANCELLED | OUTPATIENT
Start: 2021-05-20

## 2021-05-14 ENCOUNTER — PATIENT MESSAGE (OUTPATIENT)
Dept: ENDOSCOPY | Facility: HOSPITAL | Age: 61
End: 2021-05-14

## 2021-05-14 DIAGNOSIS — Z01.818 PRE-OP TESTING: Primary | ICD-10-CM

## 2021-05-18 ENCOUNTER — TELEPHONE (OUTPATIENT)
Dept: FAMILY MEDICINE | Facility: CLINIC | Age: 61
End: 2021-05-18

## 2021-05-19 ENCOUNTER — TELEPHONE (OUTPATIENT)
Dept: FAMILY MEDICINE | Facility: CLINIC | Age: 61
End: 2021-05-19

## 2021-05-24 ENCOUNTER — INFUSION (OUTPATIENT)
Dept: INFUSION THERAPY | Facility: HOSPITAL | Age: 61
End: 2021-05-24
Attending: INTERNAL MEDICINE
Payer: MEDICAID

## 2021-05-24 VITALS
HEIGHT: 68 IN | TEMPERATURE: 98 F | OXYGEN SATURATION: 99 % | SYSTOLIC BLOOD PRESSURE: 133 MMHG | HEART RATE: 86 BPM | BODY MASS INDEX: 44.41 KG/M2 | WEIGHT: 293 LBS | DIASTOLIC BLOOD PRESSURE: 72 MMHG | RESPIRATION RATE: 18 BRPM

## 2021-05-24 DIAGNOSIS — D50.0 IRON DEFICIENCY ANEMIA DUE TO CHRONIC BLOOD LOSS: Primary | ICD-10-CM

## 2021-05-24 PROCEDURE — 63600175 PHARM REV CODE 636 W HCPCS: Mod: JG | Performed by: INTERNAL MEDICINE

## 2021-05-24 PROCEDURE — 96365 THER/PROPH/DIAG IV INF INIT: CPT

## 2021-05-24 PROCEDURE — 25000003 PHARM REV CODE 250: Performed by: INTERNAL MEDICINE

## 2021-05-24 RX ORDER — DIPHENHYDRAMINE HYDROCHLORIDE 50 MG/ML
50 INJECTION INTRAMUSCULAR; INTRAVENOUS ONCE AS NEEDED
Status: CANCELLED | OUTPATIENT
Start: 2021-05-24

## 2021-05-24 RX ORDER — METHYLPREDNISOLONE SOD SUCC 125 MG
125 VIAL (EA) INJECTION ONCE AS NEEDED
Status: CANCELLED | OUTPATIENT
Start: 2021-05-24

## 2021-05-24 RX ORDER — HEPARIN 100 UNIT/ML
500 SYRINGE INTRAVENOUS
Status: CANCELLED | OUTPATIENT
Start: 2021-05-24

## 2021-05-24 RX ORDER — EPINEPHRINE 0.3 MG/.3ML
0.3 INJECTION SUBCUTANEOUS ONCE AS NEEDED
Status: CANCELLED | OUTPATIENT
Start: 2021-05-24

## 2021-05-24 RX ORDER — SODIUM CHLORIDE 0.9 % (FLUSH) 0.9 %
10 SYRINGE (ML) INJECTION
Status: CANCELLED | OUTPATIENT
Start: 2021-05-24

## 2021-05-24 RX ADMIN — FERUMOXYTOL 510 MG: 510 INJECTION INTRAVENOUS at 11:05

## 2021-05-31 ENCOUNTER — INFUSION (OUTPATIENT)
Dept: INFUSION THERAPY | Facility: HOSPITAL | Age: 61
End: 2021-05-31
Attending: INTERNAL MEDICINE
Payer: MEDICAID

## 2021-05-31 VITALS
SYSTOLIC BLOOD PRESSURE: 141 MMHG | OXYGEN SATURATION: 95 % | RESPIRATION RATE: 20 BRPM | DIASTOLIC BLOOD PRESSURE: 61 MMHG | HEART RATE: 76 BPM | TEMPERATURE: 97 F

## 2021-05-31 DIAGNOSIS — D50.0 IRON DEFICIENCY ANEMIA DUE TO CHRONIC BLOOD LOSS: Primary | ICD-10-CM

## 2021-05-31 PROCEDURE — 96365 THER/PROPH/DIAG IV INF INIT: CPT

## 2021-05-31 PROCEDURE — 63600175 PHARM REV CODE 636 W HCPCS: Mod: JG | Performed by: INTERNAL MEDICINE

## 2021-05-31 PROCEDURE — 25000003 PHARM REV CODE 250: Performed by: INTERNAL MEDICINE

## 2021-05-31 RX ORDER — EPINEPHRINE 0.3 MG/.3ML
0.3 INJECTION SUBCUTANEOUS ONCE AS NEEDED
Status: CANCELLED | OUTPATIENT
Start: 2021-05-31

## 2021-05-31 RX ORDER — DIPHENHYDRAMINE HYDROCHLORIDE 50 MG/ML
50 INJECTION INTRAMUSCULAR; INTRAVENOUS ONCE AS NEEDED
Status: CANCELLED | OUTPATIENT
Start: 2021-05-31

## 2021-05-31 RX ORDER — METHYLPREDNISOLONE SOD SUCC 125 MG
125 VIAL (EA) INJECTION ONCE AS NEEDED
Status: CANCELLED | OUTPATIENT
Start: 2021-05-31

## 2021-05-31 RX ORDER — HEPARIN 100 UNIT/ML
500 SYRINGE INTRAVENOUS
Status: CANCELLED | OUTPATIENT
Start: 2021-05-31

## 2021-05-31 RX ORDER — SODIUM CHLORIDE 0.9 % (FLUSH) 0.9 %
10 SYRINGE (ML) INJECTION
Status: CANCELLED | OUTPATIENT
Start: 2021-05-31

## 2021-05-31 RX ADMIN — FERUMOXYTOL 510 MG: 510 INJECTION INTRAVENOUS at 11:05

## 2021-06-01 ENCOUNTER — PATIENT MESSAGE (OUTPATIENT)
Dept: ENDOSCOPY | Facility: HOSPITAL | Age: 61
End: 2021-06-01

## 2021-06-09 ENCOUNTER — LAB VISIT (OUTPATIENT)
Dept: LAB | Facility: HOSPITAL | Age: 61
End: 2021-06-09
Attending: FAMILY MEDICINE
Payer: MEDICAID

## 2021-06-09 DIAGNOSIS — E11.49 TYPE II DIABETES MELLITUS WITH NEUROLOGICAL MANIFESTATIONS: Chronic | ICD-10-CM

## 2021-06-09 PROCEDURE — 83036 HEMOGLOBIN GLYCOSYLATED A1C: CPT | Performed by: FAMILY MEDICINE

## 2021-06-09 PROCEDURE — 36415 COLL VENOUS BLD VENIPUNCTURE: CPT | Mod: PO | Performed by: FAMILY MEDICINE

## 2021-06-10 LAB
ESTIMATED AVG GLUCOSE: 105 MG/DL (ref 68–131)
HBA1C MFR BLD: 5.3 % (ref 4–5.6)

## 2021-06-11 ENCOUNTER — OFFICE VISIT (OUTPATIENT)
Dept: CARDIOLOGY | Facility: CLINIC | Age: 61
End: 2021-06-11
Payer: MEDICAID

## 2021-06-11 VITALS
WEIGHT: 293 LBS | BODY MASS INDEX: 54.33 KG/M2 | OXYGEN SATURATION: 96 % | DIASTOLIC BLOOD PRESSURE: 70 MMHG | HEART RATE: 108 BPM | SYSTOLIC BLOOD PRESSURE: 130 MMHG

## 2021-06-11 DIAGNOSIS — R55 SYNCOPE, UNSPECIFIED SYNCOPE TYPE: ICD-10-CM

## 2021-06-11 DIAGNOSIS — R06.09 DOE (DYSPNEA ON EXERTION): ICD-10-CM

## 2021-06-11 DIAGNOSIS — E78.5 HYPERLIPIDEMIA, UNSPECIFIED HYPERLIPIDEMIA TYPE: ICD-10-CM

## 2021-06-11 DIAGNOSIS — E78.2 COMBINED HYPERLIPIDEMIA ASSOCIATED WITH TYPE 2 DIABETES MELLITUS: ICD-10-CM

## 2021-06-11 DIAGNOSIS — E11.69 COMBINED HYPERLIPIDEMIA ASSOCIATED WITH TYPE 2 DIABETES MELLITUS: ICD-10-CM

## 2021-06-11 DIAGNOSIS — I73.9 CLAUDICATION OF BOTH LOWER EXTREMITIES: Primary | ICD-10-CM

## 2021-06-11 DIAGNOSIS — R00.0 TACHYCARDIA: ICD-10-CM

## 2021-06-11 DIAGNOSIS — R01.1 SYSTOLIC MURMUR: ICD-10-CM

## 2021-06-11 PROCEDURE — 99214 OFFICE O/P EST MOD 30 MIN: CPT | Mod: PBBFAC,PO | Performed by: INTERNAL MEDICINE

## 2021-06-11 PROCEDURE — 99214 PR OFFICE/OUTPT VISIT, EST, LEVL IV, 30-39 MIN: ICD-10-PCS | Mod: S$PBB,,, | Performed by: INTERNAL MEDICINE

## 2021-06-11 PROCEDURE — 99999 PR PBB SHADOW E&M-EST. PATIENT-LVL IV: CPT | Mod: PBBFAC,,, | Performed by: INTERNAL MEDICINE

## 2021-06-11 PROCEDURE — 99999 PR PBB SHADOW E&M-EST. PATIENT-LVL IV: ICD-10-PCS | Mod: PBBFAC,,, | Performed by: INTERNAL MEDICINE

## 2021-06-11 PROCEDURE — 99214 OFFICE O/P EST MOD 30 MIN: CPT | Mod: S$PBB,,, | Performed by: INTERNAL MEDICINE

## 2021-06-14 ENCOUNTER — PATIENT MESSAGE (OUTPATIENT)
Dept: FAMILY MEDICINE | Facility: CLINIC | Age: 61
End: 2021-06-14

## 2021-06-14 DIAGNOSIS — E11.65 TYPE 2 DIABETES MELLITUS WITH HYPERGLYCEMIA, WITHOUT LONG-TERM CURRENT USE OF INSULIN: ICD-10-CM

## 2021-06-14 DIAGNOSIS — E11.40 TYPE 2 DIABETES MELLITUS WITH DIABETIC NEUROPATHY, WITHOUT LONG-TERM CURRENT USE OF INSULIN: ICD-10-CM

## 2021-06-15 RX ORDER — INSULIN PUMP SYRINGE, 3 ML
EACH MISCELLANEOUS
Qty: 100 EACH | Refills: 0 | Status: SHIPPED | OUTPATIENT
Start: 2021-06-15 | End: 2022-03-20 | Stop reason: SDUPTHER

## 2021-06-15 RX ORDER — LANCETS 33 GAUGE
EACH MISCELLANEOUS
Qty: 100 EACH | Refills: 99 | Status: SHIPPED | OUTPATIENT
Start: 2021-06-15 | End: 2022-07-15

## 2021-06-15 RX ORDER — INSULIN PUMP SYRINGE, 3 ML
EACH MISCELLANEOUS
Qty: 1 EACH | Refills: 0 | Status: SHIPPED | OUTPATIENT
Start: 2021-06-15 | End: 2022-03-20 | Stop reason: SDUPTHER

## 2021-06-15 RX ORDER — LANCETS
EACH MISCELLANEOUS
Qty: 100 EACH | Refills: 99 | Status: SHIPPED | OUTPATIENT
Start: 2021-06-15 | End: 2022-07-15

## 2021-06-18 ENCOUNTER — TELEPHONE (OUTPATIENT)
Dept: PULMONOLOGY | Facility: CLINIC | Age: 61
End: 2021-06-18

## 2021-06-18 DIAGNOSIS — R06.02 SOB (SHORTNESS OF BREATH): Primary | ICD-10-CM

## 2021-06-22 ENCOUNTER — OFFICE VISIT (OUTPATIENT)
Dept: PULMONOLOGY | Facility: CLINIC | Age: 61
End: 2021-06-22
Payer: MEDICAID

## 2021-06-22 ENCOUNTER — HOSPITAL ENCOUNTER (OUTPATIENT)
Dept: RADIOLOGY | Facility: HOSPITAL | Age: 61
Discharge: HOME | End: 2021-06-22
Attending: INTERNAL MEDICINE
Payer: MEDICAID

## 2021-06-22 VITALS
WEIGHT: 293 LBS | HEIGHT: 68 IN | HEART RATE: 87 BPM | SYSTOLIC BLOOD PRESSURE: 134 MMHG | BODY MASS INDEX: 44.41 KG/M2 | RESPIRATION RATE: 20 BRPM | DIASTOLIC BLOOD PRESSURE: 78 MMHG

## 2021-06-22 DIAGNOSIS — G47.34 NOCTURNAL HYPOXEMIA: ICD-10-CM

## 2021-06-22 DIAGNOSIS — U07.1 COVID-19: ICD-10-CM

## 2021-06-22 DIAGNOSIS — R06.02 SOB (SHORTNESS OF BREATH): ICD-10-CM

## 2021-06-22 DIAGNOSIS — Z09 HOSPITAL DISCHARGE FOLLOW-UP: ICD-10-CM

## 2021-06-22 DIAGNOSIS — R09.02 EXERCISE HYPOXEMIA: Primary | ICD-10-CM

## 2021-06-22 DIAGNOSIS — Z99.81 OXYGEN DEPENDENT: ICD-10-CM

## 2021-06-22 DIAGNOSIS — J45.998 POST VIRAL ASTHMA: ICD-10-CM

## 2021-06-22 PROCEDURE — 99205 PR OFFICE/OUTPT VISIT, NEW, LEVL V, 60-74 MIN: ICD-10-PCS | Mod: S$PBB,,, | Performed by: INTERNAL MEDICINE

## 2021-06-22 PROCEDURE — 71046 XR CHEST PA AND LATERAL: ICD-10-PCS | Mod: 26,,, | Performed by: RADIOLOGY

## 2021-06-22 PROCEDURE — 99205 OFFICE O/P NEW HI 60 MIN: CPT | Mod: S$PBB,,, | Performed by: INTERNAL MEDICINE

## 2021-06-22 PROCEDURE — 99213 OFFICE O/P EST LOW 20 MIN: CPT | Mod: PBBFAC,25 | Performed by: INTERNAL MEDICINE

## 2021-06-22 PROCEDURE — 99999 PR PBB SHADOW E&M-EST. PATIENT-LVL III: CPT | Mod: PBBFAC,,, | Performed by: INTERNAL MEDICINE

## 2021-06-22 PROCEDURE — 71046 X-RAY EXAM CHEST 2 VIEWS: CPT | Mod: 26,,, | Performed by: RADIOLOGY

## 2021-06-22 PROCEDURE — 99999 PR PBB SHADOW E&M-EST. PATIENT-LVL III: ICD-10-PCS | Mod: PBBFAC,,, | Performed by: INTERNAL MEDICINE

## 2021-06-22 PROCEDURE — 71046 X-RAY EXAM CHEST 2 VIEWS: CPT | Mod: TC

## 2021-06-22 RX ORDER — INHALER, ASSIST DEVICES
SPACER (EA) MISCELLANEOUS
Qty: 1 DEVICE | Status: SHIPPED | OUTPATIENT
Start: 2021-06-22

## 2021-06-22 RX ORDER — FLUTICASONE PROPIONATE AND SALMETEROL XINAFOATE 115; 21 UG/1; UG/1
2 AEROSOL, METERED RESPIRATORY (INHALATION) 2 TIMES DAILY
Qty: 1 INHALER | Refills: 11 | Status: SHIPPED | OUTPATIENT
Start: 2021-06-22 | End: 2022-07-15

## 2021-06-22 RX ORDER — MONTELUKAST SODIUM 10 MG/1
10 TABLET ORAL NIGHTLY
Qty: 30 TABLET | Refills: 12 | Status: SHIPPED | OUTPATIENT
Start: 2021-06-22 | End: 2022-07-15

## 2021-06-23 ENCOUNTER — TELEPHONE (OUTPATIENT)
Dept: FAMILY MEDICINE | Facility: CLINIC | Age: 61
End: 2021-06-23

## 2021-06-28 DIAGNOSIS — E11.40 TYPE 2 DIABETES MELLITUS WITH DIABETIC NEUROPATHY, WITHOUT LONG-TERM CURRENT USE OF INSULIN: ICD-10-CM

## 2021-06-28 DIAGNOSIS — E11.65 TYPE 2 DIABETES MELLITUS WITH HYPERGLYCEMIA, WITHOUT LONG-TERM CURRENT USE OF INSULIN: Chronic | ICD-10-CM

## 2021-06-28 RX ORDER — SEMAGLUTIDE 1.34 MG/ML
1 INJECTION, SOLUTION SUBCUTANEOUS
Qty: 2 PEN | Refills: 11 | Status: CANCELLED | OUTPATIENT
Start: 2021-06-28 | End: 2022-06-28

## 2021-07-08 ENCOUNTER — ANESTHESIA EVENT (OUTPATIENT)
Dept: ENDOSCOPY | Facility: HOSPITAL | Age: 61
End: 2021-07-08
Payer: MEDICAID

## 2021-07-08 ENCOUNTER — CLINICAL SUPPORT (OUTPATIENT)
Dept: PULMONOLOGY | Facility: CLINIC | Age: 61
End: 2021-07-08
Payer: MEDICAID

## 2021-07-08 ENCOUNTER — TELEPHONE (OUTPATIENT)
Dept: PULMONOLOGY | Facility: CLINIC | Age: 61
End: 2021-07-08

## 2021-07-08 ENCOUNTER — ANESTHESIA (OUTPATIENT)
Dept: ENDOSCOPY | Facility: HOSPITAL | Age: 61
End: 2021-07-08
Payer: MEDICAID

## 2021-07-08 ENCOUNTER — HOSPITAL ENCOUNTER (OUTPATIENT)
Facility: HOSPITAL | Age: 61
Discharge: HOME | End: 2021-07-08
Attending: FAMILY MEDICINE | Admitting: FAMILY MEDICINE
Payer: MEDICAID

## 2021-07-08 DIAGNOSIS — K29.30 CHRONIC SUPERFICIAL GASTRITIS WITHOUT BLEEDING: ICD-10-CM

## 2021-07-08 DIAGNOSIS — J45.998 POST VIRAL ASTHMA: ICD-10-CM

## 2021-07-08 DIAGNOSIS — G47.34 NOCTURNAL HYPOXEMIA: ICD-10-CM

## 2021-07-08 DIAGNOSIS — K31.89 GASTRIC NODULE: ICD-10-CM

## 2021-07-08 DIAGNOSIS — D50.0 IRON DEFICIENCY ANEMIA DUE TO CHRONIC BLOOD LOSS: Primary | ICD-10-CM

## 2021-07-08 DIAGNOSIS — J45.998 POST VIRAL ASTHMA: Primary | ICD-10-CM

## 2021-07-08 LAB — POCT GLUCOSE: 88 MG/DL (ref 70–110)

## 2021-07-08 PROCEDURE — 63600175 PHARM REV CODE 636 W HCPCS: Performed by: NURSE ANESTHETIST, CERTIFIED REGISTERED

## 2021-07-08 PROCEDURE — 25000003 PHARM REV CODE 250: Performed by: NURSE ANESTHETIST, CERTIFIED REGISTERED

## 2021-07-08 PROCEDURE — 00731 ANES UPR GI NDSC PX NOS: CPT | Performed by: FAMILY MEDICINE

## 2021-07-08 PROCEDURE — 37000009 HC ANESTHESIA EA ADD 15 MINS: Performed by: FAMILY MEDICINE

## 2021-07-08 PROCEDURE — 43239 PR EGD, FLEX, W/BIOPSY, SGL/MULTI: ICD-10-PCS | Mod: ,,, | Performed by: FAMILY MEDICINE

## 2021-07-08 PROCEDURE — 88305 TISSUE EXAM BY PATHOLOGIST: ICD-10-PCS | Mod: 26,,, | Performed by: STUDENT IN AN ORGANIZED HEALTH CARE EDUCATION/TRAINING PROGRAM

## 2021-07-08 PROCEDURE — 43239 EGD BIOPSY SINGLE/MULTIPLE: CPT | Performed by: FAMILY MEDICINE

## 2021-07-08 PROCEDURE — 43239 EGD BIOPSY SINGLE/MULTIPLE: CPT | Mod: ,,, | Performed by: FAMILY MEDICINE

## 2021-07-08 PROCEDURE — 99999 PR PBB SHADOW E&M-EST. PATIENT-LVL I: CPT | Mod: PBBFAC,,,

## 2021-07-08 PROCEDURE — 88305 TISSUE EXAM BY PATHOLOGIST: CPT | Mod: 26,,, | Performed by: STUDENT IN AN ORGANIZED HEALTH CARE EDUCATION/TRAINING PROGRAM

## 2021-07-08 PROCEDURE — 27201012 HC FORCEPS, HOT/COLD, DISP: Performed by: FAMILY MEDICINE

## 2021-07-08 PROCEDURE — 99211 OFF/OP EST MAY X REQ PHY/QHP: CPT | Mod: PBBFAC

## 2021-07-08 PROCEDURE — 99999 PR PBB SHADOW E&M-EST. PATIENT-LVL I: ICD-10-PCS | Mod: PBBFAC,,,

## 2021-07-08 PROCEDURE — 88305 TISSUE EXAM BY PATHOLOGIST: CPT | Mod: 59 | Performed by: STUDENT IN AN ORGANIZED HEALTH CARE EDUCATION/TRAINING PROGRAM

## 2021-07-08 PROCEDURE — 88342 CHG IMMUNOCYTOCHEMISTRY: ICD-10-PCS | Mod: 26,,, | Performed by: STUDENT IN AN ORGANIZED HEALTH CARE EDUCATION/TRAINING PROGRAM

## 2021-07-08 PROCEDURE — 88342 IMHCHEM/IMCYTCHM 1ST ANTB: CPT | Mod: 26,,, | Performed by: STUDENT IN AN ORGANIZED HEALTH CARE EDUCATION/TRAINING PROGRAM

## 2021-07-08 PROCEDURE — 37000008 HC ANESTHESIA 1ST 15 MINUTES: Performed by: FAMILY MEDICINE

## 2021-07-08 PROCEDURE — 88342 IMHCHEM/IMCYTCHM 1ST ANTB: CPT | Performed by: STUDENT IN AN ORGANIZED HEALTH CARE EDUCATION/TRAINING PROGRAM

## 2021-07-08 RX ORDER — PROPOFOL 10 MG/ML
VIAL (ML) INTRAVENOUS
Status: DISCONTINUED | OUTPATIENT
Start: 2021-07-08 | End: 2021-07-08

## 2021-07-08 RX ORDER — SODIUM CHLORIDE, SODIUM LACTATE, POTASSIUM CHLORIDE, CALCIUM CHLORIDE 600; 310; 30; 20 MG/100ML; MG/100ML; MG/100ML; MG/100ML
INJECTION, SOLUTION INTRAVENOUS CONTINUOUS
Status: DISCONTINUED | OUTPATIENT
Start: 2021-07-08 | End: 2021-07-08 | Stop reason: HOSPADM

## 2021-07-08 RX ORDER — SODIUM CHLORIDE, SODIUM LACTATE, POTASSIUM CHLORIDE, CALCIUM CHLORIDE 600; 310; 30; 20 MG/100ML; MG/100ML; MG/100ML; MG/100ML
INJECTION, SOLUTION INTRAVENOUS CONTINUOUS PRN
Status: DISCONTINUED | OUTPATIENT
Start: 2021-07-08 | End: 2021-07-08

## 2021-07-08 RX ORDER — LIDOCAINE HYDROCHLORIDE 10 MG/ML
INJECTION, SOLUTION EPIDURAL; INFILTRATION; INTRACAUDAL; PERINEURAL
Status: DISCONTINUED | OUTPATIENT
Start: 2021-07-08 | End: 2021-07-08

## 2021-07-08 RX ADMIN — PROPOFOL 40 MG: 10 INJECTION, EMULSION INTRAVENOUS at 01:07

## 2021-07-08 RX ADMIN — PROPOFOL 30 MG: 10 INJECTION, EMULSION INTRAVENOUS at 01:07

## 2021-07-08 RX ADMIN — PROPOFOL 50 MG: 10 INJECTION, EMULSION INTRAVENOUS at 01:07

## 2021-07-08 RX ADMIN — PROPOFOL 80 MG: 10 INJECTION, EMULSION INTRAVENOUS at 01:07

## 2021-07-08 RX ADMIN — SODIUM CHLORIDE, SODIUM LACTATE, POTASSIUM CHLORIDE, AND CALCIUM CHLORIDE: 600; 310; 30; 20 INJECTION, SOLUTION INTRAVENOUS at 01:07

## 2021-07-08 RX ADMIN — LIDOCAINE HYDROCHLORIDE 50 MG: 10 INJECTION, SOLUTION EPIDURAL; INFILTRATION; INTRACAUDAL; PERINEURAL at 01:07

## 2021-07-13 ENCOUNTER — TELEPHONE (OUTPATIENT)
Dept: FAMILY MEDICINE | Facility: CLINIC | Age: 61
End: 2021-07-13

## 2021-07-13 ENCOUNTER — TELEPHONE (OUTPATIENT)
Dept: PAIN MEDICINE | Facility: CLINIC | Age: 61
End: 2021-07-13

## 2021-07-13 ENCOUNTER — OFFICE VISIT (OUTPATIENT)
Dept: FAMILY MEDICINE | Facility: CLINIC | Age: 61
End: 2021-07-13
Payer: MEDICAID

## 2021-07-13 VITALS
HEIGHT: 68 IN | BODY MASS INDEX: 44.41 KG/M2 | RESPIRATION RATE: 20 BRPM | HEART RATE: 85 BPM | DIASTOLIC BLOOD PRESSURE: 75 MMHG | SYSTOLIC BLOOD PRESSURE: 133 MMHG | OXYGEN SATURATION: 99 % | TEMPERATURE: 98 F | WEIGHT: 293 LBS

## 2021-07-13 VITALS
HEART RATE: 86 BPM | WEIGHT: 293 LBS | SYSTOLIC BLOOD PRESSURE: 105 MMHG | RESPIRATION RATE: 16 BRPM | HEIGHT: 68 IN | TEMPERATURE: 98 F | DIASTOLIC BLOOD PRESSURE: 73 MMHG | BODY MASS INDEX: 44.41 KG/M2

## 2021-07-13 DIAGNOSIS — Z00.00 WELL ADULT EXAM: Primary | ICD-10-CM

## 2021-07-13 DIAGNOSIS — M54.42 CHRONIC RIGHT-SIDED LOW BACK PAIN WITH BILATERAL SCIATICA: Chronic | ICD-10-CM

## 2021-07-13 DIAGNOSIS — Z13.6 ENCOUNTER FOR LIPID SCREENING FOR CARDIOVASCULAR DISEASE: ICD-10-CM

## 2021-07-13 DIAGNOSIS — E11.65 TYPE 2 DIABETES MELLITUS WITH HYPERGLYCEMIA, WITHOUT LONG-TERM CURRENT USE OF INSULIN: Chronic | ICD-10-CM

## 2021-07-13 DIAGNOSIS — M54.41 CHRONIC RIGHT-SIDED LOW BACK PAIN WITH BILATERAL SCIATICA: Chronic | ICD-10-CM

## 2021-07-13 DIAGNOSIS — K59.00 CONSTIPATION, UNSPECIFIED CONSTIPATION TYPE: ICD-10-CM

## 2021-07-13 DIAGNOSIS — G89.29 CHRONIC RIGHT-SIDED LOW BACK PAIN WITH BILATERAL SCIATICA: Chronic | ICD-10-CM

## 2021-07-13 DIAGNOSIS — Z79.899 ENCOUNTER FOR LONG-TERM (CURRENT) USE OF MEDICATIONS: ICD-10-CM

## 2021-07-13 DIAGNOSIS — Z13.220 ENCOUNTER FOR LIPID SCREENING FOR CARDIOVASCULAR DISEASE: ICD-10-CM

## 2021-07-13 DIAGNOSIS — E11.59 HYPERTENSION ASSOCIATED WITH DIABETES: Chronic | ICD-10-CM

## 2021-07-13 DIAGNOSIS — I15.2 HYPERTENSION ASSOCIATED WITH DIABETES: Chronic | ICD-10-CM

## 2021-07-13 PROBLEM — J30.2 SEASONAL ALLERGIC RHINITIS: Status: RESOLVED | Noted: 2020-10-03 | Resolved: 2021-07-13

## 2021-07-13 PROBLEM — R06.00 DYSPNEA: Status: RESOLVED | Noted: 2020-10-03 | Resolved: 2021-07-13

## 2021-07-13 PROBLEM — R51.9 HEADACHE: Status: RESOLVED | Noted: 2020-10-03 | Resolved: 2021-07-13

## 2021-07-13 PROCEDURE — 99396 PREV VISIT EST AGE 40-64: CPT | Mod: S$PBB,,, | Performed by: FAMILY MEDICINE

## 2021-07-13 PROCEDURE — 99999 PR PBB SHADOW E&M-EST. PATIENT-LVL V: ICD-10-PCS | Mod: PBBFAC,,, | Performed by: FAMILY MEDICINE

## 2021-07-13 PROCEDURE — 99215 OFFICE O/P EST HI 40 MIN: CPT | Mod: PBBFAC,PO | Performed by: FAMILY MEDICINE

## 2021-07-13 PROCEDURE — 99999 PR PBB SHADOW E&M-EST. PATIENT-LVL V: CPT | Mod: PBBFAC,,, | Performed by: FAMILY MEDICINE

## 2021-07-13 PROCEDURE — 99396 PR PREVENTIVE VISIT,EST,40-64: ICD-10-PCS | Mod: S$PBB,,, | Performed by: FAMILY MEDICINE

## 2021-07-14 LAB
FINAL PATHOLOGIC DIAGNOSIS: NORMAL
GROSS: NORMAL
Lab: NORMAL
MICROSCOPIC EXAM: NORMAL

## 2021-07-15 ENCOUNTER — TELEPHONE (OUTPATIENT)
Dept: FAMILY MEDICINE | Facility: CLINIC | Age: 61
End: 2021-07-15

## 2021-07-15 DIAGNOSIS — A04.8 H. PYLORI INFECTION: Primary | ICD-10-CM

## 2021-07-15 RX ORDER — METRONIDAZOLE 500 MG/1
500 TABLET ORAL EVERY 8 HOURS
Qty: 42 TABLET | Refills: 0 | Status: SHIPPED | OUTPATIENT
Start: 2021-07-15 | End: 2021-07-30

## 2021-07-15 RX ORDER — CLARITHROMYCIN 500 MG/1
500 TABLET, FILM COATED ORAL 2 TIMES DAILY
Qty: 28 TABLET | Refills: 0 | Status: SHIPPED | OUTPATIENT
Start: 2021-07-15 | End: 2021-07-30

## 2021-07-15 RX ORDER — OMEPRAZOLE 20 MG/1
20 CAPSULE, DELAYED RELEASE ORAL 2 TIMES DAILY
Qty: 28 CAPSULE | Refills: 0 | Status: SHIPPED | OUTPATIENT
Start: 2021-07-15 | End: 2021-10-22 | Stop reason: SDUPTHER

## 2021-07-17 PROBLEM — K59.00 CONSTIPATION: Chronic | Status: ACTIVE | Noted: 2021-07-13

## 2021-07-19 ENCOUNTER — TELEPHONE (OUTPATIENT)
Dept: FAMILY MEDICINE | Facility: CLINIC | Age: 61
End: 2021-07-19

## 2021-07-29 ENCOUNTER — PATIENT OUTREACH (OUTPATIENT)
Dept: ADMINISTRATIVE | Facility: OTHER | Age: 61
End: 2021-07-29

## 2021-07-30 ENCOUNTER — TELEPHONE (OUTPATIENT)
Dept: PAIN MEDICINE | Facility: CLINIC | Age: 61
End: 2021-07-30

## 2021-07-30 ENCOUNTER — OFFICE VISIT (OUTPATIENT)
Dept: PAIN MEDICINE | Facility: CLINIC | Age: 61
End: 2021-07-30
Payer: MEDICAID

## 2021-07-30 VITALS
WEIGHT: 293 LBS | SYSTOLIC BLOOD PRESSURE: 139 MMHG | DIASTOLIC BLOOD PRESSURE: 88 MMHG | BODY MASS INDEX: 53.2 KG/M2 | HEART RATE: 79 BPM

## 2021-07-30 DIAGNOSIS — G89.29 CHRONIC RIGHT-SIDED LOW BACK PAIN WITH BILATERAL SCIATICA: Chronic | ICD-10-CM

## 2021-07-30 DIAGNOSIS — M54.16 LUMBAR RADICULOPATHY, CHRONIC: Primary | ICD-10-CM

## 2021-07-30 DIAGNOSIS — M54.42 CHRONIC RIGHT-SIDED LOW BACK PAIN WITH BILATERAL SCIATICA: Chronic | ICD-10-CM

## 2021-07-30 DIAGNOSIS — Z01.818 PRE-OP TESTING: ICD-10-CM

## 2021-07-30 DIAGNOSIS — M54.41 CHRONIC RIGHT-SIDED LOW BACK PAIN WITH BILATERAL SCIATICA: Chronic | ICD-10-CM

## 2021-07-30 DIAGNOSIS — M54.12 CERVICAL RADICULOPATHY: ICD-10-CM

## 2021-07-30 PROCEDURE — 99215 OFFICE O/P EST HI 40 MIN: CPT | Mod: PBBFAC | Performed by: ANESTHESIOLOGY

## 2021-07-30 PROCEDURE — 99999 PR PBB SHADOW E&M-EST. PATIENT-LVL V: ICD-10-PCS | Mod: PBBFAC,,, | Performed by: ANESTHESIOLOGY

## 2021-07-30 PROCEDURE — 99214 PR OFFICE/OUTPT VISIT, EST, LEVL IV, 30-39 MIN: ICD-10-PCS | Mod: S$PBB,,, | Performed by: ANESTHESIOLOGY

## 2021-07-30 PROCEDURE — 99214 OFFICE O/P EST MOD 30 MIN: CPT | Mod: S$PBB,,, | Performed by: ANESTHESIOLOGY

## 2021-07-30 PROCEDURE — 99999 PR PBB SHADOW E&M-EST. PATIENT-LVL V: CPT | Mod: PBBFAC,,, | Performed by: ANESTHESIOLOGY

## 2021-07-30 RX ORDER — TOPIRAMATE 25 MG/1
TABLET ORAL
Qty: 147 TABLET | Refills: 0 | Status: SHIPPED | OUTPATIENT
Start: 2021-07-30 | End: 2021-10-22 | Stop reason: SDUPTHER

## 2021-08-05 DIAGNOSIS — I15.2 HYPERTENSION ASSOCIATED WITH DIABETES: Chronic | ICD-10-CM

## 2021-08-05 DIAGNOSIS — E78.5 HYPERLIPIDEMIA, UNSPECIFIED HYPERLIPIDEMIA TYPE: ICD-10-CM

## 2021-08-05 DIAGNOSIS — Z76.0 MEDICATION REFILL: ICD-10-CM

## 2021-08-05 DIAGNOSIS — E11.59 HYPERTENSION ASSOCIATED WITH DIABETES: Chronic | ICD-10-CM

## 2021-08-05 RX ORDER — METFORMIN HYDROCHLORIDE 1000 MG/1
1000 TABLET ORAL 2 TIMES DAILY WITH MEALS
Qty: 180 TABLET | Refills: 4 | Status: SHIPPED | OUTPATIENT
Start: 2021-08-05 | End: 2021-08-06 | Stop reason: SDUPTHER

## 2021-08-05 RX ORDER — PRAVASTATIN SODIUM 80 MG/1
80 TABLET ORAL DAILY
Qty: 90 TABLET | Refills: 4 | Status: SHIPPED | OUTPATIENT
Start: 2021-08-05 | End: 2021-08-06 | Stop reason: SDUPTHER

## 2021-08-06 DIAGNOSIS — E78.5 HYPERLIPIDEMIA, UNSPECIFIED HYPERLIPIDEMIA TYPE: ICD-10-CM

## 2021-08-06 DIAGNOSIS — Z76.0 MEDICATION REFILL: ICD-10-CM

## 2021-08-06 DIAGNOSIS — I15.2 HYPERTENSION ASSOCIATED WITH DIABETES: Chronic | ICD-10-CM

## 2021-08-06 DIAGNOSIS — E11.59 HYPERTENSION ASSOCIATED WITH DIABETES: Chronic | ICD-10-CM

## 2021-08-06 RX ORDER — METFORMIN HYDROCHLORIDE 1000 MG/1
1000 TABLET ORAL 2 TIMES DAILY WITH MEALS
Qty: 180 TABLET | Refills: 4 | Status: SHIPPED | OUTPATIENT
Start: 2021-08-06 | End: 2022-07-20

## 2021-08-06 RX ORDER — PRAVASTATIN SODIUM 80 MG/1
80 TABLET ORAL DAILY
Qty: 90 TABLET | Refills: 4 | Status: SHIPPED | OUTPATIENT
Start: 2021-08-06 | End: 2023-01-13 | Stop reason: SDUPTHER

## 2021-08-12 ENCOUNTER — LAB VISIT (OUTPATIENT)
Dept: LAB | Facility: HOSPITAL | Age: 61
End: 2021-08-12
Attending: INTERNAL MEDICINE
Payer: MEDICAID

## 2021-08-12 ENCOUNTER — PATIENT MESSAGE (OUTPATIENT)
Dept: HEMATOLOGY/ONCOLOGY | Facility: CLINIC | Age: 61
End: 2021-08-12

## 2021-08-12 DIAGNOSIS — D50.9 IRON DEFICIENCY ANEMIA, UNSPECIFIED IRON DEFICIENCY ANEMIA TYPE: ICD-10-CM

## 2021-08-12 LAB
BASOPHILS # BLD AUTO: 0.02 K/UL (ref 0–0.2)
BASOPHILS NFR BLD: 0.3 % (ref 0–1.9)
DIFFERENTIAL METHOD: ABNORMAL
EOSINOPHIL # BLD AUTO: 0.1 K/UL (ref 0–0.5)
EOSINOPHIL NFR BLD: 1.7 % (ref 0–8)
ERYTHROCYTE [DISTWIDTH] IN BLOOD BY AUTOMATED COUNT: 15.3 % (ref 11.5–14.5)
FERRITIN SERPL-MCNC: 99 NG/ML (ref 20–300)
HCT VFR BLD AUTO: 41.2 % (ref 37–48.5)
HGB BLD-MCNC: 13.1 G/DL (ref 12–16)
IMM GRANULOCYTES # BLD AUTO: 0.01 K/UL (ref 0–0.04)
IMM GRANULOCYTES NFR BLD AUTO: 0.2 % (ref 0–0.5)
IRON SERPL-MCNC: 103 UG/DL (ref 30–160)
LYMPHOCYTES # BLD AUTO: 2.7 K/UL (ref 1–4.8)
LYMPHOCYTES NFR BLD: 44.8 % (ref 18–48)
MCH RBC QN AUTO: 27.9 PG (ref 27–31)
MCHC RBC AUTO-ENTMCNC: 31.8 G/DL (ref 32–36)
MCV RBC AUTO: 88 FL (ref 82–98)
MONOCYTES # BLD AUTO: 0.6 K/UL (ref 0.3–1)
MONOCYTES NFR BLD: 9.1 % (ref 4–15)
NEUTROPHILS # BLD AUTO: 2.7 K/UL (ref 1.8–7.7)
NEUTROPHILS NFR BLD: 43.9 % (ref 38–73)
NRBC BLD-RTO: 0 /100 WBC
PLATELET # BLD AUTO: 383 K/UL (ref 150–450)
PMV BLD AUTO: 10.7 FL (ref 9.2–12.9)
RBC # BLD AUTO: 4.69 M/UL (ref 4–5.4)
SATURATED IRON: 30 % (ref 20–50)
TOTAL IRON BINDING CAPACITY: 348 UG/DL (ref 250–450)
TRANSFERRIN SERPL-MCNC: 235 MG/DL (ref 200–375)
WBC # BLD AUTO: 6.03 K/UL (ref 3.9–12.7)

## 2021-08-12 PROCEDURE — 85025 COMPLETE CBC W/AUTO DIFF WBC: CPT | Performed by: INTERNAL MEDICINE

## 2021-08-12 PROCEDURE — 82728 ASSAY OF FERRITIN: CPT | Performed by: INTERNAL MEDICINE

## 2021-08-12 PROCEDURE — 83540 ASSAY OF IRON: CPT | Performed by: INTERNAL MEDICINE

## 2021-08-12 PROCEDURE — 36415 COLL VENOUS BLD VENIPUNCTURE: CPT | Mod: PO | Performed by: INTERNAL MEDICINE

## 2021-08-27 ENCOUNTER — OFFICE VISIT (OUTPATIENT)
Dept: HEMATOLOGY/ONCOLOGY | Facility: CLINIC | Age: 61
End: 2021-08-27
Payer: MEDICAID

## 2021-08-27 DIAGNOSIS — D50.0 IRON DEFICIENCY ANEMIA DUE TO CHRONIC BLOOD LOSS: ICD-10-CM

## 2021-08-27 PROCEDURE — 99214 PR OFFICE/OUTPT VISIT, EST, LEVL IV, 30-39 MIN: ICD-10-PCS | Mod: 95,,, | Performed by: NURSE PRACTITIONER

## 2021-08-27 PROCEDURE — 99214 OFFICE O/P EST MOD 30 MIN: CPT | Mod: 95,,, | Performed by: NURSE PRACTITIONER

## 2021-09-13 ENCOUNTER — TELEPHONE (OUTPATIENT)
Dept: PAIN MEDICINE | Facility: CLINIC | Age: 61
End: 2021-09-13

## 2021-09-13 ENCOUNTER — PATIENT MESSAGE (OUTPATIENT)
Dept: PAIN MEDICINE | Facility: CLINIC | Age: 61
End: 2021-09-13

## 2021-09-15 ENCOUNTER — TELEPHONE (OUTPATIENT)
Dept: PAIN MEDICINE | Facility: CLINIC | Age: 61
End: 2021-09-15

## 2021-09-16 ENCOUNTER — TELEPHONE (OUTPATIENT)
Dept: FAMILY MEDICINE | Facility: CLINIC | Age: 61
End: 2021-09-16

## 2021-09-22 ENCOUNTER — TELEPHONE (OUTPATIENT)
Dept: FAMILY MEDICINE | Facility: CLINIC | Age: 61
End: 2021-09-22

## 2021-09-27 ENCOUNTER — TELEPHONE (OUTPATIENT)
Dept: PAIN MEDICINE | Facility: CLINIC | Age: 61
End: 2021-09-27

## 2021-10-01 ENCOUNTER — TELEPHONE (OUTPATIENT)
Dept: PODIATRY | Facility: CLINIC | Age: 61
End: 2021-10-01

## 2021-10-04 ENCOUNTER — TELEPHONE (OUTPATIENT)
Dept: PODIATRY | Facility: CLINIC | Age: 61
End: 2021-10-04

## 2021-10-04 ENCOUNTER — TELEPHONE (OUTPATIENT)
Dept: PAIN MEDICINE | Facility: CLINIC | Age: 61
End: 2021-10-04

## 2021-10-06 ENCOUNTER — TELEPHONE (OUTPATIENT)
Dept: INTERNAL MEDICINE | Facility: CLINIC | Age: 61
End: 2021-10-06

## 2021-10-06 ENCOUNTER — LAB VISIT (OUTPATIENT)
Dept: LAB | Facility: HOSPITAL | Age: 61
End: 2021-10-06
Attending: FAMILY MEDICINE
Payer: MEDICAID

## 2021-10-06 DIAGNOSIS — Z12.31 ENCOUNTER FOR SCREENING MAMMOGRAM FOR BREAST CANCER: Primary | ICD-10-CM

## 2021-10-06 DIAGNOSIS — E11.49 TYPE II DIABETES MELLITUS WITH NEUROLOGICAL MANIFESTATIONS: Chronic | ICD-10-CM

## 2021-10-06 LAB
ESTIMATED AVG GLUCOSE: 105 MG/DL (ref 68–131)
HBA1C MFR BLD: 5.3 % (ref 4–5.6)

## 2021-10-06 PROCEDURE — 83036 HEMOGLOBIN GLYCOSYLATED A1C: CPT | Performed by: FAMILY MEDICINE

## 2021-10-06 PROCEDURE — 36415 COLL VENOUS BLD VENIPUNCTURE: CPT | Mod: PO | Performed by: FAMILY MEDICINE

## 2021-10-08 ENCOUNTER — HOSPITAL ENCOUNTER (OUTPATIENT)
Facility: HOSPITAL | Age: 61
Discharge: HOME | End: 2021-10-08
Attending: ANESTHESIOLOGY | Admitting: ANESTHESIOLOGY
Payer: MEDICAID

## 2021-10-08 VITALS
SYSTOLIC BLOOD PRESSURE: 135 MMHG | HEIGHT: 68 IN | OXYGEN SATURATION: 100 % | DIASTOLIC BLOOD PRESSURE: 82 MMHG | RESPIRATION RATE: 18 BRPM | BODY MASS INDEX: 44.41 KG/M2 | HEART RATE: 72 BPM | WEIGHT: 293 LBS | TEMPERATURE: 98 F

## 2021-10-08 DIAGNOSIS — M54.12 CERVICAL RADICULOPATHY: ICD-10-CM

## 2021-10-08 LAB — POCT GLUCOSE: 103 MG/DL (ref 70–110)

## 2021-10-08 PROCEDURE — 25000003 PHARM REV CODE 250: Performed by: ANESTHESIOLOGY

## 2021-10-08 PROCEDURE — 63600175 PHARM REV CODE 636 W HCPCS: Performed by: ANESTHESIOLOGY

## 2021-10-08 PROCEDURE — 62321 NJX INTERLAMINAR CRV/THRC: CPT | Performed by: ANESTHESIOLOGY

## 2021-10-08 PROCEDURE — 25500020 PHARM REV CODE 255: Performed by: ANESTHESIOLOGY

## 2021-10-08 PROCEDURE — 62321 NJX INTERLAMINAR CRV/THRC: CPT | Mod: ,,, | Performed by: ANESTHESIOLOGY

## 2021-10-08 PROCEDURE — 62321 PR INJ CERV/THORAC, W/GUIDANCE: ICD-10-PCS | Mod: ,,, | Performed by: ANESTHESIOLOGY

## 2021-10-08 PROCEDURE — 82962 GLUCOSE BLOOD TEST: CPT | Performed by: ANESTHESIOLOGY

## 2021-10-08 RX ORDER — MIDAZOLAM HYDROCHLORIDE 1 MG/ML
INJECTION, SOLUTION INTRAMUSCULAR; INTRAVENOUS
Status: DISCONTINUED | OUTPATIENT
Start: 2021-10-08 | End: 2021-10-08 | Stop reason: HOSPADM

## 2021-10-08 RX ORDER — INDOMETHACIN 25 MG/1
CAPSULE ORAL
Status: DISCONTINUED | OUTPATIENT
Start: 2021-10-08 | End: 2021-10-08 | Stop reason: HOSPADM

## 2021-10-08 RX ORDER — DEXAMETHASONE SODIUM PHOSPHATE 10 MG/ML
INJECTION INTRAMUSCULAR; INTRAVENOUS
Status: DISCONTINUED | OUTPATIENT
Start: 2021-10-08 | End: 2021-10-08 | Stop reason: HOSPADM

## 2021-10-08 RX ORDER — BUPIVACAINE HYDROCHLORIDE 2.5 MG/ML
INJECTION, SOLUTION EPIDURAL; INFILTRATION; INTRACAUDAL
Status: DISCONTINUED | OUTPATIENT
Start: 2021-10-08 | End: 2021-10-08 | Stop reason: HOSPADM

## 2021-10-08 RX ORDER — FENTANYL CITRATE 50 UG/ML
INJECTION, SOLUTION INTRAMUSCULAR; INTRAVENOUS
Status: DISCONTINUED | OUTPATIENT
Start: 2021-10-08 | End: 2021-10-08 | Stop reason: HOSPADM

## 2021-10-14 DIAGNOSIS — E78.5 HYPERLIPIDEMIA, UNSPECIFIED HYPERLIPIDEMIA TYPE: ICD-10-CM

## 2021-10-14 DIAGNOSIS — E11.42 DIABETIC POLYNEUROPATHY ASSOCIATED WITH TYPE 2 DIABETES MELLITUS: Primary | ICD-10-CM

## 2021-10-14 DIAGNOSIS — E11.59 HYPERTENSION ASSOCIATED WITH DIABETES: ICD-10-CM

## 2021-10-14 DIAGNOSIS — R01.1 SYSTOLIC MURMUR: ICD-10-CM

## 2021-10-14 DIAGNOSIS — I15.2 HYPERTENSION ASSOCIATED WITH DIABETES: ICD-10-CM

## 2021-10-15 ENCOUNTER — OFFICE VISIT (OUTPATIENT)
Dept: CARDIOLOGY | Facility: CLINIC | Age: 61
End: 2021-10-15
Payer: MEDICAID

## 2021-10-15 ENCOUNTER — HOSPITAL ENCOUNTER (OUTPATIENT)
Dept: CARDIOLOGY | Facility: HOSPITAL | Age: 61
Discharge: HOME | End: 2021-10-15
Attending: INTERNAL MEDICINE
Payer: MEDICAID

## 2021-10-15 VITALS
HEART RATE: 66 BPM | HEIGHT: 68 IN | BODY MASS INDEX: 44.41 KG/M2 | OXYGEN SATURATION: 97 % | DIASTOLIC BLOOD PRESSURE: 72 MMHG | WEIGHT: 293 LBS | SYSTOLIC BLOOD PRESSURE: 130 MMHG

## 2021-10-15 DIAGNOSIS — E78.2 COMBINED HYPERLIPIDEMIA ASSOCIATED WITH TYPE 2 DIABETES MELLITUS: ICD-10-CM

## 2021-10-15 DIAGNOSIS — E11.59 HYPERTENSION ASSOCIATED WITH DIABETES: ICD-10-CM

## 2021-10-15 DIAGNOSIS — R01.1 SYSTOLIC MURMUR: ICD-10-CM

## 2021-10-15 DIAGNOSIS — R55 SYNCOPE, UNSPECIFIED SYNCOPE TYPE: ICD-10-CM

## 2021-10-15 DIAGNOSIS — E78.5 HYPERLIPIDEMIA, UNSPECIFIED HYPERLIPIDEMIA TYPE: ICD-10-CM

## 2021-10-15 DIAGNOSIS — I15.2 HYPERTENSION ASSOCIATED WITH DIABETES: ICD-10-CM

## 2021-10-15 DIAGNOSIS — E11.42 DIABETIC POLYNEUROPATHY ASSOCIATED WITH TYPE 2 DIABETES MELLITUS: ICD-10-CM

## 2021-10-15 DIAGNOSIS — E11.69 COMBINED HYPERLIPIDEMIA ASSOCIATED WITH TYPE 2 DIABETES MELLITUS: ICD-10-CM

## 2021-10-15 DIAGNOSIS — I73.9 CLAUDICATION OF BOTH LOWER EXTREMITIES: Primary | ICD-10-CM

## 2021-10-15 DIAGNOSIS — R00.0 TACHYCARDIA: ICD-10-CM

## 2021-10-15 DIAGNOSIS — R06.09 DOE (DYSPNEA ON EXERTION): ICD-10-CM

## 2021-10-15 PROCEDURE — 99215 OFFICE O/P EST HI 40 MIN: CPT | Mod: PBBFAC,PO | Performed by: INTERNAL MEDICINE

## 2021-10-15 PROCEDURE — 93005 ELECTROCARDIOGRAM TRACING: CPT | Mod: PO

## 2021-10-15 PROCEDURE — 99214 OFFICE O/P EST MOD 30 MIN: CPT | Mod: S$PBB,,, | Performed by: INTERNAL MEDICINE

## 2021-10-15 PROCEDURE — 93010 ELECTROCARDIOGRAM REPORT: CPT | Mod: ,,, | Performed by: STUDENT IN AN ORGANIZED HEALTH CARE EDUCATION/TRAINING PROGRAM

## 2021-10-15 PROCEDURE — 99999 PR PBB SHADOW E&M-EST. PATIENT-LVL V: ICD-10-PCS | Mod: PBBFAC,,, | Performed by: INTERNAL MEDICINE

## 2021-10-15 PROCEDURE — 99214 PR OFFICE/OUTPT VISIT, EST, LEVL IV, 30-39 MIN: ICD-10-PCS | Mod: S$PBB,,, | Performed by: INTERNAL MEDICINE

## 2021-10-15 PROCEDURE — 99999 PR PBB SHADOW E&M-EST. PATIENT-LVL V: CPT | Mod: PBBFAC,,, | Performed by: INTERNAL MEDICINE

## 2021-10-15 PROCEDURE — 93010 EKG 12-LEAD: ICD-10-PCS | Mod: ,,, | Performed by: STUDENT IN AN ORGANIZED HEALTH CARE EDUCATION/TRAINING PROGRAM

## 2021-10-18 ENCOUNTER — TELEPHONE (OUTPATIENT)
Dept: FAMILY MEDICINE | Facility: CLINIC | Age: 61
End: 2021-10-18

## 2021-10-18 ENCOUNTER — PATIENT MESSAGE (OUTPATIENT)
Dept: FAMILY MEDICINE | Facility: CLINIC | Age: 61
End: 2021-10-18
Payer: MEDICAID

## 2021-10-18 DIAGNOSIS — A04.8 H. PYLORI INFECTION: Primary | ICD-10-CM

## 2021-10-18 DIAGNOSIS — E04.2 MULTINODULAR GOITER: ICD-10-CM

## 2021-10-18 DIAGNOSIS — Z76.0 MEDICATION REFILL: ICD-10-CM

## 2021-10-18 RX ORDER — LEVOTHYROXINE SODIUM 100 UG/1
TABLET ORAL
Qty: 90 TABLET | Refills: 1 | Status: SHIPPED | OUTPATIENT
Start: 2021-10-18 | End: 2022-03-20 | Stop reason: SDUPTHER

## 2021-10-18 RX ORDER — TIZANIDINE 4 MG/1
TABLET ORAL
Qty: 60 TABLET | Refills: 4 | Status: SHIPPED | OUTPATIENT
Start: 2021-10-18 | End: 2022-03-17 | Stop reason: SDUPTHER

## 2021-10-22 DIAGNOSIS — K59.00 CONSTIPATION, UNSPECIFIED CONSTIPATION TYPE: ICD-10-CM

## 2021-10-25 ENCOUNTER — OFFICE VISIT (OUTPATIENT)
Dept: FAMILY MEDICINE | Facility: CLINIC | Age: 61
End: 2021-10-25
Payer: MEDICAID

## 2021-10-25 ENCOUNTER — TELEPHONE (OUTPATIENT)
Dept: FAMILY MEDICINE | Facility: CLINIC | Age: 61
End: 2021-10-25
Payer: MEDICAID

## 2021-10-25 DIAGNOSIS — J06.9 UPPER RESPIRATORY TRACT INFECTION, UNSPECIFIED TYPE: Primary | ICD-10-CM

## 2021-10-25 PROCEDURE — 99213 PR OFFICE/OUTPT VISIT, EST, LEVL III, 20-29 MIN: ICD-10-PCS | Mod: 95,,, | Performed by: FAMILY MEDICINE

## 2021-10-25 PROCEDURE — 99213 OFFICE O/P EST LOW 20 MIN: CPT | Mod: 95,,, | Performed by: FAMILY MEDICINE

## 2021-10-25 RX ORDER — METHYLPREDNISOLONE 4 MG/1
TABLET ORAL
Qty: 1 EACH | Refills: 0 | Status: SHIPPED | OUTPATIENT
Start: 2021-10-25 | End: 2021-11-15

## 2021-11-02 ENCOUNTER — TELEPHONE (OUTPATIENT)
Dept: FAMILY MEDICINE | Facility: CLINIC | Age: 61
End: 2021-11-02
Payer: MEDICAID

## 2021-11-05 ENCOUNTER — PATIENT OUTREACH (OUTPATIENT)
Dept: ADMINISTRATIVE | Facility: OTHER | Age: 61
End: 2021-11-05
Payer: MEDICAID

## 2021-11-05 ENCOUNTER — TELEPHONE (OUTPATIENT)
Dept: PAIN MEDICINE | Facility: CLINIC | Age: 61
End: 2021-11-05
Payer: MEDICAID

## 2021-11-08 ENCOUNTER — TELEPHONE (OUTPATIENT)
Dept: PAIN MEDICINE | Facility: CLINIC | Age: 61
End: 2021-11-08

## 2021-11-08 ENCOUNTER — OFFICE VISIT (OUTPATIENT)
Dept: PAIN MEDICINE | Facility: CLINIC | Age: 61
End: 2021-11-08
Payer: MEDICAID

## 2021-11-08 VITALS
WEIGHT: 293 LBS | HEART RATE: 84 BPM | SYSTOLIC BLOOD PRESSURE: 140 MMHG | DIASTOLIC BLOOD PRESSURE: 87 MMHG | BODY MASS INDEX: 51.24 KG/M2

## 2021-11-08 DIAGNOSIS — M79.18 CHRONIC MYOFASCIAL PAIN: ICD-10-CM

## 2021-11-08 DIAGNOSIS — M54.12 CERVICAL RADICULOPATHY: Primary | ICD-10-CM

## 2021-11-08 DIAGNOSIS — M47.812 CERVICAL SPONDYLOSIS: ICD-10-CM

## 2021-11-08 DIAGNOSIS — M54.16 LUMBAR RADICULOPATHY, CHRONIC: ICD-10-CM

## 2021-11-08 DIAGNOSIS — M47.816 LUMBAR SPONDYLOSIS: ICD-10-CM

## 2021-11-08 DIAGNOSIS — G89.29 CHRONIC MYOFASCIAL PAIN: ICD-10-CM

## 2021-11-08 DIAGNOSIS — M46.1 SACROILIITIS: ICD-10-CM

## 2021-11-08 PROCEDURE — 99214 OFFICE O/P EST MOD 30 MIN: CPT | Mod: S$PBB,,, | Performed by: ANESTHESIOLOGY

## 2021-11-08 PROCEDURE — 99214 PR OFFICE/OUTPT VISIT, EST, LEVL IV, 30-39 MIN: ICD-10-PCS | Mod: S$PBB,,, | Performed by: ANESTHESIOLOGY

## 2021-11-08 PROCEDURE — 99999 PR PBB SHADOW E&M-EST. PATIENT-LVL V: CPT | Mod: PBBFAC,,, | Performed by: ANESTHESIOLOGY

## 2021-11-08 PROCEDURE — 99215 OFFICE O/P EST HI 40 MIN: CPT | Mod: PBBFAC | Performed by: ANESTHESIOLOGY

## 2021-11-08 PROCEDURE — 99999 PR PBB SHADOW E&M-EST. PATIENT-LVL V: ICD-10-PCS | Mod: PBBFAC,,, | Performed by: ANESTHESIOLOGY

## 2021-11-09 ENCOUNTER — OFFICE VISIT (OUTPATIENT)
Dept: PODIATRY | Facility: CLINIC | Age: 61
End: 2021-11-09
Payer: MEDICAID

## 2021-11-09 DIAGNOSIS — E11.49 TYPE II DIABETES MELLITUS WITH NEUROLOGICAL MANIFESTATIONS: Primary | ICD-10-CM

## 2021-11-09 DIAGNOSIS — E66.01 MORBID OBESITY: ICD-10-CM

## 2021-11-09 DIAGNOSIS — M54.17 LUMBOSACRAL RADICULOPATHY: ICD-10-CM

## 2021-11-09 PROCEDURE — 99999 PR PBB SHADOW E&M-EST. PATIENT-LVL III: ICD-10-PCS | Mod: PBBFAC,,, | Performed by: PODIATRIST

## 2021-11-09 PROCEDURE — 99214 PR OFFICE/OUTPT VISIT, EST, LEVL IV, 30-39 MIN: ICD-10-PCS | Mod: S$PBB,,, | Performed by: PODIATRIST

## 2021-11-09 PROCEDURE — 99214 OFFICE O/P EST MOD 30 MIN: CPT | Mod: S$PBB,,, | Performed by: PODIATRIST

## 2021-11-09 PROCEDURE — 99999 PR PBB SHADOW E&M-EST. PATIENT-LVL III: CPT | Mod: PBBFAC,,, | Performed by: PODIATRIST

## 2021-11-09 PROCEDURE — 99213 OFFICE O/P EST LOW 20 MIN: CPT | Mod: PBBFAC,PO | Performed by: PODIATRIST

## 2021-11-17 ENCOUNTER — TELEPHONE (OUTPATIENT)
Dept: FAMILY MEDICINE | Facility: CLINIC | Age: 61
End: 2021-11-17
Payer: MEDICAID

## 2021-11-22 ENCOUNTER — PATIENT MESSAGE (OUTPATIENT)
Dept: HEMATOLOGY/ONCOLOGY | Facility: CLINIC | Age: 61
End: 2021-11-22
Payer: MEDICAID

## 2021-11-23 ENCOUNTER — PATIENT MESSAGE (OUTPATIENT)
Dept: ADMINISTRATIVE | Facility: OTHER | Age: 61
End: 2021-11-23
Payer: MEDICAID

## 2021-11-23 RX ORDER — TOPIRAMATE 25 MG/1
TABLET ORAL
Qty: 147 TABLET | Refills: 0 | Status: SHIPPED | OUTPATIENT
Start: 2021-11-23 | End: 2022-01-06

## 2021-12-06 ENCOUNTER — TELEPHONE (OUTPATIENT)
Dept: FAMILY MEDICINE | Facility: CLINIC | Age: 61
End: 2021-12-06
Payer: MEDICAID

## 2021-12-10 ENCOUNTER — HOSPITAL ENCOUNTER (OUTPATIENT)
Facility: HOSPITAL | Age: 61
Discharge: HOME | End: 2021-12-10
Attending: ANESTHESIOLOGY | Admitting: ANESTHESIOLOGY
Payer: MEDICAID

## 2021-12-10 ENCOUNTER — PATIENT MESSAGE (OUTPATIENT)
Dept: PAIN MEDICINE | Facility: CLINIC | Age: 61
End: 2021-12-10
Payer: MEDICAID

## 2021-12-10 VITALS
TEMPERATURE: 98 F | HEIGHT: 68 IN | HEART RATE: 78 BPM | RESPIRATION RATE: 16 BRPM | OXYGEN SATURATION: 99 % | BODY MASS INDEX: 44.41 KG/M2 | WEIGHT: 293 LBS | SYSTOLIC BLOOD PRESSURE: 132 MMHG | DIASTOLIC BLOOD PRESSURE: 63 MMHG

## 2021-12-10 DIAGNOSIS — M54.16 LUMBAR RADICULOPATHY, CHRONIC: ICD-10-CM

## 2021-12-10 LAB — POCT GLUCOSE: 89 MG/DL (ref 70–110)

## 2021-12-10 PROCEDURE — 62323 NJX INTERLAMINAR LMBR/SAC: CPT | Performed by: ANESTHESIOLOGY

## 2021-12-10 PROCEDURE — 25000003 PHARM REV CODE 250: Performed by: ANESTHESIOLOGY

## 2021-12-10 PROCEDURE — 62323 PR INJ LUMBAR/SACRAL, W/IMAGING GUIDANCE: ICD-10-PCS | Mod: ,,, | Performed by: ANESTHESIOLOGY

## 2021-12-10 PROCEDURE — 63600175 PHARM REV CODE 636 W HCPCS: Performed by: ANESTHESIOLOGY

## 2021-12-10 PROCEDURE — 62323 NJX INTERLAMINAR LMBR/SAC: CPT | Mod: ,,, | Performed by: ANESTHESIOLOGY

## 2021-12-10 PROCEDURE — 82962 GLUCOSE BLOOD TEST: CPT | Performed by: ANESTHESIOLOGY

## 2021-12-10 PROCEDURE — 25500020 PHARM REV CODE 255: Performed by: ANESTHESIOLOGY

## 2021-12-10 RX ORDER — BUPIVACAINE HYDROCHLORIDE 2.5 MG/ML
INJECTION, SOLUTION EPIDURAL; INFILTRATION; INTRACAUDAL
Status: DISCONTINUED | OUTPATIENT
Start: 2021-12-10 | End: 2021-12-10 | Stop reason: HOSPADM

## 2021-12-10 RX ORDER — METHYLPREDNISOLONE ACETATE 40 MG/ML
INJECTION, SUSPENSION INTRA-ARTICULAR; INTRALESIONAL; INTRAMUSCULAR; SOFT TISSUE
Status: DISCONTINUED | OUTPATIENT
Start: 2021-12-10 | End: 2021-12-10 | Stop reason: HOSPADM

## 2021-12-10 RX ORDER — INDOMETHACIN 25 MG/1
CAPSULE ORAL
Status: DISCONTINUED | OUTPATIENT
Start: 2021-12-10 | End: 2021-12-10 | Stop reason: HOSPADM

## 2021-12-14 ENCOUNTER — LAB VISIT (OUTPATIENT)
Dept: LAB | Facility: HOSPITAL | Age: 61
End: 2021-12-14
Attending: NURSE PRACTITIONER
Payer: MEDICAID

## 2021-12-14 DIAGNOSIS — D50.0 IRON DEFICIENCY ANEMIA DUE TO CHRONIC BLOOD LOSS: ICD-10-CM

## 2021-12-14 LAB
BASOPHILS # BLD AUTO: 0.03 K/UL (ref 0–0.2)
BASOPHILS NFR BLD: 0.5 % (ref 0–1.9)
DIFFERENTIAL METHOD: NORMAL
EOSINOPHIL # BLD AUTO: 0.1 K/UL (ref 0–0.5)
EOSINOPHIL NFR BLD: 1.5 % (ref 0–8)
ERYTHROCYTE [DISTWIDTH] IN BLOOD BY AUTOMATED COUNT: 13.6 % (ref 11.5–14.5)
FERRITIN SERPL-MCNC: 51 NG/ML (ref 20–300)
HCT VFR BLD AUTO: 40.6 % (ref 37–48.5)
HGB BLD-MCNC: 13.1 G/DL (ref 12–16)
IMM GRANULOCYTES # BLD AUTO: 0 K/UL (ref 0–0.04)
IMM GRANULOCYTES NFR BLD AUTO: 0 % (ref 0–0.5)
IRON SERPL-MCNC: 63 UG/DL (ref 30–160)
LYMPHOCYTES # BLD AUTO: 1.7 K/UL (ref 1–4.8)
LYMPHOCYTES NFR BLD: 28.8 % (ref 18–48)
MCH RBC QN AUTO: 29.2 PG (ref 27–31)
MCHC RBC AUTO-ENTMCNC: 32.3 G/DL (ref 32–36)
MCV RBC AUTO: 90 FL (ref 82–98)
MONOCYTES # BLD AUTO: 0.4 K/UL (ref 0.3–1)
MONOCYTES NFR BLD: 7.3 % (ref 4–15)
NEUTROPHILS # BLD AUTO: 3.6 K/UL (ref 1.8–7.7)
NEUTROPHILS NFR BLD: 61.9 % (ref 38–73)
NRBC BLD-RTO: 0 /100 WBC
PLATELET # BLD AUTO: 317 K/UL (ref 150–450)
PMV BLD AUTO: 10.2 FL (ref 9.2–12.9)
RBC # BLD AUTO: 4.49 M/UL (ref 4–5.4)
SATURATED IRON: 16 % (ref 20–50)
TOTAL IRON BINDING CAPACITY: 395 UG/DL (ref 250–450)
TRANSFERRIN SERPL-MCNC: 267 MG/DL (ref 200–375)
WBC # BLD AUTO: 5.86 K/UL (ref 3.9–12.7)

## 2021-12-14 PROCEDURE — 85025 COMPLETE CBC W/AUTO DIFF WBC: CPT | Mod: PO | Performed by: NURSE PRACTITIONER

## 2021-12-14 PROCEDURE — 36415 COLL VENOUS BLD VENIPUNCTURE: CPT | Mod: PO | Performed by: NURSE PRACTITIONER

## 2021-12-14 PROCEDURE — 82728 ASSAY OF FERRITIN: CPT | Performed by: NURSE PRACTITIONER

## 2021-12-14 PROCEDURE — 84466 ASSAY OF TRANSFERRIN: CPT | Performed by: NURSE PRACTITIONER

## 2021-12-16 ENCOUNTER — OFFICE VISIT (OUTPATIENT)
Dept: HEMATOLOGY/ONCOLOGY | Facility: CLINIC | Age: 61
End: 2021-12-16
Payer: MEDICAID

## 2021-12-16 VITALS
OXYGEN SATURATION: 98 % | HEIGHT: 68 IN | BODY MASS INDEX: 44.41 KG/M2 | WEIGHT: 293 LBS | HEART RATE: 103 BPM | TEMPERATURE: 97 F | DIASTOLIC BLOOD PRESSURE: 103 MMHG | SYSTOLIC BLOOD PRESSURE: 155 MMHG

## 2021-12-16 DIAGNOSIS — D50.0 IRON DEFICIENCY ANEMIA DUE TO CHRONIC BLOOD LOSS: Primary | ICD-10-CM

## 2021-12-16 PROCEDURE — 3061F NEG MICROALBUMINURIA REV: CPT | Mod: CPTII,,, | Performed by: NURSE PRACTITIONER

## 2021-12-16 PROCEDURE — 99215 OFFICE O/P EST HI 40 MIN: CPT | Mod: PBBFAC | Performed by: NURSE PRACTITIONER

## 2021-12-16 PROCEDURE — 3061F PR NEG MICROALBUMINURIA RESULT DOCUMENTED/REVIEW: ICD-10-PCS | Mod: CPTII,,, | Performed by: NURSE PRACTITIONER

## 2021-12-16 PROCEDURE — 99213 PR OFFICE/OUTPT VISIT, EST, LEVL III, 20-29 MIN: ICD-10-PCS | Mod: S$PBB,,, | Performed by: NURSE PRACTITIONER

## 2021-12-16 PROCEDURE — 99213 OFFICE O/P EST LOW 20 MIN: CPT | Mod: S$PBB,,, | Performed by: NURSE PRACTITIONER

## 2021-12-16 PROCEDURE — 99999 PR PBB SHADOW E&M-EST. PATIENT-LVL V: CPT | Mod: PBBFAC,,, | Performed by: NURSE PRACTITIONER

## 2021-12-16 PROCEDURE — 3066F PR DOCUMENTATION OF TREATMENT FOR NEPHROPATHY: ICD-10-PCS | Mod: CPTII,,, | Performed by: NURSE PRACTITIONER

## 2021-12-16 PROCEDURE — 99999 PR PBB SHADOW E&M-EST. PATIENT-LVL V: ICD-10-PCS | Mod: PBBFAC,,, | Performed by: NURSE PRACTITIONER

## 2021-12-16 PROCEDURE — 3066F NEPHROPATHY DOC TX: CPT | Mod: CPTII,,, | Performed by: NURSE PRACTITIONER

## 2021-12-16 RX ORDER — LIDOCAINE AND PRILOCAINE 25; 25 MG/G; MG/G
CREAM TOPICAL
COMMUNITY
Start: 2021-11-29 | End: 2023-06-29

## 2021-12-20 DIAGNOSIS — K21.00 GASTROESOPHAGEAL REFLUX DISEASE WITH ESOPHAGITIS, UNSPECIFIED WHETHER HEMORRHAGE: Primary | ICD-10-CM

## 2021-12-20 DIAGNOSIS — R05.9 COUGH: ICD-10-CM

## 2021-12-20 RX ORDER — FAMOTIDINE 40 MG/1
40 TABLET, FILM COATED ORAL DAILY
Qty: 90 TABLET | Refills: 4 | Status: SHIPPED | OUTPATIENT
Start: 2021-12-20 | End: 2022-06-17 | Stop reason: SDUPTHER

## 2021-12-20 RX ORDER — LEVOCETIRIZINE DIHYDROCHLORIDE 5 MG/1
5 TABLET, FILM COATED ORAL NIGHTLY
Qty: 90 TABLET | Refills: 4 | Status: SHIPPED | OUTPATIENT
Start: 2021-12-20 | End: 2022-07-15

## 2021-12-27 ENCOUNTER — TELEPHONE (OUTPATIENT)
Dept: PAIN MEDICINE | Facility: CLINIC | Age: 61
End: 2021-12-27
Payer: MEDICAID

## 2022-01-06 ENCOUNTER — OFFICE VISIT (OUTPATIENT)
Dept: FAMILY MEDICINE | Facility: CLINIC | Age: 62
End: 2022-01-06
Attending: ANESTHESIOLOGY
Payer: MEDICAID

## 2022-01-06 ENCOUNTER — HOSPITAL ENCOUNTER (OUTPATIENT)
Dept: RADIOLOGY | Facility: HOSPITAL | Age: 62
Discharge: HOME | End: 2022-01-06
Attending: FAMILY MEDICINE
Payer: MEDICAID

## 2022-01-06 VITALS
RESPIRATION RATE: 20 BRPM | TEMPERATURE: 97 F | OXYGEN SATURATION: 95 % | HEIGHT: 68 IN | BODY MASS INDEX: 44.41 KG/M2 | DIASTOLIC BLOOD PRESSURE: 74 MMHG | WEIGHT: 293 LBS | HEART RATE: 114 BPM | SYSTOLIC BLOOD PRESSURE: 110 MMHG

## 2022-01-06 VITALS — BODY MASS INDEX: 44.41 KG/M2 | HEIGHT: 68 IN | WEIGHT: 293 LBS

## 2022-01-06 DIAGNOSIS — Z12.31 ENCOUNTER FOR SCREENING MAMMOGRAM FOR BREAST CANCER: ICD-10-CM

## 2022-01-06 DIAGNOSIS — M79.604 RIGHT LEG PAIN: ICD-10-CM

## 2022-01-06 DIAGNOSIS — I15.2 HYPERTENSION ASSOCIATED WITH DIABETES: ICD-10-CM

## 2022-01-06 DIAGNOSIS — E11.65 TYPE 2 DIABETES MELLITUS WITH HYPERGLYCEMIA, WITHOUT LONG-TERM CURRENT USE OF INSULIN: ICD-10-CM

## 2022-01-06 DIAGNOSIS — E11.59 HYPERTENSION ASSOCIATED WITH DIABETES: ICD-10-CM

## 2022-01-06 DIAGNOSIS — M54.12 CERVICAL RADICULOPATHY: Primary | ICD-10-CM

## 2022-01-06 DIAGNOSIS — M79.641 PAIN IN BOTH HANDS: ICD-10-CM

## 2022-01-06 DIAGNOSIS — Z79.899 ENCOUNTER FOR LONG-TERM (CURRENT) USE OF MEDICATIONS: ICD-10-CM

## 2022-01-06 DIAGNOSIS — M79.642 PAIN IN BOTH HANDS: ICD-10-CM

## 2022-01-06 PROCEDURE — 3078F DIAST BP <80 MM HG: CPT | Mod: CPTII,,, | Performed by: FAMILY MEDICINE

## 2022-01-06 PROCEDURE — 99999 PR PBB SHADOW E&M-EST. PATIENT-LVL IV: CPT | Mod: PBBFAC,,, | Performed by: FAMILY MEDICINE

## 2022-01-06 PROCEDURE — 73590 X-RAY EXAM OF LOWER LEG: CPT | Mod: TC,PO,RT

## 2022-01-06 PROCEDURE — 99214 OFFICE O/P EST MOD 30 MIN: CPT | Mod: S$PBB,,, | Performed by: FAMILY MEDICINE

## 2022-01-06 PROCEDURE — 73590 X-RAY EXAM OF LOWER LEG: CPT | Mod: 26,RT,, | Performed by: RADIOLOGY

## 2022-01-06 PROCEDURE — 73590 XR TIBIA FIBULA 2 VIEW RIGHT: ICD-10-PCS | Mod: 26,RT,, | Performed by: RADIOLOGY

## 2022-01-06 PROCEDURE — 3074F SYST BP LT 130 MM HG: CPT | Mod: CPTII,,, | Performed by: FAMILY MEDICINE

## 2022-01-06 PROCEDURE — 99214 OFFICE O/P EST MOD 30 MIN: CPT | Mod: PBBFAC,PO | Performed by: FAMILY MEDICINE

## 2022-01-06 PROCEDURE — 77067 SCR MAMMO BI INCL CAD: CPT | Mod: 26,,, | Performed by: RADIOLOGY

## 2022-01-06 PROCEDURE — 1159F MED LIST DOCD IN RCRD: CPT | Mod: CPTII,,, | Performed by: FAMILY MEDICINE

## 2022-01-06 PROCEDURE — 3008F PR BODY MASS INDEX (BMI) DOCUMENTED: ICD-10-PCS | Mod: CPTII,,, | Performed by: FAMILY MEDICINE

## 2022-01-06 PROCEDURE — 1159F PR MEDICATION LIST DOCUMENTED IN MEDICAL RECORD: ICD-10-PCS | Mod: CPTII,,, | Performed by: FAMILY MEDICINE

## 2022-01-06 PROCEDURE — 77067 MAMMO DIGITAL SCREENING BILAT: ICD-10-PCS | Mod: 26,,, | Performed by: RADIOLOGY

## 2022-01-06 PROCEDURE — 99999 PR PBB SHADOW E&M-EST. PATIENT-LVL IV: ICD-10-PCS | Mod: PBBFAC,,, | Performed by: FAMILY MEDICINE

## 2022-01-06 PROCEDURE — 3008F BODY MASS INDEX DOCD: CPT | Mod: CPTII,,, | Performed by: FAMILY MEDICINE

## 2022-01-06 PROCEDURE — 77067 SCR MAMMO BI INCL CAD: CPT | Mod: TC,PO

## 2022-01-06 PROCEDURE — 3074F PR MOST RECENT SYSTOLIC BLOOD PRESSURE < 130 MM HG: ICD-10-PCS | Mod: CPTII,,, | Performed by: FAMILY MEDICINE

## 2022-01-06 PROCEDURE — 99214 PR OFFICE/OUTPT VISIT, EST, LEVL IV, 30-39 MIN: ICD-10-PCS | Mod: S$PBB,,, | Performed by: FAMILY MEDICINE

## 2022-01-06 PROCEDURE — 1160F RVW MEDS BY RX/DR IN RCRD: CPT | Mod: CPTII,,, | Performed by: FAMILY MEDICINE

## 2022-01-06 PROCEDURE — 3078F PR MOST RECENT DIASTOLIC BLOOD PRESSURE < 80 MM HG: ICD-10-PCS | Mod: CPTII,,, | Performed by: FAMILY MEDICINE

## 2022-01-06 PROCEDURE — 1160F PR REVIEW ALL MEDS BY PRESCRIBER/CLIN PHARMACIST DOCUMENTED: ICD-10-PCS | Mod: CPTII,,, | Performed by: FAMILY MEDICINE

## 2022-01-06 RX ORDER — TOPIRAMATE 100 MG/1
100 TABLET, FILM COATED ORAL 2 TIMES DAILY
Qty: 60 TABLET | Refills: 12 | Status: SHIPPED | OUTPATIENT
Start: 2022-01-06 | End: 2022-01-11

## 2022-01-06 RX ORDER — ETODOLAC 400 MG/1
400 TABLET, FILM COATED ORAL 2 TIMES DAILY
Qty: 60 TABLET | Refills: 12 | Status: SHIPPED | OUTPATIENT
Start: 2022-01-06 | End: 2022-02-24

## 2022-01-06 NOTE — PROGRESS NOTES
PLAN:      Problem List Items Addressed This Visit     Type 2 diabetes mellitus with hyperglycemia, without long-term current use of insulin (Chronic)     Continue Ozempic.  Update labs.We will plan to monitor hemoglobin A1c at designated intervals 3 to 6 months.  I recommend ongoing Education for diabetic diet and exercise protocol.  We will continue to monitor for side effects.    Please be advised of symptoms to monitor for and to notify me immediately if persistent or worsening.  Follow up with Ophthalmology/Optometry and Podiatry at least annually.           Hypertension associated with diabetes (Chronic)     Continue monitoring blood pressure.  If remains low consider decreasing blood pressure medication or changing to alternative agent since she is treating constipation as a side effect of the blood pressure medication.  Follow-up with Cardiology.Counseled on importance of hypertension disease course, I recommend ongoing Education for DASH-diet and exercise.  Counseled on medication regimen importance of treating high blood pressure.  Please be advised of risk of untreated blood pressure as discussed.  Please advised of ER precautions were given for symptoms of hypertensive urgency and emergency.           Encounter for long-term (current) use of medications (Chronic)     Complete history and physical was completed today.  Complete and thorough medication reconciliation was performed.  Discussed risks and benefits of medications.  Advised patient on orders and health maintenance.  We discussed old records and old labs if available.  Will request any records not available through epic.  Continue current medications listed on your summary sheet.           Relevant Medications    etodolac (LODINE) 400 MG tablet    Cervical radiculopathy - Primary (Chronic)     Continue Topamax, tizanidine.  Patient has stop gabapentin.  Patient reports that she feels reasonably well right now.  Continue physical therapy.   Continue follow-up with pain management consider repeat TEETEE when appropriate.  Blood pressure and diabetes are well controlled.         Relevant Medications    topiramate (TOPAMAX) 100 MG tablet    etodolac (LODINE) 400 MG tablet    Pain in both hands     Referral to orthopedic hand for further evaluation treatment.  Start Lodine.         Relevant Medications    etodolac (LODINE) 400 MG tablet    Other Relevant Orders    Ambulatory referral/consult to Orthopedics    Right leg pain     X-ray of the right lower leg in the area of pain.  Start Lodine.  Consider physical therapy and or further imaging if needed.  Patient may ultimately may need consult orthopedics.         Relevant Medications    etodolac (LODINE) 400 MG tablet    Other Relevant Orders    X-Ray Tibia Fibula 2 View Right        Future Appointments     Date Provider Specialty Appt Notes    1/6/2022  Radiology Right leg pain [M79.604]    1/6/2022  Radiology Encounter for screening mammogram for breast cancer [Z12.31]  jackie    1/11/2022 Nae Paul MD Pain Medicine 4 wk p. inj f/u...ncd    1/21/2022 Joon Gomez MD Cardiology 3 month F/u     2/24/2022 Oleksandr Nagel MD Family Medicine 6m f/u    3/14/2022  Lab ty    3/16/2022 Sol Mckeon NP Hematology and Oncology 3 mo f/u prior lab/cbd    7/15/2022 Oleksandr Nagel MD Family Medicine 6month     10/20/2022 Oleksandr Nagel MD Family Medicine My breast , my feet  has burning feelings         Medication Management for assessment above:   Medication List with Changes/Refills   New Medications    ETODOLAC (LODINE) 400 MG TABLET    Take 1 tablet (400 mg total) by mouth 2 (two) times daily.    TOPIRAMATE (TOPAMAX) 100 MG TABLET    Take 1 tablet (100 mg total) by mouth 2 (two) times daily.   Current Medications    ACETAMINOPHEN (TYLENOL) 500 MG TABLET    Take 2 tablets (1,000 mg total) by mouth every 6 (six) hours as needed for Pain.    ALBUTEROL (PROVENTIL) 2.5 MG /3 ML (0.083 %) NEBULIZER  SOLUTION    Take 3 mLs (2.5 mg total) by nebulization every 6 (six) hours while awake.    ALBUTEROL (PROVENTIL/VENTOLIN HFA) 90 MCG/ACTUATION INHALER    INHALE 2 PUFFS BY MOUTH EVERY 6 HOURS AS NEEDED FOR WHEEZING OR SHORTNESS OF BREATH    BLOOD SUGAR DIAGNOSTIC (CLEVER CHOICE VOICE+ TEST) STRP    TEST BLOOD SUGARS ONE TIME DAILY    BLOOD SUGAR DIAGNOSTIC STRP    To check BG 3 times daily, to use with insurance preferred meter    BLOOD-GLUCOSE METER KIT    To check BG three times daily, to use with insurance preferred meter    BLOOD-GLUCOSE METER KIT    Use before meals and before bed when the glucoses are not in control.  Otherwise, test it daily once a day before meals randomly to ensure    DILTIAZEM (DILACOR XR) 120 MG CDCR    Take 1 capsule (120 mg total) by mouth once daily.    FAMOTIDINE (PEPCID) 40 MG TABLET    Take 1 tablet (40 mg total) by mouth once daily.    FERROUS SULFATE 324 MG (65 MG IRON) TBEC    Take 1 tablet (324 mg total) by mouth once daily.    FLUTICASONE PROPION-SALMETEROL 115-21 MCG/DOSE (ADVAIR HFA) 115-21 MCG/ACTUATION HFAA INHALER    Inhale 2 puffs into the lungs 2 (two) times daily. Wash out mouth after use. Controller    FLUTICASONE PROPIONATE (FLONASE) 50 MCG/ACTUATION NASAL SPRAY    Use 2 sprays to each nostril daily    INHALATION SPACING DEVICE (BREATHERITE VALVED MDI CHAMBER)    Use as directed for inhalation.    LANCETS MISC    To check BG three times daily, to use with insurance preferred meter    LEVOCETIRIZINE (XYZAL) 5 MG TABLET    Take 1 tablet (5 mg total) by mouth every evening.    LEVOTHYROXINE (SYNTHROID) 100 MCG TABLET    Take 1 tablet by mouth daily.    LIDOCAINE-PRILOCAINE (EMLA) CREAM    SMARTSI-2 Pump Topical 3-4 Times Daily    LINACLOTIDE (LINZESS) 72 MCG CAP CAPSULE    Take 1 capsule (72 mcg total) by mouth once daily.    METFORMIN (GLUCOPHAGE) 1000 MG TABLET    Take 1 tablet (1,000 mg total) by mouth 2 (two) times daily with meals.    MONTELUKAST (SINGULAIR) 10 MG  "TABLET    Take 1 tablet (10 mg total) by mouth every evening.    OMEPRAZOLE (PRILOSEC) 20 MG CAPSULE    Take 1 capsule (20 mg total) by mouth 2 (two) times daily. for 14 days    PANTOPRAZOLE (PROTONIX) 40 MG TABLET    Take 1 tablet (40 mg total) by mouth 2 (two) times daily.    PEN NEEDLE, DIABETIC (PEN NEEDLE) 32 GAUGE X 5/32" NDLE    Use as directed to inject medication    PRAVASTATIN (PRAVACHOL) 80 MG TABLET    Take 1 tablet (80 mg total) by mouth once daily.    PULSE OXIMETER (PULSE OXIMETER) DEVICE    by Apply Externally route 2 (two) times a day. Use twice daily at 8 AM and 3 PM and [facility] as directed.    SEMAGLUTIDE (OZEMPIC) 1 MG/DOSE (4 MG/3 ML)    Inject 1 mg into the skin every 7 days.    TIZANIDINE (ZANAFLEX) 4 MG TABLET    Take 1/2 to 1 tablet by mouth twice daily as needed for muscle spasms. May cause drowsiness.    TRUEPLUS LANCETS 33 GAUGE MISC    Check blood glucose up to 3 times daily as needed before meals   Discontinued Medications    GABAPENTIN (NEURONTIN) 800 MG TABLET    Take 1 tablet (800 mg total) by mouth 3 (three) times daily.    TOPIRAMATE (TOPAMAX) 25 MG TABLET    Take 1 tablet (25 mg total) by mouth every evening for 7 days, THEN 1 tablet (25 mg total) 2 (two) times daily for 7 days, THEN 2 tablets (50 mg total) 2 (two) times daily for 7 days, THEN 3 tablets (75 mg total) 2 (two) times daily for 7 days, THEN 4 tablets (100 mg total) 2 (two) times daily for 7 days.       Oleksandr Nagel M.D.  ==========================================================================  Subjective:   Patient ID: Zakia Price is a 61 y.o. female.  has a past medical history of Acute respiratory failure due to COVID-19, COVID-19, Diabetes mellitus, type 2, Diabetic neuropathy (1/27/2014), DJD (degenerative joint disease) of knee, DVT (deep venous thrombosis) (around 1990's), Fatty liver, GERD (gastroesophageal reflux disease), Hypertension associated with diabetes, HECTOR (iron " deficiency anemia) (5/13/2021), Multinodular goiter, Obesity, morbid, BMI 50 or higher, and Sleep apnea.   Chief Complaint: Neck Pain      Problem List Items Addressed This Visit     Type 2 diabetes mellitus with hyperglycemia, without long-term current use of insulin (Chronic)    Overview     November 2019:  Reviewed diabetes management status.  Patient was due for LDL less than 100 at that time.  Also needing foot exam.DECEMBER 2019:  Patient reports issues with ACE-inhibitor.  Currently having cough.  Only on amlodipine.Diabetes Management StatusStatin: TakingACE/ARB: Not takingMARCH 2020:  Diabetes Management StatusStatin: TakingACE/ARB: Not takingSEPTEMBER 2020:  Reviewed Diabetes Management Status.  Patient is taking statin but not Ace or Arb.  July 2021:Diabetes Management StatusStatin: TakingACE/ARB: Not taking  January 2022:  Patient reports weight loss with Ozempic.  Diabetes Management Status    Statin: Taking  ACE/ARB: Not taking    Screening or Prevention Patient's value Goal Complete/Controlled?   HgA1C Testing and Control   Lab Results   Component Value Date    HGBA1C 5.3 10/06/2021      Annually/Less than 8% Yes   Lipid profile : 09/04/2020 Annually No   LDL control Lab Results   Component Value Date    LDLCALC 88.6 09/04/2020    Annually/Less than 100 mg/dl  No   Nephropathy screening Lab Results   Component Value Date    LABMICR 29.0 08/12/2021     Lab Results   Component Value Date    PROTEINUA Negative 10/28/2015     No results found for: UTPCR   Annually Yes   Blood pressure BP Readings from Last 1 Encounters:   01/06/22 110/74    Less than 140/90 Yes   Dilated retinal exam : 03/23/2021 Annually Yes   Foot exam   : 11/24/2020 Annually No              Current Assessment & Plan     Continue Ozempic.  Update labs.We will plan to monitor hemoglobin A1c at designated intervals 3 to 6 months.  I recommend ongoing Education for diabetic diet and exercise protocol.  We will continue to monitor for side  effects.    Please be advised of symptoms to monitor for and to notify me immediately if persistent or worsening.  Follow up with Ophthalmology/Optometry and Podiatry at least annually.           Hypertension associated with diabetes (Chronic)    Overview     CHRONIC.  January 2022:  Blood pressure well controlled actually on the lower end of normal today.  Patient is on diltiazem 120 milligrams daily.  She does report some constipation that is being treated with Linzess.  July 2021:  Blood pressure well controlled today.  Previous HPI  Patient recently got a new blood pressure monitor at home through digital medicine however the cuff does not fit around her arm.. BP Reviewed.  Compliant with BP medications. No SE reported.  Patient taking amlodipine 5 mg.  (-) CP, SOB, palpitations, dizziness, lightheadedness, HA, arm numbness, tingling or weakness, syncope.  Creatinine   Date Value Ref Range Status   04/06/2021 0.8 0.5 - 1.4 mg/dL Final     History of hypertension currently on amlodipine with previous reported allergy to ACE inhibitor    Last Assessment & Plan:   Blood pressure stable on current medicine regimen. Continue current medicine regimen and follow low salt diet.         Current Assessment & Plan     Continue monitoring blood pressure.  If remains low consider decreasing blood pressure medication or changing to alternative agent since she is treating constipation as a side effect of the blood pressure medication.  Follow-up with Cardiology.Counseled on importance of hypertension disease course, I recommend ongoing Education for DASH-diet and exercise.  Counseled on medication regimen importance of treating high blood pressure.  Please be advised of risk of untreated blood pressure as discussed.  Please advised of ER precautions were given for symptoms of hypertensive urgency and emergency.           Encounter for long-term (current) use of medications (Chronic)    Overview     CHRONIC. Stable. Compliant  with medications for managed conditions. See medication list. No SE reported. Routine lab analysis is being monitored. Refills were addressed.  January 2021:CHRONIC. Stable. Compliant with medications for managed conditions. See medication list. No SE reported. Routine lab analysis is being monitored. Refills were addressed.  July 2021:  Reviewed labs.  January 2022:  Reviewed labs.  Lab Results   Component Value Date    WBC 5.86 12/14/2021    HGB 13.1 12/14/2021    HCT 40.6 12/14/2021    MCV 90 12/14/2021     12/14/2021         Chemistry        Component Value Date/Time     04/06/2021 0917    K 4.1 04/06/2021 0917     04/06/2021 0917    CO2 27 04/06/2021 0917    BUN 11 04/06/2021 0917    CREATININE 0.8 04/06/2021 0917    GLU 81 04/06/2021 0917        Component Value Date/Time    CALCIUM 8.9 04/06/2021 0917    ALKPHOS 68 04/06/2021 0917    AST 13 04/06/2021 0917    ALT 10 04/06/2021 0917    BILITOT 0.2 04/06/2021 0917    ESTGFRAFRICA >60.0 04/06/2021 0917    EGFRNONAA >60.0 04/06/2021 0917          Lab Results   Component Value Date    TSH 2.462 04/06/2021    FREET4 1.06 12/04/2019    T3FREE 2.7 12/04/2019       =================================         Current Assessment & Plan     Complete history and physical was completed today.  Complete and thorough medication reconciliation was performed.  Discussed risks and benefits of medications.  Advised patient on orders and health maintenance.  We discussed old records and old labs if available.  Will request any records not available through epic.  Continue current medications listed on your summary sheet.           Cervical radiculopathy - Primary (Chronic)    Overview     Impression:     Spondylosis with minimal retro listhesis or posterior subluxation C4 on C5.     Hepatopathy ache bone and degenerative change limits evaluation of the C1-2 articulation and levels particularly on the left.  Consideration should be given for CT if not previously  performed to further evaluate these findings.     Additional findings as above.        Electronically signed by: Brian Carrasquillo MD  Date:                                            11/20/2020  Time:                                           12:58             Exam Ended: 11/20/20 10:57           Chronic.  Intermittent control.  Patient did have relief with cervical TEETEE.  She is following with pain management.         Current Assessment & Plan     Continue Topamax, tizanidine.  Patient has stop gabapentin.  Patient reports that she feels reasonably well right now.  Continue physical therapy.  Continue follow-up with pain management consider repeat TEETEE when appropriate.  Blood pressure and diabetes are well controlled.         Pain in both hands    Overview     Chronic.  Worsening.  Patient had history of fracture repair on the left wrist.  Is having pain in the fingers of the left hand and the thumb of the right hand.         Current Assessment & Plan     Referral to orthopedic hand for further evaluation treatment.  Start Lodine.         Right leg pain    Overview     Chronic.  Worsening.  Patient believes that it may be due to a cyst in her popliteal space however the cyst is located on the left side which was reviewed with her by EMR.    Study date: 4/30/21       Reason for Exam  Priority: Routine  Dx: Claudication of both lower extremities [I73.9 (ICD-10-CM)]     Conclusion    · Normal arterial Doppler of the lower extremities bilaterally with no focal area of stenosis at any level, with three-vessel runoff bilaterally, and with normal bilateral ABIs of 1.0.  · Incidental finding is a left Baker cyst measures 5.8 x 1.4 cm.                 Current Assessment & Plan     X-ray of the right lower leg in the area of pain.  Start Lodine.  Consider physical therapy and or further imaging if needed.  Patient may ultimately may need consult orthopedics.                Review of patient's allergies indicates:   Allergen  Reactions    Codeine sulfate      Nausea^    Lisinopril Swelling     angioedema    Codeine Nausea Only and Rash     Current Outpatient Medications   Medication Instructions    acetaminophen (TYLENOL) 1,000 mg, Oral, Every 6 hours PRN    albuterol (PROVENTIL) 2.5 mg, Nebulization, Every 6 hours while awake    albuterol (PROVENTIL/VENTOLIN HFA) 90 mcg/actuation inhaler INHALE 2 PUFFS BY MOUTH EVERY 6 HOURS AS NEEDED FOR WHEEZING OR SHORTNESS OF BREATH    blood sugar diagnostic (CLEVER CHOICE VOICE+ TEST) Strp TEST BLOOD SUGARS ONE TIME DAILY    blood sugar diagnostic Strp To check BG 3 times daily, to use with insurance preferred meter    blood-glucose meter kit To check BG three times daily, to use with insurance preferred meter    blood-glucose meter kit Use before meals and before bed when the glucoses are not in control.  Otherwise, test it daily once a day before meals randomly to ensure    diltiaZEM (DILACOR XR) 120 mg, Oral, Daily    etodolac (LODINE) 400 mg, Oral, 2 times daily    famotidine (PEPCID) 40 mg, Oral, Daily    ferrous sulfate 324 mg, Oral, Daily    fluticasone propion-salmeterol 115-21 mcg/dose (ADVAIR HFA) 115-21 mcg/actuation HFAA inhaler 2 puffs, Inhalation, 2 times daily, Wash out mouth after use. Controller    fluticasone propionate (FLONASE) 50 mcg/actuation nasal spray Use 2 sprays to each nostril daily    inhalation spacing device (BREATHERITE VALVED MDI CHAMBER) Use as directed for inhalation.    lancets Misc To check BG three times daily, to use with insurance preferred meter    levocetirizine (XYZAL) 5 mg, Oral, Nightly    levothyroxine (SYNTHROID) 100 MCG tablet Take 1 tablet by mouth daily.    LIDOcaine-prilocaine (EMLA) cream SMARTSI-2 Pump Topical 3-4 Times Daily    linaCLOtide (LINZESS) 72 mcg, Oral, Daily    metFORMIN (GLUCOPHAGE) 1,000 mg, Oral, 2 times daily with meals    montelukast (SINGULAIR) 10 mg, Oral, Nightly    omeprazole (PRILOSEC) 20 mg,  "Oral, 2 times daily    pantoprazole (PROTONIX) 40 mg, Oral, 2 times daily    pen needle, diabetic (PEN NEEDLE) 32 gauge x 5/32" Ndle Use as directed to inject medication    pravastatin (PRAVACHOL) 80 mg, Oral, Daily    pulse oximeter (PULSE OXIMETER) device Apply Externally, 2 times daily, Use twice daily at 8 AM and 3 PM and record the value in MyChart as directed.    semaglutide (OZEMPIC) 1 mg, Subcutaneous, Every 7 days    tiZANidine (ZANAFLEX) 4 MG tablet Take 1/2 to 1 tablet by mouth twice daily as needed for muscle spasms. May cause drowsiness.    topiramate (TOPAMAX) 100 mg, Oral, 2 times daily    TRUEPLUS LANCETS 33 gauge Misc Check blood glucose up to 3 times daily as needed before meals      I have reviewed the PMH, social history, FamilyHx, surgical history, allergies and medications documented / confirmed by the patient at the time of this visit.  Review of Systems   Constitutional: Negative for chills, fatigue, fever and unexpected weight change.   HENT: Negative for ear pain and sore throat.    Eyes: Negative for redness and visual disturbance.   Respiratory: Negative for cough and shortness of breath.    Cardiovascular: Negative for chest pain and palpitations.   Gastrointestinal: Negative for nausea and vomiting.   Genitourinary: Negative for difficulty urinating and hematuria.   Musculoskeletal: Negative for arthralgias and myalgias.   Skin: Negative for rash and wound.   Neurological: Negative for weakness and headaches.   Psychiatric/Behavioral: Negative for sleep disturbance. The patient is not nervous/anxious.      Objective:   /74   Pulse (!) 114   Temp 97.2 °F (36.2 °C) (Temporal)   Resp 20   Ht 5' 8" (1.727 m)   Wt (!) 148.3 kg (326 lb 14.4 oz)   SpO2 95%   BMI 49.70 kg/m²   Physical Exam  Vitals and nursing note reviewed.   Constitutional:       General: She is not in acute distress.     Appearance: She is well-developed. She is obese.   HENT:      Head: Normocephalic " and atraumatic.      Right Ear: External ear normal.      Left Ear: External ear normal.      Nose: Nose normal. No rhinorrhea.   Eyes:      Extraocular Movements: Extraocular movements intact.      Pupils: Pupils are equal, round, and reactive to light.   Cardiovascular:      Rate and Rhythm: Normal rate.      Pulses: Normal pulses.   Pulmonary:      Effort: Pulmonary effort is normal. No respiratory distress.      Breath sounds: Normal breath sounds.   Musculoskeletal:         General: Normal range of motion.      Cervical back: Normal range of motion and neck supple. Tenderness and bony tenderness present. Pain with movement present.      Lumbar back: Spasms and tenderness present. No bony tenderness. Negative right straight leg raise test and negative left straight leg raise test.      Right lower leg: Tenderness present. No swelling, deformity, lacerations or bony tenderness. No edema.      Left lower leg: No swelling, deformity, lacerations, tenderness or bony tenderness. No edema.        Legs:    Skin:     General: Skin is warm and dry.      Capillary Refill: Capillary refill takes less than 2 seconds.   Neurological:      General: No focal deficit present.      Mental Status: She is alert and oriented to person, place, and time.   Psychiatric:         Mood and Affect: Mood normal.         Behavior: Behavior normal.        Patient is wearing a mask which limits physical exam due to COVID restrictions.   Assessment:     1. Cervical radiculopathy    2. Type 2 diabetes mellitus with hyperglycemia, without long-term current use of insulin    3. Hypertension associated with diabetes    4. Encounter for long-term (current) use of medications    5. Pain in both hands    6. Right leg pain      MDM:   Moderate complexity.  Moderate risk.  Total time: 31 minutes.  This includes total time spent on the encounter, which includes face to face time and non-face to face time preparing to see the patient (eg, review of  previous medical records, tests), Obtaining and/or reviewing separately obtained history, documenting clinical information in the electronic or other health record, independently interpreting results (not separately reported)/communicating results to the patient/family/caregiver, and/or care coordination (not separately reported).    I have Reviewed and summarized old records.  I have performed thorough medication reconciliation today and discussed risk and benefits of medications.  I have reviewed labs and discussed with patient.  All questions were answered.  I am requesting old records and will review them once they are available.    I have signed for the following orders AND/OR meds.  Orders Placed This Encounter   Procedures    X-Ray Tibia Fibula 2 View Right     Standing Status:   Future     Number of Occurrences:   1     Standing Expiration Date:   1/6/2023     Order Specific Question:   May the Radiologist modify the order per protocol to meet the clinical needs of the patient?     Answer:   Yes     Order Specific Question:   Release to patient     Answer:   Immediate    Ambulatory referral/consult to Orthopedics     Standing Status:   Future     Standing Expiration Date:   2/6/2023     Referral Priority:   Routine     Referral Type:   Consultation     Requested Specialty:   Orthopedic Surgery     Number of Visits Requested:   1     Medications Ordered This Encounter   Medications    etodolac (LODINE) 400 MG tablet     Sig: Take 1 tablet (400 mg total) by mouth 2 (two) times daily.     Dispense:  60 tablet     Refill:  12    topiramate (TOPAMAX) 100 MG tablet     Sig: Take 1 tablet (100 mg total) by mouth 2 (two) times daily.     Dispense:  60 tablet     Refill:  12        Follow up in about 6 months (around 7/6/2022), or if symptoms worsen or fail to improve, for Annual Wellness Exam.    If no improvement in symptoms or symptoms worsen, advised to call/follow-up at clinic or go to ER. Patient voiced  understanding and all questions/concerns were addressed.   DISCLAIMER: This note was compiled by using a speech recognition dictation system and therefore please be aware that typographical / speech recognition errors can and do occur.  Please contact me if you see any errors specifically.    Oleksandr Nagel M.D.       Office: [phone] [address] [place] [address]  FAX: [phone]

## 2022-01-06 NOTE — ASSESSMENT & PLAN NOTE
X-ray of the right lower leg in the area of pain.  Start Lodine.  Consider physical therapy and or further imaging if needed.  Patient may ultimately may need consult orthopedics.

## 2022-01-06 NOTE — ASSESSMENT & PLAN NOTE
Continue Topamax, tizanidine.  Patient has stop gabapentin.  Patient reports that she feels reasonably well right now.  Continue physical therapy.  Continue follow-up with pain management consider repeat TEETEE when appropriate.  Blood pressure and diabetes are well controlled.

## 2022-01-06 NOTE — PROGRESS NOTES
1st check to see if patient has seen the results.  If not then  CALL patient with results and Document verification.  Schedule follow-up if needed.  [phone]  X-ray of the right lower leg in the area of pain does not reveal any obvious cause of the pain.  There is arthritis located in the knee as well as other findings of arthritis in the ankle joint.  If no improvement I recommend orthopedics consult and or physical therapy.

## 2022-01-06 NOTE — ASSESSMENT & PLAN NOTE
Continue Ozempic.  Update labs.We will plan to monitor hemoglobin A1c at designated intervals 3 to 6 months.  I recommend ongoing Education for diabetic diet and exercise protocol.  We will continue to monitor for side effects.    Please be advised of symptoms to monitor for and to notify me immediately if persistent or worsening.  Follow up with Ophthalmology/Optometry and Podiatry at least annually.

## 2022-01-06 NOTE — PATIENT INSTRUCTIONS
Follow up in about 6 months (around 7/6/2022), or if symptoms worsen or fail to improve, for Annual Wellness Exam. Patient Education       Etodolac (ee toe ROMERO gómez)   Brand Names: US Lodine   Brand Names: Derek NU-Etodolac [DSC]; TARO-Etodolac   Warning   · This drug may raise the risk of heart and blood vessel problems like heart attack and stroke. These effects can be deadly. The risk may be greater if you have heart disease or risks for heart disease. However, it can also be raised even if you do not have heart disease or risks for heart disease. The risk can happen within the first weeks of using this drug and may be greater with higher doses or long-term use. Do not use this drug right before or after bypass heart surgery.  · This drug may raise the chance of severe and sometimes deadly stomach or bowel problems like ulcers or bleeding. The risk is greater in older people, and in people who have had stomach or bowel ulcers or bleeding before. These problems may occur without warning signs.    What is this drug used for?   · It is used to treat some types of arthritis.  · It is used to ease pain.    What do I need to tell my doctor BEFORE I take this drug?   · If you are allergic to this drug; any part of this drug; or any other drugs, foods, or substances. Tell your doctor about the allergy and what signs you had.  · If you have an allergy to aspirin or nonsteroidal anti-inflammatory drugs (NSAIDs) like ibuprofen or naproxen.  · If you have any of these health problems: GI (gastrointestinal) bleeding or kidney problems.  · If you have any of these health problems: Heart failure (weak heart) or a recent heart attack.  · If you are taking any other NSAID, a salicylate drug like aspirin, or pemetrexed.  · If you are taking phenylbutazone.  · If you are having trouble getting pregnant or you are having your fertility checked.  · If you are pregnant, plan to become pregnant, or get pregnant while taking this drug.  This drug may cause harm to an unborn baby if taken at 20 weeks or later in pregnancy. If you are between 20 to 30 weeks of pregnancy, only take this drug if your doctor has told you to. Do not take this drug if you are more than 30 weeks pregnant.  This is not a list of all drugs or health problems that interact with this drug.  Tell your doctor and pharmacist about all of your drugs (prescription or OTC, natural products, vitamins) and health problems. You must check to make sure that it is safe for you to take this drug with all of your drugs and health problems. Do not start, stop, or change the dose of any drug without checking with your doctor.  What are some things I need to know or do while I take this drug?   · Tell all of your health care providers that you take this drug. This includes your doctors, nurses, pharmacists, and dentists.  · Have your blood work checked if you are on this drug for a long time. Talk with your doctor.  · This drug may affect certain lab tests. Tell all of your health care providers and lab workers that you take this drug.  · High blood pressure has happened with drugs like this one. Have your blood pressure checked as you have been told by your doctor.  · Talk with your doctor before you drink alcohol.  · If you smoke, talk with your doctor.  · Do not take more than what your doctor told you to take. Taking more than you are told may raise your chance of very bad side effects.  · Do not take this drug for longer than you were told by your doctor.  · If you have asthma, talk with your doctor. You may be more sensitive to this drug.  · You may bleed more easily. Be careful and avoid injury. Use a soft toothbrush and an electric razor.  · The chance of heart failure is raised with the use of drugs like this one. In people who already have heart failure, the chance of heart attack, having to go to the hospital for heart failure, and death is raised. Talk with the doctor.  · The  chance of heart attack and heart-related death is raised in people taking drugs like this one after a recent heart attack. People taking drugs like this one after a first heart attack were also more likely to die in the year after the heart attack compared with people not taking drugs like this one. Talk with the doctor.  · If you are taking aspirin to help prevent a heart attack, talk with your doctor.  · Liver problems have happened with drugs like this one. Sometimes, this has been deadly. Call your doctor right away if you have signs of liver problems like dark urine, feeling tired, not hungry, upset stomach or stomach pain, light-colored stools, throwing up, or yellow skin or eyes.  · A severe and sometimes deadly reaction has happened with drugs like this one. Most of the time, this reaction has signs like fever, rash, or swollen glands with problems in body organs like the liver, kidney, blood, heart, muscles and joints, or lungs. If you have questions, talk with the doctor.  · If you are 65 or older, use this drug with care. You could have more side effects.  · NSAIDs like this drug may affect egg release (ovulation). This may affect being able to get pregnant. This goes back to normal when this drug is stopped. Talk with the doctor.  · Tell your doctor if you are breast-feeding. You will need to talk about any risks to your baby.    What are some side effects that I need to call my doctor about right away?   WARNING/CAUTION: Even though it may be rare, some people may have very bad and sometimes deadly side effects when taking a drug. Tell your doctor or get medical help right away if you have any of the following signs or symptoms that may be related to a very bad side effect:  · Signs of an allergic reaction, like rash; hives; itching; red, swollen, blistered, or peeling skin with or without fever; wheezing; tightness in the chest or throat; trouble breathing, swallowing, or talking; unusual hoarseness;  or swelling of the mouth, face, lips, tongue, or throat.  · Signs of bleeding like throwing up or coughing up blood; vomit that looks like coffee grounds; blood in the urine; black, red, or tarry stools; bleeding from the gums; abnormal vaginal bleeding; bruises without a cause or that get bigger; or bleeding you cannot stop.  · Signs of kidney problems like unable to pass urine, change in how much urine is passed, blood in the urine, or a big weight gain.  · Signs of high potassium levels like a heartbeat that does not feel normal; feeling confused; feeling weak, lightheaded, or dizzy; feeling like passing out; numbness or tingling; or shortness of breath.  · Signs of high blood pressure like very bad headache or dizziness, passing out, or change in eyesight.  · Shortness of breath, a big weight gain, or swelling in the arms or legs.  · Chest pain or pressure or a fast heartbeat.  · Weakness on 1 side of the body, trouble speaking or thinking, change in balance, drooping on one side of the face, or blurred eyesight.  · Feeling very tired or weak.  · Ringing in ears.  · Flu-like signs.  · Swollen gland.  · A severe skin reaction (Brown-Armando syndrome/toxic epidermal necrolysis) may happen. It can cause severe health problems that may not go away, and sometimes death. Get medical help right away if you have signs like red, swollen, blistered, or peeling skin (with or without fever); red or irritated eyes; or sores in your mouth, throat, nose, or eyes.  What are some other side effects of this drug?   All drugs may cause side effects. However, many people have no side effects or only have minor side effects. Call your doctor or get medical help if any of these side effects or any other side effects bother you or do not go away:  · Constipation, diarrhea, stomach pain, upset stomach, or throwing up.  · Heartburn.  · Gas.  · Feeling dizzy, tired, or weak.  · Headache.  These are not all of the side effects that may  occur. If you have questions about side effects, call your doctor. Call your doctor for medical advice about side effects.  You may report side effects to your national health agency.  You may report side effects to the FDA at 1-509.153.7236. You may also report side effects at https://www.fda.gov/medwatch.  How is this drug best taken?   Use this drug as ordered by your doctor. Read all information given to you. Follow all instructions closely.  All products:   · Take with or without food. Take with food if it causes an upset stomach.  · Take with a full glass of water.  Extended-release tablets:   · Swallow whole. Do not chew, break, or crush.  What do I do if I miss a dose?   All products:   · If you take this drug on a regular basis, take a missed dose as soon as you think about it.  · If it is close to the time for your next dose, skip the missed dose and go back to your normal time.  · Do not take 2 doses at the same time or extra doses.  Short-acting products:   · Many times this drug is taken on an as needed basis. Do not take more often than told by the doctor.  How do I store and/or throw out this drug?   · Store at room temperature in a dry place. Do not store in a bathroom.  · Keep all drugs in a safe place. Keep all drugs out of the reach of children and pets.  · Throw away unused or  drugs. Do not flush down a toilet or pour down a drain unless you are told to do so. Check with your pharmacist if you have questions about the best way to throw out drugs. There may be drug take-back programs in your area.    General drug facts   · If your symptoms or health problems do not get better or if they become worse, call your doctor.  · Do not share your drugs with others and do not take anyone else's drugs.  · Some drugs may have another patient information leaflet. If you have any questions about this drug, please talk with your doctor, nurse, pharmacist, or other health care provider.  · This drug  comes with an extra patient fact sheet called a Medication Guide. Read it with care. Read it again each time this drug is refilled. If you have any questions about this drug, please talk with the doctor, pharmacist, or other health care provider.  · If you think there has been an overdose, call your poison control center or get medical care right away. Be ready to tell or show what was taken, how much, and when it happened.    Consumer Information Use and Disclaimer   This generalized information is a limited summary of diagnosis, treatment, and/or medication information. It is not meant to be comprehensive and should be used as a tool to help the user understand and/or assess potential diagnostic and treatment options. It does NOT include all information about conditions, treatments, medications, side effects, or risks that may apply to a specific patient. It is not intended to be medical advice or a substitute for the medical advice, diagnosis, or treatment of a health care provider based on the health care provider's examination and assessment of a patient's specific and unique circumstances. Patients must speak with a health care provider for complete information about their health, medical questions, and treatment options, including any risks or benefits regarding use of medications. This information does not endorse any treatments or medications as safe, effective, or approved for treating a specific patient. [employer] and its affiliates disclaim any warranty or liability relating to this information or the use thereof. The use of this information is governed by the Terms of Use, available at https://www.[employer].com.au.com/en/solutions/lexicomp/about/estevan.  Last Reviewed Date   2020-12-01  Copyright   © 2021 [employer] and its affiliates and/or licensors. All rights reserved.    Patient Education       Topiramate (toe PYRE a mate)   Brand Names: US Qudexy XR; Topamax; Topamax Sprinkle; Trokendi XR    Brand Names: Derek ACH-Topiramate; AG-Topiramate; APO-Topiramate; Auro-Topiramate; DOM-Topiramate; GLN-Topiramate; JAMP-Topiramate; Mar-Topiramate; MINT-Topiramate; MYLAN-Topiramate; PMS-Topiramate; PRO-Topiramate; RAN-Topiramate [DSC]; SANDOZ Topiramate; TEVA-Topiramate; Topamax; Topamax Sprinkle   What is this drug used for?   · It is used to treat seizures.  · It is used to prevent migraine headaches.  · It may be given to you for other reasons. Talk with the doctor.    What do I need to tell my doctor BEFORE I take this drug?   · If you are allergic to this drug; any part of this drug; or any other drugs, foods, or substances. Tell your doctor about the allergy and what signs you had.  This drug may interact with other drugs or health problems.  Tell your doctor and pharmacist about all of your drugs (prescription or OTC, natural products, vitamins) and health problems. You must check to make sure that it is safe for you to take this drug with all of your drugs and health problems. Do not start, stop, or change the dose of any drug without checking with your doctor.  What are some things I need to know or do while I take this drug?   · Avoid driving and doing other tasks or actions that call for you to be alert until you see how this drug affects you.  · Sweating less and high body temperatures have happened with this drug. Sometimes, this has led to the need for treatment in a hospital. Be careful in hot weather and while being active. Call your doctor right away if you have a fever or you do not sweat during activities or in warm temperatures.  · Like other drugs that may be used for seizures, this drug may rarely raise the risk of suicidal thoughts or actions. The risk may be higher in people who have had suicidal thoughts or actions in the past. Call the doctor right away about any new or worse signs like depression; feeling nervous, restless, or grouchy; panic attacks; or other changes in mood or  behavior. Call the doctor right away if any suicidal thoughts or actions occur.  · This drug may cause an acid blood problem (metabolic acidosis). The chance may be higher in children and in people with kidney problems, breathing problems, or diarrhea. The chance may also be higher if you take certain other drugs, if you have surgery, or if you are on a ketogenic diet. Over time, metabolic acidosis can cause kidney stones, bone problems, or growth problems in children.  · This drug may raise the chance of bleeding. Sometimes, bleeding can be life-threatening. Talk with the doctor.  · This drug may cause very bad eye problems. If left untreated, this can lead to lasting eyesight loss. Call your doctor right away if you have new eye signs like blurred eyesight or other changes in eyesight, eye pain, or eye redness.  · A severe skin reaction (Brown-Armando syndrome/toxic epidermal necrolysis) may happen. It can cause severe health problems that may not go away, and sometimes death. Get medical help right away if you have signs like red, swollen, blistered, or peeling skin (with or without fever); red or irritated eyes; or sores in your mouth, throat, nose, or eyes.  · If the patient is a child, use this drug with care. The risk of some side effects may be higher in children.  · This drug may affect growth in children and teens in some cases. They may need regular growth checks. Talk with the doctor.  · Birth control pills and other hormone-based birth control may not work as well to prevent pregnancy. Use some other kind of birth control also like a condom when taking this drug.  · If you are taking hormone-based birth control and you have any change in your bleeding pattern, talk with your doctor.  · This drug may cause harm to the unborn baby if you take it while you are pregnant. If you are pregnant or you get pregnant while taking this drug, call your doctor right away.  · If you are able to get pregnant but do  not want to get pregnant, use birth control that you can trust to prevent pregnancy while taking this drug.  · Tell your doctor if you are breast-feeding. You will need to talk about any risks to your baby.    What are some side effects that I need to call my doctor about right away?   WARNING/CAUTION: Even though it may be rare, some people may have very bad and sometimes deadly side effects when taking a drug. Tell your doctor or get medical help right away if you have any of the following signs or symptoms that may be related to a very bad side effect:  · Signs of an allergic reaction, like rash; hives; itching; red, swollen, blistered, or peeling skin with or without fever; wheezing; tightness in the chest or throat; trouble breathing, swallowing, or talking; unusual hoarseness; or swelling of the mouth, face, lips, tongue, or throat.  · Signs of too much acid in the blood (acidosis) like confusion; fast breathing; fast heartbeat; a heartbeat that does not feel normal; very bad stomach pain, upset stomach, or throwing up; feeling very sleepy; shortness of breath; or feeling very tired or weak.  · Signs of infection like fever, chills, very bad sore throat, ear or sinus pain, cough, more sputum or change in color of sputum, pain with passing urine, mouth sores, or wound that will not heal.  · Signs of high ammonia levels like a heartbeat that does not feel normal, breathing that is not normal, feeling confused, pale skin, slow heartbeat, seizures, sweating, throwing up, or twitching.  · Any unexplained bruising or bleeding.  · Feeling confused, not able to focus, or change in behavior.  · Memory problems or loss.  · Trouble speaking.  · Trouble sleeping.  · Change in balance.  · Very bad dizziness or passing out.  · Not able to eat.  · Back pain, belly pain, or blood in the urine. May be signs of a kidney stone.  · A burning, numbness, or tingling feeling that is not normal.  · Bone pain.  · Chest pain.  · Muscle  pain or weakness.  · Shakiness.  · Trouble walking.  · Not able to control eye movements.  · Liver problems have rarely happened with this drug. Sometimes, this has been deadly. Call your doctor right away if you have signs of liver problems like dark urine, feeling tired, not hungry, upset stomach or stomach pain, light-colored stools, throwing up, or yellow skin or eyes.  What are some other side effects of this drug?   All drugs may cause side effects. However, many people have no side effects or only have minor side effects. Call your doctor or get medical help if any of these side effects or any other side effects bother you or do not go away:  · Constipation, diarrhea, stomach pain, upset stomach, throwing up, or feeling less hungry.  · Change in taste.  · Weight loss.  · Feeling nervous and excitable.  · Feeling dizzy, sleepy, tired, or weak.  · Headache.  · Flushing.  · Signs of a common cold.  · Joint pain.  These are not all of the side effects that may occur. If you have questions about side effects, call your doctor. Call your doctor for medical advice about side effects.  You may report side effects to your national health agency.  You may report side effects to the FDA at 1-963.678.2132. You may also report side effects at https://www.fda.gov/medwatch.  How is this drug best taken?   Use this drug as ordered by your doctor. Read all information given to you. Follow all instructions closely.  All products:   · Take with or without food.  · Keep taking this drug as you have been told by your doctor or other health care provider, even if you feel well.  · Do not stop taking this drug all of a sudden without calling your doctor. You may have a greater risk of seizures. If you need to stop this drug, you will want to slowly stop it as ordered by your doctor.  · Drink lots of noncaffeine liquids unless told to drink less liquid by your doctor.  · Tell all of your health care providers that you take this  drug. This includes your doctors, nurses, pharmacists, and dentists.  · Have blood work checked as you have been told by the doctor. Talk with the doctor.  · Talk with your doctor before you use marijuana, other forms of cannabis, or prescription or OTC drugs that may slow your actions.  · Taking this drug with valproic acid can cause low body temperature. This can also cause tiredness, confusion, or coma. Talk with the doctor.  Tablets:   · Swallow whole. Do not chew, break, or crush.  · Avoid drinking alcohol while taking this drug.  Regular-release sprinkle capsules and extended-release sprinkle capsules:   · You may swallow whole or sprinkle the contents on a spoonful of soft food like applesauce. Do not crush or chew before you swallow.  · If mixed, swallow the mixed drug right away. Do not store for use at a later time.  · Drink fluids right after eating the food and drug mixture to make sure the drug is swallowed.  · Avoid drinking alcohol while taking this drug.  Extended-release capsules:   · Swallow whole. Do not chew, open, or crush.  · Do not sprinkle this drug on food.  · Avoid drinking alcohol while taking this drug. This is most important within 6 hours before or 6 hours after taking this drug.  What do I do if I miss a dose?   Extended-release capsules:   · Call your doctor to find out what to do.  Extended-release sprinkle capsules:   · Take a missed dose as soon as you think about it.  · If it is close to the time for your next dose, skip the missed dose and go back to your normal time.  · Do not take 2 doses at the same time or extra doses.  · If you miss 2 doses, call your doctor.  All other products:   · Take a missed dose as soon as you think about it.  · If it is less than 6 hours until the next dose, skip the missed dose and go back to the normal time.  · Do not take 2 doses at the same time or extra doses.  · If you miss 2 doses, call your doctor.  How do I store and/or throw out this drug?    · Store at room temperature protected from light. Store in a dry place. Do not store in a bathroom.  · Keep lid tightly closed.  · Keep all drugs in a safe place. Keep all drugs out of the reach of children and pets.  · Throw away unused or  drugs. Do not flush down a toilet or pour down a drain unless you are told to do so. Check with your pharmacist if you have questions about the best way to throw out drugs. There may be drug take-back programs in your area.    General drug facts   · If your symptoms or health problems do not get better or if they become worse, call your doctor.  · Do not share your drugs with others and do not take anyone else's drugs.  · Some drugs may have another patient information leaflet. If you have any questions about this drug, please talk with your doctor, nurse, pharmacist, or other health care provider.  · This drug comes with an extra patient fact sheet called a Medication Guide. Read it with care. Read it again each time this drug is refilled. If you have any questions about this drug, please talk with the doctor, pharmacist, or other health care provider.  · If you think there has been an overdose, call your poison control center or get medical care right away. Be ready to tell or show what was taken, how much, and when it happened.    Consumer Information Use and Disclaimer   This generalized information is a limited summary of diagnosis, treatment, and/or medication information. It is not meant to be comprehensive and should be used as a tool to help the user understand and/or assess potential diagnostic and treatment options. It does NOT include all information about conditions, treatments, medications, side effects, or risks that may apply to a specific patient. It is not intended to be medical advice or a substitute for the medical advice, diagnosis, or treatment of a health care provider based on the health care provider's examination and assessment of a patient's  specific and unique circumstances. Patients must speak with a health care provider for complete information about their health, medical questions, and treatment options, including any risks or benefits regarding use of medications. This information does not endorse any treatments or medications as safe, effective, or approved for treating a specific patient. [employer] and its affiliates disclaim any warranty or liability relating to this information or the use thereof. The use of this information is governed by the Terms of Use, available at https://www.[employer] [employer]/en/[email or web address]/[email or web address].  Last Reviewed Date   2020-06-18  Copyright   © 2021 UpToDate, Inc. and its affiliates and/or licensors. All rights reserved.    Dear patient,   As a result of recent federal legislation (The Federal Cures Act), you may receive lab or pathology results from your visit in your [facility] account before your physician is able to contact you. Your physician or their representative will relay the results to you with their recommendations at their soonest availability.     If no improvement in symptoms or symptoms worsen, please be advised to call MD, follow-up at clinic and/or go to ER if becomes severe.    Oleksandr Nagel M.D.        We Offer TELEHEALTH & Same Day Appointments!   Book your Telehealth appointment with me through my nurse or   Clinic appointments on [employer]!    [address] [place] [address]    Office: [phone]   FAX: [phone]    Check out my Facebook Page and Follow Me at: [email or web address]    Check out my website at [email or web address] by clicking on: [email or web address]    To Schedule appointments online, go to [employer]: [email or web address]

## 2022-01-07 NOTE — PROGRESS NOTES
Normal mammogram, repeat in 1 year, result released through [employer].  Please call the patient if not enrolled with my chart. [phone]   COVID-19 Vaccine(1) Never done  Shingles Vaccine(1 of 2) Never done  Lipid Panel due on 09/04/2021  Foot Exam due on 11/24/2021

## 2022-01-08 ENCOUNTER — PATIENT OUTREACH (OUTPATIENT)
Dept: ADMINISTRATIVE | Facility: OTHER | Age: 62
End: 2022-01-08
Payer: MEDICAID

## 2022-01-08 NOTE — PROGRESS NOTES
Health Maintenance Due   Topic Date Due    Shingles Vaccine (1 of 2) Never done    Lipid Panel  09/04/2021    Foot Exam  11/24/2021     Updates were requested from care everywhere.  Chart was reviewed for overdue [facility]active [facility] Encounters (SUDHEER) topics (CRS, Breast Cancer Screening, Eye exam)  Health Maintenance has been updated.  LINKS immunization registry triggered.  Immunizations were reconciled.

## 2022-01-10 ENCOUNTER — TELEPHONE (OUTPATIENT)
Dept: FAMILY MEDICINE | Facility: CLINIC | Age: 62
End: 2022-01-10
Payer: MEDICAID

## 2022-01-10 RX ORDER — DICLOFENAC SODIUM 75 MG/1
75 TABLET, DELAYED RELEASE ORAL 2 TIMES DAILY
Qty: 60 TABLET | Refills: 2 | Status: SHIPPED | OUTPATIENT
Start: 2022-01-10 | End: 2022-04-11 | Stop reason: SDUPTHER

## 2022-01-10 NOTE — TELEPHONE ENCOUNTER
----- Message from Barbara Fair sent at 1/10/2022  9:28 AM CST -----  Please call maria rt @ [phone] regarding medication Etodolac 400mg, states insurance is denying script, pt states she need another script

## 2022-01-10 NOTE — TELEPHONE ENCOUNTER
----- Message from Ann Esqueda sent at 1/10/2022 11:10 AM CST -----  Contact: Patient [phone]  Pt called in requesting to speak with staff for Dr Croft regarding medications please advise

## 2022-01-10 NOTE — PROGRESS NOTES
Established Patient Chronic Pain Note     Referring Physician: No ref. provider found    PCP: Oleksandr Nagel MD    Chief Complaint:   Chief Complaint   Patient presents with    Back Pain        SUBJECTIVE:  Interval history 01/11/2022  Patient presents status post L5-S1 interlaminar epidural steroid injection with right paramedian approach 12/10/2021.  Patient reports 80% sustained relief in lower back and bilateral lower extremity radicular symptoms following epidural steroid injection.  Today patient reports her pain as a 5/10.  Patient has continued Topamax 100 mg twice daily.  She denies any significant sedation from this medication and has enjoyed a  20 lb weight loss since initiation as well as improvement in neuropathic pain.      Interval Hx: 11/8/21  Pt is s/p C7-T1 IL TEETEE with R paramedian approach 10/08/21.  Patient reports 60% sustained relief following her cervical epidural steroid injection in both her neck and upper extremities.  Patient does report improvement in range of motion and strength.  Today patient would like to discuss her lower back and leg pain.  Today she again reports pain in the lower back which radiates down the posterior aspects of bilateral lower extremities in L5-S1 distribution to the feet.  Today pain is 5/10.  Patient is currently participating actively in physical therapy.  She is currently reached Topamax 75 mg twice daily without side effects.  She denies any new onset lower extremity weakness, bowel or bladder incontinence or saddle anesthesia.    HPI 07/30/21  Zakia Price is a 61 y.o. female with past medical history significant for history of acute respiratory failure secondary to COVID-19, type 2 diabetes complicated by neuropathy, GERD, hypertension, morbid obesity, obstructive sleep apnea,  who presents to the clinic for the evaluation of neck and lower back and leg pain.  Patient reports that her pain began approximately 5-6 years prior without inciting  accident injury or trauma.  Patient reports lower back pain which begins in a bandlike distribution at her iliac crest with radiation down the posterior aspect of bilateral legs to the feet in L5-S1 distribution.  Patient reports left side is significantly worse than the right.  Patient reports tingling and numbness in bilateral feet and associated subjective weakness in the lower extremities.  Patient does report periodic falls associated with her pain.    Patient also reports longstanding neck pain with radiation down bilateral upper extremities in no particular dermatomal distribution.  Patient reports pain is worse along the right paracervical muscles than on the left.  Patient reports compromise in hand  strength as well as in hand dexterity.  Patient denies ataxia or bowel or bladder incontinence.   Patient's neck and shoulder pain is exacerbated by cervical flexion, extension and lateral rotation as well as overhead activities..    Patient reports that lower extremity pain is exacerbated from moving from a sitting to a standing position and with ambulation.  Patient is able to ambulate approximately 10 minutes before requiring rest.  The pain is rated with a score of  7/10 on the BEST day and a score of 10/10 on the WORST day.  Symptoms interfere with daily activity and sleeping.     Patient reports significant motor weakness.  Patient denies night fever/night sweats, urinary incontinence, bowel incontinence, significant weight loss and loss of sensations.      Pain Disability Index Review:     Last 3 PDI Scores 1/11/2022 11/8/2021   Pain Disability Index (PDI) 35 45       Non-Pharmacologic Treatments:  Physical Therapy/Home Exercise: yes  Ice/Heat:yes  TENS: no  Acupuncture: no  Massage: yes  Chiropractic: no    Other: no    Pain Medications:  - Adjuvant Medications: Neurontin (Gabapentin), Topamax (Topiramate), Tylenol (Acetaminophen) and Zanaflex ( Tizanidine) Diazepam    Pain Procedures:    -12/10/2021:  L5-S1 intralaminar epidural steroid injection with right paramedian approach  -10/8/21: C7-T1 IL TEETEE with R paramedian approach; Dr. Paul  Prior epidural steroid injection lumbar spine, approximately 3-4 years prior at the [facility] in [place] which confirmed approximately 1 year of relief.    Past Medical History:   Diagnosis Date    Acute respiratory failure due to COVID-19     COVID-19     Diabetes mellitus, type 2     Diabetic neuropathy 1/27/2014    DJD (degenerative joint disease) of knee     DVT (deep venous thrombosis) around 1990's    in leg, is on no anticoagulant therapy presently    Fatty liver     GERD (gastroesophageal reflux disease)     Hypertension associated with diabetes     HECTOR (iron deficiency anemia) 5/13/2021    Multinodular goiter     Obesity, morbid, BMI 50 or higher     Sleep apnea     has no CPAP     Past Surgical History:   Procedure Laterality Date    COLONOSCOPY N/A 5/12/2021    Procedure: COLONOSCOPY;  Surgeon: Carolina Rizo MD;  Location: [facility];  Service: Endoscopy;  Laterality: N/A;    EPIDURAL STEROID INJECTION N/A 12/10/2021    Procedure: Lumbar L5/S1 IL TEETEE  Would like AM procedure, if possible;  Surgeon: Nae Paul MD;  Location: [facility];  Service: Pain Management;  Laterality: N/A;    EPIDURAL STEROID INJECTION INTO CERVICAL SPINE N/A 10/8/2021    Procedure: C7-T1 IL TEETEE-no sedation.  Needs IV-just incase;  Surgeon: Nae Paul MD;  Location: [facility];  Service: Pain Management;  Laterality: N/A;    ESOPHAGOGASTRODUODENOSCOPY N/A 7/8/2021    Procedure: EGD (ESOPHAGOGASTRODUODENOSCOPY) previous positve covid;  Surgeon: Jann Gutierrez MD;  Location: [facility];  Service: Endoscopy;  Laterality: N/A;    FRACTURE SURGERY      HYSTERECTOMY      SHOULDER ARTHROSCOPY      THYROIDECTOMY, PARTIAL Right     and transplatation of right superior parathyroid gland to the sternocleidomastoid muscle      TONSILLECTOMY      TUBAL LIGATION  1984    WRIST FRACTURE SURGERY      left     Review of patient's allergies indicates:   Allergen Reactions    Codeine sulfate      Nausea^    Lisinopril Swelling     angioedema    Codeine Nausea Only and Rash       Current Outpatient Medications   Medication Sig    acetaminophen (TYLENOL) 500 MG tablet Take 2 tablets (1,000 mg total) by mouth every 6 (six) hours as needed for Pain.    albuterol (PROVENTIL) 2.5 mg /3 mL (0.083 %) nebulizer solution Take 3 mLs (2.5 mg total) by nebulization every 6 (six) hours while awake.    albuterol (PROVENTIL/VENTOLIN HFA) 90 mcg/actuation inhaler INHALE 2 PUFFS BY MOUTH EVERY 6 HOURS AS NEEDED FOR WHEEZING OR SHORTNESS OF BREATH    blood sugar diagnostic (CLEVER CHOICE VOICE+ TEST) Strp TEST BLOOD SUGARS ONE TIME DAILY    blood sugar diagnostic Strp To check BG 3 times daily, to use with insurance preferred meter    blood-glucose meter kit To check BG three times daily, to use with insurance preferred meter    blood-glucose meter kit Use before meals and before bed when the glucoses are not in control.  Otherwise, test it daily once a day before meals randomly to ensure    diclofenac (VOLTAREN) 75 MG EC tablet Take 1 tablet (75 mg total) by mouth 2 (two) times daily.    diltiaZEM (DILACOR XR) 120 MG CDCR Take 1 capsule (120 mg total) by mouth once daily.    etodolac (LODINE) 400 MG tablet Take 1 tablet (400 mg total) by mouth 2 (two) times daily.    famotidine (PEPCID) 40 MG tablet Take 1 tablet (40 mg total) by mouth once daily.    ferrous sulfate 324 mg (65 mg iron) TbEC Take 1 tablet (324 mg total) by mouth once daily.    fluticasone propion-salmeterol 115-21 mcg/dose (ADVAIR HFA) 115-21 mcg/actuation HFAA inhaler Inhale 2 puffs into the lungs 2 (two) times daily. Wash out mouth after use. Controller    fluticasone propionate (FLONASE) 50 mcg/actuation nasal spray Use 2 sprays to each nostril daily    inhalation spacing  "device (BREATHERITE VALVED MDI CHAMBER) Use as directed for inhalation.    lancets Misc To check BG three times daily, to use with insurance preferred meter    levocetirizine (XYZAL) 5 MG tablet Take 1 tablet (5 mg total) by mouth every evening.    levothyroxine (SYNTHROID) 100 MCG tablet Take 1 tablet by mouth daily.    LIDOcaine-prilocaine (EMLA) cream SMARTSI-2 Pump Topical 3-4 Times Daily    linaCLOtide (LINZESS) 72 mcg Cap capsule Take 1 capsule (72 mcg total) by mouth once daily.    metFORMIN (GLUCOPHAGE) 1000 MG tablet Take 1 tablet (1,000 mg total) by mouth 2 (two) times daily with meals.    montelukast (SINGULAIR) 10 mg tablet Take 1 tablet (10 mg total) by mouth every evening.    pantoprazole (PROTONIX) 40 MG tablet Take 1 tablet (40 mg total) by mouth 2 (two) times daily.    pen needle, diabetic (PEN NEEDLE) 32 gauge x 5/32" Ndle Use as directed to inject medication    pravastatin (PRAVACHOL) 80 MG tablet Take 1 tablet (80 mg total) by mouth once daily.    pulse oximeter (PULSE OXIMETER) device by Apply Externally route 2 (two) times a day. Use twice daily at 8 AM and 3 PM and record the value in [facility] as directed.    semaglutide (OZEMPIC) 1 mg/dose (4 mg/3 mL) Inject 1 mg into the skin every 7 days.    tiZANidine (ZANAFLEX) 4 MG tablet Take 1/2 to 1 tablet by mouth twice daily as needed for muscle spasms. May cause drowsiness.    TRUEPLUS LANCETS 33 gauge Misc Check blood glucose up to 3 times daily as needed before meals    omeprazole (PRILOSEC) 20 MG capsule Take 1 capsule (20 mg total) by mouth 2 (two) times daily. for 14 days    topiramate (TOPAMAX) 100 MG tablet Take 1 tablet (100 mg total) by mouth 2 (two) times daily.     No current facility-administered medications for this visit.       Review of Systems     GENERAL:  No weight loss, malaise or fevers.  HEENT:   No recent changes in vision or hearing  NECK:  Negative for lumps, no difficulty with swallowing.  RESPIRATORY:  " "Negative for cough, wheezing or shortness of breath, patient denies any recent URI.  CARDIOVASCULAR:  Negative for chest pain, leg swelling or palpitations.  GI:  Negative for abdominal discomfort, blood in stools or black stools or change in bowel habits.  MUSCULOSKELETAL:  See HPI.  SKIN:  Negative for lesions, rash, and itching.  PSYCH:  No mood disorder or recent psychosocial stressors.  Patients sleep is not disturbed secondary to pain.  HEMATOLOGY/LYMPHOLOGY:  Negative for prolonged bleeding, bruising easily or swollen nodes.  Patient is not currently taking any anti-coagulants  NEURO:   No history of headaches, syncope, paralysis, seizures or tremors.  All other reviewed and negative other than HPI.    OBJECTIVE:    Resp 18   Ht 5' 8" (1.727 m)   Wt (!) 149.4 kg (329 lb 5.9 oz)   BMI 50.08 kg/m²       Physical Exam    GENERAL: Well appearing, in no acute distress, alert and oriented x3.  PSYCH:  Mood and affect appropriate.  SKIN: Skin color, texture, turgor normal, no rashes or lesions.  HEAD/FACE:  Normocephalic, atraumatic. Cranial nerves grossly intact.    NECK: pain to palpation over the cervical paraspinous muscles. Spurling Negative.  pain with neck flexion, extension, or lateral flexion.   Normal testing biceps, triceps and brachioradialis bilaterally.    NegativeHoffmann's bilaterally.    5/5 strength testing deltoid, biceps, triceps, wrist extensor, wrist flexor and ulnar intrinsics bilaterally.    Normal  strength bilaterally    CV: RRR with palpation of the radial artery.  PULM: No evidence of respiratory difficulty, symmetric chest rise.  GI:  Soft and non-tender.    BACK: Straight leg raising in the sitting and supine positions is negative to radicular pain. No pain to palpation over the facet joints of the lumbar spine or spinous processes. Normal range of motion without pain reproduction.  EXTREMITIES: Peripheral joint ROM is full and pain free without obvious instability or laxity in " all four extremities. No deformities, edema, or skin discoloration. Good capillary refill.  MUSCULOSKELETAL: Able to stand on heels & toes.   hip, and knee provocative maneuvers are negative.   pain with palpation over the sacroiliac joints bilaterally.  FABERs test is negative.  FAER test is negative bilaterally.   Bilateral upper and lower extremity strength is normal and symmetric.  No atrophy or tone abnormalities are noted.  -5/5 strength testing hip flexion, quadriceps, gastrocnemius soleus, anterior tibialis and EHL bilaterally.  RIGHT Lower extremity: Hip flexion 5/5, Hip Abduction 5/5, Hip Adduction 5/5, Knee extension 5/5, Knee flexion 5/5, Ankle dorsiflexion5/5, Extensor hallucis longus 5/5, Ankle plantarflexion 5/5  LEFT Lower extremity:  Hip flexion 5/5, Hip Abduction 5/5,Hip Adduction 5/5, Knee extension 5/5, Knee flexion 5/5, Ankle dorsiflexion 5/5, Extensor hallucis longus 5/5, Ankle plantarflexion 5/5  -Normal testing knee (patellar) jerk and ankle (achilles) jerk    NEURO: Bilateral upper and lower extremity coordination and muscle stretch reflexes are physiologic and symmetric. No loss of sensation is noted.  GAIT: normal.    Imaging:   X-Ray Lumbar Spine Complete 5 View  FINDINGS:  Mild dextroconvex curvature of the lumbar spine.  Grade 1 anterolisthesis of L4 on L5, measuring 4 mm.  Vertebral body heights are maintained without evidence of fracture or aggressive osseous lesion.  Multilevel degenerative anterior marginal osteophyte formation, most pronounced in the visualized lower thoracic spine.  Intervertebral disc space narrowing at L5-S1.  Multilevel degenerative facet arthropathy, most pronounced at L4-5 and L5-S1.    X-Ray Cervical Spine 5 View W Flex Extxt  FINDINGS:  C7 faintly visualized on neutral lateral view.  There is heterotopic bone formation along the anterior/inferior margin C1-2 articulation.  There is smooth prominence of the overlying prevertebral soft tissues at this level.   Pre dens space appears within upper limits normal.  Vertebral body height and alignment maintained with anterior and posterior marginal spurring most prominently at the C4-5 and C5-6 levels.  Posterior subluxation C4 on C5 noted.  There is uniform loss of disc height at C4-5 and C5-6 levels.  Heterotopic bone and positioning combine to limit evaluation of the left side of C1-2 articulation on the open mouth view.  There is suggestion of slight asymmetry of lateral masses of C1.  Flexion and extension views demonstrate stable positioning without additional evidence subluxation or instability.  No other evidence of fracture or subluxation noted.  Multilevel mild uncovertebral osteophyte formation and minimal/mild neural foraminal stenosis identified.  Surgical staples identified anteriorly in the lower neck at and just to the right of midline.  Remaining osseous structures appear intact.  Partially visualized upper apices appear clear.    Impression  Spondylosis with minimal retro listhesis or posterior subluxation C4 on C5.    Hepatopathy ache bone and degenerative change limits evaluation of the C1-2 articulation and levels particularly on the left.  Consideration should be given for CT if not previously performed to further evaluate these findings.    ASSESSMENT: 61 y.o. year old female with neck and shoulder and back and lower extremity pain, consistent with     1. Lumbar radiculopathy, chronic     2. Cervical radiculopathy     3. Cervical spondylosis     4. Chronic myofascial pain     5. Lumbar spondylosis     6. Dorsalgia, unspecified     7. Radiculopathy, cervical region  MRI Cervical Spine Without Contrast   8. Lumbar radiculopathy  MRI Lumbar Spine Without Contrast         PLAN:   - Interventions:  Patient has sustained 80% relief following L5-S1 intralaminar epidural steroid injection.  We have discussed repeating a lumbar intralaminar epidural steroid in the future should her symptoms exacerbate.  Explained  the risks and benefits of the procedure in detail with the patient today in clinic along with alternative treatment options    - Anticoagulation use: no no anticoagulation    - we have discussed considering repeat cervical C7-T1 epidural steroid injection the future should her upper extremity radicular symptoms exacerbate.     report:  Reviewed and consistent with medication use as prescribed.      - Medications:  -Continue Topamax .  We discussed potential side effects of this medication include drowsiness, dizziness, tingling of the hands and feet, diarrhea, dysgeusia, weight loss/anorexia.    Topamax  100mg BID   Refill x 11    - Therapy:   We discussed continuing physical therapy to help manage the patient/s painful condition. The patient was counseled that muscle strengthening will improve the long term prognosis in regards to pain and may also help increase range of motion and mobility.    - Imaging: Reviewed available imaging with patient and answered any questions they had regarding study.  MRI lumbar and cervical spine to better assess patient's pain and associated weakness    - Follow up visit: return to clinic in 8 weeks    The above plan and management options were discussed at length with patient. Patient is in agreement with the above and verbalized understanding.    - I discussed the goals of interventional chronic pain management with the patient on today's visit. We discussed a multimodal and systematic approach to pain.  This includes diagnostic and therapeutic injections, adjuvant pharmacologic treatment, physical therapy, and at times psychiatry.  I emphasized the importance of regular exercise, core strengthening and stretching, diet and weight loss as a cornerstone of long-term pain management.    - This condition does not require this patient to take time off of work, and the primary goal of our Pain Management services is to improve the patient's functional capacity.  - Patient Questions:  Answered all of the patient's questions regarding diagnoses, therapy, treatment and next steps      Nae Paul MD  Interventional Pain Management  [facility]    Disclaimer:  This note was prepared using voice recognition system and is likely to have sound alike errors that may have been overlooked even after proof reading.  Please call me with any questions

## 2022-01-10 NOTE — TELEPHONE ENCOUNTER
Pt will call insurance and get the alternative medication that is covered

## 2022-01-10 NOTE — TELEPHONE ENCOUNTER
Care Due:                  Date            Visit Type   Department     Provider  --------------------------------------------------------------------------------                                MYCHART                              FOLLOWUP/OF  [facility]  Last Visit: 01-      FICE VISIT   MEDICINE       [facility]                                           [facility]  Next Visit: 02-      None         [facility]                                                            Last  Test          Frequency    Reason                     Performed    Due Date  --------------------------------------------------------------------------------    CMP.........  12 months..  famotidine, metFORMIN,     Not Found    Overdue                             pravastatin..............    HBA1C.......  6 months...  metFORMIN, semaglutide...  10-   04-    Powered by [employer]. Reference number: [id number].   1/10/2022 3:37:57 PM CST

## 2022-01-11 ENCOUNTER — OFFICE VISIT (OUTPATIENT)
Dept: PAIN MEDICINE | Facility: CLINIC | Age: 62
End: 2022-01-11
Payer: MEDICAID

## 2022-01-11 VITALS — BODY MASS INDEX: 44.41 KG/M2 | HEIGHT: 68 IN | WEIGHT: 293 LBS | RESPIRATION RATE: 18 BRPM

## 2022-01-11 DIAGNOSIS — M79.18 CHRONIC MYOFASCIAL PAIN: ICD-10-CM

## 2022-01-11 DIAGNOSIS — M54.16 LUMBAR RADICULOPATHY, CHRONIC: Primary | ICD-10-CM

## 2022-01-11 DIAGNOSIS — M47.816 LUMBAR SPONDYLOSIS: ICD-10-CM

## 2022-01-11 DIAGNOSIS — M47.812 CERVICAL SPONDYLOSIS: ICD-10-CM

## 2022-01-11 DIAGNOSIS — M54.12 RADICULOPATHY, CERVICAL REGION: ICD-10-CM

## 2022-01-11 DIAGNOSIS — M54.12 CERVICAL RADICULOPATHY: ICD-10-CM

## 2022-01-11 DIAGNOSIS — M54.9 DORSALGIA, UNSPECIFIED: ICD-10-CM

## 2022-01-11 DIAGNOSIS — G89.29 CHRONIC MYOFASCIAL PAIN: ICD-10-CM

## 2022-01-11 DIAGNOSIS — M54.16 LUMBAR RADICULOPATHY: ICD-10-CM

## 2022-01-11 PROCEDURE — 99214 PR OFFICE/OUTPT VISIT, EST, LEVL IV, 30-39 MIN: ICD-10-PCS | Mod: S$PBB,,, | Performed by: ANESTHESIOLOGY

## 2022-01-11 PROCEDURE — 99999 PR PBB SHADOW E&M-EST. PATIENT-LVL III: CPT | Mod: PBBFAC,,, | Performed by: ANESTHESIOLOGY

## 2022-01-11 PROCEDURE — 3008F PR BODY MASS INDEX (BMI) DOCUMENTED: ICD-10-PCS | Mod: CPTII,,, | Performed by: ANESTHESIOLOGY

## 2022-01-11 PROCEDURE — 99213 OFFICE O/P EST LOW 20 MIN: CPT | Mod: PBBFAC | Performed by: ANESTHESIOLOGY

## 2022-01-11 PROCEDURE — 99214 OFFICE O/P EST MOD 30 MIN: CPT | Mod: S$PBB,,, | Performed by: ANESTHESIOLOGY

## 2022-01-11 PROCEDURE — 1159F PR MEDICATION LIST DOCUMENTED IN MEDICAL RECORD: ICD-10-PCS | Mod: CPTII,,, | Performed by: ANESTHESIOLOGY

## 2022-01-11 PROCEDURE — 99999 PR PBB SHADOW E&M-EST. PATIENT-LVL III: ICD-10-PCS | Mod: PBBFAC,,, | Performed by: ANESTHESIOLOGY

## 2022-01-11 PROCEDURE — 1159F MED LIST DOCD IN RCRD: CPT | Mod: CPTII,,, | Performed by: ANESTHESIOLOGY

## 2022-01-11 PROCEDURE — 1160F RVW MEDS BY RX/DR IN RCRD: CPT | Mod: CPTII,,, | Performed by: ANESTHESIOLOGY

## 2022-01-11 PROCEDURE — 1160F PR REVIEW ALL MEDS BY PRESCRIBER/CLIN PHARMACIST DOCUMENTED: ICD-10-PCS | Mod: CPTII,,, | Performed by: ANESTHESIOLOGY

## 2022-01-11 PROCEDURE — 3008F BODY MASS INDEX DOCD: CPT | Mod: CPTII,,, | Performed by: ANESTHESIOLOGY

## 2022-01-11 RX ORDER — TOPIRAMATE 100 MG/1
100 TABLET, FILM COATED ORAL 2 TIMES DAILY
Qty: 60 TABLET | Refills: 11 | Status: SHIPPED | OUTPATIENT
Start: 2022-01-11 | End: 2023-01-04 | Stop reason: SDUPTHER

## 2022-01-11 NOTE — PATIENT INSTRUCTIONS
General Neck and Back Pain    Both neck and back pain are usually caused by injury to the muscles or ligaments of the spine. Sometimes the disks that separate each bone of the spine may cause pain by pressing on a nearby nerve. Back and neck pain may appear after a sudden twisting or bending force (such as in a car accident), or sometimes after a simple awkward movement. In either case, muscle spasm is often present and adds to the pain.  Acute neck and back pain usually gets better in 1 to 2 weeks. Pain related to disk disease, arthritis in the spinal joints or spinal stenosis (narrowing of the spinal canal) can become chronic and last for months or years.  Back and neck pain are common problems. Most people feel better in 1 or 2 weeks, and most of the rest in 1 to 2 months. Most people can remain active.  People experience and describe pain differently.  · Pain can be sharp, stabbing, shooting, aching, cramping, or burning  · Movement, standing, bending, lifting, sitting, or walking may worsen the pain  · Pain can be localized to one spot or area, or it can be more generalized  · Pain can spread or radiate upwards, downwards, to the front, or go down your arms  · Muscle spasm may occur.  Most of the time mechanical problems with the muscles or spine cause the pain. it is usually caused by an injury, whether known or not, to the muscles or ligaments. While illnesses can cause back pain, it is usually not caused by a serious illness. Pain is usually related to physical activity, whether sports, exercise, work, or normal activity. Sometimes it can occur without an identifiable cause. This can happen simply by stretching or moving wrong, without noting pain at the time. Other causes include:  · Overexertion, lifting, pushing, pulling incorrectly or too aggressively.  · Sudden twisting, bending or stretching from an accident (car or fall), or accidental movement.  · Poor posture  · Poor conditioning, lack of regular  exercise  · Spinal disc disease or arthritis  · Stress  · Pregnancy, or illness like appendicitis, bladder or kidney infection, pelvic infections   Home care  · For neck pain: Use a comfortable pillow that supports the head and keeps the spine in a neutral position. The position of the head should not be tilted forward or backward.  · When in bed, try to find a position of comfort. A firm mattress is best. Try lying flat on your back with pillows under your knees. You can also try lying on your side with your knees bent up towards your chest and a pillow between your knees.  · At first, do not try to stretch out the sore spots. If there is a strain, it is not like the good soreness you get after exercising without an injury. In this case, stretching may make it worse.  · Avoid prolonged sitting, long car rides or travel. This puts more stress on the lower back than standing or walking.  · During the first 24 to 72 hours after an injury, apply an ice pack to the painful area for 20 minutes and then remove it for 20 minutes over a period of 60 to 90 minutes or several times a day.   · You can alternate ice and heat therapies. Talk with your healthcare provider about the best treatment for your back or neck pain. As a safety precaution, do not use a heating pad at bedtime. Sleeping with a heating pad can lead to skin burns or tissue damage.  · Therapeutic massage can help relax the back and neck muscles without stretching them.  · Be aware of safe lifting methods and do not lift anything over 15 pounds until all the pain is gone.  Medications  Talk to your healthcare provider before using medicine, especially if you have other medical problems or are taking other medicines.  · You may use over-the-counter medicine to control pain, unless another pain medicine was prescribed. If you have chronic conditions like diabetes, liver or kidney disease, stomach ulcers,  gastrointestinal bleeding, or are taking blood thinner  medicines.  · Be careful if you are given pain medicines, narcotics, or medicine for muscle spasm. They can cause drowsiness, and can affect your coordination, reflexes, and judgment. Do not drive or operate heavy machinery.  Follow-up care  Follow up with your healthcare provider, or as advised. Physical therapy or further tests may be needed.  If X-rays were taken, you will be notified of any new findings that may affect your care.  Call 911  Seek emergency medical care if any of the following occur:  · Trouble breathing  · Confusion  · Very drowsy or trouble awakening  · Fainting or loss of consciousness  · Rapid or very slow heart rate  · Loss of bowel or bladder control  When to seek medical advice  Call your healthcare provider right away if any of these occur:  · Pain becomes worse or spreads into your arms or legs  · Weakness, numbness or pain in one or both arms or legs  · Numbness in the groin area  · Difficulty walking  · Fever of 100.4ºF (38ºC) or higher, or as directed by your healthcare provider  Date Last Reviewed: 7/1/2016 © 2000-2017 [employer]. [address] [place] [address]. All rights reserved. This information is not intended as a substitute for professional medical care. Always follow your healthcare professional's instructions.          Understanding Lumbar Radiculopathy    Lumbar radiculopathy is irritation or inflammation of a nerve root in the low back. It causes symptoms that spread out from the back down one or both legs. To understand this condition, it helps to understand the parts of the spine:  · Vertebrae. These are bones that stack to form the spine. The lumbar spine contains the 5 bottom vertebrae.  · Disks. These are soft pads of tissue between the vertebrae. They act as shock absorbers for the spine.  · Spinal canal. This is a tunnel formed within the stacked vertebrae. In the lumbar spine, nerves run through this canal.  · Nerves. These branch off and  leave the spinal canal, traveling out to parts of the body. As they leave the spinal canal, nerves pass through openings between the vertebrae. The nerve root is the part of the nerve that is closest to the spinal canal.  · Sciatic nerve. This is a large nerve formed from several nerve roots in the low back. This nerve extends down the back of the leg to the foot.  With lumbar radiculopathy, nerve roots in the low back become irritated. This leads to pain and symptoms. The sciatic nerve is commonly involved, so the condition is often called sciatica.  What causes lumbar radiculopathy?  Aging, injury, poor posture, extra body weight, and other issues can lead to problems in the low back. These problems may then irritate nerve roots. They include:  · Damage to a disk in the lumbar spine. The damaged disk may then press on nearby nerve roots.  · Degeneration from wear and tear, and aging. This can lead to narrowing (stenosis) of the openings between the vertebrae. The narrowed openings press on nerve roots as they leave the spinal canal.  · Unstable spine. This is when a vertebra slips forward. It can then press on a nerve root.  Other, less common things can put pressure on nerves in the low back. These include diabetes, infection, or a tumor.  Symptoms of lumbar radiculopathy  These include:  · Pain in the low back  · Pain, numbness, tingling, or weakness that travels into the buttocks, hip, groin, or leg  · Muscle spasms  Treatment for lumbar radiculopathy  In most cases, your healthcare provider will first try treatments that help relieve symptoms. These may include:  · Prescription and over-the-counter pain medicines. These help relieve pain, swelling, and irritation.  · Limits on positions and activities that increase pain. But lying in bed or avoiding all movement is only recommended for a short period of time.  · Physical therapy, including exercises and stretches. This helps decrease pain and increase movement  and function.  · Steroid shots into the lower back. This may help relieve symptoms for a time.  · Weight-loss program. If you are overweight, losing extra pounds may help relieve symptoms.  In some cases, you may need surgery to fix the underlying problem. This depends on the cause, the symptoms, and how long the pain has lasted.  Possible complications  Over time, an irritated and inflamed nerve may become damaged. This may lead to long-lasting (permanent) numbness or weakness in your legs and feet. If symptoms change suddenly or get worse, be sure to let your healthcare provider know.  When to call your healthcare provider  Call your healthcare provider right away if you have any of these:  · New pain or pain that gets worse  · New or increasing weakness, tingling, or numbness in your leg or foot  · Problems controlling your bladder or bowel   Date Last Reviewed: 3/10/2016  © 8901-7208 [employer]. [address] [place] [address]. All rights reserved. This information is not intended as a substitute for professional medical care. Always follow your healthcare professional's instructions.          Understanding Cervical Radiculopathy    Cervical radiculopathy is irritation or inflammation of a nerve root in the neck. It causes neck pain and other symptoms that may spread into the chest or down the arm. To understand this condition, it helps to understand the parts of the spine:  · Vertebrae. These are bones that stack to form the spine. The cervical spine contains the 7 vertebrae in the neck.  · Disks. These are soft pads of tissue between the vertebrae. They act as shock absorbers for the spine.  · The spinal canal. This is a tunnel formed within the stacked vertebrae. The spinal cord runs through this canal.  · Nerves. These branch off the spinal cord. As they leave the spinal canal, nerves pass through openings between the vertebrae. The nerve root is the part of the nerve that is closest  to the spinal cord.   With cervical radiculopathy, nerve roots in the neck become irritated. This leads to pain and symptoms that can travel to the nerves that go from the spinal cord down the arms and into the torso.  What causes cervical radiculopathy?  Aging, injury, poor posture, and other issues can lead to problems in the neck. These problems may then irritate nerve roots. These include:  · Damage to a disk in the cervical spine. The damaged disk may then press on nearby nerve roots.  · Degeneration from wear and tear, and aging. This can lead to narrowing (stenosis) of the openings between the vertebrae. The narrowed openings press on nerve roots as they leave the spinal canal.  · An unstable spine. This is when a vertebra slips forward. It can then press on a nerve root.  There are other, less common causes of pressure on nerves in the neck. These include infection, cysts, and tumors.  Symptoms of cervical radiculopathy  These include:  · Neck pain  · Pain, numbness, tingling, or weakness that travels down the arm  · Loss of neck movement  · Muscle spasms  Treatment for cervical radiculopathy  In most cases, your healthcare provider will first try treatments that help relieve symptoms. These may include:  · Prescription or over-the-counter pain medicines. These help relieve pain and swelling.  · Cold packs. These help reduce pain.  · Resting. This involves avoiding positions and activities that increase pain.  · Neck brace (cervical collar). This can help relieve inflammation and pain.  · Physical therapy, including exercises and stretches. This can help decrease pain and increase movement and function.  · Shots of medicinesaround the nerve roots. This is done to help relieve symptoms for a time.  In some cases, your healthcare provider may advise surgery to fix the underlying problem. This depends on the cause, the symptoms, and how long the pain has lasted.  Possible complications  Over time, an irritated  and inflamed nerve may become damaged. This may lead to long-lasting (permanent) numbness or weakness. If symptoms change suddenly or get worse, be sure to let your healthcare provider know.     When to call your healthcare provider  Call your healthcare provider right away if you have any of these:  · New pain or pain that gets worse  · New or increasing weakness, numbness, or tingling in your arm or hand  · Bowel or bladder changes   Date Last Reviewed: 3/10/2016  © 6956-9152 [employer]. [address] [place] [address]. All rights reserved. This information is not intended as a substitute for professional medical care. Always follow your healthcare professional's instructions.          Exercises to Strengthen Your Lower Back  Strong lower back and abdominal muscles work together to support your spine. The exercises below will help strengthen the lower back. It is important that you begin exercising slowly and increase levels gradually.  Always begin any exercise program with stretching. If you feel pain while doing any of these exercises, stop and talk to your doctor about a more specific exercise program that better suits your condition.   Low back stretch  The point of stretching is to make you more flexible and increase your range of motion. Stretch only as much as you are able. Stretch slowly. Do not push your stretch to the limit. If at any point you feel pain while stretching, this is your (temporary) limit.  · Lie on your back with your knees bent and both feet on the ground.  · Slowly raise your left knee to your chest as you flatten your lower back against the floor. Hold for 5 seconds.  · Relax and repeat the exercise with your right knee.  · Do 10 of these exercises for each leg.  · Repeat hugging both knees to your chest at the same time.  Building lower back strength  Start your exercise routine with 10 to 30 minutes a day, 1 to 3 times a day.  Initial exercises  Lying on your  back:  1. Ankle pumps: Move your foot up and down, towards your head, and then away. Repeat 10 times with each foot.  2. Heel slides: Slowly bend your knee, drawing the heel of your foot towards you. Then slide your heel/foot from you, straightening your knee. Do not lift your foot off the floor (this is not a leg lift).  3. Abdominal contraction: Bend your knees and put your hands on your stomach. Tighten your stomach muscles. Hold for 5 seconds, then relax. Repeat 10 times.  4. Straight leg raise: Bend one leg at the knee and keep the other leg straight. Tighten your stomach muscles. Slowly lift your straight leg 6 to 12 inches off the floor and hold for up to 5 seconds. Repeat 10 times on each side.  Standin. Wall squats: Stand with your back against the wall. Move your feet about 12 inches away from the wall. Tighten your stomach muscles, and slowly bend your knees until they are at about a 45 degree angle. Do not go down too far. Hold about 5 seconds. Then slowly return to your starting position. Repeat 10 times.  2. Heel raises: Stand facing the wall. Slowly raise the heels of your feet up and down, while keeping your toes on the floor. If you have trouble balancing, you can touch the wall with your hands. Repeat 10 times.  More advanced exercises  When you feel comfortable enough, try these exercises.  1. Kneeling lumbar extension: Begin on your hands and knees. At the same time, raise and straighten your right arm and left leg until they are parallel to the ground. Hold for 2 seconds and come back slowly to a starting position. Repeat with left arm and right leg, alternating 10 times.  2. Prone lumbar extension: Lie face down, arms extended overhead, palms on the floor. At the same time, raise your right arm and left leg as high as comfortably possible. Hold for 10 seconds and slowly return to start. Repeat with left arm and right leg, alternating 10 times. Gradually build up to 20 times. (Advanced:  Repeat this exercise raising both arms and both legs a few inches off the floor at the same time. Hold for 5 seconds and release.)  3. Pelvic tilt: Lie on the floor on your back with your knees bent at 90 degrees. Your feet should be flat on the floor. Inhale, exhale, then slowly contract your abdominal muscles bringing your navel toward your spine. Let your pelvis rock back until your lower back is flat on the floor. Hold for 10 seconds while breathing smoothly.  4. Abdominal crunch: Perform a pelvic tilt (above) flattening your lower back against the floor. Holding the tension in your abdominal muscles, take another breath and raise your shoulder blades off the ground (this is not a full sit-up). Keep your head in line with your body (dont bend your neck forward). Hold for 2 seconds, then slowly lower.  Date Last Reviewed: 6/1/2016  © 8932-4665 [employer]. [address] [place] [address]. All rights reserved. This information is not intended as a substitute for professional medical care. Always follow your healthcare professional's instructions.          Exercises: Neck Isometrics  To start, sit in a chair with your feet flat on the floor. Your weight should be slightly forward so that youre balanced evenly on your buttocks. Relax your shoulders and keep your head level. Using a chair with arms may help you keep your balance.       5. Press your palm against your forehead. Resist with your neck muscles. Hold for 10 seconds. Relax. Repeat 5 times.  6. Do the exercise again, pressing on the side of your head. Repeat 5 times. Switch sides.  7. Do the exercise again, pressing on the back of your head. Repeat 5 times.  For your safety, check with your healthcare provider before starting an exercise program.   Date Last Reviewed: 8/16/2015  © 8960-0189 [employer]. [address] [place] [address]. All rights reserved. This information is not intended as a substitute for  professional medical care. Always follow your healthcare professional's instructions.

## 2022-01-13 ENCOUNTER — TELEPHONE (OUTPATIENT)
Dept: ORTHOPEDICS | Facility: CLINIC | Age: 62
End: 2022-01-13
Payer: MEDICAID

## 2022-01-20 NOTE — PROGRESS NOTES
Subjective:   Patient ID:  Zakia Price is a 62 y.o. female who presents for follow-up of No chief complaint on file.  The patient has post COVID syndrome with evidence of shortness of breath and chronic oxygen use.  Today patient has evidence of prominent P2 suggestive of pulmonary hypertension.  Cardiac echo be done to assess LV and RV function.  Follow-up evaluation again after this the performed.  Blood pressure stable today.  Patient is already scheduled peripheral vascular studies due to claudication symptoms     This pleasant patient history hypertension, , peripheral vascular disease, hyperlipidemia and diabetes.  Patient has been stable since COVID but has some shortness of breath     Overall doing well no shortness breath or edema today we reviewed all the testing including the echo and the peripheral vascular studies and stable.        This patient presents the office and is doing well post COVID improving less shortness of breath.  No edema today and feeling improved.  Vascular study showed no evidence of significant peripheral vascular disease.  ABIs were normal.  Patient has had no exertional symptoms feeling generally improved will continue on current medications  EKG today showed normal sinus rhythm and otherwise stable.  The patient states that diltiazem medication has largely resolved all of her palpitations.  She will continue on this medication long term.       This visit finds the patient feeling a little lightheaded with medications and blood pressure is running 110/70.  I will change the diltiazem to standard dosing 30 mg twice daily in place of the long-acting diltiazem.  Follow-up evaluation of blood pressure in the next 2 weeks.      Review of Systems   Constitutional: Negative for chills, diaphoresis, night sweats, weight gain and weight loss.   HENT: Negative for congestion, hoarse voice, sore throat and stridor.    Eyes: Negative for double vision and pain.   Cardiovascular:  Negative for chest pain, claudication, cyanosis, dyspnea on exertion, irregular heartbeat, leg swelling, near-syncope, orthopnea, palpitations, paroxysmal nocturnal dyspnea and syncope.   Respiratory: Negative for cough, hemoptysis, shortness of breath, sleep disturbances due to breathing, snoring, sputum production and wheezing.    Endocrine: Negative for cold intolerance, heat intolerance and polydipsia.   Hematologic/Lymphatic: Negative for bleeding problem. Does not bruise/bleed easily.   Skin: Negative for color change, dry skin and rash.   Musculoskeletal: Negative for joint swelling and muscle cramps.   Gastrointestinal: Negative for bloating, abdominal pain, constipation, diarrhea, dysphagia, melena, nausea and vomiting.   Genitourinary: Negative for flank pain and urgency.   Neurological: Negative for dizziness, focal weakness, headaches, light-headedness, loss of balance, seizures and weakness.   Psychiatric/Behavioral: Negative for altered mental status and memory loss. The patient is not nervous/anxious.      Family History   Problem Relation Age of Onset    Leukemia Son     Cancer Son     Diabetes Mother     Hypertension Mother     Heart disease Mother 50    Cancer Father     Arthritis Father     Cancer Brother     Cancer Brother      Past Medical History:   Diagnosis Date    Acute respiratory failure due to COVID-19     COVID-19     Diabetes mellitus, type 2     Diabetic neuropathy 1/27/2014    DJD (degenerative joint disease) of knee     DVT (deep venous thrombosis) around 1990's    in leg, is on no anticoagulant therapy presently    Fatty liver     GERD (gastroesophageal reflux disease)     Hypertension associated with diabetes     HECTOR (iron deficiency anemia) 5/13/2021    Multinodular goiter     Obesity, morbid, BMI 50 or higher     Sleep apnea     has no CPAP     Social History     Socioeconomic History    Marital status: Single   Tobacco Use    Smoking status: Never Smoker     Smokeless tobacco: Never Used   Substance and Sexual Activity    Alcohol use: No    Drug use: No    Sexual activity: Not Currently     Current Outpatient Medications on File Prior to Visit   Medication Sig Dispense Refill    acetaminophen (TYLENOL) 500 MG tablet Take 2 tablets (1,000 mg total) by mouth every 6 (six) hours as needed for Pain.      albuterol (PROVENTIL) 2.5 mg /3 mL (0.083 %) nebulizer solution Take 3 mLs (2.5 mg total) by nebulization every 6 (six) hours while awake. 270 mL 11    albuterol (PROVENTIL/VENTOLIN HFA) 90 mcg/actuation inhaler INHALE 2 PUFFS BY MOUTH EVERY 6 HOURS AS NEEDED FOR WHEEZING OR SHORTNESS OF BREATH 18 g 11    blood sugar diagnostic (CLEVER CHOICE VOICE+ TEST) Strp TEST BLOOD SUGARS ONE TIME DAILY 100 strip 1    blood sugar diagnostic Strp To check BG 3 times daily, to use with insurance preferred meter 100 each 99    blood-glucose meter kit To check BG three times daily, to use with insurance preferred meter 100 each 0    blood-glucose meter kit Use before meals and before bed when the glucoses are not in control.  Otherwise, test it daily once a day before meals randomly to ensure 1 each 0    diclofenac (VOLTAREN) 75 MG EC tablet Take 1 tablet (75 mg total) by mouth 2 (two) times daily. 60 tablet 2    diltiaZEM (DILACOR XR) 120 MG CDCR Take 1 capsule (120 mg total) by mouth once daily. 30 capsule 11    etodolac (LODINE) 400 MG tablet Take 1 tablet (400 mg total) by mouth 2 (two) times daily. 60 tablet 12    famotidine (PEPCID) 40 MG tablet Take 1 tablet (40 mg total) by mouth once daily. 90 tablet 4    ferrous sulfate 324 mg (65 mg iron) TbEC Take 1 tablet (324 mg total) by mouth once daily. 365 tablet 0    fluticasone propion-salmeterol 115-21 mcg/dose (ADVAIR HFA) 115-21 mcg/actuation HFAA inhaler Inhale 2 puffs into the lungs 2 (two) times daily. Wash out mouth after use. Controller 1 Inhaler 11    fluticasone propionate (FLONASE) 50 mcg/actuation nasal  "spray Use 2 sprays to each nostril daily 16 g 12    inhalation spacing device (BREATHERITE VALVED MDI CHAMBER) Use as directed for inhalation. 1 Device prn    lancets Mis To check BG three times daily, to use with insurance preferred meter 100 each 99    levocetirizine (XYZAL) 5 MG tablet Take 1 tablet (5 mg total) by mouth every evening. 90 tablet 4    levothyroxine (SYNTHROID) 100 MCG tablet Take 1 tablet by mouth daily. 90 tablet 1    LIDOcaine-prilocaine (EMLA) cream SMARTSI-2 Pump Topical 3-4 Times Daily      linaCLOtide (LINZESS) 72 mcg Cap capsule Take 1 capsule (72 mcg total) by mouth once daily. 30 capsule 12    metFORMIN (GLUCOPHAGE) 1000 MG tablet Take 1 tablet (1,000 mg total) by mouth 2 (two) times daily with meals. 180 tablet 4    montelukast (SINGULAIR) 10 mg tablet Take 1 tablet (10 mg total) by mouth every evening. 30 tablet 12    omeprazole (PRILOSEC) 20 MG capsule Take 1 capsule (20 mg total) by mouth 2 (two) times daily. for 14 days 28 capsule 0    pantoprazole (PROTONIX) 40 MG tablet Take 1 tablet (40 mg total) by mouth 2 (two) times daily. 60 tablet 12    pen needle, diabetic (PEN NEEDLE) 32 gauge x 5/32" Ndle Use as directed to inject medication 100 each 4    pravastatin (PRAVACHOL) 80 MG tablet Take 1 tablet (80 mg total) by mouth once daily. 90 tablet 4    pulse oximeter (PULSE OXIMETER) device by Apply Externally route 2 (two) times a day. Use twice daily at 8 AM and 3 PM and record the value in [facility] as directed. 1 each 0    semaglutide (OZEMPIC) 1 mg/dose (4 mg/3 mL) Inject 1 mg into the skin every 7 days. 3 pen 4    tiZANidine (ZANAFLEX) 4 MG tablet Take 1/2 to 1 tablet by mouth twice daily as needed for muscle spasms. May cause drowsiness. 60 tablet 4    topiramate (TOPAMAX) 100 MG tablet Take 1 tablet (100 mg total) by mouth 2 (two) times daily. 60 tablet 11    TRUEPLUS LANCETS 33 gauge Misc Check blood glucose up to 3 times daily as needed before meals 100 each " 99     No current facility-administered medications on file prior to visit.     Review of patient's allergies indicates:   Allergen Reactions    Codeine sulfate      Nausea^    Lisinopril Swelling     angioedema    Codeine Nausea Only and Rash       Objective:     Physical Exam  Eyes:      Pupils: Pupils are equal, round, and reactive to light.   Neck:      Trachea: No tracheal deviation.   Cardiovascular:      Rate and Rhythm: Normal rate and regular rhythm.      Pulses: Intact distal pulses.           Carotid pulses are 2+ on the right side and 2+ on the left side.       Radial pulses are 2+ on the right side and 2+ on the left side.        Femoral pulses are 2+ on the right side and 2+ on the left side.       Popliteal pulses are 2+ on the right side and 2+ on the left side.        Dorsalis pedis pulses are 2+ on the right side and 2+ on the left side.        Posterior tibial pulses are 2+ on the right side and 2+ on the left side.      Heart sounds: Normal heart sounds. No murmur heard.  No friction rub. No gallop.    Pulmonary:      Effort: Pulmonary effort is normal. No respiratory distress.      Breath sounds: Normal breath sounds. No stridor. No wheezing or rales.   Chest:      Chest wall: No tenderness.   Abdominal:      General: There is no distension.      Tenderness: There is no abdominal tenderness. There is no rebound.   Musculoskeletal:         General: No tenderness or edema.      Cervical back: Normal range of motion.   Skin:     General: Skin is warm and dry.   Neurological:      Mental Status: She is alert and oriented to person, place, and time.         Assessment:     1. Systolic murmur    2. Hypertension associated with diabetes    3. Hyperlipidemia, unspecified hyperlipidemia type    4. Combined hyperlipidemia associated with type 2 diabetes mellitus    5. Claudication of both lower extremities    6. Diabetic polyneuropathy associated with type 2 diabetes mellitus    7. Tachycardia    8.  ROMERO (dyspnea on exertion)    9. Syncope, unspecified syncope type        Plan:     Systolic murmur    Hypertension associated with diabetes    Hyperlipidemia, unspecified hyperlipidemia type    Combined hyperlipidemia associated with type 2 diabetes mellitus    Claudication of both lower extremities    Diabetic polyneuropathy associated with type 2 diabetes mellitus    Tachycardia    ROMERO (dyspnea on exertion)    Syncope, unspecified syncope type      Impression 1 tachycardia stable but will change diltiazem to short-acting 30 mg b.i.d..  2. Syncope resolved  3. Claudication stable  All questions answered follow-up evaluation again in 2 weeks for review of blood pressure heart rate response.

## 2022-01-21 ENCOUNTER — OFFICE VISIT (OUTPATIENT)
Dept: CARDIOLOGY | Facility: CLINIC | Age: 62
End: 2022-01-21
Payer: MEDICAID

## 2022-01-21 ENCOUNTER — TELEPHONE (OUTPATIENT)
Dept: CARDIOLOGY | Facility: CLINIC | Age: 62
End: 2022-01-21
Payer: MEDICAID

## 2022-01-21 DIAGNOSIS — E11.42 DIABETIC POLYNEUROPATHY ASSOCIATED WITH TYPE 2 DIABETES MELLITUS: ICD-10-CM

## 2022-01-21 DIAGNOSIS — E11.59 HYPERTENSION ASSOCIATED WITH DIABETES: Primary | Chronic | ICD-10-CM

## 2022-01-21 DIAGNOSIS — E78.2 COMBINED HYPERLIPIDEMIA ASSOCIATED WITH TYPE 2 DIABETES MELLITUS: ICD-10-CM

## 2022-01-21 DIAGNOSIS — R01.1 SYSTOLIC MURMUR: ICD-10-CM

## 2022-01-21 DIAGNOSIS — E11.69 COMBINED HYPERLIPIDEMIA ASSOCIATED WITH TYPE 2 DIABETES MELLITUS: ICD-10-CM

## 2022-01-21 DIAGNOSIS — R06.09 DOE (DYSPNEA ON EXERTION): ICD-10-CM

## 2022-01-21 DIAGNOSIS — I73.9 CLAUDICATION OF BOTH LOWER EXTREMITIES: ICD-10-CM

## 2022-01-21 DIAGNOSIS — I15.2 HYPERTENSION ASSOCIATED WITH DIABETES: Primary | Chronic | ICD-10-CM

## 2022-01-21 DIAGNOSIS — R55 SYNCOPE, UNSPECIFIED SYNCOPE TYPE: ICD-10-CM

## 2022-01-21 DIAGNOSIS — E78.5 HYPERLIPIDEMIA, UNSPECIFIED HYPERLIPIDEMIA TYPE: ICD-10-CM

## 2022-01-21 DIAGNOSIS — R00.0 TACHYCARDIA: ICD-10-CM

## 2022-01-21 PROCEDURE — 99214 OFFICE O/P EST MOD 30 MIN: CPT | Mod: 95,,, | Performed by: INTERNAL MEDICINE

## 2022-01-21 PROCEDURE — 99214 PR OFFICE/OUTPT VISIT, EST, LEVL IV, 30-39 MIN: ICD-10-PCS | Mod: 95,,, | Performed by: INTERNAL MEDICINE

## 2022-01-21 RX ORDER — DILTIAZEM HYDROCHLORIDE 30 MG/1
30 TABLET, FILM COATED ORAL EVERY 12 HOURS
Qty: 60 TABLET | Refills: 11 | Status: SHIPPED | OUTPATIENT
Start: 2022-01-21 | End: 2023-01-23

## 2022-01-21 NOTE — TELEPHONE ENCOUNTER
Pharmacy contacted and clarified the medication changes. There are no further questions or concerns      ----- Message from Donte Vieira sent at 1/21/2022 10:12 AM CST -----  Contact: Geoff Tellez  from [facility] in [place] Is calling for clarification on the Pt's Prescription. Call back at [phone]

## 2022-01-23 ENCOUNTER — PATIENT MESSAGE (OUTPATIENT)
Dept: OTHER | Facility: OTHER | Age: 62
End: 2022-01-23
Payer: MEDICAID

## 2022-02-11 ENCOUNTER — HOSPITAL ENCOUNTER (OUTPATIENT)
Dept: RADIOLOGY | Facility: HOSPITAL | Age: 62
Discharge: HOME | End: 2022-02-11
Attending: ANESTHESIOLOGY
Payer: MEDICAID

## 2022-02-11 DIAGNOSIS — M54.12 RADICULOPATHY, CERVICAL REGION: ICD-10-CM

## 2022-02-11 DIAGNOSIS — M54.16 LUMBAR RADICULOPATHY: ICD-10-CM

## 2022-02-24 ENCOUNTER — OFFICE VISIT (OUTPATIENT)
Dept: FAMILY MEDICINE | Facility: CLINIC | Age: 62
End: 2022-02-24
Payer: MEDICAID

## 2022-02-24 ENCOUNTER — LAB VISIT (OUTPATIENT)
Dept: LAB | Facility: HOSPITAL | Age: 62
End: 2022-02-24
Attending: FAMILY MEDICINE
Payer: MEDICAID

## 2022-02-24 VITALS
HEART RATE: 104 BPM | HEIGHT: 68 IN | WEIGHT: 293 LBS | RESPIRATION RATE: 20 BRPM | SYSTOLIC BLOOD PRESSURE: 130 MMHG | BODY MASS INDEX: 44.41 KG/M2 | TEMPERATURE: 98 F | OXYGEN SATURATION: 98 % | DIASTOLIC BLOOD PRESSURE: 72 MMHG

## 2022-02-24 DIAGNOSIS — I15.2 HYPERTENSION ASSOCIATED WITH DIABETES: Chronic | ICD-10-CM

## 2022-02-24 DIAGNOSIS — D50.0 IRON DEFICIENCY ANEMIA DUE TO CHRONIC BLOOD LOSS: ICD-10-CM

## 2022-02-24 DIAGNOSIS — Z79.899 ENCOUNTER FOR LONG-TERM (CURRENT) USE OF MEDICATIONS: ICD-10-CM

## 2022-02-24 DIAGNOSIS — E11.42 DIABETIC POLYNEUROPATHY ASSOCIATED WITH TYPE 2 DIABETES MELLITUS: Primary | ICD-10-CM

## 2022-02-24 DIAGNOSIS — E11.40 TYPE 2 DIABETES MELLITUS WITH DIABETIC NEUROPATHY, WITHOUT LONG-TERM CURRENT USE OF INSULIN: Chronic | ICD-10-CM

## 2022-02-24 DIAGNOSIS — E78.5 HYPERLIPIDEMIA, UNSPECIFIED HYPERLIPIDEMIA TYPE: ICD-10-CM

## 2022-02-24 DIAGNOSIS — E11.59 HYPERTENSION ASSOCIATED WITH DIABETES: Chronic | ICD-10-CM

## 2022-02-24 PROBLEM — M79.604 RIGHT LEG PAIN: Chronic | Status: ACTIVE | Noted: 2022-01-06

## 2022-02-24 LAB
ALBUMIN SERPL BCP-MCNC: 3.8 G/DL (ref 3.5–5.2)
ALP SERPL-CCNC: 75 U/L (ref 55–135)
ALT SERPL W/O P-5'-P-CCNC: 12 U/L (ref 10–44)
ANION GAP SERPL CALC-SCNC: 11 MMOL/L (ref 8–16)
AST SERPL-CCNC: 14 U/L (ref 10–40)
BASOPHILS # BLD AUTO: 0.02 K/UL (ref 0–0.2)
BASOPHILS NFR BLD: 0.4 % (ref 0–1.9)
BILIRUB SERPL-MCNC: 0.3 MG/DL (ref 0.1–1)
BUN SERPL-MCNC: 13 MG/DL (ref 8–23)
CALCIUM SERPL-MCNC: 9.6 MG/DL (ref 8.7–10.5)
CHLORIDE SERPL-SCNC: 111 MMOL/L (ref 95–110)
CHOLEST SERPL-MCNC: 174 MG/DL (ref 120–199)
CHOLEST/HDLC SERPL: 3.6 {RATIO} (ref 2–5)
CO2 SERPL-SCNC: 20 MMOL/L (ref 23–29)
CREAT SERPL-MCNC: 0.8 MG/DL (ref 0.5–1.4)
DIFFERENTIAL METHOD: NORMAL
EOSINOPHIL # BLD AUTO: 0.1 K/UL (ref 0–0.5)
EOSINOPHIL NFR BLD: 1 % (ref 0–8)
ERYTHROCYTE [DISTWIDTH] IN BLOOD BY AUTOMATED COUNT: 14 % (ref 11.5–14.5)
EST. GFR  (AFRICAN AMERICAN): >60 ML/MIN/1.73 M^2
EST. GFR  (NON AFRICAN AMERICAN): >60 ML/MIN/1.73 M^2
ESTIMATED AVG GLUCOSE: 103 MG/DL (ref 68–131)
FERRITIN SERPL-MCNC: 103 NG/ML (ref 20–300)
GLUCOSE SERPL-MCNC: 93 MG/DL (ref 70–110)
HBA1C MFR BLD: 5.2 % (ref 4–5.6)
HCT VFR BLD AUTO: 40.5 % (ref 37–48.5)
HDLC SERPL-MCNC: 49 MG/DL (ref 40–75)
HDLC SERPL: 28.2 % (ref 20–50)
HGB BLD-MCNC: 13 G/DL (ref 12–16)
IMM GRANULOCYTES # BLD AUTO: 0.01 K/UL (ref 0–0.04)
IMM GRANULOCYTES NFR BLD AUTO: 0.2 % (ref 0–0.5)
IRON SERPL-MCNC: 73 UG/DL (ref 30–160)
LDLC SERPL CALC-MCNC: 99.6 MG/DL (ref 63–159)
LYMPHOCYTES # BLD AUTO: 1.8 K/UL (ref 1–4.8)
LYMPHOCYTES NFR BLD: 35.1 % (ref 18–48)
MCH RBC QN AUTO: 28.6 PG (ref 27–31)
MCHC RBC AUTO-ENTMCNC: 32.1 G/DL (ref 32–36)
MCV RBC AUTO: 89 FL (ref 82–98)
MONOCYTES # BLD AUTO: 0.3 K/UL (ref 0.3–1)
MONOCYTES NFR BLD: 5.1 % (ref 4–15)
NEUTROPHILS # BLD AUTO: 3 K/UL (ref 1.8–7.7)
NEUTROPHILS NFR BLD: 58.4 % (ref 38–73)
NONHDLC SERPL-MCNC: 125 MG/DL
NRBC BLD-RTO: 0 /100 WBC
PLATELET # BLD AUTO: 364 K/UL (ref 150–450)
PMV BLD AUTO: 9.9 FL (ref 9.2–12.9)
POTASSIUM SERPL-SCNC: 3.5 MMOL/L (ref 3.5–5.1)
PROT SERPL-MCNC: 7.8 G/DL (ref 6–8.4)
RBC # BLD AUTO: 4.54 M/UL (ref 4–5.4)
SATURATED IRON: 20 % (ref 20–50)
SODIUM SERPL-SCNC: 142 MMOL/L (ref 136–145)
TOTAL IRON BINDING CAPACITY: 370 UG/DL (ref 250–450)
TRANSFERRIN SERPL-MCNC: 250 MG/DL (ref 200–375)
TRIGL SERPL-MCNC: 127 MG/DL (ref 30–150)
WBC # BLD AUTO: 5.07 K/UL (ref 3.9–12.7)

## 2022-02-24 PROCEDURE — 3078F DIAST BP <80 MM HG: CPT | Mod: CPTII,,, | Performed by: FAMILY MEDICINE

## 2022-02-24 PROCEDURE — 82728 ASSAY OF FERRITIN: CPT | Performed by: NURSE PRACTITIONER

## 2022-02-24 PROCEDURE — 80053 COMPREHEN METABOLIC PANEL: CPT | Performed by: FAMILY MEDICINE

## 2022-02-24 PROCEDURE — 1160F PR REVIEW ALL MEDS BY PRESCRIBER/CLIN PHARMACIST DOCUMENTED: ICD-10-PCS | Mod: CPTII,,, | Performed by: FAMILY MEDICINE

## 2022-02-24 PROCEDURE — 84466 ASSAY OF TRANSFERRIN: CPT | Performed by: NURSE PRACTITIONER

## 2022-02-24 PROCEDURE — 99999 PR PBB SHADOW E&M-EST. PATIENT-LVL V: CPT | Mod: PBBFAC,,, | Performed by: FAMILY MEDICINE

## 2022-02-24 PROCEDURE — 3008F BODY MASS INDEX DOCD: CPT | Mod: CPTII,,, | Performed by: FAMILY MEDICINE

## 2022-02-24 PROCEDURE — 3075F PR MOST RECENT SYSTOLIC BLOOD PRESS GE 130-139MM HG: ICD-10-PCS | Mod: CPTII,,, | Performed by: FAMILY MEDICINE

## 2022-02-24 PROCEDURE — 80061 LIPID PANEL: CPT | Performed by: FAMILY MEDICINE

## 2022-02-24 PROCEDURE — 99999 PR PBB SHADOW E&M-EST. PATIENT-LVL V: ICD-10-PCS | Mod: PBBFAC,,, | Performed by: FAMILY MEDICINE

## 2022-02-24 PROCEDURE — 3078F PR MOST RECENT DIASTOLIC BLOOD PRESSURE < 80 MM HG: ICD-10-PCS | Mod: CPTII,,, | Performed by: FAMILY MEDICINE

## 2022-02-24 PROCEDURE — 99214 PR OFFICE/OUTPT VISIT, EST, LEVL IV, 30-39 MIN: ICD-10-PCS | Mod: S$PBB,,, | Performed by: FAMILY MEDICINE

## 2022-02-24 PROCEDURE — 1159F MED LIST DOCD IN RCRD: CPT | Mod: CPTII,,, | Performed by: FAMILY MEDICINE

## 2022-02-24 PROCEDURE — 83036 HEMOGLOBIN GLYCOSYLATED A1C: CPT | Performed by: FAMILY MEDICINE

## 2022-02-24 PROCEDURE — 99215 OFFICE O/P EST HI 40 MIN: CPT | Mod: PBBFAC,PO | Performed by: FAMILY MEDICINE

## 2022-02-24 PROCEDURE — 85025 COMPLETE CBC W/AUTO DIFF WBC: CPT | Mod: PO | Performed by: NURSE PRACTITIONER

## 2022-02-24 PROCEDURE — 3008F PR BODY MASS INDEX (BMI) DOCUMENTED: ICD-10-PCS | Mod: CPTII,,, | Performed by: FAMILY MEDICINE

## 2022-02-24 PROCEDURE — 3075F SYST BP GE 130 - 139MM HG: CPT | Mod: CPTII,,, | Performed by: FAMILY MEDICINE

## 2022-02-24 PROCEDURE — 99214 OFFICE O/P EST MOD 30 MIN: CPT | Mod: S$PBB,,, | Performed by: FAMILY MEDICINE

## 2022-02-24 PROCEDURE — 1159F PR MEDICATION LIST DOCUMENTED IN MEDICAL RECORD: ICD-10-PCS | Mod: CPTII,,, | Performed by: FAMILY MEDICINE

## 2022-02-24 PROCEDURE — 1160F RVW MEDS BY RX/DR IN RCRD: CPT | Mod: CPTII,,, | Performed by: FAMILY MEDICINE

## 2022-02-24 PROCEDURE — 36415 COLL VENOUS BLD VENIPUNCTURE: CPT | Mod: PO | Performed by: FAMILY MEDICINE

## 2022-02-24 RX ORDER — FLUTICASONE FUROATE, UMECLIDINIUM BROMIDE AND VILANTEROL TRIFENATATE 100; 62.5; 25 UG/1; UG/1; UG/1
1 POWDER RESPIRATORY (INHALATION) DAILY
COMMUNITY
Start: 2022-02-23

## 2022-02-24 RX ORDER — PREGABALIN 25 MG/1
25 CAPSULE ORAL 2 TIMES DAILY
Qty: 60 CAPSULE | Refills: 6 | Status: SHIPPED | OUTPATIENT
Start: 2022-02-24 | End: 2022-02-25 | Stop reason: SDUPTHER

## 2022-02-24 NOTE — PATIENT INSTRUCTIONS
Follow up in about 6 months (around 8/24/2022), or if symptoms worsen or fail to improve, for Med refills, LAB RESULTS.     Dear patient,   As a result of recent federal legislation (The Federal Cures Act), you may receive lab or pathology results from your visit in your [facility] account before your physician is able to contact you. Your physician or their representative will relay the results to you with their recommendations at their soonest availability.     If no improvement in symptoms or symptoms worsen, please be advised to call MD, follow-up at clinic and/or go to ER if becomes severe.    Oleksandr Nagel M.D.        We Offer TELEHEALTH & Same Day Appointments!   Book your Telehealth appointment with me through my nurse or   Clinic appointments on [employer]!    [address] [place] [address]    Office: [phone]   FAX: [phone]    Check out my Facebook Page and Follow Me at: [email or web address]    Check out my website at [employer] by clicking on: [email or web address]    To Schedule appointments online, go to [employer]: [email or web address]

## 2022-02-24 NOTE — ASSESSMENT & PLAN NOTE
Continue current blood pressure medication regimen.Counseled on importance of hypertension disease course, I recommend ongoing Education for DASH-diet and exercise.  Counseled on medication regimen importance of treating high blood pressure.  Please be advised of risk of untreated blood pressure as discussed.  Please advised of ER precautions were given for symptoms of hypertensive urgency and emergency.

## 2022-02-24 NOTE — ASSESSMENT & PLAN NOTE
Update labs today.Complete history and physical was completed today.  Complete and thorough medication reconciliation was performed.  Discussed risks and benefits of medications.  Advised patient on orders and health maintenance.  We discussed old records and old labs if available.  Will request any records not available through epic.  Continue current medications listed on your summary sheet.

## 2022-02-24 NOTE — PROGRESS NOTES
PLAN:      Problem List Items Addressed This Visit     Hypertension associated with diabetes (Chronic)     Continue current blood pressure medication regimen.Counseled on importance of hypertension disease course, I recommend ongoing Education for DASH-diet and exercise.  Counseled on medication regimen importance of treating high blood pressure.  Please be advised of risk of untreated blood pressure as discussed.  Please advised of ER precautions were given for symptoms of hypertensive urgency and emergency.             Diabetic neuropathy - Primary (Chronic)     Start low-dose Lyrica.  Follow-up with pain management and Neurology.  Patient may need updated nerve study.  Continue controlling diabetes and hypertension.    Discussed risk and benefits of medication use.The patient was checked in the [facility] Board of Pharmacy's  Prescription Monitoring Program. There appears to be no incongruities with the patient's verbalized history.              Relevant Medications    pregabalin (LYRICA) 25 MG capsule    Other Relevant Orders    Ambulatory referral/consult to Podiatry    Encounter for long-term (current) use of medications (Chronic)     Update labs today.Complete history and physical was completed today.  Complete and thorough medication reconciliation was performed.  Discussed risks and benefits of medications.  Advised patient on orders and health maintenance.  We discussed old records and old labs if available.  Will request any records not available through epic.  Continue current medications listed on your summary sheet.                 Future Appointments     Date Provider Specialty Appt Notes    3/1/2022  Radiology     3/1/2022  Radiology     3/8/2022 Nae Paul MD Pain Medicine MRI review    3/16/2022 Sol Mckeon NP Hematology and Oncology 3 mo f/u prior lab/cbd    7/15/2022 Oleksandr Nagel MD Family Medicine 6month     10/20/2022 Oleksandr Nagel MD Family Medicine My breast , my  feet  has burning feelings         Medication Management for assessment above:   Medication List with Changes/Refills   New Medications    PREGABALIN (LYRICA) 25 MG CAPSULE    Take 1 capsule (25 mg total) by mouth 2 (two) times daily.   Current Medications    ACETAMINOPHEN (TYLENOL) 500 MG TABLET    Take 2 tablets (1,000 mg total) by mouth every 6 (six) hours as needed for Pain.    ALBUTEROL (PROVENTIL) 2.5 MG /3 ML (0.083 %) NEBULIZER SOLUTION    Take 3 mLs (2.5 mg total) by nebulization every 6 (six) hours while awake.    ALBUTEROL (PROVENTIL/VENTOLIN HFA) 90 MCG/ACTUATION INHALER    INHALE 2 PUFFS BY MOUTH EVERY 6 HOURS AS NEEDED FOR WHEEZING OR SHORTNESS OF BREATH    BLOOD SUGAR DIAGNOSTIC ([facility]) STRP    TEST BLOOD SUGARS ONE TIME DAILY    BLOOD SUGAR DIAGNOSTIC STRP    To check BG 3 times daily, to use with insurance preferred meter    BLOOD-GLUCOSE METER KIT    To check BG three times daily, to use with insurance preferred meter    BLOOD-GLUCOSE METER KIT    Use before meals and before bed when the glucoses are not in control.  Otherwise, test it daily once a day before meals randomly to ensure    DICLOFENAC (VOLTAREN) 75 MG EC TABLET    Take 1 tablet (75 mg total) by mouth 2 (two) times daily.    DILTIAZEM (CARDIZEM) 30 MG TABLET    Take 1 tablet (30 mg total) by mouth every 12 (twelve) hours.    FAMOTIDINE (PEPCID) 40 MG TABLET    Take 1 tablet (40 mg total) by mouth once daily.    FERROUS SULFATE 324 MG (65 MG IRON) TBEC    Take 1 tablet (324 mg total) by mouth once daily.    FLUTICASONE PROPION-SALMETEROL 115-21 MCG/DOSE (ADVAIR HFA) 115-21 MCG/ACTUATION HFAA INHALER    Inhale 2 puffs into the lungs 2 (two) times daily. Wash out mouth after use. Controller    FLUTICASONE PROPIONATE (FLONASE) 50 MCG/ACTUATION NASAL SPRAY    Use 2 sprays to each nostril daily    INHALATION SPACING DEVICE (BREATHERITE VALVED MDI CHAMBER)    Use as directed for inhalation.    LANCETS MISC    To check BG  "three times daily, to use with insurance preferred meter    LEVOCETIRIZINE (XYZAL) 5 MG TABLET    Take 1 tablet (5 mg total) by mouth every evening.    LEVOTHYROXINE (SYNTHROID) 100 MCG TABLET    Take 1 tablet by mouth daily.    LIDOCAINE-PRILOCAINE (EMLA) CREAM    SMARTSI-2 Pump Topical 3-4 Times Daily    LINACLOTIDE (LINZESS) 72 MCG CAP CAPSULE    Take 1 capsule (72 mcg total) by mouth once daily.    METFORMIN (GLUCOPHAGE) 1000 MG TABLET    Take 1 tablet (1,000 mg total) by mouth 2 (two) times daily with meals.    MONTELUKAST (SINGULAIR) 10 MG TABLET    Take 1 tablet (10 mg total) by mouth every evening.    OMEPRAZOLE (PRILOSEC) 20 MG CAPSULE    Take 1 capsule (20 mg total) by mouth 2 (two) times daily. for 14 days    PANTOPRAZOLE (PROTONIX) 40 MG TABLET    Take 1 tablet (40 mg total) by mouth 2 (two) times daily.    PEN NEEDLE, DIABETIC (PEN NEEDLE) 32 GAUGE X 5/32" NDLE    Use as directed to inject medication    PRAVASTATIN (PRAVACHOL) 80 MG TABLET    Take 1 tablet (80 mg total) by mouth once daily.    PULSE OXIMETER (PULSE OXIMETER) DEVICE    by Apply Externally route 2 (two) times a day. Use twice daily at 8 AM and 3 PM and record the value in [facility] as directed.    SEMAGLUTIDE (OZEMPIC) 1 MG/DOSE (4 MG/3 ML)    Inject 1 mg into the skin every 7 days.    TIZANIDINE (ZANAFLEX) 4 MG TABLET    Take 1/2 to 1 tablet by mouth twice daily as needed for muscle spasms. May cause drowsiness.    TOPIRAMATE (TOPAMAX) 100 MG TABLET    Take 1 tablet (100 mg total) by mouth 2 (two) times daily.    TRELEGY ELLIPTA 100-62.5-25 MCG DSDV    Inhale 1 puff into the lungs once daily.    TRUEPLUS LANCETS 33 GAUGE MISC    Check blood glucose up to 3 times daily as needed before meals   Discontinued Medications    ETODOLAC (LODINE) 400 MG TABLET    Take 1 tablet (400 mg total) by mouth 2 (two) times daily.       Oleksandr Nagel M.D.  ==========================================================================  Subjective: "   Patient ID: Zakia Price is a 62 y.o. female.  has a past medical history of Acute respiratory failure due to COVID-19, COVID-19, Diabetes mellitus, type 2, Diabetic neuropathy (1/27/2014), DJD (degenerative joint disease) of knee, DVT (deep venous thrombosis) (around 1990's), Fatty liver, GERD (gastroesophageal reflux disease), Hypertension associated with diabetes, HECTOR (iron deficiency anemia) (5/13/2021), Multinodular goiter, Obesity, morbid, BMI 50 or higher, and Sleep apnea.   Chief Complaint: Follow-up and Foot Pain (Bilateral foot numbness, tingling, unable to wear socks)      Problem List Items Addressed This Visit     Hypertension associated with diabetes (Chronic)    Overview     CHRONIC.  February 2022:  Blood pressure remains less than 140/90.  January 2022:  Blood pressure well controlled actually on the lower end of normal today.  Patient is on diltiazem 120 milligrams daily.  She does report some constipation that is being treated with Linzess.  July 2021:  Blood pressure well controlled today.  Previous HPI  Patient recently got a new blood pressure monitor at home through [employer] however the cuff does not fit around her arm.. BP Reviewed.  Compliant with BP medications. No SE reported.  Patient taking amlodipine 5 mg.  (-) CP, SOB, palpitations, dizziness, lightheadedness, HA, arm numbness, tingling or weakness, syncope.  Creatinine   Date Value Ref Range Status   04/06/2021 0.8 0.5 - 1.4 mg/dL Final     History of hypertension currently on amlodipine with previous reported allergy to ACE inhibitor    Last Assessment & Plan:   Blood pressure stable on current medicine regimen. Continue current medicine regimen and follow low salt diet.           Current Assessment & Plan     Continue current blood pressure medication regimen.Counseled on importance of hypertension disease course, I recommend ongoing Education for DASH-diet and exercise.  Counseled on medication regimen importance  of treating high blood pressure.  Please be advised of risk of untreated blood pressure as discussed.  Please advised of ER precautions were given for symptoms of hypertensive urgency and emergency.             Diabetic neuropathy - Primary (Chronic)    Overview     Chronic.  She has been on gabapentin previously.  She was taking 100 milligrams 3 times daily but does not report any improvement in her burning in her feet.  She was switched to Topamax by pain management.  She reports that some of her pain is better but her in burning sensation is still present.      Reviewed nerve study from :  Summary:  Nerve conduction studies were performed on the right upper and lower extremities.  Right median, ulnar, and superficial peroneal sensory responses were normal in amplitude and latency.  Right sural sensory response was absent.  Right median motor response was borderline in velocity but normal in amplitude and latency.  Right ulnar motor response was normal in amplitude, latency and velocity.  Right peroneal motor response was normal in amplitude, latency and velocity.  Right tibial motor response was prolonged with normal amplitude and velocity.  Further internal comparison studies were performed to assess the carpal tunnel.  Right median versus ulnar 2nd lumbrical interosseous comparison studies revealed a prolonged median motor latency as compared to the ulnar.  Needle EMG was performed in the right upper and lower extremities.  No active denervation was present in any muscle tested.  Motor unit morphology and recruitment patterns were normal in every muscle tested.     Impression:  This is a mildly abnormal EMG of the right upper and lower extremities.  There is electrophysiologic evidence of the followin. Very mild, axonal, peripheral polyneuropathy without active denervation.  2. Very mild, right, median mononeuropathy across the wrist (carpal tunnel syndrome) without active denervation.  There is no  evidence of any other focal neuropathy, plexopathy, or radiculopathy on the study.  In addition there is no evidence of myopathy on the study.        Thank you for referring to the [facility] in [place]. Please feel free to contact the clinic if you have any further questions regarding this study or report.        _____________________________  Cady Roberts D.O., ABPN, AOBNP, ABEM            Current Assessment & Plan     Start low-dose Lyrica.  Follow-up with pain management and Neurology.  Patient may need updated nerve study.  Continue controlling diabetes and hypertension.    Discussed risk and benefits of medication use.The patient was checked in the [facility] Board of Pharmacy's  Prescription Monitoring Program. There appears to be no incongruities with the patient's verbalized history.              Encounter for long-term (current) use of medications (Chronic)    Overview     CHRONIC. Stable. Compliant with medications for managed conditions. See medication list. No SE reported. Routine lab analysis is being monitored. Refills were addressed.  January 2021:CHRONIC. Stable. Compliant with medications for managed conditions. See medication list. No SE reported. Routine lab analysis is being monitored. Refills were addressed.  July 2021:  Reviewed labs.  January 2022:  Reviewed labs.  February 2022:  Reviewed labs.  Lab Results   Component Value Date    WBC 5.07 02/24/2022    HGB 13.0 02/24/2022    HCT 40.5 02/24/2022    MCV 89 02/24/2022     02/24/2022         Chemistry        Component Value Date/Time     04/06/2021 0917    K 4.1 04/06/2021 0917     04/06/2021 0917    CO2 27 04/06/2021 0917    BUN 11 04/06/2021 0917    CREATININE 0.8 04/06/2021 0917    GLU 81 04/06/2021 0917        Component Value Date/Time    CALCIUM 8.9 04/06/2021 0917    ALKPHOS 68 04/06/2021 0917    AST 13 04/06/2021 0917    ALT 10 04/06/2021 0917    BILITOT 0.2 04/06/2021  0917    ESTGFRAFRICA >60.0 04/06/2021 0917    EGFRNONAA >60.0 04/06/2021 0917          Lab Results   Component Value Date    TSH 2.462 04/06/2021    FREET4 1.06 12/04/2019    T3FREE 2.7 12/04/2019       =================================           Current Assessment & Plan     Update labs today.Complete history and physical was completed today.  Complete and thorough medication reconciliation was performed.  Discussed risks and benefits of medications.  Advised patient on orders and health maintenance.  We discussed old records and old labs if available.  Will request any records not available through epic.  Continue current medications listed on your summary sheet.                    Review of patient's allergies indicates:   Allergen Reactions    Codeine sulfate      Nausea^    Lisinopril Swelling     angioedema    Codeine Nausea Only and Rash     Current Outpatient Medications   Medication Instructions    acetaminophen (TYLENOL) 1,000 mg, Oral, Every 6 hours PRN    albuterol (PROVENTIL) 2.5 mg, Nebulization, Every 6 hours while awake    albuterol (PROVENTIL/VENTOLIN HFA) 90 mcg/actuation inhaler INHALE 2 PUFFS BY MOUTH EVERY 6 HOURS AS NEEDED FOR WHEEZING OR SHORTNESS OF BREATH    blood sugar diagnostic ([facility] CHOICE VOICE+ TEST) Strp TEST BLOOD SUGARS ONE TIME DAILY    blood sugar diagnostic Strp To check BG 3 times daily, to use with insurance preferred meter    blood-glucose meter kit To check BG three times daily, to use with insurance preferred meter    blood-glucose meter kit Use before meals and before bed when the glucoses are not in control.  Otherwise, test it daily once a day before meals randomly to ensure    diclofenac (VOLTAREN) 75 mg, Oral, 2 times daily    diltiaZEM (CARDIZEM) 30 mg, Oral, Every 12 hours    famotidine (PEPCID) 40 mg, Oral, Daily    ferrous sulfate 324 mg, Oral, Daily    fluticasone propion-salmeterol 115-21 mcg/dose (ADVAIR HFA) 115-21 mcg/actuation HFAA inhaler 2  "puffs, Inhalation, 2 times daily, Wash out mouth after use. Controller    fluticasone propionate (FLONASE) 50 mcg/actuation nasal spray Use 2 sprays to each nostril daily    inhalation spacing device (BREATHERITE VALVED MDI CHAMBER) Use as directed for inhalation.    lancets Misc To check BG three times daily, to use with insurance preferred meter    levocetirizine (XYZAL) 5 mg, Oral, Nightly    levothyroxine (SYNTHROID) 100 MCG tablet Take 1 tablet by mouth daily.    LIDOcaine-prilocaine (EMLA) cream SMARTSI-2 Pump Topical 3-4 Times Daily    linaCLOtide (LINZESS) 72 mcg, Oral, Daily    metFORMIN (GLUCOPHAGE) 1,000 mg, Oral, 2 times daily with meals    montelukast (SINGULAIR) 10 mg, Oral, Nightly    omeprazole (PRILOSEC) 20 mg, Oral, 2 times daily    pantoprazole (PROTONIX) 40 mg, Oral, 2 times daily    pen needle, diabetic (PEN NEEDLE) 32 gauge x 5/32" Ndle Use as directed to inject medication    pravastatin (PRAVACHOL) 80 mg, Oral, Daily    pregabalin (LYRICA) 25 mg, Oral, 2 times daily    pulse oximeter (PULSE OXIMETER) device Apply Externally, 2 times daily, Use twice daily at 8 AM and 3 PM and record the value in MyChart as directed.    semaglutide (OZEMPIC) 1 mg, Subcutaneous, Every 7 days    tiZANidine (ZANAFLEX) 4 MG tablet Take 1/2 to 1 tablet by mouth twice daily as needed for muscle spasms. May cause drowsiness.    topiramate (TOPAMAX) 100 mg, Oral, 2 times daily    TRELEGY ELLIPTA 100-62.5-25 mcg DsDv 1 puff, Inhalation, Daily    TRUEPLUS LANCETS 33 gauge Misc Check blood glucose up to 3 times daily as needed before meals      I have reviewed the PMH, social history, FamilyHx, surgical history, allergies and medications documented / confirmed by the patient at the time of this visit.  Review of Systems   Constitutional: Negative for chills, fatigue, fever and unexpected weight change.   HENT: Negative for ear pain and sore throat.    Eyes: Negative for redness and visual " "disturbance.   Respiratory: Negative for cough and shortness of breath.    Cardiovascular: Negative for chest pain and palpitations.   Gastrointestinal: Negative for nausea and vomiting.   Genitourinary: Negative for difficulty urinating and hematuria.   Musculoskeletal: Negative for arthralgias and myalgias.   Skin: Negative for rash and wound.   Neurological: Negative for weakness and headaches.   Psychiatric/Behavioral: Negative for sleep disturbance. The patient is not nervous/anxious.      Objective:   /72   Pulse 104   Temp 98.2 °F (36.8 °C) (Temporal)   Resp 20   Ht 5' 8" (1.727 m)   Wt (!) 144.8 kg (319 lb 4.8 oz)   SpO2 98%   BMI 48.55 kg/m²   Physical Exam  Vitals and nursing note reviewed.   Constitutional:       General: She is not in acute distress.     Appearance: She is well-developed. She is obese.   HENT:      Head: Normocephalic and atraumatic.      Right Ear: External ear normal.      Left Ear: External ear normal.      Nose: Nose normal. No rhinorrhea.   Eyes:      Extraocular Movements: Extraocular movements intact.      Pupils: Pupils are equal, round, and reactive to light.   Cardiovascular:      Rate and Rhythm: Normal rate.      Pulses: Normal pulses.   Pulmonary:      Effort: Pulmonary effort is normal. No respiratory distress.      Breath sounds: Normal breath sounds.   Musculoskeletal:         General: Normal range of motion.      Cervical back: Normal range of motion and neck supple. Tenderness and bony tenderness present. Pain with movement present.      Lumbar back: Spasms and tenderness present. No bony tenderness. Negative right straight leg raise test and negative left straight leg raise test.      Right lower leg: Tenderness present. No swelling, deformity, lacerations or bony tenderness. No edema.      Left lower leg: No swelling, deformity, lacerations, tenderness or bony tenderness. No edema.   Skin:     General: Skin is warm and dry.      Capillary Refill: " Capillary refill takes less than 2 seconds.   Neurological:      General: No focal deficit present.      Mental Status: She is alert and oriented to person, place, and time.   Psychiatric:         Mood and Affect: Mood normal.         Behavior: Behavior normal.        Patient is wearing a mask which limits physical exam due to COVID restrictions.   Assessment:     1. Diabetic polyneuropathy associated with type 2 diabetes mellitus    2. Encounter for long-term (current) use of medications    3. Hypertension associated with diabetes      MDM:   Moderate complexity.  Moderate risk.  Total time: 31 minutes.  This includes total time spent on the encounter, which includes face to face time and non-face to face time preparing to see the patient (eg, review of previous medical records, tests), Obtaining and/or reviewing separately obtained history, documenting clinical information in the electronic or other health record, independently interpreting results (not separately reported)/communicating results to the patient/family/caregiver, and/or care coordination (not separately reported).    I have Reviewed and summarized old records.  I have performed thorough medication reconciliation today and discussed risk and benefits of medications.  I have reviewed labs and discussed with patient.  All questions were answered.  I am requesting old records and will review them once they are available.    I have signed for the following orders AND/OR meds.  Orders Placed This Encounter   Procedures    Ambulatory referral/consult to Podiatry     Standing Status:   Future     Standing Expiration Date:   3/24/2023     Referral Priority:   Routine     Referral Type:   Consultation     Referral Reason:   Specialty Services Required     Requested Specialty:   Podiatry     Number of Visits Requested:   1     Medications Ordered This Encounter   Medications    pregabalin (LYRICA) 25 MG capsule     Sig: Take 1 capsule (25 mg total) by mouth  2 (two) times daily.     Dispense:  60 capsule     Refill:  6        Follow up in about 6 months (around 8/24/2022), or if symptoms worsen or fail to improve, for Med refills, LAB RESULTS.    If no improvement in symptoms or symptoms worsen, advised to call/follow-up at clinic or go to ER. Patient voiced understanding and all questions/concerns were addressed.   DISCLAIMER: This note was compiled by using a speech recognition dictation system and therefore please be aware that typographical / speech recognition errors can and do occur.  Please contact me if you see any errors specifically.    Oleksandr Nagel M.D.       Office: [phone]   [address] [place] [address]  FAX: [phone]

## 2022-02-25 ENCOUNTER — TELEPHONE (OUTPATIENT)
Dept: FAMILY MEDICINE | Facility: CLINIC | Age: 62
End: 2022-02-25
Payer: MEDICAID

## 2022-02-25 DIAGNOSIS — E11.42 DIABETIC POLYNEUROPATHY ASSOCIATED WITH TYPE 2 DIABETES MELLITUS: ICD-10-CM

## 2022-02-25 RX ORDER — PREGABALIN 25 MG/1
25 CAPSULE ORAL 2 TIMES DAILY
Qty: 60 CAPSULE | Refills: 6 | Status: SHIPPED | OUTPATIENT
Start: 2022-02-25 | End: 2022-04-20 | Stop reason: SDUPTHER

## 2022-02-25 NOTE — TELEPHONE ENCOUNTER
----- Message from Nia Lopez sent at 2/25/2022 11:27 AM CST -----  Contact: Adalberto/Kiki Glover called regarding a prescription they need to be called in verbally, please give her a call back at [phone]    Thanks  kb

## 2022-02-25 NOTE — PROGRESS NOTES
Make follow-up lab appointment per recommendation below.  Check to see if patient has seen the results through my chart.  If not then,  #CALL THE PATIENT# to discuss results/see if they have questions and document verification of contact. Make F/U appt if needed. [phone]    #My interpretation that was sent to them through [employer]:  Zakia, I have reviewed your recent blood work.     Your complete blood count is normal, no anemia.  Iron levels have improved.  Your metabolic panel which shows your glucose, kidney function, electrolytes, and liver function is stable.   Your cholesterol is stable.    Your hemoglobin A1c is improved.  Keep up the great work!  This test is gold standard screening test for diabetes.  It is a measures 3 months of your average blood sugar.    Repeat annual wellness labs in one year.  =========================  Also please address any outstanding health maintenance that may be due: Shingles Vaccine(1 of 2) Never done  Foot Exam due on 11/24/2021  Eye Exam due on 03/23/2022

## 2022-02-25 NOTE — TELEPHONE ENCOUNTER
No new care gaps identified.  Powered by [employer]. Reference number: [id number].   2/25/2022 11:16:00 AM CST

## 2022-02-25 NOTE — TELEPHONE ENCOUNTER
----- Message from Candelaria Lozano sent at 2/25/2022 11:03 AM CST -----  Contact: self/[phone]  Patient is calling regarding this medication pregabalin (LYRICA) 25 MG capsule, patient states the pharmacy has not receive it. Please send it back again. Please call her back at [phone]. Thanks/ar

## 2022-03-01 ENCOUNTER — HOSPITAL ENCOUNTER (OUTPATIENT)
Dept: RADIOLOGY | Facility: HOSPITAL | Age: 62
Discharge: HOME | End: 2022-03-01
Attending: ANESTHESIOLOGY
Payer: MEDICAID

## 2022-03-01 PROCEDURE — 72141 MRI NECK SPINE W/O DYE: CPT | Mod: 26,,, | Performed by: RADIOLOGY

## 2022-03-01 PROCEDURE — 72141 MRI CERVICAL SPINE WITHOUT CONTRAST: ICD-10-PCS | Mod: 26,,, | Performed by: RADIOLOGY

## 2022-03-01 PROCEDURE — 72141 MRI NECK SPINE W/O DYE: CPT | Mod: TC

## 2022-03-01 PROCEDURE — 72148 MRI LUMBAR SPINE W/O DYE: CPT | Mod: TC

## 2022-03-01 PROCEDURE — 72148 MRI LUMBAR SPINE W/O DYE: CPT | Mod: 26,,, | Performed by: RADIOLOGY

## 2022-03-01 PROCEDURE — 72148 MRI LUMBAR SPINE WITHOUT CONTRAST: ICD-10-PCS | Mod: 26,,, | Performed by: RADIOLOGY

## 2022-03-07 NOTE — PROGRESS NOTES
Established Patient Chronic Pain Note     Referring Physician: No ref. provider found    PCP: Oleksandr Nagel MD    Chief Complaint:   Chief Complaint   Patient presents with    Low-back Pain    Neck Pain        SUBJECTIVE:  Interval history 03/08/2022   Patient presents for MRI review- lumbar and cervical spine.  Today patient reports sustained pain relief in the neck and upper extremities following C7-T1 interlaminar epidural steroid injection October 2021. She also reports improvement in lower extremity radicular symptoms in the lower back and down the lower extremities.  Today her primary concern is burning sensation on the bottom of the feet.  Patient has continued Topamax and has reached 100 mg twice daily.  She does report noticeable improvement in her pain on this medication.  She denies any side effects.  She denies any new onset upper or lower extremity weakness, bowel or bladder incontinence or saddle anesthesia.      Interval history 01/11/2022  Patient presents status post L5-S1 interlaminar epidural steroid injection with right paramedian approach 12/10/2021.  Patient reports 80% sustained relief in lower back and bilateral lower extremity radicular symptoms following epidural steroid injection.  Today patient reports her pain as a 5/10.  Patient has continued Topamax 100 mg twice daily.  She denies any significant sedation from this medication and has enjoyed a  20 lb weight loss since initiation as well as improvement in neuropathic pain.      Interval Hx: 11/8/21  Pt is s/p C7-T1 IL TEETEE with R paramedian approach 10/08/21.  Patient reports 60% sustained relief following her cervical epidural steroid injection in both her neck and upper extremities.  Patient does report improvement in range of motion and strength.  Today patient would like to discuss her lower back and leg pain.  Today she again reports pain in the lower back which radiates down the posterior aspects of bilateral lower  extremities in L5-S1 distribution to the feet.  Today pain is 5/10.  Patient is currently participating actively in physical therapy.  She is currently reached Topamax 75 mg twice daily without side effects.  She denies any new onset lower extremity weakness, bowel or bladder incontinence or saddle anesthesia.    HPI 07/30/21  Zakia Price is a 62 y.o. female with past medical history significant for history of acute respiratory failure secondary to COVID-19, type 2 diabetes complicated by neuropathy, GERD, hypertension, morbid obesity, obstructive sleep apnea,  who presents to the clinic for the evaluation of neck and lower back and leg pain.  Patient reports that her pain began approximately 5-6 years prior without inciting accident injury or trauma.  Patient reports lower back pain which begins in a bandlike distribution at her iliac crest with radiation down the posterior aspect of bilateral legs to the feet in L5-S1 distribution.  Patient reports left side is significantly worse than the right.  Patient reports tingling and numbness in bilateral feet and associated subjective weakness in the lower extremities.  Patient does report periodic falls associated with her pain.    Patient also reports longstanding neck pain with radiation down bilateral upper extremities in no particular dermatomal distribution.  Patient reports pain is worse along the right paracervical muscles than on the left.  Patient reports compromise in hand  strength as well as in hand dexterity.  Patient denies ataxia or bowel or bladder incontinence.   Patient's neck and shoulder pain is exacerbated by cervical flexion, extension and lateral rotation as well as overhead activities..    Patient reports that lower extremity pain is exacerbated from moving from a sitting to a standing position and with ambulation.  Patient is able to ambulate approximately 10 minutes before requiring rest.  The pain is rated with a score of  7/10  on the BEST day and a score of 10/10 on the WORST day.  Symptoms interfere with daily activity and sleeping.     Patient reports significant motor weakness.  Patient denies night fever/night sweats, urinary incontinence, bowel incontinence, significant weight loss and loss of sensations.      Pain Disability Index Review:     Last 3 PDI Scores 3/8/2022 1/11/2022 11/8/2021   Pain Disability Index (PDI) 35 35 45       Non-Pharmacologic Treatments:  Physical Therapy/Home Exercise: yes  Ice/Heat:yes  TENS: no  Acupuncture: no  Massage: yes  Chiropractic: no    Other: no    Pain Medications:  - Adjuvant Medications: Neurontin (Gabapentin), Topamax (Topiramate), Tylenol (Acetaminophen) and Zanaflex ( Tizanidine) Diazepam    Pain Procedures:   -12/10/2021:  L5-S1 intralaminar epidural steroid injection with right paramedian approach  -10/8/21: C7-T1 IL TEETEE with R paramedian approach; Dr. Paul  Prior epidural steroid injection lumbar spine, approximately 3-4 years prior at the [facility] in [place] which confirmed approximately 1 year of relief.    Past Medical History:   Diagnosis Date    Acute respiratory failure due to COVID-19     COVID-19     Diabetes mellitus, type 2     Diabetic neuropathy 1/27/2014    DJD (degenerative joint disease) of knee     DVT (deep venous thrombosis) around 1990's    in leg, is on no anticoagulant therapy presently    Fatty liver     GERD (gastroesophageal reflux disease)     Hypertension associated with diabetes     HECTOR (iron deficiency anemia) 5/13/2021    Multinodular goiter     Obesity, morbid, BMI 50 or higher     Sleep apnea     has no CPAP     Past Surgical History:   Procedure Laterality Date    COLONOSCOPY N/A 5/12/2021    Procedure: COLONOSCOPY;  Surgeon: Carolina Rizo MD;  Location: [facility];  Service: Endoscopy;  Laterality: N/A;    EPIDURAL STEROID INJECTION N/A 12/10/2021    Procedure: Lumbar L5/S1 IL TEETEE  Would like AM procedure, if  possible;  Surgeon: Nae Paul MD;  Location: [facility];  Service: Pain Management;  Laterality: N/A;    EPIDURAL STEROID INJECTION INTO CERVICAL SPINE N/A 10/8/2021    Procedure: C7-T1 IL TEETEE-no sedation.  Needs IV-just incase;  Surgeon: Nae Paul MD;  Location: [facility];  Service: Pain Management;  Laterality: N/A;    ESOPHAGOGASTRODUODENOSCOPY N/A 7/8/2021    Procedure: EGD (ESOPHAGOGASTRODUODENOSCOPY) previous positve covid;  Surgeon: Jann Gutierrez MD;  Location: [facility];  Service: Endoscopy;  Laterality: N/A;    FRACTURE SURGERY      HYSTERECTOMY      SHOULDER ARTHROSCOPY      THYROIDECTOMY, PARTIAL Right     and transplatation of right superior parathyroid gland to the sternocleidomastoid muscle     TONSILLECTOMY      TUBAL LIGATION  1984    WRIST FRACTURE SURGERY      left     Review of patient's allergies indicates:   Allergen Reactions    Codeine sulfate      Nausea^    Lisinopril Swelling     angioedema    Codeine Nausea Only and Rash       Current Outpatient Medications   Medication Sig    acetaminophen (TYLENOL) 500 MG tablet Take 2 tablets (1,000 mg total) by mouth every 6 (six) hours as needed for Pain.    albuterol (PROVENTIL) 2.5 mg /3 mL (0.083 %) nebulizer solution Take 3 mLs (2.5 mg total) by nebulization every 6 (six) hours while awake.    albuterol (PROVENTIL/VENTOLIN HFA) 90 mcg/actuation inhaler INHALE 2 PUFFS BY MOUTH EVERY 6 HOURS AS NEEDED FOR WHEEZING OR SHORTNESS OF BREATH    blood sugar diagnostic (CLEVER CHOICE VOICE+ TEST) Strp TEST BLOOD SUGARS ONE TIME DAILY    blood sugar diagnostic Strp To check BG 3 times daily, to use with insurance preferred meter    blood-glucose meter kit To check BG three times daily, to use with insurance preferred meter    blood-glucose meter kit Use before meals and before bed when the glucoses are not in control.  Otherwise, test it daily once a day before meals randomly to ensure    diclofenac (VOLTAREN) 75 MG  "EC tablet Take 1 tablet (75 mg total) by mouth 2 (two) times daily.    diltiaZEM (CARDIZEM) 30 MG tablet Take 1 tablet (30 mg total) by mouth every 12 (twelve) hours.    famotidine (PEPCID) 40 MG tablet Take 1 tablet (40 mg total) by mouth once daily.    ferrous sulfate 324 mg (65 mg iron) TbEC Take 1 tablet (324 mg total) by mouth once daily.    fluticasone propion-salmeterol 115-21 mcg/dose (ADVAIR HFA) 115-21 mcg/actuation HFAA inhaler Inhale 2 puffs into the lungs 2 (two) times daily. Wash out mouth after use. Controller    fluticasone propionate (FLONASE) 50 mcg/actuation nasal spray Use 2 sprays to each nostril daily    inhalation spacing device (BREATHERITE VALVED MDI CHAMBER) Use as directed for inhalation.    lancets Misc To check BG three times daily, to use with insurance preferred meter    levocetirizine (XYZAL) 5 MG tablet Take 1 tablet (5 mg total) by mouth every evening.    levothyroxine (SYNTHROID) 100 MCG tablet Take 1 tablet by mouth daily.    LIDOcaine-prilocaine (EMLA) cream SMARTSI-2 Pump Topical 3-4 Times Daily    linaCLOtide (LINZESS) 72 mcg Cap capsule Take 1 capsule (72 mcg total) by mouth once daily.    metFORMIN (GLUCOPHAGE) 1000 MG tablet Take 1 tablet (1,000 mg total) by mouth 2 (two) times daily with meals.    montelukast (SINGULAIR) 10 mg tablet Take 1 tablet (10 mg total) by mouth every evening.    omeprazole (PRILOSEC) 20 MG capsule Take 1 capsule (20 mg total) by mouth 2 (two) times daily. for 14 days    pantoprazole (PROTONIX) 40 MG tablet Take 1 tablet (40 mg total) by mouth 2 (two) times daily.    pen needle, diabetic (PEN NEEDLE) 32 gauge x 5/32" Ndle Use as directed to inject medication    pravastatin (PRAVACHOL) 80 MG tablet Take 1 tablet (80 mg total) by mouth once daily.    pregabalin (LYRICA) 25 MG capsule Take 1 capsule (25 mg total) by mouth 2 (two) times daily.    pulse oximeter (PULSE OXIMETER) device by Apply Externally route 2 (two) times a day. " Use twice daily at 8 AM and 3 PM and record the value in MyChart as directed.    semaglutide (OZEMPIC) 1 mg/dose (4 mg/3 mL) Inject 1 mg into the skin every 7 days.    tiZANidine (ZANAFLEX) 4 MG tablet Take 1/2 to 1 tablet by mouth twice daily as needed for muscle spasms. May cause drowsiness.    topiramate (TOPAMAX) 100 MG tablet Take 1 tablet (100 mg total) by mouth 2 (two) times daily.    TRELEGY ELLIPTA 100-62.5-25 mcg DsDv Inhale 1 puff into the lungs once daily.    TRUEPLUS LANCETS 33 gauge Misc Check blood glucose up to 3 times daily as needed before meals     No current facility-administered medications for this visit.       Review of Systems     GENERAL:  No weight loss, malaise or fevers.  HEENT:   No recent changes in vision or hearing  NECK:  Negative for lumps, no difficulty with swallowing.  RESPIRATORY:  Negative for cough, wheezing or shortness of breath, patient denies any recent URI.  CARDIOVASCULAR:  Negative for chest pain, leg swelling or palpitations.  GI:  Negative for abdominal discomfort, blood in stools or black stools or change in bowel habits.  MUSCULOSKELETAL:  See HPI.  SKIN:  Negative for lesions, rash, and itching.  PSYCH:  No mood disorder or recent psychosocial stressors.  Patients sleep is not disturbed secondary to pain.  HEMATOLOGY/LYMPHOLOGY:  Negative for prolonged bleeding, bruising easily or swollen nodes.  Patient is not currently taking any anti-coagulants  NEURO:   No history of headaches, syncope, paralysis, seizures or tremors.  All other reviewed and negative other than HPI.    OBJECTIVE:    BP (!) 142/85   Pulse 82   Wt (!) 146.3 kg (322 lb 8.5 oz)   BMI 49.04 kg/m²       Physical Exam    GENERAL: Well appearing, in no acute distress, alert and oriented x3.  PSYCH:  Mood and affect appropriate.  SKIN: Skin color, texture, turgor normal, no rashes or lesions.  HEAD/FACE:  Normocephalic, atraumatic. Cranial nerves grossly intact.    NECK: pain to palpation over  the cervical paraspinous muscles. Spurling Negative.  pain with neck flexion, extension, or lateral flexion.   Normal testing biceps, triceps and brachioradialis bilaterally.    NegativeHoffmann's bilaterally.    5/5 strength testing deltoid, biceps, triceps, wrist extensor, wrist flexor and ulnar intrinsics bilaterally.    Normal  strength bilaterally    CV: RRR with palpation of the radial artery.  PULM: No evidence of respiratory difficulty, symmetric chest rise.  GI:  Soft and non-tender.    BACK: Straight leg raising in the sitting and supine positions is negative to radicular pain. No pain to palpation over the facet joints of the lumbar spine or spinous processes. Normal range of motion without pain reproduction.  EXTREMITIES: Peripheral joint ROM is full and pain free without obvious instability or laxity in all four extremities. No deformities, edema, or skin discoloration. Good capillary refill.  MUSCULOSKELETAL: Able to stand on heels & toes.   hip, and knee provocative maneuvers are negative.   pain with palpation over the sacroiliac joints bilaterally.  FABERs test is negative.  FAER test is negative bilaterally.   Bilateral upper and lower extremity strength is normal and symmetric.  No atrophy or tone abnormalities are noted.  -5/5 strength testing hip flexion, quadriceps, gastrocnemius soleus, anterior tibialis and EHL bilaterally.  RIGHT Lower extremity: Hip flexion 5/5, Hip Abduction 5/5, Hip Adduction 5/5, Knee extension 5/5, Knee flexion 5/5, Ankle dorsiflexion5/5, Extensor hallucis longus 5/5, Ankle plantarflexion 5/5  LEFT Lower extremity:  Hip flexion 5/5, Hip Abduction 5/5,Hip Adduction 5/5, Knee extension 5/5, Knee flexion 5/5, Ankle dorsiflexion 5/5, Extensor hallucis longus 5/5, Ankle plantarflexion 5/5  -Normal testing knee (patellar) jerk and ankle (achilles) jerk    NEURO: Bilateral upper and lower extremity coordination and muscle stretch reflexes are physiologic and symmetric. No  loss of sensation is noted.  GAIT: normal.    Imaging:   X-Ray Lumbar Spine Complete 5 View  FINDINGS:  Mild dextroconvex curvature of the lumbar spine.  Grade 1 anterolisthesis of L4 on L5, measuring 4 mm.  Vertebral body heights are maintained without evidence of fracture or aggressive osseous lesion.  Multilevel degenerative anterior marginal osteophyte formation, most pronounced in the visualized lower thoracic spine.  Intervertebral disc space narrowing at L5-S1.  Multilevel degenerative facet arthropathy, most pronounced at L4-5 and L5-S1.    X-Ray Cervical Spine 5 View W Flex Extxt  FINDINGS:  C7 faintly visualized on neutral lateral view.  There is heterotopic bone formation along the anterior/inferior margin C1-2 articulation.  There is smooth prominence of the overlying prevertebral soft tissues at this level.  Pre dens space appears within upper limits normal.  Vertebral body height and alignment maintained with anterior and posterior marginal spurring most prominently at the C4-5 and C5-6 levels.  Posterior subluxation C4 on C5 noted.  There is uniform loss of disc height at C4-5 and C5-6 levels.  Heterotopic bone and positioning combine to limit evaluation of the left side of C1-2 articulation on the open mouth view.  There is suggestion of slight asymmetry of lateral masses of C1.  Flexion and extension views demonstrate stable positioning without additional evidence subluxation or instability.  No other evidence of fracture or subluxation noted.  Multilevel mild uncovertebral osteophyte formation and minimal/mild neural foraminal stenosis identified.  Surgical staples identified anteriorly in the lower neck at and just to the right of midline.  Remaining osseous structures appear intact.  Partially visualized upper apices appear clear.    Impression  Spondylosis with minimal retro listhesis or posterior subluxation C4 on C5.    Hepatopathy ache bone and degenerative change limits evaluation of the  C1-2 articulation and levels particularly on the left.  Consideration should be given for CT if not previously performed to further evaluate these findings.    MRI Lumbar Spine 1/11/22  FINDINGS:  Grade 1 anterolisthesis of L4 on L5. No fracture.  Multiple hemangiomas are seen within the lower thoracic and upper lumbar spine vertebral bodies.  Mild disc height loss at the L1-L2 and L4-L5 levels.  Conus medullaris terminates at the L1 level. Distal spinal cord intensity is normal.  There is no paraspinal soft tissue abnormality.     T12-L1: Minimal diffuse disc bulge.  Mild bilateral facet arthropathy.  No neural foraminal stenosis.  No spinal canal stenosis.     L1-L2: There is a mild diffuse disc bulge.  Mild bilateral facet arthropathy.  No neural foraminal stenosis.  No spinal canal stenosis.     L2-L3: No disc bulge.  Mild bilateral facet arthropathy.  No neural foraminal stenosis.  No spinal canal stenosis.     L3-L4: Minimal diffuse disc bulge.  Moderate bilateral facet arthropathy.  Mild bilateral neural foraminal stenosis.  No significant spinal canal stenosis.     L4-L5: Mild uncovering of the disc space secondary to listhesis.  There is a diffuse disc bulge.  Severe bilateral facet arthropathy.  Moderate bilateral neural foraminal stenosis.  Mild spinal canal stenosis secondary to listhesis, disc bulge, facet arthropathy, and ligamentum flavum hypertrophy.     L5-S1: Minimal diffuse disc bulge.  Moderate facet arthropathy, greater on the right than left.  Mild-to-moderate bilateral neural foraminal stenosis.  No spinal canal stenosis.    MRI cervical spine 1/11/22  FINDINGS:  There is mild retrolisthesis of C4 on C5. Vertebral bodies demonstrate normal signal intensity on all sequences.  No fracture.  Mild-to-moderate disc height loss and desiccation involves the C4 through C7 disc spaces, most pronounced at the C4-C5 and C5-C6 disc spaces.  The craniocervical junction is normal.  Visualized portions of  the posterior fossa appear normal.  Mucosal thickening noted within the sphenoid sinus.  The spinal cord demonstrates normal signal intensity on all sequences. No paraspinal soft tissue abnormality is identified.     C2-C3: Small posterior disc osteophyte complex, best seen on the axial sequence, without spinal canal stenosis.  There is no facet arthropathy.  There is no uncovertebral joint disease.  There is no neuroforaminal stenosis.     C3-C4: No disc bulge.  Severe right facet arthropathy.  No significant uncovertebral arthropathy.  Moderate right neural foraminal stenosis.  No spinal canal stenosis.     C4-C5: Small posterior disc osteophyte complex.  Mild spinal canal stenosis secondary to retrolisthesis and disc osteophyte complex.  There is also a right lateral disc protrusion which involves the right neural foramen, best appreciated on the T2 sagittal sequence.  Mild bilateral facet arthropathy.  Mild-to-moderate bilateral uncovertebral arthropathy.  Moderate to severe neural foraminal stenosis, greater on the right than left.     C5-C6: Small posterior disc osteophyte complex results in mild spinal canal stenosis.  Severe left facet arthropathy.  Mild-to-moderate bilateral uncovertebral arthropathy.  Mild to moderate bilateral neural foraminal stenosis.     C6-C7: Small posterior disc osteophyte complex results in mild spinal canal stenosis.  No significant facet arthropathy.  No significant uncovertebral arthropathy.  No neural foraminal stenosis.     C7-T1: No disc bulge.  No significant facet arthropathy.  No neural foraminal stenosis.  No spinal canal stenosis.    ASSESSMENT: 62 y.o. year old female with neck and shoulder and back and lower extremity pain, consistent with     1. Lumbar radiculopathy, chronic  IR Epidural Transfor 1st Vert Lumbar Adarsh    Case Request-RAD/Other Procedure Area: Bilateral L5/S1 TF TEETEE with RN IV sedation   2. Cervical radiculopathy     3. Cervical spondylosis     4.  Sacroiliitis     5. Lumbar spondylosis     6. Lumbar facet arthropathy     7. Facet arthropathy, cervical           PLAN:   - Interventions:  Schedule for bilateral L5/S1 transforaminal epidural steroid injection to see if this helps with radicular symptoms and neuropathic pain in bilateral feet..  Explained the risks and benefits of the procedure in detail with the patient today in clinic along with alternative treatment options.  Patient has elected to pursue this procedure.    - Anticoagulation use: no no anticoagulation    - we have discussed considering repeat cervical  epidural steroid injection at a more cephalad level, C6-7, in the future should her upper extremity radicular symptoms exacerbate.     report:  Reviewed and consistent with medication use as prescribed.      - Medications:  -Continue Topamax .  We discussed potential side effects of this medication include drowsiness, dizziness, tingling of the hands and feet, diarrhea, dysgeusia, weight loss/anorexia.    Topamax  100mg BID       - Therapy:   We discussed continuing physical therapy to help manage the patient/s painful condition. The patient was counseled that muscle strengthening will improve the long term prognosis in regards to pain and may also help increase range of motion and mobility.    - Imaging: Reviewed available imaging with patient and answered any questions they had regarding study.  - Follow up visit: return to clinic in 8 weeks    The above plan and management options were discussed at length with patient. Patient is in agreement with the above and verbalized understanding.    - I discussed the goals of interventional chronic pain management with the patient on today's visit. We discussed a multimodal and systematic approach to pain.  This includes diagnostic and therapeutic injections, adjuvant pharmacologic treatment, physical therapy, and at times psychiatry.  I emphasized the importance of regular exercise, core  strengthening and stretching, diet and weight loss as a cornerstone of long-term pain management.    - This condition does not require this patient to take time off of work, and the primary goal of our Pain Management services is to improve the patient's functional capacity.  - Patient Questions: Answered all of the patient's questions regarding diagnoses, therapy, treatment and next steps      Nae Paul MD  Interventional Pain Management  [facility]    Disclaimer:  This note was prepared using voice recognition system and is likely to have sound alike errors that may have been overlooked even after proof reading.  Please call me with any questions

## 2022-03-08 ENCOUNTER — TELEPHONE (OUTPATIENT)
Dept: PAIN MEDICINE | Facility: CLINIC | Age: 62
End: 2022-03-08

## 2022-03-08 ENCOUNTER — OFFICE VISIT (OUTPATIENT)
Dept: PAIN MEDICINE | Facility: CLINIC | Age: 62
End: 2022-03-08
Payer: MEDICAID

## 2022-03-08 VITALS
BODY MASS INDEX: 49.04 KG/M2 | DIASTOLIC BLOOD PRESSURE: 85 MMHG | HEART RATE: 82 BPM | SYSTOLIC BLOOD PRESSURE: 142 MMHG | WEIGHT: 293 LBS

## 2022-03-08 DIAGNOSIS — M46.1 SACROILIITIS: ICD-10-CM

## 2022-03-08 DIAGNOSIS — M47.812 FACET ARTHROPATHY, CERVICAL: ICD-10-CM

## 2022-03-08 DIAGNOSIS — M54.12 CERVICAL RADICULOPATHY: ICD-10-CM

## 2022-03-08 DIAGNOSIS — M47.816 LUMBAR SPONDYLOSIS: ICD-10-CM

## 2022-03-08 DIAGNOSIS — M54.16 LUMBAR RADICULOPATHY, CHRONIC: Primary | ICD-10-CM

## 2022-03-08 DIAGNOSIS — M47.816 LUMBAR FACET ARTHROPATHY: ICD-10-CM

## 2022-03-08 DIAGNOSIS — M47.812 CERVICAL SPONDYLOSIS: ICD-10-CM

## 2022-03-08 PROCEDURE — 3008F BODY MASS INDEX DOCD: CPT | Mod: CPTII,,, | Performed by: ANESTHESIOLOGY

## 2022-03-08 PROCEDURE — 3044F HG A1C LEVEL LT 7.0%: CPT | Mod: CPTII,,, | Performed by: ANESTHESIOLOGY

## 2022-03-08 PROCEDURE — 3008F PR BODY MASS INDEX (BMI) DOCUMENTED: ICD-10-PCS | Mod: CPTII,,, | Performed by: ANESTHESIOLOGY

## 2022-03-08 PROCEDURE — 1159F PR MEDICATION LIST DOCUMENTED IN MEDICAL RECORD: ICD-10-PCS | Mod: CPTII,,, | Performed by: ANESTHESIOLOGY

## 2022-03-08 PROCEDURE — 99999 PR PBB SHADOW E&M-EST. PATIENT-LVL V: CPT | Mod: PBBFAC,,, | Performed by: ANESTHESIOLOGY

## 2022-03-08 PROCEDURE — 1159F MED LIST DOCD IN RCRD: CPT | Mod: CPTII,,, | Performed by: ANESTHESIOLOGY

## 2022-03-08 PROCEDURE — 3079F DIAST BP 80-89 MM HG: CPT | Mod: CPTII,,, | Performed by: ANESTHESIOLOGY

## 2022-03-08 PROCEDURE — 99214 OFFICE O/P EST MOD 30 MIN: CPT | Mod: S$PBB,,, | Performed by: ANESTHESIOLOGY

## 2022-03-08 PROCEDURE — 3079F PR MOST RECENT DIASTOLIC BLOOD PRESSURE 80-89 MM HG: ICD-10-PCS | Mod: CPTII,,, | Performed by: ANESTHESIOLOGY

## 2022-03-08 PROCEDURE — 3044F PR MOST RECENT HEMOGLOBIN A1C LEVEL <7.0%: ICD-10-PCS | Mod: CPTII,,, | Performed by: ANESTHESIOLOGY

## 2022-03-08 PROCEDURE — 99214 PR OFFICE/OUTPT VISIT, EST, LEVL IV, 30-39 MIN: ICD-10-PCS | Mod: S$PBB,,, | Performed by: ANESTHESIOLOGY

## 2022-03-08 PROCEDURE — 3077F SYST BP >= 140 MM HG: CPT | Mod: CPTII,,, | Performed by: ANESTHESIOLOGY

## 2022-03-08 PROCEDURE — 99999 PR PBB SHADOW E&M-EST. PATIENT-LVL V: ICD-10-PCS | Mod: PBBFAC,,, | Performed by: ANESTHESIOLOGY

## 2022-03-08 PROCEDURE — 99215 OFFICE O/P EST HI 40 MIN: CPT | Mod: PBBFAC | Performed by: ANESTHESIOLOGY

## 2022-03-08 PROCEDURE — 3077F PR MOST RECENT SYSTOLIC BLOOD PRESSURE >= 140 MM HG: ICD-10-PCS | Mod: CPTII,,, | Performed by: ANESTHESIOLOGY

## 2022-03-08 NOTE — TELEPHONE ENCOUNTER
----- Message from Sebastián Salas sent at 3/8/2022  9:22 AM CST -----  Pt would  like to reschedule procedure scheduled 04/01/2022.  Please call back at [phone].  Md Mehreen

## 2022-03-08 NOTE — PATIENT INSTRUCTIONS
General Neck and Back Pain    Both neck and back pain are usually caused by injury to the muscles or ligaments of the spine. Sometimes the disks that separate each bone of the spine may cause pain by pressing on a nearby nerve. Back and neck pain may appear after a sudden twisting or bending force (such as in a car accident), or sometimes after a simple awkward movement. In either case, muscle spasm is often present and adds to the pain.  Acute neck and back pain usually gets better in 1 to 2 weeks. Pain related to disk disease, arthritis in the spinal joints or spinal stenosis (narrowing of the spinal canal) can become chronic and last for months or years.  Back and neck pain are common problems. Most people feel better in 1 or 2 weeks, and most of the rest in 1 to 2 months. Most people can remain active.  People experience and describe pain differently.  Pain can be sharp, stabbing, shooting, aching, cramping, or burning  Movement, standing, bending, lifting, sitting, or walking may worsen the pain  Pain can be localized to one spot or area, or it can be more generalized  Pain can spread or radiate upwards, downwards, to the front, or go down your arms  Muscle spasm may occur.  Most of the time mechanical problems with the muscles or spine cause the pain. it is usually caused by an injury, whether known or not, to the muscles or ligaments. While illnesses can cause back pain, it is usually not caused by a serious illness. Pain is usually related to physical activity, whether sports, exercise, work, or normal activity. Sometimes it can occur without an identifiable cause. This can happen simply by stretching or moving wrong, without noting pain at the time. Other causes include:  Overexertion, lifting, pushing, pulling incorrectly or too aggressively.  Sudden twisting, bending or stretching from an accident (car or fall), or accidental movement.  Poor posture  Poor conditioning, lack of regular exercise  Spinal  disc disease or arthritis  Stress  Pregnancy, or illness like appendicitis, bladder or kidney infection, pelvic infections   Home care  For neck pain: Use a comfortable pillow that supports the head and keeps the spine in a neutral position. The position of the head should not be tilted forward or backward.  When in bed, try to find a position of comfort. A firm mattress is best. Try lying flat on your back with pillows under your knees. You can also try lying on your side with your knees bent up towards your chest and a pillow between your knees.  At first, do not try to stretch out the sore spots. If there is a strain, it is not like the good soreness you get after exercising without an injury. In this case, stretching may make it worse.  Avoid prolonged sitting, long car rides or travel. This puts more stress on the lower back than standing or walking.  During the first 24 to 72 hours after an injury, apply an ice pack to the painful area for 20 minutes and then remove it for 20 minutes over a period of 60 to 90 minutes or several times a day.   You can alternate ice and heat therapies. Talk with your healthcare provider about the best treatment for your back or neck pain. As a safety precaution, do not use a heating pad at bedtime. Sleeping with a heating pad can lead to skin burns or tissue damage.  Therapeutic massage can help relax the back and neck muscles without stretching them.  Be aware of safe lifting methods and do not lift anything over 15 pounds until all the pain is gone.  Medications  Talk to your healthcare provider before using medicine, especially if you have other medical problems or are taking other medicines.  You may use over-the-counter medicine to control pain, unless another pain medicine was prescribed. If you have chronic conditions like diabetes, liver or kidney disease, stomach ulcers,  gastrointestinal bleeding, or are taking blood thinner medicines.  Be careful if you are given pain  medicines, narcotics, or medicine for muscle spasm. They can cause drowsiness, and can affect your coordination, reflexes, and judgment. Do not drive or operate heavy machinery.  Follow-up care  Follow up with your healthcare provider, or as advised. Physical therapy or further tests may be needed.  If X-rays were taken, you will be notified of any new findings that may affect your care.  Call 911  Seek emergency medical care if any of the following occur:  Trouble breathing  Confusion  Very drowsy or trouble awakening  Fainting or loss of consciousness  Rapid or very slow heart rate  Loss of bowel or bladder control  When to seek medical advice  Call your healthcare provider right away if any of these occur:  Pain becomes worse or spreads into your arms or legs  Weakness, numbness or pain in one or both arms or legs  Numbness in the groin area  Difficulty walking  Fever of 100.4ºF (38ºC) or higher, or as directed by your healthcare provider  Date Last Reviewed: 7/1/2016  © 9379-6990 [employer]. [address] [place] [address]. All rights reserved. This information is not intended as a substitute for professional medical care. Always follow your healthcare professional's instructions.       Understanding Lumbar Radiculopathy    Lumbar radiculopathy is irritation or inflammation of a nerve root in the low back. It causes symptoms that spread out from the back down one or both legs. To understand this condition, it helps to understand the parts of the spine:  Vertebrae. These are bones that stack to form the spine. The lumbar spine contains the 5 bottom vertebrae.  Disks. These are soft pads of tissue between the vertebrae. They act as shock absorbers for the spine.  Spinal canal. This is a tunnel formed within the stacked vertebrae. In the lumbar spine, nerves run through this canal.  Nerves. These branch off and leave the spinal canal, traveling out to parts of the body. As they leave the  spinal canal, nerves pass through openings between the vertebrae. The nerve root is the part of the nerve that is closest to the spinal canal.  Sciatic nerve. This is a large nerve formed from several nerve roots in the low back. This nerve extends down the back of the leg to the foot.  With lumbar radiculopathy, nerve roots in the low back become irritated. This leads to pain and symptoms. The sciatic nerve is commonly involved, so the condition is often called sciatica.  What causes lumbar radiculopathy?  Aging, injury, poor posture, extra body weight, and other issues can lead to problems in the low back. These problems may then irritate nerve roots. They include:  Damage to a disk in the lumbar spine. The damaged disk may then press on nearby nerve roots.  Degeneration from wear and tear, and aging. This can lead to narrowing (stenosis) of the openings between the vertebrae. The narrowed openings press on nerve roots as they leave the spinal canal.  Unstable spine. This is when a vertebra slips forward. It can then press on a nerve root.  Other, less common things can put pressure on nerves in the low back. These include diabetes, infection, or a tumor.  Symptoms of lumbar radiculopathy  These include:  Pain in the low back  Pain, numbness, tingling, or weakness that travels into the buttocks, hip, groin, or leg  Muscle spasms  Treatment for lumbar radiculopathy  In most cases, your healthcare provider will first try treatments that help relieve symptoms. These may include:  Prescription and over-the-counter pain medicines. These help relieve pain, swelling, and irritation.  Limits on positions and activities that increase pain. But lying in bed or avoiding all movement is only recommended for a short period of time.  Physical therapy, including exercises and stretches. This helps decrease pain and increase movement and function.  Steroid shots into the lower back. This may help relieve symptoms for a  time.  Weight-loss program. If you are overweight, losing extra pounds may help relieve symptoms.  In some cases, you may need surgery to fix the underlying problem. This depends on the cause, the symptoms, and how long the pain has lasted.  Possible complications  Over time, an irritated and inflamed nerve may become damaged. This may lead to long-lasting (permanent) numbness or weakness in your legs and feet. If symptoms change suddenly or get worse, be sure to let your healthcare provider know.  When to call your healthcare provider  Call your healthcare provider right away if you have any of these:  New pain or pain that gets worse  New or increasing weakness, tingling, or numbness in your leg or foot  Problems controlling your bladder or bowel   Date Last Reviewed: 3/10/2016  © 7443-5034 [employer]. [address] [place] [address]. All rights reserved. This information is not intended as a substitute for professional medical care. Always follow your healthcare professional's instructions.       Understanding Cervical Radiculopathy    Cervical radiculopathy is irritation or inflammation of a nerve root in the neck. It causes neck pain and other symptoms that may spread into the chest or down the arm. To understand this condition, it helps to understand the parts of the spine:  Vertebrae. These are bones that stack to form the spine. The cervical spine contains the 7 vertebrae in the neck.  Disks. These are soft pads of tissue between the vertebrae. They act as shock absorbers for the spine.  The spinal canal. This is a tunnel formed within the stacked vertebrae. The spinal cord runs through this canal.  Nerves. These branch off the spinal cord. As they leave the spinal canal, nerves pass through openings between the vertebrae. The nerve root is the part of the nerve that is closest to the spinal cord.   With cervical radiculopathy, nerve roots in the neck become irritated. This leads to  pain and symptoms that can travel to the nerves that go from the spinal cord down the arms and into the torso.  What causes cervical radiculopathy?  Aging, injury, poor posture, and other issues can lead to problems in the neck. These problems may then irritate nerve roots. These include:  Damage to a disk in the cervical spine. The damaged disk may then press on nearby nerve roots.  Degeneration from wear and tear, and aging. This can lead to narrowing (stenosis) of the openings between the vertebrae. The narrowed openings press on nerve roots as they leave the spinal canal.  An unstable spine. This is when a vertebra slips forward. It can then press on a nerve root.  There are other, less common causes of pressure on nerves in the neck. These include infection, cysts, and tumors.  Symptoms of cervical radiculopathy  These include:  Neck pain  Pain, numbness, tingling, or weakness that travels down the arm  Loss of neck movement  Muscle spasms  Treatment for cervical radiculopathy  In most cases, your healthcare provider will first try treatments that help relieve symptoms. These may include:  Prescription or over-the-counter pain medicines. These help relieve pain and swelling.  Cold packs. These help reduce pain.  Resting. This involves avoiding positions and activities that increase pain.  Neck brace (cervical collar). This can help relieve inflammation and pain.  Physical therapy, including exercises and stretches. This can help decrease pain and increase movement and function.  Shots of medicinesaround the nerve roots. This is done to help relieve symptoms for a time.  In some cases, your healthcare provider may advise surgery to fix the underlying problem. This depends on the cause, the symptoms, and how long the pain has lasted.  Possible complications  Over time, an irritated and inflamed nerve may become damaged. This may lead to long-lasting (permanent) numbness or weakness. If symptoms change suddenly or  get worse, be sure to let your healthcare provider know.     When to call your healthcare provider  Call your healthcare provider right away if you have any of these:  New pain or pain that gets worse  New or increasing weakness, numbness, or tingling in your arm or hand  Bowel or bladder changes   Date Last Reviewed: 3/10/2016  © 3867-7511 [employer]. [address] [place] [address]. All rights reserved. This information is not intended as a substitute for professional medical care. Always follow your healthcare professional's instructions.       Exercises to Strengthen Your Lower Back  Strong lower back and abdominal muscles work together to support your spine. The exercises below will help strengthen the lower back. It is important that you begin exercising slowly and increase levels gradually.  Always begin any exercise program with stretching. If you feel pain while doing any of these exercises, stop and talk to your doctor about a more specific exercise program that better suits your condition.   Low back stretch  The point of stretching is to make you more flexible and increase your range of motion. Stretch only as much as you are able. Stretch slowly. Do not push your stretch to the limit. If at any point you feel pain while stretching, this is your (temporary) limit.  Lie on your back with your knees bent and both feet on the ground.  Slowly raise your left knee to your chest as you flatten your lower back against the floor. Hold for 5 seconds.  Relax and repeat the exercise with your right knee.  Do 10 of these exercises for each leg.  Repeat hugging both knees to your chest at the same time.  Building lower back strength  Start your exercise routine with 10 to 30 minutes a day, 1 to 3 times a day.  Initial exercises  Lying on your back:  Ankle pumps: Move your foot up and down, towards your head, and then away. Repeat 10 times with each foot.  Heel slides: Slowly bend your knee,  drawing the heel of your foot towards you. Then slide your heel/foot from you, straightening your knee. Do not lift your foot off the floor (this is not a leg lift).  Abdominal contraction: Bend your knees and put your hands on your stomach. Tighten your stomach muscles. Hold for 5 seconds, then relax. Repeat 10 times.  Straight leg raise: Bend one leg at the knee and keep the other leg straight. Tighten your stomach muscles. Slowly lift your straight leg 6 to 12 inches off the floor and hold for up to 5 seconds. Repeat 10 times on each side.  Standing:  Wall squats: Stand with your back against the wall. Move your feet about 12 inches away from the wall. Tighten your stomach muscles, and slowly bend your knees until they are at about a 45 degree angle. Do not go down too far. Hold about 5 seconds. Then slowly return to your starting position. Repeat 10 times.  Heel raises: Stand facing the wall. Slowly raise the heels of your feet up and down, while keeping your toes on the floor. If you have trouble balancing, you can touch the wall with your hands. Repeat 10 times.  More advanced exercises  When you feel comfortable enough, try these exercises.  Kneeling lumbar extension: Begin on your hands and knees. At the same time, raise and straighten your right arm and left leg until they are parallel to the ground. Hold for 2 seconds and come back slowly to a starting position. Repeat with left arm and right leg, alternating 10 times.  Prone lumbar extension: Lie face down, arms extended overhead, palms on the floor. At the same time, raise your right arm and left leg as high as comfortably possible. Hold for 10 seconds and slowly return to start. Repeat with left arm and right leg, alternating 10 times. Gradually build up to 20 times. (Advanced: Repeat this exercise raising both arms and both legs a few inches off the floor at the same time. Hold for 5 seconds and release.)  Pelvic tilt: Lie on the floor on your back  with your knees bent at 90 degrees. Your feet should be flat on the floor. Inhale, exhale, then slowly contract your abdominal muscles bringing your navel toward your spine. Let your pelvis rock back until your lower back is flat on the floor. Hold for 10 seconds while breathing smoothly.  Abdominal crunch: Perform a pelvic tilt (above) flattening your lower back against the floor. Holding the tension in your abdominal muscles, take another breath and raise your shoulder blades off the ground (this is not a full sit-up). Keep your head in line with your body (dont bend your neck forward). Hold for 2 seconds, then slowly lower.  Date Last Reviewed: 6/1/2016  © 7931-4887 [employer]. [address] [place] [address]. All rights reserved. This information is not intended as a substitute for professional medical care. Always follow your healthcare professional's instructions.       Exercises: Neck Isometrics  To start, sit in a chair with your feet flat on the floor. Your weight should be slightly forward so that youre balanced evenly on your buttocks. Relax your shoulders and keep your head level. Using a chair with arms may help you keep your balance.       Press your palm against your forehead. Resist with your neck muscles. Hold for 10 seconds. Relax. Repeat 5 times.  Do the exercise again, pressing on the side of your head. Repeat 5 times. Switch sides.  Do the exercise again, pressing on the back of your head. Repeat 5 times.  For your safety, check with your healthcare provider before starting an exercise program.   Date Last Reviewed: 8/16/2015  © 8588-5247 [employer]. [address] [place] [id number]. All rights reserved. This information is not intended as a substitute for professional medical care. Always follow your healthcare professional's instructions.

## 2022-03-08 NOTE — TELEPHONE ENCOUNTER
I spoke with the patient and was able to get her procedure rescheduled for Friday, 5/18/22.  The patient verbalized understanding.  All questions answered.

## 2022-03-16 ENCOUNTER — OFFICE VISIT (OUTPATIENT)
Dept: HEMATOLOGY/ONCOLOGY | Facility: CLINIC | Age: 62
End: 2022-03-16
Payer: MEDICAID

## 2022-03-16 VITALS
HEIGHT: 68 IN | SYSTOLIC BLOOD PRESSURE: 135 MMHG | TEMPERATURE: 98 F | OXYGEN SATURATION: 98 % | BODY MASS INDEX: 44.41 KG/M2 | DIASTOLIC BLOOD PRESSURE: 74 MMHG | HEART RATE: 80 BPM | WEIGHT: 293 LBS

## 2022-03-16 DIAGNOSIS — D50.0 IRON DEFICIENCY ANEMIA DUE TO CHRONIC BLOOD LOSS: Primary | ICD-10-CM

## 2022-03-16 PROCEDURE — 3044F HG A1C LEVEL LT 7.0%: CPT | Mod: CPTII,,, | Performed by: NURSE PRACTITIONER

## 2022-03-16 PROCEDURE — 3008F BODY MASS INDEX DOCD: CPT | Mod: CPTII,,, | Performed by: NURSE PRACTITIONER

## 2022-03-16 PROCEDURE — 1160F RVW MEDS BY RX/DR IN RCRD: CPT | Mod: CPTII,,, | Performed by: NURSE PRACTITIONER

## 2022-03-16 PROCEDURE — 99213 PR OFFICE/OUTPT VISIT, EST, LEVL III, 20-29 MIN: ICD-10-PCS | Mod: S$PBB,,, | Performed by: NURSE PRACTITIONER

## 2022-03-16 PROCEDURE — 1160F PR REVIEW ALL MEDS BY PRESCRIBER/CLIN PHARMACIST DOCUMENTED: ICD-10-PCS | Mod: CPTII,,, | Performed by: NURSE PRACTITIONER

## 2022-03-16 PROCEDURE — 3078F DIAST BP <80 MM HG: CPT | Mod: CPTII,,, | Performed by: NURSE PRACTITIONER

## 2022-03-16 PROCEDURE — 3008F PR BODY MASS INDEX (BMI) DOCUMENTED: ICD-10-PCS | Mod: CPTII,,, | Performed by: NURSE PRACTITIONER

## 2022-03-16 PROCEDURE — 3075F PR MOST RECENT SYSTOLIC BLOOD PRESS GE 130-139MM HG: ICD-10-PCS | Mod: CPTII,,, | Performed by: NURSE PRACTITIONER

## 2022-03-16 PROCEDURE — 99999 PR PBB SHADOW E&M-EST. PATIENT-LVL V: CPT | Mod: PBBFAC,,, | Performed by: NURSE PRACTITIONER

## 2022-03-16 PROCEDURE — 1159F PR MEDICATION LIST DOCUMENTED IN MEDICAL RECORD: ICD-10-PCS | Mod: CPTII,,, | Performed by: NURSE PRACTITIONER

## 2022-03-16 PROCEDURE — 3078F PR MOST RECENT DIASTOLIC BLOOD PRESSURE < 80 MM HG: ICD-10-PCS | Mod: CPTII,,, | Performed by: NURSE PRACTITIONER

## 2022-03-16 PROCEDURE — 99999 PR PBB SHADOW E&M-EST. PATIENT-LVL V: ICD-10-PCS | Mod: PBBFAC,,, | Performed by: NURSE PRACTITIONER

## 2022-03-16 PROCEDURE — 1159F MED LIST DOCD IN RCRD: CPT | Mod: CPTII,,, | Performed by: NURSE PRACTITIONER

## 2022-03-16 PROCEDURE — 99215 OFFICE O/P EST HI 40 MIN: CPT | Mod: PBBFAC | Performed by: NURSE PRACTITIONER

## 2022-03-16 PROCEDURE — 3044F PR MOST RECENT HEMOGLOBIN A1C LEVEL <7.0%: ICD-10-PCS | Mod: CPTII,,, | Performed by: NURSE PRACTITIONER

## 2022-03-16 PROCEDURE — 99213 OFFICE O/P EST LOW 20 MIN: CPT | Mod: S$PBB,,, | Performed by: NURSE PRACTITIONER

## 2022-03-16 PROCEDURE — 3075F SYST BP GE 130 - 139MM HG: CPT | Mod: CPTII,,, | Performed by: NURSE PRACTITIONER

## 2022-03-16 NOTE — PROGRESS NOTES
Subjective:       Patient ID: Zakia Price is a 62 y.o. female.    Chief Complaint: review labs. anemia    HPI: 62 y.o female presenting today for follow up of her ion deficiency anemia treated with Feraheme 5/2021. Recent EGD/Colonoscopy evaluation reviewed. EGD pathology H. Pylori positive, she completed treatment end of July. Colonoscopy unremarkable with recommended repeat in 10 years. She feels well and is without complaints. Denies abnormal bleeding or anemia symptoms.     Taking OTC oral iron supplementation but notes worsened constipation.         Social History     Socioeconomic History    Marital status: Single   Tobacco Use    Smoking status: Never Smoker    Smokeless tobacco: Never Used   Substance and Sexual Activity    Alcohol use: No    Drug use: No    Sexual activity: Not Currently       Past Medical History:   Diagnosis Date    Acute respiratory failure due to COVID-19     COVID-19     Diabetes mellitus, type 2     Diabetic neuropathy 1/27/2014    DJD (degenerative joint disease) of knee     DVT (deep venous thrombosis) around 1990's    in leg, is on no anticoagulant therapy presently    Fatty liver     GERD (gastroesophageal reflux disease)     Hypertension associated with diabetes     HECTOR (iron deficiency anemia) 5/13/2021    Multinodular goiter     Obesity, morbid, BMI 50 or higher     Sleep apnea     has no CPAP       Family History   Problem Relation Age of Onset    Leukemia Son     Cancer Son     Diabetes Mother     Hypertension Mother     Heart disease Mother 50    Cancer Father     Arthritis Father     Cancer Brother     Cancer Brother        Past Surgical History:   Procedure Laterality Date    COLONOSCOPY N/A 5/12/2021    Procedure: COLONOSCOPY;  Surgeon: Carolina Rizo MD;  Location: [facility];  Service: Endoscopy;  Laterality: N/A;    EPIDURAL STEROID INJECTION N/A 12/10/2021    Procedure: Lumbar L5/S1 IL TEETEE  Would like AM procedure, if  possible;  Surgeon: Nae Paul MD;  Location: [facility];  Service: Pain Management;  Laterality: N/A;    EPIDURAL STEROID INJECTION INTO CERVICAL SPINE N/A 10/8/2021    Procedure: C7-T1 IL TEETEE-no sedation.  Needs IV-just incase;  Surgeon: Nae Paul MD;  Location: [facility];  Service: Pain Management;  Laterality: N/A;    ESOPHAGOGASTRODUODENOSCOPY N/A 7/8/2021    Procedure: EGD (ESOPHAGOGASTRODUODENOSCOPY) previous positve covid;  Surgeon: Jann Gutierrez MD;  Location: [facility];  Service: Endoscopy;  Laterality: N/A;    FRACTURE SURGERY      HYSTERECTOMY      SHOULDER ARTHROSCOPY      THYROIDECTOMY, PARTIAL Right     and transplatation of right superior parathyroid gland to the sternocleidomastoid muscle     TONSILLECTOMY      TUBAL LIGATION  1984    WRIST FRACTURE SURGERY      left       Review of Systems   Constitutional: Negative for activity change, appetite change, chills, fatigue, fever and unexpected weight change.   HENT: Negative for congestion, mouth sores, nosebleeds, sore throat, trouble swallowing and voice change.    Eyes: Negative for visual disturbance.   Respiratory: Negative for cough, chest tightness, shortness of breath and wheezing.    Cardiovascular: Negative for chest pain and leg swelling.   Gastrointestinal: Positive for constipation. Negative for abdominal distention, abdominal pain, anal bleeding, blood in stool, diarrhea, nausea and vomiting.   Genitourinary: Negative for difficulty urinating, dysuria and hematuria.   Musculoskeletal: Negative for arthralgias, back pain and myalgias.   Skin: Negative for pallor, rash and wound.   Neurological: Negative for dizziness, syncope, weakness and headaches.   Hematological: Negative for adenopathy. Does not bruise/bleed easily.   Psychiatric/Behavioral: The patient is not nervous/anxious.          Medication List with Changes/Refills   Current Medications    ACETAMINOPHEN (TYLENOL) 500 MG TABLET    Take 2 tablets  (1,000 mg total) by mouth every 6 (six) hours as needed for Pain.    ALBUTEROL (PROVENTIL) 2.5 MG /3 ML (0.083 %) NEBULIZER SOLUTION    Take 3 mLs (2.5 mg total) by nebulization every 6 (six) hours while awake.    ALBUTEROL (PROVENTIL/VENTOLIN HFA) 90 MCG/ACTUATION INHALER    INHALE 2 PUFFS BY MOUTH EVERY 6 HOURS AS NEEDED FOR WHEEZING OR SHORTNESS OF BREATH    BLOOD SUGAR DIAGNOSTIC (CLEVER CHOICE VOICE+ TEST) STRP    TEST BLOOD SUGARS ONE TIME DAILY    BLOOD SUGAR DIAGNOSTIC STRP    To check BG 3 times daily, to use with insurance preferred meter    BLOOD-GLUCOSE METER KIT    To check BG three times daily, to use with insurance preferred meter    BLOOD-GLUCOSE METER KIT    Use before meals and before bed when the glucoses are not in control.  Otherwise, test it daily once a day before meals randomly to ensure    DICLOFENAC (VOLTAREN) 75 MG EC TABLET    Take 1 tablet (75 mg total) by mouth 2 (two) times daily.    DILTIAZEM (CARDIZEM) 30 MG TABLET    Take 1 tablet (30 mg total) by mouth every 12 (twelve) hours.    FAMOTIDINE (PEPCID) 40 MG TABLET    Take 1 tablet (40 mg total) by mouth once daily.    FERROUS SULFATE 324 MG (65 MG IRON) TBEC    Take 1 tablet (324 mg total) by mouth once daily.    FLUTICASONE PROPION-SALMETEROL 115-21 MCG/DOSE (ADVAIR HFA) 115-21 MCG/ACTUATION HFAA INHALER    Inhale 2 puffs into the lungs 2 (two) times daily. Wash out mouth after use. Controller    FLUTICASONE PROPIONATE (FLONASE) 50 MCG/ACTUATION NASAL SPRAY    Use 2 sprays to each nostril daily    INHALATION SPACING DEVICE (BREATHERITE VALVED MDI CHAMBER)    Use as directed for inhalation.    LANCETS MISC    To check BG three times daily, to use with insurance preferred meter    LEVOCETIRIZINE (XYZAL) 5 MG TABLET    Take 1 tablet (5 mg total) by mouth every evening.    LEVOTHYROXINE (SYNTHROID) 100 MCG TABLET    Take 1 tablet by mouth daily.    LIDOCAINE-PRILOCAINE (EMLA) CREAM    SMARTSI-2 Pump Topical 3-4 Times Daily     "LINACLOTIDE (LINZESS) 72 MCG CAP CAPSULE    Take 1 capsule (72 mcg total) by mouth once daily.    METFORMIN (GLUCOPHAGE) 1000 MG TABLET    Take 1 tablet (1,000 mg total) by mouth 2 (two) times daily with meals.    MONTELUKAST (SINGULAIR) 10 MG TABLET    Take 1 tablet (10 mg total) by mouth every evening.    OMEPRAZOLE (PRILOSEC) 20 MG CAPSULE    Take 1 capsule (20 mg total) by mouth 2 (two) times daily. for 14 days    PANTOPRAZOLE (PROTONIX) 40 MG TABLET    Take 1 tablet (40 mg total) by mouth 2 (two) times daily.    PEN NEEDLE, DIABETIC (PEN NEEDLE) 32 GAUGE X 5/32" NDLE    Use as directed to inject medication    PRAVASTATIN (PRAVACHOL) 80 MG TABLET    Take 1 tablet (80 mg total) by mouth once daily.    PREGABALIN (LYRICA) 25 MG CAPSULE    Take 1 capsule (25 mg total) by mouth 2 (two) times daily.    PULSE OXIMETER (PULSE OXIMETER) DEVICE    by Apply Externally route 2 (two) times a day. Use twice daily at 8 AM and 3 PM and record the value in [employer] as directed.    SEMAGLUTIDE (OZEMPIC) 1 MG/DOSE (4 MG/3 ML)    Inject 1 mg into the skin every 7 days.    TIZANIDINE (ZANAFLEX) 4 MG TABLET    Take 1/2 to 1 tablet by mouth twice daily as needed for muscle spasms. May cause drowsiness.    TOPIRAMATE (TOPAMAX) 100 MG TABLET    Take 1 tablet (100 mg total) by mouth 2 (two) times daily.    TRELEGY ELLIPTA 100-62.5-25 MCG DSDV    Inhale 1 puff into the lungs once daily.    TRUEPLUS LANCETS 33 GAUGE MISC    Check blood glucose up to 3 times daily as needed before meals     Objective:     Vitals:    03/16/22 1307   BP: 135/74   Pulse: 80   Temp: 97.6 °F (36.4 °C)     Lab Results   Component Value Date    WBC 5.07 02/24/2022    HGB 13.0 02/24/2022    HCT 40.5 02/24/2022    MCV 89 02/24/2022     02/24/2022       BMP  Lab Results   Component Value Date     02/24/2022    K 3.5 02/24/2022     (H) 02/24/2022    CO2 20 (L) 02/24/2022    BUN 13 02/24/2022    CREATININE 0.8 02/24/2022    CALCIUM 9.6 02/24/2022    " ANIONGAP 11 02/24/2022    ESTGFRAFRICA >60.0 02/24/2022    EGFRNONAA >60.0 02/24/2022     Lab Results   Component Value Date    ALT 12 02/24/2022    AST 14 02/24/2022    ALKPHOS 75 02/24/2022    BILITOT 0.3 02/24/2022     Lab Results   Component Value Date    IRON 73 02/24/2022    TIBC 370 02/24/2022    FERRITIN 103 02/24/2022       Physical Exam  Vitals reviewed.   Constitutional:       Appearance: She is well-developed.   HENT:      Head: Normocephalic.      Right Ear: External ear normal.      Left Ear: External ear normal.   Eyes:      General: Lids are normal. No scleral icterus.        Right eye: No discharge.         Left eye: No discharge.      Conjunctiva/sclera: Conjunctivae normal.   Neck:      Thyroid: No thyroid mass.   Cardiovascular:      Rate and Rhythm: Normal rate and regular rhythm.      Heart sounds: Normal heart sounds. No murmur heard.  Pulmonary:      Effort: Pulmonary effort is normal. No respiratory distress.      Breath sounds: Normal breath sounds.   Abdominal:      General: There is no distension.   Genitourinary:     Comments: deferred  Musculoskeletal:         General: Normal range of motion.      Cervical back: Normal range of motion.   Skin:     General: Skin is warm and dry.   Neurological:      Mental Status: She is alert and oriented to person, place, and time.   Psychiatric:         Speech: Speech normal.         Behavior: Behavior normal. Behavior is cooperative.         Thought Content: Thought content normal.          Assessment:     Problem List Items Addressed This Visit        Oncology    Iron deficiency anemia due to chronic blood loss - Primary     Iron levels remain adequate. Hemoglobin remains normal     Patient had been taking oral iron supplementation but notes significant constipation.     EGD done 7/2021 reviewed. Patient H. Pylori positive likely resulting in reduced absorption of dietary iron. She has completed abx for this. Discussed need for retesting stool 3  months following abx completion. Previous Colonoscopy done 5/2021 reviewed with unremarkable findings. Recommended repeat in 10 years.     No urgent need for additional IV iron. Encourage iron rich foods. Trial off oral iron due to constipation.     F/u 3 months with cbc, iron, ferritin. Discussed S&S to report sooner           Relevant Orders    CBC Auto Differential    Iron and TIBC    Ferritin            Plan:     Iron deficiency anemia due to chronic blood loss  -     CBC Auto Differential; Future; Expected date: 03/16/2022  -     Iron and TIBC; Future; Expected date: 03/16/2022  -     Ferritin; Future; Expected date: 03/16/2022              KARO Arroyo

## 2022-03-16 NOTE — ASSESSMENT & PLAN NOTE
Iron levels remain adequate. Hemoglobin remains normal     Patient had been taking oral iron supplementation but notes significant constipation.     EGD done 7/2021 reviewed. Patient H. Pylori positive likely resulting in reduced absorption of dietary iron. She has completed abx for this. Discussed need for retesting stool 3 months following abx completion. Previous Colonoscopy done 5/2021 reviewed with unremarkable findings. Recommended repeat in 10 years.     No urgent need for additional IV iron. Encourage iron rich foods. Trial off oral iron due to constipation.     F/u 3 months with cbc, iron, ferritin. Discussed S&S to report sooner

## 2022-03-17 ENCOUNTER — LAB VISIT (OUTPATIENT)
Dept: LAB | Facility: HOSPITAL | Age: 62
End: 2022-03-17
Attending: FAMILY MEDICINE
Payer: MEDICAID

## 2022-03-17 DIAGNOSIS — M54.12 CERVICAL RADICULOPATHY: Primary | ICD-10-CM

## 2022-03-17 DIAGNOSIS — A04.8 H. PYLORI INFECTION: ICD-10-CM

## 2022-03-17 DIAGNOSIS — K21.00 GASTROESOPHAGEAL REFLUX DISEASE WITH ESOPHAGITIS, UNSPECIFIED WHETHER HEMORRHAGE: ICD-10-CM

## 2022-03-17 DIAGNOSIS — R05.9 COUGH: ICD-10-CM

## 2022-03-17 PROCEDURE — 87338 HPYLORI STOOL AG IA: CPT | Performed by: FAMILY MEDICINE

## 2022-03-17 RX ORDER — PANTOPRAZOLE SODIUM 40 MG/1
40 TABLET, DELAYED RELEASE ORAL 2 TIMES DAILY
Qty: 180 TABLET | Refills: 4 | Status: SHIPPED | OUTPATIENT
Start: 2022-03-17 | End: 2022-04-20

## 2022-03-17 RX ORDER — FLUTICASONE PROPIONATE 50 MCG
SPRAY, SUSPENSION (ML) NASAL
Qty: 16 G | Refills: 12 | Status: SHIPPED | OUTPATIENT
Start: 2022-03-17 | End: 2023-02-21 | Stop reason: SDUPTHER

## 2022-03-17 RX ORDER — PANTOPRAZOLE SODIUM 40 MG/1
40 TABLET, DELAYED RELEASE ORAL 2 TIMES DAILY
COMMUNITY
End: 2022-03-17 | Stop reason: SDUPTHER

## 2022-03-17 RX ORDER — TIZANIDINE 4 MG/1
TABLET ORAL
Qty: 60 TABLET | Refills: 4 | Status: SHIPPED | OUTPATIENT
Start: 2022-03-17 | End: 2023-02-21 | Stop reason: SDUPTHER

## 2022-03-17 NOTE — TELEPHONE ENCOUNTER
No new care gaps identified.  Powered by [employer]. Reference number: [id number].   3/17/2022 8:54:56 AM CDT

## 2022-03-20 DIAGNOSIS — E11.40 TYPE 2 DIABETES MELLITUS WITH DIABETIC NEUROPATHY, WITHOUT LONG-TERM CURRENT USE OF INSULIN: ICD-10-CM

## 2022-03-20 DIAGNOSIS — E11.65 TYPE 2 DIABETES MELLITUS WITH HYPERGLYCEMIA, WITHOUT LONG-TERM CURRENT USE OF INSULIN: Chronic | ICD-10-CM

## 2022-03-20 DIAGNOSIS — Z76.0 MEDICATION REFILL: ICD-10-CM

## 2022-03-20 DIAGNOSIS — E04.2 MULTINODULAR GOITER: ICD-10-CM

## 2022-03-20 NOTE — TELEPHONE ENCOUNTER
No new care gaps identified.  Powered by "[employer] by [employer]. Reference number: [id number].   3/20/2022 8:54:01 AM CDT

## 2022-03-20 NOTE — TELEPHONE ENCOUNTER
No new care gaps identified.  Powered by [employer]. Reference number: [id number].   3/20/2022 8:58:21 AM CDT

## 2022-03-21 ENCOUNTER — TELEPHONE (OUTPATIENT)
Dept: PAIN MEDICINE | Facility: CLINIC | Age: 62
End: 2022-03-21
Payer: MEDICAID

## 2022-03-21 RX ORDER — INSULIN PUMP SYRINGE, 3 ML
EACH MISCELLANEOUS
Qty: 1 EACH | Refills: 0 | Status: SHIPPED | OUTPATIENT
Start: 2022-03-21 | End: 2022-07-15

## 2022-03-21 RX ORDER — LEVOTHYROXINE SODIUM 100 UG/1
100 TABLET ORAL DAILY
Qty: 90 TABLET | Refills: 1 | Status: SHIPPED | OUTPATIENT
Start: 2022-03-21 | End: 2022-11-04 | Stop reason: SDUPTHER

## 2022-03-21 RX ORDER — INSULIN PUMP SYRINGE, 3 ML
EACH MISCELLANEOUS
Qty: 100 EACH | Refills: 0 | Status: SHIPPED | OUTPATIENT
Start: 2022-03-21 | End: 2022-07-15

## 2022-03-21 NOTE — TELEPHONE ENCOUNTER
The patient was scheduled for a procedure with Dr. Paul on Good Friday, 4/15/22.  I contacted her and was able to move her to the following Friday, 4/22/22.  The patient verbalized understanding.  All questions answered.

## 2022-03-21 NOTE — TELEPHONE ENCOUNTER
----- Message from Heather Patterson sent at 3/21/2022  8:59 AM CDT -----  Mikayla from [facility] would like a call back in regards to the directions for the prescriptions and the correct dosage. Please call her at [phone]

## 2022-03-23 LAB
H PYLORI AG STL QL IA: NORMAL
SPECIMEN SOURCE: NORMAL

## 2022-04-10 ENCOUNTER — PATIENT MESSAGE (OUTPATIENT)
Dept: OTHER | Facility: OTHER | Age: 62
End: 2022-04-10
Payer: MEDICAID

## 2022-04-11 ENCOUNTER — TELEPHONE (OUTPATIENT)
Dept: PAIN MEDICINE | Facility: CLINIC | Age: 62
End: 2022-04-11
Payer: MEDICAID

## 2022-04-11 NOTE — TELEPHONE ENCOUNTER
Pt r/s 5/6/2022 due to transportation issues, f/u r/s to 6/7/2022. All questions answered at this time.

## 2022-04-11 NOTE — TELEPHONE ENCOUNTER
----- Message from Criss Leger MA sent at 4/11/2022  9:45 AM CDT -----    ----- Message -----  From: Melina High PA-C  Sent: 4/11/2022   9:38 AM CDT  To: Criss Leger MA    Request to re-schedule procedure   ----- Message -----  From: Linda Caballero  Sent: 4/11/2022   8:23 AM CDT  To: [facility] Staff    Type:  Patient Call Back    Who Called: Zakia    What is the reqeust in detail: Pt is requesting a call back to reschedule her procedure 04/22. Please Advise     Can the clinic reply by [facility]?    Best Call Back Number: [phone]

## 2022-04-12 RX ORDER — DICLOFENAC SODIUM 75 MG/1
75 TABLET, DELAYED RELEASE ORAL 2 TIMES DAILY
Qty: 60 TABLET | Refills: 2 | Status: SHIPPED | OUTPATIENT
Start: 2022-04-12 | End: 2022-07-15 | Stop reason: SDUPTHER

## 2022-04-13 ENCOUNTER — PATIENT OUTREACH (OUTPATIENT)
Dept: ADMINISTRATIVE | Facility: OTHER | Age: 62
End: 2022-04-13
Payer: MEDICAID

## 2022-04-13 NOTE — PROGRESS NOTES
Health Maintenance Due   Topic Date Due    Shingles Vaccine (1 of 2) Never done    Foot Exam  11/24/2021    Eye Exam  03/23/2022     Updates were requested from care everywhere.  Chart was reviewed for overdue Proactive [facility] Encounters (SUDHEER) topics (CRS, Breast Cancer Screening, Eye exam)  Health Maintenance has been updated.  LINKS immunization registry triggered.  Immunizations were reconciled.

## 2022-04-14 ENCOUNTER — OFFICE VISIT (OUTPATIENT)
Dept: PODIATRY | Facility: CLINIC | Age: 62
End: 2022-04-14
Payer: MEDICAID

## 2022-04-14 VITALS — HEIGHT: 68 IN | WEIGHT: 293 LBS | BODY MASS INDEX: 44.41 KG/M2

## 2022-04-14 DIAGNOSIS — E11.42 DIABETIC POLYNEUROPATHY ASSOCIATED WITH TYPE 2 DIABETES MELLITUS: ICD-10-CM

## 2022-04-14 DIAGNOSIS — M54.17 LUMBOSACRAL RADICULOPATHY: ICD-10-CM

## 2022-04-14 DIAGNOSIS — E11.49 TYPE II DIABETES MELLITUS WITH NEUROLOGICAL MANIFESTATIONS: Primary | ICD-10-CM

## 2022-04-14 DIAGNOSIS — E66.01 MORBID OBESITY: ICD-10-CM

## 2022-04-14 DIAGNOSIS — L60.3 ONYCHODYSTROPHY: ICD-10-CM

## 2022-04-14 PROCEDURE — 1160F RVW MEDS BY RX/DR IN RCRD: CPT | Mod: CPTII,,, | Performed by: PODIATRIST

## 2022-04-14 PROCEDURE — 11721 DEBRIDE NAIL 6 OR MORE: CPT | Mod: S$PBB,,, | Performed by: PODIATRIST

## 2022-04-14 PROCEDURE — 11721 DEBRIDE NAIL 6 OR MORE: CPT | Mod: PBBFAC | Performed by: PODIATRIST

## 2022-04-14 PROCEDURE — 3008F PR BODY MASS INDEX (BMI) DOCUMENTED: ICD-10-PCS | Mod: CPTII,,, | Performed by: PODIATRIST

## 2022-04-14 PROCEDURE — 99999 PR PBB SHADOW E&M-EST. PATIENT-LVL V: CPT | Mod: PBBFAC,,, | Performed by: PODIATRIST

## 2022-04-14 PROCEDURE — 99215 OFFICE O/P EST HI 40 MIN: CPT | Mod: PBBFAC | Performed by: PODIATRIST

## 2022-04-14 PROCEDURE — 1159F MED LIST DOCD IN RCRD: CPT | Mod: CPTII,,, | Performed by: PODIATRIST

## 2022-04-14 PROCEDURE — 1160F PR REVIEW ALL MEDS BY PRESCRIBER/CLIN PHARMACIST DOCUMENTED: ICD-10-PCS | Mod: CPTII,,, | Performed by: PODIATRIST

## 2022-04-14 PROCEDURE — 99999 PR PBB SHADOW E&M-EST. PATIENT-LVL V: ICD-10-PCS | Mod: PBBFAC,,, | Performed by: PODIATRIST

## 2022-04-14 PROCEDURE — 3044F HG A1C LEVEL LT 7.0%: CPT | Mod: CPTII,,, | Performed by: PODIATRIST

## 2022-04-14 PROCEDURE — 99213 PR OFFICE/OUTPT VISIT, EST, LEVL III, 20-29 MIN: ICD-10-PCS | Mod: 25,S$PBB,, | Performed by: PODIATRIST

## 2022-04-14 PROCEDURE — 1159F PR MEDICATION LIST DOCUMENTED IN MEDICAL RECORD: ICD-10-PCS | Mod: CPTII,,, | Performed by: PODIATRIST

## 2022-04-14 PROCEDURE — 99213 OFFICE O/P EST LOW 20 MIN: CPT | Mod: 25,S$PBB,, | Performed by: PODIATRIST

## 2022-04-14 PROCEDURE — 11721 PR DEBRIDEMENT OF NAILS, 6 OR MORE: ICD-10-PCS | Mod: S$PBB,,, | Performed by: PODIATRIST

## 2022-04-14 PROCEDURE — 3044F PR MOST RECENT HEMOGLOBIN A1C LEVEL <7.0%: ICD-10-PCS | Mod: CPTII,,, | Performed by: PODIATRIST

## 2022-04-14 PROCEDURE — 3008F BODY MASS INDEX DOCD: CPT | Mod: CPTII,,, | Performed by: PODIATRIST

## 2022-04-14 NOTE — PROGRESS NOTES
Subjective:       Patient ID: Zakia Price is a 62 y.o. female.    Chief Complaint: Foot Pain (C/o bilateral foot pain, rates pain 5/10,  diabetic, wearing slippers, last seen PCP Dr. Nagel on 02/24/2022.)      HPI: Zakia Price presents to the office today, under referral by , Oleksandr Nagel MD, for her annual diabetic foot assessment and risk evaluation.  Patient is a DMII. Patient states neuropathy. This patient last saw his/her internal/family medicine physician on 02/24/2022.  Relates 5/10 pain associated with her back.  States pain with lying flat on her back or standing straight up.    Hemoglobin A1C   Date Value Ref Range Status   02/24/2022 5.2 4.0 - 5.6 % Final     Comment:     ADA Screening Guidelines:  5.7-6.4%  Consistent with prediabetes  >or=6.5%  Consistent with diabetes    High levels of fetal hemoglobin interfere with the HbA1C  assay. Heterozygous hemoglobin variants (HbS, HgC, etc)do  not significantly interfere with this assay.   However, presence of multiple variants may affect accuracy.     10/06/2021 5.3 4.0 - 5.6 % Final     Comment:     ADA Screening Guidelines:  5.7-6.4%  Consistent with prediabetes  >or=6.5%  Consistent with diabetes    High levels of fetal hemoglobin interfere with the HbA1C  assay. Heterozygous hemoglobin variants (HbS, HgC, etc)do  not significantly interfere with this assay.   However, presence of multiple variants may affect accuracy.     06/09/2021 5.3 4.0 - 5.6 % Final     Comment:     ADA Screening Guidelines:  5.7-6.4%  Consistent with prediabetes  >or=6.5%  Consistent with diabetes    High levels of fetal hemoglobin interfere with the HbA1C  assay. Heterozygous hemoglobin variants (HbS, HgC, etc)do  not significantly interfere with this assay.   However, presence of multiple variants may affect accuracy.     .    Review of patient's allergies indicates:   Allergen Reactions    Codeine sulfate      Nausea^    Lisinopril Swelling      angioedema    Codeine Nausea Only and Rash       Past Medical History:   Diagnosis Date    Acute respiratory failure due to COVID-19     COVID-19     Diabetes mellitus, type 2     Diabetic neuropathy 1/27/2014    DJD (degenerative joint disease) of knee     DVT (deep venous thrombosis) around 1990's    in leg, is on no anticoagulant therapy presently    Fatty liver     GERD (gastroesophageal reflux disease)     Hypertension associated with diabetes     HECTOR (iron deficiency anemia) 5/13/2021    Multinodular goiter     Obesity, morbid, BMI 50 or higher     Sleep apnea     has no CPAP       Family History   Problem Relation Age of Onset    Leukemia Son     Cancer Son     Diabetes Mother     Hypertension Mother     Heart disease Mother 50    Cancer Father     Arthritis Father     Cancer Brother     Cancer Brother        Social History     Socioeconomic History    Marital status: Single   Tobacco Use    Smoking status: Never Smoker    Smokeless tobacco: Never Used   Substance and Sexual Activity    Alcohol use: No    Drug use: No    Sexual activity: Not Currently       Past Surgical History:   Procedure Laterality Date    COLONOSCOPY N/A 5/12/2021    Procedure: COLONOSCOPY;  Surgeon: Carolina Rizo MD;  Location: [facility];  Service: Endoscopy;  Laterality: N/A;    EPIDURAL STEROID INJECTION N/A 12/10/2021    Procedure: Lumbar L5/S1 IL TEETEE  Would like AM procedure, if possible;  Surgeon: Nae Paul MD;  Location: [facility];  Service: Pain Management;  Laterality: N/A;    EPIDURAL STEROID INJECTION INTO CERVICAL SPINE N/A 10/8/2021    Procedure: C7-T1 IL TEETEE-no sedation.  Needs IV-just incase;  Surgeon: Nae Paul MD;  Location: [facility];  Service: Pain Management;  Laterality: N/A;    ESOPHAGOGASTRODUODENOSCOPY N/A 7/8/2021    Procedure: EGD (ESOPHAGOGASTRODUODENOSCOPY) previous positve covid;  Surgeon: Jann Gutierrez MD;  Location: [facility];  Service: Endoscopy;   "Laterality: N/A;    FRACTURE SURGERY      HYSTERECTOMY      SHOULDER ARTHROSCOPY      THYROIDECTOMY, PARTIAL Right     and transplatation of right superior parathyroid gland to the sternocleidomastoid muscle     TONSILLECTOMY      TUBAL LIGATION  1984    WRIST FRACTURE SURGERY      left       Review of Systems      Objective:   Ht 5' 8" (1.727 m)   Wt (!) 146.2 kg (322 lb 5 oz)   BMI 49.01 kg/m²     Physical Exam  LOWER EXTREMITY PHYSICAL EXAMINATION    ORTHOPEDIC:  No pain on palpation of the foot or ankle.   Range of motion within normal limits of the ankle joint, rearfoot, and forefoot.  Manual muscle strength testing is 5/5 with dorsiflexion, plantar flexion, abduction and abduction of the lower extremity.  No pain with or without resistance.  The patient is a full to ambulate without pain or discomfort.  The patient's gait is moderate.  The patient does not utilize any assistive device for ambulation.    VASCULAR:  The right dorsalis pedis pulse 2/4 and the right posterior tibial pulse 1/4.  The left dorsalis pedis pulse 2/4 and posterior tibial pulse on the left is 1/4.  Capillary refill is intact.  Pedal hair growth decreased.     NEUROLOGY:  Protective sensation is not intact to the right left foot.  Vibratory sensation is diminished.  Proprioception is intact.  Sharp/dull is reduced.    DERMATOLOGY:  Skin is supple, moist, intact.  There is no signs of callusing, ulcerations, other lesions identified to the dorsal or plantar aspect of the right or left foot.  The R1, 2, 5 and left L1,2, 5 are thickened, discolored dystrophic.  There is subungual debris.  Nail plates have area of dark discoloration.  The remaining nails 3-4 on the right foot and the left foot are elongated but of normal color, thickness, and texture.   There is no signs of ingrowing into the medial or lateral borders.  There is no evidence of wounds or skin breakdown.  No edema or erythema.  No obvious lacerations or fissuring.  " Interdigital spaces are clean, dry, intact.  No rashes or scars appreciated.    Assessment:     1. Type II diabetes mellitus with neurological manifestations    2. Diabetic polyneuropathy associated with type 2 diabetes mellitus    3. Lumbosacral radiculopathy    4. Morbid obesity    5. Onychodystrophy        Plan:     Type II diabetes mellitus with neurological manifestations    Diabetic polyneuropathy associated with type 2 diabetes mellitus  -     Ambulatory referral/consult to Podiatry    Lumbosacral radiculopathy    Morbid obesity    Onychodystrophy      I counseled the patient on his/her Diabetic Mellitus regarding today's clinical examination and objection findings. We did also discuss recent medication changes, pertinent labs and imaging evaluations and other medical consultation notes and progress notes. Greater than 50% of this visit was spent on counseling and coordination of care. Greater than 20 minutes of this appt. was spent on education about the diabetic foot, in relation to PVD and/or neuropathy, and the prevention of limb loss.     Shoe gear is inspected and wear and proper fit/type. Patient is reminded of the importance of good nutrition and blood sugar control to help prevent podiatric complications of diabetes. Patient instructed on proper foot hygeine. We discussed wearing proper shoe gear, daily foot inspections, never walking without protective shoe gear, never putting sharp instruments to feet.  Patient  will continue to monitor the areas daily, inspect feet, wear protective shoe gear when ambulatory, moisturizer to maintain skin integrity.     Patient's DMI/DMII is managed by Internal/Family Medicine Physician and/or Endocrinology Advanced Practice Provider.    Continue to follow with pain management with pain management for lumbar radiculopathy due to severe pain    Dystrophic nail plates, as outlined above (R#1,2,5  ; L#1,2,5 ), are sharply debrided with double action nail nipper, and/or  with the assistance of a mechanical rotary carlos alberto, with removal of all offending nail and nail border(s), for reduction of pains. Nails are reduced in terms of length, width and girth with removal of subungual debris to facilitate pain free weight bearing and ambulation. The elongated nails as outlined in the objective portion of this note, were trimmed to appropriate length, with a double action nail nipper, for alleviation/reduction of pains as well. Follow up in approx. 3-4 months.

## 2022-04-20 ENCOUNTER — HOSPITAL ENCOUNTER (OUTPATIENT)
Dept: RADIOLOGY | Facility: HOSPITAL | Age: 62
Discharge: HOME | End: 2022-04-20
Attending: FAMILY MEDICINE
Payer: MEDICAID

## 2022-04-20 ENCOUNTER — OFFICE VISIT (OUTPATIENT)
Dept: FAMILY MEDICINE | Facility: CLINIC | Age: 62
End: 2022-04-20
Payer: MEDICAID

## 2022-04-20 VITALS
TEMPERATURE: 97 F | DIASTOLIC BLOOD PRESSURE: 80 MMHG | OXYGEN SATURATION: 99 % | HEART RATE: 63 BPM | HEIGHT: 68 IN | SYSTOLIC BLOOD PRESSURE: 134 MMHG | BODY MASS INDEX: 44.41 KG/M2 | WEIGHT: 293 LBS

## 2022-04-20 DIAGNOSIS — G89.29 CHRONIC LEFT SHOULDER PAIN: ICD-10-CM

## 2022-04-20 DIAGNOSIS — M25.512 CHRONIC LEFT SHOULDER PAIN: ICD-10-CM

## 2022-04-20 DIAGNOSIS — E11.42 DIABETIC POLYNEUROPATHY ASSOCIATED WITH TYPE 2 DIABETES MELLITUS: ICD-10-CM

## 2022-04-20 DIAGNOSIS — Z78.0 MENOPAUSE: ICD-10-CM

## 2022-04-20 DIAGNOSIS — Z86.19 HISTORY OF HELICOBACTER PYLORI INFECTION: ICD-10-CM

## 2022-04-20 DIAGNOSIS — K21.00 GASTROESOPHAGEAL REFLUX DISEASE WITH ESOPHAGITIS WITHOUT HEMORRHAGE: Primary | ICD-10-CM

## 2022-04-20 DIAGNOSIS — R93.89 ABNORMAL X-RAY: ICD-10-CM

## 2022-04-20 PROCEDURE — 99215 OFFICE O/P EST HI 40 MIN: CPT | Mod: PBBFAC,PO | Performed by: FAMILY MEDICINE

## 2022-04-20 PROCEDURE — 3044F HG A1C LEVEL LT 7.0%: CPT | Mod: CPTII,,, | Performed by: FAMILY MEDICINE

## 2022-04-20 PROCEDURE — 1159F PR MEDICATION LIST DOCUMENTED IN MEDICAL RECORD: ICD-10-PCS | Mod: CPTII,,, | Performed by: FAMILY MEDICINE

## 2022-04-20 PROCEDURE — 73030 XR SHOULDER COMPLETE 2 OR MORE VIEWS LEFT: ICD-10-PCS | Mod: 26,LT,, | Performed by: RADIOLOGY

## 2022-04-20 PROCEDURE — 3079F PR MOST RECENT DIASTOLIC BLOOD PRESSURE 80-89 MM HG: ICD-10-PCS | Mod: CPTII,,, | Performed by: FAMILY MEDICINE

## 2022-04-20 PROCEDURE — 3079F DIAST BP 80-89 MM HG: CPT | Mod: CPTII,,, | Performed by: FAMILY MEDICINE

## 2022-04-20 PROCEDURE — 99214 OFFICE O/P EST MOD 30 MIN: CPT | Mod: S$PBB,,, | Performed by: FAMILY MEDICINE

## 2022-04-20 PROCEDURE — 3075F PR MOST RECENT SYSTOLIC BLOOD PRESS GE 130-139MM HG: ICD-10-PCS | Mod: CPTII,,, | Performed by: FAMILY MEDICINE

## 2022-04-20 PROCEDURE — 1160F PR REVIEW ALL MEDS BY PRESCRIBER/CLIN PHARMACIST DOCUMENTED: ICD-10-PCS | Mod: CPTII,,, | Performed by: FAMILY MEDICINE

## 2022-04-20 PROCEDURE — 73030 X-RAY EXAM OF SHOULDER: CPT | Mod: 26,LT,, | Performed by: RADIOLOGY

## 2022-04-20 PROCEDURE — 3008F BODY MASS INDEX DOCD: CPT | Mod: CPTII,,, | Performed by: FAMILY MEDICINE

## 2022-04-20 PROCEDURE — 1159F MED LIST DOCD IN RCRD: CPT | Mod: CPTII,,, | Performed by: FAMILY MEDICINE

## 2022-04-20 PROCEDURE — 99999 PR PBB SHADOW E&M-EST. PATIENT-LVL V: ICD-10-PCS | Mod: PBBFAC,,, | Performed by: FAMILY MEDICINE

## 2022-04-20 PROCEDURE — 73030 X-RAY EXAM OF SHOULDER: CPT | Mod: TC,PO,LT

## 2022-04-20 PROCEDURE — 3044F PR MOST RECENT HEMOGLOBIN A1C LEVEL <7.0%: ICD-10-PCS | Mod: CPTII,,, | Performed by: FAMILY MEDICINE

## 2022-04-20 PROCEDURE — 99214 PR OFFICE/OUTPT VISIT, EST, LEVL IV, 30-39 MIN: ICD-10-PCS | Mod: S$PBB,,, | Performed by: FAMILY MEDICINE

## 2022-04-20 PROCEDURE — 3075F SYST BP GE 130 - 139MM HG: CPT | Mod: CPTII,,, | Performed by: FAMILY MEDICINE

## 2022-04-20 PROCEDURE — 99999 PR PBB SHADOW E&M-EST. PATIENT-LVL V: CPT | Mod: PBBFAC,,, | Performed by: FAMILY MEDICINE

## 2022-04-20 PROCEDURE — 1160F RVW MEDS BY RX/DR IN RCRD: CPT | Mod: CPTII,,, | Performed by: FAMILY MEDICINE

## 2022-04-20 PROCEDURE — 3008F PR BODY MASS INDEX (BMI) DOCUMENTED: ICD-10-PCS | Mod: CPTII,,, | Performed by: FAMILY MEDICINE

## 2022-04-20 RX ORDER — LORATADINE 10 MG/1
10 TABLET ORAL DAILY
COMMUNITY
Start: 2022-04-13 | End: 2022-07-15

## 2022-04-20 RX ORDER — SUCRALFATE 1 G/1
1 TABLET ORAL 4 TIMES DAILY
Qty: 120 TABLET | Refills: 0 | Status: SHIPPED | OUTPATIENT
Start: 2022-04-20 | End: 2022-06-01 | Stop reason: SDUPTHER

## 2022-04-20 RX ORDER — PREGABALIN 50 MG/1
50 CAPSULE ORAL 3 TIMES DAILY
Qty: 90 CAPSULE | Refills: 5 | Status: SHIPPED | OUTPATIENT
Start: 2022-04-20 | End: 2022-08-15

## 2022-04-20 RX ORDER — OMEPRAZOLE 40 MG/1
40 CAPSULE, DELAYED RELEASE ORAL
Qty: 60 CAPSULE | Refills: 1 | Status: SHIPPED | OUTPATIENT
Start: 2022-04-20 | End: 2022-07-15

## 2022-04-20 NOTE — PROGRESS NOTES
PLAN:      Problem List Items Addressed This Visit     Diabetic neuropathy (Chronic)     Increase Lyrica to 50 milligrams 3 times daily.  Follow-up if no improvement.  Follow-up with Neurology.           Relevant Medications    pregabalin (LYRICA) 50 MG capsule    Gastroesophageal reflux disease with esophagitis without hemorrhage - Primary (Chronic)     Check stool for H pylori.  Changing PPI and continuing H2 blocker.  Add Carafate to regimen.  Referral to GI for further evaluation.           Relevant Medications    sucralfate (CARAFATE) 1 gram tablet    omeprazole (PRILOSEC) 40 MG capsule    Other Relevant Orders    Ambulatory referral/consult to Gastroenterology    H. pylori antigen, stool    Chronic left shoulder pain (Chronic)     Patient defers physical therapy at this time.  She cannot take anti-inflammatories due to her history of gastritis.  Referral to Orthopedics for further evaluation and treatment with possible injections or surgical options.           Relevant Orders    X-Ray Shoulder 2 or More Views Left (Completed)    History of Helicobacter pylori infection (Chronic)    Relevant Medications    sucralfate (CARAFATE) 1 gram tablet    omeprazole (PRILOSEC) 40 MG capsule    Other Relevant Orders    Ambulatory referral/consult to Gastroenterology    H. pylori antigen, stool    Abnormal x-ray     Check CT scan of the chest for further evaluation of the abnormality seen on left shoulder x-ray.           Relevant Orders    CT Chest Without Contrast    Menopause     Start calcium and vitamin-D supplementation.  Weight-bearing exercise is recommended.           Relevant Orders    DXA Bone Density Spine And Hip        Future Appointments     Date Provider Specialty Appt Notes    4/27/2022  Radiology What I need to wear    6/1/2022 Rose Price NP Family Medicine 6week     6/7/2022 Nae Paul MD Pain Medicine inj f/u    6/20/2022  Lab ty    6/22/2022 Sol Mckeon NP Hematology and Oncology 3 mo  f/u/prior lab/cbd    7/15/2022 Oleksandr Nagel MD Family Medicine 6month     8/15/2022 Brandie Rey, CAROL Podiatry 4 month DNC    8/29/2022 Oleksandr Nagel MD Family Medicine 6 mth f/u         Medication Management for assessment above:   Medication List with Changes/Refills   New Medications    OMEPRAZOLE (PRILOSEC) 40 MG CAPSULE    Take 1 capsule (40 mg total) by mouth 2 (two) times daily before meals.    SUCRALFATE (CARAFATE) 1 GRAM TABLET    Take 1 tablet (1 g total) by mouth 4 (four) times daily.   Current Medications    ACETAMINOPHEN (TYLENOL) 500 MG TABLET    Take 2 tablets (1,000 mg total) by mouth every 6 (six) hours as needed for Pain.    ALBUTEROL (PROVENTIL/VENTOLIN HFA) 90 MCG/ACTUATION INHALER    INHALE 2 PUFFS BY MOUTH EVERY 6 HOURS AS NEEDED FOR WHEEZING OR SHORTNESS OF BREATH    BLOOD SUGAR DIAGNOSTIC (CLEVER CHOICE VOICE+ TEST) STRP    TEST BLOOD SUGARS ONE TIME DAILY    BLOOD SUGAR DIAGNOSTIC (CLEVER CHOICE VOICE+ TEST) STRP    TEST BLOOD SUGARS ONE TIME DAILY    BLOOD SUGAR DIAGNOSTIC STRP    To check BG 3 times daily, to use with insurance preferred meter    BLOOD-GLUCOSE METER KIT    To check BG three times daily, to use with insurance preferred meter    BLOOD-GLUCOSE METER KIT    Use before meals and before bed when the glucoses are not in control.  Otherwise, test it daily once a day before meals randomly to ensure    DICLOFENAC (VOLTAREN) 75 MG EC TABLET    Take 1 tablet (75 mg total) by mouth 2 (two) times daily.    DILTIAZEM (CARDIZEM) 30 MG TABLET    Take 1 tablet (30 mg total) by mouth every 12 (twelve) hours.    FAMOTIDINE (PEPCID) 40 MG TABLET    Take 1 tablet (40 mg total) by mouth once daily.    FERROUS SULFATE 324 MG (65 MG IRON) TBEC    Take 1 tablet (324 mg total) by mouth once daily.    FLUTICASONE PROPION-SALMETEROL 115-21 MCG/DOSE (ADVAIR HFA) 115-21 MCG/ACTUATION HFAA INHALER    Inhale 2 puffs into the lungs 2 (two) times daily. Wash out mouth after use. Controller     "FLUTICASONE PROPIONATE (FLONASE) 50 MCG/ACTUATION NASAL SPRAY    Use 2 sprays to each nostril daily    INHALATION SPACING DEVICE (BREATHERITE VALVED MDI CHAMBER)    Use as directed for inhalation.    LANCETS MISC    To check BG three times daily, to use with insurance preferred meter    LEVOCETIRIZINE (XYZAL) 5 MG TABLET    Take 1 tablet (5 mg total) by mouth every evening.    LEVOTHYROXINE (SYNTHROID) 100 MCG TABLET    Take 1 tablet (100 mcg total) by mouth once daily.    LIDOCAINE-PRILOCAINE (EMLA) CREAM    SMARTSI-2 Pump Topical 3-4 Times Daily    LINACLOTIDE (LINZESS) 72 MCG CAP CAPSULE    Take 1 capsule (72 mcg total) by mouth once daily.    LORATADINE (CLARITIN) 10 MG TABLET    Take 10 mg by mouth once daily.    METFORMIN (GLUCOPHAGE) 1000 MG TABLET    Take 1 tablet (1,000 mg total) by mouth 2 (two) times daily with meals.    MONTELUKAST (SINGULAIR) 10 MG TABLET    Take 1 tablet (10 mg total) by mouth every evening.    PEN NEEDLE, DIABETIC (PEN NEEDLE) 32 GAUGE X 5/32" NDLE    Use as directed to inject medication    PRAVASTATIN (PRAVACHOL) 80 MG TABLET    Take 1 tablet (80 mg total) by mouth once daily.    PULSE OXIMETER (PULSE OXIMETER) DEVICE    by Apply Externally route 2 (two) times a day. Use twice daily at 8 AM and 3 PM and record the value in [facility] as directed.    SEMAGLUTIDE (OZEMPIC) 1 MG/DOSE (4 MG/3 ML)    Inject 1 mg into the skin every 7 days.    TIZANIDINE (ZANAFLEX) 4 MG TABLET    Take 1/2 to 1 tablet by mouth twice daily as needed for muscle spasms. May cause drowsiness.    TOPIRAMATE (TOPAMAX) 100 MG TABLET    Take 1 tablet (100 mg total) by mouth 2 (two) times daily.    TRELEGY ELLIPTA 100-62.5-25 MCG DSDV    Inhale 1 puff into the lungs once daily.    TRUEPLUS LANCETS 33 GAUGE MISC    Check blood glucose up to 3 times daily as needed before meals   Changed and/or Refilled Medications    Modified Medication Previous Medication    PREGABALIN (LYRICA) 50 MG CAPSULE pregabalin (LYRICA) 25 " MG capsule       Take 1 capsule (50 mg total) by mouth 3 (three) times daily.    Take 1 capsule (25 mg total) by mouth 2 (two) times daily.   Discontinued Medications    OMEPRAZOLE (PRILOSEC) 20 MG CAPSULE    Take 1 capsule (20 mg total) by mouth 2 (two) times daily. for 14 days    PANTOPRAZOLE (PROTONIX) 40 MG TABLET    Take 1 tablet (40 mg total) by mouth 2 (two) times daily.       Oleksandr Nagel M.D.  ==========================================================================  Subjective:   Patient ID: Zakia Price is a 62 y.o. female.  has a past medical history of Acute respiratory failure due to COVID-19, COVID-19, Diabetes mellitus, type 2, Diabetic neuropathy (1/27/2014), DJD (degenerative joint disease) of knee, DVT (deep venous thrombosis) (around 1990's), Fatty liver, GERD (gastroesophageal reflux disease), Hypertension associated with diabetes, HECTOR (iron deficiency anemia) (5/13/2021), Multinodular goiter, Obesity, morbid, BMI 50 or higher, and Sleep apnea.   Chief Complaint: burning in feet and Gastroesophageal Reflux      Problem List Items Addressed This Visit     Diabetic neuropathy (Chronic)    Overview     April 2022:  Patient reports that she is still having significant nerve pain.  She is currently taking Lyrica 25 milligrams twice daily.    Chronic.  She has been on gabapentin previously.  She was taking 100 milligrams 3 times daily but does not report any improvement in her burning in her feet.  She was switched to Topamax by pain management.  She reports that some of her pain is better but her in burning sensation is still present.    Reviewed nerve study from 2021:  Summary:  Nerve conduction studies were performed on the right upper and lower extremities.  Right median, ulnar, and superficial peroneal sensory responses were normal in amplitude and latency.  Right sural sensory response was absent.  Right median motor response was borderline in velocity but normal in amplitude and  latency.  Right ulnar motor response was normal in amplitude, latency and velocity.  Right peroneal motor response was normal in amplitude, latency and velocity.  Right tibial motor response was prolonged with normal amplitude and velocity.  Further internal comparison studies were performed to assess the carpal tunnel.  Right median versus ulnar 2nd lumbrical interosseous comparison studies revealed a prolonged median motor latency as compared to the ulnar.  Needle EMG was performed in the right upper and lower extremities.  No active denervation was present in any muscle tested.  Motor unit morphology and recruitment patterns were normal in every muscle tested.     Impression:  This is a mildly abnormal EMG of the right upper and lower extremities.  There is electrophysiologic evidence of the followin. Very mild, axonal, peripheral polyneuropathy without active denervation.  2. Very mild, right, median mononeuropathy across the wrist (carpal tunnel syndrome) without active denervation.  There is no evidence of any other focal neuropathy, plexopathy, or radiculopathy on the study.  In addition there is no evidence of myopathy on the study.        Thank you for referring to the [facility] in [place]. Please feel free to contact the clinic if you have any further questions regarding this study or report.        _____________________________  Cady Roberts D.O., ABPN, AOBNP, ABEM            Current Assessment & Plan     Increase Lyrica to 50 milligrams 3 times daily.  Follow-up if no improvement.  Follow-up with Neurology.           Gastroesophageal reflux disease with esophagitis without hemorrhage - Primary (Chronic)    Overview     Chronic.  2022:  Currently uncontrolled.  History of H pylori.  Previous HPI:  on Zantac.  However medication has been pharmacy recall.  Patient needing replacement medication for this condition.  Reported compliance.  Symptoms are  controlled.  No side effects reported.           Current Assessment & Plan     Check stool for H pylori.  Changing PPI and continuing H2 blocker.  Add Carafate to regimen.  Referral to GI for further evaluation.           Chronic left shoulder pain (Chronic)    Overview     Chronic.  Uncontrolled.  Patient is not currently taking any medications for this.  Denies any previous imaging.  Denies any trauma or injury.  X-Ray Shoulder 2 or More Views Left  Narrative: EXAMINATION:  XR SHOULDER COMPLETE 2 OR MORE VIEWS LEFT    CLINICAL HISTORY:  Pain in left shoulder    TECHNIQUE:  Two or three views of the left shoulder were performed.    COMPARISON:  November 2016    FINDINGS:  Osteopenia.  Moderate degenerative changes at the AC and glenohumeral joints with spur formation.  No fracture or dislocation.  Chronic pleuroparenchymal changes in the visualized left mid to lower lung zone.  Impression: As above    Electronically signed by: Angel Robles MD  Date:    04/20/2022  Time:    11:25               Current Assessment & Plan     Patient defers physical therapy at this time.  She cannot take anti-inflammatories due to her history of gastritis.  Referral to Orthopedics for further evaluation and treatment with possible injections or surgical options.           History of Helicobacter pylori infection (Chronic)    Overview     Collected: 03/17/22 1040   Result status: Final   Resulting lab: [facility]   Value: SEE BELOW   Comment: HELICOBACTER PYLORI AG, EIA, STOOL     Micro Number:      [id number]   Test Status:       Final   Specimen Source:   Stool   Specimen Quality:  Adequate   H.pylori Ag:       Not Detected     Antimicrobials, proton pump inhibitors, and   bismuth preparations inhibit H. pylori and   ingestion up to two weeks prior to testing may   cause false negative results. If clinically   indicated the test should be repeated on a new   specimen obtained two weeks after discontinuing   treatment.    Reference Range:   Not Detected       TEST PERFORMED AT:   [facility] [place]   [address] [place] [address]   ISAI BENTLEY MD                 Abnormal x-ray    Overview     Incidental findings on x-ray of the shoulder shows lung abnormality.           Current Assessment & Plan     Check CT scan of the chest for further evaluation of the abnormality seen on left shoulder x-ray.           Menopause    Overview     Patient has osteopenia on x-ray.  She is due for a bone density scan.           Current Assessment & Plan     Start calcium and vitamin-D supplementation.  Weight-bearing exercise is recommended.                  Review of patient's allergies indicates:   Allergen Reactions    Codeine sulfate      Nausea^    Lisinopril Swelling     angioedema    Codeine Nausea Only and Rash     Current Outpatient Medications   Medication Instructions    acetaminophen (TYLENOL) 1,000 mg, Oral, Every 6 hours PRN    albuterol (PROVENTIL/VENTOLIN HFA) 90 mcg/actuation inhaler INHALE 2 PUFFS BY MOUTH EVERY 6 HOURS AS NEEDED FOR WHEEZING OR SHORTNESS OF BREATH    blood sugar diagnostic (CLERFMicron CHOICE VOICE+ TEST) Strp TEST BLOOD SUGARS ONE TIME DAILY    blood sugar diagnostic (CLEScoopshot VOICE+ TEST) Strp TEST BLOOD SUGARS ONE TIME DAILY    blood sugar diagnostic Strp To check BG 3 times daily, to use with insurance preferred meter    blood-glucose meter kit To check BG three times daily, to use with insurance preferred meter    blood-glucose meter kit Use before meals and before bed when the glucoses are not in control.  Otherwise, test it daily once a day before meals randomly to ensure    diclofenac (VOLTAREN) 75 mg, Oral, 2 times daily    diltiaZEM (CARDIZEM) 30 mg, Oral, Every 12 hours    famotidine (PEPCID) 40 mg, Oral, Daily    ferrous sulfate 324 mg, Oral, Daily    fluticasone propion-salmeterol 115-21 mcg/dose (ADVAIR HFA) 115-21 mcg/actuation HFAA inhaler 2 puffs,  "Inhalation, 2 times daily, Wash out mouth after use. Controller    fluticasone propionate (FLONASE) 50 mcg/actuation nasal spray Use 2 sprays to each nostril daily    inhalation spacing device (BREATHERITE VALVED MDI CHAMBER) Use as directed for inhalation.    lancets Misc To check BG three times daily, to use with insurance preferred meter    levocetirizine (XYZAL) 5 mg, Oral, Nightly    levothyroxine (SYNTHROID) 100 mcg, Oral, Daily    LIDOcaine-prilocaine (EMLA) cream SMARTSI-2 Pump Topical 3-4 Times Daily    linaCLOtide (LINZESS) 72 mcg, Oral, Daily    loratadine (CLARITIN) 10 mg, Oral, Daily    metFORMIN (GLUCOPHAGE) 1,000 mg, Oral, 2 times daily with meals    montelukast (SINGULAIR) 10 mg, Oral, Nightly    omeprazole (PRILOSEC) 40 mg, Oral, 2 times daily before meals    pen needle, diabetic (PEN NEEDLE) 32 gauge x 5/32" Ndle Use as directed to inject medication    pravastatin (PRAVACHOL) 80 mg, Oral, Daily    pregabalin (LYRICA) 50 mg, Oral, 3 times daily    pulse oximeter (PULSE OXIMETER) device Apply Externally, 2 times daily, Use twice daily at 8 AM and 3 PM and [facility] the [facility] as directed.    semaglutide (OZEMPIC) 1 mg, Subcutaneous, Every 7 days    sucralfate (CARAFATE) 1 g, Oral, 4 times daily    tiZANidine (ZANAFLEX) 4 MG tablet Take 1/2 to 1 tablet by mouth twice daily as needed for muscle spasms. May cause drowsiness.    topiramate (TOPAMAX) 100 mg, Oral, 2 times daily    TRELEGY ELLIPTA 100-62.5-25 mcg DsDv 1 puff, Inhalation, Daily    TRUEPLUS LANCETS 33 gauge Misc Check blood glucose up to 3 times daily as needed before meals      I have reviewed the PMH, social history, FamilyHx, surgical history, allergies and medications documented / confirmed by the patient at the time of this visit.  Review of Systems   Constitutional: Negative for chills, fatigue, fever and unexpected weight change.   HENT: Negative for ear pain and sore throat.    Eyes: Negative for " "redness and visual disturbance.   Respiratory: Negative for cough and shortness of breath.    Cardiovascular: Negative for chest pain and palpitations.   Gastrointestinal: Positive for abdominal pain. Negative for nausea and vomiting.   Genitourinary: Negative for difficulty urinating and hematuria.   Musculoskeletal: Positive for arthralgias and back pain. Negative for myalgias.   Skin: Negative for rash and wound.   Neurological: Positive for numbness. Negative for weakness and headaches.   Psychiatric/Behavioral: Negative for sleep disturbance. The patient is not nervous/anxious.      Objective:   /80   Pulse 63   Temp 97.2 °F (36.2 °C) (Other (see comments))   Ht 5' 8" (1.727 m)   Wt (!) 143.9 kg (317 lb 3.2 oz)   SpO2 99%   BMI 48.23 kg/m²   Physical Exam  Vitals and nursing note reviewed.   Constitutional:       General: She is not in acute distress.     Appearance: She is well-developed. She is obese. She is not ill-appearing, toxic-appearing or diaphoretic.   HENT:      Head: Normocephalic and atraumatic.      Right Ear: Hearing and external ear normal.      Left Ear: Hearing and external ear normal.      Nose: Nose normal. No rhinorrhea.   Eyes:      General: Lids are normal.      Extraocular Movements: Extraocular movements intact.      Conjunctiva/sclera: Conjunctivae normal.      Pupils: Pupils are equal, round, and reactive to light.   Cardiovascular:      Rate and Rhythm: Normal rate.      Pulses: Normal pulses.   Pulmonary:      Effort: Pulmonary effort is normal. No respiratory distress.      Breath sounds: Normal breath sounds.   Abdominal:      General: Bowel sounds are normal.      Palpations: Abdomen is soft.   Musculoskeletal:         General: Normal range of motion.      Cervical back: Normal range of motion and neck supple. Tenderness and bony tenderness present. Pain with movement present.      Lumbar back: Spasms and tenderness present. No bony tenderness. Negative right straight " leg raise test and negative left straight leg raise test.      Right lower leg: Tenderness present. No swelling, deformity, lacerations or bony tenderness. No edema.      Left lower leg: No swelling, deformity, lacerations, tenderness or bony tenderness. No edema.   Skin:     General: Skin is warm and dry.      Capillary Refill: Capillary refill takes less than 2 seconds.      Coloration: Skin is not pale.   Neurological:      General: No focal deficit present.      Mental Status: She is alert and oriented to person, place, and time. Mental status is at baseline. She is not disoriented.      Cranial Nerves: No cranial nerve deficit.      Motor: No weakness.      Gait: Gait normal.   Psychiatric:         Attention and Perception: She is attentive.         Mood and Affect: Mood normal. Mood is not anxious or depressed.         Speech: Speech is not rapid and pressured or slurred.         Behavior: Behavior normal. Behavior is not agitated, aggressive or hyperactive. Behavior is cooperative.         Thought Content: Thought content normal. Thought content is not paranoid or delusional. Thought content does not include homicidal or suicidal ideation. Thought content does not include homicidal or suicidal plan.         Cognition and Memory: Memory is not impaired.         Judgment: Judgment normal.        Patient is wearing a mask which limits physical exam due to COVID restrictions.   Assessment:     1. Gastroesophageal reflux disease with esophagitis without hemorrhage    2. History of Helicobacter pylori infection    3. Chronic left shoulder pain    4. Diabetic polyneuropathy associated with type 2 diabetes mellitus    5. Abnormal x-ray    6. Menopause      MDM:   Moderate complexity.  Moderate risk.  Total time: 35 minutes.  This includes total time spent on the encounter, which includes face to face time and non-face to face time preparing to see the patient (eg, review of previous medical records, tests), Obtaining  and/or reviewing separately obtained history, documenting clinical information in the electronic or other health record, independently interpreting results (not separately reported)/communicating results to the patient/family/caregiver, and/or care coordination (not separately reported).    I have Reviewed and summarized old records.  I have performed thorough medication reconciliation today and discussed risk and benefits of medications.  I have reviewed labs and discussed with patient.  All questions were answered.  I am requesting old records and will review them once they are available.    I have signed for the following orders AND/OR meds.  Orders Placed This Encounter   Procedures    X-Ray Shoulder 2 or More Views Left     Standing Status:   Future     Number of Occurrences:   1     Standing Expiration Date:   4/21/2023    DXA Bone Density Spine And Hip     Standing Status:   Future     Standing Expiration Date:   4/20/2025     Order Specific Question:   May the Radiologist modify the order per protocol to meet the clinical needs of the patient?     Answer:   Yes     Order Specific Question:   Release to patient     Answer:   Immediate    CT Chest Without Contrast     Standing Status:   Future     Standing Expiration Date:   4/20/2023     Order Specific Question:   May the Radiologist modify the order per protocol to meet the clinical needs of the patient?     Answer:   Yes    H. pylori antigen, stool     Standing Status:   Future     Standing Expiration Date:   4/20/2023    Ambulatory referral/consult to Gastroenterology     Standing Status:   Future     Standing Expiration Date:   5/20/2023     Referral Priority:   Routine     Referral Type:   Consultation     Referral Reason:   Specialty Services Required     Requested Specialty:   Gastroenterology     Number of Visits Requested:   1     Medications Ordered This Encounter   Medications    omeprazole (PRILOSEC) 40 MG capsule     Sig: Take 1 capsule (40  mg total) by mouth 2 (two) times daily before meals.     Dispense:  60 capsule     Refill:  1    pregabalin (LYRICA) 50 MG capsule     Sig: Take 1 capsule (50 mg total) by mouth 3 (three) times daily.     Dispense:  90 capsule     Refill:  5    sucralfate (CARAFATE) 1 gram tablet     Sig: Take 1 tablet (1 g total) by mouth 4 (four) times daily.     Dispense:  120 tablet     Refill:  0        Follow up in about 3 months (around 7/20/2022), or if symptoms worsen or fail to improve, for med refill.    If no improvement in symptoms or symptoms worsen, advised to call/follow-up at clinic or go to ER. Patient voiced understanding and all questions/concerns were addressed.   DISCLAIMER: This note was compiled by using a speech recognition dictation system and therefore please be aware that typographical / speech recognition errors can and do occur.  Please contact me if you see any errors specifically.    Oleksandr Nagel M.D.       Office: [phone]   [address] [place] [address]  FAX: [phone]

## 2022-04-20 NOTE — ASSESSMENT & PLAN NOTE
Check stool for H pylori.  Changing PPI and continuing H2 blocker.  Add Carafate to regimen.  Referral to GI for further evaluation.

## 2022-04-20 NOTE — ASSESSMENT & PLAN NOTE
Increase Lyrica to 50 milligrams 3 times daily.  Follow-up if no improvement.  Follow-up with Neurology.

## 2022-04-20 NOTE — ASSESSMENT & PLAN NOTE
Patient defers physical therapy at this time.  She cannot take anti-inflammatories due to her history of gastritis.  Referral to Orthopedics for further evaluation and treatment with possible injections or surgical options.

## 2022-04-20 NOTE — PROGRESS NOTES
1st check to see if patient has seen the results.  If not then  CALL patient with results and Document verification.  Schedule follow-up if needed.  [phone]  X-ray of the shoulder reviewed.  There are multiple abnormalities.  1. Osteopenia.  I recommend getting a bone density scan for further evaluation .  Let me know if in agreement we can set this up for you.  2. There is moderate arthritis of the AC joint and shoulder joint with bone spurs.  I recommend getting an with Orthopedics for further evaluation and treatment if shoulder pain continues.  3. There is abnormal appearance of the lungs.  I recommend a CT of the chest which will give us better imaging of this abnormality.   Again let me know if you are in agreement know I will order this for you.  Please follow-up after these tests have been performed.

## 2022-04-20 NOTE — PATIENT INSTRUCTIONS
Follow up in about 3 months (around 7/20/2022), or if symptoms worsen or fail to improve, for med refill.     Dear patient,   As a result of recent federal legislation (The Federal Cures Act), you may receive lab or pathology results from your visit in your [facility] account before your physician is able to contact you. Your physician or their representative will relay the results to you with their recommendations at their soonest availability.     If no improvement in symptoms or symptoms worsen, please be advised to call MD, follow-up at clinic and/or go to ER if becomes severe.    Oleksandr Nagel M.D.        We Offer TELEHEALTH & Same Day Appointments!   Book your Telehealth appointment with me through my nurse or   Clinic appointments on [facility]!    [address] [place] [address]    Office: [phone]   FAX: [phone]    Check out my Facebook Page and Follow Me at: [email or web address]    Check out my website at [employer] by clicking on: [email or web address]    To Schedule appointments online, go to [facility]: [email or web address]

## 2022-04-22 ENCOUNTER — TELEPHONE (OUTPATIENT)
Dept: GASTROENTEROLOGY | Facility: CLINIC | Age: 62
End: 2022-04-22
Payer: MEDICAID

## 2022-04-22 NOTE — TELEPHONE ENCOUNTER
----- Message from Dominic Chacon MA sent at 4/21/2022  9:56 AM CDT -----  Regarding: FW: schedule  Contact: [phone]    ----- Message -----  From: [place]  Sent: 4/20/2022   1:01 PM CDT  To: [facility] Clinical Staff  Subject: schedule                                         Request Appt    Appt Type:Gastroesophageal reflux disease with esophagitis without hemorrhage  Z86.19 (ICD-10-CM) - History of Helicobacter pylori infection  Call Back Number:[phone]  Additional Information:

## 2022-04-26 ENCOUNTER — PATIENT MESSAGE (OUTPATIENT)
Dept: ADMINISTRATIVE | Facility: HOSPITAL | Age: 62
End: 2022-04-26
Payer: MEDICAID

## 2022-04-27 ENCOUNTER — HOSPITAL ENCOUNTER (OUTPATIENT)
Dept: RADIOLOGY | Facility: HOSPITAL | Age: 62
Discharge: HOME | End: 2022-04-27
Attending: FAMILY MEDICINE
Payer: MEDICAID

## 2022-04-27 DIAGNOSIS — Z78.0 MENOPAUSE: ICD-10-CM

## 2022-04-27 PROCEDURE — 77080 DXA BONE DENSITY AXIAL: CPT | Mod: TC,PO

## 2022-04-27 PROCEDURE — 77080 DXA BONE DENSITY AXIAL: CPT | Mod: 26,,, | Performed by: RADIOLOGY

## 2022-04-27 PROCEDURE — 77080 DEXA BONE DENSITY SPINE HIP: ICD-10-PCS | Mod: 26,,, | Performed by: RADIOLOGY

## 2022-04-27 NOTE — PROGRESS NOTES
1st check to see if patient has seen the results.  If not then  CALL patient with results and Document verification.  Schedule follow-up if needed.  [phone]  Normal bone density scan.

## 2022-05-03 ENCOUNTER — TELEPHONE (OUTPATIENT)
Dept: PAIN MEDICINE | Facility: CLINIC | Age: 62
End: 2022-05-03
Payer: MEDICAID

## 2022-05-03 NOTE — TELEPHONE ENCOUNTER
----- Message from Jen Fraser sent at 5/3/2022 12:58 PM CDT -----  Contact: Zakia  Patient is calling to speak with a nurse regarding concerns for upcoming procedure. Patient reports having an injection scheduled for 5/6/22 and requests to be notified of time to arrive. Please give patient a call back at [phone] to notify.  Thank you,  GH

## 2022-05-03 NOTE — TELEPHONE ENCOUNTER
Inform pt that I  do not have a the arrival time. inform pt that if they do not give you a call by 5/05/2022, send us a messages.    Richie Aguilar   Medical Assistant

## 2022-05-03 NOTE — PRE-PROCEDURE INSTRUCTIONS
Spoke with patient regarding procedure scheduled on 5.6    Arrival time 0800    Has patient been sick with fever or on antibiotics within the last 7 days? No    Does the patient have any open wounds, sores or rashes? No    Does the patient have any recent fractures? no    Has patient received a vaccination within the last 7 days? No    Received the COVID vaccination? yes    Has the patient stopped all medications as directed? Hold dm meds am of procedure     Does patient have a pacemaker and or defibrillator? no    Does the patient have a ride to and from procedure and someone reliable to remain with patient? Sue     Is the patient diabetic? yes    Does the patient have sleep apnea? Or use O2 at home? tres    Is the patient receiving sedation? yes    Is the patient instructed to remain NPO beginning at midnight the night before their procedure? yes    Procedure location confirmed with patient? Yes    Covid- Denies signs/symptoms. Instructed to notify PAT/MD if any changes.

## 2022-05-06 ENCOUNTER — HOSPITAL ENCOUNTER (OUTPATIENT)
Facility: HOSPITAL | Age: 62
Discharge: HOME | End: 2022-05-06
Attending: ANESTHESIOLOGY | Admitting: ANESTHESIOLOGY
Payer: MEDICAID

## 2022-05-06 VITALS
DIASTOLIC BLOOD PRESSURE: 71 MMHG | SYSTOLIC BLOOD PRESSURE: 125 MMHG | WEIGHT: 293 LBS | OXYGEN SATURATION: 97 % | TEMPERATURE: 97 F | HEIGHT: 68 IN | BODY MASS INDEX: 44.41 KG/M2 | HEART RATE: 66 BPM | RESPIRATION RATE: 16 BRPM

## 2022-05-06 DIAGNOSIS — M54.16 LUMBAR RADICULOPATHY, CHRONIC: ICD-10-CM

## 2022-05-06 LAB — POCT GLUCOSE: 97 MG/DL (ref 70–110)

## 2022-05-06 PROCEDURE — 64483 NJX AA&/STRD TFRM EPI L/S 1: CPT | Mod: 50 | Performed by: ANESTHESIOLOGY

## 2022-05-06 PROCEDURE — 82962 GLUCOSE BLOOD TEST: CPT | Performed by: ANESTHESIOLOGY

## 2022-05-06 PROCEDURE — 64483 PR EPIDURAL INJ, ANES/STEROID, TRANSFORAMINAL, LUMB/SACR, SNGL LEVL: ICD-10-PCS | Mod: 50,,, | Performed by: ANESTHESIOLOGY

## 2022-05-06 PROCEDURE — 25500020 PHARM REV CODE 255: Performed by: ANESTHESIOLOGY

## 2022-05-06 PROCEDURE — 25000003 PHARM REV CODE 250: Performed by: ANESTHESIOLOGY

## 2022-05-06 PROCEDURE — 64483 NJX AA&/STRD TFRM EPI L/S 1: CPT | Mod: 50,,, | Performed by: ANESTHESIOLOGY

## 2022-05-06 PROCEDURE — 63600175 PHARM REV CODE 636 W HCPCS: Performed by: ANESTHESIOLOGY

## 2022-05-06 RX ORDER — DEXAMETHASONE SODIUM PHOSPHATE 10 MG/ML
INJECTION INTRAMUSCULAR; INTRAVENOUS
Status: DISCONTINUED | OUTPATIENT
Start: 2022-05-06 | End: 2022-05-06 | Stop reason: HOSPADM

## 2022-05-06 RX ORDER — INDOMETHACIN 25 MG/1
CAPSULE ORAL
Status: DISCONTINUED | OUTPATIENT
Start: 2022-05-06 | End: 2022-05-06 | Stop reason: HOSPADM

## 2022-05-06 RX ORDER — BUPIVACAINE HYDROCHLORIDE 2.5 MG/ML
INJECTION, SOLUTION EPIDURAL; INFILTRATION; INTRACAUDAL
Status: DISCONTINUED | OUTPATIENT
Start: 2022-05-06 | End: 2022-05-06 | Stop reason: HOSPADM

## 2022-05-06 RX ORDER — FENTANYL CITRATE 50 UG/ML
INJECTION, SOLUTION INTRAMUSCULAR; INTRAVENOUS
Status: DISCONTINUED | OUTPATIENT
Start: 2022-05-06 | End: 2022-05-06 | Stop reason: HOSPADM

## 2022-05-06 RX ORDER — MIDAZOLAM HYDROCHLORIDE 1 MG/ML
INJECTION, SOLUTION INTRAMUSCULAR; INTRAVENOUS
Status: DISCONTINUED | OUTPATIENT
Start: 2022-05-06 | End: 2022-05-06 | Stop reason: HOSPADM

## 2022-05-06 NOTE — OP NOTE
Zakia Price  62 y.o. female      Vitals:    05/06/22 0825   BP: 117/65   Pulse: 64   Resp: 11   Temp:      Procedure Date:5/6/22      INFORMED CONSENT: The procedure, risks, benefits and options were discussed with patient. There are no contraindications to the procedure. The patient expressed understanding and agreed to proceed. The personnel performing the procedure was discussed. I verify that I personally obtained consent prior to the start of the procedure and the signed consent can be found on the patient's chart.       Anesthesia:   Conscious sedation provided by M.D    The patient was monitored with continuous pulse oximetry, EKG, and intermittent blood pressure monitors.  The patient was hemodynamically stable throughout the entire process was responsive to voice, and breathing spontaneously.  Supplemental O2 was provided at 2L/min via nasal cannula.  Patient was comfortable for the duration of the procedure. (See nurse documentation and case log for sedation time)    There was a total of 2mg IV Midazolam and 50mcg Fentanyl titrated for the procedure    Pre Procedure diagnosis: Lumbar radiculopathy, chronic [M54.16]  Post-Procedure diagnosis: SAME     Complications: None    Specimens: None      DESCRIPTION OF PROCEDURE: The patient was brought to the procedure room. IV access was obtained prior to the procedure. The patient was positioned prone on the fluoroscopy table. Continuous hemodynamic monitoring was initiated including blood pressure, EKG, and pulse oximetry. . The skin was prepped with chlorhexidine and draped in a sterile fashion. Skin anesthesia was achieved using a total of 10mL of lidocaine, 5mL over each respective injection site.     The  L5/S1 transforaminal spaces were identified with fluoroscopy in the  AP, oblique, and lateral views.  A 22 gauge spinal quinke needle was then advanced into the area of the trans foraminal spaces bilateral with confirmation of proper needle  position using AP, oblique, and lateral fluoroscopic views. Once the needle tip was in the area of the transforaminal space, and there was no blood, CSF or paraesthesias,  1.5 mL of Omnipaque 300mg/ml was injected on bilateral for a total of 3mL.  Fluoroscopic imaging in the AP and lateral views revealed a clear outline of the spinal nerve with proximal spread of agent through the neural foramen into the epidural space. A total combination of 2 mL of Bupivicaine 0.25% and 10 mg dexamethasone was injected on each side for a total of 6 mL of injected medications with displacement of the contrast dye confirming that the medication went into the area of the transforaminal spaces bilateral. A sterile dressing was applied.   Patient tolerated the procedure well.    Patient was taken back to the recovery room for further observation.     The patient was discharged to home in stable condition

## 2022-05-06 NOTE — H&P
HPI  Patient presenting for Procedure(s) (LRB):  Bilateral L5/S1 TF TEETEE with RN IV sedation (Bilateral)     Patient on Anti-coagulation No    No health changes since previous encounter    Past Medical History:   Diagnosis Date    Acute respiratory failure due to COVID-19     COVID-19     Diabetes mellitus, type 2     Diabetic neuropathy 1/27/2014    DJD (degenerative joint disease) of knee     DVT (deep venous thrombosis) around 1990's    in leg, is on no anticoagulant therapy presently    Fatty liver     GERD (gastroesophageal reflux disease)     Hypertension associated with diabetes     HECTOR (iron deficiency anemia) 5/13/2021    Multinodular goiter     Obesity, morbid, BMI 50 or higher     Sleep apnea     has no CPAP     Past Surgical History:   Procedure Laterality Date    COLONOSCOPY N/A 5/12/2021    Procedure: COLONOSCOPY;  Surgeon: Carolina Rizo MD;  Location: [facility];  Service: Endoscopy;  Laterality: N/A;    EPIDURAL STEROID INJECTION N/A 12/10/2021    Procedure: Lumbar L5/S1 IL TEETEE  Would like AM procedure, if possible;  Surgeon: Nae Paul MD;  Location: [facility]IN [facility];  Service: Pain Management;  Laterality: N/A;    EPIDURAL STEROID INJECTION INTO CERVICAL SPINE N/A 10/8/2021    Procedure: C7-T1 IL TEETEE-no sedation.  Needs IV-just incase;  Surgeon: Nae Paul MD;  Location: [facility];  Service: Pain Management;  Laterality: N/A;    ESOPHAGOGASTRODUODENOSCOPY N/A 7/8/2021    Procedure: EGD (ESOPHAGOGASTRODUODENOSCOPY) previous positve covid;  Surgeon: Jann Gutierrez MD;  Location: [facility];  Service: Endoscopy;  Laterality: N/A;    FRACTURE SURGERY      HYSTERECTOMY      SHOULDER ARTHROSCOPY      THYROIDECTOMY, PARTIAL Right     and transplatation of right superior parathyroid gland to the sternocleidomastoid muscle     TONSILLECTOMY      TUBAL LIGATION  1984    WRIST FRACTURE SURGERY      left     Review of patient's allergies indicates:   Allergen Reactions     Codeine sulfate      Nausea^    Lisinopril Swelling     angioedema    Codeine Nausea Only and Rash        No current facility-administered medications on file prior to encounter.     Current Outpatient Medications on File Prior to Encounter   Medication Sig Dispense Refill    acetaminophen (TYLENOL) 500 MG tablet Take 2 tablets (1,000 mg total) by mouth every 6 (six) hours as needed for Pain.      diltiaZEM (CARDIZEM) 30 MG tablet Take 1 tablet (30 mg total) by mouth every 12 (twelve) hours. 60 tablet 11    famotidine (PEPCID) 40 MG tablet Take 1 tablet (40 mg total) by mouth once daily. 90 tablet 4    fluticasone propion-salmeterol 115-21 mcg/dose (ADVAIR HFA) 115-21 mcg/actuation HFAA inhaler Inhale 2 puffs into the lungs 2 (two) times daily. Wash out mouth after use. Controller 1 Inhaler 11    levocetirizine (XYZAL) 5 MG tablet Take 1 tablet (5 mg total) by mouth every evening. 90 tablet 4    linaCLOtide (LINZESS) 72 mcg Cap capsule Take 1 capsule (72 mcg total) by mouth once daily. 30 capsule 12    metFORMIN (GLUCOPHAGE) 1000 MG tablet Take 1 tablet (1,000 mg total) by mouth 2 (two) times daily with meals. 180 tablet 4    montelukast (SINGULAIR) 10 mg tablet Take 1 tablet (10 mg total) by mouth every evening. 30 tablet 12    pravastatin (PRAVACHOL) 80 MG tablet Take 1 tablet (80 mg total) by mouth once daily. 90 tablet 4    semaglutide (OZEMPIC) 1 mg/dose (4 mg/3 mL) Inject 1 mg into the skin every 7 days. 3 pen 4    topiramate (TOPAMAX) 100 MG tablet Take 1 tablet (100 mg total) by mouth 2 (two) times daily. 60 tablet 11    TRELEGY ELLIPTA 100-62.5-25 mcg DsDv Inhale 1 puff into the lungs once daily.      albuterol (PROVENTIL/VENTOLIN HFA) 90 mcg/actuation inhaler INHALE 2 PUFFS BY MOUTH EVERY 6 HOURS AS NEEDED FOR WHEEZING OR SHORTNESS OF BREATH 18 g 11    ferrous sulfate 324 mg (65 mg iron) TbEC Take 1 tablet (324 mg total) by mouth once daily. 365 tablet 0    inhalation spacing device  "(BREATHERITE VALVED MDI CHAMBER) Use as directed for inhalation. 1 Device prn    lancets Misc To check BG three times daily, to use with insurance preferred meter 100 each 99    LIDOcaine-prilocaine (EMLA) cream SMARTSI-2 Pump Topical 3-4 Times Daily      pen needle, diabetic (PEN NEEDLE) 32 gauge x 5/32" Ndle Use as directed to inject medication 100 each 4    pulse oximeter (PULSE OXIMETER) device by Apply Externally route 2 (two) times a day. Use twice daily at 8 AM and 3 PM and record the value in MyChart as directed. 1 each 0    TRUEPLUS LANCETS 33 gauge Misc Check blood glucose up to 3 times daily as needed before meals 100 each 99        PMHx, PSHx, Allergies, Medications reviewed in epic    ROS negative except pain complaints in HPI    OBJECTIVE:    BP (!) 144/73 (BP Location: Right arm, Patient Position: Sitting)   Pulse 69   Temp 97.3 °F (36.3 °C) (Temporal)   Resp 20   Ht 5' 8" (1.727 m)   Wt (!) 142.6 kg (314 lb 6 oz)   SpO2 99%   BMI 47.80 kg/m²     PHYSICAL EXAMINATION:    GENERAL: Well appearing, in no acute distress, alert and oriented x3.  PSYCH:  Mood and affect appropriate.  SKIN: Skin color, texture, turgor normal, no rashes or lesions which will impact the procedure.  CV: RRR with palpation of the radial artery.  PULM: No evidence of respiratory difficulty, symmetric chest rise. Clear to auscultation.  NEURO: Cranial nerves grossly intact.    Plan:    Proceed with procedure as planned Procedure(s) (LRB):  Bilateral L5/S1 TF TEETEE with RN IV sedation (Bilateral)    Nae Paul MD  2022            "

## 2022-05-06 NOTE — DISCHARGE INSTRUCTIONS

## 2022-05-06 NOTE — DISCHARGE SUMMARY
[facility] - Pain Mgmt 1st Fl  Discharge Note  Short Stay    Procedure(s) (LRB):  Bilateral L5/S1 TF TEETEE with RN IV sedation (Bilateral)    OUTCOME: Patient tolerated treatment/procedure well without complication and is now ready for discharge.    DISPOSITION: Home or Self Care    FINAL DIAGNOSIS:  <principal problem not specified>    FOLLOWUP: In clinic    DISCHARGE INSTRUCTIONS:  No discharge procedures on file.     TIME SPENT ON DISCHARGE: 15 minutes

## 2022-05-17 ENCOUNTER — TELEPHONE (OUTPATIENT)
Dept: FAMILY MEDICINE | Facility: CLINIC | Age: 62
End: 2022-05-17
Payer: MEDICAID

## 2022-05-17 NOTE — TELEPHONE ENCOUNTER
----- Message from Kanika Dennis sent at 5/17/2022 10:06 AM CDT -----  Contact: Mr. Kent # [phone]  Mr. Kent a  at [facility] is calling in regards to him wanting to know if a prior authorization was put in for the patients pantoprazole 40 MG to the patients insurance company Medicaid please?

## 2022-05-17 NOTE — TELEPHONE ENCOUNTER
Spoke with [facility] pharmacy to let them know that we have not received a PA for Pantoprazole and that this medication is not currently active on the patient's medication list.

## 2022-05-31 ENCOUNTER — PATIENT MESSAGE (OUTPATIENT)
Dept: FAMILY MEDICINE | Facility: CLINIC | Age: 62
End: 2022-05-31
Payer: MEDICAID

## 2022-06-01 ENCOUNTER — OFFICE VISIT (OUTPATIENT)
Dept: FAMILY MEDICINE | Facility: CLINIC | Age: 62
End: 2022-06-01
Payer: MEDICAID

## 2022-06-01 VITALS
OXYGEN SATURATION: 97 % | HEART RATE: 87 BPM | HEIGHT: 68 IN | DIASTOLIC BLOOD PRESSURE: 86 MMHG | SYSTOLIC BLOOD PRESSURE: 134 MMHG | WEIGHT: 293 LBS | BODY MASS INDEX: 44.41 KG/M2

## 2022-06-01 DIAGNOSIS — E11.42 DIABETIC POLYNEUROPATHY ASSOCIATED WITH TYPE 2 DIABETES MELLITUS: Chronic | ICD-10-CM

## 2022-06-01 DIAGNOSIS — M54.16 LUMBAR RADICULOPATHY, CHRONIC: ICD-10-CM

## 2022-06-01 DIAGNOSIS — E66.2 CLASS 3 OBESITY WITH ALVEOLAR HYPOVENTILATION, SERIOUS COMORBIDITY, AND BODY MASS INDEX (BMI) OF 50.0 TO 59.9 IN ADULT: Chronic | ICD-10-CM

## 2022-06-01 DIAGNOSIS — K21.00 GASTROESOPHAGEAL REFLUX DISEASE WITH ESOPHAGITIS WITHOUT HEMORRHAGE: Primary | Chronic | ICD-10-CM

## 2022-06-01 PROCEDURE — 99999 PR PBB SHADOW E&M-EST. PATIENT-LVL V: ICD-10-PCS | Mod: PBBFAC,,, | Performed by: NURSE PRACTITIONER

## 2022-06-01 PROCEDURE — 99214 PR OFFICE/OUTPT VISIT, EST, LEVL IV, 30-39 MIN: ICD-10-PCS | Mod: S$PBB,,, | Performed by: NURSE PRACTITIONER

## 2022-06-01 PROCEDURE — 99215 OFFICE O/P EST HI 40 MIN: CPT | Mod: PBBFAC,PO | Performed by: NURSE PRACTITIONER

## 2022-06-01 PROCEDURE — 3044F HG A1C LEVEL LT 7.0%: CPT | Mod: CPTII,,, | Performed by: NURSE PRACTITIONER

## 2022-06-01 PROCEDURE — 3079F PR MOST RECENT DIASTOLIC BLOOD PRESSURE 80-89 MM HG: ICD-10-PCS | Mod: CPTII,,, | Performed by: NURSE PRACTITIONER

## 2022-06-01 PROCEDURE — 3075F SYST BP GE 130 - 139MM HG: CPT | Mod: CPTII,,, | Performed by: NURSE PRACTITIONER

## 2022-06-01 PROCEDURE — 1160F RVW MEDS BY RX/DR IN RCRD: CPT | Mod: CPTII,,, | Performed by: NURSE PRACTITIONER

## 2022-06-01 PROCEDURE — 3008F PR BODY MASS INDEX (BMI) DOCUMENTED: ICD-10-PCS | Mod: CPTII,,, | Performed by: NURSE PRACTITIONER

## 2022-06-01 PROCEDURE — 3075F PR MOST RECENT SYSTOLIC BLOOD PRESS GE 130-139MM HG: ICD-10-PCS | Mod: CPTII,,, | Performed by: NURSE PRACTITIONER

## 2022-06-01 PROCEDURE — 1160F PR REVIEW ALL MEDS BY PRESCRIBER/CLIN PHARMACIST DOCUMENTED: ICD-10-PCS | Mod: CPTII,,, | Performed by: NURSE PRACTITIONER

## 2022-06-01 PROCEDURE — 3079F DIAST BP 80-89 MM HG: CPT | Mod: CPTII,,, | Performed by: NURSE PRACTITIONER

## 2022-06-01 PROCEDURE — 99214 OFFICE O/P EST MOD 30 MIN: CPT | Mod: S$PBB,,, | Performed by: NURSE PRACTITIONER

## 2022-06-01 PROCEDURE — 1159F MED LIST DOCD IN RCRD: CPT | Mod: CPTII,,, | Performed by: NURSE PRACTITIONER

## 2022-06-01 PROCEDURE — 99999 PR PBB SHADOW E&M-EST. PATIENT-LVL V: CPT | Mod: PBBFAC,,, | Performed by: NURSE PRACTITIONER

## 2022-06-01 PROCEDURE — 3008F BODY MASS INDEX DOCD: CPT | Mod: CPTII,,, | Performed by: NURSE PRACTITIONER

## 2022-06-01 PROCEDURE — 3044F PR MOST RECENT HEMOGLOBIN A1C LEVEL <7.0%: ICD-10-PCS | Mod: CPTII,,, | Performed by: NURSE PRACTITIONER

## 2022-06-01 PROCEDURE — 1159F PR MEDICATION LIST DOCUMENTED IN MEDICAL RECORD: ICD-10-PCS | Mod: CPTII,,, | Performed by: NURSE PRACTITIONER

## 2022-06-01 RX ORDER — SUCRALFATE 1 G/1
1 TABLET ORAL
Qty: 120 TABLET | Refills: 2 | Status: SHIPPED | OUTPATIENT
Start: 2022-06-01 | End: 2022-10-03 | Stop reason: ALTCHOICE

## 2022-06-01 NOTE — ASSESSMENT & PLAN NOTE
-chronic, improved  -taking Famotidine, omeprazole, and sucralfate   -denies alarm symptoms, such as dysphagia, weight loss, chest pain, melena, hematemesis, or N/V  -continue lifestyle modification with avoidance of acidic foods, caffeine, late night eating, spicy foods, and NSAIDs  -she plans to see GI in 3 months as scheduled

## 2022-06-01 NOTE — ASSESSMENT & PLAN NOTE
Lyrica recently increased to 50 milligrams 3 times daily. She reports improvement in symptoms. Follow-up with Neurology.

## 2022-06-01 NOTE — PROGRESS NOTES
Assessment/Plan:  Problem List Items Addressed This Visit        Neuro    Diabetic neuropathy (Chronic)    Overview     2022:  Patient reports that she is still having significant nerve pain.  She is currently taking Lyrica 25 milligrams twice daily.    Chronic.  She has been on gabapentin previously.  She was taking 100 milligrams 3 times daily but does not report any improvement in her burning in her feet.  She was switched to Topamax by pain management.  She reports that some of her pain is better but her in burning sensation is still present.    Reviewed nerve study from :  Summary:  Nerve conduction studies were performed on the right upper and lower extremities.  Right median, ulnar, and superficial peroneal sensory responses were normal in amplitude and latency.  Right sural sensory response was absent.  Right median motor response was borderline in velocity but normal in amplitude and latency.  Right ulnar motor response was normal in amplitude, latency and velocity.  Right peroneal motor response was normal in amplitude, latency and velocity.  Right tibial motor response was prolonged with normal amplitude and velocity.  Further internal comparison studies were performed to assess the carpal tunnel.  Right median versus ulnar 2nd lumbrical interosseous comparison studies revealed a prolonged median motor latency as compared to the ulnar.  Needle EMG was performed in the right upper and lower extremities.  No active denervation was present in any muscle tested.  Motor unit morphology and recruitment patterns were normal in every muscle tested.     Impression:  This is a mildly abnormal EMG of the right upper and lower extremities.  There is electrophysiologic evidence of the followin. Very mild, axonal, peripheral polyneuropathy without active denervation.  2. Very mild, right, median mononeuropathy across the wrist (carpal tunnel syndrome) without active denervation.  There is no evidence of  any other focal neuropathy, plexopathy, or radiculopathy on the study.  In addition there is no evidence of myopathy on the study.        Thank you for referring to the [facility] in [place]. Please feel free to contact the clinic if you have any further questions regarding this study or report.        _____________________________  Cady Roberts D.O., ABPN, AOBNP, ABEM            Current Assessment & Plan     Lyrica recently increased to 50 milligrams 3 times daily. She reports improvement in symptoms. Follow-up with Neurology.           Lumbar radiculopathy, chronic    Overview     Chronic, stable. Intermittent flares.  Following with pain management, Dr. Nae Paul  Taking tylenol, diclofenac, and lyrica   Recently received TEETEE 5/6/22               GI    Gastroesophageal reflux disease with esophagitis without hemorrhage - Primary (Chronic)    Overview     Chronic.  April 2022:  Currently uncontrolled.  History of H pylori.  Previous HPI:  on Zantac.  However medication has been pharmacy recall.  Patient needing replacement medication for this condition.  Reported compliance.  Symptoms are controlled.  No side effects reported.           Current Assessment & Plan     -chronic, improved  -taking Famotidine, omeprazole, and sucralfate   -denies alarm symptoms, such as dysphagia, weight loss, chest pain, melena, hematemesis, or N/V  -continue lifestyle modification with avoidance of acidic foods, caffeine, late night eating, spicy foods, and NSAIDs  -she plans to see GI in 3 months as scheduled            Relevant Medications    sucralfate (CARAFATE) 1 gram tablet       Other    Class 3 obesity with alveolar hypoventilation, serious comorbidity, and body mass index (BMI) of 50.0 to 59.9 in adult (Chronic)    Overview     Encourage increased physical activity, healthy diet choices, and weight loss.    Wt Readings from Last 3 Encounters:   06/01/22 0926 (!) 140.1 kg (308 lb 13.8  oz)   05/06/22 0720 (!) 142.6 kg (314 lb 6 oz)   04/20/22 0952 (!) 143.9 kg (317 lb 3.2 oz)                  Follow up in about 3 months (around 9/1/2022), or if symptoms worsen or fail to improve, for Follow up with Dr. Nagel.    Rose Price, NP  _____________________________________________________________________________________________________________________________________________________    CC: follow up     HPI: Patient is a 62-year-old female who presents in clinic today as an established patient here for follow up. Chronic condition has been reviewed. Further details as stated above.     Past Medical History:  Past Medical History:   Diagnosis Date    Acute respiratory failure due to COVID-19     COVID-19     Diabetes mellitus, type 2     Diabetic neuropathy 1/27/2014    DJD (degenerative joint disease) of knee     DVT (deep venous thrombosis) around 1990's    in leg, is on no anticoagulant therapy presently    Fatty liver     GERD (gastroesophageal reflux disease)     Hypertension associated with diabetes     HECTOR (iron deficiency anemia) 5/13/2021    Multinodular goiter     Obesity, morbid, BMI 50 or higher     Sleep apnea     has no CPAP     Past Surgical History:   Procedure Laterality Date    COLONOSCOPY N/A 5/12/2021    Procedure: COLONOSCOPY;  Surgeon: Carolina Rizo MD;  Location: [facility];  Service: Endoscopy;  Laterality: N/A;    EPIDURAL STEROID INJECTION N/A 12/10/2021    Procedure: Lumbar L5/S1 IL TEETEE  Would like AM procedure, if possible;  Surgeon: Nae Paul MD;  Location: [facility];  Service: Pain Management;  Laterality: N/A;    EPIDURAL STEROID INJECTION INTO CERVICAL SPINE N/A 10/8/2021    Procedure: C7-T1 IL TEETEE-no sedation.  Needs IV-just incase;  Surgeon: Nae Paul MD;  Location: [facility];  Service: Pain Management;  Laterality: N/A;    ESOPHAGOGASTRODUODENOSCOPY N/A 7/8/2021    Procedure: EGD (ESOPHAGOGASTRODUODENOSCOPY) previous positve  nicky;  Surgeon: Jann Gutierrez MD;  Location[facility];  Service: Endoscopy;  Laterality: N/A;    FRACTURE SURGERY      HYSTERECTOMY      SELECTIVE INJECTION OF ANESTHETIC AGENT AROUND LUMBAR SPINAL NERVE ROOT BY TRANSFORAMINAL APPROACH Bilateral 5/6/2022    Procedure: Bilateral L5/S1 TF TEETEE with RN IV sedation;  Surgeon: Nae Paul MD;  Location: [facility];  Service: Pain Management;  Laterality: Bilateral;    SHOULDER ARTHROSCOPY      THYROIDECTOMY, PARTIAL Right     and transplatation of right superior parathyroid gland to the sternocleidomastoid muscle     TONSILLECTOMY      TUBAL LIGATION  1984    WRIST FRACTURE SURGERY      left     Review of patient's allergies indicates:   Allergen Reactions    Codeine sulfate      Nausea^    Lisinopril Swelling     angioedema    Codeine Nausea Only and Rash     Social History     Tobacco Use    Smoking status: Never Smoker    Smokeless tobacco: Never Used   Substance Use Topics    Alcohol use: No    Drug use: No     Family History   Problem Relation Age of Onset    Leukemia Son     Cancer Son     Diabetes Mother     Hypertension Mother     Heart disease Mother 50    Cancer Father     Arthritis Father     Cancer Brother     Cancer Brother      Current Outpatient Medications on File Prior to Visit   Medication Sig Dispense Refill    acetaminophen (TYLENOL) 500 MG tablet Take 2 tablets (1,000 mg total) by mouth every 6 (six) hours as needed for Pain.      albuterol (PROVENTIL/VENTOLIN HFA) 90 mcg/actuation inhaler INHALE 2 PUFFS BY MOUTH EVERY 6 HOURS AS NEEDED FOR WHEEZING OR SHORTNESS OF BREATH 18 g 11    blood sugar diagnostic (CLEVER CHOICE VOICE+ TEST) Strp TEST BLOOD SUGARS ONE TIME DAILY 100 strip 4    blood sugar diagnostic (CLEVER CHOICE VOICE+ TEST) Strp TEST BLOOD SUGARS ONE TIME DAILY 100 strip 1    blood sugar diagnostic Strp To check BG 3 times daily, to use with insurance preferred meter 100 each 99    blood-glucose  meter kit To check BG three times daily, to use with insurance preferred meter 100 each 0    blood-glucose meter kit Use before meals and before bed when the glucoses are not in control.  Otherwise, test it daily once a day before meals randomly to ensure 1 each 0    diclofenac (VOLTAREN) 75 MG EC tablet Take 1 tablet (75 mg total) by mouth 2 (two) times daily. 60 tablet 2    diltiaZEM (CARDIZEM) 30 MG tablet Take 1 tablet (30 mg total) by mouth every 12 (twelve) hours. 60 tablet 11    famotidine (PEPCID) 40 MG tablet Take 1 tablet (40 mg total) by mouth once daily. 90 tablet 4    ferrous sulfate 324 mg (65 mg iron) TbEC Take 1 tablet (324 mg total) by mouth once daily. 365 tablet 0    fluticasone propion-salmeterol 115-21 mcg/dose (ADVAIR HFA) 115-21 mcg/actuation HFAA inhaler Inhale 2 puffs into the lungs 2 (two) times daily. Wash out mouth after use. Controller 1 Inhaler 11    fluticasone propionate (FLONASE) 50 mcg/actuation nasal spray Use 2 sprays to each nostril daily 16 g 12    inhalation spacing device (BREATHERITE VALVED MDI CHAMBER) Use as directed for inhalation. 1 Device prn    lancets Misc To check BG three times daily, to use with insurance preferred meter 100 each 99    levocetirizine (XYZAL) 5 MG tablet Take 1 tablet (5 mg total) by mouth every evening. 90 tablet 4    levothyroxine (SYNTHROID) 100 MCG tablet Take 1 tablet (100 mcg total) by mouth once daily. 90 tablet 1    LIDOcaine-prilocaine (EMLA) cream SMARTSI-2 Pump Topical 3-4 Times Daily      linaCLOtide (LINZESS) 72 mcg Cap capsule Take 1 capsule (72 mcg total) by mouth once daily. 30 capsule 12    loratadine (CLARITIN) 10 mg tablet Take 10 mg by mouth once daily.      metFORMIN (GLUCOPHAGE) 1000 MG tablet Take 1 tablet (1,000 mg total) by mouth 2 (two) times daily with meals. 180 tablet 4    montelukast (SINGULAIR) 10 mg tablet Take 1 tablet (10 mg total) by mouth every evening. 30 tablet 12    omeprazole (PRILOSEC) 40  "MG capsule Take 1 capsule (40 mg total) by mouth 2 (two) times daily before meals. 60 capsule 1    pen needle, diabetic (PEN NEEDLE) 32 gauge x 5/32" Ndle Use as directed to inject medication 100 each 4    pravastatin (PRAVACHOL) 80 MG tablet Take 1 tablet (80 mg total) by mouth once daily. 90 tablet 4    pregabalin (LYRICA) 50 MG capsule Take 1 capsule (50 mg total) by mouth 3 (three) times daily. 90 capsule 5    pulse oximeter (PULSE OXIMETER) device by Apply Externally route 2 (two) times a day. Use twice daily at 8 AM and 3 PM and record the value in [employer] as directed. 1 each 0    semaglutide (OZEMPIC) 1 mg/dose (4 mg/3 mL) Inject 1 mg into the skin every 7 days. 3 pen 4    tiZANidine (ZANAFLEX) 4 MG tablet Take 1/2 to 1 tablet by mouth twice daily as needed for muscle spasms. May cause drowsiness. 60 tablet 4    topiramate (TOPAMAX) 100 MG tablet Take 1 tablet (100 mg total) by mouth 2 (two) times daily. 60 tablet 11    TRELEGY ELLIPTA 100-62.5-25 mcg DsDv Inhale 1 puff into the lungs once daily.      TRUEPLUS LANCETS 33 gauge Misc Check blood glucose up to 3 times daily as needed before meals 100 each 99    [DISCONTINUED] sucralfate (CARAFATE) 1 gram tablet Take 1 tablet (1 g total) by mouth 4 (four) times daily. 120 tablet 0     No current facility-administered medications on file prior to visit.     Review of Systems   Constitutional: Negative for chills, fatigue, fever and unexpected weight change.   HENT: Negative for ear pain and sore throat.    Eyes: Negative for redness and visual disturbance.   Respiratory: Negative for cough and shortness of breath.    Cardiovascular: Negative for chest pain and palpitations.   Gastrointestinal: Negative for abdominal pain, blood in stool, diarrhea, nausea and vomiting.        Reflux   Genitourinary: Negative for difficulty urinating and hematuria.   Musculoskeletal: Positive for arthralgias and back pain. Negative for myalgias.   Skin: Negative for rash " "and wound.   Neurological: Positive for numbness. Negative for weakness and headaches.   Psychiatric/Behavioral: Negative for sleep disturbance. The patient is not nervous/anxious.      Vitals:    06/01/22 0926   BP: 134/86   BP Location: Right arm   Pulse: 87   SpO2: 97%   Weight: (!) 140.1 kg (308 lb 13.8 oz)   Height: 5' 8" (1.727 m)     Wt Readings from Last 3 Encounters:   06/01/22 (!) 140.1 kg (308 lb 13.8 oz)   05/06/22 (!) 142.6 kg (314 lb 6 oz)   04/20/22 (!) 143.9 kg (317 lb 3.2 oz)     Physical Exam  Vitals and nursing note reviewed.   Constitutional:       General: She is not in acute distress.     Appearance: She is well-developed. She is obese. She is not ill-appearing, toxic-appearing or diaphoretic.   HENT:      Head: Normocephalic and atraumatic.      Right Ear: Hearing and external ear normal.      Left Ear: Hearing and external ear normal.      Nose: Nose normal. No rhinorrhea.   Eyes:      General: Lids are normal.      Extraocular Movements: Extraocular movements intact.      Conjunctiva/sclera: Conjunctivae normal.      Pupils: Pupils are equal, round, and reactive to light.   Cardiovascular:      Rate and Rhythm: Normal rate.      Pulses: Normal pulses.   Pulmonary:      Effort: Pulmonary effort is normal. No respiratory distress.      Breath sounds: Normal breath sounds.   Abdominal:      General: Bowel sounds are normal.      Palpations: Abdomen is soft.      Tenderness: There is no abdominal tenderness.   Musculoskeletal:         General: Normal range of motion.      Cervical back: Normal range of motion and neck supple. Tenderness present. Pain with movement present. Normal range of motion.      Lumbar back: Spasms and tenderness present. No bony tenderness. Normal range of motion. Negative right straight leg raise test and negative left straight leg raise test.   Skin:     General: Skin is warm and dry.      Capillary Refill: Capillary refill takes less than 2 seconds.      Coloration: " Skin is not pale.   Neurological:      General: No focal deficit present.      Mental Status: She is alert and oriented to person, place, and time. Mental status is at baseline. She is not disoriented.      GCS: GCS eye subscore is 4. GCS verbal subscore is 5. GCS motor subscore is 6.      Cranial Nerves: No cranial nerve deficit.      Motor: Motor function is intact. No weakness.      Coordination: Coordination is intact.      Gait: Gait is intact. Gait normal.   Psychiatric:         Attention and Perception: She is attentive.         Mood and Affect: Mood normal. Mood is not anxious or depressed.         Speech: Speech is not rapid and pressured or slurred.         Behavior: Behavior normal. Behavior is not agitated, aggressive or hyperactive. Behavior is cooperative.         Thought Content: Thought content normal. Thought content is not paranoid or delusional. Thought content does not include homicidal or suicidal ideation. Thought content does not include homicidal or suicidal plan.         Cognition and Memory: Memory is not impaired.         Judgment: Judgment normal.       Health Maintenance   Topic Date Due    Eye Exam  03/23/2022    Hemoglobin A1c  08/24/2022    Mammogram  01/06/2023    Lipid Panel  02/24/2023    Foot Exam  04/14/2023    TETANUS VACCINE  10/29/2028    Hepatitis C Screening  Completed

## 2022-06-06 NOTE — PROGRESS NOTES
Established Patient Chronic Pain Note     Referring Physician: No ref. provider found    PCP: Oleksandr Nagel MD    Chief Complaint:   Chief Complaint   Patient presents with    Low-back Pain     Low back pain        SUBJECTIVE:  Interval history 06/07/2022  Patient presents status post bilateral L5/S1 transforaminal epidural steroid injection 05/06/2022.  Patient reports 100% pain relief initially following transforaminal injection of lower extremity radicular symptoms.  Today pain is rated 0/10.  Patient reports overall 50% sustained relief in lower extremity pain.  Patient reports more significant relief with transforaminal band prior intralaminar approach.  Patient continues to report sustained relief in the neck and upper extremities following C7-T1 interlaminar epidural steroid injection October 2021. Patient is continue Topamax 100 mg twice daily.  She does report noticeable improvement in pain on this medication and denies any side effects.  Today she denies any significant upper lower extremity weakness, bowel or bladder incontinence or saddle anesthesia.      Interval history 03/08/2022   Patient presents for MRI review- lumbar and cervical spine.  Today patient reports sustained pain relief in the neck and upper extremities following C7-T1 interlaminar epidural steroid injection October 2021. She also reports improvement in lower extremity radicular symptoms in the lower back and down the lower extremities.  Today her primary concern is burning sensation on the bottom of the feet.  Patient has continued Topamax and has reached 100 mg twice daily.  She does report noticeable improvement in her pain on this medication.  She denies any side effects.  She denies any new onset upper or lower extremity weakness, bowel or bladder incontinence or saddle anesthesia.      Interval history 01/11/2022  Patient presents status post L5-S1 interlaminar epidural steroid injection with right paramedian approach  12/10/2021.  Patient reports 80% sustained relief in lower back and bilateral lower extremity radicular symptoms following epidural steroid injection.  Today patient reports her pain as a 5/10.  Patient has continued Topamax 100 mg twice daily.  She denies any significant sedation from this medication and has enjoyed a  20 lb weight loss since initiation as well as improvement in neuropathic pain.      Interval Hx: 11/8/21  Pt is s/p C7-T1 IL TEETEE with R paramedian approach 10/08/21.  Patient reports 60% sustained relief following her cervical epidural steroid injection in both her neck and upper extremities.  Patient does report improvement in range of motion and strength.  Today patient would like to discuss her lower back and leg pain.  Today she again reports pain in the lower back which radiates down the posterior aspects of bilateral lower extremities in L5-S1 distribution to the feet.  Today pain is 5/10.  Patient is currently participating actively in physical therapy.  She is currently reached Topamax 75 mg twice daily without side effects.  She denies any new onset lower extremity weakness, bowel or bladder incontinence or saddle anesthesia.    HPI 07/30/21  Zakia Price is a 62 y.o. female with past medical history significant for history of acute respiratory failure secondary to COVID-19, type 2 diabetes complicated by neuropathy, GERD, hypertension, morbid obesity, obstructive sleep apnea,  who presents to the clinic for the evaluation of neck and lower back and leg pain.  Patient reports that her pain began approximately 5-6 years prior without inciting accident injury or trauma.  Patient reports lower back pain which begins in a bandlike distribution at her iliac crest with radiation down the posterior aspect of bilateral legs to the feet in L5-S1 distribution.  Patient reports left side is significantly worse than the right.  Patient reports tingling and numbness in bilateral feet and  associated subjective weakness in the lower extremities.  Patient does report periodic falls associated with her pain.    Patient also reports longstanding neck pain with radiation down bilateral upper extremities in no particular dermatomal distribution.  Patient reports pain is worse along the right paracervical muscles than on the left.  Patient reports compromise in hand  strength as well as in hand dexterity.  Patient denies ataxia or bowel or bladder incontinence.   Patient's neck and shoulder pain is exacerbated by cervical flexion, extension and lateral rotation as well as overhead activities..    Patient reports that lower extremity pain is exacerbated from moving from a sitting to a standing position and with ambulation.  Patient is able to ambulate approximately 10 minutes before requiring rest.  The pain is rated with a score of  7/10 on the BEST day and a score of 10/10 on the WORST day.  Symptoms interfere with daily activity and sleeping.     Patient reports significant motor weakness.  Patient denies night fever/night sweats, urinary incontinence, bowel incontinence, significant weight loss and loss of sensations.      Pain Disability Index Review:     Last 3 PDI Scores 3/8/2022 1/11/2022 11/8/2021   Pain Disability Index (PDI) 35 35 45       Non-Pharmacologic Treatments:  Physical Therapy/Home Exercise: yes  Ice/Heat:yes  TENS: no  Acupuncture: no  Massage: yes  Chiropractic: no    Other: no    Pain Medications:  - Adjuvant Medications: Neurontin (Gabapentin), Topamax (Topiramate), Tylenol (Acetaminophen) and Zanaflex ( Tizanidine) Diazepam    Pain Procedures:   -05/06/2022:  Bilateral L5/S1 transforaminal epidural steroid injection  -12/10/2021:  L5-S1 intralaminar epidural steroid injection with right paramedian approach  -10/8/21: C7-T1 IL TEETEE with R paramedian approach; Dr. Paul  Prior epidural steroid injection lumbar spine, approximately 3-4 years prior at the [facility] in [place] which confirmed approximately 1 year of relief.    Past Medical History:   Diagnosis Date    Acute respiratory failure due to COVID-19     COVID-19     Diabetes mellitus, type 2     Diabetic neuropathy 1/27/2014    DJD (degenerative joint disease) of knee     DVT (deep venous thrombosis) around 1990's    in leg, is on no anticoagulant therapy presently    Fatty liver     GERD (gastroesophageal reflux disease)     Hypertension associated with diabetes     HECTOR (iron deficiency anemia) 5/13/2021    Multinodular goiter     Obesity, morbid, BMI 50 or higher     Sleep apnea     has no CPAP     Past Surgical History:   Procedure Laterality Date    COLONOSCOPY N/A 5/12/2021    Procedure: COLONOSCOPY;  Surgeon: Carolina Rizo MD;  Location: [facility];  Service: Endoscopy;  Laterality: N/A;    EPIDURAL STEROID INJECTION N/A 12/10/2021    Procedure: Lumbar L5/S1 IL TEETEE  Would like AM procedure, if possible;  Surgeon: Nae Paul MD;  Location: [facility];  Service: Pain Management;  Laterality: N/A;    EPIDURAL STEROID INJECTION INTO CERVICAL SPINE N/A 10/8/2021    Procedure: C7-T1 IL TEETEE-no sedation.  Needs IV-just incase;  Surgeon: Nae Paul MD;  Location: [facility];  Service: Pain Management;  Laterality: N/A;    ESOPHAGOGASTRODUODENOSCOPY N/A 7/8/2021    Procedure: EGD (ESOPHAGOGASTRODUODENOSCOPY) previous positve covid;  Surgeon: Jann Gutierrez MD;  Location: [facility];  Service: Endoscopy;  Laterality: N/A;    FRACTURE SURGERY      HYSTERECTOMY      SELECTIVE INJECTION OF ANESTHETIC AGENT AROUND LUMBAR SPINAL NERVE ROOT BY TRANSFORAMINAL APPROACH Bilateral 5/6/2022    Procedure: Bilateral L5/S1 TF TEETEE with RN IV sedation;  Surgeon: Nae Paul MD;  Location: [facility];  Service: Pain Management;  Laterality: Bilateral;    SHOULDER ARTHROSCOPY      THYROIDECTOMY, PARTIAL Right     and transplatation of right superior parathyroid gland to the  sternocleidomastoid muscle     TONSILLECTOMY      TUBAL LIGATION  1984    WRIST FRACTURE SURGERY      left     Review of patient's allergies indicates:   Allergen Reactions    Codeine sulfate      Nausea^    Lisinopril Swelling     angioedema    Codeine Nausea Only and Rash       Current Outpatient Medications   Medication Sig    acetaminophen (TYLENOL) 500 MG tablet Take 2 tablets (1,000 mg total) by mouth every 6 (six) hours as needed for Pain.    albuterol (PROVENTIL/VENTOLIN HFA) 90 mcg/actuation inhaler INHALE 2 PUFFS BY MOUTH EVERY 6 HOURS AS NEEDED FOR WHEEZING OR SHORTNESS OF BREATH    diclofenac (VOLTAREN) 75 MG EC tablet Take 1 tablet (75 mg total) by mouth 2 (two) times daily.    diltiaZEM (CARDIZEM) 30 MG tablet Take 1 tablet (30 mg total) by mouth every 12 (twelve) hours.    famotidine (PEPCID) 40 MG tablet Take 1 tablet (40 mg total) by mouth once daily.    fluticasone propion-salmeterol 115-21 mcg/dose (ADVAIR HFA) 115-21 mcg/actuation HFAA inhaler Inhale 2 puffs into the lungs 2 (two) times daily. Wash out mouth after use. Controller    fluticasone propionate (FLONASE) 50 mcg/actuation nasal spray Use 2 sprays to each nostril daily    levocetirizine (XYZAL) 5 MG tablet Take 1 tablet (5 mg total) by mouth every evening.    levothyroxine (SYNTHROID) 100 MCG tablet Take 1 tablet (100 mcg total) by mouth once daily.    LIDOcaine-prilocaine (EMLA) cream SMARTSI-2 Pump Topical 3-4 Times Daily    linaCLOtide (LINZESS) 72 mcg Cap capsule Take 1 capsule (72 mcg total) by mouth once daily.    loratadine (CLARITIN) 10 mg tablet Take 10 mg by mouth once daily.    metFORMIN (GLUCOPHAGE) 1000 MG tablet Take 1 tablet (1,000 mg total) by mouth 2 (two) times daily with meals.    montelukast (SINGULAIR) 10 mg tablet Take 1 tablet (10 mg total) by mouth every evening.    omeprazole (PRILOSEC) 40 MG capsule Take 1 capsule (40 mg total) by mouth 2 (two) times daily before meals.    pravastatin  "(PRAVACHOL) 80 MG tablet Take 1 tablet (80 mg total) by mouth once daily.    pregabalin (LYRICA) 50 MG capsule Take 1 capsule (50 mg total) by mouth 3 (three) times daily.    semaglutide (OZEMPIC) 1 mg/dose (4 mg/3 mL) Inject 1 mg into the skin every 7 days.    sucralfate (CARAFATE) 1 gram tablet Take 1 tablet (1 g total) by mouth 4 (four) times daily before meals and nightly.    tiZANidine (ZANAFLEX) 4 MG tablet Take 1/2 to 1 tablet by mouth twice daily as needed for muscle spasms. May cause drowsiness.    topiramate (TOPAMAX) 100 MG tablet Take 1 tablet (100 mg total) by mouth 2 (two) times daily.    TRELEGY ELLIPTA 100-62.5-25 mcg DsDv Inhale 1 puff into the lungs once daily.    blood sugar diagnostic ([employer] CHOICE VOICE+ TEST) Strp TEST BLOOD SUGARS ONE TIME DAILY    blood sugar diagnostic ([employer] CHOICE VOICE+ TEST) Strp TEST BLOOD SUGARS ONE TIME DAILY    blood sugar diagnostic Strp To check BG 3 times daily, to use with insurance preferred meter    blood-glucose meter kit To check BG three times daily, to use with insurance preferred meter    blood-glucose meter kit Use before meals and before bed when the glucoses are not in control.  Otherwise, test it daily once a day before meals randomly to ensure    ferrous sulfate 324 mg (65 mg iron) TbEC Take 1 tablet (324 mg total) by mouth once daily. (Patient not taking: Reported on 6/7/2022)    inhalation spacing device (BREATHERITE VALVED MDI CHAMBER) Use as directed for inhalation.    lancets Misc To check BG three times daily, to use with insurance preferred meter    pen needle, diabetic (PEN NEEDLE) 32 gauge x 5/32" Ndle Use as directed to inject medication    pulse oximeter (PULSE OXIMETER) device by Apply Externally route 2 (two) times a day. Use twice daily at 8 AM and 3 PM and record the value in [facility] as directed.    TRUEPLUS LANCETS 33 gauge Misc Check blood glucose up to 3 times daily as needed before meals     No current " "facility-administered medications for this visit.       Review of Systems     GENERAL:  No weight loss, malaise or fevers.  HEENT:   No recent changes in vision or hearing  NECK:  Negative for lumps, no difficulty with swallowing.  RESPIRATORY:  Negative for cough, wheezing or shortness of breath, patient denies any recent URI.  CARDIOVASCULAR:  Negative for chest pain, leg swelling or palpitations.  GI:  Negative for abdominal discomfort, blood in stools or black stools or change in bowel habits.  MUSCULOSKELETAL:  See HPI.  SKIN:  Negative for lesions, rash, and itching.  PSYCH:  No mood disorder or recent psychosocial stressors.  Patients sleep is not disturbed secondary to pain.  HEMATOLOGY/LYMPHOLOGY:  Negative for prolonged bleeding, bruising easily or swollen nodes.  Patient is not currently taking any anti-coagulants  NEURO:   No history of headaches, syncope, paralysis, seizures or tremors.  All other reviewed and negative other than HPI.    OBJECTIVE:    /65   Pulse 69   Ht 5' 8" (1.727 m)   Wt (!) 140.9 kg (310 lb 10.1 oz)   BMI 47.23 kg/m²       Physical Exam    GENERAL: Well appearing, in no acute distress, alert and oriented x3.  PSYCH:  Mood and affect appropriate.  SKIN: Skin color, texture, turgor normal, no rashes or lesions.  HEAD/FACE:  Normocephalic, atraumatic. Cranial nerves grossly intact.    NECK: pain to palpation over the cervical paraspinous muscles. Spurling Negative.  pain with neck flexion, extension, or lateral flexion.   Normal testing biceps, triceps and brachioradialis bilaterally.    NegativeHoffmann's bilaterally.    5/5 strength testing deltoid, biceps, triceps, wrist extensor, wrist flexor and ulnar intrinsics bilaterally.    Normal  strength bilaterally    CV: RRR with palpation of the radial artery.  PULM: No evidence of respiratory difficulty, symmetric chest rise.  GI:  Soft and non-tender.    BACK: Straight leg raising in the sitting and supine positions is " negative to radicular pain. No pain to palpation over the facet joints of the lumbar spine or spinous processes. Normal range of motion without pain reproduction.  EXTREMITIES: Peripheral joint ROM is full and pain free without obvious instability or laxity in all four extremities. No deformities, edema, or skin discoloration. Good capillary refill.  MUSCULOSKELETAL: Able to stand on heels & toes.   hip, and knee provocative maneuvers are negative.   pain with palpation over the sacroiliac joints bilaterally.  FABERs test is negative.  FAER test is negative bilaterally.   Bilateral upper and lower extremity strength is normal and symmetric.  No atrophy or tone abnormalities are noted.  -5/5 strength testing hip flexion, quadriceps, gastrocnemius soleus, anterior tibialis and EHL bilaterally.  RIGHT Lower extremity: Hip flexion 5/5, Hip Abduction 5/5, Hip Adduction 5/5, Knee extension 5/5, Knee flexion 5/5, Ankle dorsiflexion5/5, Extensor hallucis longus 5/5, Ankle plantarflexion 5/5  LEFT Lower extremity:  Hip flexion 5/5, Hip Abduction 5/5,Hip Adduction 5/5, Knee extension 5/5, Knee flexion 5/5, Ankle dorsiflexion 5/5, Extensor hallucis longus 5/5, Ankle plantarflexion 5/5  -Normal testing knee (patellar) jerk and ankle (achilles) jerk    NEURO: Bilateral upper and lower extremity coordination and muscle stretch reflexes are physiologic and symmetric. No loss of sensation is noted.  GAIT: normal.    Imaging:   X-Ray Lumbar Spine Complete 5 View  FINDINGS:  Mild dextroconvex curvature of the lumbar spine.  Grade 1 anterolisthesis of L4 on L5, measuring 4 mm.  Vertebral body heights are maintained without evidence of fracture or aggressive osseous lesion.  Multilevel degenerative anterior marginal osteophyte formation, most pronounced in the visualized lower thoracic spine.  Intervertebral disc space narrowing at L5-S1.  Multilevel degenerative facet arthropathy, most pronounced at L4-5 and L5-S1.    X-Ray Cervical  Spine 5 View W Flex Extxt  FINDINGS:  C7 faintly visualized on neutral lateral view.  There is heterotopic bone formation along the anterior/inferior margin C1-2 articulation.  There is smooth prominence of the overlying prevertebral soft tissues at this level.  Pre dens space appears within upper limits normal.  Vertebral body height and alignment maintained with anterior and posterior marginal spurring most prominently at the C4-5 and C5-6 levels.  Posterior subluxation C4 on C5 noted.  There is uniform loss of disc height at C4-5 and C5-6 levels.  Heterotopic bone and positioning combine to limit evaluation of the left side of C1-2 articulation on the open mouth view.  There is suggestion of slight asymmetry of lateral masses of C1.  Flexion and extension views demonstrate stable positioning without additional evidence subluxation or instability.  No other evidence of fracture or subluxation noted.  Multilevel mild uncovertebral osteophyte formation and minimal/mild neural foraminal stenosis identified.  Surgical staples identified anteriorly in the lower neck at and just to the right of midline.  Remaining osseous structures appear intact.  Partially visualized upper apices appear clear.    Impression  Spondylosis with minimal retro listhesis or posterior subluxation C4 on C5.    Hepatopathy ache bone and degenerative change limits evaluation of the C1-2 articulation and levels particularly on the left.  Consideration should be given for CT if not previously performed to further evaluate these findings.    MRI Lumbar Spine 1/11/22  FINDINGS:  Grade 1 anterolisthesis of L4 on L5. No fracture.  Multiple hemangiomas are seen within the lower thoracic and upper lumbar spine vertebral bodies.  Mild disc height loss at the L1-L2 and L4-L5 levels.  Conus medullaris terminates at the L1 level. Distal spinal cord intensity is normal.  There is no paraspinal soft tissue abnormality.     T12-L1: Minimal diffuse disc bulge.   Mild bilateral facet arthropathy.  No neural foraminal stenosis.  No spinal canal stenosis.     L1-L2: There is a mild diffuse disc bulge.  Mild bilateral facet arthropathy.  No neural foraminal stenosis.  No spinal canal stenosis.     L2-L3: No disc bulge.  Mild bilateral facet arthropathy.  No neural foraminal stenosis.  No spinal canal stenosis.     L3-L4: Minimal diffuse disc bulge.  Moderate bilateral facet arthropathy.  Mild bilateral neural foraminal stenosis.  No significant spinal canal stenosis.     L4-L5: Mild uncovering of the disc space secondary to listhesis.  There is a diffuse disc bulge.  Severe bilateral facet arthropathy.  Moderate bilateral neural foraminal stenosis.  Mild spinal canal stenosis secondary to listhesis, disc bulge, facet arthropathy, and ligamentum flavum hypertrophy.     L5-S1: Minimal diffuse disc bulge.  Moderate facet arthropathy, greater on the right than left.  Mild-to-moderate bilateral neural foraminal stenosis.  No spinal canal stenosis.    MRI cervical spine 1/11/22  FINDINGS:  There is mild retrolisthesis of C4 on C5. Vertebral bodies demonstrate normal signal intensity on all sequences.  No fracture.  Mild-to-moderate disc height loss and desiccation involves the C4 through C7 disc spaces, most pronounced at the C4-C5 and C5-C6 disc spaces.  The craniocervical junction is normal.  Visualized portions of the posterior fossa appear normal.  Mucosal thickening noted within the sphenoid sinus.  The spinal cord demonstrates normal signal intensity on all sequences. No paraspinal soft tissue abnormality is identified.     C2-C3: Small posterior disc osteophyte complex, best seen on the axial sequence, without spinal canal stenosis.  There is no facet arthropathy.  There is no uncovertebral joint disease.  There is no neuroforaminal stenosis.     C3-C4: No disc bulge.  Severe right facet arthropathy.  No significant uncovertebral arthropathy.  Moderate right neural foraminal  stenosis.  No spinal canal stenosis.     C4-C5: Small posterior disc osteophyte complex.  Mild spinal canal stenosis secondary to retrolisthesis and disc osteophyte complex.  There is also a right lateral disc protrusion which involves the right neural foramen, best appreciated on the T2 sagittal sequence.  Mild bilateral facet arthropathy.  Mild-to-moderate bilateral uncovertebral arthropathy.  Moderate to severe neural foraminal stenosis, greater on the right than left.     C5-C6: Small posterior disc osteophyte complex results in mild spinal canal stenosis.  Severe left facet arthropathy.  Mild-to-moderate bilateral uncovertebral arthropathy.  Mild to moderate bilateral neural foraminal stenosis.     C6-C7: Small posterior disc osteophyte complex results in mild spinal canal stenosis.  No significant facet arthropathy.  No significant uncovertebral arthropathy.  No neural foraminal stenosis.     C7-T1: No disc bulge.  No significant facet arthropathy.  No neural foraminal stenosis.  No spinal canal stenosis.    ASSESSMENT: 62 y.o. year old female with neck and shoulder and back and lower extremity pain, consistent with     1. Lumbar radiculopathy, chronic     2. Cervical radiculopathy     3. Cervical spondylosis     4. Lumbar spondylosis     5. Lumbar facet arthropathy           PLAN:   - Interventions:  Patient has sustained 50% relief and lower extremity radicular symptoms following bilateral L5/S1 transforaminal epidural steroid injection.  We discussed repeating this procedure in the future should symptoms exacerbate...  Explained the risks and benefits of the procedure in detail with the patient today in clinic along with alternative treatment options    - Anticoagulation use: no no anticoagulation    - we have discussed considering repeat cervical  epidural steroid injection at a more cephalad level, C6-7, in the future should her upper extremity radicular symptoms exacerbate.     report:  Reviewed and  consistent with medication use as prescribed.      - Medications:  -Continue Topamax .  We discussed potential side effects of this medication include drowsiness, dizziness, tingling of the hands and feet, diarrhea, dysgeusia, weight loss/anorexia.    Topamax  100mg BID       - Therapy:   We discussed continuing physical therapy to help manage the patient/s painful condition. The patient was counseled that muscle strengthening will improve the long term prognosis in regards to pain and may also help increase range of motion and mobility.    - Imaging: Reviewed available imaging with patient and answered any questions they had regarding study.  - Follow up visit: return to clinic in  3 months    The above plan and management options were discussed at length with patient. Patient is in agreement with the above and verbalized understanding.    - I discussed the goals of interventional chronic pain management with the patient on today's visit. We discussed a multimodal and systematic approach to pain.  This includes diagnostic and therapeutic injections, adjuvant pharmacologic treatment, physical therapy, and at times psychiatry.  I emphasized the importance of regular exercise, core strengthening and stretching, diet and weight loss as a cornerstone of long-term pain management.    - This condition does not require this patient to take time off of work, and the primary goal of our Pain Management services is to improve the patient's functional capacity.  - Patient Questions: Answered all of the patient's questions regarding diagnoses, therapy, treatment and next steps      Nae Paul MD  Interventional Pain Management  [facility]    Disclaimer:  This note was prepared using voice recognition system and is likely to have sound alike errors that may have been overlooked even after proof reading.  Please call me with any questions

## 2022-06-07 ENCOUNTER — OFFICE VISIT (OUTPATIENT)
Dept: PAIN MEDICINE | Facility: CLINIC | Age: 62
End: 2022-06-07
Payer: MEDICAID

## 2022-06-07 VITALS
HEART RATE: 69 BPM | BODY MASS INDEX: 44.41 KG/M2 | HEIGHT: 68 IN | SYSTOLIC BLOOD PRESSURE: 108 MMHG | DIASTOLIC BLOOD PRESSURE: 65 MMHG | WEIGHT: 293 LBS

## 2022-06-07 DIAGNOSIS — M54.16 LUMBAR RADICULOPATHY, CHRONIC: Primary | ICD-10-CM

## 2022-06-07 DIAGNOSIS — M54.12 CERVICAL RADICULOPATHY: ICD-10-CM

## 2022-06-07 DIAGNOSIS — M47.812 CERVICAL SPONDYLOSIS: ICD-10-CM

## 2022-06-07 DIAGNOSIS — M47.816 LUMBAR FACET ARTHROPATHY: ICD-10-CM

## 2022-06-07 DIAGNOSIS — M47.816 LUMBAR SPONDYLOSIS: ICD-10-CM

## 2022-06-07 PROCEDURE — 99999 PR PBB SHADOW E&M-EST. PATIENT-LVL V: ICD-10-PCS | Mod: PBBFAC,,, | Performed by: ANESTHESIOLOGY

## 2022-06-07 PROCEDURE — 3008F PR BODY MASS INDEX (BMI) DOCUMENTED: ICD-10-PCS | Mod: CPTII,,, | Performed by: ANESTHESIOLOGY

## 2022-06-07 PROCEDURE — 3078F PR MOST RECENT DIASTOLIC BLOOD PRESSURE < 80 MM HG: ICD-10-PCS | Mod: CPTII,,, | Performed by: ANESTHESIOLOGY

## 2022-06-07 PROCEDURE — 99215 OFFICE O/P EST HI 40 MIN: CPT | Mod: PBBFAC | Performed by: ANESTHESIOLOGY

## 2022-06-07 PROCEDURE — 3074F PR MOST RECENT SYSTOLIC BLOOD PRESSURE < 130 MM HG: ICD-10-PCS | Mod: CPTII,,, | Performed by: ANESTHESIOLOGY

## 2022-06-07 PROCEDURE — 1159F MED LIST DOCD IN RCRD: CPT | Mod: CPTII,,, | Performed by: ANESTHESIOLOGY

## 2022-06-07 PROCEDURE — 99213 PR OFFICE/OUTPT VISIT, EST, LEVL III, 20-29 MIN: ICD-10-PCS | Mod: S$PBB,,, | Performed by: ANESTHESIOLOGY

## 2022-06-07 PROCEDURE — 3008F BODY MASS INDEX DOCD: CPT | Mod: CPTII,,, | Performed by: ANESTHESIOLOGY

## 2022-06-07 PROCEDURE — 99213 OFFICE O/P EST LOW 20 MIN: CPT | Mod: S$PBB,,, | Performed by: ANESTHESIOLOGY

## 2022-06-07 PROCEDURE — 1159F PR MEDICATION LIST DOCUMENTED IN MEDICAL RECORD: ICD-10-PCS | Mod: CPTII,,, | Performed by: ANESTHESIOLOGY

## 2022-06-07 PROCEDURE — 3074F SYST BP LT 130 MM HG: CPT | Mod: CPTII,,, | Performed by: ANESTHESIOLOGY

## 2022-06-07 PROCEDURE — 99999 PR PBB SHADOW E&M-EST. PATIENT-LVL V: CPT | Mod: PBBFAC,,, | Performed by: ANESTHESIOLOGY

## 2022-06-07 PROCEDURE — 3078F DIAST BP <80 MM HG: CPT | Mod: CPTII,,, | Performed by: ANESTHESIOLOGY

## 2022-06-07 PROCEDURE — 3044F PR MOST RECENT HEMOGLOBIN A1C LEVEL <7.0%: ICD-10-PCS | Mod: CPTII,,, | Performed by: ANESTHESIOLOGY

## 2022-06-07 PROCEDURE — 3044F HG A1C LEVEL LT 7.0%: CPT | Mod: CPTII,,, | Performed by: ANESTHESIOLOGY

## 2022-06-07 NOTE — PATIENT INSTRUCTIONS
General Neck and Back Pain    Both neck and back pain are usually caused by injury to the muscles or ligaments of the spine. Sometimes the disks that separate each bone of the spine may cause pain by pressing on a nearby nerve. Back and neck pain may appear after a sudden twisting or bending force (such as in a car accident), or sometimes after a simple awkward movement. In either case, muscle spasm is often present and adds to the pain.  Acute neck and back pain usually gets better in 1 to 2 weeks. Pain related to disk disease, arthritis in the spinal joints or spinal stenosis (narrowing of the spinal canal) can become chronic and last for months or years.  Back and neck pain are common problems. Most people feel better in 1 or 2 weeks, and most of the rest in 1 to 2 months. Most people can remain active.  People experience and describe pain differently.  Pain can be sharp, stabbing, shooting, aching, cramping, or burning  Movement, standing, bending, lifting, sitting, or walking may worsen the pain  Pain can be localized to one spot or area, or it can be more generalized  Pain can spread or radiate upwards, downwards, to the front, or go down your arms  Muscle spasm may occur.  Most of the time mechanical problems with the muscles or spine cause the pain. it is usually caused by an injury, whether known or not, to the muscles or ligaments. While illnesses can cause back pain, it is usually not caused by a serious illness. Pain is usually related to physical activity, whether sports, exercise, work, or normal activity. Sometimes it can occur without an identifiable cause. This can happen simply by stretching or moving wrong, without noting pain at the time. Other causes include:  Overexertion, lifting, pushing, pulling incorrectly or too aggressively.  Sudden twisting, bending or stretching from an accident (car or fall), or accidental movement.  Poor posture  Poor conditioning, lack of regular exercise  Spinal  disc disease or arthritis  Stress  Pregnancy, or illness like appendicitis, bladder or kidney infection, pelvic infections   Home care  For neck pain: Use a comfortable pillow that supports the head and keeps the spine in a neutral position. The position of the head should not be tilted forward or backward.  When in bed, try to find a position of comfort. A firm mattress is best. Try lying flat on your back with pillows under your knees. You can also try lying on your side with your knees bent up towards your chest and a pillow between your knees.  At first, do not try to stretch out the sore spots. If there is a strain, it is not like the good soreness you get after exercising without an injury. In this case, stretching may make it worse.  Avoid prolonged sitting, long car rides or travel. This puts more stress on the lower back than standing or walking.  During the first 24 to 72 hours after an injury, apply an ice pack to the painful area for 20 minutes and then remove it for 20 minutes over a period of 60 to 90 minutes or several times a day.   You can alternate ice and heat therapies. Talk with your healthcare provider about the best treatment for your back or neck pain. As a safety precaution, do not use a heating pad at bedtime. Sleeping with a heating pad can lead to skin burns or tissue damage.  Therapeutic massage can help relax the back and neck muscles without stretching them.  Be aware of safe lifting methods and do not lift anything over 15 pounds until all the pain is gone.  Medications  Talk to your healthcare provider before using medicine, especially if you have other medical problems or are taking other medicines.  You may use over-the-counter medicine to control pain, unless another pain medicine was prescribed. If you have chronic conditions like diabetes, liver or kidney disease, stomach ulcers,  gastrointestinal bleeding, or are taking blood thinner medicines.  Be careful if you are given pain  medicines, narcotics, or medicine for muscle spasm. They can cause drowsiness, and can affect your coordination, reflexes, and judgment. Do not drive or operate heavy machinery.  Follow-up care  Follow up with your healthcare provider, or as advised. Physical therapy or further tests may be needed.  If X-rays were taken, you will be notified of any new findings that may affect your care.  Call 911  Seek emergency medical care if any of the following occur:  Trouble breathing  Confusion  Very drowsy or trouble awakening  Fainting or loss of consciousness  Rapid or very slow heart rate  Loss of bowel or bladder control  When to seek medical advice  Call your healthcare provider right away if any of these occur:  Pain becomes worse or spreads into your arms or legs  Weakness, numbness or pain in one or both arms or legs  Numbness in the groin area  Difficulty walking  Fever of 100.4ºF (38ºC) or higher, or as directed by your healthcare provider  Date Last Reviewed: 7/1/2016  © 2759-3843 [employer]. [address] [place] [address]. All rights reserved. This information is not intended as a substitute for professional medical care. Always follow your healthcare professional's instructions.       Understanding Lumbar Radiculopathy    Lumbar radiculopathy is irritation or inflammation of a nerve root in the low back. It causes symptoms that spread out from the back down one or both legs. To understand this condition, it helps to understand the parts of the spine:  Vertebrae. These are bones that stack to form the spine. The lumbar spine contains the 5 bottom vertebrae.  Disks. These are soft pads of tissue between the vertebrae. They act as shock absorbers for the spine.  Spinal canal. This is a tunnel formed within the stacked vertebrae. In the lumbar spine, nerves run through this canal.  Nerves. These branch off and leave the spinal canal, traveling out to parts of the body. As they leave the  spinal canal, nerves pass through openings between the vertebrae. The nerve root is the part of the nerve that is closest to the spinal canal.  Sciatic nerve. This is a large nerve formed from several nerve roots in the low back. This nerve extends down the back of the leg to the foot.  With lumbar radiculopathy, nerve roots in the low back become irritated. This leads to pain and symptoms. The sciatic nerve is commonly involved, so the condition is often called sciatica.  What causes lumbar radiculopathy?  Aging, injury, poor posture, extra body weight, and other issues can lead to problems in the low back. These problems may then irritate nerve roots. They include:  Damage to a disk in the lumbar spine. The damaged disk may then press on nearby nerve roots.  Degeneration from wear and tear, and aging. This can lead to narrowing (stenosis) of the openings between the vertebrae. The narrowed openings press on nerve roots as they leave the spinal canal.  Unstable spine. This is when a vertebra slips forward. It can then press on a nerve root.  Other, less common things can put pressure on nerves in the low back. These include diabetes, infection, or a tumor.  Symptoms of lumbar radiculopathy  These include:  Pain in the low back  Pain, numbness, tingling, or weakness that travels into the buttocks, hip, groin, or leg  Muscle spasms  Treatment for lumbar radiculopathy  In most cases, your healthcare provider will first try treatments that help relieve symptoms. These may include:  Prescription and over-the-counter pain medicines. These help relieve pain, swelling, and irritation.  Limits on positions and activities that increase pain. But lying in bed or avoiding all movement is only recommended for a short period of time.  Physical therapy, including exercises and stretches. This helps decrease pain and increase movement and function.  Steroid shots into the lower back. This may help relieve symptoms for a  time.  Weight-loss program. If you are overweight, losing extra pounds may help relieve symptoms.  In some cases, you may need surgery to fix the underlying problem. This depends on the cause, the symptoms, and how long the pain has lasted.  Possible complications  Over time, an irritated and inflamed nerve may become damaged. This may lead to long-lasting (permanent) numbness or weakness in your legs and feet. If symptoms change suddenly or get worse, be sure to let your healthcare provider know.  When to call your healthcare provider  Call your healthcare provider right away if you have any of these:  New pain or pain that gets worse  New or increasing weakness, tingling, or numbness in your leg or foot  Problems controlling your bladder or bowel   Date Last Reviewed: 3/10/2016  © 6639-0712 [employer]. [address] [place] [address]. All rights reserved. This information is not intended as a substitute for professional medical care. Always follow your healthcare professional's instructions.       Understanding Cervical Radiculopathy    Cervical radiculopathy is irritation or inflammation of a nerve root in the neck. It causes neck pain and other symptoms that may spread into the chest or down the arm. To understand this condition, it helps to understand the parts of the spine:  Vertebrae. These are bones that stack to form the spine. The cervical spine contains the 7 vertebrae in the neck.  Disks. These are soft pads of tissue between the vertebrae. They act as shock absorbers for the spine.  The spinal canal. This is a tunnel formed within the stacked vertebrae. The spinal cord runs through this canal.  Nerves. These branch off the spinal cord. As they leave the spinal canal, nerves pass through openings between the vertebrae. The nerve root is the part of the nerve that is closest to the spinal cord.   With cervical radiculopathy, nerve roots in the neck become irritated. This leads to  pain and symptoms that can travel to the nerves that go from the spinal cord down the arms and into the torso.  What causes cervical radiculopathy?  Aging, injury, poor posture, and other issues can lead to problems in the neck. These problems may then irritate nerve roots. These include:  Damage to a disk in the cervical spine. The damaged disk may then press on nearby nerve roots.  Degeneration from wear and tear, and aging. This can lead to narrowing (stenosis) of the openings between the vertebrae. The narrowed openings press on nerve roots as they leave the spinal canal.  An unstable spine. This is when a vertebra slips forward. It can then press on a nerve root.  There are other, less common causes of pressure on nerves in the neck. These include infection, cysts, and tumors.  Symptoms of cervical radiculopathy  These include:  Neck pain  Pain, numbness, tingling, or weakness that travels down the arm  Loss of neck movement  Muscle spasms  Treatment for cervical radiculopathy  In most cases, your healthcare provider will first try treatments that help relieve symptoms. These may include:  Prescription or over-the-counter pain medicines. These help relieve pain and swelling.  Cold packs. These help reduce pain.  Resting. This involves avoiding positions and activities that increase pain.  Neck brace (cervical collar). This can help relieve inflammation and pain.  Physical therapy, including exercises and stretches. This can help decrease pain and increase movement and function.  Shots of medicinesaround the nerve roots. This is done to help relieve symptoms for a time.  In some cases, your healthcare provider may advise surgery to fix the underlying problem. This depends on the cause, the symptoms, and how long the pain has lasted.  Possible complications  Over time, an irritated and inflamed nerve may become damaged. This may lead to long-lasting (permanent) numbness or weakness. If symptoms change suddenly or  get worse, be sure to let your healthcare provider know.     When to call your healthcare provider  Call your healthcare provider right away if you have any of these:  New pain or pain that gets worse  New or increasing weakness, numbness, or tingling in your arm or hand  Bowel or bladder changes   Date Last Reviewed: 3/10/2016  © 5903-9943 [employer]. [address] [place] [address]. All rights reserved. This information is not intended as a substitute for professional medical care. Always follow your healthcare professional's instructions.       Exercises to Strengthen Your Lower Back  Strong lower back and abdominal muscles work together to support your spine. The exercises below will help strengthen the lower back. It is important that you begin exercising slowly and increase levels gradually.  Always begin any exercise program with stretching. If you feel pain while doing any of these exercises, stop and talk to your doctor about a more specific exercise program that better suits your condition.   Low back stretch  The point of stretching is to make you more flexible and increase your range of motion. Stretch only as much as you are able. Stretch slowly. Do not push your stretch to the limit. If at any point you feel pain while stretching, this is your (temporary) limit.  Lie on your back with your knees bent and both feet on the ground.  Slowly raise your left knee to your chest as you flatten your lower back against the floor. Hold for 5 seconds.  Relax and repeat the exercise with your right knee.  Do 10 of these exercises for each leg.  Repeat hugging both knees to your chest at the same time.  Building lower back strength  Start your exercise routine with 10 to 30 minutes a day, 1 to 3 times a day.  Initial exercises  Lying on your back:  Ankle pumps: Move your foot up and down, towards your head, and then away. Repeat 10 times with each foot.  Heel slides: Slowly bend your knee,  drawing the heel of your foot towards you. Then slide your heel/foot from you, straightening your knee. Do not lift your foot off the floor (this is not a leg lift).  Abdominal contraction: Bend your knees and put your hands on your stomach. Tighten your stomach muscles. Hold for 5 seconds, then relax. Repeat 10 times.  Straight leg raise: Bend one leg at the knee and keep the other leg straight. Tighten your stomach muscles. Slowly lift your straight leg 6 to 12 inches off the floor and hold for up to 5 seconds. Repeat 10 times on each side.  Standing:  Wall squats: Stand with your back against the wall. Move your feet about 12 inches away from the wall. Tighten your stomach muscles, and slowly bend your knees until they are at about a 45 degree angle. Do not go down too far. Hold about 5 seconds. Then slowly return to your starting position. Repeat 10 times.  Heel raises: Stand facing the wall. Slowly raise the heels of your feet up and down, while keeping your toes on the floor. If you have trouble balancing, you can touch the wall with your hands. Repeat 10 times.  More advanced exercises  When you feel comfortable enough, try these exercises.  Kneeling lumbar extension: Begin on your hands and knees. At the same time, raise and straighten your right arm and left leg until they are parallel to the ground. Hold for 2 seconds and come back slowly to a starting position. Repeat with left arm and right leg, alternating 10 times.  Prone lumbar extension: Lie face down, arms extended overhead, palms on the floor. At the same time, raise your right arm and left leg as high as comfortably possible. Hold for 10 seconds and slowly return to start. Repeat with left arm and right leg, alternating 10 times. Gradually build up to 20 times. (Advanced: Repeat this exercise raising both arms and both legs a few inches off the floor at the same time. Hold for 5 seconds and release.)  Pelvic tilt: Lie on the floor on your back  with your knees bent at 90 degrees. Your feet should be flat on the floor. Inhale, exhale, then slowly contract your abdominal muscles bringing your navel toward your spine. Let your pelvis rock back until your lower back is flat on the floor. Hold for 10 seconds while breathing smoothly.  Abdominal crunch: Perform a pelvic tilt (above) flattening your lower back against the floor. Holding the tension in your abdominal muscles, take another breath and raise your shoulder blades off the ground (this is not a full sit-up). Keep your head in line with your body (dont bend your neck forward). Hold for 2 seconds, then slowly lower.  Date Last Reviewed: 6/1/2016  © 2665-4798 [employer]. [address] [place] [address]. All rights reserved. This information is not intended as a substitute for professional medical care. Always follow your healthcare professional's instructions.       Exercises: Neck Isometrics  To start, sit in a chair with your feet flat on the floor. Your weight should be slightly forward so that youre balanced evenly on your buttocks. Relax your shoulders and keep your head level. Using a chair with arms may help you keep your balance.       Press your palm against your forehead. Resist with your neck muscles. Hold for 10 seconds. Relax. Repeat 5 times.  Do the exercise again, pressing on the side of your head. Repeat 5 times. Switch sides.  Do the exercise again, pressing on the back of your head. Repeat 5 times.  For your safety, check with your healthcare provider before starting an exercise program.   Date Last Reviewed: 8/16/2015  © 1909-4983 [employer]. [address] [place] [address]. All rights reserved. This information is not intended as a substitute for professional medical care. Always follow your healthcare professional's instructions.

## 2022-06-14 ENCOUNTER — TELEPHONE (OUTPATIENT)
Dept: FAMILY MEDICINE | Facility: CLINIC | Age: 62
End: 2022-06-14
Payer: MEDICAID

## 2022-06-14 NOTE — TELEPHONE ENCOUNTER
----- Message from Deidre Josue sent at 6/14/2022  3:16 PM CDT -----  Contact: [facility]  Request a return call concerning a p/a on this pt Famotidine 40mg tab medication, no additional info given and can be reached at [phone]///[id number]

## 2022-06-17 DIAGNOSIS — K21.00 GASTROESOPHAGEAL REFLUX DISEASE WITH ESOPHAGITIS, UNSPECIFIED WHETHER HEMORRHAGE: ICD-10-CM

## 2022-06-17 RX ORDER — FAMOTIDINE 40 MG/1
40 TABLET, FILM COATED ORAL DAILY
Qty: 90 TABLET | Refills: 4 | Status: SHIPPED | OUTPATIENT
Start: 2022-06-17 | End: 2022-08-16 | Stop reason: SDUPTHER

## 2022-06-17 NOTE — TELEPHONE ENCOUNTER
Unable to retrieve patient chart and identify care gaps.  [facility] Embedded Care Gaps. Reference number: [id number]. 6/17/2022   9:21:51 AM CDT

## 2022-06-17 NOTE — TELEPHONE ENCOUNTER
----- Message from Jenae Humphries sent at 6/17/2022  9:05 AM CDT -----  Contact: Jamal with [facility] phone [phone]  Requesting an RX refill or new RX.  Is this a refill or new RX: Refill  RX name and strength (copy/paste from chart):  famotidine (PEPCID) 40 MG tablet  Is this a 30 day or 90 day RX: 30  Pharmacy name and phone # (copy/paste from chart):    [facility] [address]  [facility] [address]  Phone: [phone] Fax: [phone]  The doctors have asked that we provide their patients with the following 2 reminders -- prescription refills can take up to 72 hours, and a friendly reminder that in the future you can use your [facility] account to request refills: N/A

## 2022-06-20 ENCOUNTER — LAB VISIT (OUTPATIENT)
Dept: LAB | Facility: HOSPITAL | Age: 62
End: 2022-06-20
Attending: NURSE PRACTITIONER
Payer: MEDICAID

## 2022-06-20 DIAGNOSIS — D50.0 IRON DEFICIENCY ANEMIA DUE TO CHRONIC BLOOD LOSS: ICD-10-CM

## 2022-06-20 LAB
BASOPHILS # BLD AUTO: 0.02 K/UL (ref 0–0.2)
BASOPHILS NFR BLD: 0.4 % (ref 0–1.9)
DIFFERENTIAL METHOD: NORMAL
EOSINOPHIL # BLD AUTO: 0.2 K/UL (ref 0–0.5)
EOSINOPHIL NFR BLD: 3 % (ref 0–8)
ERYTHROCYTE [DISTWIDTH] IN BLOOD BY AUTOMATED COUNT: 13.5 % (ref 11.5–14.5)
FERRITIN SERPL-MCNC: 63 NG/ML (ref 20–300)
HCT VFR BLD AUTO: 39.1 % (ref 37–48.5)
HGB BLD-MCNC: 12.8 G/DL (ref 12–16)
IMM GRANULOCYTES # BLD AUTO: 0.01 K/UL (ref 0–0.04)
IMM GRANULOCYTES NFR BLD AUTO: 0.2 % (ref 0–0.5)
IRON SERPL-MCNC: 70 UG/DL (ref 30–160)
LYMPHOCYTES # BLD AUTO: 2 K/UL (ref 1–4.8)
LYMPHOCYTES NFR BLD: 40.7 % (ref 18–48)
MCH RBC QN AUTO: 29.8 PG (ref 27–31)
MCHC RBC AUTO-ENTMCNC: 32.7 G/DL (ref 32–36)
MCV RBC AUTO: 91 FL (ref 82–98)
MONOCYTES # BLD AUTO: 0.3 K/UL (ref 0.3–1)
MONOCYTES NFR BLD: 6.9 % (ref 4–15)
NEUTROPHILS # BLD AUTO: 2.4 K/UL (ref 1.8–7.7)
NEUTROPHILS NFR BLD: 49 % (ref 38–73)
NRBC BLD-RTO: 0 /100 WBC
PLATELET # BLD AUTO: 316 K/UL (ref 150–450)
PMV BLD AUTO: 9.8 FL (ref 9.2–12.9)
RBC # BLD AUTO: 4.29 M/UL (ref 4–5.4)
SATURATED IRON: 18 % (ref 20–50)
TOTAL IRON BINDING CAPACITY: 382 UG/DL (ref 250–450)
TRANSFERRIN SERPL-MCNC: 258 MG/DL (ref 200–375)
WBC # BLD AUTO: 4.94 K/UL (ref 3.9–12.7)

## 2022-06-20 PROCEDURE — 82728 ASSAY OF FERRITIN: CPT | Performed by: NURSE PRACTITIONER

## 2022-06-20 PROCEDURE — 85025 COMPLETE CBC W/AUTO DIFF WBC: CPT | Mod: PO | Performed by: NURSE PRACTITIONER

## 2022-06-20 PROCEDURE — 36415 COLL VENOUS BLD VENIPUNCTURE: CPT | Mod: PO | Performed by: NURSE PRACTITIONER

## 2022-06-20 PROCEDURE — 84466 ASSAY OF TRANSFERRIN: CPT | Performed by: NURSE PRACTITIONER

## 2022-06-22 ENCOUNTER — OFFICE VISIT (OUTPATIENT)
Dept: HEMATOLOGY/ONCOLOGY | Facility: CLINIC | Age: 62
End: 2022-06-22
Payer: MEDICAID

## 2022-06-22 VITALS
TEMPERATURE: 97 F | HEIGHT: 68 IN | DIASTOLIC BLOOD PRESSURE: 83 MMHG | HEART RATE: 90 BPM | BODY MASS INDEX: 44.41 KG/M2 | OXYGEN SATURATION: 98 % | SYSTOLIC BLOOD PRESSURE: 131 MMHG | WEIGHT: 293 LBS

## 2022-06-22 DIAGNOSIS — D50.0 IRON DEFICIENCY ANEMIA DUE TO CHRONIC BLOOD LOSS: Primary | ICD-10-CM

## 2022-06-22 PROCEDURE — 3044F HG A1C LEVEL LT 7.0%: CPT | Mod: CPTII,,, | Performed by: NURSE PRACTITIONER

## 2022-06-22 PROCEDURE — 3079F DIAST BP 80-89 MM HG: CPT | Mod: CPTII,,, | Performed by: NURSE PRACTITIONER

## 2022-06-22 PROCEDURE — 3075F PR MOST RECENT SYSTOLIC BLOOD PRESS GE 130-139MM HG: ICD-10-PCS | Mod: CPTII,,, | Performed by: NURSE PRACTITIONER

## 2022-06-22 PROCEDURE — 99214 PR OFFICE/OUTPT VISIT, EST, LEVL IV, 30-39 MIN: ICD-10-PCS | Mod: S$PBB,,, | Performed by: NURSE PRACTITIONER

## 2022-06-22 PROCEDURE — 3079F PR MOST RECENT DIASTOLIC BLOOD PRESSURE 80-89 MM HG: ICD-10-PCS | Mod: CPTII,,, | Performed by: NURSE PRACTITIONER

## 2022-06-22 PROCEDURE — 3044F PR MOST RECENT HEMOGLOBIN A1C LEVEL <7.0%: ICD-10-PCS | Mod: CPTII,,, | Performed by: NURSE PRACTITIONER

## 2022-06-22 PROCEDURE — 1159F PR MEDICATION LIST DOCUMENTED IN MEDICAL RECORD: ICD-10-PCS | Mod: CPTII,,, | Performed by: NURSE PRACTITIONER

## 2022-06-22 PROCEDURE — 1160F RVW MEDS BY RX/DR IN RCRD: CPT | Mod: CPTII,,, | Performed by: NURSE PRACTITIONER

## 2022-06-22 PROCEDURE — 99215 OFFICE O/P EST HI 40 MIN: CPT | Mod: PBBFAC | Performed by: NURSE PRACTITIONER

## 2022-06-22 PROCEDURE — 1160F PR REVIEW ALL MEDS BY PRESCRIBER/CLIN PHARMACIST DOCUMENTED: ICD-10-PCS | Mod: CPTII,,, | Performed by: NURSE PRACTITIONER

## 2022-06-22 PROCEDURE — 99214 OFFICE O/P EST MOD 30 MIN: CPT | Mod: S$PBB,,, | Performed by: NURSE PRACTITIONER

## 2022-06-22 PROCEDURE — 3008F PR BODY MASS INDEX (BMI) DOCUMENTED: ICD-10-PCS | Mod: CPTII,,, | Performed by: NURSE PRACTITIONER

## 2022-06-22 PROCEDURE — 1159F MED LIST DOCD IN RCRD: CPT | Mod: CPTII,,, | Performed by: NURSE PRACTITIONER

## 2022-06-22 PROCEDURE — 99999 PR PBB SHADOW E&M-EST. PATIENT-LVL V: CPT | Mod: PBBFAC,,, | Performed by: NURSE PRACTITIONER

## 2022-06-22 PROCEDURE — 3008F BODY MASS INDEX DOCD: CPT | Mod: CPTII,,, | Performed by: NURSE PRACTITIONER

## 2022-06-22 PROCEDURE — 99999 PR PBB SHADOW E&M-EST. PATIENT-LVL V: ICD-10-PCS | Mod: PBBFAC,,, | Performed by: NURSE PRACTITIONER

## 2022-06-22 PROCEDURE — 3075F SYST BP GE 130 - 139MM HG: CPT | Mod: CPTII,,, | Performed by: NURSE PRACTITIONER

## 2022-06-22 NOTE — PROGRESS NOTES
Subjective:       Patient ID: Zakia Price is a 62 y.o. female.    Chief Complaint: review labs. anemia    HPI: 62 y.o female presenting today for follow up of her ion deficiency anemia treated with Feraheme 5/2021. Recent EGD/Colonoscopy evaluation reviewed. EGD pathology H. Pylori positive, she completed treatment end of July. Repeat testing reflect eradication of H. Pylori infection. Colonoscopy unremarkable with recommended repeat in 10 years. She feels well and is without complaints. Denies abnormal bleeding or anemia symptoms. Has chronic constipation for which she sees GI            Social History     Socioeconomic History    Marital status: Single   Tobacco Use    Smoking status: Never Smoker    Smokeless tobacco: Never Used   Substance and Sexual Activity    Alcohol use: No    Drug use: No    Sexual activity: Not Currently       Past Medical History:   Diagnosis Date    Acute respiratory failure due to COVID-19     COVID-19     Diabetes mellitus, type 2     Diabetic neuropathy 1/27/2014    DJD (degenerative joint disease) of knee     DVT (deep venous thrombosis) around 1990's    in leg, is on no anticoagulant therapy presently    Fatty liver     GERD (gastroesophageal reflux disease)     Hypertension associated with diabetes     HECTOR (iron deficiency anemia) 5/13/2021    Multinodular goiter     Obesity, morbid, BMI 50 or higher     Sleep apnea     has no CPAP       Family History   Problem Relation Age of Onset    Leukemia Son     Cancer Son     Diabetes Mother     Hypertension Mother     Heart disease Mother 50    Cancer Father     Arthritis Father     Cancer Brother     Cancer Brother        Past Surgical History:   Procedure Laterality Date    COLONOSCOPY N/A 5/12/2021    Procedure: COLONOSCOPY;  Surgeon: Carolina Rizo MD;  Location: [facility];  Service: Endoscopy;  Laterality: N/A;    EPIDURAL STEROID INJECTION N/A 12/10/2021    Procedure: Lumbar L5/S1 IL TEETEE   Would like AM procedure, if possible;  Surgeon: Nae Paul MD;  Location: [facility];  Service: Pain Management;  Laterality: N/A;    EPIDURAL STEROID INJECTION INTO CERVICAL SPINE N/A 10/8/2021    Procedure: C7-T1 IL TEETEE-no sedation.  Needs IV-just incase;  Surgeon: Nae Paul MD;  Location: [facility];  Service: Pain Management;  Laterality: N/A;    ESOPHAGOGASTRODUODENOSCOPY N/A 7/8/2021    Procedure: EGD (ESOPHAGOGASTRODUODENOSCOPY) previous positve covid;  Surgeon: Jann Gutierrez MD;  Location: [facility] ENDO;  Service: Endoscopy;  Laterality: N/A;    FRACTURE SURGERY      HYSTERECTOMY      SELECTIVE INJECTION OF ANESTHETIC AGENT AROUND LUMBAR SPINAL NERVE ROOT BY TRANSFORAMINAL APPROACH Bilateral 5/6/2022    Procedure: Bilateral L5/S1 TF TEETEE with RN IV sedation;  Surgeon: Nae Paul MD;  Location: [facility];  Service: Pain Management;  Laterality: Bilateral;    SHOULDER ARTHROSCOPY      THYROIDECTOMY, PARTIAL Right     and transplatation of right superior parathyroid gland to the sternocleidomastoid muscle     TONSILLECTOMY      TUBAL LIGATION  1984    WRIST FRACTURE SURGERY      left       Review of Systems   Constitutional: Negative for activity change, appetite change, chills, fatigue, fever and unexpected weight change.   HENT: Negative for congestion, mouth sores, nosebleeds, sore throat, trouble swallowing and voice change.    Eyes: Negative for visual disturbance.   Respiratory: Negative for cough, chest tightness, shortness of breath and wheezing.    Cardiovascular: Negative for chest pain and leg swelling.   Gastrointestinal: Positive for constipation. Negative for abdominal distention, abdominal pain, anal bleeding, blood in stool, diarrhea, nausea and vomiting.   Genitourinary: Negative for difficulty urinating, dysuria and hematuria.   Musculoskeletal: Negative for arthralgias, back pain and myalgias.   Skin: Negative for pallor, rash and wound.   Neurological: Negative  for dizziness, syncope, weakness and headaches.   Hematological: Negative for adenopathy. Does not bruise/bleed easily.   Psychiatric/Behavioral: The patient is not nervous/anxious.          Medication List with Changes/Refills   Current Medications    ACETAMINOPHEN (TYLENOL) 500 MG TABLET    Take 2 tablets (1,000 mg total) by mouth every 6 (six) hours as needed for Pain.    ALBUTEROL (PROVENTIL/VENTOLIN HFA) 90 MCG/ACTUATION INHALER    INHALE 2 PUFFS BY MOUTH EVERY 6 HOURS AS NEEDED FOR WHEEZING OR SHORTNESS OF BREATH    BLOOD SUGAR DIAGNOSTIC (CLEVER CHOICE VOICE+ TEST) STRP    TEST BLOOD SUGARS ONE TIME DAILY    BLOOD SUGAR DIAGNOSTIC (CLEVER CHOICE VOICE+ TEST) STRP    TEST BLOOD SUGARS ONE TIME DAILY    BLOOD SUGAR DIAGNOSTIC STRP    To check BG 3 times daily, to use with insurance preferred meter    BLOOD-GLUCOSE METER KIT    To check BG three times daily, to use with insurance preferred meter    BLOOD-GLUCOSE METER KIT    Use before meals and before bed when the glucoses are not in control.  Otherwise, test it daily once a day before meals randomly to ensure    DICLOFENAC (VOLTAREN) 75 MG EC TABLET    Take 1 tablet (75 mg total) by mouth 2 (two) times daily.    DILTIAZEM (CARDIZEM) 30 MG TABLET    Take 1 tablet (30 mg total) by mouth every 12 (twelve) hours.    FAMOTIDINE (PEPCID) 40 MG TABLET    Take 1 tablet (40 mg total) by mouth once daily.    FERROUS SULFATE 324 MG (65 MG IRON) TBEC    Take 1 tablet (324 mg total) by mouth once daily.    FLUTICASONE PROPION-SALMETEROL 115-21 MCG/DOSE (ADVAIR HFA) 115-21 MCG/ACTUATION HFAA INHALER    Inhale 2 puffs into the lungs 2 (two) times daily. Wash out mouth after use. Controller    FLUTICASONE PROPIONATE (FLONASE) 50 MCG/ACTUATION NASAL SPRAY    Use 2 sprays to each nostril daily    INHALATION SPACING DEVICE (BREATHERITE VALVED MDI CHAMBER)    Use as directed for inhalation.    LANCETS MISC    To check BG three times daily, to use with insurance preferred meter     "LEVOCETIRIZINE (XYZAL) 5 MG TABLET    Take 1 tablet (5 mg total) by mouth every evening.    LEVOTHYROXINE (SYNTHROID) 100 MCG TABLET    Take 1 tablet (100 mcg total) by mouth once daily.    LIDOCAINE-PRILOCAINE (EMLA) CREAM    SMARTSI-2 Pump Topical 3-4 Times Daily    LINACLOTIDE (LINZESS) 72 MCG CAP CAPSULE    Take 1 capsule (72 mcg total) by mouth once daily.    LORATADINE (CLARITIN) 10 MG TABLET    Take 10 mg by mouth once daily.    METFORMIN (GLUCOPHAGE) 1000 MG TABLET    Take 1 tablet (1,000 mg total) by mouth 2 (two) times daily with meals.    MONTELUKAST (SINGULAIR) 10 MG TABLET    Take 1 tablet (10 mg total) by mouth every evening.    OMEPRAZOLE (PRILOSEC) 40 MG CAPSULE    Take 1 capsule (40 mg total) by mouth 2 (two) times daily before meals.    PEN NEEDLE, DIABETIC (PEN NEEDLE) 32 GAUGE X 5/32" NDLE    Use as directed to inject medication    PRAVASTATIN (PRAVACHOL) 80 MG TABLET    Take 1 tablet (80 mg total) by mouth once daily.    PREGABALIN (LYRICA) 50 MG CAPSULE    Take 1 capsule (50 mg total) by mouth 3 (three) times daily.    PULSE OXIMETER (PULSE OXIMETER) DEVICE    by Apply Externally route 2 (two) times a day. Use twice daily at 8 AM and 3 PM and record the value in [facility] as directed.    SEMAGLUTIDE (OZEMPIC) 1 MG/DOSE (4 MG/3 ML)    Inject 1 mg into the skin every 7 days.    SUCRALFATE (CARAFATE) 1 GRAM TABLET    Take 1 tablet (1 g total) by mouth 4 (four) times daily before meals and nightly.    TIZANIDINE (ZANAFLEX) 4 MG TABLET    Take 1/2 to 1 tablet by mouth twice daily as needed for muscle spasms. May cause drowsiness.    TOPIRAMATE (TOPAMAX) 100 MG TABLET    Take 1 tablet (100 mg total) by mouth 2 (two) times daily.    TRELEGY ELLIPTA 100-62.5-25 MCG DSDV    Inhale 1 puff into the lungs once daily.    TRUEPLUS LANCETS 33 GAUGE MISC    Check blood glucose up to 3 times daily as needed before meals     Objective:     Vitals:    06/22/22 1303   BP: 131/83   Pulse: 90   Temp: 97.4 °F (36.3 " °C)     Lab Results   Component Value Date    WBC 4.94 06/20/2022    HGB 12.8 06/20/2022    HCT 39.1 06/20/2022    MCV 91 06/20/2022     06/20/2022       BMP  Lab Results   Component Value Date     02/24/2022    K 3.5 02/24/2022     (H) 02/24/2022    CO2 20 (L) 02/24/2022    BUN 13 02/24/2022    CREATININE 0.8 02/24/2022    CALCIUM 9.6 02/24/2022    ANIONGAP 11 02/24/2022    ESTGFRAFRICA >60.0 02/24/2022    EGFRNONAA >60.0 02/24/2022     Lab Results   Component Value Date    ALT 12 02/24/2022    AST 14 02/24/2022    ALKPHOS 75 02/24/2022    BILITOT 0.3 02/24/2022     Lab Results   Component Value Date    IRON 70 06/20/2022    TIBC 382 06/20/2022    FERRITIN 63 06/20/2022       Physical Exam  Vitals reviewed.   Constitutional:       Appearance: She is well-developed.   HENT:      Head: Normocephalic.      Right Ear: External ear normal.      Left Ear: External ear normal.   Eyes:      General: Lids are normal. No scleral icterus.        Right eye: No discharge.         Left eye: No discharge.      Conjunctiva/sclera: Conjunctivae normal.   Neck:      Thyroid: No thyroid mass.   Cardiovascular:      Rate and Rhythm: Normal rate and regular rhythm.      Heart sounds: Normal heart sounds. No murmur heard.  Pulmonary:      Effort: Pulmonary effort is normal. No respiratory distress.      Breath sounds: Normal breath sounds.   Abdominal:      General: There is no distension.   Genitourinary:     Comments: deferred  Musculoskeletal:         General: Normal range of motion.      Cervical back: Normal range of motion.   Skin:     General: Skin is warm and dry.   Neurological:      Mental Status: She is alert and oriented to person, place, and time.   Psychiatric:         Speech: Speech normal.         Behavior: Behavior normal. Behavior is cooperative.         Thought Content: Thought content normal.          Assessment:     Problem List Items Addressed This Visit        Oncology    Iron deficiency anemia  due to chronic blood loss - Primary     Recurrent mild iron deficiency. Saturated iron 18%. Other iron indices wnl. Repeat H. Pylori testing negative. Patient previously d/c'd oral iron supplementation due to constipation. Discussed with patient recommendations for VCE. For now she would like to hold off on this and think about it. She has upcoming GI appointment 9/2022    List of iron rich foods from up to date given to patient    -encourage iron rich foods  -Hold IV iron at present time  -f/u 3 months with cbc, iron, ferritin  -Discussed S&S to report sooner           Relevant Orders    CBC Auto Differential    Iron and TIBC    Ferritin            Plan:     Iron deficiency anemia due to chronic blood loss  -     CBC Auto Differential; Future; Expected date: 06/22/2022  -     Iron and TIBC; Future; Expected date: 06/22/2022  -     Ferritin; Future; Expected date: 06/22/2022              KARO Arroyo

## 2022-06-22 NOTE — ASSESSMENT & PLAN NOTE
Recurrent mild iron deficiency. Saturated iron 18%. Other iron indices wnl. Repeat H. Pylori testing negative. Patient previously d/c'd oral iron supplementation due to constipation. Discussed with patient recommendations for VCE. For now she would like to hold off on this and think about it. She has upcoming GI appointment 9/2022    List of iron rich foods from up to date given to patient    -encourage iron rich foods  -Hold IV iron at present time  -f/u 3 months with cbc, iron, ferritin  -Discussed S&S to report sooner

## 2022-07-15 ENCOUNTER — OFFICE VISIT (OUTPATIENT)
Dept: FAMILY MEDICINE | Facility: CLINIC | Age: 62
End: 2022-07-15
Payer: MEDICAID

## 2022-07-15 VITALS
OXYGEN SATURATION: 96 % | HEIGHT: 68 IN | TEMPERATURE: 97 F | WEIGHT: 293 LBS | DIASTOLIC BLOOD PRESSURE: 70 MMHG | SYSTOLIC BLOOD PRESSURE: 128 MMHG | BODY MASS INDEX: 44.41 KG/M2 | RESPIRATION RATE: 16 BRPM | HEART RATE: 70 BPM

## 2022-07-15 DIAGNOSIS — Z79.899 ENCOUNTER FOR LONG-TERM (CURRENT) USE OF MEDICATIONS: ICD-10-CM

## 2022-07-15 DIAGNOSIS — G47.00 INSOMNIA, UNSPECIFIED TYPE: ICD-10-CM

## 2022-07-15 DIAGNOSIS — E11.42 DIABETIC POLYNEUROPATHY ASSOCIATED WITH TYPE 2 DIABETES MELLITUS: Primary | ICD-10-CM

## 2022-07-15 DIAGNOSIS — G25.81 RESTLESS LEGS SYNDROME (RLS): ICD-10-CM

## 2022-07-15 DIAGNOSIS — M54.16 LUMBAR RADICULOPATHY, CHRONIC: ICD-10-CM

## 2022-07-15 PROCEDURE — 99215 OFFICE O/P EST HI 40 MIN: CPT | Mod: PBBFAC,PO | Performed by: FAMILY MEDICINE

## 2022-07-15 PROCEDURE — 3074F SYST BP LT 130 MM HG: CPT | Mod: CPTII,,, | Performed by: FAMILY MEDICINE

## 2022-07-15 PROCEDURE — 99999 PR PBB SHADOW E&M-EST. PATIENT-LVL V: ICD-10-PCS | Mod: PBBFAC,,, | Performed by: FAMILY MEDICINE

## 2022-07-15 PROCEDURE — 1160F PR REVIEW ALL MEDS BY PRESCRIBER/CLIN PHARMACIST DOCUMENTED: ICD-10-PCS | Mod: CPTII,,, | Performed by: FAMILY MEDICINE

## 2022-07-15 PROCEDURE — 1159F PR MEDICATION LIST DOCUMENTED IN MEDICAL RECORD: ICD-10-PCS | Mod: CPTII,,, | Performed by: FAMILY MEDICINE

## 2022-07-15 PROCEDURE — 3008F PR BODY MASS INDEX (BMI) DOCUMENTED: ICD-10-PCS | Mod: CPTII,,, | Performed by: FAMILY MEDICINE

## 2022-07-15 PROCEDURE — 1159F MED LIST DOCD IN RCRD: CPT | Mod: CPTII,,, | Performed by: FAMILY MEDICINE

## 2022-07-15 PROCEDURE — 3078F PR MOST RECENT DIASTOLIC BLOOD PRESSURE < 80 MM HG: ICD-10-PCS | Mod: CPTII,,, | Performed by: FAMILY MEDICINE

## 2022-07-15 PROCEDURE — 3008F BODY MASS INDEX DOCD: CPT | Mod: CPTII,,, | Performed by: FAMILY MEDICINE

## 2022-07-15 PROCEDURE — 3044F HG A1C LEVEL LT 7.0%: CPT | Mod: CPTII,,, | Performed by: FAMILY MEDICINE

## 2022-07-15 PROCEDURE — 3044F PR MOST RECENT HEMOGLOBIN A1C LEVEL <7.0%: ICD-10-PCS | Mod: CPTII,,, | Performed by: FAMILY MEDICINE

## 2022-07-15 PROCEDURE — 99999 PR PBB SHADOW E&M-EST. PATIENT-LVL V: CPT | Mod: PBBFAC,,, | Performed by: FAMILY MEDICINE

## 2022-07-15 PROCEDURE — 3074F PR MOST RECENT SYSTOLIC BLOOD PRESSURE < 130 MM HG: ICD-10-PCS | Mod: CPTII,,, | Performed by: FAMILY MEDICINE

## 2022-07-15 PROCEDURE — 99214 PR OFFICE/OUTPT VISIT, EST, LEVL IV, 30-39 MIN: ICD-10-PCS | Mod: S$PBB,,, | Performed by: FAMILY MEDICINE

## 2022-07-15 PROCEDURE — 3078F DIAST BP <80 MM HG: CPT | Mod: CPTII,,, | Performed by: FAMILY MEDICINE

## 2022-07-15 PROCEDURE — 1160F RVW MEDS BY RX/DR IN RCRD: CPT | Mod: CPTII,,, | Performed by: FAMILY MEDICINE

## 2022-07-15 PROCEDURE — 99214 OFFICE O/P EST MOD 30 MIN: CPT | Mod: S$PBB,,, | Performed by: FAMILY MEDICINE

## 2022-07-15 RX ORDER — ROPINIROLE 0.25 MG/1
0.25 TABLET, FILM COATED ORAL NIGHTLY
Qty: 30 TABLET | Refills: 11 | Status: SHIPPED | OUTPATIENT
Start: 2022-07-15 | End: 2023-03-20 | Stop reason: SDUPTHER

## 2022-07-15 RX ORDER — DICLOFENAC SODIUM 75 MG/1
75 TABLET, DELAYED RELEASE ORAL 2 TIMES DAILY
Qty: 180 TABLET | Refills: 4 | Status: SHIPPED | OUTPATIENT
Start: 2022-07-15 | End: 2023-03-15 | Stop reason: ALTCHOICE

## 2022-07-15 NOTE — PROGRESS NOTES
PLAN:      Problem List Items Addressed This Visit     Diabetic neuropathy - Primary (Chronic)     Lyrica recently increased to 50 milligrams 3 times daily. She reports improvement in symptoms. Follow-up with Neurology.    We will plan to monitor hemoglobin A1c at designated intervals 3 to 6 months.  I recommend ongoing Education for diabetic diet and exercise protocol.  We will continue to monitor for side effects.    Please be advised of symptoms to monitor for and to notify me immediately if persistent or worsening.  Follow up with Ophthalmology/Optometry and Podiatry at least annually.             Relevant Orders    Microalbumin/Creatinine Ratio, Urine    Encounter for long-term (current) use of medications (Chronic)     Complete history and physical was completed today.  Complete and thorough medication reconciliation was performed.  Discussed risks and benefits of medications.  Advised patient on orders and health maintenance.  We discussed old records and old labs if available.  Will request any records not available through epic.  Continue current medications listed on your summary sheet.             Relevant Medications    diclofenac (VOLTAREN) 75 MG EC tablet    Lumbar radiculopathy, chronic (Chronic)     Continue diclofenac.  Monitor renal function.  GI protection.  Follow-up with specialist.           Relevant Medications    diclofenac (VOLTAREN) 75 MG EC tablet    Restless legs syndrome (RLS)     Trial of Requip.  Follow-up if no improvement.  Follow-up with Pulmonary.           Relevant Medications    rOPINIRole (REQUIP) 0.25 MG tablet    Insomnia     Update sleep study if needed.               Future Appointments     Date Provider Specialty Appt Notes    8/15/2022 Brandie Rey DPM Podiatry 4 month DNC    9/13/2022 Nae Paul MD Pain Medicine 3 month f/u    9/20/2022 Alexandrea Crenshaw PA-C Gastroenterology History of Helicobacter pylori infection    9/20/2022  Lab ty    9/22/2022 Sol SONI  "[facility] 3 mo f/u prior lab/cbd    2023 Oleksandr Nagel MD Family Medicine 6 mth f/u         Medication Management for assessment above:   Medication List with Changes/Refills   New Medications    ROPINIROLE (REQUIP) 0.25 MG TABLET    Take 1 tablet (0.25 mg total) by mouth every evening.   Current Medications    ACETAMINOPHEN (TYLENOL) 500 MG TABLET    Take 2 tablets (1,000 mg total) by mouth every 6 (six) hours as needed for Pain.    ALBUTEROL (PROVENTIL/VENTOLIN HFA) 90 MCG/ACTUATION INHALER    INHALE 2 PUFFS BY MOUTH EVERY 6 HOURS AS NEEDED FOR WHEEZING OR SHORTNESS OF BREATH    DILTIAZEM (CARDIZEM) 30 MG TABLET    Take 1 tablet (30 mg total) by mouth every 12 (twelve) hours.    FAMOTIDINE (PEPCID) 40 MG TABLET    Take 1 tablet (40 mg total) by mouth once daily.    FERROUS SULFATE 324 MG (65 MG IRON) TBEC    Take 1 tablet (324 mg total) by mouth once daily.    FLUTICASONE PROPIONATE (FLONASE) 50 MCG/ACTUATION NASAL SPRAY    Use 2 sprays to each nostril daily    INHALATION SPACING DEVICE (BREATHERITE VALVED MDI CHAMBER)    Use as directed for inhalation.    LEVOTHYROXINE (SYNTHROID) 100 MCG TABLET    Take 1 tablet (100 mcg total) by mouth once daily.    LIDOCAINE-PRILOCAINE (EMLA) CREAM    SMARTSI-2 Pump Topical 3-4 Times Daily    LINACLOTIDE (LINZESS) 72 MCG CAP CAPSULE    Take 1 capsule (72 mcg total) by mouth once daily.    METFORMIN (GLUCOPHAGE) 1000 MG TABLET    Take 1 tablet (1,000 mg total) by mouth 2 (two) times daily with meals.    PEN NEEDLE, DIABETIC (PEN NEEDLE) 32 GAUGE X 5/32" NDLE    Use as directed to inject medication    PRAVASTATIN (PRAVACHOL) 80 MG TABLET    Take 1 tablet (80 mg total) by mouth once daily.    PREGABALIN (LYRICA) 50 MG CAPSULE    Take 1 capsule (50 mg total) by mouth 3 (three) times daily.    PULSE OXIMETER (PULSE OXIMETER) DEVICE    by Apply Externally route 2 (two) times a day. Use twice daily at 8 AM and 3 PM and record the value in Inotremhart " as directed.    SEMAGLUTIDE (OZEMPIC) 1 MG/DOSE (4 MG/3 ML)    Inject 1 mg into the skin every 7 days.    SUCRALFATE (CARAFATE) 1 GRAM TABLET    Take 1 tablet (1 g total) by mouth 4 (four) times daily before meals and nightly.    TIZANIDINE (ZANAFLEX) 4 MG TABLET    Take 1/2 to 1 tablet by mouth twice daily as needed for muscle spasms. May cause drowsiness.    TOPIRAMATE (TOPAMAX) 100 MG TABLET    Take 1 tablet (100 mg total) by mouth 2 (two) times daily.    TRELEGY ELLIPTA 100-62.5-25 MCG DSDV    Inhale 1 puff into the lungs once daily.   Changed and/or Refilled Medications    Modified Medication Previous Medication    DICLOFENAC (VOLTAREN) 75 MG EC TABLET diclofenac (VOLTAREN) 75 MG EC tablet       Take 1 tablet (75 mg total) by mouth 2 (two) times daily.    Take 1 tablet (75 mg total) by mouth 2 (two) times daily.   Discontinued Medications    BLOOD SUGAR DIAGNOSTIC ([employer] CHOICE VOICE+ TEST) STRP    TEST BLOOD SUGARS ONE TIME DAILY    BLOOD SUGAR DIAGNOSTIC ([employer] CHOICE VOICE+ TEST) STRP    TEST BLOOD SUGARS ONE TIME DAILY    BLOOD SUGAR DIAGNOSTIC STRP    To check BG 3 times daily, to use with insurance preferred meter    BLOOD-GLUCOSE METER KIT    To check BG three times daily, to use with insurance preferred meter    BLOOD-GLUCOSE METER KIT    Use before meals and before bed when the glucoses are not in control.  Otherwise, test it daily once a day before meals randomly to ensure    FLUTICASONE PROPION-SALMETEROL 115-21 MCG/DOSE (ADVAIR HFA) 115-21 MCG/ACTUATION HFAA INHALER    Inhale 2 puffs into the lungs 2 (two) times daily. Wash out mouth after use. Controller    LANCETS MISC    To check BG three times daily, to use with insurance preferred meter    LEVOCETIRIZINE (XYZAL) 5 MG TABLET    Take 1 tablet (5 mg total) by mouth every evening.    LORATADINE (CLARITIN) 10 MG TABLET    Take 10 mg by mouth once daily.    MONTELUKAST (SINGULAIR) 10 MG TABLET    Take 1 tablet (10 mg total) by mouth every evening.     OMEPRAZOLE (PRILOSEC) 40 MG CAPSULE    Take 1 capsule (40 mg total) by mouth 2 (two) times daily before meals.    TRUEPLUS LANCETS 33 GAUGE MISC    Check blood glucose up to 3 times daily as needed before meals       Oleksandr Nagel M.D.  ==========================================================================  Subjective:   Patient ID: Zakia Price is a 62 y.o. female.  has a past medical history of Acute respiratory failure due to COVID-19, COVID-19, Diabetes mellitus, type 2, Diabetic neuropathy (1/27/2014), DJD (degenerative joint disease) of knee, DVT (deep venous thrombosis) (around 1990's), Fatty liver, GERD (gastroesophageal reflux disease), Hypertension associated with diabetes, HECTOR (iron deficiency anemia) (5/13/2021), Multinodular goiter, Obesity, morbid, BMI 50 or higher, and Sleep apnea.   Chief Complaint: Follow-up and Diabetes      Problem List Items Addressed This Visit     Diabetic neuropathy - Primary (Chronic)    Overview     July 2022:  She continues on Lyrica but now on 50 milligrams she reports no real improvement in pain.  She continues on Ozempic.  Diabetes Management Status    Statin: Taking  ACE/ARB: Not taking    Screening or Prevention Patient's value Goal Complete/Controlled?   HgA1C Testing and Control   Lab Results   Component Value Date    HGBA1C 5.2 02/24/2022      Annually/Less than 8% Yes   Lipid profile : 02/24/2022 Annually Yes   LDL control Lab Results   Component Value Date    LDLCALC 99.6 02/24/2022    Annually/Less than 100 mg/dl  Yes   Nephropathy screening Lab Results   Component Value Date    LABMICR 29.0 08/12/2021     Lab Results   Component Value Date    PROTEINUA Negative 10/28/2015     No results found for: UTPCR   Annually Yes   Blood pressure BP Readings from Last 1 Encounters:   07/15/22 128/70    Less than 140/90 Yes   Dilated retinal exam : 03/23/2021 Annually No   Foot exam   : 04/14/2022 Annually Yes         April 2022:  Patient reports that  she is still having significant nerve pain.  She is currently taking Lyrica 25 milligrams twice daily.    Chronic.  She has been on gabapentin previously.  She was taking 100 milligrams 3 times daily but does not report any improvement in her burning in her feet.  She was switched to Topamax by pain management.  She reports that some of her pain is better but her in burning sensation is still present.    Reviewed nerve study from :  Summary:  Nerve conduction studies were performed on the right upper and lower extremities.  Right median, ulnar, and superficial peroneal sensory responses were normal in amplitude and latency.  Right sural sensory response was absent.  Right median motor response was borderline in velocity but normal in amplitude and latency.  Right ulnar motor response was normal in amplitude, latency and velocity.  Right peroneal motor response was normal in amplitude, latency and velocity.  Right tibial motor response was prolonged with normal amplitude and velocity.  Further internal comparison studies were performed to assess the carpal tunnel.  Right median versus ulnar 2nd lumbrical interosseous comparison studies revealed a prolonged median motor latency as compared to the ulnar.  Needle EMG was performed in the right upper and lower extremities.  No active denervation was present in any muscle tested.  Motor unit morphology and recruitment patterns were normal in every muscle tested.     Impression:  This is a mildly abnormal EMG of the right upper and lower extremities.  There is electrophysiologic evidence of the followin. Very mild, axonal, peripheral polyneuropathy without active denervation.  2. Very mild, right, median mononeuropathy across the wrist (carpal tunnel syndrome) without active denervation.  There is no evidence of any other focal neuropathy, plexopathy, or radiculopathy on the study.  In addition there is no evidence of myopathy on the study.        Thank you for  referring to the [facility] in [place]. Please feel free to contact the clinic if you have any further questions regarding this study or report.        _____________________________  Cady Roberts D.O., ABPN, AOBNP, ABEM            Current Assessment & Plan     Lyrica recently increased to 50 milligrams 3 times daily. She reports improvement in symptoms. Follow-up with Neurology.    We will plan to monitor hemoglobin A1c at designated intervals 3 to 6 months.  I recommend ongoing Education for diabetic diet and exercise protocol.  We will continue to monitor for side effects.    Please be advised of symptoms to monitor for and to notify me immediately if persistent or worsening.  Follow up with Ophthalmology/Optometry and Podiatry at least annually.             Encounter for long-term (current) use of medications (Chronic)    Overview     CHRONIC. Stable. Compliant with medications for managed conditions. See medication list. No SE reported. Routine lab analysis is being monitored. Refills were addressed.  January 2021:CHRONIC. Stable. Compliant with medications for managed conditions. See medication list. No SE reported. Routine lab analysis is being monitored. Refills were addressed.  July 2021:  Reviewed labs.  January 2022:  Reviewed labs.  February 2022:  Reviewed labs.  July 2022:  Reviewed labs.  Lab Results   Component Value Date    WBC 4.94 06/20/2022    HGB 12.8 06/20/2022    HCT 39.1 06/20/2022    MCV 91 06/20/2022     06/20/2022         Chemistry        Component Value Date/Time     02/24/2022 1255    K 3.5 02/24/2022 1255     (H) 02/24/2022 1255    CO2 20 (L) 02/24/2022 1255    BUN 13 02/24/2022 1255    CREATININE 0.8 02/24/2022 1255    GLU 93 02/24/2022 1255        Component Value Date/Time    CALCIUM 9.6 02/24/2022 1255    ALKPHOS 75 02/24/2022 1255    AST 14 02/24/2022 1255    ALT 12 02/24/2022 1255    BILITOT 0.3 02/24/2022 1255     ESTGFRAFRICA >60.0 02/24/2022 1255    EGFRNONAA >60.0 02/24/2022 1255          Lab Results   Component Value Date    TSH 2.462 04/06/2021    FREET4 1.06 12/04/2019    T3FREE 2.7 12/04/2019       =================================           Current Assessment & Plan     Complete history and physical was completed today.  Complete and thorough medication reconciliation was performed.  Discussed risks and benefits of medications.  Advised patient on orders and health maintenance.  We discussed old records and old labs if available.  Will request any records not available through epic.  Continue current medications listed on your summary sheet.             Lumbar radiculopathy, chronic (Chronic)    Overview     Chronic, stable. Intermittent flares.  Following with pain management, Dr. Nae Paul  Taking tylenol, diclofenac, and lyrica   Recently received TEETEE 5/6/22            Current Assessment & Plan     Continue diclofenac.  Monitor renal function.  GI protection.  Follow-up with specialist.           Restless legs syndrome (RLS)    Overview     Chronic.  Patient reports being diagnosed with restless leg syndrome during sleep study years ago.  She is not on any medications for this currently.           Current Assessment & Plan     Trial of Requip.  Follow-up if no improvement.  Follow-up with Pulmonary.           Insomnia    Overview     .  Intermittent control.  Chronic.            Current Assessment & Plan     Update sleep study if needed.                  Review of patient's allergies indicates:   Allergen Reactions    Codeine sulfate      Nausea^    Lisinopril Swelling     angioedema    Codeine Nausea Only and Rash     Current Outpatient Medications   Medication Instructions    acetaminophen (TYLENOL) 1,000 mg, Oral, Every 6 hours PRN    albuterol (PROVENTIL/VENTOLIN HFA) 90 mcg/actuation inhaler INHALE 2 PUFFS BY MOUTH EVERY 6 HOURS AS NEEDED FOR WHEEZING OR SHORTNESS OF BREATH    diclofenac (VOLTAREN) 75  "mg, Oral, 2 times daily    diltiaZEM (CARDIZEM) 30 mg, Oral, Every 12 hours    famotidine (PEPCID) 40 mg, Oral, Daily    ferrous sulfate 324 mg, Oral, Daily    fluticasone propionate (FLONASE) 50 mcg/actuation nasal spray Use 2 sprays to each nostril daily    inhalation spacing device (BREATHERITE VALVED MDI CHAMBER) Use as directed for inhalation.    levothyroxine (SYNTHROID) 100 mcg, Oral, Daily    LIDOcaine-prilocaine (EMLA) cream SMARTSI-2 Pump Topical 3-4 Times Daily    linaCLOtide (LINZESS) 72 mcg, Oral, Daily    metFORMIN (GLUCOPHAGE) 1,000 mg, Oral, 2 times daily with meals    pen needle, diabetic (PEN NEEDLE) 32 gauge x 532" Ndle Use as directed to inject medication    pravastatin (PRAVACHOL) 80 mg, Oral, Daily    pregabalin (LYRICA) 50 mg, Oral, 3 times daily    pulse oximeter (PULSE OXIMETER) device Apply Externally, 2 times daily, Use twice daily at 8 AM and 3 PM and record the value in MyChart as directed.    rOPINIRole (REQUIP) 0.25 mg, Oral, Nightly    semaglutide (OZEMPIC) 1 mg, Subcutaneous, Every 7 days    sucralfate (CARAFATE) 1 g, Oral, Before meals & nightly    tiZANidine (ZANAFLEX) 4 MG tablet Take 1/2 to 1 tablet by mouth twice daily as needed for muscle spasms. May cause drowsiness.    topiramate (TOPAMAX) 100 mg, Oral, 2 times daily    TRELEGY ELLIPTA 100-62.5-25 mcg DsDv 1 puff, Inhalation, Daily      I have reviewed the PMH, social history, FamilyHx, surgical history, allergies and medications documented / confirmed by the patient at the time of this visit.  Review of Systems   Constitutional: Negative for chills, fatigue, fever and unexpected weight change.   HENT: Negative for ear pain and sore throat.    Eyes: Negative for redness and visual disturbance.   Respiratory: Negative for cough and shortness of breath.    Cardiovascular: Negative for chest pain and palpitations.   Gastrointestinal: Negative for abdominal pain, nausea and vomiting.   Genitourinary: " "Negative for difficulty urinating and hematuria.   Musculoskeletal: Positive for arthralgias and back pain. Negative for myalgias.   Skin: Negative for rash and wound.   Neurological: Positive for numbness. Negative for weakness and headaches.   Psychiatric/Behavioral: Positive for sleep disturbance. The patient is not nervous/anxious.      Objective:   /70   Pulse 70   Temp 97.1 °F (36.2 °C) (Temporal)   Resp 16   Ht 5' 8" (1.727 m)   Wt (!) 140.5 kg (309 lb 11.2 oz)   SpO2 96%   BMI 47.09 kg/m²   Physical Exam  Vitals and nursing note reviewed.   Constitutional:       General: She is not in acute distress.     Appearance: She is well-developed. She is obese. She is not ill-appearing, toxic-appearing or diaphoretic.   HENT:      Head: Normocephalic and atraumatic.      Right Ear: Hearing and external ear normal.      Left Ear: Hearing and external ear normal.      Nose: Nose normal. No rhinorrhea.   Eyes:      General: Lids are normal.      Extraocular Movements: Extraocular movements intact.      Conjunctiva/sclera: Conjunctivae normal.      Pupils: Pupils are equal, round, and reactive to light.   Cardiovascular:      Rate and Rhythm: Normal rate.      Pulses: Normal pulses.   Pulmonary:      Effort: Pulmonary effort is normal. No respiratory distress.      Breath sounds: Normal breath sounds.   Abdominal:      General: Bowel sounds are normal.      Palpations: Abdomen is soft.   Musculoskeletal:         General: Tenderness and deformity present. Normal range of motion.      Cervical back: Normal range of motion and neck supple. Bony tenderness present. No tenderness. Pain with movement present.      Lumbar back: Spasms and tenderness present. No bony tenderness. Negative right straight leg raise test and negative left straight leg raise test.      Right lower leg: Tenderness present. No swelling, deformity, lacerations or bony tenderness. No edema.      Left lower leg: No swelling, deformity, " lacerations, tenderness or bony tenderness. No edema.   Skin:     General: Skin is warm and dry.      Capillary Refill: Capillary refill takes less than 2 seconds.      Coloration: Skin is not pale.   Neurological:      General: No focal deficit present.      Mental Status: She is alert and oriented to person, place, and time. Mental status is at baseline. She is not disoriented.      Cranial Nerves: No cranial nerve deficit.      Motor: No weakness.      Gait: Gait normal.   Psychiatric:         Attention and Perception: She is attentive.         Mood and Affect: Mood normal. Mood is not anxious or depressed.         Speech: Speech is not rapid and pressured or slurred.         Behavior: Behavior normal. Behavior is not agitated, aggressive or hyperactive. Behavior is cooperative.         Thought Content: Thought content normal. Thought content is not paranoid or delusional. Thought content does not include homicidal or suicidal ideation. Thought content does not include homicidal or suicidal plan.         Cognition and Memory: Memory is not impaired.         Judgment: Judgment normal.         Assessment:     1. Diabetic polyneuropathy associated with type 2 diabetes mellitus    2. Encounter for long-term (current) use of medications    3. Lumbar radiculopathy, chronic    4. Restless legs syndrome (RLS)    5. Insomnia, unspecified type      MDM:   Moderate complexity.  Moderate risk.  Total time: 35 minutes.  This includes total time spent on the encounter, which includes face to face time and non-face to face time preparing to see the patient (eg, review of previous medical records, tests), Obtaining and/or reviewing separately obtained history, documenting clinical information in the electronic or other health record, independently interpreting results (not separately reported)/communicating results to the patient/family/caregiver, and/or care coordination (not separately reported).    I have Reviewed and  summarized old records.  I have performed thorough medication reconciliation today and discussed risk and benefits of medications.  I have reviewed labs and discussed with patient.  All questions were answered.  I am requesting old records and will review them once they are available.    I have signed for the following orders AND/OR meds.  Orders Placed This Encounter   Procedures    Microalbumin/Creatinine Ratio, Urine     Standing Status:   Future     Standing Expiration Date:   9/13/2023     Order Specific Question:   Specimen Source     Answer:   Urine     Medications Ordered This Encounter   Medications    diclofenac (VOLTAREN) 75 MG EC tablet     Sig: Take 1 tablet (75 mg total) by mouth 2 (two) times daily.     Dispense:  180 tablet     Refill:  4    rOPINIRole (REQUIP) 0.25 MG tablet     Sig: Take 1 tablet (0.25 mg total) by mouth every evening.     Dispense:  30 tablet     Refill:  11        Follow up in about 6 months (around 1/15/2023), or if symptoms worsen or fail to improve.    If no improvement in symptoms or symptoms worsen, advised to call/follow-up at clinic or go to ER. Patient voiced understanding and all questions/concerns were addressed.   DISCLAIMER: This note was compiled by using a speech recognition dictation system and therefore please be aware that typographical / speech recognition errors can and do occur.  Please contact me if you see any errors specifically.    Oleksandr Nagel M.D.       Office: [phone] [address] [place] [address]  FAX: [phone]

## 2022-07-15 NOTE — PATIENT INSTRUCTIONS
Follow up in about 6 months (around 1/15/2023), or if symptoms worsen or fail to improve.     Dear patient,   As a result of recent federal legislation (The Federal Cures Act), you may receive lab or pathology results from your visit in your [facility] account before your physician is able to contact you. Your physician or their representative will relay the results to you with their recommendations at their soonest availability.     If no improvement in symptoms or symptoms worsen, please be advised to call MD, follow-up at clinic and/or go to ER if becomes severe.    Oleksandr Nagel M.D.        We Offer TELEHEALTH & Same Day Appointments!   Book your Telehealth appointment with me through my nurse or   Clinic appointments on [employer]!    [address] [place] [address]    Office: [phone]   FAX: [phone]    Check out my Facebook Page and Follow Me at: [email or web address]    Check out my website at [employer] by clicking on: [email or web address]    To Schedule appointments online, go to [employer]: [email or web address]

## 2022-07-15 NOTE — ASSESSMENT & PLAN NOTE
Lyrica recently increased to 50 milligrams 3 times daily. She reports improvement in symptoms. Follow-up with Neurology.    We will plan to monitor hemoglobin A1c at designated intervals 3 to 6 months.  I recommend ongoing Education for diabetic diet and exercise protocol.  We will continue to monitor for side effects.    Please be advised of symptoms to monitor for and to notify me immediately if persistent or worsening.  Follow up with Ophthalmology/Optometry and Podiatry at least annually.

## 2022-07-20 DIAGNOSIS — E11.59 HYPERTENSION ASSOCIATED WITH DIABETES: Chronic | ICD-10-CM

## 2022-07-20 DIAGNOSIS — Z76.0 MEDICATION REFILL: ICD-10-CM

## 2022-07-20 DIAGNOSIS — I15.2 HYPERTENSION ASSOCIATED WITH DIABETES: Chronic | ICD-10-CM

## 2022-07-20 RX ORDER — METFORMIN HYDROCHLORIDE 1000 MG/1
TABLET ORAL
Qty: 180 TABLET | Refills: 0 | Status: SHIPPED | OUTPATIENT
Start: 2022-07-20 | End: 2022-08-12 | Stop reason: SDUPTHER

## 2022-07-20 NOTE — TELEPHONE ENCOUNTER
Care Due:                  Date            Visit Type   Department     Provider  --------------------------------------------------------------------------------                                EP -                              PRIMARY      [facility] FAMILY  Last Visit: 07-      CARE ([facility])   MEDICINE       Oleksandr Nagel                               -                              [facility] FAMILY  Next Visit: 01-      CARE ([facility])   [facility]       Oleksandr Nagel                                                            Last  Test          Frequency    Reason                     Performed    Due Date  --------------------------------------------------------------------------------    HBA1C.......  6 months...  metFORMIN, semaglutide...  02- 08-    TSH.........  12 months..  levothyroxine............  04- 04-    [facility] Embedded Care Gaps. Reference number: [id number]. 7/20/2022   4:28:16 AM CDT

## 2022-07-20 NOTE — TELEPHONE ENCOUNTER
Refill Decision Note   Zakia Price  is requesting a refill authorization.  Brief Assessment and Rationale for Refill:  Approve    -Medication-Related Problems Identified: Requires labs  Medication Therapy Plan:       Medication Reconciliation Completed: No   Comments:     Provider Staff:     Action is required for this patient.   Please see care gap opportunities below in Care Due Message.     Thanks!  [facility]     Appointments      Date Provider   Last Visit   7/15/2022 Oleksandr Nagel MD   Next Visit   1/13/2023 Oleksandr Nagel MD     Note composed:12:04 PM 07/20/2022           Note composed:12:04 PM 07/20/2022

## 2022-08-01 ENCOUNTER — PATIENT MESSAGE (OUTPATIENT)
Dept: OTHER | Facility: OTHER | Age: 62
End: 2022-08-01
Payer: MEDICAID

## 2022-08-02 ENCOUNTER — PATIENT MESSAGE (OUTPATIENT)
Dept: OTHER | Facility: OTHER | Age: 62
End: 2022-08-02
Payer: MEDICAID

## 2022-08-11 ENCOUNTER — TELEPHONE (OUTPATIENT)
Dept: FAMILY MEDICINE | Facility: CLINIC | Age: 62
End: 2022-08-11
Payer: MEDICAID

## 2022-08-11 NOTE — TELEPHONE ENCOUNTER
"Spoke to pt. Asking for alternatives for her medications; she "has heard they are causing trouble for people and the carafate is on back order." Advised pt. To call insurance for covered alternatives and let us know. Pt. Verbalized understanding. Phone call ended.   "

## 2022-08-11 NOTE — TELEPHONE ENCOUNTER
----- Message from Augustina Leos sent at 8/11/2022 10:19 AM CDT -----  Contact: KELLE CASEY [[id number]]@ [phone]  Pharmacy is having issues with getting her sucralfate (CARAFATE) 1 gram tablet medication filled plus it causes itching. Do you want to call in another Rx for her.  She also has a question about her Medformin

## 2022-08-12 DIAGNOSIS — I15.2 HYPERTENSION ASSOCIATED WITH DIABETES: Chronic | ICD-10-CM

## 2022-08-12 DIAGNOSIS — E11.59 HYPERTENSION ASSOCIATED WITH DIABETES: Chronic | ICD-10-CM

## 2022-08-12 DIAGNOSIS — Z76.0 MEDICATION REFILL: ICD-10-CM

## 2022-08-12 RX ORDER — LORATADINE 10 MG/1
10 TABLET ORAL DAILY
COMMUNITY
Start: 2022-08-11 | End: 2023-03-15 | Stop reason: SDUPTHER

## 2022-08-12 RX ORDER — METFORMIN HYDROCHLORIDE 1000 MG/1
1000 TABLET ORAL 2 TIMES DAILY WITH MEALS
Qty: 180 TABLET | Refills: 4 | Status: SHIPPED | OUTPATIENT
Start: 2022-08-12 | End: 2023-03-20 | Stop reason: SDUPTHER

## 2022-08-12 NOTE — TELEPHONE ENCOUNTER
Spoke with patient, Famotidine is on back order and the Carafate makes her itch.  She needs an alternative. Her insurance told her to call us to get you to send in an alternative. Please advise.

## 2022-08-12 NOTE — TELEPHONE ENCOUNTER
No new care gaps identified.  [facility] Embedded Care Gaps. Reference number: [id number]. 8/12/2022   1:46:40 PM CDT

## 2022-08-12 NOTE — TELEPHONE ENCOUNTER
----- Message from Jaxon Quan sent at 8/11/2022  2:37 PM CDT -----  Contact: [phone]  Pt is calling in, she is needing a call back about her medication, please return call, thanks

## 2022-08-15 ENCOUNTER — OFFICE VISIT (OUTPATIENT)
Dept: PODIATRY | Facility: CLINIC | Age: 62
End: 2022-08-15
Payer: MEDICAID

## 2022-08-15 VITALS — BODY MASS INDEX: 44.41 KG/M2 | WEIGHT: 293 LBS | HEIGHT: 68 IN

## 2022-08-15 DIAGNOSIS — E66.01 MORBID OBESITY: ICD-10-CM

## 2022-08-15 DIAGNOSIS — M54.17 LUMBOSACRAL RADICULOPATHY: ICD-10-CM

## 2022-08-15 DIAGNOSIS — E11.42 DIABETIC POLYNEUROPATHY ASSOCIATED WITH TYPE 2 DIABETES MELLITUS: Primary | ICD-10-CM

## 2022-08-15 DIAGNOSIS — E11.49 TYPE II DIABETES MELLITUS WITH NEUROLOGICAL MANIFESTATIONS: ICD-10-CM

## 2022-08-15 DIAGNOSIS — L60.3 ONYCHODYSTROPHY: ICD-10-CM

## 2022-08-15 PROCEDURE — 11721 DEBRIDE NAIL 6 OR MORE: CPT | Mod: S$PBB,,, | Performed by: PODIATRIST

## 2022-08-15 PROCEDURE — 3044F HG A1C LEVEL LT 7.0%: CPT | Mod: CPTII,,, | Performed by: PODIATRIST

## 2022-08-15 PROCEDURE — 99213 OFFICE O/P EST LOW 20 MIN: CPT | Mod: PBBFAC | Performed by: PODIATRIST

## 2022-08-15 PROCEDURE — 99214 PR OFFICE/OUTPT VISIT, EST, LEVL IV, 30-39 MIN: ICD-10-PCS | Mod: 25,S$PBB,, | Performed by: PODIATRIST

## 2022-08-15 PROCEDURE — 1160F PR REVIEW ALL MEDS BY PRESCRIBER/CLIN PHARMACIST DOCUMENTED: ICD-10-PCS | Mod: CPTII,,, | Performed by: PODIATRIST

## 2022-08-15 PROCEDURE — 11721 PR DEBRIDEMENT OF NAILS, 6 OR MORE: ICD-10-PCS | Mod: S$PBB,,, | Performed by: PODIATRIST

## 2022-08-15 PROCEDURE — 99999 PR PBB SHADOW E&M-EST. PATIENT-LVL III: CPT | Mod: PBBFAC,,, | Performed by: PODIATRIST

## 2022-08-15 PROCEDURE — 99999 PR PBB SHADOW E&M-EST. PATIENT-LVL III: ICD-10-PCS | Mod: PBBFAC,,, | Performed by: PODIATRIST

## 2022-08-15 PROCEDURE — 3008F PR BODY MASS INDEX (BMI) DOCUMENTED: ICD-10-PCS | Mod: CPTII,,, | Performed by: PODIATRIST

## 2022-08-15 PROCEDURE — 99214 OFFICE O/P EST MOD 30 MIN: CPT | Mod: 25,S$PBB,, | Performed by: PODIATRIST

## 2022-08-15 PROCEDURE — 3008F BODY MASS INDEX DOCD: CPT | Mod: CPTII,,, | Performed by: PODIATRIST

## 2022-08-15 PROCEDURE — 1159F PR MEDICATION LIST DOCUMENTED IN MEDICAL RECORD: ICD-10-PCS | Mod: CPTII,,, | Performed by: PODIATRIST

## 2022-08-15 PROCEDURE — 3044F PR MOST RECENT HEMOGLOBIN A1C LEVEL <7.0%: ICD-10-PCS | Mod: CPTII,,, | Performed by: PODIATRIST

## 2022-08-15 PROCEDURE — 1160F RVW MEDS BY RX/DR IN RCRD: CPT | Mod: CPTII,,, | Performed by: PODIATRIST

## 2022-08-15 PROCEDURE — 11721 DEBRIDE NAIL 6 OR MORE: CPT | Mod: PBBFAC | Performed by: PODIATRIST

## 2022-08-15 PROCEDURE — 1159F MED LIST DOCD IN RCRD: CPT | Mod: CPTII,,, | Performed by: PODIATRIST

## 2022-08-15 RX ORDER — PREGABALIN 75 MG/1
75 CAPSULE ORAL 3 TIMES DAILY
Qty: 90 CAPSULE | Refills: 3 | Status: SHIPPED | OUTPATIENT
Start: 2022-08-15 | End: 2022-12-13 | Stop reason: SDUPTHER

## 2022-08-15 NOTE — PROGRESS NOTES
Subjective:       Patient ID: Zakia Price is a 62 y.o. female.    Chief Complaint: Nail Care (Coming in for 4 month DNC, rates pain 8/10, diabetic, wearing socks and shoes, last seen PCP Dr. Nagel on 07/15/2022.)      HPI: Patient presents to the office today with the chief complaint of elongated, thickened and dystrophic nail plates to the B/L foot. Patient also complains of moderate to severe foot pain to the right left secondary to neuropathy.  States her pain is an 8/10.  Does notice a slight decrease in symptoms with the increase in Lyrica proximally 4 months ago.  Continues to have injections and being monitored by pain management for lumbosacral radiculopathy.. This patient is a Diabetic Type II, complicated with morbid obesity and Peripheral Neuropathy. Patient does follow with Primary Care and/or Endocrinology for management of Diabetes Mellitus. This patient's PMD is Oleksandr Nagel MD. This patient last saw his/her primary care provider on 07/15/2022    Hemoglobin A1C   Date Value Ref Range Status   02/24/2022 5.2 4.0 - 5.6 % Final     Comment:     ADA Screening Guidelines:  5.7-6.4%  Consistent with prediabetes  >or=6.5%  Consistent with diabetes    High levels of fetal hemoglobin interfere with the HbA1C  assay. Heterozygous hemoglobin variants (HbS, HgC, etc)do  not significantly interfere with this assay.   However, presence of multiple variants may affect accuracy.     10/06/2021 5.3 4.0 - 5.6 % Final     Comment:     ADA Screening Guidelines:  5.7-6.4%  Consistent with prediabetes  >or=6.5%  Consistent with diabetes    High levels of fetal hemoglobin interfere with the HbA1C  assay. Heterozygous hemoglobin variants (HbS, HgC, etc)do  not significantly interfere with this assay.   However, presence of multiple variants may affect accuracy.     06/09/2021 5.3 4.0 - 5.6 % Final     Comment:     ADA Screening Guidelines:  5.7-6.4%  Consistent with prediabetes  >or=6.5%  Consistent with  diabetes    High levels of fetal hemoglobin interfere with the HbA1C  assay. Heterozygous hemoglobin variants (HbS, HgC, etc)do  not significantly interfere with this assay.   However, presence of multiple variants may affect accuracy.     .     Review of patient's allergies indicates:   Allergen Reactions    Codeine sulfate      Nausea^    Lisinopril Swelling     angioedema    Codeine Nausea Only and Rash       Past Medical History:   Diagnosis Date    Acute respiratory failure due to COVID-19     COVID-19     Diabetes mellitus, type 2     Diabetic neuropathy 1/27/2014    DJD (degenerative joint disease) of knee     DVT (deep venous thrombosis) around 1990's    in leg, is on no anticoagulant therapy presently    Fatty liver     GERD (gastroesophageal reflux disease)     Hypertension associated with diabetes     HECTOR (iron deficiency anemia) 5/13/2021    Multinodular goiter     Obesity, morbid, BMI 50 or higher     Sleep apnea     has no CPAP       Family History   Problem Relation Age of Onset    Leukemia Son     Cancer Son     Diabetes Mother     Hypertension Mother     Heart disease Mother 50    Cancer Father     Arthritis Father     Cancer Brother     Cancer Brother        Social History     Socioeconomic History    Marital status: Single   Tobacco Use    Smoking status: Never Smoker    Smokeless tobacco: Never Used   Substance and Sexual Activity    Alcohol use: No    Drug use: No    Sexual activity: Not Currently       Past Surgical History:   Procedure Laterality Date    COLONOSCOPY N/A 5/12/2021    Procedure: COLONOSCOPY;  Surgeon: Carolina Rizo MD;  Location: [facility] ENDO;  Service: Endoscopy;  Laterality: N/A;    EPIDURAL STEROID INJECTION N/A 12/10/2021    Procedure: Lumbar L5/S1 IL TEETEE  Would like AM procedure, if possible;  Surgeon: Nae Paul MD;  [facility];  Service: Pain Management;  Laterality: N/A;    EPIDURAL STEROID INJECTION INTO CERVICAL  "SPINE N/A 10/8/2021    Procedure: C7-T1 IL TEETEE-no sedation.  Needs IV-just incase;  Surgeon: Nae Paul MD;  Location: [facility] PAIN MGT;  Service: Pain Management;  Laterality: N/A;    ESOPHAGOGASTRODUODENOSCOPY N/A 7/8/2021    Procedure: EGD (ESOPHAGOGASTRODUODENOSCOPY) previous positve covid;  Surgeon: Jann Gutierrez MD;  Location: [facility];  Service: Endoscopy;  Laterality: N/A;    FRACTURE SURGERY      HYSTERECTOMY      SELECTIVE INJECTION OF ANESTHETIC AGENT AROUND LUMBAR SPINAL NERVE ROOT BY TRANSFORAMINAL APPROACH Bilateral 5/6/2022    Procedure: Bilateral L5/S1 TF TEETEE with RN IV sedation;  Surgeon: Nae Paul MD;  Location: [facility] PAIN MGT;  Service: Pain Management;  Laterality: Bilateral;    SHOULDER ARTHROSCOPY      THYROIDECTOMY, PARTIAL Right     and transplatation of right superior parathyroid gland to the sternocleidomastoid muscle     TONSILLECTOMY      TUBAL LIGATION  1984    WRIST FRACTURE SURGERY      left       Review of Systems       Objective:   Ht 5' 8" (1.727 m)   Wt (!) 140.5 kg (309 lb 11.9 oz)   BMI 47.10 kg/m²     Physical Exam  LOWER EXTREMITY PHYSICAL EXAMINATION    VASCULAR:  The right dorsalis pedis pulse 2/4 and the right posterior tibial pulse 1/4.  The left dorsalis pedis pulse 2/4 and posterior tibial pulse on the left is 1/4.  Capillary refill is intact.  Pedal hair growth decreased.     NEUROLOGY: Protective sensation is not intact to the left and right plantar surfaces of the foot and digits, as the patient has no sensation/detection at greater than 4 distinct points of contact with 5.07 Bend Cele monofilament. Sensation to light touch is intact on the left and right foot. Proprioception is intact, bilateral. Sensation to pin prick is reduced to absent. Vibratory sensation is diminished.    DERMATOLOGY:  Skin is supple, moist, intact.  There is no signs of callusing, ulcerations, other lesions identified to the dorsal or plantar aspect of the right or " left foot.  The R1, 2, 5 and left L1,2, 5 are thickened, discolored dystrophic.  There is subungual debris.  Nail plates have area of dark discoloration.  The remaining nails 3-4 on the right foot and the left foot are elongated but of normal color, thickness, and texture.   There is no signs of ingrowing into the medial or lateral borders.  There is no evidence of wounds or skin breakdown.  No edema or erythema.  No obvious lacerations or fissuring.  Interdigital spaces are clean, dry, intact.  No rashes or scars appreciated.    ORTHOPEDIC: Manual Muscle Testing is 5/5 in all planes on the left and right, without pains, with and without resistance. Gait pattern is non-antalgic.    Assessment:     1. Diabetic polyneuropathy associated with type 2 diabetes mellitus    2. Type II diabetes mellitus with neurological manifestations    3. Lumbosacral radiculopathy    4. Morbid obesity    5. Onychodystrophy        Plan:     Diabetic polyneuropathy associated with type 2 diabetes mellitus    Type II diabetes mellitus with neurological manifestations    Lumbosacral radiculopathy    Morbid obesity    Onychodystrophy    Other orders  -     pregabalin (LYRICA) 75 MG capsule; Take 1 capsule (75 mg total) by mouth 3 (three) times daily.  Dispense: 90 capsule; Refill: 3        Thorough discussion is had with the patient this afternoon, concerning the diagnosis, its etiology, and the treatment algorithm at present.  Greater than 50% of this visit spent on counseling and coordination of care. Greater than 15 minutes of a 20 minute appointment spent on education about the diabetic foot, neuropathy, and prevention of limb loss.  Shoe inspection. Diabetic Foot Education. Patient reminded of the importance of good nutrition and blood sugar control to help prevent podiatric complications of diabetes. Patient instructed on proper foot hygeine. We discussed wearing proper and supportive shoe gear, daily foot inspections, never walking  barefooted or sock footed, never putting sharp instruments to feet which can cause major complications associated with infection, ulcers, lacerations.      Dystrophic nail plates, as outlined above (R#1,2,5  ; L#1,2,5 ), are sharply debrided with double action nail nipper, and/or with the assistance of a mechanical rotary carlos alberto, with removal of all offending nail and nail border(s), for reduction of pains. Nails are reduced in terms of length, width and girth with removal of subungual debris to facilitate pain free weight bearing and ambulation. The elongated nails as outlined in the objective portion of this note, were trimmed to appropriate length, with a double action nail nipper, for alleviation/reduction of pains as well. Follow up in approx. 3-4 months.    Due to increasing neuropathy pains in conjunction with elevated dose Lyrica, we will increase the dose of Lyrica once more to see how patient tolerates this.  We will be able to provide patient with increased dose of Lyrica until she is able to see her PCP at the next scheduled appointment.  Will have PCP continue to monitor these and refill medications as needed.    Continue to follow with pain management for lumbosacral radiculopathy as per her last note has only had 50% improvement in her symptoms with injection.        Future Appointments   Date Time Provider Department Center   9/13/2022 11:40 AM Nae Paul MD [facility] IN PN  Medical C   9/20/2022  9:40 AM Alexandrea Crenshaw PA-C [facility] GASTRO [place]   9/20/2022  1:30 PM LABORATORY, [facility]   9/22/2022  1:30 PM Sol Mckeon NP [facility] HEM ONC [facility]   12/15/2022 10:45 AM Brandie Rey DPM [facility] POD  Medical C   1/13/2023  3:40 PM Oleksandr Nagel MD [facility] [place]

## 2022-08-16 DIAGNOSIS — K21.00 GASTROESOPHAGEAL REFLUX DISEASE WITH ESOPHAGITIS, UNSPECIFIED WHETHER HEMORRHAGE: ICD-10-CM

## 2022-08-16 RX ORDER — FAMOTIDINE 40 MG/1
40 TABLET, FILM COATED ORAL DAILY
Qty: 90 TABLET | Refills: 4 | Status: SHIPPED | OUTPATIENT
Start: 2022-08-16 | End: 2022-10-03 | Stop reason: ALTCHOICE

## 2022-08-16 NOTE — TELEPHONE ENCOUNTER
No new care gaps identified.  Health system Embedded Care Gaps. Reference number: [id number]. 8/16/2022   11:25:47 AM PADMINIT

## 2022-08-16 NOTE — TELEPHONE ENCOUNTER
----- Message from Jen Fraser sent at 8/16/2022  9:42 AM CDT -----  Contact: Zakia  Patient is calling to speak with a nurse regarding lab. Patient request a new order to have an A1C lab performed at the [place] location. Please give patient a call back at [phone] when possible.  Thank you,  [facility]

## 2022-09-16 NOTE — PROGRESS NOTES
Established Patient Chronic Pain Note     Referring Physician: No ref. provider found    PCP: Oleksandr Nagel MD    Chief Complaint:   No chief complaint on file.    The patient location is: home  The chief complaint leading to consultation is: chronic pain     Visit type: audiovisual    Face to Face time with patient: 10-15 minutes  20 minutes of total time spent on the encounter, which includes face to face time and non-face to face time preparing to see the patient (eg, review of tests), Obtaining and/or reviewing separately obtained history, Documenting clinical information in the electronic or other health record, Independently interpreting results (not separately reported) and communicating results to the patient/family/caregiver, or Care coordination (not separately reported).     Each patient to whom he or she provides medical services by telemedicine is:  (1) informed of the relationship between the physician and patient and the respective role of any other health care provider with respect to management of the patient; and (2) notified that he or she may decline to receive medical services by telemedicine and may withdraw from such care at any time.       SUBJECTIVE:  Interval History (9/16/2022):  Zakia Price presents today for follow-up visit.  Last injection bilateral L5/S1 TF TEETEE on 05/06/2022. Patient reports pain as 6/10 today. She reports she is no longer experiencing relief from the previous TEETEE. Relief lasted for 2-3 months before insidiously returning. Continues to report pain in low lumbar spine with radiation into bilateral lower extremities along posterior aspect. Reports weakness in lower extremities and decreased balance. She also reports insidious return of neck pain, but low back pain is more persistent, constant, and worse than neck pain. Pain interferes with daily activity. She has been performing physician directed exercises at home without relief. Patient has continued  conservative treatments including anti-inflammatory and nerve pain medications, as well as home physical therapy exercises without relief.      Interval history 06/07/2022  Patient presents status post bilateral L5/S1 transforaminal epidural steroid injection 05/06/2022.  Patient reports 100% pain relief initially following transforaminal injection of lower extremity radicular symptoms.  Today pain is rated 0/10.  Patient reports overall 50% sustained relief in lower extremity pain.  Patient reports more significant relief with transforaminal band prior intralaminar approach.  Patient continues to report sustained relief in the neck and upper extremities following C7-T1 interlaminar epidural steroid injection October 2021. Patient is continue Topamax 100 mg twice daily.  She does report noticeable improvement in pain on this medication and denies any side effects.  Today she denies any significant upper lower extremity weakness, bowel or bladder incontinence or saddle anesthesia.      Interval history 03/08/2022   Patient presents for MRI review- lumbar and cervical spine.  Today patient reports sustained pain relief in the neck and upper extremities following C7-T1 interlaminar epidural steroid injection October 2021. She also reports improvement in lower extremity radicular symptoms in the lower back and down the lower extremities.  Today her primary concern is burning sensation on the bottom of the feet.  Patient has continued Topamax and has reached 100 mg twice daily.  She does report noticeable improvement in her pain on this medication.  She denies any side effects.  She denies any new onset upper or lower extremity weakness, bowel or bladder incontinence or saddle anesthesia.      Interval history 01/11/2022  Patient presents status post L5-S1 interlaminar epidural steroid injection with right paramedian approach 12/10/2021.  Patient reports 80% sustained relief in lower back and bilateral lower extremity  radicular symptoms following epidural steroid injection.  Today patient reports her pain as a 5/10.  Patient has continued Topamax 100 mg twice daily.  She denies any significant sedation from this medication and has enjoyed a  20 lb weight loss since initiation as well as improvement in neuropathic pain.      Interval Hx: 11/8/21  Pt is s/p C7-T1 IL TEETEE with R paramedian approach 10/08/21.  Patient reports 60% sustained relief following her cervical epidural steroid injection in both her neck and upper extremities.  Patient does report improvement in range of motion and strength.  Today patient would like to discuss her lower back and leg pain.  Today she again reports pain in the lower back which radiates down the posterior aspects of bilateral lower extremities in L5-S1 distribution to the feet.  Today pain is 5/10.  Patient is currently participating actively in physical therapy.  She is currently reached Topamax 75 mg twice daily without side effects.  She denies any new onset lower extremity weakness, bowel or bladder incontinence or saddle anesthesia.    HPI 07/30/21  Zakia Price is a 62 y.o. female with past medical history significant for history of acute respiratory failure secondary to COVID-19, type 2 diabetes complicated by neuropathy, GERD, hypertension, morbid obesity, obstructive sleep apnea,  who presents to the clinic for the evaluation of neck and lower back and leg pain.  Patient reports that her pain began approximately 5-6 years prior without inciting accident injury or trauma.  Patient reports lower back pain which begins in a bandlike distribution at her iliac crest with radiation down the posterior aspect of bilateral legs to the feet in L5-S1 distribution.  Patient reports left side is significantly worse than the right.  Patient reports tingling and numbness in bilateral feet and associated subjective weakness in the lower extremities.  Patient does report periodic falls  associated with her pain.    Patient also reports longstanding neck pain with radiation down bilateral upper extremities in no particular dermatomal distribution.  Patient reports pain is worse along the right paracervical muscles than on the left.  Patient reports compromise in hand  strength as well as in hand dexterity.  Patient denies ataxia or bowel or bladder incontinence.   Patient's neck and shoulder pain is exacerbated by cervical flexion, extension and lateral rotation as well as overhead activities..    Patient reports that lower extremity pain is exacerbated from moving from a sitting to a standing position and with ambulation.  Patient is able to ambulate approximately 10 minutes before requiring rest.  The pain is rated with a score of  7/10 on the BEST day and a score of 10/10 on the WORST day.  Symptoms interfere with daily activity and sleeping.     Patient reports significant motor weakness.  Patient denies night fever/night sweats, urinary incontinence, bowel incontinence, significant weight loss and loss of sensations.      Pain Disability Index Review:     Last 3 PDI Scores 3/8/2022 1/11/2022 11/8/2021   Pain Disability Index (PDI) 35 35 45       Non-Pharmacologic Treatments:  Physical Therapy/Home Exercise: yes  Ice/Heat:yes  TENS: no  Acupuncture: no  Massage: yes  Chiropractic: no    Other: no    Pain Medications:  - Adjuvant Medications: Neurontin (Gabapentin), Topamax (Topiramate), Tylenol (Acetaminophen) and Zanaflex ( Tizanidine) Diazepam    Pain Procedures:   -05/06/2022:  Bilateral L5/S1 transforaminal epidural steroid injection  -12/10/2021:  L5-S1 intralaminar epidural steroid injection with right paramedian approach  -10/8/21: C7-T1 IL TEETEE with R paramedian approach; Dr. Paul  Prior epidural steroid injection lumbar spine, approximately 3-4 years prior at the [facility] in [place] which confirmed approximately 1 year of relief.    Past Medical History:    Diagnosis Date    Acute respiratory failure due to COVID-19     COVID-19     Diabetes mellitus, type 2     Diabetic neuropathy 1/27/2014    DJD (degenerative joint disease) of knee     DVT (deep venous thrombosis) around 1990's    in leg, is on no anticoagulant therapy presently    Fatty liver     GERD (gastroesophageal reflux disease)     Hypertension associated with diabetes     HECTOR (iron deficiency anemia) 5/13/2021    Multinodular goiter     Obesity, morbid, BMI 50 or higher     Sleep apnea     has no CPAP     Past Surgical History:   Procedure Laterality Date    COLONOSCOPY N/A 5/12/2021    Procedure: COLONOSCOPY;  Surgeon: Carolina Rizo MD;  Location: [facility];  Service: Endoscopy;  Laterality: N/A;    EPIDURAL STEROID INJECTION N/A 12/10/2021    Procedure: Lumbar L5/S1 IL TEETEE  Would like AM procedure, if possible;  Surgeon: Nae Paul MD;  Location: [facility];  Service: Pain Management;  Laterality: N/A;    EPIDURAL STEROID INJECTION INTO CERVICAL SPINE N/A 10/8/2021    Procedure: C7-T1 IL TEETEE-no sedation.  Needs IV-just incase;  Surgeon: Nae Paul MD;  Location: [facility];  Service: Pain Management;  Laterality: N/A;    ESOPHAGOGASTRODUODENOSCOPY N/A 7/8/2021    Procedure: EGD (ESOPHAGOGASTRODUODENOSCOPY) previous positve covid;  Surgeon: Jann Gutierrez MD;  Location: [facility];  Service: Endoscopy;  Laterality: N/A;    FRACTURE SURGERY      HYSTERECTOMY      SELECTIVE INJECTION OF ANESTHETIC AGENT AROUND LUMBAR SPINAL NERVE ROOT BY TRANSFORAMINAL APPROACH Bilateral 5/6/2022    Procedure: Bilateral L5/S1 TF TEETEE with RN IV sedation;  Surgeon: Nae Paul MD;  Location: [facility];  Service: Pain Management;  Laterality: Bilateral;    SHOULDER ARTHROSCOPY      THYROIDECTOMY, PARTIAL Right     and transplatation of right superior parathyroid gland to the sternocleidomastoid muscle     TONSILLECTOMY      TUBAL LIGATION  1984    WRIST FRACTURE SURGERY      left     Review of  patient's allergies indicates:   Allergen Reactions    Codeine sulfate      Nausea^    Lisinopril Swelling     angioedema    Codeine Nausea Only and Rash       Current Outpatient Medications   Medication Sig    diclofenac (VOLTAREN) 75 MG EC tablet Take 1 tablet (75 mg total) by mouth 2 (two) times daily.    diltiaZEM (CARDIZEM) 30 MG tablet Take 1 tablet (30 mg total) by mouth every 12 (twelve) hours.    famotidine (PEPCID) 40 MG tablet Take 1 tablet (40 mg total) by mouth once daily.    ferrous sulfate 324 mg (65 mg iron) TbEC Take 1 tablet (324 mg total) by mouth once daily.    fluticasone propionate (FLONASE) 50 mcg/actuation nasal spray Use 2 sprays to each nostril daily    inhalation spacing device (BREATHERITE VALVED MDI CHAMBER) Use as directed for inhalation.    levothyroxine (SYNTHROID) 100 MCG tablet Take 1 tablet (100 mcg total) by mouth once daily.    LIDOcaine-prilocaine (EMLA) cream SMARTSI-2 Pump Topical 3-4 Times Daily    linaCLOtide (LINZESS) 72 mcg Cap capsule Take 1 capsule (72 mcg total) by mouth once daily.    loratadine (CLARITIN) 10 mg tablet Take 10 mg by mouth once daily.    metFORMIN (GLUCOPHAGE) 1000 MG tablet Take 1 tablet (1,000 mg total) by mouth 2 (two) times daily with meals.    pravastatin (PRAVACHOL) 80 MG tablet Take 1 tablet (80 mg total) by mouth once daily.    pregabalin (LYRICA) 75 MG capsule Take 1 capsule (75 mg total) by mouth 3 (three) times daily.    rOPINIRole (REQUIP) 0.25 MG tablet Take 1 tablet (0.25 mg total) by mouth every evening.    semaglutide (OZEMPIC) 1 mg/dose (4 mg/3 mL) Inject 1 mg into the skin every 7 days.    sucralfate (CARAFATE) 1 gram tablet Take 1 tablet (1 g total) by mouth 4 (four) times daily before meals and nightly.    tiZANidine (ZANAFLEX) 4 MG tablet Take 1/2 to 1 tablet by mouth twice daily as needed for muscle spasms. May cause drowsiness.    topiramate (TOPAMAX) 100 MG tablet Take 1 tablet (100 mg total) by mouth 2 (two) times daily.     TRELEGY ELLIPTA 100-62.5-25 mcg DsDv Inhale 1 puff into the lungs once daily.     No current facility-administered medications for this visit.       Review of Systems     GENERAL:  No weight loss, malaise or fevers.  HEENT:   No recent changes in vision or hearing  NECK:  Negative for lumps, no difficulty with swallowing.  RESPIRATORY:  Negative for cough, wheezing or shortness of breath, patient denies any recent URI.  CARDIOVASCULAR:  Negative for chest pain, leg swelling or palpitations.  GI:  Negative for abdominal discomfort, blood in stools or black stools or change in bowel habits.  MUSCULOSKELETAL:  See HPI.  SKIN:  Negative for lesions, rash, and itching.  PSYCH:  No mood disorder or recent psychosocial stressors.  Patients sleep is not disturbed secondary to pain.  HEMATOLOGY/LYMPHOLOGY:  Negative for prolonged bleeding, bruising easily or swollen nodes.  Patient is not currently taking any anti-coagulants  NEURO:   No history of headaches, syncope, paralysis, seizures or tremors.  All other reviewed and negative other than HPI.    OBJECTIVE:    There were no vitals taken for this visit.  Telemedicine Exam  There were no vitals filed for this visit.  There is no height or weight on file to calculate BMI.   (reviewed on 9/22/2022)     GENERAL: Well appearing, in no acute distress, alert and oriented x3.  Cooperative.  PSYCH:  Mood and affect appropriate.  SKIN: Skin color & texture with no obvious abnormalities.    HEAD/FACE:  Normocephalic, atraumatic.    PULM:  No difficulty breathing. No nasal flaring. No obvious wheezing.  EXTREMITIES: No obvious deformities. Moving all extremities well, appears to have symmetric strength throughout.  MUSCULOSKELETAL: No obvious atrophy abnormalities are noted.   NEURO: No obvious neurologic deficit.   GAIT: sitting.     Physical Exam: last in clinic visit:     Physical Exam    GENERAL: Well appearing, in no acute distress, alert and oriented x3.  PSYCH:  Mood and  affect appropriate.  SKIN: Skin color, texture, turgor normal, no rashes or lesions.  HEAD/FACE:  Normocephalic, atraumatic. Cranial nerves grossly intact.    NECK: pain to palpation over the cervical paraspinous muscles. Spurling Negative.  pain with neck flexion, extension, or lateral flexion.   Normal testing biceps, triceps and brachioradialis bilaterally.    NegativeHoffmann's bilaterally.    5/5 strength testing deltoid, biceps, triceps, wrist extensor, wrist flexor and ulnar intrinsics bilaterally.    Normal  strength bilaterally    CV: RRR with palpation of the radial artery.  PULM: No evidence of respiratory difficulty, symmetric chest rise.  GI:  Soft and non-tender.    BACK: Straight leg raising in the sitting and supine positions is negative to radicular pain. No pain to palpation over the facet joints of the lumbar spine or spinous processes. Normal range of motion without pain reproduction.  EXTREMITIES: Peripheral joint ROM is full and pain free without obvious instability or laxity in all four extremities. No deformities, edema, or skin discoloration. Good capillary refill.  MUSCULOSKELETAL: Able to stand on heels & toes.   hip, and knee provocative maneuvers are negative.   pain with palpation over the sacroiliac joints bilaterally.  FABERs test is negative.  FAER test is negative bilaterally.   Bilateral upper and lower extremity strength is normal and symmetric.  No atrophy or tone abnormalities are noted.  -5/5 strength testing hip flexion, quadriceps, gastrocnemius soleus, anterior tibialis and EHL bilaterally.  RIGHT Lower extremity: Hip flexion 5/5, Hip Abduction 5/5, Hip Adduction 5/5, Knee extension 5/5, Knee flexion 5/5, Ankle dorsiflexion5/5, Extensor hallucis longus 5/5, Ankle plantarflexion 5/5  LEFT Lower extremity:  Hip flexion 5/5, Hip Abduction 5/5,Hip Adduction 5/5, Knee extension 5/5, Knee flexion 5/5, Ankle dorsiflexion 5/5, Extensor hallucis longus 5/5, Ankle plantarflexion  5/5  -Normal testing knee (patellar) jerk and ankle (achilles) jerk    NEURO: Bilateral upper and lower extremity coordination and muscle stretch reflexes are physiologic and symmetric. No loss of sensation is noted.  GAIT: normal.    Imaging:   X-Ray Lumbar Spine Complete 5 View  FINDINGS:  Mild dextroconvex curvature of the lumbar spine.  Grade 1 anterolisthesis of L4 on L5, measuring 4 mm.  Vertebral body heights are maintained without evidence of fracture or aggressive osseous lesion.  Multilevel degenerative anterior marginal osteophyte formation, most pronounced in the visualized lower thoracic spine.  Intervertebral disc space narrowing at L5-S1.  Multilevel degenerative facet arthropathy, most pronounced at L4-5 and L5-S1.    X-Ray Cervical Spine 5 View W Flex Extxt  FINDINGS:  C7 faintly visualized on neutral lateral view.  There is heterotopic bone formation along the anterior/inferior margin C1-2 articulation.  There is smooth prominence of the overlying prevertebral soft tissues at this level.  Pre dens space appears within upper limits normal.  Vertebral body height and alignment maintained with anterior and posterior marginal spurring most prominently at the C4-5 and C5-6 levels.  Posterior subluxation C4 on C5 noted.  There is uniform loss of disc height at C4-5 and C5-6 levels.  Heterotopic bone and positioning combine to limit evaluation of the left side of C1-2 articulation on the open mouth view.  There is suggestion of slight asymmetry of lateral masses of C1.  Flexion and extension views demonstrate stable positioning without additional evidence subluxation or instability.  No other evidence of fracture or subluxation noted.  Multilevel mild uncovertebral osteophyte formation and minimal/mild neural foraminal stenosis identified.  Surgical staples identified anteriorly in the lower neck at and just to the right of midline.  Remaining osseous structures appear intact.  Partially visualized upper  apices appear clear.    Impression  Spondylosis with minimal retro listhesis or posterior subluxation C4 on C5.    Hepatopathy ache bone and degenerative change limits evaluation of the C1-2 articulation and levels particularly on the left.  Consideration should be given for CT if not previously performed to further evaluate these findings.    MRI Lumbar Spine 1/11/22  FINDINGS:  Grade 1 anterolisthesis of L4 on L5. No fracture.  Multiple hemangiomas are seen within the lower thoracic and upper lumbar spine vertebral bodies.  Mild disc height loss at the L1-L2 and L4-L5 levels.  Conus medullaris terminates at the L1 level. Distal spinal cord intensity is normal.  There is no paraspinal soft tissue abnormality.     T12-L1: Minimal diffuse disc bulge.  Mild bilateral facet arthropathy.  No neural foraminal stenosis.  No spinal canal stenosis.     L1-L2: There is a mild diffuse disc bulge.  Mild bilateral facet arthropathy.  No neural foraminal stenosis.  No spinal canal stenosis.     L2-L3: No disc bulge.  Mild bilateral facet arthropathy.  No neural foraminal stenosis.  No spinal canal stenosis.     L3-L4: Minimal diffuse disc bulge.  Moderate bilateral facet arthropathy.  Mild bilateral neural foraminal stenosis.  No significant spinal canal stenosis.     L4-L5: Mild uncovering of the disc space secondary to listhesis.  There is a diffuse disc bulge.  Severe bilateral facet arthropathy.  Moderate bilateral neural foraminal stenosis.  Mild spinal canal stenosis secondary to listhesis, disc bulge, facet arthropathy, and ligamentum flavum hypertrophy.     L5-S1: Minimal diffuse disc bulge.  Moderate facet arthropathy, greater on the right than left.  Mild-to-moderate bilateral neural foraminal stenosis.  No spinal canal stenosis.    MRI cervical spine 1/11/22  FINDINGS:  There is mild retrolisthesis of C4 on C5. Vertebral bodies demonstrate normal signal intensity on all sequences.  No fracture.  Mild-to-moderate disc  height loss and desiccation involves the C4 through C7 disc spaces, most pronounced at the C4-C5 and C5-C6 disc spaces.  The craniocervical junction is normal.  Visualized portions of the posterior fossa appear normal.  Mucosal thickening noted within the sphenoid sinus.  The spinal cord demonstrates normal signal intensity on all sequences. No paraspinal soft tissue abnormality is identified.     C2-C3: Small posterior disc osteophyte complex, best seen on the axial sequence, without spinal canal stenosis.  There is no facet arthropathy.  There is no uncovertebral joint disease.  There is no neuroforaminal stenosis.     C3-C4: No disc bulge.  Severe right facet arthropathy.  No significant uncovertebral arthropathy.  Moderate right neural foraminal stenosis.  No spinal canal stenosis.     C4-C5: Small posterior disc osteophyte complex.  Mild spinal canal stenosis secondary to retrolisthesis and disc osteophyte complex.  There is also a right lateral disc protrusion which involves the right neural foramen, best appreciated on the T2 sagittal sequence.  Mild bilateral facet arthropathy.  Mild-to-moderate bilateral uncovertebral arthropathy.  Moderate to severe neural foraminal stenosis, greater on the right than left.     C5-C6: Small posterior disc osteophyte complex results in mild spinal canal stenosis.  Severe left facet arthropathy.  Mild-to-moderate bilateral uncovertebral arthropathy.  Mild to moderate bilateral neural foraminal stenosis.     C6-C7: Small posterior disc osteophyte complex results in mild spinal canal stenosis.  No significant facet arthropathy.  No significant uncovertebral arthropathy.  No neural foraminal stenosis.     C7-T1: No disc bulge.  No significant facet arthropathy.  No neural foraminal stenosis.  No spinal canal stenosis.    ASSESSMENT: 62 y.o. year old female with neck and shoulder and back and lower extremity pain, consistent with     No diagnosis found.        PLAN:   -  Interventions:  Schedule for repeat bilateral L5/S1 transforaminal epidural steroid injection for exacerbation of radicular symptoms as patient previously experienced >50% sustained relief following TEETEE  Explained the risks and benefits of the procedure in detail with the patient today in clinic along with alternative treatment options    - Anticoagulation use: no no anticoagulation    - we have discussed considering repeat cervical  epidural steroid injection at a more cephalad level, C6-7, in the future should her upper extremity radicular symptoms exacerbate. We will first focus on lower back     report:  Reviewed and consistent with medication use as prescribed.      - Medications:  -Continue Topamax .  We discussed potential side effects of this medication include drowsiness, dizziness, tingling of the hands and feet, diarrhea, dysgeusia, weight loss/anorexia.    Topamax  100mg BID   -Continue Lyrica 75 mg BID per Dr. Rey    - Therapy:   We discussed continuing HEP to help manage the patient/s painful condition. The patient was counseled that muscle strengthening will improve the long term prognosis in regards to pain and may also help increase range of motion and mobility.    - Imaging: Reviewed available imaging with patient and answered any questions they had regarding study.  - Follow up visit: return to clinic in  4 weeks after injection    The above plan and management options were discussed at length with patient. Patient is in agreement with the above and verbalized understanding.    - I discussed the goals of interventional chronic pain management with the patient on today's visit. We discussed a multimodal and systematic approach to pain.  This includes diagnostic and therapeutic injections, adjuvant pharmacologic treatment, physical therapy, and at times psychiatry.  I emphasized the importance of regular exercise, core strengthening and stretching, diet and weight loss as a cornerstone of  long-term pain management.    - This condition does not require this patient to take time off of work, and the primary goal of our Pain Management services is to improve the patient's functional capacity.  - Patient Questions: Answered all of the patient's questions regarding diagnoses, therapy, treatment and next steps      Blanca Redman PA-C  Interventional Pain Management  [facility]    Disclaimer:  This note was prepared using voice recognition system and is likely to have sound alike errors that may have been overlooked even after proof reading.  Please call me with any questions

## 2022-09-19 ENCOUNTER — OFFICE VISIT (OUTPATIENT)
Dept: PAIN MEDICINE | Facility: CLINIC | Age: 62
End: 2022-09-19
Payer: MEDICAID

## 2022-09-19 DIAGNOSIS — M54.16 LUMBAR RADICULOPATHY, CHRONIC: Primary | ICD-10-CM

## 2022-09-19 PROCEDURE — 1160F PR REVIEW ALL MEDS BY PRESCRIBER/CLIN PHARMACIST DOCUMENTED: ICD-10-PCS | Mod: CPTII,95,, | Performed by: PHYSICIAN ASSISTANT

## 2022-09-19 PROCEDURE — 3044F HG A1C LEVEL LT 7.0%: CPT | Mod: CPTII,95,, | Performed by: PHYSICIAN ASSISTANT

## 2022-09-19 PROCEDURE — 99214 PR OFFICE/OUTPT VISIT, EST, LEVL IV, 30-39 MIN: ICD-10-PCS | Mod: 95,,, | Performed by: PHYSICIAN ASSISTANT

## 2022-09-19 PROCEDURE — 1159F PR MEDICATION LIST DOCUMENTED IN MEDICAL RECORD: ICD-10-PCS | Mod: CPTII,95,, | Performed by: PHYSICIAN ASSISTANT

## 2022-09-19 PROCEDURE — 99214 OFFICE O/P EST MOD 30 MIN: CPT | Mod: 95,,, | Performed by: PHYSICIAN ASSISTANT

## 2022-09-19 PROCEDURE — 3044F PR MOST RECENT HEMOGLOBIN A1C LEVEL <7.0%: ICD-10-PCS | Mod: CPTII,95,, | Performed by: PHYSICIAN ASSISTANT

## 2022-09-19 PROCEDURE — 1160F RVW MEDS BY RX/DR IN RCRD: CPT | Mod: CPTII,95,, | Performed by: PHYSICIAN ASSISTANT

## 2022-09-19 PROCEDURE — 1159F MED LIST DOCD IN RCRD: CPT | Mod: CPTII,95,, | Performed by: PHYSICIAN ASSISTANT

## 2022-09-20 ENCOUNTER — TELEPHONE (OUTPATIENT)
Dept: GASTROENTEROLOGY | Facility: CLINIC | Age: 62
End: 2022-09-20

## 2022-09-20 ENCOUNTER — LAB VISIT (OUTPATIENT)
Dept: LAB | Facility: HOSPITAL | Age: 62
End: 2022-09-20
Payer: MEDICAID

## 2022-09-20 ENCOUNTER — PATIENT MESSAGE (OUTPATIENT)
Dept: PAIN MEDICINE | Facility: CLINIC | Age: 62
End: 2022-09-20

## 2022-09-20 DIAGNOSIS — D50.0 IRON DEFICIENCY ANEMIA DUE TO CHRONIC BLOOD LOSS: ICD-10-CM

## 2022-09-20 DIAGNOSIS — E11.40 TYPE 2 DIABETES MELLITUS WITH DIABETIC NEUROPATHY, WITHOUT LONG-TERM CURRENT USE OF INSULIN: Chronic | ICD-10-CM

## 2022-09-20 LAB
BASOPHILS # BLD AUTO: 0.02 K/UL (ref 0–0.2)
BASOPHILS NFR BLD: 0.4 % (ref 0–1.9)
DIFFERENTIAL METHOD: NORMAL
EOSINOPHIL # BLD AUTO: 0.1 K/UL (ref 0–0.5)
EOSINOPHIL NFR BLD: 2.6 % (ref 0–8)
ERYTHROCYTE [DISTWIDTH] IN BLOOD BY AUTOMATED COUNT: 13.7 % (ref 11.5–14.5)
ESTIMATED AVG GLUCOSE: 100 MG/DL (ref 68–131)
FERRITIN SERPL-MCNC: 34 NG/ML (ref 20–300)
HBA1C MFR BLD: 5.1 % (ref 4–5.6)
HCT VFR BLD AUTO: 38.1 % (ref 37–48.5)
HGB BLD-MCNC: 12.3 G/DL (ref 12–16)
IMM GRANULOCYTES # BLD AUTO: 0 K/UL (ref 0–0.04)
IMM GRANULOCYTES NFR BLD AUTO: 0 % (ref 0–0.5)
IRON SERPL-MCNC: 45 UG/DL (ref 30–160)
LYMPHOCYTES # BLD AUTO: 2 K/UL (ref 1–4.8)
LYMPHOCYTES NFR BLD: 40.3 % (ref 18–48)
MCH RBC QN AUTO: 29.1 PG (ref 27–31)
MCHC RBC AUTO-ENTMCNC: 32.3 G/DL (ref 32–36)
MCV RBC AUTO: 90 FL (ref 82–98)
MONOCYTES # BLD AUTO: 0.3 K/UL (ref 0.3–1)
MONOCYTES NFR BLD: 5.1 % (ref 4–15)
NEUTROPHILS # BLD AUTO: 2.6 K/UL (ref 1.8–7.7)
NEUTROPHILS NFR BLD: 51.6 % (ref 38–73)
NRBC BLD-RTO: 0 /100 WBC
PLATELET # BLD AUTO: 381 K/UL (ref 150–450)
PMV BLD AUTO: 9.9 FL (ref 9.2–12.9)
RBC # BLD AUTO: 4.23 M/UL (ref 4–5.4)
SATURATED IRON: 12 % (ref 20–50)
TOTAL IRON BINDING CAPACITY: 385 UG/DL (ref 250–450)
TRANSFERRIN SERPL-MCNC: 260 MG/DL (ref 200–375)
WBC # BLD AUTO: 5.06 K/UL (ref 3.9–12.7)

## 2022-09-20 PROCEDURE — 36415 COLL VENOUS BLD VENIPUNCTURE: CPT | Mod: PO | Performed by: NURSE PRACTITIONER

## 2022-09-20 PROCEDURE — 85025 COMPLETE CBC W/AUTO DIFF WBC: CPT | Mod: PO | Performed by: NURSE PRACTITIONER

## 2022-09-20 PROCEDURE — 84466 ASSAY OF TRANSFERRIN: CPT | Performed by: NURSE PRACTITIONER

## 2022-09-20 PROCEDURE — 82728 ASSAY OF FERRITIN: CPT | Performed by: NURSE PRACTITIONER

## 2022-09-20 PROCEDURE — 83036 HEMOGLOBIN GLYCOSYLATED A1C: CPT | Performed by: FAMILY MEDICINE

## 2022-09-20 NOTE — TELEPHONE ENCOUNTER
Returned call to patient. She advised me that the transportation vehicle broke down and they were unsure when they would be able to get another to her. I informed her that we would need to reschedule. [facility] She verbalized understanding.

## 2022-09-20 NOTE — TELEPHONE ENCOUNTER
----- Message from Treva Ingram RN sent at 9/20/2022  9:11 AM CDT -----  Contact: Zakia    ----- Message -----  From: Anuja Landa  Sent: 9/20/2022   9:09 AM CDT  To: [facility] Staff    Patient is calling to speak to the nurse regarding appt. Reports having car trouble and is going to be late. Please give patient a call back at .[phone]

## 2022-09-21 NOTE — PROGRESS NOTES
1st check to see if patient has seen the results.  If not then  CALL patient with results and Document verification.  Schedule follow-up if needed.  [phone]  A1c is stable/improved slightly from previous.  Keep up the great work!  Continue current medication regimen.  Recheck A1c in six months.

## 2022-09-22 ENCOUNTER — OFFICE VISIT (OUTPATIENT)
Dept: HEMATOLOGY/ONCOLOGY | Facility: CLINIC | Age: 62
End: 2022-09-22
Payer: MEDICAID

## 2022-09-22 VITALS
HEART RATE: 88 BPM | HEIGHT: 68 IN | WEIGHT: 293 LBS | TEMPERATURE: 98 F | OXYGEN SATURATION: 98 % | DIASTOLIC BLOOD PRESSURE: 93 MMHG | BODY MASS INDEX: 44.41 KG/M2 | SYSTOLIC BLOOD PRESSURE: 140 MMHG

## 2022-09-22 DIAGNOSIS — D50.0 IRON DEFICIENCY ANEMIA DUE TO CHRONIC BLOOD LOSS: Primary | ICD-10-CM

## 2022-09-22 PROCEDURE — 3080F DIAST BP >= 90 MM HG: CPT | Mod: CPTII,,, | Performed by: NURSE PRACTITIONER

## 2022-09-22 PROCEDURE — 99214 PR OFFICE/OUTPT VISIT, EST, LEVL IV, 30-39 MIN: ICD-10-PCS | Mod: S$PBB,,, | Performed by: NURSE PRACTITIONER

## 2022-09-22 PROCEDURE — 99214 OFFICE O/P EST MOD 30 MIN: CPT | Mod: PBBFAC | Performed by: NURSE PRACTITIONER

## 2022-09-22 PROCEDURE — 3044F PR MOST RECENT HEMOGLOBIN A1C LEVEL <7.0%: ICD-10-PCS | Mod: CPTII,,, | Performed by: NURSE PRACTITIONER

## 2022-09-22 PROCEDURE — 1159F PR MEDICATION LIST DOCUMENTED IN MEDICAL RECORD: ICD-10-PCS | Mod: CPTII,,, | Performed by: NURSE PRACTITIONER

## 2022-09-22 PROCEDURE — 3077F SYST BP >= 140 MM HG: CPT | Mod: CPTII,,, | Performed by: NURSE PRACTITIONER

## 2022-09-22 PROCEDURE — 3080F PR MOST RECENT DIASTOLIC BLOOD PRESSURE >= 90 MM HG: ICD-10-PCS | Mod: CPTII,,, | Performed by: NURSE PRACTITIONER

## 2022-09-22 PROCEDURE — 1160F RVW MEDS BY RX/DR IN RCRD: CPT | Mod: CPTII,,, | Performed by: NURSE PRACTITIONER

## 2022-09-22 PROCEDURE — 1159F MED LIST DOCD IN RCRD: CPT | Mod: CPTII,,, | Performed by: NURSE PRACTITIONER

## 2022-09-22 PROCEDURE — 1160F PR REVIEW ALL MEDS BY PRESCRIBER/CLIN PHARMACIST DOCUMENTED: ICD-10-PCS | Mod: CPTII,,, | Performed by: NURSE PRACTITIONER

## 2022-09-22 PROCEDURE — 3044F HG A1C LEVEL LT 7.0%: CPT | Mod: CPTII,,, | Performed by: NURSE PRACTITIONER

## 2022-09-22 PROCEDURE — 3077F PR MOST RECENT SYSTOLIC BLOOD PRESSURE >= 140 MM HG: ICD-10-PCS | Mod: CPTII,,, | Performed by: NURSE PRACTITIONER

## 2022-09-22 PROCEDURE — 3008F BODY MASS INDEX DOCD: CPT | Mod: CPTII,,, | Performed by: NURSE PRACTITIONER

## 2022-09-22 PROCEDURE — 3008F PR BODY MASS INDEX (BMI) DOCUMENTED: ICD-10-PCS | Mod: CPTII,,, | Performed by: NURSE PRACTITIONER

## 2022-09-22 PROCEDURE — 99999 PR PBB SHADOW E&M-EST. PATIENT-LVL IV: ICD-10-PCS | Mod: PBBFAC,,, | Performed by: NURSE PRACTITIONER

## 2022-09-22 PROCEDURE — 99214 OFFICE O/P EST MOD 30 MIN: CPT | Mod: S$PBB,,, | Performed by: NURSE PRACTITIONER

## 2022-09-22 PROCEDURE — 99999 PR PBB SHADOW E&M-EST. PATIENT-LVL IV: CPT | Mod: PBBFAC,,, | Performed by: NURSE PRACTITIONER

## 2022-09-22 NOTE — ASSESSMENT & PLAN NOTE
Persistent mild iron deficiency with gradual decline in iron levels. Hemoglobin remains wnl but also with gradual decline. Prior h/o H. Pylori. Repeat H. Pylori testing negative. Patient previously d/c'd oral iron supplementation due to constipation. Discussed with patient recommendations for VCE. For now she would like to hold off on this and think about it. She had GI appointment 9/2022 but rescheduled to next month    List of iron rich foods from up to date given to patient    -encourage iron rich foods  -Hold IV iron at present time per patient request. She would like f/u with GI first  -f/u 3 months with cbc, iron, ferritin  -Discussed S&S to report sooner

## 2022-09-22 NOTE — PROGRESS NOTES
Subjective:       Patient ID: Zakia Price is a 62 y.o. female.    Chief Complaint: review labs. anemia    HPI: 62 y.o female presenting today for follow up of her ion deficiency anemia treated with Feraheme 5/2021. Recent EGD/Colonoscopy evaluation reviewed. EGD pathology H. Pylori positive, she completed treatment end of July. Repeat testing reflect eradication of H. Pylori infection. Colonoscopy unremarkable with recommended repeat in 10 years. She feels well and is without complaints. Denies abnormal bleeding or anemia symptoms. Has chronic constipation for which she sees GI. She has upcoming GI appointment 10/3. Reports issues with indigestion, lack of appetite causing weight loss            Social History     Socioeconomic History    Marital status: Single   Tobacco Use    Smoking status: Never    Smokeless tobacco: Never   Substance and Sexual Activity    Alcohol use: No    Drug use: No    Sexual activity: Not Currently       Past Medical History:   Diagnosis Date    Acute respiratory failure due to COVID-19     COVID-19     Diabetes mellitus, type 2     Diabetic neuropathy 1/27/2014    DJD (degenerative joint disease) of knee     DVT (deep venous thrombosis) around 1990's    in leg, is on no anticoagulant therapy presently    Fatty liver     GERD (gastroesophageal reflux disease)     Hypertension associated with diabetes     HECTOR (iron deficiency anemia) 5/13/2021    Multinodular goiter     Obesity, morbid, BMI 50 or higher     Sleep apnea     has no CPAP       Family History   Problem Relation Age of Onset    Leukemia Son     Cancer Son     Diabetes Mother     Hypertension Mother     Heart disease Mother 50    Cancer Father     Arthritis Father     Cancer Brother     Cancer Brother        Past Surgical History:   Procedure Laterality Date    COLONOSCOPY N/A 5/12/2021    Procedure: COLONOSCOPY;  Surgeon: Carolina Rizo MD;  Location: [facility];  Service: Endoscopy;  Laterality: N/A;    EPIDURAL  STEROID INJECTION N/A 12/10/2021    Procedure: Lumbar L5/S1 IL TEETEE  Would like AM procedure, if possible;  Surgeon: Nae Paul MD;  Location: [facility]IN [facility];  Service: Pain Management;  Laterality: N/A;    EPIDURAL STEROID INJECTION INTO CERVICAL SPINE N/A 10/8/2021    Procedure: C7-T1 IL TEETEE-no sedation.  Needs IV-just incase;  Surgeon: Nae Paul MD;  Location: [facility];  Service: Pain Management;  Laterality: N/A;    ESOPHAGOGASTRODUODENOSCOPY N/A 7/8/2021    Procedure: EGD (ESOPHAGOGASTRODUODENOSCOPY) previous positve covid;  Surgeon: Jann Gutierrez MD;  Location: [facility];  Service: Endoscopy;  Laterality: N/A;    FRACTURE SURGERY      HYSTERECTOMY      SELECTIVE INJECTION OF ANESTHETIC AGENT AROUND LUMBAR SPINAL NERVE ROOT BY TRANSFORAMINAL APPROACH Bilateral 5/6/2022    Procedure: Bilateral L5/S1 TF TEETEE with RN IV sedation;  Surgeon: Nae Paul MD;  Location: [facility];  Service: Pain Management;  Laterality: Bilateral;    SHOULDER ARTHROSCOPY      THYROIDECTOMY, PARTIAL Right     and transplatation of right superior parathyroid gland to the sternocleidomastoid muscle     TONSILLECTOMY      TUBAL LIGATION  1984    WRIST FRACTURE SURGERY      left       Review of Systems   Constitutional:  Positive for appetite change and unexpected weight change. Negative for activity change, chills, fatigue and fever.   HENT:  Negative for congestion, mouth sores, nosebleeds, sore throat, trouble swallowing and voice change.    Eyes:  Negative for visual disturbance.   Respiratory:  Negative for cough, chest tightness, shortness of breath and wheezing.    Cardiovascular:  Negative for chest pain and leg swelling.   Gastrointestinal:  Positive for constipation. Negative for abdominal distention, abdominal pain, anal bleeding, blood in stool, diarrhea, nausea and vomiting.   Genitourinary:  Negative for difficulty urinating, dysuria and hematuria.   Musculoskeletal:  Negative for arthralgias, back pain  and myalgias.   Skin:  Negative for pallor, rash and wound.   Neurological:  Negative for dizziness, syncope, weakness and headaches.   Hematological:  Negative for adenopathy. Does not bruise/bleed easily.   Psychiatric/Behavioral:  The patient is not nervous/anxious.        Medication List with Changes/Refills   Current Medications    DICLOFENAC (VOLTAREN) 75 MG EC TABLET    Take 1 tablet (75 mg total) by mouth 2 (two) times daily.    DILTIAZEM (CARDIZEM) 30 MG TABLET    Take 1 tablet (30 mg total) by mouth every 12 (twelve) hours.    FAMOTIDINE (PEPCID) 40 MG TABLET    Take 1 tablet (40 mg total) by mouth once daily.    FERROUS SULFATE 324 MG (65 MG IRON) TBEC    Take 1 tablet (324 mg total) by mouth once daily.    FLUTICASONE PROPIONATE (FLONASE) 50 MCG/ACTUATION NASAL SPRAY    Use 2 sprays to each nostril daily    INHALATION SPACING DEVICE (BREATHERITE VALVED MDI CHAMBER)    Use as directed for inhalation.    LEVOTHYROXINE (SYNTHROID) 100 MCG TABLET    Take 1 tablet (100 mcg total) by mouth once daily.    LIDOCAINE-PRILOCAINE (EMLA) CREAM    SMARTSI-2 Pump Topical 3-4 Times Daily    LINACLOTIDE (LINZESS) 72 MCG CAP CAPSULE    Take 1 capsule (72 mcg total) by mouth once daily.    LORATADINE (CLARITIN) 10 MG TABLET    Take 10 mg by mouth once daily.    METFORMIN (GLUCOPHAGE) 1000 MG TABLET    Take 1 tablet (1,000 mg total) by mouth 2 (two) times daily with meals.    PRAVASTATIN (PRAVACHOL) 80 MG TABLET    Take 1 tablet (80 mg total) by mouth once daily.    PREGABALIN (LYRICA) 75 MG CAPSULE    Take 1 capsule (75 mg total) by mouth 3 (three) times daily.    ROPINIROLE (REQUIP) 0.25 MG TABLET    Take 1 tablet (0.25 mg total) by mouth every evening.    SEMAGLUTIDE (OZEMPIC) 1 MG/DOSE (4 MG/3 ML)    Inject 1 mg into the skin every 7 days.    SUCRALFATE (CARAFATE) 1 GRAM TABLET    Take 1 tablet (1 g total) by mouth 4 (four) times daily before meals and nightly.    TIZANIDINE (ZANAFLEX) 4 MG TABLET    Take 1/2 to 1  tablet by mouth twice daily as needed for muscle spasms. May cause drowsiness.    TOPIRAMATE (TOPAMAX) 100 MG TABLET    Take 1 tablet (100 mg total) by mouth 2 (two) times daily.    TRELEGY ELLIPTA 100-62.5-25 MCG DSDV    Inhale 1 puff into the lungs once daily.     Objective:     Vitals:    09/22/22 1346   BP: (!) 140/93   Pulse: 88   Temp: 97.8 °F (36.6 °C)     Lab Results   Component Value Date    WBC 5.06 09/20/2022    HGB 12.3 09/20/2022    HCT 38.1 09/20/2022    MCV 90 09/20/2022     09/20/2022       BMP  Lab Results   Component Value Date     02/24/2022    K 3.5 02/24/2022     (H) 02/24/2022    CO2 20 (L) 02/24/2022    BUN 13 02/24/2022    CREATININE 0.8 02/24/2022    CALCIUM 9.6 02/24/2022    ANIONGAP 11 02/24/2022    ESTGFRAFRICA >60.0 02/24/2022    EGFRNONAA >60.0 02/24/2022     Lab Results   Component Value Date    ALT 12 02/24/2022    AST 14 02/24/2022    ALKPHOS 75 02/24/2022    BILITOT 0.3 02/24/2022     Lab Results   Component Value Date    IRON 45 09/20/2022    TIBC 385 09/20/2022    FERRITIN 34 09/20/2022       Physical Exam  Vitals reviewed.   Constitutional:       Appearance: She is well-developed.   HENT:      Head: Normocephalic.      Right Ear: External ear normal.      Left Ear: External ear normal.   Eyes:      General: Lids are normal. No scleral icterus.        Right eye: No discharge.         Left eye: No discharge.      Conjunctiva/sclera: Conjunctivae normal.   Neck:      Thyroid: No thyroid mass.   Cardiovascular:      Rate and Rhythm: Normal rate and regular rhythm.      Heart sounds: Normal heart sounds. No murmur heard.  Pulmonary:      Effort: Pulmonary effort is normal. No respiratory distress.      Breath sounds: Normal breath sounds.   Abdominal:      General: There is no distension.   Genitourinary:     Comments: deferred  Musculoskeletal:         General: Normal range of motion.      Cervical back: Normal range of motion.   Skin:     General: Skin is warm and  dry.   Neurological:      Mental Status: She is alert and oriented to person, place, and time.   Psychiatric:         Speech: Speech normal.         Behavior: Behavior normal. Behavior is cooperative.         Thought Content: Thought content normal.        Assessment:     Problem List Items Addressed This Visit          Oncology    Iron deficiency anemia due to chronic blood loss - Primary     Persistent mild iron deficiency with gradual decline in iron levels. Hemoglobin remains wnl but also with gradual decline. Prior h/o H. Pylori. Repeat H. Pylori testing negative. Patient previously d/c'd oral iron supplementation due to constipation. Discussed with patient recommendations for VCE. For now she would like to hold off on this and think about it. She had GI appointment 9/2022 but rescheduled to next month    List of iron rich foods from up to date given to patient    -encourage iron rich foods  -Hold IV iron at present time per patient request. She would like f/u with GI first  -f/u 3 months with cbc, iron, ferritin  -Discussed S&S to report sooner         Relevant Orders    CBC Auto Differential    Iron and TIBC    Ferritin         Plan:     Iron deficiency anemia due to chronic blood loss  -     CBC Auto Differential; Future; Expected date: 09/22/2022  -     Iron and TIBC; Future; Expected date: 09/22/2022  -     Ferritin; Future; Expected date: 09/22/2022            KARO Arroyo

## 2022-09-28 ENCOUNTER — TELEPHONE (OUTPATIENT)
Dept: FAMILY MEDICINE | Facility: CLINIC | Age: 62
End: 2022-09-28

## 2022-09-28 ENCOUNTER — OFFICE VISIT (OUTPATIENT)
Dept: FAMILY MEDICINE | Facility: CLINIC | Age: 62
End: 2022-09-28
Payer: MEDICAID

## 2022-09-28 DIAGNOSIS — J06.9 UPPER RESPIRATORY TRACT INFECTION, UNSPECIFIED TYPE: Primary | ICD-10-CM

## 2022-09-28 DIAGNOSIS — H10.32 ACUTE CONJUNCTIVITIS OF LEFT EYE, UNSPECIFIED ACUTE CONJUNCTIVITIS TYPE: ICD-10-CM

## 2022-09-28 PROCEDURE — 1160F PR REVIEW ALL MEDS BY PRESCRIBER/CLIN PHARMACIST DOCUMENTED: ICD-10-PCS | Mod: CPTII,95,, | Performed by: FAMILY MEDICINE

## 2022-09-28 PROCEDURE — 3044F HG A1C LEVEL LT 7.0%: CPT | Mod: CPTII,95,, | Performed by: FAMILY MEDICINE

## 2022-09-28 PROCEDURE — 99213 OFFICE O/P EST LOW 20 MIN: CPT | Mod: 95,,, | Performed by: FAMILY MEDICINE

## 2022-09-28 PROCEDURE — 3044F PR MOST RECENT HEMOGLOBIN A1C LEVEL <7.0%: ICD-10-PCS | Mod: CPTII,95,, | Performed by: FAMILY MEDICINE

## 2022-09-28 PROCEDURE — 1159F MED LIST DOCD IN RCRD: CPT | Mod: CPTII,95,, | Performed by: FAMILY MEDICINE

## 2022-09-28 PROCEDURE — 1159F PR MEDICATION LIST DOCUMENTED IN MEDICAL RECORD: ICD-10-PCS | Mod: CPTII,95,, | Performed by: FAMILY MEDICINE

## 2022-09-28 PROCEDURE — 1160F RVW MEDS BY RX/DR IN RCRD: CPT | Mod: CPTII,95,, | Performed by: FAMILY MEDICINE

## 2022-09-28 PROCEDURE — 99213 PR OFFICE/OUTPT VISIT, EST, LEVL III, 20-29 MIN: ICD-10-PCS | Mod: 95,,, | Performed by: FAMILY MEDICINE

## 2022-09-28 RX ORDER — MONTELUKAST SODIUM 10 MG/1
10 TABLET ORAL NIGHTLY
Qty: 10 TABLET | Refills: 0 | Status: SHIPPED | OUTPATIENT
Start: 2022-09-28 | End: 2022-10-08

## 2022-09-28 RX ORDER — SULFACETAMIDE SODIUM 100 MG/ML
2 SOLUTION/ DROPS OPHTHALMIC 4 TIMES DAILY
Qty: 5 ML | Refills: 0 | Status: SHIPPED | OUTPATIENT
Start: 2022-09-28

## 2022-09-28 NOTE — TELEPHONE ENCOUNTER
----- Message from Tameka Mandel sent at 9/28/2022 11:25 AM CDT -----  Contact: [phone]  Pt would like to consult with a nurse in regards to getting a prescription. Pt stated that her eye are red, she is not sure if it the pink eye or just allergies and would like to know what's the next step. Please call her back at [phone]. Thanks KB

## 2022-09-28 NOTE — PROGRESS NOTES
The patient location is: Home  The chief complaint leading to consultation is:Upper rsp congestion   Visit type: Virtual visit with synchronous audio   Total time spent with patient: 15 minutes  Each patient to whom he or she provides medical services by telemedicine is:  (1) informed of the relationship between the physician and patient and the respective role of any other health care provider with respect to management of the patient; and (2) notified that he or she may decline to receive medical services by telemedicine and may withdraw from such care at any time.    Notes:   Zakia Price presents with OS red w dc moderate upper respiratory congestion,rhinnorhea,past 2-3 days. No dyspnea Denies nausea,vomiting,diarrhea or significant fever.    Past Medical History:   Diagnosis Date    Acute respiratory failure due to COVID-19     COVID-19     Diabetes mellitus, type 2     Diabetic neuropathy 1/27/2014    DJD (degenerative joint disease) of knee     DVT (deep venous thrombosis) around 1990's    in leg, is on no anticoagulant therapy presently    Fatty liver     GERD (gastroesophageal reflux disease)     Hypertension associated with diabetes     HECTOR (iron deficiency anemia) 5/13/2021    Multinodular goiter     Obesity, morbid, BMI 50 or higher     Sleep apnea     has no CPAP     Past Surgical History:   Procedure Laterality Date    COLONOSCOPY N/A 5/12/2021    Procedure: COLONOSCOPY;  Surgeon: Carolina Rizo MD;  Location: [facility];  Service: Endoscopy;  Laterality: N/A;    EPIDURAL STEROID INJECTION N/A 12/10/2021    Procedure: Lumbar L5/S1 IL TEETEE  Would like AM procedure, if possible;  Surgeon: Nae Paul MD;  [facility];  Service: Pain Management;  Laterality: N/A;    EPIDURAL STEROID INJECTION INTO CERVICAL SPINE N/A 10/8/2021    Procedure: C7-T1 IL TEETEE-no sedation.  Needs IV-just incase;  Surgeon: Nae Paul MD;  Location: [facility] PAIN T;  Service: Pain Management;   Laterality: N/A;    ESOPHAGOGASTRODUODENOSCOPY N/A 2021    Procedure: EGD (ESOPHAGOGASTRODUODENOSCOPY) previous positve covid;  Surgeon: Jann Gutierrez MD;  Location: [facility] ENDO;  Service: Endoscopy;  Laterality: N/A;    FRACTURE SURGERY      HYSTERECTOMY      SELECTIVE INJECTION OF ANESTHETIC AGENT AROUND LUMBAR SPINAL NERVE ROOT BY TRANSFORAMINAL APPROACH Bilateral 2022    Procedure: Bilateral L5/S1 TF TEETEE with RN IV sedation;  Surgeon: Nae Paul MD;  Location: [facility] PAIN [facility]GT;  Service: Pain Management;  Laterality: Bilateral;    SHOULDER ARTHROSCOPY      THYROIDECTOMY, PARTIAL Right     and transplatation of right superior parathyroid gland to the sternocleidomastoid muscle     TONSILLECTOMY      TUBAL LIGATION      WRIST FRACTURE SURGERY      left     Review of patient's allergies indicates:   Allergen Reactions    Codeine sulfate      Nausea^    Lisinopril Swelling     angioedema    Codeine Nausea Only and Rash     Current Outpatient Medications on File Prior to Visit   Medication Sig Dispense Refill    diclofenac (VOLTAREN) 75 MG EC tablet Take 1 tablet (75 mg total) by mouth 2 (two) times daily. 180 tablet 4    diltiaZEM (CARDIZEM) 30 MG tablet Take 1 tablet (30 mg total) by mouth every 12 (twelve) hours. 60 tablet 11    famotidine (PEPCID) 40 MG tablet Take 1 tablet (40 mg total) by mouth once daily. 90 tablet 4    ferrous sulfate 324 mg (65 mg iron) TbEC Take 1 tablet (324 mg total) by mouth once daily. 365 tablet 0    fluticasone propionate (FLONASE) 50 mcg/actuation nasal spray Use 2 sprays to each nostril daily 16 g 12    inhalation spacing device (BREATHERITE VALVED MDI CHAMBER) Use as directed for inhalation. 1 Device prn    levothyroxine (SYNTHROID) 100 MCG tablet Take 1 tablet (100 mcg total) by mouth once daily. 90 tablet 1    LIDOcaine-prilocaine (EMLA) cream SMARTSI-2 Pump Topical 3-4 Times Daily      linaCLOtide (LINZESS) 72 mcg Cap capsule Take 1 capsule (72 mcg total) by  mouth once daily. 30 capsule 12    loratadine (CLARITIN) 10 mg tablet Take 10 mg by mouth once daily.      metFORMIN (GLUCOPHAGE) 1000 MG tablet Take 1 tablet (1,000 mg total) by mouth 2 (two) times daily with meals. 180 tablet 4    pravastatin (PRAVACHOL) 80 MG tablet Take 1 tablet (80 mg total) by mouth once daily. 90 tablet 4    pregabalin (LYRICA) 75 MG capsule Take 1 capsule (75 mg total) by mouth 3 (three) times daily. 90 capsule 3    rOPINIRole (REQUIP) 0.25 MG tablet Take 1 tablet (0.25 mg total) by mouth every evening. 30 tablet 11    semaglutide (OZEMPIC) 1 mg/dose (4 mg/3 mL) Inject 1 mg into the skin every 7 days. 3 pen 4    sucralfate (CARAFATE) 1 gram tablet Take 1 tablet (1 g total) by mouth 4 (four) times daily before meals and nightly. 120 tablet 2    tiZANidine (ZANAFLEX) 4 MG tablet Take 1/2 to 1 tablet by mouth twice daily as needed for muscle spasms. May cause drowsiness. 60 tablet 4    topiramate (TOPAMAX) 100 MG tablet Take 1 tablet (100 mg total) by mouth 2 (two) times daily. 60 tablet 11    TRELEGY ELLIPTA 100-62.5-25 mcg DsDv Inhale 1 puff into the lungs once daily.       No current facility-administered medications on file prior to visit.     Social History     Socioeconomic History    Marital status: Single   Tobacco Use    Smoking status: Never    Smokeless tobacco: Never   Substance and Sexual Activity    Alcohol use: No    Drug use: No    Sexual activity: Not Currently     Family History   Problem Relation Age of Onset    Leukemia Son     Cancer Son     Diabetes Mother     Hypertension Mother     Heart disease Mother 50    Cancer Father     Arthritis Father     Cancer Brother     Cancer Brother          ROS:  SKIN: No rashes, itching or changes in color or texture of skin.  EYES: Visual acuity fine. Sl photophobia, purulent dcocular pain no diplopia.EARS: Denies ear pain, discharge or vertigo.NOSE: No loss of smell, no epistaxis some postnasal drip.MOUTH & THROAT: No hoarseness or  change in voice. No excessive gum bleeding.CHEST: Denies ROMERO, cyanosis, wheezing  CARDIOVASCULAR: Denies chest pain, PND, orthopnea or reduced exercise tolerance.  ABDOMEN:  No weight loss.No abdominal pain, no hematemesis or blood in stool.  URINARY: No flank pain, dysuria or hematuria.  PERIPHERAL VASCULAR: No claudication or cyanosis.  MUSCULOSKELETAL: Negative   NEUROLOGIC: No history of seizures, paralysis, alteration of gait or coordination.    PE: Heent:OS red w dc per pt report  Chest:No tachypnea. No wheezing, rhonchi on forced expiration  Abdomen:Soft, non tender to patient palpation  Impression: Upper Respiratory Infection. 465.9/AR/Conjunctivitis  Diagnoses and all orders for this visit:    Upper respiratory tract infection, unspecified type    Acute conjunctivitis of left eye, unspecified acute conjunctivitis type    Other orders  -     montelukast (SINGULAIR) 10 mg tablet; Take 1 tablet (10 mg total) by mouth every evening. for 10 days  -     sulfacetamide sodium 10% (BLEPH-10) 10 % ophthalmic solution; Place 2 drops into the left eye 4 (four) times daily.

## 2022-09-28 NOTE — TELEPHONE ENCOUNTER
----- Message from Tameka Mandel sent at 9/28/2022 11:22 AM CDT -----  Contact: [phone]  Pt is calling in regards to getting a prior authorization for the semaglutide (OZEMPIC) 1 mg/dose (4 mg/3 mL). Please call her back at [phone].        [facility] [address] [place] [address] [address]  Phone: [phone] Fax: [phone]        Thanks KB

## 2022-10-03 ENCOUNTER — OFFICE VISIT (OUTPATIENT)
Dept: GASTROENTEROLOGY | Facility: CLINIC | Age: 62
End: 2022-10-03
Payer: MEDICAID

## 2022-10-03 ENCOUNTER — PATIENT MESSAGE (OUTPATIENT)
Dept: GASTROENTEROLOGY | Facility: CLINIC | Age: 62
End: 2022-10-03

## 2022-10-03 VITALS
HEART RATE: 65 BPM | BODY MASS INDEX: 44.41 KG/M2 | HEIGHT: 68 IN | WEIGHT: 293 LBS | DIASTOLIC BLOOD PRESSURE: 89 MMHG | OXYGEN SATURATION: 98 % | SYSTOLIC BLOOD PRESSURE: 139 MMHG

## 2022-10-03 DIAGNOSIS — E61.1 IRON DEFICIENCY: Primary | ICD-10-CM

## 2022-10-03 DIAGNOSIS — K21.00 GASTROESOPHAGEAL REFLUX DISEASE WITH ESOPHAGITIS WITHOUT HEMORRHAGE: ICD-10-CM

## 2022-10-03 DIAGNOSIS — R11.10 VOMITING, UNSPECIFIED VOMITING TYPE, UNSPECIFIED WHETHER NAUSEA PRESENT: ICD-10-CM

## 2022-10-03 DIAGNOSIS — K59.00 CONSTIPATION, UNSPECIFIED CONSTIPATION TYPE: ICD-10-CM

## 2022-10-03 DIAGNOSIS — Z86.19 HISTORY OF HELICOBACTER PYLORI INFECTION: ICD-10-CM

## 2022-10-03 PROCEDURE — 99203 PR OFFICE/OUTPT VISIT, NEW, LEVL III, 30-44 MIN: ICD-10-PCS | Mod: S$PBB,,, | Performed by: PHYSICIAN ASSISTANT

## 2022-10-03 PROCEDURE — 3044F HG A1C LEVEL LT 7.0%: CPT | Mod: CPTII,,, | Performed by: PHYSICIAN ASSISTANT

## 2022-10-03 PROCEDURE — 1159F MED LIST DOCD IN RCRD: CPT | Mod: CPTII,,, | Performed by: PHYSICIAN ASSISTANT

## 2022-10-03 PROCEDURE — 3008F PR BODY MASS INDEX (BMI) DOCUMENTED: ICD-10-PCS | Mod: CPTII,,, | Performed by: PHYSICIAN ASSISTANT

## 2022-10-03 PROCEDURE — 99203 OFFICE O/P NEW LOW 30 MIN: CPT | Mod: S$PBB,,, | Performed by: PHYSICIAN ASSISTANT

## 2022-10-03 PROCEDURE — 3075F PR MOST RECENT SYSTOLIC BLOOD PRESS GE 130-139MM HG: ICD-10-PCS | Mod: CPTII,,, | Performed by: PHYSICIAN ASSISTANT

## 2022-10-03 PROCEDURE — 3044F PR MOST RECENT HEMOGLOBIN A1C LEVEL <7.0%: ICD-10-PCS | Mod: CPTII,,, | Performed by: PHYSICIAN ASSISTANT

## 2022-10-03 PROCEDURE — 3079F PR MOST RECENT DIASTOLIC BLOOD PRESSURE 80-89 MM HG: ICD-10-PCS | Mod: CPTII,,, | Performed by: PHYSICIAN ASSISTANT

## 2022-10-03 PROCEDURE — 3079F DIAST BP 80-89 MM HG: CPT | Mod: CPTII,,, | Performed by: PHYSICIAN ASSISTANT

## 2022-10-03 PROCEDURE — 99215 OFFICE O/P EST HI 40 MIN: CPT | Mod: PBBFAC | Performed by: PHYSICIAN ASSISTANT

## 2022-10-03 PROCEDURE — 99999 PR PBB SHADOW E&M-EST. PATIENT-LVL V: CPT | Mod: PBBFAC,,, | Performed by: PHYSICIAN ASSISTANT

## 2022-10-03 PROCEDURE — 99999 PR PBB SHADOW E&M-EST. PATIENT-LVL V: ICD-10-PCS | Mod: PBBFAC,,, | Performed by: PHYSICIAN ASSISTANT

## 2022-10-03 PROCEDURE — 3075F SYST BP GE 130 - 139MM HG: CPT | Mod: CPTII,,, | Performed by: PHYSICIAN ASSISTANT

## 2022-10-03 PROCEDURE — 3008F BODY MASS INDEX DOCD: CPT | Mod: CPTII,,, | Performed by: PHYSICIAN ASSISTANT

## 2022-10-03 PROCEDURE — 1159F PR MEDICATION LIST DOCUMENTED IN MEDICAL RECORD: ICD-10-PCS | Mod: CPTII,,, | Performed by: PHYSICIAN ASSISTANT

## 2022-10-03 RX ORDER — PANTOPRAZOLE SODIUM 40 MG/1
40 TABLET, DELAYED RELEASE ORAL DAILY
Qty: 30 TABLET | Refills: 11 | Status: SHIPPED | OUTPATIENT
Start: 2022-10-03 | End: 2023-03-20 | Stop reason: SDUPTHER

## 2022-10-03 NOTE — PROGRESS NOTES
"Clinic Consult:  [facility] Consultation Note    Reason for Consult:  The primary encounter diagnosis was Iron deficiency. Diagnoses of Gastroesophageal reflux disease with esophagitis without hemorrhage, History of Helicobacter pylori infection, Vomiting, unspecified vomiting type, unspecified whether nausea present, and Constipation, unspecified constipation type were also pertinent to this visit.    PCP: Oleksandr Nagel   [id number] [facility] / [place] [id number]    CC: Discuss recent lab results (Was told iron levels were abnormal.) and Gastroesophageal Reflux (Intermittent relief on Pepcid.)      HPI:  This is a 62 y.o. female here for evaluation of the above.   Acid reflux is bothering her. Present for years but really flaring over the past couple of months. Feels burning sensation, bitter taste in her mouth, food sitting in her chest. Feels full quickly. Will regurgitate often - not every day but comes and goes. No PPI therapy. Sucralfate making her itch. Pepcid every day with no real improvement. Reports bruising spots popping up recently.  Hgb and plt normal. She is iron deficient which went from sat iron 18 to 12. Denies overt bleeding. Colon and EGD completed in 2021. + H pylori with confirmed eradication.  Also struggling with constipation. Has been on Linzess 72mcg daily for "a while". Shows mild improvement, but still having trouble with bowels. Would like to increase dose.      Review of Systems   Constitutional:  Negative for activity change, appetite change, chills, diaphoresis, fatigue, fever and unexpected weight change.   HENT:  Negative for trouble swallowing and voice change.    Respiratory:  Negative for cough and shortness of breath.    Cardiovascular:  Negative for chest pain.   Gastrointestinal:  Positive for constipation, nausea and vomiting. Negative for abdominal pain, blood in stool and diarrhea.   Genitourinary:  Negative for dysuria.   Musculoskeletal:  Negative for back " pain.   Skin:  Negative for color change and pallor.   Neurological:  Negative for dizziness, weakness and light-headedness.   Psychiatric/Behavioral:  Negative for confusion and dysphoric mood. The patient is not nervous/anxious.       Medical History:   Past Medical History:   Diagnosis Date    Acute respiratory failure due to COVID-19     COVID-19     Diabetes mellitus, type 2     Diabetic neuropathy 1/27/2014    DJD (degenerative joint disease) of knee     DVT (deep venous thrombosis) around 1990's    in leg, is on no anticoagulant therapy presently    Fatty liver     GERD (gastroesophageal reflux disease)     Hypertension associated with diabetes     HECTOR (iron deficiency anemia) 5/13/2021    Multinodular goiter     Obesity, morbid, BMI 50 or higher     Sleep apnea     has no CPAP       Surgical History:   Past Surgical History:   Procedure Laterality Date    COLONOSCOPY N/A 5/12/2021    Procedure: COLONOSCOPY;  Surgeon: Carolina Rizo MD;  Location: [facility];  Service: Endoscopy;  Laterality: N/A;    EPIDURAL STEROID INJECTION N/A 12/10/2021    Procedure: Lumbar L5/S1 IL TEETEE  Would like AM procedure, if possible;  Surgeon: Nae Paul MD;  Location: [facility];  Service: Pain Management;  Laterality: N/A;    EPIDURAL STEROID INJECTION INTO CERVICAL SPINE N/A 10/8/2021    Procedure: C7-T1 IL TEETEE-no sedation.  Needs IV-just incase;  Surgeon: Nae Paul MD;  Location: [facility];  Service: Pain Management;  Laterality: N/A;    ESOPHAGOGASTRODUODENOSCOPY N/A 7/8/2021    Procedure: EGD (ESOPHAGOGASTRODUODENOSCOPY) previous positve covid;  Surgeon: Jann Gutierrez MD;  Location: [facility];  Service: Endoscopy;  Laterality: N/A;    FRACTURE SURGERY      HYSTERECTOMY      SELECTIVE INJECTION OF ANESTHETIC AGENT AROUND LUMBAR SPINAL NERVE ROOT BY TRANSFORAMINAL APPROACH Bilateral 5/6/2022    Procedure: Bilateral L5/S1 TF TEETEE with RN IV sedation;  Surgeon: Nae Paul MD;  Location: [facility];   Service: Pain Management;  Laterality: Bilateral;    SHOULDER ARTHROSCOPY      THYROIDECTOMY, PARTIAL Right     and transplatation of right superior parathyroid gland to the sternocleidomastoid muscle     TONSILLECTOMY      TUBAL LIGATION  1984    WRIST FRACTURE SURGERY      left       Family History:    Family History   Problem Relation Age of Onset    Leukemia Son     Cancer Son     Diabetes Mother     Hypertension Mother     Heart disease Mother 50    Cancer Father     Arthritis Father     Cancer Brother     Cancer Brother        Social History:   Social History     Socioeconomic History    Marital status: Single   Tobacco Use    Smoking status: Never    Smokeless tobacco: Never   Substance and Sexual Activity    Alcohol use: No    Drug use: No    Sexual activity: Not Currently       Allergies:   Review of patient's allergies indicates:   Allergen Reactions    Codeine sulfate      Nausea^    Lisinopril Swelling     angioedema    Codeine Nausea Only and Rash       Home Medications:   Current Outpatient Medications on File Prior to Visit   Medication Sig Dispense Refill    diclofenac (VOLTAREN) 75 MG EC tablet Take 1 tablet (75 mg total) by mouth 2 (two) times daily. 180 tablet 4    diltiaZEM (CARDIZEM) 30 MG tablet Take 1 tablet (30 mg total) by mouth every 12 (twelve) hours. 60 tablet 11    ferrous sulfate 324 mg (65 mg iron) TbEC Take 1 tablet (324 mg total) by mouth once daily. 365 tablet 0    fluticasone propionate (FLONASE) 50 mcg/actuation nasal spray Use 2 sprays to each nostril daily 16 g 12    inhalation spacing device (BREATHERITE VALVED MDI CHAMBER) Use as directed for inhalation. 1 Device prn    levothyroxine (SYNTHROID) 100 MCG tablet Take 1 tablet (100 mcg total) by mouth once daily. 90 tablet 1    LIDOcaine-prilocaine (EMLA) cream SMARTSI-2 Pump Topical 3-4 Times Daily      loratadine (CLARITIN) 10 mg tablet Take 10 mg by mouth once daily.      metFORMIN (GLUCOPHAGE) 1000 MG tablet Take 1 tablet  "(1,000 mg total) by mouth 2 (two) times daily with meals. 180 tablet 4    montelukast (SINGULAIR) 10 mg tablet Take 1 tablet (10 mg total) by mouth every evening. for 10 days 10 tablet 0    pravastatin (PRAVACHOL) 80 MG tablet Take 1 tablet (80 mg total) by mouth once daily. 90 tablet 4    pregabalin (LYRICA) 75 MG capsule Take 1 capsule (75 mg total) by mouth 3 (three) times daily. 90 capsule 3    rOPINIRole (REQUIP) 0.25 MG tablet Take 1 tablet (0.25 mg total) by mouth every evening. 30 tablet 11    semaglutide (OZEMPIC) 1 mg/dose (4 mg/3 mL) Inject 1 mg into the skin every 7 days. 3 pen 4    sulfacetamide sodium 10% (BLEPH-10) 10 % ophthalmic solution Place 2 drops into the left eye 4 (four) times daily. 5 mL 0    tiZANidine (ZANAFLEX) 4 MG tablet Take 1/2 to 1 tablet by mouth twice daily as needed for muscle spasms. May cause drowsiness. 60 tablet 4    topiramate (TOPAMAX) 100 MG tablet Take 1 tablet (100 mg total) by mouth 2 (two) times daily. 60 tablet 11    TRELEGY ELLIPTA 100-62.5-25 mcg DsDv Inhale 1 puff into the lungs once daily.      [DISCONTINUED] famotidine (PEPCID) 40 MG tablet Take 1 tablet (40 mg total) by mouth once daily. 90 tablet 4    [DISCONTINUED] linaCLOtide (LINZESS) 72 mcg Cap capsule Take 1 capsule (72 mcg total) by mouth once daily. 30 capsule 12    [DISCONTINUED] sucralfate (CARAFATE) 1 gram tablet Take 1 tablet (1 g total) by mouth 4 (four) times daily before meals and nightly. 120 tablet 2     No current facility-administered medications on file prior to visit.       Physical Exam:  /89 (BP Location: Left arm, Patient Position: Sitting, BP Method: Medium (Manual)) Comment (BP Location): Wrist  Pulse 65   Ht 5' 8" (1.727 m)   Wt (!) 139.4 kg (307 lb 5.1 oz)   SpO2 98%   BMI 46.73 kg/m²   Body mass index is 46.73 kg/m².  Physical Exam  Constitutional:       General: She is not in acute distress.     Appearance: Normal appearance. She is not ill-appearing, toxic-appearing or " diaphoretic.   HENT:      Head: Normocephalic and atraumatic.   Eyes:      General: No scleral icterus.     Extraocular Movements: Extraocular movements intact.   Cardiovascular:      Rate and Rhythm: Normal rate and regular rhythm.   Pulmonary:      Effort: Pulmonary effort is normal. No respiratory distress.   Abdominal:      General: Bowel sounds are normal. There is no distension.      Palpations: Abdomen is soft. There is no mass.      Tenderness: There is no abdominal tenderness. There is no guarding.   Musculoskeletal:         General: Normal range of motion.      Cervical back: Normal range of motion.   Skin:     General: Skin is warm and dry.      Coloration: Skin is not jaundiced or pale.   Neurological:      Mental Status: She is alert and oriented to person, place, and time.         Labs: Pertinent labs reviewed.  Endoscopy:   CRC Screenin  Anticoagulation: none    Assessment:  1. Iron deficiency    2. Gastroesophageal reflux disease with esophagitis without hemorrhage    3. History of Helicobacter pylori infection    4. Vomiting, unspecified vomiting type, unspecified whether nausea present    5. Constipation, unspecified constipation type         Recommendations:  -Schedule for VCE given ongoing HECTOR. + H. Pylori can cause HECTOR, however this has been eradicated.   -Begin Protonix daily. Stop Carafate given the itching. Can stop Pepcid in the interim. Will see her back in 4 weeks. If she needs additional therapy, can add Pepcid back into the regimen.   -Trial of increased Linzess given the fact that her bowels are not moving well. This can also be worsening her GERD.  -F/u 4 weeks.    Iron deficiency  -     pantoprazole (PROTONIX) 40 MG tablet; Take 1 tablet (40 mg total) by mouth once daily.  Dispense: 30 tablet; Refill: 11  -     Case Request Endoscopy: IMAGING PROCEDURE, GI TRACT, INTRALUMINAL, VIA CAPSULE    Gastroesophageal reflux disease with esophagitis without hemorrhage  -      Ambulatory referral/consult to Gastroenterology  -     pantoprazole (PROTONIX) 40 MG tablet; Take 1 tablet (40 mg total) by mouth once daily.  Dispense: 30 tablet; Refill: 11    History of Helicobacter pylori infection  -     Ambulatory referral/consult to Gastroenterology    Vomiting, unspecified vomiting type, unspecified whether nausea present  -     pantoprazole (PROTONIX) 40 MG tablet; Take 1 tablet (40 mg total) by mouth once daily.  Dispense: 30 tablet; Refill: 11    Constipation, unspecified constipation type  -     linaCLOtide (LINZESS) 145 mcg Cap capsule; Take 1 capsule (145 mcg total) by mouth once daily.  Dispense: 30 capsule; Refill: 5      Follow up in about 4 weeks (around 10/31/2022).    Thank you so much for allowing me to participate in the care of Zakia Crenshaw PA-C

## 2022-10-14 ENCOUNTER — TELEPHONE (OUTPATIENT)
Dept: PAIN MEDICINE | Facility: CLINIC | Age: 62
End: 2022-10-14

## 2022-10-14 NOTE — TELEPHONE ENCOUNTER
The pt procedure is not approved and she stated that she will call to reschedule at a later date.

## 2022-10-18 ENCOUNTER — PATIENT MESSAGE (OUTPATIENT)
Dept: ADMINISTRATIVE | Facility: HOSPITAL | Age: 62
End: 2022-10-18

## 2022-10-18 ENCOUNTER — TELEPHONE (OUTPATIENT)
Dept: PAIN MEDICINE | Facility: CLINIC | Age: 62
End: 2022-10-18

## 2022-10-18 NOTE — TELEPHONE ENCOUNTER
----- Message from Taylor Redman PA-C sent at 10/18/2022  1:30 PM CDT -----  Regarding: RE: Pending Approval  Sarai please call the patient and schedule the procedure now that we have approval    taylor  ----- Message -----  From: Krupa Guerrier  Sent: 10/17/2022   4:55 PM CDT  To: Taylor Redman PA-C, #  Subject: RE: Pending Approval                             Hi, We have the approval now   ----- Message -----  From: Katerina Preciado MA  Sent: 10/14/2022   1:29 PM CDT  To: Krupa Guerrier  Subject: FW: Pending Approval                               ----- Message -----  From: Taylor Redman PA-C  Sent: 10/14/2022  12:37 PM CDT  To: Krupa Guerrier, Katerina Preciado MA  Subject: RE: Pending Approval                             Not medically urgent    Taylor  ----- Message -----  From: Katerina Preciado MA  Sent: 10/14/2022  11:50 AM CDT  To: Taylor Redman PA-C  Subject: FW: Pending Approval                               ----- Message -----  From: Krupa Guerrier  Sent: 10/14/2022  11:01 AM CDT  To: Humberto Avila  Subject: FW: Pending Approval                               ----- Message -----  From: Krupa Guerrier  Sent: 10/13/2022  12:57 PM CDT  To: Taylor Redman PA-C  Subject: Pending Approval                                 Hi Taylor    RACHNA Spoke w/ carine at [facility]  call ref# [id number] who stated the case request for approval on the injection is still in review for a determination by the medical director at this time     Please advise is procedure Is medically urgent at this time? Thank you

## 2022-10-18 NOTE — TELEPHONE ENCOUNTER
Called patient informed her that procedure have been approved.  She stated that she will call once she has someone to bring and stay with her.

## 2022-10-24 ENCOUNTER — HOSPITAL ENCOUNTER (OUTPATIENT)
Facility: HOSPITAL | Age: 62
Discharge: HOME | End: 2022-10-24
Attending: FAMILY MEDICINE | Admitting: INTERNAL MEDICINE
Payer: MEDICARE

## 2022-10-24 PROCEDURE — 91110 GI TRC IMG INTRAL ESOPH-ILE: CPT

## 2022-10-31 ENCOUNTER — OFFICE VISIT (OUTPATIENT)
Dept: GASTROENTEROLOGY | Facility: CLINIC | Age: 62
End: 2022-10-31
Payer: MEDICAID

## 2022-10-31 VITALS
BODY MASS INDEX: 44.41 KG/M2 | WEIGHT: 293 LBS | HEART RATE: 70 BPM | DIASTOLIC BLOOD PRESSURE: 84 MMHG | HEIGHT: 68 IN | SYSTOLIC BLOOD PRESSURE: 136 MMHG

## 2022-10-31 DIAGNOSIS — K59.00 CONSTIPATION, UNSPECIFIED CONSTIPATION TYPE: Primary | ICD-10-CM

## 2022-10-31 PROCEDURE — 3044F HG A1C LEVEL LT 7.0%: CPT | Mod: CPTII,,, | Performed by: PHYSICIAN ASSISTANT

## 2022-10-31 PROCEDURE — 3079F PR MOST RECENT DIASTOLIC BLOOD PRESSURE 80-89 MM HG: ICD-10-PCS | Mod: CPTII,,, | Performed by: PHYSICIAN ASSISTANT

## 2022-10-31 PROCEDURE — 3008F PR BODY MASS INDEX (BMI) DOCUMENTED: ICD-10-PCS | Mod: CPTII,,, | Performed by: PHYSICIAN ASSISTANT

## 2022-10-31 PROCEDURE — 99999 PR PBB SHADOW E&M-EST. PATIENT-LVL IV: ICD-10-PCS | Mod: PBBFAC,,, | Performed by: PHYSICIAN ASSISTANT

## 2022-10-31 PROCEDURE — 99214 OFFICE O/P EST MOD 30 MIN: CPT | Mod: S$PBB,,, | Performed by: PHYSICIAN ASSISTANT

## 2022-10-31 PROCEDURE — 3044F PR MOST RECENT HEMOGLOBIN A1C LEVEL <7.0%: ICD-10-PCS | Mod: CPTII,,, | Performed by: PHYSICIAN ASSISTANT

## 2022-10-31 PROCEDURE — 1159F PR MEDICATION LIST DOCUMENTED IN MEDICAL RECORD: ICD-10-PCS | Mod: CPTII,,, | Performed by: PHYSICIAN ASSISTANT

## 2022-10-31 PROCEDURE — 3008F BODY MASS INDEX DOCD: CPT | Mod: CPTII,,, | Performed by: PHYSICIAN ASSISTANT

## 2022-10-31 PROCEDURE — 1159F MED LIST DOCD IN RCRD: CPT | Mod: CPTII,,, | Performed by: PHYSICIAN ASSISTANT

## 2022-10-31 PROCEDURE — 99214 PR OFFICE/OUTPT VISIT, EST, LEVL IV, 30-39 MIN: ICD-10-PCS | Mod: S$PBB,,, | Performed by: PHYSICIAN ASSISTANT

## 2022-10-31 PROCEDURE — 3079F DIAST BP 80-89 MM HG: CPT | Mod: CPTII,,, | Performed by: PHYSICIAN ASSISTANT

## 2022-10-31 PROCEDURE — 3075F PR MOST RECENT SYSTOLIC BLOOD PRESS GE 130-139MM HG: ICD-10-PCS | Mod: CPTII,,, | Performed by: PHYSICIAN ASSISTANT

## 2022-10-31 PROCEDURE — 3075F SYST BP GE 130 - 139MM HG: CPT | Mod: CPTII,,, | Performed by: PHYSICIAN ASSISTANT

## 2022-10-31 PROCEDURE — 99999 PR PBB SHADOW E&M-EST. PATIENT-LVL IV: CPT | Mod: PBBFAC,,, | Performed by: PHYSICIAN ASSISTANT

## 2022-10-31 PROCEDURE — 99214 OFFICE O/P EST MOD 30 MIN: CPT | Mod: PBBFAC | Performed by: PHYSICIAN ASSISTANT

## 2022-10-31 RX ORDER — LACTULOSE 10 G/15ML
20 SOLUTION ORAL; RECTAL 2 TIMES DAILY
Qty: 1800 ML | Refills: 2 | Status: SHIPPED | OUTPATIENT
Start: 2022-10-31 | End: 2023-01-29

## 2022-10-31 RX ORDER — FAMOTIDINE 40 MG/1
40 TABLET, FILM COATED ORAL DAILY
COMMUNITY
Start: 2022-10-11 | End: 2023-06-29

## 2022-10-31 NOTE — PATIENT INSTRUCTIONS
-Stop Linzess, as this is not working well for the constipation. Will try Lactulose. May take Miralax in addition if needed.  -Continue on Protonix 40mg daily in the morning on an empty stomach. Can add Pepcid 40mg in the evening if having breakthrough symptoms.   -Waiting for results of the video capsule.

## 2022-10-31 NOTE — PROGRESS NOTES
Subjective:      Patient ID: Zakia Price is a 62 y.o. female.    Chief Complaint: Gastroesophageal Reflux and Anemia    HPI:  Patient reports to clinic today for follow up of the above.   VCE completed last week for HECTOR - waiting on results.   History of constipation. Linzess 145mcg not helping. Drinking apple juice which is somewhat beneficial.   Previously mentions itching and bruising. Plt normal. She does have an allergist. Recommend follow up.  Taking Protonix daily with control of reflux symptoms.      Review of Systems   Constitutional:  Negative for activity change, appetite change, chills, diaphoresis, fatigue, fever and unexpected weight change.   HENT:  Negative for trouble swallowing.    Respiratory:  Negative for shortness of breath.    Cardiovascular:  Negative for chest pain.   Gastrointestinal:  Positive for abdominal pain (bloating) and constipation. Negative for abdominal distention, anal bleeding, blood in stool, diarrhea, nausea and vomiting.   Skin:  Negative for color change and pallor.   Neurological:  Negative for dizziness, weakness and light-headedness.   Psychiatric/Behavioral:  Negative for dysphoric mood. The patient is not nervous/anxious.      Medical History: Reviewed    Social History: Reviewed    Allergies: Reviewed    Objective:     Physical Exam    Assessment:     1. Constipation, unspecified constipation type        Plan:     -Stop Linzess, as this is not working well for the constipation. Will try Lactulose. May take Miralax in addition if needed.  -Continue on Protonix 40mg daily in the morning on an empty stomach. Can add Pepcid 40mg in the evening if having breakthrough symptoms.   -Waiting for results of the video capsule.    Zakia was seen today for gastroesophageal reflux and anemia.    Diagnoses and all orders for this visit:    Constipation, unspecified constipation type  -     lactulose (CHRONULAC) 10 gram/15 mL solution; Take 30 mL (20 g total) by mouth 2  (two) times daily.    Other orders  -     Video capsule endoscopy      No follow-ups on file.    Thank you for the opportunity to participate in the care of this patient.   Alexandrea Crenshaw PA-C.

## 2022-11-01 DIAGNOSIS — T18.9XXA FOREIGN BODY IN DIGESTIVE TRACT, INITIAL ENCOUNTER: Primary | ICD-10-CM

## 2022-11-01 PROCEDURE — 91110 GI TRC IMG INTRAL ESOPH-ILE: CPT | Mod: 26,,, | Performed by: INTERNAL MEDICINE

## 2022-11-01 PROCEDURE — 91110 PR GI TRACT CAPSULE ENDOSCOPY: ICD-10-PCS | Mod: 26,,, | Performed by: INTERNAL MEDICINE

## 2022-11-01 NOTE — PROGRESS NOTES
[facility]  Gastroenterology    Patient evaluated at the request of No referring provider defined for this encounter.    PCP: Oleksandr Nagel MD    11/1/22        Subjective:   Zakia Price is a 62 y.o. female      Past Medical History:   Diagnosis Date    Acute respiratory failure due to COVID-19     COVID-19     Diabetes mellitus, type 2     Diabetic neuropathy 1/27/2014    DJD (degenerative joint disease) of knee     DVT (deep venous thrombosis) around 1990's    in leg, is on no anticoagulant therapy presently    Fatty liver     GERD (gastroesophageal reflux disease)     Hypertension associated with diabetes     HECTOR (iron deficiency anemia) 5/13/2021    Multinodular goiter     Obesity, morbid, BMI 50 or higher     Sleep apnea     has no CPAP       Past Surgical History:   Procedure Laterality Date    COLONOSCOPY N/A 5/12/2021    Procedure: COLONOSCOPY;  Surgeon: Carolina Rizo MD;  Location: [facility];  Service: Endoscopy;  Laterality: N/A;    EPIDURAL STEROID INJECTION N/A 12/10/2021    Procedure: Lumbar L5/S1 IL TEETEE  Would like AM procedure, if possible;  Surgeon: Nae Paul MD;  Location: [facility];  Service: Pain Management;  Laterality: N/A;    EPIDURAL STEROID INJECTION INTO CERVICAL SPINE N/A 10/8/2021    Procedure: C7-T1 IL TEETEE-no sedation.  Needs IV-just incase;  Surgeon: Nae Paul MD;  Location: [facility];  Service: Pain Management;  Laterality: N/A;    ESOPHAGOGASTRODUODENOSCOPY N/A 7/8/2021    Procedure: EGD (ESOPHAGOGASTRODUODENOSCOPY) previous positve covid;  Surgeon: Jann Gutierrez MD;  Location: [facility];  Service: Endoscopy;  Laterality: N/A;    FRACTURE SURGERY      HYSTERECTOMY      INTRALUMINAL GASTROINTESTINAL TRACT IMAGING VIA CAPSULE N/A 10/24/2022    Procedure: IMAGING PROCEDURE, GI TRACT, INTRALUMINAL, VIA CAPSULE;  Surgeon: Hang Lunsford RN;  Location: [facility];  Service: Endoscopy;  Laterality: N/A;    SELECTIVE INJECTION OF ANESTHETIC  AGENT AROUND LUMBAR SPINAL NERVE ROOT BY TRANSFORAMINAL APPROACH Bilateral 2022    Procedure: Bilateral L5/S1 TF TEETEE with RN IV sedation;  Surgeon: Nae Paul MD;  Location: [facility];  Service: Pain Management;  Laterality: Bilateral;    SHOULDER ARTHROSCOPY      THYROIDECTOMY, PARTIAL Right     and transplatation of right superior parathyroid gland to the sternocleidomastoid muscle     TONSILLECTOMY      TUBAL LIGATION  1984    WRIST FRACTURE SURGERY      left       Current Outpatient Medications on File Prior to Visit   Medication Sig Dispense Refill    diclofenac (VOLTAREN) 75 MG EC tablet Take 1 tablet (75 mg total) by mouth 2 (two) times daily. 180 tablet 4    diltiaZEM (CARDIZEM) 30 MG tablet Take 1 tablet (30 mg total) by mouth every 12 (twelve) hours. 60 tablet 11    famotidine (PEPCID) 40 MG tablet Take 40 mg by mouth once daily.      ferrous sulfate 324 mg (65 mg iron) TbEC Take 1 tablet (324 mg total) by mouth once daily. 365 tablet 0    fluticasone propionate (FLONASE) 50 mcg/actuation nasal spray Use 2 sprays to each nostril daily 16 g 12    inhalation spacing device (BREATHERITE VALVED MDI CHAMBER) Use as directed for inhalation. 1 Device prn    lactulose (CHRONULAC) 10 gram/15 mL solution Take 30 mL (20 g total) by mouth 2 (two) times daily. 1800 mL 2    levothyroxine (SYNTHROID) 100 MCG tablet Take 1 tablet (100 mcg total) by mouth once daily. 90 tablet 1    LIDOcaine-prilocaine (EMLA) cream SMARTSI-2 Pump Topical 3-4 Times Daily      linaCLOtide (LINZESS) 145 mcg Cap capsule Take 1 capsule (145 mcg total) by mouth once daily. 30 capsule 5    loratadine (CLARITIN) 10 mg tablet Take 10 mg by mouth once daily.      metFORMIN (GLUCOPHAGE) 1000 MG tablet Take 1 tablet (1,000 mg total) by mouth 2 (two) times daily with meals. 180 tablet 4    pantoprazole (PROTONIX) 40 MG tablet Take 1 tablet (40 mg total) by mouth once daily. 30 tablet 11    pravastatin (PRAVACHOL) 80 MG tablet Take 1  tablet (80 mg total) by mouth once daily. 90 tablet 4    pregabalin (LYRICA) 75 MG capsule Take 1 capsule (75 mg total) by mouth 3 (three) times daily. 90 capsule 3    rOPINIRole (REQUIP) 0.25 MG tablet Take 1 tablet (0.25 mg total) by mouth every evening. 30 tablet 11    semaglutide (OZEMPIC) 1 mg/dose (4 mg/3 mL) Inject 1 mg into the skin every 7 days. 3 pen 4    sulfacetamide sodium 10% (BLEPH-10) 10 % ophthalmic solution Place 2 drops into the left eye 4 (four) times daily. (Patient not taking: Reported on 10/31/2022) 5 mL 0    tiZANidine (ZANAFLEX) 4 MG tablet Take 1/2 to 1 tablet by mouth twice daily as needed for muscle spasms. May cause drowsiness. 60 tablet 4    topiramate (TOPAMAX) 100 MG tablet Take 1 tablet (100 mg total) by mouth 2 (two) times daily. 60 tablet 11    TRELEGY ELLIPTA 100-62.5-25 mcg DsDv Inhale 1 puff into the lungs once daily.       No current facility-administered medications on file prior to visit.       Review of patient's allergies indicates:   Allergen Reactions    Codeine sulfate      Nausea^    Lisinopril Swelling     angioedema    Codeine Nausea Only and Rash       Social History     Socioeconomic History    Marital status: Single   Tobacco Use    Smoking status: Never    Smokeless tobacco: Never   Substance and Sexual Activity    Alcohol use: No    Drug use: No    Sexual activity: Not Currently       Family History   Problem Relation Age of Onset    Leukemia Son     Cancer Son     Diabetes Mother     Hypertension Mother     Heart disease Mother 50    Cancer Father     Arthritis Father     Cancer Brother     Cancer Brother        Review of Systems        Objective:   Vitals: There were no vitals filed for this visit.    Physical Exam    {*** HELP TEXT ***    This SmartLink shows lab results for visits on the day of current visit & previous visits. It will accept two parameters,  by commas, which specify the duration and the format of the output.    For example, a user  "may enter .GETLABS[6M,1    The "6M" instructs [employer] to search 6 months back from the day of the visit the user is presently in to find and display all lab component values in that time period. Entry for this parameter may be in the form #D, #W, #M, or #Y. The "#" indicates a number and the D, W, M, Y stand for days, weeks, months, or years respectively. If no entry is specified for this parameter (.GETLABS[,1), or if there are no results that fall within the time period indicated, then [employer] will display the last known result.    The second parameter controls the display of the SmartLink. It accepts a blank entry, "1", or "2". A blank entry will cause [employer] to display the component name, value, high and low ranges, status and any comments. An entry of "1" will display everything stated above except for the comments. An entry of "2" will cause an abbreviated display of just the name and value for each component.}    No flowsheet data found.    No flowsheet data found.      No results found.    IMPRESSION     Problem List Items Addressed This Visit    None      PLANS:    There are no diagnoses linked to this encounter.        Carolina Rizo MD  Gastroenterology and Hepatology        "

## 2022-11-01 NOTE — PROVATION PATIENT INSTRUCTIONS
Discharge Summary/Instructions after an Endoscopic Procedure  Patient Name: Zakia Price  Patient MRN: [id number]  Patient YOB: 1960 Monday, October 24, 2022 Carolina Rizo MD  Dear patient,  As a result of recent federal legislation (The Federal Cures Act), you may   receive lab or pathology results from your procedure in your [facility]   account before your physician is able to contact you. Your physician or   their representative will relay the results to you with their   recommendations at their soonest availability.  Thank you,  RESTRICTIONS:  During your procedure today, you received medications for sedation.  These   medications may affect your judgment, balance and coordination.  Therefore,   for 24 hours, you have the following restrictions:   - DO NOT drive a car, operate machinery, make legal/financial decisions,   sign important papers or drink alcohol.    ACTIVITY:  Today: no heavy lifting, straining or running due to procedural   sedation/anesthesia.  The following day: return to full activity including work.  DIET:  Eat and drink normally unless instructed otherwise.     TREATMENT FOR COMMON SIDE EFFECTS:  - Mild abdominal pain, nausea, belching, bloating or excessive gas:  rest,   eat lightly and use a heating pad.  - Sore Throat: treat with throat lozenges and/or gargle with warm salt   water.  - Because air was used during the procedure, expelling large amounts of air   from your rectum or belching is normal.  - If a bowel prep was taken, you may not have a bowel movement for 1-3 days.    This is normal.  SYMPTOMS TO WATCH FOR AND REPORT TO YOUR PHYSICIAN:  1. Abdominal pain or bloating, other than gas cramps.  2. Chest pain.  3. Back pain.  4. Signs of infection such as: chills or fever occurring within 24 hours   after the procedure.  5. Rectal bleeding, which would show as bright red, maroon, or black stools.   (A tablespoon of blood from the rectum is not serious, especially  if   hemorrhoids are present.)  6. Vomiting.  7. Weakness or dizziness.  GO DIRECTLY TO THE NEAREST EMERGENCY ROOM IF YOU HAVE ANY OF THE FOLLOWING:      Difficulty breathing              Chills and/or fever over 101 F   Persistent vomiting and/or vomiting blood   Severe abdominal pain   Severe chest pain   Black, tarry stools   Bleeding- more than one tablespoon   Any other symptom or condition that you feel may need urgent attention  Your doctor recommends these additional instructions:  If any biopsies were taken, your doctors clinic will contact you in 1 to 2   weeks with any results.  - Perform an upright abdominal x-ray at appointment to be scheduled to   ensure capsule has passed.   - Continue present medications.  For questions, problems or results please call your physician Carolina Rizo MD at Work:  [phone]  If you have any questions about the above instructions, call the GI   department at [phone] or call the endoscopy unit at [phone]   from 7am until 3 pm.  [facility] [place], EMERGENCY ROOM PHONE NUMBER:   [phone]  IF A COMPLICATION OR EMERGENCY SITUATION ARISES AND YOU ARE UNABLE TO REACH   YOUR PHYSICIAN - GO DIRECTLY TO THE EMERGENCY ROOM.  I have read or have had read to me these discharge instructions for my   procedure and have received a written copy.  I understand these   instructions and will follow-up with my physician if I have any questions.     __________________________________       _____________________________________  Nurse Signature                                          Patient/Designated   Responsible Party Signature  MD Carolina Troy MD  11/1/2022 10:07:07 AM  [employer]

## 2022-11-02 ENCOUNTER — TELEPHONE (OUTPATIENT)
Dept: FAMILY MEDICINE | Facility: CLINIC | Age: 62
End: 2022-11-02
Payer: MEDICAID

## 2022-11-02 NOTE — TELEPHONE ENCOUNTER
----- Message from Ana Miranda sent at 11/2/2022 10:44 AM CDT -----  Type:  Patient Returning Call    Who Called:pt   Who Left Message for Patient:  Does the patient know what this is regarding?:nephopathy assesment   Would the patient rather a call back or a response via [facility]? Call back   Best Call Back Number:[phone]  Additional Information: pt received call stating its time to schedule nephropathy assessment and has xrays scheduled for tomorrow and would like to know if she could have them done then.

## 2022-11-03 ENCOUNTER — HOSPITAL ENCOUNTER (OUTPATIENT)
Dept: RADIOLOGY | Facility: HOSPITAL | Age: 62
Discharge: HOME | End: 2022-11-03
Attending: INTERNAL MEDICINE
Payer: MEDICAID

## 2022-11-03 DIAGNOSIS — T18.9XXA FOREIGN BODY IN DIGESTIVE TRACT, INITIAL ENCOUNTER: ICD-10-CM

## 2022-11-03 PROCEDURE — 74018 RADEX ABDOMEN 1 VIEW: CPT | Mod: TC,PO

## 2022-11-03 PROCEDURE — 74018 XR ABDOMEN AP 1 VIEW: ICD-10-PCS | Mod: 26,,, | Performed by: RADIOLOGY

## 2022-11-03 PROCEDURE — 74018 RADEX ABDOMEN 1 VIEW: CPT | Mod: 26,,, | Performed by: RADIOLOGY

## 2022-11-14 ENCOUNTER — TELEPHONE (OUTPATIENT)
Dept: PAIN MEDICINE | Facility: CLINIC | Age: 62
End: 2022-11-14
Payer: MEDICAID

## 2022-11-16 ENCOUNTER — TELEPHONE (OUTPATIENT)
Dept: PAIN MEDICINE | Facility: CLINIC | Age: 62
End: 2022-11-16
Payer: MEDICAID

## 2022-11-23 ENCOUNTER — PATIENT MESSAGE (OUTPATIENT)
Dept: ADMINISTRATIVE | Facility: OTHER | Age: 62
End: 2022-11-23
Payer: MEDICAID

## 2022-12-15 ENCOUNTER — OFFICE VISIT (OUTPATIENT)
Dept: PODIATRY | Facility: CLINIC | Age: 62
End: 2022-12-15
Payer: MEDICAID

## 2022-12-15 VITALS — BODY MASS INDEX: 44.41 KG/M2 | WEIGHT: 293 LBS | HEIGHT: 68 IN

## 2022-12-15 DIAGNOSIS — L60.3 ONYCHODYSTROPHY: ICD-10-CM

## 2022-12-15 DIAGNOSIS — E66.01 MORBID OBESITY: ICD-10-CM

## 2022-12-15 DIAGNOSIS — E11.49 TYPE II DIABETES MELLITUS WITH NEUROLOGICAL MANIFESTATIONS: ICD-10-CM

## 2022-12-15 DIAGNOSIS — E11.42 DIABETIC POLYNEUROPATHY ASSOCIATED WITH TYPE 2 DIABETES MELLITUS: Primary | ICD-10-CM

## 2022-12-15 PROCEDURE — 99499 UNLISTED E&M SERVICE: CPT | Mod: S$PBB,,, | Performed by: PODIATRIST

## 2022-12-15 PROCEDURE — 99499 NO LOS: ICD-10-PCS | Mod: S$PBB,,, | Performed by: PODIATRIST

## 2022-12-15 PROCEDURE — 11721 DEBRIDE NAIL 6 OR MORE: CPT | Mod: S$PBB,,, | Performed by: PODIATRIST

## 2022-12-15 PROCEDURE — 99213 OFFICE O/P EST LOW 20 MIN: CPT | Mod: PBBFAC | Performed by: PODIATRIST

## 2022-12-15 PROCEDURE — 3008F PR BODY MASS INDEX (BMI) DOCUMENTED: ICD-10-PCS | Mod: CPTII,,, | Performed by: PODIATRIST

## 2022-12-15 PROCEDURE — 99999 PR PBB SHADOW E&M-EST. PATIENT-LVL III: ICD-10-PCS | Mod: PBBFAC,,, | Performed by: PODIATRIST

## 2022-12-15 PROCEDURE — 3066F NEPHROPATHY DOC TX: CPT | Mod: CPTII,,, | Performed by: PODIATRIST

## 2022-12-15 PROCEDURE — 1159F MED LIST DOCD IN RCRD: CPT | Mod: CPTII,,, | Performed by: PODIATRIST

## 2022-12-15 PROCEDURE — 99999 PR PBB SHADOW E&M-EST. PATIENT-LVL III: CPT | Mod: PBBFAC,,, | Performed by: PODIATRIST

## 2022-12-15 PROCEDURE — 3008F BODY MASS INDEX DOCD: CPT | Mod: CPTII,,, | Performed by: PODIATRIST

## 2022-12-15 PROCEDURE — 11721 DEBRIDE NAIL 6 OR MORE: CPT | Mod: Q9,PBBFAC | Performed by: PODIATRIST

## 2022-12-15 PROCEDURE — 1159F PR MEDICATION LIST DOCUMENTED IN MEDICAL RECORD: ICD-10-PCS | Mod: CPTII,,, | Performed by: PODIATRIST

## 2022-12-15 PROCEDURE — 3044F PR MOST RECENT HEMOGLOBIN A1C LEVEL <7.0%: ICD-10-PCS | Mod: CPTII,,, | Performed by: PODIATRIST

## 2022-12-15 PROCEDURE — 1160F RVW MEDS BY RX/DR IN RCRD: CPT | Mod: CPTII,,, | Performed by: PODIATRIST

## 2022-12-15 PROCEDURE — 1160F PR REVIEW ALL MEDS BY PRESCRIBER/CLIN PHARMACIST DOCUMENTED: ICD-10-PCS | Mod: CPTII,,, | Performed by: PODIATRIST

## 2022-12-15 PROCEDURE — 3066F PR DOCUMENTATION OF TREATMENT FOR NEPHROPATHY: ICD-10-PCS | Mod: CPTII,,, | Performed by: PODIATRIST

## 2022-12-15 PROCEDURE — 11721 PR DEBRIDEMENT OF NAILS, 6 OR MORE: ICD-10-PCS | Mod: S$PBB,,, | Performed by: PODIATRIST

## 2022-12-15 PROCEDURE — 3061F NEG MICROALBUMINURIA REV: CPT | Mod: CPTII,,, | Performed by: PODIATRIST

## 2022-12-15 PROCEDURE — 3044F HG A1C LEVEL LT 7.0%: CPT | Mod: CPTII,,, | Performed by: PODIATRIST

## 2022-12-15 PROCEDURE — 3061F PR NEG MICROALBUMINURIA RESULT DOCUMENTED/REVIEW: ICD-10-PCS | Mod: CPTII,,, | Performed by: PODIATRIST

## 2022-12-15 NOTE — PROGRESS NOTES
Subjective:       Patient ID: Zakia Price is a 62 y.o. female.    Chief Complaint: Diabetic Foot Exam (Nail Care Pain 6/10 Diabetic PCP:Oleksandr Nagel MD last seen )      HPI: Patient presents to the office today with the chief complaint of elongated, thickened and dystrophic nail plates to the B/L foot. Patient also complains of moderate  foot pain to the right left secondary to neuropathy.  States her pain is an 6/10.  Does notice a slight decrease in symptoms with Lyrica.  This patient is a Diabetic Type II, complicated with morbid obesity and Peripheral Neuropathy. Patient does follow with Primary Care and/or Endocrinology for management of Diabetes Mellitus. This patient's PMD is Oleksandr Nagel MD. This patient last saw his/her primary care provider on 9/8/22.     Hemoglobin A1C   Date Value Ref Range Status   09/20/2022 5.1 4.0 - 5.6 % Final     Comment:     ADA Screening Guidelines:  5.7-6.4%  Consistent with prediabetes  >or=6.5%  Consistent with diabetes    High levels of fetal hemoglobin interfere with the HbA1C  assay. Heterozygous hemoglobin variants (HbS, HgC, etc)do  not significantly interfere with this assay.   However, presence of multiple variants may affect accuracy.     02/24/2022 5.2 4.0 - 5.6 % Final     Comment:     ADA Screening Guidelines:  5.7-6.4%  Consistent with prediabetes  >or=6.5%  Consistent with diabetes    High levels of fetal hemoglobin interfere with the HbA1C  assay. Heterozygous hemoglobin variants (HbS, HgC, etc)do  not significantly interfere with this assay.   However, presence of multiple variants may affect accuracy.     10/06/2021 5.3 4.0 - 5.6 % Final     Comment:     ADA Screening Guidelines:  5.7-6.4%  Consistent with prediabetes  >or=6.5%  Consistent with diabetes    High levels of fetal hemoglobin interfere with the HbA1C  assay. Heterozygous hemoglobin variants (HbS, HgC, etc)do  not significantly interfere with this assay.   However, presence  of multiple variants may affect accuracy.     .     Review of patient's allergies indicates:   Allergen Reactions    Codeine sulfate      Nausea^    Lisinopril Swelling     angioedema    Codeine Nausea Only and Rash       Past Medical History:   Diagnosis Date    Acute respiratory failure due to COVID-19     COVID-19     Diabetes mellitus, type 2     Diabetic neuropathy 1/27/2014    DJD (degenerative joint disease) of knee     DVT (deep venous thrombosis) around 1990's    in leg, is on no anticoagulant therapy presently    Fatty liver     GERD (gastroesophageal reflux disease)     Hypertension associated with diabetes     HECTOR (iron deficiency anemia) 5/13/2021    Multinodular goiter     Obesity, morbid, BMI 50 or higher     Sleep apnea     has no CPAP       Family History   Problem Relation Age of Onset    Leukemia Son     Cancer Son     Diabetes Mother     Hypertension Mother     Heart disease Mother 50    Cancer Father     Arthritis Father     Cancer Brother     Cancer Brother        Social History     Socioeconomic History    Marital status: Single   Tobacco Use    Smoking status: Never    Smokeless tobacco: Never   Substance and Sexual Activity    Alcohol use: No    Drug use: No    Sexual activity: Not Currently       Past Surgical History:   Procedure Laterality Date    COLONOSCOPY N/A 5/12/2021    Procedure: COLONOSCOPY;  Surgeon: Carolina Rizo MD;  Location: [facility];  Service: Endoscopy;  Laterality: N/A;    EPIDURAL STEROID INJECTION N/A 12/10/2021    Procedure: Lumbar L5/S1 IL TEETEE  Would like AM procedure, if possible;  Surgeon: Nae Paul MD;  Location: [facility] PAIN MGT;  Service: Pain Management;  Laterality: N/A;    EPIDURAL STEROID INJECTION INTO CERVICAL SPINE N/A 10/8/2021    Procedure: C7-T1 IL TEETEE-no sedation.  Needs IV-just incase;  Surgeon: Nae Paul MD;  Location: [facility] PAIN [facility]GT;  Service: Pain Management;  Laterality: N/A;    ESOPHAGOGASTRODUODENOSCOPY N/A 7/8/2021    Procedure: EGD  "(ESOPHAGOGASTRODUODENOSCOPY) previous positve covid;  Surgeon: Jann Gutierrez MD;  Location: [facility] ENDO;  Service: Endoscopy;  Laterality: N/A;    FRACTURE SURGERY      HYSTERECTOMY      INTRALUMINAL GASTROINTESTINAL TRACT IMAGING VIA CAPSULE N/A 10/24/2022    Procedure: IMAGING PROCEDURE, GI TRACT, INTRALUMINAL, VIA CAPSULE;  Surgeon: Hang Lunsford RN;  Location: [facility] ENDO;  Service: Endoscopy;  Laterality: N/A;    SELECTIVE INJECTION OF ANESTHETIC AGENT AROUND LUMBAR SPINAL NERVE ROOT BY TRANSFORAMINAL APPROACH Bilateral 5/6/2022    Procedure: Bilateral L5/S1 TF TEETEE with RN IV sedation;  Surgeon: Nae Paul MD;  Location: [facility] PAIN MGT;  Service: Pain Management;  Laterality: Bilateral;    SHOULDER ARTHROSCOPY      THYROIDECTOMY, PARTIAL Right     and transplatation of right superior parathyroid gland to the sternocleidomastoid muscle     TONSILLECTOMY      TUBAL LIGATION  1984    WRIST FRACTURE SURGERY      left       Review of Systems       Objective:   Ht 5' 8" (1.727 m)   Wt (!) 139.9 kg (308 lb 6.8 oz)   BMI 46.90 kg/m²     Physical Exam  LOWER EXTREMITY PHYSICAL EXAMINATION    VASCULAR:  The right dorsalis pedis pulse 2/4 and the right posterior tibial pulse 1/4.  The left dorsalis pedis pulse 2/4 and posterior tibial pulse on the left is 1/4.  Capillary refill is intact.  Pedal hair growth decreased.     NEUROLOGY: Protective sensation is not intact to the left and right plantar surfaces of the foot and digits, as the patient has no sensation/detection at greater than 4 distinct points of contact with 5.07 [place] Cele monofilament. Sensation to light touch is intact on the left and right foot. Proprioception is intact, bilateral. Sensation to pin prick is reduced to absent. Vibratory sensation is diminished.    DERMATOLOGY:  Skin is supple, moist, intact.  There is no signs of callusing, ulcerations, other lesions identified to the dorsal or plantar aspect of the right or left foot.  The R1, 2, 5 " and left L1,2, 5 are thickened, discolored dystrophic.  There is subungual debris.  Nail plates have area of dark discoloration.  The remaining nails 3-4 on the right foot and the left foot are elongated but of normal color, thickness, and texture.   There is no signs of ingrowing into the medial or lateral borders.  There is no evidence of wounds or skin breakdown.  No edema or erythema.  No obvious lacerations or fissuring.  Interdigital spaces are clean, dry, intact.  No rashes or scars appreciated.    ORTHOPEDIC: Manual Muscle Testing is 5/5 in all planes on the left and right, without pains, with and without resistance. Gait pattern is non-antalgic.    Assessment:     1. Diabetic polyneuropathy associated with type 2 diabetes mellitus    2. Type II diabetes mellitus with neurological manifestations    3. Onychodystrophy    4. Morbid obesity        Plan:     Diabetic polyneuropathy associated with type 2 diabetes mellitus    Type II diabetes mellitus with neurological manifestations    Onychodystrophy    Morbid obesity      Thorough discussion is had with the patient this afternoon, concerning the diagnosis, its etiology, and the treatment algorithm at present.  Greater than 50% of this visit spent on counseling and coordination of care. Greater than 15 minutes of a 20 minute appointment spent on education about the diabetic foot, neuropathy, and prevention of limb loss.  Shoe inspection. Diabetic Foot Education. Patient reminded of the importance of good nutrition and blood sugar control to help prevent podiatric complications of diabetes. Patient instructed on proper foot hygeine. We discussed wearing proper and supportive shoe gear, daily foot inspections, never walking barefooted or sock footed, never putting sharp instruments to feet which can cause major complications associated with infection, ulcers, lacerations.      Dystrophic nail plates, as outlined above (R#1,2,5  ; L#1,2,5 ), are sharply debrided  with double action nail nipper, and/or with the assistance of a mechanical rotary carlos alberto, with removal of all offending nail and nail border(s), for reduction of pains. Nails are reduced in terms of length, width and girth with removal of subungual debris to facilitate pain free weight bearing and ambulation. The elongated nails as outlined in the objective portion of this note, were trimmed to appropriate length, with a double action nail nipper, for alleviation/reduction of pains as well. Follow up in approx. 3-4 months.            Future Appointments   Date Time Provider Department Center   12/20/2022 10:10 AM LABORATORY, [facility] LAB [place]   12/22/2022  1:30 PM Sol Mckeon NP [facility] HEM ON[facility]   1/13/2023  3:40 PM Oleksandr Nagel MD [facility] MED [place]   1/31/2023 12:20 PM Blanca Redman PA-C [facility]

## 2022-12-20 ENCOUNTER — LAB VISIT (OUTPATIENT)
Dept: LAB | Facility: HOSPITAL | Age: 62
End: 2022-12-20
Attending: NURSE PRACTITIONER
Payer: MEDICAID

## 2022-12-20 DIAGNOSIS — D50.0 IRON DEFICIENCY ANEMIA DUE TO CHRONIC BLOOD LOSS: ICD-10-CM

## 2022-12-20 LAB
BASOPHILS # BLD AUTO: 0.02 K/UL (ref 0–0.2)
BASOPHILS NFR BLD: 0.4 % (ref 0–1.9)
DIFFERENTIAL METHOD: NORMAL
EOSINOPHIL # BLD AUTO: 0.1 K/UL (ref 0–0.5)
EOSINOPHIL NFR BLD: 1.1 % (ref 0–8)
ERYTHROCYTE [DISTWIDTH] IN BLOOD BY AUTOMATED COUNT: 14.4 % (ref 11.5–14.5)
FERRITIN SERPL-MCNC: 55 NG/ML (ref 20–300)
HCT VFR BLD AUTO: 40 % (ref 37–48.5)
HGB BLD-MCNC: 12.8 G/DL (ref 12–16)
IMM GRANULOCYTES # BLD AUTO: 0.01 K/UL (ref 0–0.04)
IMM GRANULOCYTES NFR BLD AUTO: 0.2 % (ref 0–0.5)
IRON SERPL-MCNC: 94 UG/DL (ref 30–160)
LYMPHOCYTES # BLD AUTO: 1.8 K/UL (ref 1–4.8)
LYMPHOCYTES NFR BLD: 31 % (ref 18–48)
MCH RBC QN AUTO: 28.6 PG (ref 27–31)
MCHC RBC AUTO-ENTMCNC: 32 G/DL (ref 32–36)
MCV RBC AUTO: 89 FL (ref 82–98)
MONOCYTES # BLD AUTO: 0.5 K/UL (ref 0.3–1)
MONOCYTES NFR BLD: 9.5 % (ref 4–15)
NEUTROPHILS # BLD AUTO: 3.3 K/UL (ref 1.8–7.7)
NEUTROPHILS NFR BLD: 58 % (ref 38–73)
NRBC BLD-RTO: 0 /100 WBC
PLATELET # BLD AUTO: 354 K/UL (ref 150–450)
PMV BLD AUTO: 9.5 FL (ref 9.2–12.9)
RBC # BLD AUTO: 4.48 M/UL (ref 4–5.4)
SATURATED IRON: 24 % (ref 20–50)
TOTAL IRON BINDING CAPACITY: 388 UG/DL (ref 250–450)
TRANSFERRIN SERPL-MCNC: 262 MG/DL (ref 200–375)
WBC # BLD AUTO: 5.71 K/UL (ref 3.9–12.7)

## 2022-12-20 PROCEDURE — 85025 COMPLETE CBC W/AUTO DIFF WBC: CPT | Mod: PO | Performed by: NURSE PRACTITIONER

## 2022-12-20 PROCEDURE — 36415 COLL VENOUS BLD VENIPUNCTURE: CPT | Mod: PO | Performed by: NURSE PRACTITIONER

## 2022-12-20 PROCEDURE — 82728 ASSAY OF FERRITIN: CPT | Performed by: NURSE PRACTITIONER

## 2022-12-20 PROCEDURE — 84466 ASSAY OF TRANSFERRIN: CPT | Performed by: NURSE PRACTITIONER

## 2022-12-22 ENCOUNTER — OFFICE VISIT (OUTPATIENT)
Dept: HEMATOLOGY/ONCOLOGY | Facility: CLINIC | Age: 62
End: 2022-12-22
Payer: MEDICAID

## 2022-12-22 VITALS
DIASTOLIC BLOOD PRESSURE: 72 MMHG | HEART RATE: 89 BPM | TEMPERATURE: 97 F | SYSTOLIC BLOOD PRESSURE: 117 MMHG | BODY MASS INDEX: 44.41 KG/M2 | WEIGHT: 293 LBS | HEIGHT: 68 IN | OXYGEN SATURATION: 98 %

## 2022-12-22 DIAGNOSIS — D50.0 IRON DEFICIENCY ANEMIA DUE TO CHRONIC BLOOD LOSS: Primary | ICD-10-CM

## 2022-12-22 PROCEDURE — 1159F MED LIST DOCD IN RCRD: CPT | Mod: CPTII,,, | Performed by: NURSE PRACTITIONER

## 2022-12-22 PROCEDURE — 99213 PR OFFICE/OUTPT VISIT, EST, LEVL III, 20-29 MIN: ICD-10-PCS | Mod: S$PBB,,, | Performed by: NURSE PRACTITIONER

## 2022-12-22 PROCEDURE — 3078F DIAST BP <80 MM HG: CPT | Mod: CPTII,,, | Performed by: NURSE PRACTITIONER

## 2022-12-22 PROCEDURE — 3008F BODY MASS INDEX DOCD: CPT | Mod: CPTII,,, | Performed by: NURSE PRACTITIONER

## 2022-12-22 PROCEDURE — 1160F PR REVIEW ALL MEDS BY PRESCRIBER/CLIN PHARMACIST DOCUMENTED: ICD-10-PCS | Mod: CPTII,,, | Performed by: NURSE PRACTITIONER

## 2022-12-22 PROCEDURE — 1159F PR MEDICATION LIST DOCUMENTED IN MEDICAL RECORD: ICD-10-PCS | Mod: CPTII,,, | Performed by: NURSE PRACTITIONER

## 2022-12-22 PROCEDURE — 3044F PR MOST RECENT HEMOGLOBIN A1C LEVEL <7.0%: ICD-10-PCS | Mod: CPTII,,, | Performed by: NURSE PRACTITIONER

## 2022-12-22 PROCEDURE — 3044F HG A1C LEVEL LT 7.0%: CPT | Mod: CPTII,,, | Performed by: NURSE PRACTITIONER

## 2022-12-22 PROCEDURE — 99999 PR PBB SHADOW E&M-EST. PATIENT-LVL IV: ICD-10-PCS | Mod: PBBFAC,,, | Performed by: NURSE PRACTITIONER

## 2022-12-22 PROCEDURE — 3074F SYST BP LT 130 MM HG: CPT | Mod: CPTII,,, | Performed by: NURSE PRACTITIONER

## 2022-12-22 PROCEDURE — 99213 OFFICE O/P EST LOW 20 MIN: CPT | Mod: S$PBB,,, | Performed by: NURSE PRACTITIONER

## 2022-12-22 PROCEDURE — 1160F RVW MEDS BY RX/DR IN RCRD: CPT | Mod: CPTII,,, | Performed by: NURSE PRACTITIONER

## 2022-12-22 PROCEDURE — 99999 PR PBB SHADOW E&M-EST. PATIENT-LVL IV: CPT | Mod: PBBFAC,,, | Performed by: NURSE PRACTITIONER

## 2022-12-22 PROCEDURE — 3074F PR MOST RECENT SYSTOLIC BLOOD PRESSURE < 130 MM HG: ICD-10-PCS | Mod: CPTII,,, | Performed by: NURSE PRACTITIONER

## 2022-12-22 PROCEDURE — 3008F PR BODY MASS INDEX (BMI) DOCUMENTED: ICD-10-PCS | Mod: CPTII,,, | Performed by: NURSE PRACTITIONER

## 2022-12-22 PROCEDURE — 3061F PR NEG MICROALBUMINURIA RESULT DOCUMENTED/REVIEW: ICD-10-PCS | Mod: CPTII,,, | Performed by: NURSE PRACTITIONER

## 2022-12-22 PROCEDURE — 3066F NEPHROPATHY DOC TX: CPT | Mod: CPTII,,, | Performed by: NURSE PRACTITIONER

## 2022-12-22 PROCEDURE — 3061F NEG MICROALBUMINURIA REV: CPT | Mod: CPTII,,, | Performed by: NURSE PRACTITIONER

## 2022-12-22 PROCEDURE — 99214 OFFICE O/P EST MOD 30 MIN: CPT | Mod: PBBFAC | Performed by: NURSE PRACTITIONER

## 2022-12-22 PROCEDURE — 3066F PR DOCUMENTATION OF TREATMENT FOR NEPHROPATHY: ICD-10-PCS | Mod: CPTII,,, | Performed by: NURSE PRACTITIONER

## 2022-12-22 PROCEDURE — 3078F PR MOST RECENT DIASTOLIC BLOOD PRESSURE < 80 MM HG: ICD-10-PCS | Mod: CPTII,,, | Performed by: NURSE PRACTITIONER

## 2022-12-22 NOTE — ASSESSMENT & PLAN NOTE
Prior h/o H. Pylori. Repeat H. Pylori testing negative. Patient previously d/c'd oral iron supplementation due to constipation. Discussed with patient recommendations for VCE. For now she would like to hold off on this and think about it. She had GI appointment 9/2022 but rescheduled    Iron levels now wnl. No anemia  List of iron rich foods from up to date previously given to patient    -encourage continued iron rich foods  -f/u 3 months with cbc, iron, ferritin  -Discussed S&S to report sooner

## 2022-12-22 NOTE — PROGRESS NOTES
Subjective:       Patient ID: Zakia Price is a 62 y.o. female.    Chief Complaint: review labs. anemia    HPI: 62 y.o female presenting today for follow up of her ion deficiency anemia treated with Feraheme 5/2021. Recent EGD/Colonoscopy evaluation reviewed. EGD pathology H. Pylori positive, she completed treatment end of July. Repeat testing reflect eradication of H. Pylori infection. Colonoscopy unremarkable with recommended repeat in 10 years. She feels well and is without complaints. Denies abnormal bleeding or anemia symptoms. Has chronic constipation for which she sees GI. Reports issues with indigestion, lack of appetite. She follows up with GI who are managing medically            Social History     Socioeconomic History    Marital status: Single   Tobacco Use    Smoking status: Never    Smokeless tobacco: Never   Substance and Sexual Activity    Alcohol use: No    Drug use: No    Sexual activity: Not Currently       Past Medical History:   Diagnosis Date    Acute respiratory failure due to COVID-19     COVID-19     Diabetes mellitus, type 2     Diabetic neuropathy 1/27/2014    DJD (degenerative joint disease) of knee     DVT (deep venous thrombosis) around 1990's    in leg, is on no anticoagulant therapy presently    Fatty liver     GERD (gastroesophageal reflux disease)     Hypertension associated with diabetes     HECTOR (iron deficiency anemia) 5/13/2021    Multinodular goiter     Obesity, morbid, BMI 50 or higher     Sleep apnea     has no CPAP       Family History   Problem Relation Age of Onset    Leukemia Son     Cancer Son     Diabetes Mother     Hypertension Mother     Heart disease Mother 50    Cancer Father     Arthritis Father     Cancer Brother     Cancer Brother        Past Surgical History:   Procedure Laterality Date    COLONOSCOPY N/A 5/12/2021    Procedure: COLONOSCOPY;  Surgeon: Carolina Rizo MD;  Location: [facility];  Service: Endoscopy;   Laterality: N/A;    EPIDURAL STEROID INJECTION N/A 12/10/2021    Procedure: Lumbar L5/S1 IL TEETEE  Would like AM procedure, if possible;  Surgeon: Nae Paul MD;  Location: [facility] PAIN MGT;  Service: Pain Management;  Laterality: N/A;    EPIDURAL STEROID INJECTION INTO CERVICAL SPINE N/A 10/8/2021    Procedure: C7-T1 IL TEETEE-no sedation.  Needs IV-just incase;  Surgeon: Nae Paul MD;  Location: [facility] PAIN MGT;  Service: Pain Management;  Laterality: N/A;    ESOPHAGOGASTRODUODENOSCOPY N/A 7/8/2021    Procedure: EGD (ESOPHAGOGASTRODUODENOSCOPY) previous positve covid;  Surgeon: Jann Gutierrez MD;  Location: [facility];  Service: Endoscopy;  Laterality: N/A;    FRACTURE SURGERY      HYSTERECTOMY      INTRALUMINAL GASTROINTESTINAL TRACT IMAGING VIA CAPSULE N/A 10/24/2022    Procedure: IMAGING PROCEDURE, GI TRACT, INTRALUMINAL, VIA CAPSULE;  Surgeon: Hang Lunsford RN;  Location: [facility] ENDO;  Service: Endoscopy;  Laterality: N/A;    SELECTIVE INJECTION OF ANESTHETIC AGENT AROUND LUMBAR SPINAL NERVE ROOT BY TRANSFORAMINAL APPROACH Bilateral 5/6/2022    Procedure: Bilateral L5/S1 TF TEETEE with RN IV sedation;  Surgeon: Nae Paul MD;  Location: [facility] PAIN MGT;  Service: Pain Management;  Laterality: Bilateral;    SHOULDER ARTHROSCOPY      THYROIDECTOMY, PARTIAL Right     and transplatation of right superior parathyroid gland to the sternocleidomastoid muscle     TONSILLECTOMY      TUBAL LIGATION  1984    WRIST FRACTURE SURGERY      left       Review of Systems   Constitutional:  Negative for activity change, appetite change, chills, fatigue, fever and unexpected weight change.   HENT:  Negative for congestion, mouth sores, nosebleeds, sore throat, trouble swallowing and voice change.    Eyes:  Negative for visual disturbance.   Respiratory:  Negative for cough, chest tightness, shortness of breath and wheezing.    Cardiovascular:  Negative for chest pain and leg swelling.   Gastrointestinal:  Positive for  constipation. Negative for abdominal distention, abdominal pain, anal bleeding, blood in stool, diarrhea, nausea and vomiting.   Genitourinary:  Negative for difficulty urinating, dysuria and hematuria.   Musculoskeletal:  Negative for arthralgias, back pain and myalgias.   Skin:  Negative for pallor, rash and wound.   Neurological:  Negative for dizziness, syncope, weakness and headaches.   Hematological:  Negative for adenopathy. Does not bruise/bleed easily.   Psychiatric/Behavioral:  The patient is not nervous/anxious.        Medication List with Changes/Refills   Current Medications    DICLOFENAC (VOLTAREN) 75 MG EC TABLET    Take 1 tablet (75 mg total) by mouth 2 (two) times daily.    DILTIAZEM (CARDIZEM) 30 MG TABLET    Take 1 tablet (30 mg total) by mouth every 12 (twelve) hours.    FAMOTIDINE (PEPCID) 40 MG TABLET    Take 40 mg by mouth once daily.    FERROUS SULFATE 324 MG (65 MG IRON) TBEC    Take 1 tablet (324 mg total) by mouth once daily.    FLUTICASONE PROPIONATE (FLONASE) 50 MCG/ACTUATION NASAL SPRAY    Use 2 sprays to each nostril daily    INHALATION SPACING DEVICE (BREATHERITE VALVED MDI CHAMBER)    Use as directed for inhalation.    LACTULOSE (CHRONULAC) 10 GRAM/15 ML SOLUTION    Take 30 mL (20 g total) by mouth 2 (two) times daily.    LEVOTHYROXINE (SYNTHROID) 100 MCG TABLET    Take 1 tablet (100 mcg total) by mouth once daily.    LIDOCAINE-PRILOCAINE (EMLA) CREAM    SMARTSI-2 Pump Topical 3-4 Times Daily    LINACLOTIDE (LINZESS) 145 MCG CAP CAPSULE    Take 1 capsule (145 mcg total) by mouth once daily.    LORATADINE (CLARITIN) 10 MG TABLET    Take 10 mg by mouth once daily.    METFORMIN (GLUCOPHAGE) 1000 MG TABLET    Take 1 tablet (1,000 mg total) by mouth 2 (two) times daily with meals.    PANTOPRAZOLE (PROTONIX) 40 MG TABLET    Take 1 tablet (40 mg total) by mouth once daily.    PRAVASTATIN (PRAVACHOL) 80 MG TABLET    Take 1 tablet (80 mg total) by mouth once daily.    PREGABALIN (LYRICA)  75 MG CAPSULE    Take 1 capsule (75 mg total) by mouth 3 (three) times daily.    ROPINIROLE (REQUIP) 0.25 MG TABLET    Take 1 tablet (0.25 mg total) by mouth every evening.    SEMAGLUTIDE (OZEMPIC) 1 MG/DOSE (4 MG/3 ML)    Inject 1 mg into the skin every 7 days.    SULFACETAMIDE SODIUM 10% (BLEPH-10) 10 % OPHTHALMIC SOLUTION    Place 2 drops into the left eye 4 (four) times daily.    TIZANIDINE (ZANAFLEX) 4 MG TABLET    Take 1/2 to 1 tablet by mouth twice daily as needed for muscle spasms. May cause drowsiness.    TOPIRAMATE (TOPAMAX) 100 MG TABLET    Take 1 tablet (100 mg total) by mouth 2 (two) times daily.    TRELEGY ELLIPTA 100-62.5-25 MCG DSDV    Inhale 1 puff into the lungs once daily.     Objective:     Vitals:    12/22/22 1415   BP: 117/72   Pulse: 89   Temp: 97.4 °F (36.3 °C)     Lab Results   Component Value Date    WBC 5.71 12/20/2022    HGB 12.8 12/20/2022    HCT 40.0 12/20/2022    MCV 89 12/20/2022     12/20/2022       BMP  Lab Results   Component Value Date     02/24/2022    K 3.5 02/24/2022     (H) 02/24/2022    CO2 20 (L) 02/24/2022    BUN 13 02/24/2022    CREATININE 0.8 02/24/2022    CALCIUM 9.6 02/24/2022    ANIONGAP 11 02/24/2022    ESTGFRAFRICA >60.0 02/24/2022    EGFRNONAA >60.0 02/24/2022     Lab Results   Component Value Date    ALT 12 02/24/2022    AST 14 02/24/2022    ALKPHOS 75 02/24/2022    BILITOT 0.3 02/24/2022     Lab Results   Component Value Date    IRON 94 12/20/2022    TIBC 388 12/20/2022    FERRITIN 55 12/20/2022       Physical Exam  Vitals reviewed.   Constitutional:       Appearance: She is well-developed.   HENT:      Head: Normocephalic.      Right Ear: External ear normal.      Left Ear: External ear normal.   Eyes:      General: Lids are normal. No scleral icterus.        Right eye: No discharge.         Left eye: No discharge.      Conjunctiva/sclera: Conjunctivae normal.   Neck:      Thyroid: No thyroid mass.   Cardiovascular:      Rate and Rhythm: Normal  rate and regular rhythm.      Heart sounds: Normal heart sounds. No murmur heard.  Pulmonary:      Effort: Pulmonary effort is normal. No respiratory distress.      Breath sounds: Normal breath sounds.   Abdominal:      General: There is no distension.   Genitourinary:     Comments: deferred  Musculoskeletal:         General: Normal range of motion.      Cervical back: Normal range of motion.   Skin:     General: Skin is warm and dry.   Neurological:      Mental Status: She is alert and oriented to person, place, and time.   Psychiatric:         Speech: Speech normal.         Behavior: Behavior normal. Behavior is cooperative.         Thought Content: Thought content normal.        Assessment:     Problem List Items Addressed This Visit          Oncology    Iron deficiency anemia due to chronic blood loss - Primary     Prior h/o H. Pylori. Repeat H. Pylori testing negative. Patient previously d/c'd oral iron supplementation due to constipation. Discussed with patient recommendations for VCE. For now she would like to hold off on this and think about it. She had GI appointment 9/2022 but rescheduled    Iron levels now wnl. No anemia  List of iron rich foods from up to date previously given to patient    -encourage continued iron rich foods  -f/u 3 months with cbc, iron, ferritin  -Discussed S&S to report sooner         Relevant Orders    CBC Auto Differential    Iron and TIBC    Ferritin         Med Onc Chart Routing      Follow up with physician    Follow up with KIMBER 3 months.   Infusion scheduling note    Injection scheduling note    Labs Ferritin, CBC and iron and TIBC   Lab interval:  1-2 days prior to appt   Imaging None      Pharmacy appointment No pharmacy appointment needed      Other referrals No additional referrals needed         Plan:     Iron deficiency anemia due to chronic blood loss  -     CBC Auto Differential; Future; Expected date: 12/22/2022  -     Iron and TIBC; Future; Expected date:  12/22/2022  -     Ferritin; Future; Expected date: 12/22/2022            PRECIOUS [facility]

## 2022-12-23 NOTE — PRE-PROCEDURE INSTRUCTIONS
Attempted to PAT patient for procedure on 12.30 with Dr. brown. No answer. M with return phone number. No return call at this time.

## 2022-12-29 ENCOUNTER — TELEPHONE (OUTPATIENT)
Dept: FAMILY MEDICINE | Facility: CLINIC | Age: 62
End: 2022-12-29
Payer: MEDICAID

## 2022-12-29 NOTE — TELEPHONE ENCOUNTER
----- Message from Tameka Mandel sent at 12/29/2022  3:04 PM CST -----  Contact: Zaika  Type:  Mammogram    Caller is requesting to schedule their annual mammogram appointment.  Order is not listed in EPIC.  Please enter order and contact patient to schedule.  Name of Caller:Zakia   Where would they like the mammogram performed?[facility]  Would the patient rather a call back or a response via [facility]? Call back  Best Call Back Number: [phone]  Additional Information: n/a         Thanks KB

## 2022-12-29 NOTE — TELEPHONE ENCOUNTER
----- Message from Tameka Mandel sent at 12/29/2022  3:06 PM CST -----  Contact: [phone]  Pt is calling in regards to her semaglutide (OZEMPIC) 1 mg/dose (4 mg/3 mL). Pt stated that medication is on back order and she is needing a different brand called into pharmacy or medication called into a different pharmacy. Please call her back at [phone]. Thanks KB

## 2022-12-30 ENCOUNTER — TELEPHONE (OUTPATIENT)
Dept: FAMILY MEDICINE | Facility: CLINIC | Age: 62
End: 2022-12-30
Payer: MEDICAID

## 2022-12-30 ENCOUNTER — HOSPITAL ENCOUNTER (OUTPATIENT)
Facility: HOSPITAL | Age: 62
Discharge: HOME | End: 2022-12-30
Attending: ANESTHESIOLOGY | Admitting: ANESTHESIOLOGY
Payer: MEDICAID

## 2022-12-30 VITALS
TEMPERATURE: 97 F | BODY MASS INDEX: 44.41 KG/M2 | DIASTOLIC BLOOD PRESSURE: 73 MMHG | OXYGEN SATURATION: 100 % | RESPIRATION RATE: 15 BRPM | SYSTOLIC BLOOD PRESSURE: 117 MMHG | WEIGHT: 293 LBS | HEART RATE: 70 BPM | HEIGHT: 68 IN

## 2022-12-30 DIAGNOSIS — M54.16 LUMBAR RADICULOPATHY: ICD-10-CM

## 2022-12-30 DIAGNOSIS — E11.65 TYPE 2 DIABETES MELLITUS WITH HYPERGLYCEMIA, WITHOUT LONG-TERM CURRENT USE OF INSULIN: Primary | Chronic | ICD-10-CM

## 2022-12-30 LAB — POCT GLUCOSE: 80 MG/DL (ref 70–110)

## 2022-12-30 PROCEDURE — 64483 NJX AA&/STRD TFRM EPI L/S 1: CPT | Mod: 50 | Performed by: ANESTHESIOLOGY

## 2022-12-30 PROCEDURE — 63600175 PHARM REV CODE 636 W HCPCS: Performed by: ANESTHESIOLOGY

## 2022-12-30 PROCEDURE — 25000003 PHARM REV CODE 250: Performed by: ANESTHESIOLOGY

## 2022-12-30 PROCEDURE — 25500020 PHARM REV CODE 255: Performed by: ANESTHESIOLOGY

## 2022-12-30 PROCEDURE — 64483 NJX AA&/STRD TFRM EPI L/S 1: CPT | Mod: 50,,, | Performed by: ANESTHESIOLOGY

## 2022-12-30 PROCEDURE — 64483 PR EPIDURAL INJ, ANES/STEROID, TRANSFORAMINAL, LUMB/SACR, SNGL LEVL: ICD-10-PCS | Mod: 50,,, | Performed by: ANESTHESIOLOGY

## 2022-12-30 RX ORDER — BUPIVACAINE HYDROCHLORIDE 2.5 MG/ML
INJECTION, SOLUTION EPIDURAL; INFILTRATION; INTRACAUDAL
Status: DISCONTINUED | OUTPATIENT
Start: 2022-12-30 | End: 2022-12-30 | Stop reason: HOSPADM

## 2022-12-30 RX ORDER — MIDAZOLAM HYDROCHLORIDE 1 MG/ML
INJECTION, SOLUTION INTRAMUSCULAR; INTRAVENOUS
Status: DISCONTINUED | OUTPATIENT
Start: 2022-12-30 | End: 2022-12-30 | Stop reason: HOSPADM

## 2022-12-30 RX ORDER — FENTANYL CITRATE 50 UG/ML
INJECTION, SOLUTION INTRAMUSCULAR; INTRAVENOUS
Status: DISCONTINUED | OUTPATIENT
Start: 2022-12-30 | End: 2022-12-30 | Stop reason: HOSPADM

## 2022-12-30 RX ORDER — SEMAGLUTIDE 2.68 MG/ML
2 INJECTION, SOLUTION SUBCUTANEOUS
Qty: 1 PEN | Refills: 1 | Status: SHIPPED | OUTPATIENT
Start: 2022-12-30 | End: 2023-03-06 | Stop reason: SDUPTHER

## 2022-12-30 RX ORDER — DEXAMETHASONE SODIUM PHOSPHATE 10 MG/ML
INJECTION INTRAMUSCULAR; INTRAVENOUS
Status: DISCONTINUED | OUTPATIENT
Start: 2022-12-30 | End: 2022-12-30 | Stop reason: HOSPADM

## 2022-12-30 RX ORDER — INDOMETHACIN 25 MG/1
CAPSULE ORAL
Status: DISCONTINUED | OUTPATIENT
Start: 2022-12-30 | End: 2022-12-30 | Stop reason: HOSPADM

## 2022-12-30 NOTE — TELEPHONE ENCOUNTER
----- Message from Anuja Landa sent at 12/30/2022 10:32 AM CST -----  Contact: carlie  .Type:  Patient Returning Call    Who Called:Carlie  Who Left Message for Patient:Nurse  Does the patient know what this is regarding?:No  Would the patient rather a call back or a response via [facility]? Call back  Best Call Back Number:.[phone]    Additional Information: Patient requesting call back

## 2022-12-30 NOTE — DISCHARGE INSTRUCTIONS

## 2022-12-30 NOTE — H&P
HPI  Patient presenting for Procedure(s) (LRB):  Bilateral L5/S1 TF TEETEE RN IV Sedation (Bilateral)     Patient on Anti-coagulation No    No health changes since previous encounter    Past Medical History:   Diagnosis Date    Acute respiratory failure due to COVID-19     COVID-19     Diabetes mellitus, type 2     Diabetic neuropathy 1/27/2014    DJD (degenerative joint disease) of knee     DVT (deep venous thrombosis) around 1990's    in leg, is on no anticoagulant therapy presently    Fatty liver     GERD (gastroesophageal reflux disease)     Hypertension associated with diabetes     HECTOR (iron deficiency anemia) 5/13/2021    Multinodular goiter     Obesity, morbid, BMI 50 or higher     Sleep apnea     has no CPAP     Past Surgical History:   Procedure Laterality Date    COLONOSCOPY N/A 5/12/2021    Procedure: COLONOSCOPY;  Surgeon: Carolina Rizo MD;  Location: [facility];  Service: Endoscopy;  Laterality: N/A;    EPIDURAL STEROID INJECTION N/A 12/10/2021    Procedure: Lumbar L5/S1 IL TEETEE  Would like AM procedure, if possible;  Surgeon: Nae Paul MD;  Location: [facility];  Service: Pain Management;  Laterality: N/A;    EPIDURAL STEROID INJECTION INTO CERVICAL SPINE N/A 10/8/2021    Procedure: C7-T1 IL TEETEE-no sedation.  Needs IV-just incase;  Surgeon: Nae Paul MD;  Location: [facility];  Service: Pain Management;  Laterality: N/A;    ESOPHAGOGASTRODUODENOSCOPY N/A 7/8/2021    Procedure: EGD (ESOPHAGOGASTRODUODENOSCOPY) previous positve covid;  Surgeon: Jann Gutierrez MD;  Location: [facility];  Service: Endoscopy;  Laterality: N/A;    FRACTURE SURGERY      HYSTERECTOMY      INTRALUMINAL GASTROINTESTINAL TRACT IMAGING VIA CAPSULE N/A 10/24/2022    Procedure: IMAGING PROCEDURE, GI TRACT, INTRALUMINAL, VIA CAPSULE;  Surgeon: Hang Lunsford RN;  Location: [facility];  Service: Endoscopy;  Laterality: N/A;    SELECTIVE INJECTION OF ANESTHETIC AGENT AROUND LUMBAR SPINAL NERVE ROOT BY TRANSFORAMINAL  APPROACH Bilateral 2022    Procedure: Bilateral L5/S1 TF TEETEE with RN IV sedation;  Surgeon: Nae Paul MD;  [facility];  Service: Pain Management;  Laterality: Bilateral;    SHOULDER ARTHROSCOPY      THYROIDECTOMY, PARTIAL Right     and transplatation of right superior parathyroid gland to the sternocleidomastoid muscle     TONSILLECTOMY      TUBAL LIGATION      WRIST FRACTURE SURGERY      left     Review of patient's allergies indicates:   Allergen Reactions    Codeine sulfate      Nausea^    Lisinopril Swelling     angioedema    Codeine Nausea Only and Rash        No current facility-administered medications on file prior to encounter.     Current Outpatient Medications on File Prior to Encounter   Medication Sig Dispense Refill    diltiaZEM (CARDIZEM) 30 MG tablet Take 1 tablet (30 mg total) by mouth every 12 (twelve) hours. 60 tablet 11    metFORMIN (GLUCOPHAGE) 1000 MG tablet Take 1 tablet (1,000 mg total) by mouth 2 (two) times daily with meals. 180 tablet 4    pravastatin (PRAVACHOL) 80 MG tablet Take 1 tablet (80 mg total) by mouth once daily. 90 tablet 4    diclofenac (VOLTAREN) 75 MG EC tablet Take 1 tablet (75 mg total) by mouth 2 (two) times daily. 180 tablet 4    ferrous sulfate 324 mg (65 mg iron) TbEC Take 1 tablet (324 mg total) by mouth once daily. 365 tablet 0    fluticasone propionate (FLONASE) 50 mcg/actuation nasal spray Use 2 sprays to each nostril daily 16 g 12    inhalation spacing device (BREATHERITE VALVED MDI CHAMBER) Use as directed for inhalation. 1 Device prn    LIDOcaine-prilocaine (EMLA) cream SMARTSI-2 Pump Topical 3-4 Times Daily      loratadine (CLARITIN) 10 mg tablet Take 10 mg by mouth once daily.      rOPINIRole (REQUIP) 0.25 MG tablet Take 1 tablet (0.25 mg total) by mouth every evening. 30 tablet 11    semaglutide (OZEMPIC) 1 mg/dose (4 mg/3 mL) Inject 1 mg into the skin every 7 days. 3 pen 4    tiZANidine (ZANAFLEX) 4 MG tablet Take 1/2 to 1 tablet  "by mouth twice daily as needed for muscle spasms. May cause drowsiness. 60 tablet 4    topiramate (TOPAMAX) 100 MG tablet Take 1 tablet (100 mg total) by mouth 2 (two) times daily. 60 tablet 11    TRELEGY ELLIPTA 100-62.5-25 mcg DsDv Inhale 1 puff into the lungs once daily.          PMHx, PSHx, Allergies, Medications reviewed in epic    ROS negative except pain complaints in HPI    OBJECTIVE:    BP (!) 142/78 (BP Location: Right arm, Patient Position: Sitting)   Pulse 77   Temp 97.1 °F (36.2 °C) (Temporal)   Resp 17   Ht 5' 8" (1.727 m)   Wt (!) 137.5 kg (303 lb 2.1 oz)   SpO2 96%   Breastfeeding No   BMI 46.09 kg/m²     PHYSICAL EXAMINATION:    GENERAL: Well appearing, in no acute distress, alert and oriented x3.  PSYCH:  Mood and affect appropriate.  SKIN: Skin color, texture, turgor normal, no rashes or lesions which will impact the procedure.  CV: RRR with palpation of the radial artery.  PULM: No evidence of respiratory difficulty, symmetric chest rise. Clear to auscultation.  NEURO: Cranial nerves grossly intact.    Plan:    Proceed with procedure as planned Procedure(s) (LRB):  Bilateral L5/S1 TF TEETEE RN IV Sedation (Bilateral)    Nae Paul MD  12/30/2022              "

## 2022-12-30 NOTE — DISCHARGE SUMMARY
Discharge Note  Short Stay      SUMMARY     Admit Date: 12/30/2022    Attending Physician: Nae Paul MD        Discharge Physician: Nae Paul MD        Discharge Date: 12/30/2022 10:53 AM    Procedure(s) (LRB):  Bilateral L5/S1 TF TEETEE RN IV Sedation (Bilateral)    Final Diagnosis: Lumbar radiculopathy, chronic [M54.16]    Disposition: Home or self care    Patient Instructions:   Current Discharge Medication List        CONTINUE these medications which have NOT CHANGED    Details   diltiaZEM (CARDIZEM) 30 MG tablet Take 1 tablet (30 mg total) by mouth every 12 (twelve) hours.  Qty: 60 tablet, Refills: 11    Associated Diagnoses: Systolic murmur; Hypertension associated with diabetes; Hyperlipidemia, unspecified hyperlipidemia type; Combined hyperlipidemia associated with type 2 diabetes mellitus; Claudication of both lower extremities; Diabetic polyneuropathy associated with type 2 diabetes mellitus; Tachycardia; ROMERO (dyspnea on exertion); Syncope, unspecified syncope type      levothyroxine (SYNTHROID) 100 MCG tablet Take 1 tablet (100 mcg total) by mouth once daily.  Qty: 90 tablet, Refills: 1    Associated Diagnoses: Medication refill; Multinodular goiter      metFORMIN (GLUCOPHAGE) 1000 MG tablet Take 1 tablet (1,000 mg total) by mouth 2 (two) times daily with meals.  Qty: 180 tablet, Refills: 4    Associated Diagnoses: Hypertension associated with diabetes; Medication refill      pravastatin (PRAVACHOL) 80 MG tablet Take 1 tablet (80 mg total) by mouth once daily.  Qty: 90 tablet, Refills: 4    Associated Diagnoses: Medication refill; Hyperlipidemia, unspecified hyperlipidemia type      pregabalin (LYRICA) 75 MG capsule Take 1 capsule (75 mg total) by mouth 3 (three) times daily.  Qty: 90 capsule, Refills: 3      diclofenac (VOLTAREN) 75 MG EC tablet Take 1 tablet (75 mg total) by mouth 2 (two) times daily.  Qty: 180 tablet, Refills: 4    Associated Diagnoses: Encounter for long-term (current) use of  medications; Lumbar radiculopathy, chronic      famotidine (PEPCID) 40 MG tablet Take 40 mg by mouth once daily.      ferrous sulfate 324 mg (65 mg iron) TbEC Take 1 tablet (324 mg total) by mouth once daily.  Qty: 365 tablet, Refills: 0    Associated Diagnoses: Iron deficiency anemia, unspecified iron deficiency anemia type      fluticasone propionate (FLONASE) 50 mcg/actuation nasal spray Use 2 sprays to each nostril daily  Qty: 16 g, Refills: 12    Associated Diagnoses: Cough      inhalation spacing device (BREATHERITE VALVED MDI CHAMBER) Use as directed for inhalation.  Qty: 1 Device, Refills: prn    Associated Diagnoses: Post viral asthma      lactulose (CHRONULAC) 10 gram/15 mL solution Take 30 mL (20 g total) by mouth 2 (two) times daily.  Qty: 1800 mL, Refills: 2    Associated Diagnoses: Constipation, unspecified constipation type      LIDOcaine-prilocaine (EMLA) cream SMARTSI-2 Pump Topical 3-4 Times Daily      linaCLOtide (LINZESS) 145 mcg Cap capsule Take 1 capsule (145 mcg total) by mouth once daily.  Qty: 30 capsule, Refills: 5    Associated Diagnoses: Constipation, unspecified constipation type      loratadine (CLARITIN) 10 mg tablet Take 10 mg by mouth once daily.      pantoprazole (PROTONIX) 40 MG tablet Take 1 tablet (40 mg total) by mouth once daily.  Qty: 30 tablet, Refills: 11    Associated Diagnoses: Gastroesophageal reflux disease with esophagitis without hemorrhage; Iron deficiency; Vomiting, unspecified vomiting type, unspecified whether nausea present      rOPINIRole (REQUIP) 0.25 MG tablet Take 1 tablet (0.25 mg total) by mouth every evening.  Qty: 30 tablet, Refills: 11    Associated Diagnoses: Restless legs syndrome (RLS)      semaglutide (OZEMPIC) 1 mg/dose (4 mg/3 mL) Inject 1 mg into the skin every 7 days.  Qty: 3 pen, Refills: 4      sulfacetamide sodium 10% (BLEPH-10) 10 % ophthalmic solution Place 2 drops into the left eye 4 (four) times daily.  Qty: 5 mL, Refills: 0       tiZANidine (ZANAFLEX) 4 MG tablet Take 1/2 to 1 tablet by mouth twice daily as needed for muscle spasms. May cause drowsiness.  Qty: 60 tablet, Refills: 4    Associated Diagnoses: Cervical radiculopathy      topiramate (TOPAMAX) 100 MG tablet Take 1 tablet (100 mg total) by mouth 2 (two) times daily.  Qty: 60 tablet, Refills: 11      TRELEGY ELLIPTA 100-62.5-25 mcg DsDv Inhale 1 puff into the lungs once daily.                 Discharge Diagnosis: Lumbar radiculopathy, chronic [M54.16]  Condition on Discharge: Stable with no complications to procedure   Diet on Discharge: Same as before.  Activity: as per instruction sheet.  Discharge to: Home with a responsible adult.  Follow up: 2-4 weeks       Please call the office at [phone] if you experience any weakness or loss of sensation, fever > 101.5, pain uncontrolled with oral medications, persistent nausea/vomiting/or diarrhea, redness or drainage from the incisions, or any other worrisome concerns. If physician on call was not reached or could not communicate with our office for any reason please go to the nearest emergency department

## 2022-12-30 NOTE — OP NOTE
Zakia Price  62 y.o. female      Vitals:    12/30/22 1050   BP: 101/66   Pulse: 71   Resp: 12   Temp:      Procedure Date: 12/30/22      INFORMED CONSENT: The procedure, risks, benefits and options were discussed with patient. There are no contraindications to the procedure. The patient expressed understanding and agreed to proceed. The personnel performing the procedure was discussed. I verify that I personally obtained consent prior to the start of the procedure and the signed consent can be found on the patient's chart.       Anesthesia:   Conscious sedation provided by M.D    The patient was monitored with continuous pulse oximetry, EKG, and intermittent blood pressure monitors.  The patient was hemodynamically stable throughout the entire process was responsive to voice, and breathing spontaneously.  Supplemental O2 was provided at 2L/min via nasal cannula.  Patient was comfortable for the duration of the procedure. (See nurse documentation and case log for sedation time)    There was a total of 1mg IV Midazolam and 50mcg Fentanyl titrated for the procedure    Pre Procedure diagnosis: Lumbar radiculopathy, chronic [M54.16]  Post-Procedure diagnosis: SAME     Complications: None    Specimens: None      DESCRIPTION OF PROCEDURE: The patient was brought to the procedure room. IV access was obtained prior to the procedure. The patient was positioned prone on the fluoroscopy table. Continuous hemodynamic monitoring was initiated including blood pressure, EKG, and pulse oximetry. . The skin was prepped with chlorhexidine and draped in a sterile fashion. Skin anesthesia was achieved using a total of 10mL of lidocaine, 5mL over each respective injection site.     The  L5/S1 transforaminal spaces were identified with fluoroscopy in the  AP, oblique, and lateral views.  A 22 gauge spinal quinke needle was then advanced into the area of the trans foraminal spaces bilateral with confirmation of proper needle  position using AP, oblique, and lateral fluoroscopic views. Once the needle tip was in the area of the transforaminal space, and there was no blood, CSF or paraesthesias,  1.5 mL of Omnipaque 300mg/ml was injected on bilateral for a total of 3mL.  Fluoroscopic imaging in the AP and lateral views revealed a clear outline of the spinal nerve with proximal spread of agent through the neural foramen into the epidural space. A total combination of 2 mL of Bupivicaine 0.25% and 10 mg dexamethasone was injected on each side for a total of 6mL of injected medications with displacement of the contrast dye confirming that the medication went into the area of the transforaminal spaces bilateral. A sterile dressing was applied.   Patient tolerated the procedure well.    Patient was taken back to the recovery room for further observation.     The patient was discharged to home in stable condition

## 2022-12-30 NOTE — TELEPHONE ENCOUNTER
----- Message from Natacha Hinson sent at 12/30/2022  9:05 AM CST -----  Contact: Self/[phone]  Pt is calling in regards to speaking with nurse about medication (Ozempic). Please give her a call back at [phone]. Thank you s/g

## 2023-01-04 RX ORDER — TOPIRAMATE 100 MG/1
100 TABLET, FILM COATED ORAL 2 TIMES DAILY
Qty: 60 TABLET | Refills: 11 | Status: SHIPPED | OUTPATIENT
Start: 2023-01-04 | End: 2023-03-15 | Stop reason: SDUPTHER

## 2023-01-06 ENCOUNTER — TELEPHONE (OUTPATIENT)
Dept: FAMILY MEDICINE | Facility: CLINIC | Age: 63
End: 2023-01-06
Payer: MEDICARE

## 2023-01-09 ENCOUNTER — HOSPITAL ENCOUNTER (OUTPATIENT)
Dept: RADIOLOGY | Facility: HOSPITAL | Age: 63
Discharge: HOME | End: 2023-01-09
Attending: FAMILY MEDICINE
Payer: MEDICAID

## 2023-01-09 VITALS — WEIGHT: 293 LBS | HEIGHT: 68 IN | BODY MASS INDEX: 44.41 KG/M2

## 2023-01-09 DIAGNOSIS — Z12.31 ENCOUNTER FOR SCREENING MAMMOGRAM FOR BREAST CANCER: ICD-10-CM

## 2023-01-09 PROCEDURE — 77067 SCR MAMMO BI INCL CAD: CPT | Mod: TC,PO

## 2023-01-09 PROCEDURE — 77067 SCR MAMMO BI INCL CAD: CPT | Mod: 26,,, | Performed by: RADIOLOGY

## 2023-01-09 PROCEDURE — 77067 MAMMO DIGITAL SCREENING BILAT: ICD-10-PCS | Mod: 26,,, | Performed by: RADIOLOGY

## 2023-01-10 NOTE — PROGRESS NOTES
Normal mammogram, repeat in 1 year, result released through [employer].  Please call the patient if not enrolled with my chart. [phone]   Eye Exam due on 03/23/2022  COVID-19 Vaccine(5 - Booster for Moderna series) due on 04/01/2022  Shingles Vaccine(2 of 2) due on 08/11/2022

## 2023-01-13 ENCOUNTER — LAB VISIT (OUTPATIENT)
Dept: LAB | Facility: HOSPITAL | Age: 63
End: 2023-01-13
Attending: FAMILY MEDICINE
Payer: MEDICAID

## 2023-01-13 ENCOUNTER — OFFICE VISIT (OUTPATIENT)
Dept: FAMILY MEDICINE | Facility: CLINIC | Age: 63
End: 2023-01-13
Payer: MEDICAID

## 2023-01-13 VITALS
SYSTOLIC BLOOD PRESSURE: 122 MMHG | OXYGEN SATURATION: 97 % | HEIGHT: 68 IN | DIASTOLIC BLOOD PRESSURE: 78 MMHG | HEART RATE: 81 BPM | BODY MASS INDEX: 44.41 KG/M2 | WEIGHT: 293 LBS

## 2023-01-13 DIAGNOSIS — E11.65 TYPE 2 DIABETES MELLITUS WITH HYPERGLYCEMIA, WITHOUT LONG-TERM CURRENT USE OF INSULIN: ICD-10-CM

## 2023-01-13 DIAGNOSIS — G89.29 CHRONIC LEFT SHOULDER PAIN: ICD-10-CM

## 2023-01-13 DIAGNOSIS — E03.9 HYPOTHYROIDISM, UNSPECIFIED TYPE: ICD-10-CM

## 2023-01-13 DIAGNOSIS — R05.9 COUGH, UNSPECIFIED TYPE: ICD-10-CM

## 2023-01-13 DIAGNOSIS — E78.5 HYPERLIPIDEMIA, UNSPECIFIED HYPERLIPIDEMIA TYPE: Primary | ICD-10-CM

## 2023-01-13 DIAGNOSIS — Z79.899 ENCOUNTER FOR LONG-TERM (CURRENT) USE OF MEDICATIONS: ICD-10-CM

## 2023-01-13 DIAGNOSIS — Z76.0 MEDICATION REFILL: ICD-10-CM

## 2023-01-13 DIAGNOSIS — M25.512 CHRONIC LEFT SHOULDER PAIN: ICD-10-CM

## 2023-01-13 PROCEDURE — 3074F SYST BP LT 130 MM HG: CPT | Mod: CPTII,,, | Performed by: FAMILY MEDICINE

## 2023-01-13 PROCEDURE — 3078F DIAST BP <80 MM HG: CPT | Mod: CPTII,,, | Performed by: FAMILY MEDICINE

## 2023-01-13 PROCEDURE — 84443 ASSAY THYROID STIM HORMONE: CPT | Performed by: FAMILY MEDICINE

## 2023-01-13 PROCEDURE — 99215 OFFICE O/P EST HI 40 MIN: CPT | Mod: PBBFAC,PO | Performed by: FAMILY MEDICINE

## 2023-01-13 PROCEDURE — 3008F PR BODY MASS INDEX (BMI) DOCUMENTED: ICD-10-PCS | Mod: CPTII,,, | Performed by: FAMILY MEDICINE

## 2023-01-13 PROCEDURE — 1160F PR REVIEW ALL MEDS BY PRESCRIBER/CLIN PHARMACIST DOCUMENTED: ICD-10-PCS | Mod: CPTII,,, | Performed by: FAMILY MEDICINE

## 2023-01-13 PROCEDURE — 1159F MED LIST DOCD IN RCRD: CPT | Mod: CPTII,,, | Performed by: FAMILY MEDICINE

## 2023-01-13 PROCEDURE — 3078F PR MOST RECENT DIASTOLIC BLOOD PRESSURE < 80 MM HG: ICD-10-PCS | Mod: CPTII,,, | Performed by: FAMILY MEDICINE

## 2023-01-13 PROCEDURE — 99214 PR OFFICE/OUTPT VISIT, EST, LEVL IV, 30-39 MIN: ICD-10-PCS | Mod: S$PBB,,, | Performed by: FAMILY MEDICINE

## 2023-01-13 PROCEDURE — 99999 PR PBB SHADOW E&M-EST. PATIENT-LVL V: ICD-10-PCS | Mod: PBBFAC,,, | Performed by: FAMILY MEDICINE

## 2023-01-13 PROCEDURE — 1160F RVW MEDS BY RX/DR IN RCRD: CPT | Mod: CPTII,,, | Performed by: FAMILY MEDICINE

## 2023-01-13 PROCEDURE — 1159F PR MEDICATION LIST DOCUMENTED IN MEDICAL RECORD: ICD-10-PCS | Mod: CPTII,,, | Performed by: FAMILY MEDICINE

## 2023-01-13 PROCEDURE — 99999 PR PBB SHADOW E&M-EST. PATIENT-LVL V: CPT | Mod: PBBFAC,,, | Performed by: FAMILY MEDICINE

## 2023-01-13 PROCEDURE — 3008F BODY MASS INDEX DOCD: CPT | Mod: CPTII,,, | Performed by: FAMILY MEDICINE

## 2023-01-13 PROCEDURE — 36415 COLL VENOUS BLD VENIPUNCTURE: CPT | Mod: PO | Performed by: FAMILY MEDICINE

## 2023-01-13 PROCEDURE — 3074F PR MOST RECENT SYSTOLIC BLOOD PRESSURE < 130 MM HG: ICD-10-PCS | Mod: CPTII,,, | Performed by: FAMILY MEDICINE

## 2023-01-13 PROCEDURE — 99214 OFFICE O/P EST MOD 30 MIN: CPT | Mod: S$PBB,,, | Performed by: FAMILY MEDICINE

## 2023-01-13 RX ORDER — BENZONATATE 200 MG/1
200 CAPSULE ORAL 3 TIMES DAILY PRN
Qty: 20 CAPSULE | Refills: 0 | Status: SHIPPED | OUTPATIENT
Start: 2023-01-13 | End: 2023-01-28

## 2023-01-13 RX ORDER — PRAVASTATIN SODIUM 80 MG/1
80 TABLET ORAL DAILY
Qty: 90 TABLET | Refills: 4 | Status: SHIPPED | OUTPATIENT
Start: 2023-01-13 | End: 2023-03-20 | Stop reason: SDUPTHER

## 2023-01-13 RX ORDER — PROMETHAZINE HYDROCHLORIDE AND DEXTROMETHORPHAN HYDROBROMIDE 6.25; 15 MG/5ML; MG/5ML
5 SYRUP ORAL EVERY 4 HOURS PRN
Qty: 120 ML | Refills: 0 | Status: SHIPPED | OUTPATIENT
Start: 2023-01-13 | End: 2023-01-23

## 2023-01-13 RX ORDER — METHYLPREDNISOLONE 4 MG/1
TABLET ORAL
Qty: 21 TABLET | Refills: 0 | Status: SHIPPED | OUTPATIENT
Start: 2023-01-13 | End: 2023-03-17

## 2023-01-13 RX ORDER — IBUPROFEN 800 MG/1
800 TABLET ORAL EVERY 8 HOURS PRN
Qty: 30 TABLET | Refills: 0 | Status: SHIPPED | OUTPATIENT
Start: 2023-01-13 | End: 2023-01-23

## 2023-01-13 NOTE — ASSESSMENT & PLAN NOTE
Start Medrol Dosepak and ibuprofen.  Follow-up sooner if no improvement.  Consider intra-articular steroid injection.  Patient may need further imaging with MRI.  Consider orthopedic consult.

## 2023-01-13 NOTE — PATIENT INSTRUCTIONS
Follow up in 6 months (on 7/13/2023), or if symptoms worsen or fail to improve, for follow up.     Dear patient,   As a result of recent federal legislation (The Federal Cures Act), you may receive lab or pathology results from your visit in your [facility] account before your physician is able to contact you. Your physician or their representative will relay the results to you with their recommendations at their soonest availability.     If no improvement in symptoms or symptoms worsen, please be advised to call MD, follow-up at clinic and/or go to ER if becomes severe.    Oleksandr Nagel M.D.        We Offer TELEHEALTH & Same Day Appointments!   Book your Telehealth appointment with me through my nurse or   Clinic appointments on CME!    [address] [place] [address]    Office: [phone]   FAX: [phone]    Check out my Facebook Page and Follow Me at: [email or web address]    Check out my website at [email or web address] by clicking on: [email or web address]    To Schedule appointments online, go to [email or web address]: [email or web address]

## 2023-01-13 NOTE — ASSESSMENT & PLAN NOTE
Please be advised of hypothyroidism disease course.  We will plan to monitor thyroid labs at routine intervals.  Please be advised of risks and benefits of medication use, see medication insert for complete details.  ER precautions.

## 2023-01-13 NOTE — ASSESSMENT & PLAN NOTE
Continue current diabetes meds.We will plan to monitor hemoglobin A1c at designated intervals 3 to 6 months.  I recommend ongoing Education for diabetic diet and exercise protocol.  We will continue to monitor for side effects.    Please be advised of symptoms to monitor for and to notify me immediately if persistent or worsening.  Follow up with Ophthalmology/Optometry and Podiatry at least annually.

## 2023-01-13 NOTE — ASSESSMENT & PLAN NOTE
Start cough syrup and Tessalon Perles.Discussed condition course and signs and symptoms to expect.  Patient advised take anti-inflammatories and or Tylenol for pain or fever.  ER precautions.  Call MD or follow-up to clinic if not improving or worsening symptoms.

## 2023-01-13 NOTE — PROGRESS NOTES
PLAN:    Nail abnormality: Follow-up/establish care with Dermatology.  Try different hair and nail supplement.  Problem List Items Addressed This Visit       Type 2 diabetes mellitus with hyperglycemia, without long-term current use of insulin (Chronic)     Continue current diabetes meds.We will plan to monitor hemoglobin A1c at designated intervals 3 to 6 months.  I recommend ongoing Education for diabetic diet and exercise protocol.  We will continue to monitor for side effects.    Please be advised of symptoms to monitor for and to notify me immediately if persistent or worsening.  Follow up with Ophthalmology/Optometry and Podiatry at least annually.           Encounter for long-term (current) use of medications (Chronic)     Complete history and physical was completed today.  Complete and thorough medication reconciliation was performed.  Discussed risks and benefits of medications.  Advised patient on orders and health maintenance.  We discussed old records and old labs if available.  Will request any records not available through epic.  Continue current medications listed on your summary sheet.           Chronic left shoulder pain (Chronic)     Start Medrol Dosepak and ibuprofen.  Follow-up sooner if no improvement.  Consider intra-articular steroid injection.  Patient may need further imaging with MRI.  Consider orthopedic consult.         Relevant Medications    ibuprofen (ADVIL,MOTRIN) 800 MG tablet    methylPREDNISolone (MEDROL DOSEPACK) 4 mg tablet    Hyperlipidemia - Primary     Hyperlipidemia Medications               pravastatin (PRAVACHOL) 80 MG tablet Take 1 tablet (80 mg total) by mouth once daily.   Counseled on hyperlipidemia disease course, healthy diet and increased need for exercise.  Please be advised of the risk of cardiovascular disease, increase stroke and heart attack risk with uncontrolled/untreated hyperlipidemia.     Patient voiced understanding and understood the treatment plan. All  questions were answered.              Relevant Medications    pravastatin (PRAVACHOL) 80 MG tablet    Cough     Start cough syrup and Tessalon Perles.Discussed condition course and signs and symptoms to expect.  Patient advised take anti-inflammatories and or Tylenol for pain or fever.  ER precautions.  Call MD or follow-up to clinic if not improving or worsening symptoms.           Relevant Medications    promethazine-dextromethorphan (PROMETHAZINE-DM) 6.25-15 mg/5 mL Syrp    benzonatate (TESSALON) 200 MG capsule    Medication refill    Relevant Medications    pravastatin (PRAVACHOL) 80 MG tablet    Hypothyroidism     Please be advised of hypothyroidism disease course.  We will plan to monitor thyroid labs at routine intervals.  Please be advised of risks and benefits of medication use, see medication insert for complete details.  ER precautions.           Relevant Orders    TSH     Future Appointments       Date Provider Specialty Appt Notes    1/13/2023 Oleksandr Nagel MD Family Medicine 6 mth f/u    1/31/2023 Blanca Redman PA-C Pain Medicine 4 wk inj f/u    3/13/2023  Lab horace hernandes    3/16/2023 Sol Mckeon NP Hematology and Oncology 3 mo f/u with lab prior/anemia    4/17/2023 Brandie Rey DPM Podiatry 4 month nail care           Medication Management for assessment above:   Medication List with Changes/Refills   New Medications    BENZONATATE (TESSALON) 200 MG CAPSULE    Take 1 capsule (200 mg total) by mouth 3 (three) times daily as needed for Cough.    IBUPROFEN (ADVIL,MOTRIN) 800 MG TABLET    Take 1 tablet (800 mg total) by mouth every 8 (eight) hours as needed for Pain.    METHYLPREDNISOLONE (MEDROL DOSEPACK) 4 MG TABLET    follow package directions    PROMETHAZINE-DEXTROMETHORPHAN (PROMETHAZINE-DM) 6.25-15 MG/5 ML SYRP    Take 5 mLs by mouth every 4 (four) hours as needed (cough).   Current Medications    DICLOFENAC (VOLTAREN) 75 MG EC TABLET    Take 1 tablet (75 mg total) by mouth 2  (two) times daily.    DILTIAZEM (CARDIZEM) 30 MG TABLET    Take 1 tablet (30 mg total) by mouth every 12 (twelve) hours.    FAMOTIDINE (PEPCID) 40 MG TABLET    Take 40 mg by mouth once daily.    FERROUS SULFATE 324 MG (65 MG IRON) TBEC    Take 1 tablet (324 mg total) by mouth once daily.    FLUTICASONE PROPIONATE (FLONASE) 50 MCG/ACTUATION NASAL SPRAY    Use 2 sprays to each nostril daily    INHALATION SPACING DEVICE (BREATHERITE VALVED MDI CHAMBER)    Use as directed for inhalation.    LACTULOSE (CHRONULAC) 10 GRAM/15 ML SOLUTION    Take 30 mL (20 g total) by mouth 2 (two) times daily.    LEVOTHYROXINE (SYNTHROID) 100 MCG TABLET    Take 1 tablet (100 mcg total) by mouth once daily.    LIDOCAINE-PRILOCAINE (EMLA) CREAM    SMARTSI-2 Pump Topical 3-4 Times Daily    LORATADINE (CLARITIN) 10 MG TABLET    Take 10 mg by mouth once daily.    METFORMIN (GLUCOPHAGE) 1000 MG TABLET    Take 1 tablet (1,000 mg total) by mouth 2 (two) times daily with meals.    PANTOPRAZOLE (PROTONIX) 40 MG TABLET    Take 1 tablet (40 mg total) by mouth once daily.    PREGABALIN (LYRICA) 75 MG CAPSULE    Take 1 capsule (75 mg total) by mouth 3 (three) times daily.    ROPINIROLE (REQUIP) 0.25 MG TABLET    Take 1 tablet (0.25 mg total) by mouth every evening.    SEMAGLUTIDE (OZEMPIC) 2 MG/DOSE (8 MG/3 ML) PNIJ    Inject 2 mg into the skin every 7 days.    SULFACETAMIDE SODIUM 10% (BLEPH-10) 10 % OPHTHALMIC SOLUTION    Place 2 drops into the left eye 4 (four) times daily.    TIZANIDINE (ZANAFLEX) 4 MG TABLET    Take 1/2 to 1 tablet by mouth twice daily as needed for muscle spasms. May cause drowsiness.    TOPIRAMATE (TOPAMAX) 100 MG TABLET    Take 1 tablet (100 mg total) by mouth 2 (two) times daily.    TRELEGY ELLIPTA 100-62.5-25 MCG DSDV    Inhale 1 puff into the lungs once daily.   Changed and/or Refilled Medications    Modified Medication Previous Medication    PRAVASTATIN (PRAVACHOL) 80 MG TABLET pravastatin (PRAVACHOL) 80 MG tablet        Take 1 tablet (80 mg total) by mouth once daily.    Take 1 tablet (80 mg total) by mouth once daily.   Discontinued Medications    LINACLOTIDE (LINZESS) 145 MCG CAP CAPSULE    Take 1 capsule (145 mcg total) by mouth once daily.    SEMAGLUTIDE (OZEMPIC) 1 MG/DOSE (4 MG/3 ML)    Inject 1 mg into the skin every 7 days.       Oleksandr Nagel M.D.  ==========================================================================  Subjective:   Patient ID: Zakia Price is a 62 y.o. female.  has a past medical history of Acute respiratory failure due to COVID-19, COVID-19, Diabetes mellitus, type 2, Diabetic neuropathy (1/27/2014), DJD (degenerative joint disease) of knee, DVT (deep venous thrombosis) (around 1990's), Fatty liver, GERD (gastroesophageal reflux disease), Hypertension associated with diabetes, HECTOR (iron deficiency anemia) (5/13/2021), Multinodular goiter, Obesity, morbid, BMI 50 or higher, and Sleep apnea.   Chief Complaint: Diabetes (Follow up ) and Medication Refill      Problem List Items Addressed This Visit       Type 2 diabetes mellitus with hyperglycemia, without long-term current use of insulin (Chronic)    Overview     January 2023:  Doing well on current regimen.  Diabetes Medications               metFORMIN (GLUCOPHAGE) 1000 MG tablet Take 1 tablet (1,000 mg total) by mouth 2 (two) times daily with meals.    semaglutide (OZEMPIC) 2 mg/dose (8 mg/3 mL) PnIj Inject 2 mg into the skin every 7 days.     BMI Readings from Last 10 Encounters:   01/13/23 46.53 kg/m²   01/09/23 46.07 kg/m²   12/30/22 46.09 kg/m²   12/22/22 45.55 kg/m²   12/15/22 46.90 kg/m²   10/31/22 46.91 kg/m²   10/03/22 46.73 kg/m²   09/22/22 47.33 kg/m²   08/15/22 47.10 kg/m²   07/15/22 47.09 kg/m²     November 2019:  Reviewed diabetes management status.  Patient was due for LDL less than 100 at that time.  Also needing foot exam.DECEMBER 2019:  Patient reports issues with ACE-inhibitor.  Currently having cough.  Only on  amlodipine.Diabetes Management StatusStatin: TakingACE/ARB: Not takingMARCH 2020:  Diabetes Management StatusStatin: TakingACE/ARB: Not takingSEPTEMBER 2020:  Reviewed Diabetes Management Status.  Patient is taking statin but not Ace or Arb.  July 2021:Diabetes Management StatusStatin: TakingACE/ARB: Not taking  January 2022:  Patient reports weight loss with Ozempic.  Diabetes Management Status    Diabetes Management Status    Statin: Taking  ACE/ARB: Not taking    Screening or Prevention Patient's value Goal Complete/Controlled?   HgA1C Testing and Control   Lab Results   Component Value Date    HGBA1C 5.1 09/20/2022      Annually/Less than 8% Yes   Lipid profile : 02/24/2022 Annually Yes   LDL control Lab Results   Component Value Date    LDLCALC 99.6 02/24/2022    Annually/Less than 100 mg/dl  Yes   Nephropathy screening Lab Results   Component Value Date    LABMICR 10.0 11/03/2022     Lab Results   Component Value Date    PROTEINUA Negative 10/28/2015     No results found for: UTPCR   Annually Yes   Blood pressure BP Readings from Last 1 Encounters:   01/13/23 122/78    Less than 140/90 Yes   Dilated retinal exam : 03/23/2021 Annually No   Foot exam   : 04/14/2022 Annually Yes            Current Assessment & Plan     Continue current diabetes meds.We will plan to monitor hemoglobin A1c at designated intervals 3 to 6 months.  I recommend ongoing Education for diabetic diet and exercise protocol.  We will continue to monitor for side effects.    Please be advised of symptoms to monitor for and to notify me immediately if persistent or worsening.  Follow up with Ophthalmology/Optometry and Podiatry at least annually.           Encounter for long-term (current) use of medications (Chronic)    Overview     January 2023:  Reviewed labs.  CHRONIC. Stable. Compliant with medications for managed conditions. See medication list. No SE reported. Routine lab analysis is being monitored. Refills were addressed.  January  2021:CHRONIC. Stable. Compliant with medications for managed conditions. See medication list. No SE reported. Routine lab analysis is being monitored. Refills were addressed.  July 2021:  Reviewed labs.  January 2022:  Reviewed labs.  February 2022:  Reviewed labs.  July 2022:  Reviewed labs.  Lab Results   Component Value Date    WBC 5.71 12/20/2022    HGB 12.8 12/20/2022    HCT 40.0 12/20/2022    MCV 89 12/20/2022     12/20/2022       Chemistry        Component Value Date/Time     02/24/2022 1255    K 3.5 02/24/2022 1255     (H) 02/24/2022 1255    CO2 20 (L) 02/24/2022 1255    BUN 13 02/24/2022 1255    CREATININE 0.8 02/24/2022 1255    GLU 93 02/24/2022 1255        Component Value Date/Time    CALCIUM 9.6 02/24/2022 1255    ALKPHOS 75 02/24/2022 1255    AST 14 02/24/2022 1255    ALT 12 02/24/2022 1255    BILITOT 0.3 02/24/2022 1255    ESTGFRAFRICA >60.0 02/24/2022 1255    EGFRNONAA >60.0 02/24/2022 1255          Lab Results   Component Value Date    TSH 2.462 04/06/2021    FREET4 1.06 12/04/2019    T3FREE 2.7 12/04/2019     =================================         Current Assessment & Plan     Complete history and physical was completed today.  Complete and thorough medication reconciliation was performed.  Discussed risks and benefits of medications.  Advised patient on orders and health maintenance.  We discussed old records and old labs if available.  Will request any records not available through epic.  Continue current medications listed on your summary sheet.           Chronic left shoulder pain (Chronic)    Overview     Chronic.  January 2023: Patient reports still having left shoulder pain.  She has not taking any medication for this.  Patient thinks that she may have had an injury in the remote past.  She has been to physical therapy which did not help.    Previous history:  Uncontrolled.  Patient is not currently taking any medications for this.  Denies any previous imaging.  Denies  any trauma or injury.  X-Ray Shoulder 2 or More Views Left  Narrative: EXAMINATION:  XR SHOULDER COMPLETE 2 OR MORE VIEWS LEFT    CLINICAL HISTORY:  Pain in left shoulder    TECHNIQUE:  Two or three views of the left shoulder were performed.    COMPARISON:  November 2016    FINDINGS:  Osteopenia.  Moderate degenerative changes at the AC and glenohumeral joints with spur formation.  No fracture or dislocation.  Chronic pleuroparenchymal changes in the visualized left mid to lower lung zone.  Impression: As above    Electronically signed by: Angel Robles MD  Date:    04/20/2022  Time:    11:25             Current Assessment & Plan     Start Medrol Dosepak and ibuprofen.  Follow-up sooner if no improvement.  Consider intra-articular steroid injection.  Patient may need further imaging with MRI.  Consider orthopedic consult.         Hyperlipidemia - Primary    Overview     CHRONIC. STABLE. Lab analysis reviewed.   (-) CP, SOB, abdominal pain, N/V/D, constipation, jaundice, skin changes.  (-) Myalgias  Lab Results   Component Value Date    CHOL 174 02/24/2022    CHOL 166 09/04/2020    CHOL 153 03/02/2020     Lab Results   Component Value Date    HDL 49 02/24/2022    HDL 54 09/04/2020    HDL 53 03/02/2020     Lab Results   Component Value Date    LDLCALC 99.6 02/24/2022    LDLCALC 88.6 09/04/2020    LDLCALC 83.2 03/02/2020     Lab Results   Component Value Date    TRIG 127 02/24/2022    TRIG 117 09/04/2020    TRIG 84 03/02/2020     Lab Results   Component Value Date    CHOLHDL 28.2 02/24/2022    CHOLHDL 32.5 09/04/2020    CHOLHDL 34.6 03/02/2020     Lab Results   Component Value Date    TOTALCHOLEST 3.6 02/24/2022    TOTALCHOLEST 3.1 09/04/2020    TOTALCHOLEST 2.9 03/02/2020     Lab Results   Component Value Date    ALT 12 02/24/2022    AST 14 02/24/2022    ALKPHOS 75 02/24/2022    BILITOT 0.3 02/24/2022   ======================================================  The 10-year ASCVD risk score (Fawn DEE, et al., 2019) is:  16%    Values used to calculate the score:      Age: 62 years      Sex: Female      Is Non- : Yes      Diabetic: Yes      Tobacco smoker: No      Systolic Blood Pressure: 122 mmHg      Is BP treated: Yes      HDL Cholesterol: 49 mg/dL      Total Cholesterol: 174 mg/dL           Current Assessment & Plan     Hyperlipidemia Medications               pravastatin (PRAVACHOL) 80 MG tablet Take 1 tablet (80 mg total) by mouth once daily.   Counseled on hyperlipidemia disease course, healthy diet and increased need for exercise.  Please be advised of the risk of cardiovascular disease, increase stroke and heart attack risk with uncontrolled/untreated hyperlipidemia.     Patient voiced understanding and understood the treatment plan. All questions were answered.              Cough    Overview     Subacute.  Started 1 to 2 weeks ago.  Dry cough.  Nonproductive.  Denies any fever chills shortness of breath chest pain.         Current Assessment & Plan     Start cough syrup and Tessalon Perles.Discussed condition course and signs and symptoms to expect.  Patient advised take anti-inflammatories and or Tylenol for pain or fever.  ER precautions.  Call MD or follow-up to clinic if not improving or worsening symptoms.           Medication refill    Hypothyroidism    Overview     Chronic.  Control is uncertain.  Patient due for TSH.  She is taking levothyroxine 100 micrograms daily.  Reports compliance.  No side effects reported.  Lab Results   Component Value Date    TSH 2.462 04/06/2021    FREET4 1.06 12/04/2019              Current Assessment & Plan     Please be advised of hypothyroidism disease course.  We will plan to monitor thyroid labs at routine intervals.  Please be advised of risks and benefits of medication use, see medication insert for complete details.  ER precautions.               Review of patient's allergies indicates:   Allergen Reactions    Codeine sulfate      Nausea^    Lisinopril  Swelling     angioedema    Codeine Nausea Only and Rash     Current Outpatient Medications   Medication Instructions    benzonatate (TESSALON) 200 mg, Oral, 3 times daily PRN    diclofenac (VOLTAREN) 75 mg, Oral, 2 times daily    diltiaZEM (CARDIZEM) 30 mg, Oral, Every 12 hours    famotidine (PEPCID) 40 mg, Oral, Daily    ferrous sulfate 324 mg, Oral, Daily    fluticasone propionate (FLONASE) 50 mcg/actuation nasal spray Use 2 sprays to each nostril daily    ibuprofen (ADVIL,MOTRIN) 800 mg, Oral, Every 8 hours PRN    inhalation spacing device (BREATHERITE VALVED MDI CHAMBER) Use as directed for inhalation.    lactulose (CHRONULAC) 10 gram/15 mL solution Take 30 mL (20 g total) by mouth 2 (two) times daily.    levothyroxine (SYNTHROID) 100 mcg, Oral, Daily    LIDOcaine-prilocaine (EMLA) cream SMARTSI-2 Pump Topical 3-4 Times Daily    loratadine (CLARITIN) 10 mg, Oral, Daily    metFORMIN (GLUCOPHAGE) 1,000 mg, Oral, 2 times daily with meals    methylPREDNISolone (MEDROL DOSEPACK) 4 mg tablet follow package directions    OZEMPIC 2 mg, Subcutaneous, Every 7 days    pantoprazole (PROTONIX) 40 mg, Oral, Daily    pravastatin (PRAVACHOL) 80 mg, Oral, Daily    pregabalin (LYRICA) 75 mg, Oral, 3 times daily    promethazine-dextromethorphan (PROMETHAZINE-DM) 6.25-15 mg/5 mL Syrp 5 mLs, Oral, Every 4 hours PRN    rOPINIRole (REQUIP) 0.25 mg, Oral, Nightly    sulfacetamide sodium 10% (BLEPH-10) 10 % ophthalmic solution 2 drops, Left Eye, 4 times daily    tiZANidine (ZANAFLEX) 4 MG tablet Take 1/2 to 1 tablet by mouth twice daily as needed for muscle spasms. May cause drowsiness.    topiramate (TOPAMAX) 100 mg, Oral, 2 times daily    TRELEGY ELLIPTA 100-62.5-25 mcg DsDv 1 puff, Inhalation, Daily      I have reviewed the PMH, social history, FamilyHx, surgical history, allergies and medications documented / confirmed by the patient at the time of this visit.  Review of Systems   Constitutional:  Negative for chills, fatigue,  "fever and unexpected weight change.   HENT:  Negative for ear pain and sore throat.    Eyes:  Negative for redness and visual disturbance.   Respiratory:  Negative for cough and shortness of breath.    Cardiovascular:  Negative for chest pain and palpitations.   Gastrointestinal:  Negative for abdominal pain, nausea and vomiting.   Genitourinary:  Negative for difficulty urinating and hematuria.   Musculoskeletal:  Positive for arthralgias and back pain. Negative for myalgias.   Skin:  Negative for rash and wound.   Neurological:  Positive for numbness. Negative for weakness and headaches.   Psychiatric/Behavioral:  Positive for sleep disturbance. The patient is not nervous/anxious.    Objective:   /78   Pulse 81   Ht 5' 8" (1.727 m)   Wt (!) 138.8 kg (306 lb)   SpO2 97%   BMI 46.53 kg/m²   Physical Exam  Vitals and nursing note reviewed.   Constitutional:       General: She is not in acute distress.     Appearance: She is well-developed. She is obese. She is not ill-appearing, toxic-appearing or diaphoretic.   HENT:      Head: Normocephalic and atraumatic.      Right Ear: Hearing and external ear normal.      Left Ear: Hearing and external ear normal.      Nose: Nose normal. No rhinorrhea.   Eyes:      General: Lids are normal.      Extraocular Movements: Extraocular movements intact.      Conjunctiva/sclera: Conjunctivae normal.      Pupils: Pupils are equal, round, and reactive to light.   Cardiovascular:      Rate and Rhythm: Normal rate.      Pulses: Normal pulses.   Pulmonary:      Effort: Pulmonary effort is normal. No respiratory distress.      Breath sounds: Normal breath sounds.   Abdominal:      General: Bowel sounds are normal.      Palpations: Abdomen is soft.   Musculoskeletal:         General: Tenderness and deformity present. Normal range of motion.      Cervical back: Normal range of motion and neck supple. Bony tenderness present. No tenderness. Pain with movement present.      Lumbar " back: Spasms and tenderness present. No bony tenderness. Negative right straight leg raise test and negative left straight leg raise test.      Right lower leg: Tenderness present. No swelling, deformity, lacerations or bony tenderness. No edema.      Left lower leg: No swelling, deformity, lacerations, tenderness or bony tenderness. No edema.   Skin:     General: Skin is warm and dry.      Capillary Refill: Capillary refill takes less than 2 seconds.      Coloration: Skin is not pale.   Neurological:      General: No focal deficit present.      Mental Status: She is alert and oriented to person, place, and time. Mental status is at baseline. She is not disoriented.      Cranial Nerves: No cranial nerve deficit.      Motor: No weakness.      Gait: Gait normal.   Psychiatric:         Attention and Perception: She is attentive.         Mood and Affect: Mood normal. Mood is not anxious or depressed.         Speech: Speech is not rapid and pressured or slurred.         Behavior: Behavior normal. Behavior is not agitated, aggressive or hyperactive. Behavior is cooperative.         Thought Content: Thought content normal. Thought content is not paranoid or delusional. Thought content does not include homicidal or suicidal ideation. Thought content does not include homicidal or suicidal plan.         Cognition and Memory: Memory is not impaired.         Judgment: Judgment normal.   + Nail deformity   + left shoulder with positive Todd  Assessment:     1. Hyperlipidemia, unspecified hyperlipidemia type    2. Medication refill    3. Encounter for long-term (current) use of medications    4. Type 2 diabetes mellitus with hyperglycemia, without long-term current use of insulin    5. Hypothyroidism, unspecified type    6. Cough, unspecified type    7. Chronic left shoulder pain      MDM:   Moderate complexity.  Moderate risk.  Total time: 35 minutes.  This includes total time spent on the encounter, which includes face to  face time and non-face to face time preparing to see the patient (eg, review of previous medical records, tests), Obtaining and/or reviewing separately obtained history, documenting clinical information in the electronic or other health record, independently interpreting results (not separately reported)/communicating results to the patient/family/caregiver, and/or care coordination (not separately reported).    I have Reviewed and summarized old records.  I have performed thorough medication reconciliation today and discussed risk and benefits of medications.  I have reviewed labs and discussed with patient.  All questions were answered.  I am requesting old records and will review them once they are available.    I have signed for the following orders AND/OR meds.  Orders Placed This Encounter   Procedures    TSH     Standing Status:   Standing     Number of Occurrences:   99     Standing Expiration Date:   1/8/2043     Medications Ordered This Encounter   Medications    benzonatate (TESSALON) 200 MG capsule     Sig: Take 1 capsule (200 mg total) by mouth 3 (three) times daily as needed for Cough.     Dispense:  20 capsule     Refill:  0    ibuprofen (ADVIL,MOTRIN) 800 MG tablet     Sig: Take 1 tablet (800 mg total) by mouth every 8 (eight) hours as needed for Pain.     Dispense:  30 tablet     Refill:  0    methylPREDNISolone (MEDROL DOSEPACK) 4 mg tablet     Sig: follow package directions     Dispense:  21 tablet     Refill:  0    pravastatin (PRAVACHOL) 80 MG tablet     Sig: Take 1 tablet (80 mg total) by mouth once daily.     Dispense:  90 tablet     Refill:  4    promethazine-dextromethorphan (PROMETHAZINE-DM) 6.25-15 mg/5 mL Syrp     Sig: Take 5 mLs by mouth every 4 (four) hours as needed (cough).     Dispense:  120 mL     Refill:  0        Follow up in 6 months (on 7/13/2023), or if symptoms worsen or fail to improve, for follow up.    If no improvement in symptoms or symptoms worsen, advised to  call/follow-up at clinic or go to ER. Patient voiced understanding and all questions/concerns were addressed.   DISCLAIMER: This note was compiled by using a speech recognition dictation system and therefore please be aware that typographical / speech recognition errors can and do occur.  Please contact me if you see any errors specifically.    Oleksandr Nagel M.D.       Office: [phone] [id number] [place] [address]  FAX: [phone]

## 2023-01-13 NOTE — ASSESSMENT & PLAN NOTE
Hyperlipidemia Medications             pravastatin (PRAVACHOL) 80 MG tablet Take 1 tablet (80 mg total) by mouth once daily.        Counseled on hyperlipidemia disease course, healthy diet and increased need for exercise.  Please be advised of the risk of cardiovascular disease, increase stroke and heart attack risk with uncontrolled/untreated hyperlipidemia.     Patient voiced understanding and understood the treatment plan. All questions were answered.

## 2023-01-14 LAB — TSH SERPL DL<=0.005 MIU/L-ACNC: 0.9 UIU/ML (ref 0.4–4)

## 2023-01-30 NOTE — PROGRESS NOTES
"Established Patient Chronic Pain Note     Referring Physician: No ref. provider found    PCP: Oleksandr Nagel MD    Chief Complaint:   Chief Complaint   Patient presents with    Low-back Pain    Leg Pain       SUBJECTIVE:  Interval History (1/30/2023): Zakia Price presents today for follow-up visit.  she underwent bilateral L5/S1 TF TEETEE on 12/30/22.  The patient reports that she is/was better following the procedure.  she reports 100% pain relief.  The changes lasted 4 weeks so far.  The changes have continued through this visit.  Patient reports pain as "0/10 today. She reports improvement in her neck pain as well, reports only occasional spasms. She reports improvement in burning sensation in her feet with TEETEE and Topamax and Lyrica, still gets tingling if she stands or walks for too long. Overall, improved functionality following TEETEE.    Interval History (9/16/2022):  Zakia Price presents today for follow-up visit.  Last injection bilateral L5/S1 TF TEETEE on 05/06/2022. Patient reports pain as 6/10 today. She reports she is no longer experiencing relief from the previous TEETEE. Relief lasted for 2-3 months before insidiously returning. Continues to report pain in low lumbar spine with radiation into bilateral lower extremities along posterior aspect. Reports weakness in lower extremities and decreased balance. She also reports insidious return of neck pain, but low back pain is more persistent, constant, and worse than neck pain. Pain interferes with daily activity. She has been performing physician directed exercises at home without relief. Patient has continued conservative treatments including anti-inflammatory and nerve pain medications, as well as home physical therapy exercises without relief.      Interval history 06/07/2022  Patient presents status post bilateral L5/S1 transforaminal epidural steroid injection 05/06/2022.  Patient reports 100% pain relief initially following " transforaminal injection of lower extremity radicular symptoms.  Today pain is rated 0/10.  Patient reports overall 50% sustained relief in lower extremity pain.  Patient reports more significant relief with transforaminal band prior intralaminar approach.  Patient continues to report sustained relief in the neck and upper extremities following C7-T1 interlaminar epidural steroid injection October 2021. Patient is continue Topamax 100 mg twice daily.  She does report noticeable improvement in pain on this medication and denies any side effects.  Today she denies any significant upper lower extremity weakness, bowel or bladder incontinence or saddle anesthesia.      Interval history 03/08/2022   Patient presents for MRI review- lumbar and cervical spine.  Today patient reports sustained pain relief in the neck and upper extremities following C7-T1 interlaminar epidural steroid injection October 2021. She also reports improvement in lower extremity radicular symptoms in the lower back and down the lower extremities.  Today her primary concern is burning sensation on the bottom of the feet.  Patient has continued Topamax and has reached 100 mg twice daily.  She does report noticeable improvement in her pain on this medication.  She denies any side effects.  She denies any new onset upper or lower extremity weakness, bowel or bladder incontinence or saddle anesthesia.      Interval history 01/11/2022  Patient presents status post L5-S1 interlaminar epidural steroid injection with right paramedian approach 12/10/2021.  Patient reports 80% sustained relief in lower back and bilateral lower extremity radicular symptoms following epidural steroid injection.  Today patient reports her pain as a 5/10.  Patient has continued Topamax 100 mg twice daily.  She denies any significant sedation from this medication and has enjoyed a  20 lb weight loss since initiation as well as improvement in neuropathic pain.      Interval Hx:  11/8/21  Pt is s/p C7-T1 IL TEETEE with R paramedian approach 10/08/21.  Patient reports 60% sustained relief following her cervical epidural steroid injection in both her neck and upper extremities.  Patient does report improvement in range of motion and strength.  Today patient would like to discuss her lower back and leg pain.  Today she again reports pain in the lower back which radiates down the posterior aspects of bilateral lower extremities in L5-S1 distribution to the feet.  Today pain is 5/10.  Patient is currently participating actively in physical therapy.  She is currently reached Topamax 75 mg twice daily without side effects.  She denies any new onset lower extremity weakness, bowel or bladder incontinence or saddle anesthesia.    HPI 07/30/21  Zakia Price is a 63 y.o. female with past medical history significant for history of acute respiratory failure secondary to COVID-19, type 2 diabetes complicated by neuropathy, GERD, hypertension, morbid obesity, obstructive sleep apnea,  who presents to the clinic for the evaluation of neck and lower back and leg pain.  Patient reports that her pain began approximately 5-6 years prior without inciting accident injury or trauma.  Patient reports lower back pain which begins in a bandlike distribution at her iliac crest with radiation down the posterior aspect of bilateral legs to the feet in L5-S1 distribution.  Patient reports left side is significantly worse than the right.  Patient reports tingling and numbness in bilateral feet and associated subjective weakness in the lower extremities.  Patient does report periodic falls associated with her pain.    Patient also reports longstanding neck pain with radiation down bilateral upper extremities in no particular dermatomal distribution.  Patient reports pain is worse along the right paracervical muscles than on the left.  Patient reports compromise in hand  strength as well as in hand dexterity.   Patient denies ataxia or bowel or bladder incontinence.   Patient's neck and shoulder pain is exacerbated by cervical flexion, extension and lateral rotation as well as overhead activities..    Patient reports that lower extremity pain is exacerbated from moving from a sitting to a standing position and with ambulation.  Patient is able to ambulate approximately 10 minutes before requiring rest.  The pain is rated with a score of  7/10 on the BEST day and a score of 10/10 on the WORST day.  Symptoms interfere with daily activity and sleeping.     Patient reports significant motor weakness.  Patient denies night fever/night sweats, urinary incontinence, bowel incontinence, significant weight loss and loss of sensations.      Pain Disability Index Review:     Last 3 PDI Scores 3/8/2022 1/11/2022 11/8/2021   Pain Disability Index (PDI) 35 35 45       Non-Pharmacologic Treatments:  Physical Therapy/Home Exercise: yes  Ice/Heat:yes  TENS: no  Acupuncture: no  Massage: yes  Chiropractic: no    Other: no    Pain Medications:  - Adjuvant Medications: Neurontin (Gabapentin), Topamax (Topiramate), Tylenol (Acetaminophen) and Zanaflex ( Tizanidine) Diazepam    Pain Procedures:     -bilateral L5/S1 TF TEETEE on 12/30/22 with 100% relief  -05/06/2022:  Bilateral L5/S1 transforaminal epidural steroid injection  -12/10/2021:  L5-S1 intralaminar epidural steroid injection with right paramedian approach  -10/8/21: C7-T1 IL TEETEE with R paramedian approach; Dr. Paul  Prior epidural steroid injection lumbar spine, approximately 3-4 years prior at the [facility] in [place] which confirmed approximately 1 year of relief.    Past Medical History:   Diagnosis Date    Acute respiratory failure due to COVID-19     COVID-19     Diabetes mellitus, type 2     Diabetic neuropathy 1/27/2014    DJD (degenerative joint disease) of knee     DVT (deep venous thrombosis) around 1990's    in leg, is on no anticoagulant therapy presently     Fatty liver     GERD (gastroesophageal reflux disease)     Hypertension associated with diabetes     HECTOR (iron deficiency anemia) 5/13/2021    Multinodular goiter     Obesity, morbid, BMI 50 or higher     Sleep apnea     has no CPAP     Past Surgical History:   Procedure Laterality Date    COLONOSCOPY N/A 5/12/2021    Procedure: COLONOSCOPY;  Surgeon: Carolina Rizo MD;  Location: [facility];  Service: Endoscopy;  Laterality: N/A;    EPIDURAL STEROID INJECTION N/A 12/10/2021    Procedure: Lumbar L5/S1 IL TEETEE  Would like AM procedure, if possible;  Surgeon: Nae Paul MD;  Location: [facility];  Service: Pain Management;  Laterality: N/A;    EPIDURAL STEROID INJECTION INTO CERVICAL SPINE N/A 10/8/2021    Procedure: C7-T1 IL TEETEE-no sedation.  Needs IV-just incase;  Surgeon: Nae Paul MD;  Location: [facility];  Service: Pain Management;  Laterality: N/A;    ESOPHAGOGASTRODUODENOSCOPY N/A 7/8/2021    Procedure: EGD (ESOPHAGOGASTRODUODENOSCOPY) previous positve covid;  Surgeon: Jann Gutierrez MD;  Location: [facility];  Service: Endoscopy;  Laterality: N/A;    FRACTURE SURGERY      HYSTERECTOMY      INTRALUMINAL GASTROINTESTINAL TRACT IMAGING VIA CAPSULE N/A 10/24/2022    Procedure: IMAGING PROCEDURE, GI TRACT, INTRALUMINAL, VIA CAPSULE;  Surgeon: Hang Lunsford RN;  Location: [facility];  Service: Endoscopy;  Laterality: N/A;    SELECTIVE INJECTION OF ANESTHETIC AGENT AROUND LUMBAR SPINAL NERVE ROOT BY TRANSFORAMINAL APPROACH Bilateral 5/6/2022    Procedure: Bilateral L5/S1 TF TEETEE with RN IV sedation;  Surgeon: Nae Paul MD;  Location: [facility];  Service: Pain Management;  Laterality: Bilateral;    SELECTIVE INJECTION OF ANESTHETIC AGENT AROUND LUMBAR SPINAL NERVE ROOT BY TRANSFORAMINAL APPROACH Bilateral 12/30/2022    Procedure: Bilateral L5/S1 TF TEETEE RN IV Sedation;  Surgeon: Nae Paul MD;  Location: [facility];  Service: Pain Management;  Laterality: Bilateral;    SHOULDER  ARTHROSCOPY      THYROIDECTOMY, PARTIAL Right     and transplatation of right superior parathyroid gland to the sternocleidomastoid muscle     TONSILLECTOMY      TUBAL LIGATION  1984    WRIST FRACTURE SURGERY      left     Review of patient's allergies indicates:   Allergen Reactions    Codeine sulfate      Nausea^    Lisinopril Swelling     angioedema    Codeine Nausea Only and Rash       Current Outpatient Medications   Medication Sig    diclofenac (VOLTAREN) 75 MG EC tablet Take 1 tablet (75 mg total) by mouth 2 (two) times daily.    diltiaZEM (CARDIZEM) 30 MG tablet Take 1 tablet (30 mg total) by mouth every 12 (twelve) hours.    famotidine (PEPCID) 40 MG tablet Take 40 mg by mouth once daily.    ferrous sulfate 324 mg (65 mg iron) TbEC Take 1 tablet (324 mg total) by mouth once daily.    fluticasone propionate (FLONASE) 50 mcg/actuation nasal spray Use 2 sprays to each nostril daily    inhalation spacing device (BREATHERITE VALVED MDI CHAMBER) Use as directed for inhalation.    levothyroxine (SYNTHROID) 100 MCG tablet Take 1 tablet (100 mcg total) by mouth once daily.    LIDOcaine-prilocaine (EMLA) cream SMARTSI-2 Pump Topical 3-4 Times Daily    loratadine (CLARITIN) 10 mg tablet Take 10 mg by mouth once daily.    metFORMIN (GLUCOPHAGE) 1000 MG tablet Take 1 tablet (1,000 mg total) by mouth 2 (two) times daily with meals.    methylPREDNISolone (MEDROL DOSEPACK) 4 mg tablet follow package directions    pantoprazole (PROTONIX) 40 MG tablet Take 1 tablet (40 mg total) by mouth once daily.    pravastatin (PRAVACHOL) 80 MG tablet Take 1 tablet (80 mg total) by mouth once daily.    pregabalin (LYRICA) 75 MG capsule Take 1 capsule (75 mg total) by mouth 3 (three) times daily.    rOPINIRole (REQUIP) 0.25 MG tablet Take 1 tablet (0.25 mg total) by mouth every evening.    semaglutide (OZEMPIC) 2 mg/dose (8 mg/3 mL) PnIj Inject 2 mg into the skin every 7 days.    sulfacetamide sodium 10% (BLEPH-10) 10 % ophthalmic  "solution Place 2 drops into the left eye 4 (four) times daily.    tiZANidine (ZANAFLEX) 4 MG tablet Take 1/2 to 1 tablet by mouth twice daily as needed for muscle spasms. May cause drowsiness.    topiramate (TOPAMAX) 100 MG tablet Take 1 tablet (100 mg total) by mouth 2 (two) times daily.    TRELEGY ELLIPTA 100-62.5-25 mcg DsDv Inhale 1 puff into the lungs once daily.     No current facility-administered medications for this visit.       Review of Systems     GENERAL:  No weight loss, malaise or fevers.  HEENT:   No recent changes in vision or hearing  NECK:  Negative for lumps, no difficulty with swallowing.  RESPIRATORY:  Negative for cough, wheezing or shortness of breath, patient denies any recent URI.  CARDIOVASCULAR:  Negative for chest pain, leg swelling or palpitations.  GI:  Negative for abdominal discomfort, blood in stools or black stools or change in bowel habits.  MUSCULOSKELETAL:  See HPI.  SKIN:  Negative for lesions, rash, and itching.  PSYCH:  No mood disorder or recent psychosocial stressors.  Patients sleep is not disturbed secondary to pain.  HEMATOLOGY/LYMPHOLOGY:  Negative for prolonged bleeding, bruising easily or swollen nodes.  Patient is not currently taking any anti-coagulants  NEURO:   No history of headaches, syncope, paralysis, seizures or tremors.  All other reviewed and negative other than HPI.    OBJECTIVE:    /75   Pulse 88   Ht 5' 8" (1.727 m)   Wt 135.1 kg (297 lb 13.5 oz)   BMI 45.29 kg/m²   Telemedicine Exam  Vitals:    01/31/23 1224   BP: 135/75   Pulse: 88   Weight: 135.1 kg (297 lb 13.5 oz)   Height: 5' 8" (1.727 m)     Body mass index is 45.29 kg/m².   (reviewed on 1/31/2023)     GENERAL: Well appearing, in no acute distress, alert and oriented x3.  Cooperative.  PSYCH:  Mood and affect appropriate.  SKIN: Skin color & texture with no obvious abnormalities.    HEAD/FACE:  Normocephalic, atraumatic.    PULM:  No difficulty breathing. No nasal flaring. No obvious " wheezing.  EXTREMITIES: No obvious deformities. Moving all extremities well, appears to have symmetric strength throughout.  MUSCULOSKELETAL: No obvious atrophy abnormalities are noted.   NEURO: No obvious neurologic deficit.   GAIT: sitting.     Physical Exam: last in clinic visit:     Physical Exam    GENERAL: Well appearing, in no acute distress, alert and oriented x3.  PSYCH:  Mood and affect appropriate.  SKIN: Skin color, texture, turgor normal, no rashes or lesions.  HEAD/FACE:  Normocephalic, atraumatic. Cranial nerves grossly intact.    NECK: pain to palpation over the cervical paraspinous muscles. Spurling Negative.  pain with neck flexion, extension, or lateral flexion.   Normal testing biceps, triceps and brachioradialis bilaterally.    NegativeHoffmann's bilaterally.    5/5 strength testing deltoid, biceps, triceps, wrist extensor, wrist flexor and ulnar intrinsics bilaterally.    Normal  strength bilaterally    CV: RRR with palpation of the radial artery.  PULM: No evidence of respiratory difficulty, symmetric chest rise.  GI:  Soft and non-tender.    BACK: Straight leg raising in the sitting and supine positions is negative to radicular pain. No pain to palpation over the facet joints of the lumbar spine or spinous processes. Normal range of motion without pain reproduction.  EXTREMITIES: Peripheral joint ROM is full and pain free without obvious instability or laxity in all four extremities. No deformities, edema, or skin discoloration. Good capillary refill.  MUSCULOSKELETAL: Able to stand on heels & toes.   hip, and knee provocative maneuvers are negative.   pain with palpation over the sacroiliac joints bilaterally.  FABERs test is negative.  FAER test is negative bilaterally.   Bilateral upper and lower extremity strength is normal and symmetric.  No atrophy or tone abnormalities are noted.  -5/5 strength testing hip flexion, quadriceps, gastrocnemius soleus, anterior tibialis and EHL  bilaterally.  RIGHT Lower extremity: Hip flexion 5/5, Hip Abduction 5/5, Hip Adduction 5/5, Knee extension 5/5, Knee flexion 5/5, Ankle dorsiflexion5/5, Extensor hallucis longus 5/5, Ankle plantarflexion 5/5  LEFT Lower extremity:  Hip flexion 5/5, Hip Abduction 5/5,Hip Adduction 5/5, Knee extension 5/5, Knee flexion 5/5, Ankle dorsiflexion 5/5, Extensor hallucis longus 5/5, Ankle plantarflexion 5/5  -Normal testing knee (patellar) jerk and ankle (achilles) jerk    NEURO: Bilateral upper and lower extremity coordination and muscle stretch reflexes are physiologic and symmetric. No loss of sensation is noted.  GAIT: normal.    Imaging:   X-Ray Lumbar Spine Complete 5 View  FINDINGS:  Mild dextroconvex curvature of the lumbar spine.  Grade 1 anterolisthesis of L4 on L5, measuring 4 mm.  Vertebral body heights are maintained without evidence of fracture or aggressive osseous lesion.  Multilevel degenerative anterior marginal osteophyte formation, most pronounced in the visualized lower thoracic spine.  Intervertebral disc space narrowing at L5-S1.  Multilevel degenerative facet arthropathy, most pronounced at L4-5 and L5-S1.    X-Ray Cervical Spine 5 View W Flex Extxt  FINDINGS:  C7 faintly visualized on neutral lateral view.  There is heterotopic bone formation along the anterior/inferior margin C1-2 articulation.  There is smooth prominence of the overlying prevertebral soft tissues at this level.  Pre dens space appears within upper limits normal.  Vertebral body height and alignment maintained with anterior and posterior marginal spurring most prominently at the C4-5 and C5-6 levels.  Posterior subluxation C4 on C5 noted.  There is uniform loss of disc height at C4-5 and C5-6 levels.  Heterotopic bone and positioning combine to limit evaluation of the left side of C1-2 articulation on the open mouth view.  There is suggestion of slight asymmetry of lateral masses of C1.  Flexion and extension views demonstrate  stable positioning without additional evidence subluxation or instability.  No other evidence of fracture or subluxation noted.  Multilevel mild uncovertebral osteophyte formation and minimal/mild neural foraminal stenosis identified.  Surgical staples identified anteriorly in the lower neck at and just to the right of midline.  Remaining osseous structures appear intact.  Partially visualized upper apices appear clear.    Impression  Spondylosis with minimal retro listhesis or posterior subluxation C4 on C5.    Hepatopathy ache bone and degenerative change limits evaluation of the C1-2 articulation and levels particularly on the left.  Consideration should be given for CT if not previously performed to further evaluate these findings.    MRI Lumbar Spine 1/11/22  FINDINGS:  Grade 1 anterolisthesis of L4 on L5. No fracture.  Multiple hemangiomas are seen within the lower thoracic and upper lumbar spine vertebral bodies.  Mild disc height loss at the L1-L2 and L4-L5 levels.  Conus medullaris terminates at the L1 level. Distal spinal cord intensity is normal.  There is no paraspinal soft tissue abnormality.     T12-L1: Minimal diffuse disc bulge.  Mild bilateral facet arthropathy.  No neural foraminal stenosis.  No spinal canal stenosis.     L1-L2: There is a mild diffuse disc bulge.  Mild bilateral facet arthropathy.  No neural foraminal stenosis.  No spinal canal stenosis.     L2-L3: No disc bulge.  Mild bilateral facet arthropathy.  No neural foraminal stenosis.  No spinal canal stenosis.     L3-L4: Minimal diffuse disc bulge.  Moderate bilateral facet arthropathy.  Mild bilateral neural foraminal stenosis.  No significant spinal canal stenosis.     L4-L5: Mild uncovering of the disc space secondary to listhesis.  There is a diffuse disc bulge.  Severe bilateral facet arthropathy.  Moderate bilateral neural foraminal stenosis.  Mild spinal canal stenosis secondary to listhesis, disc bulge, facet arthropathy, and  ligamentum flavum hypertrophy.     L5-S1: Minimal diffuse disc bulge.  Moderate facet arthropathy, greater on the right than left.  Mild-to-moderate bilateral neural foraminal stenosis.  No spinal canal stenosis.    MRI cervical spine 1/11/22  FINDINGS:  There is mild retrolisthesis of C4 on C5. Vertebral bodies demonstrate normal signal intensity on all sequences.  No fracture.  Mild-to-moderate disc height loss and desiccation involves the C4 through C7 disc spaces, most pronounced at the C4-C5 and C5-C6 disc spaces.  The craniocervical junction is normal.  Visualized portions of the posterior fossa appear normal.  Mucosal thickening noted within the sphenoid sinus.  The spinal cord demonstrates normal signal intensity on all sequences. No paraspinal soft tissue abnormality is identified.     C2-C3: Small posterior disc osteophyte complex, best seen on the axial sequence, without spinal canal stenosis.  There is no facet arthropathy.  There is no uncovertebral joint disease.  There is no neuroforaminal stenosis.     C3-C4: No disc bulge.  Severe right facet arthropathy.  No significant uncovertebral arthropathy.  Moderate right neural foraminal stenosis.  No spinal canal stenosis.     C4-C5: Small posterior disc osteophyte complex.  Mild spinal canal stenosis secondary to retrolisthesis and disc osteophyte complex.  There is also a right lateral disc protrusion which involves the right neural foramen, best appreciated on the T2 sagittal sequence.  Mild bilateral facet arthropathy.  Mild-to-moderate bilateral uncovertebral arthropathy.  Moderate to severe neural foraminal stenosis, greater on the right than left.     C5-C6: Small posterior disc osteophyte complex results in mild spinal canal stenosis.  Severe left facet arthropathy.  Mild-to-moderate bilateral uncovertebral arthropathy.  Mild to moderate bilateral neural foraminal stenosis.     C6-C7: Small posterior disc osteophyte complex results in mild spinal  canal stenosis.  No significant facet arthropathy.  No significant uncovertebral arthropathy.  No neural foraminal stenosis.     C7-T1: No disc bulge.  No significant facet arthropathy.  No neural foraminal stenosis.  No spinal canal stenosis.    ASSESSMENT: 63 y.o. year old female with neck and shoulder and back and lower extremity pain, consistent with     1. Lumbar radiculopathy                PLAN:   - Interventions: None at this time, consider repeat bilateral L5/S1 TF TEETEE should symptoms return or exacerbate    -s/p bilateral L5/S1 TF TEETEE on 12/30/22 with 100% relief    - Anticoagulation use: no no anticoagulation    - we have discussed considering repeat cervical  epidural steroid injection at a more cephalad level, C6-7, in the future should her upper extremity radicular symptoms exacerbate.      report:  Reviewed and consistent with medication use as prescribed.      - Medications:  -Continue Topamax .  We discussed potential side effects of this medication include drowsiness, dizziness, tingling of the hands and feet, diarrhea, dysgeusia, weight loss/anorexia.    Topamax  100mg BID   -Continue Lyrica 75 mg BID per Dr. Rey    - Therapy:   We discussed continuing HEP to help manage the patient/s painful condition. The patient was counseled that muscle strengthening will improve the long term prognosis in regards to pain and may also help increase range of motion and mobility.    - Imaging: Reviewed available imaging with patient and answered any questions they had regarding study.  - Follow up visit: return to clinic PRN    The above plan and management options were discussed at length with patient. Patient is in agreement with the above and verbalized understanding.    - I discussed the goals of interventional chronic pain management with the patient on today's visit. We discussed a multimodal and systematic approach to pain.  This includes diagnostic and therapeutic injections, adjuvant  pharmacologic treatment, physical therapy, and at times psychiatry.  I emphasized the importance of regular exercise, core strengthening and stretching, diet and weight loss as a cornerstone of long-term pain management.    - This condition does not require this patient to take time off of work, and the primary goal of our Pain Management services is to improve the patient's functional capacity.  - Patient Questions: Answered all of the patient's questions regarding diagnoses, therapy, treatment and next steps      Blanca Redman PA-C  Interventional Pain Management  [facility] [place]    Disclaimer:  This note was prepared using voice recognition system and is likely to have sound alike errors that may have been overlooked even after proof reading.  Please call me with any questions

## 2023-01-31 ENCOUNTER — OFFICE VISIT (OUTPATIENT)
Dept: PAIN MEDICINE | Facility: CLINIC | Age: 63
End: 2023-01-31
Payer: MEDICAID

## 2023-01-31 VITALS
SYSTOLIC BLOOD PRESSURE: 135 MMHG | HEART RATE: 88 BPM | DIASTOLIC BLOOD PRESSURE: 75 MMHG | WEIGHT: 293 LBS | HEIGHT: 68 IN | BODY MASS INDEX: 44.41 KG/M2

## 2023-01-31 DIAGNOSIS — M54.16 LUMBAR RADICULOPATHY: Primary | ICD-10-CM

## 2023-01-31 PROCEDURE — 1159F PR MEDICATION LIST DOCUMENTED IN MEDICAL RECORD: ICD-10-PCS | Mod: CPTII,,, | Performed by: PHYSICIAN ASSISTANT

## 2023-01-31 PROCEDURE — 99214 OFFICE O/P EST MOD 30 MIN: CPT | Mod: PBBFAC | Performed by: PHYSICIAN ASSISTANT

## 2023-01-31 PROCEDURE — 99214 OFFICE O/P EST MOD 30 MIN: CPT | Mod: S$PBB,,, | Performed by: PHYSICIAN ASSISTANT

## 2023-01-31 PROCEDURE — 3288F PR FALLS RISK ASSESSMENT DOCUMENTED: ICD-10-PCS | Mod: CPTII,,, | Performed by: PHYSICIAN ASSISTANT

## 2023-01-31 PROCEDURE — 3075F SYST BP GE 130 - 139MM HG: CPT | Mod: CPTII,,, | Performed by: PHYSICIAN ASSISTANT

## 2023-01-31 PROCEDURE — 99214 PR OFFICE/OUTPT VISIT, EST, LEVL IV, 30-39 MIN: ICD-10-PCS | Mod: S$PBB,,, | Performed by: PHYSICIAN ASSISTANT

## 2023-01-31 PROCEDURE — 3078F DIAST BP <80 MM HG: CPT | Mod: CPTII,,, | Performed by: PHYSICIAN ASSISTANT

## 2023-01-31 PROCEDURE — 1160F RVW MEDS BY RX/DR IN RCRD: CPT | Mod: CPTII,,, | Performed by: PHYSICIAN ASSISTANT

## 2023-01-31 PROCEDURE — 99999 PR PBB SHADOW E&M-EST. PATIENT-LVL IV: CPT | Mod: PBBFAC,,, | Performed by: PHYSICIAN ASSISTANT

## 2023-01-31 PROCEDURE — 1160F PR REVIEW ALL MEDS BY PRESCRIBER/CLIN PHARMACIST DOCUMENTED: ICD-10-PCS | Mod: CPTII,,, | Performed by: PHYSICIAN ASSISTANT

## 2023-01-31 PROCEDURE — 3075F PR MOST RECENT SYSTOLIC BLOOD PRESS GE 130-139MM HG: ICD-10-PCS | Mod: CPTII,,, | Performed by: PHYSICIAN ASSISTANT

## 2023-01-31 PROCEDURE — 3288F FALL RISK ASSESSMENT DOCD: CPT | Mod: CPTII,,, | Performed by: PHYSICIAN ASSISTANT

## 2023-01-31 PROCEDURE — 3078F PR MOST RECENT DIASTOLIC BLOOD PRESSURE < 80 MM HG: ICD-10-PCS | Mod: CPTII,,, | Performed by: PHYSICIAN ASSISTANT

## 2023-01-31 PROCEDURE — 99999 PR PBB SHADOW E&M-EST. PATIENT-LVL IV: ICD-10-PCS | Mod: PBBFAC,,, | Performed by: PHYSICIAN ASSISTANT

## 2023-01-31 PROCEDURE — 1101F PT FALLS ASSESS-DOCD LE1/YR: CPT | Mod: CPTII,,, | Performed by: PHYSICIAN ASSISTANT

## 2023-01-31 PROCEDURE — 1159F MED LIST DOCD IN RCRD: CPT | Mod: CPTII,,, | Performed by: PHYSICIAN ASSISTANT

## 2023-01-31 PROCEDURE — 3008F PR BODY MASS INDEX (BMI) DOCUMENTED: ICD-10-PCS | Mod: CPTII,,, | Performed by: PHYSICIAN ASSISTANT

## 2023-01-31 PROCEDURE — 3008F BODY MASS INDEX DOCD: CPT | Mod: CPTII,,, | Performed by: PHYSICIAN ASSISTANT

## 2023-01-31 PROCEDURE — 1101F PR PT FALLS ASSESS DOC 0-1 FALLS W/OUT INJ PAST YR: ICD-10-PCS | Mod: CPTII,,, | Performed by: PHYSICIAN ASSISTANT

## 2023-02-10 DIAGNOSIS — E04.2 MULTINODULAR GOITER: ICD-10-CM

## 2023-02-10 DIAGNOSIS — Z76.0 MEDICATION REFILL: ICD-10-CM

## 2023-02-10 RX ORDER — LEVOTHYROXINE SODIUM 100 UG/1
TABLET ORAL
Qty: 90 TABLET | Refills: 3 | Status: SHIPPED | OUTPATIENT
Start: 2023-02-10 | End: 2023-03-15 | Stop reason: SDUPTHER

## 2023-02-10 NOTE — TELEPHONE ENCOUNTER
Refill Decision Note   Zakia Price  is requesting a refill authorization.  Brief Assessment and Rationale for Refill:  Approve     Medication Therapy Plan:       Medication Reconciliation Completed: No   Comments:     Provider Staff:     Action is required for this patient.   Please see care gap opportunities below in Care Due Message.     Thanks!  [facility]     Appointments      Date Provider   Last Visit   1/13/2023 Oleksandr Nagel MD   Next Visit   Visit date not found Oleksandr Nagel MD     Note composed:10:06 AM 02/10/2023           Note composed:10:06 AM 02/10/2023

## 2023-02-10 NOTE — TELEPHONE ENCOUNTER
Care Due:                  Date            Visit Type   Department     Provider  --------------------------------------------------------------------------------                                EP -                              PRIMARY      [facility] FAMILY  Last Visit: 01-      CARE ([facility])   MEDICINE       Oleksandr Nagel  Next Visit: None Scheduled  None         None Found                                                            Last  Test          Frequency    Reason                     Performed    Due Date  --------------------------------------------------------------------------------    CMP.........  12 months..  metFORMIN, pravastatin...  02- 02-    HBA1C.......  6 months...  metFORMIN, semaglutide...  09- 03-    Lipid Panel.  12 months..  pravastatin..............  02- 02-    [facility] Embedded Care Gaps. Reference number: [id number]. 2/10/2023   4:20:26 AM CST

## 2023-02-21 DIAGNOSIS — R05.9 COUGH: ICD-10-CM

## 2023-02-21 DIAGNOSIS — M54.12 CERVICAL RADICULOPATHY: ICD-10-CM

## 2023-02-21 RX ORDER — TIZANIDINE 4 MG/1
TABLET ORAL
Qty: 180 TABLET | Refills: 0 | Status: SHIPPED | OUTPATIENT
Start: 2023-02-21 | End: 2023-03-15 | Stop reason: SDUPTHER

## 2023-02-21 RX ORDER — FLUTICASONE PROPIONATE 50 MCG
SPRAY, SUSPENSION (ML) NASAL
Qty: 16 G | Refills: 12 | Status: SHIPPED | OUTPATIENT
Start: 2023-02-21

## 2023-02-21 NOTE — TELEPHONE ENCOUNTER
No new care gaps identified.  [facility] Embedded Care Gaps. Reference number: [id number]. 2/21/2023   2:20:17 PM CST

## 2023-02-21 NOTE — TELEPHONE ENCOUNTER
Requested Prescriptions     Pending Prescriptions Disp Refills    tiZANidine (ZANAFLEX) 4 MG tablet 180 tablet 0     Sig: Take 1/2 to 1 tablet by mouth twice daily as needed for muscle spasms. May cause drowsiness.    fluticasone propionate (FLONASE) 50 mcg/actuation nasal spray 16 g 12     Sig: Use 2 sprays to each nostril daily

## 2023-02-21 NOTE — TELEPHONE ENCOUNTER
----- Message from Abby Washington sent at 2/21/2023  2:14 PM CST -----  Contact: Leana[facility]  .Type:  RX Refill Request  2 medications    Who Called:  annetta with [facility]   Refill or New Rx: refill   RX Name and Strength:   1. fluticasone propionate (FLONASE) 50 mcg/actuation nasal spray  2. tiZANidine (ZANAFLEX) 4 MG tablet  How is the patient currently taking it? (ex. 1XDay): as prescribed   Is this a 30 day or 90 day RX: 30 day   Preferred Pharmacy with phone number: .  [facility] [address]  [place] [id number]  Phone: [phone] Fax: [phone]  Local or Mail Order: mail   Ordering Provider: Dr rangel   Would the patient rather a call back or a response via [facility]?  Call   Best Call Back Number: .[phone]  Additional Information: Pharmacy said they faxed over the refill request twice, and hasn't heard a response.

## 2023-03-01 ENCOUNTER — TELEPHONE (OUTPATIENT)
Dept: FAMILY MEDICINE | Facility: CLINIC | Age: 63
End: 2023-03-01
Payer: MEDICARE

## 2023-03-01 NOTE — TELEPHONE ENCOUNTER
----- Message from Natacha Hinson sent at 3/1/2023  9:48 AM CST -----  Contact: self/[phone]  Pt is calling in regards to scheduling an injection. She states they talked about it at her last visit. Please give her a call back at [phone]. Thank you s/g

## 2023-03-06 DIAGNOSIS — E11.65 TYPE 2 DIABETES MELLITUS WITH HYPERGLYCEMIA, WITHOUT LONG-TERM CURRENT USE OF INSULIN: Chronic | ICD-10-CM

## 2023-03-06 RX ORDER — SEMAGLUTIDE 2.68 MG/ML
2 INJECTION, SOLUTION SUBCUTANEOUS
Qty: 1 PEN | Refills: 1 | Status: SHIPPED | OUTPATIENT
Start: 2023-03-06 | End: 2023-03-15 | Stop reason: SDUPTHER

## 2023-03-06 NOTE — TELEPHONE ENCOUNTER
No new care gaps identified.  [facility] Embedded Care Gaps. Reference number: [id number]. 3/06/2023   9:06:31 AM CST  
Refill Routing Note   Medication(s) are not appropriate for processing by [facility] for the following reason(s):       New or recently adjusted medication    [facility] action(s):  Defer         Appointments  past 12m or future 3m with PCP    Date Provider   Last Visit   1/13/2023 Oleksandr Nagel MD   Next Visit   Visit date not found Oleksandr Nagel MD   ED visits in past 90 days: 0        Note composed:5:01 PM 03/06/2023         
2017

## 2023-03-13 ENCOUNTER — TELEPHONE (OUTPATIENT)
Dept: FAMILY MEDICINE | Facility: CLINIC | Age: 63
End: 2023-03-13
Payer: MEDICARE

## 2023-03-13 ENCOUNTER — LAB VISIT (OUTPATIENT)
Dept: LAB | Facility: HOSPITAL | Age: 63
End: 2023-03-13
Attending: NURSE PRACTITIONER
Payer: MEDICARE

## 2023-03-13 DIAGNOSIS — E11.49 TYPE II DIABETES MELLITUS WITH NEUROLOGICAL MANIFESTATIONS: Chronic | ICD-10-CM

## 2023-03-13 DIAGNOSIS — D50.0 IRON DEFICIENCY ANEMIA DUE TO CHRONIC BLOOD LOSS: ICD-10-CM

## 2023-03-13 LAB
BASOPHILS # BLD AUTO: 0.01 K/UL (ref 0–0.2)
BASOPHILS NFR BLD: 0.2 % (ref 0–1.9)
DIFFERENTIAL METHOD: ABNORMAL
EOSINOPHIL # BLD AUTO: 0.1 K/UL (ref 0–0.5)
EOSINOPHIL NFR BLD: 2.5 % (ref 0–8)
ERYTHROCYTE [DISTWIDTH] IN BLOOD BY AUTOMATED COUNT: 14 % (ref 11.5–14.5)
ESTIMATED AVG GLUCOSE: 94 MG/DL (ref 68–131)
FERRITIN SERPL-MCNC: 49 NG/ML (ref 20–300)
HBA1C MFR BLD: 4.9 % (ref 4–5.6)
HCT VFR BLD AUTO: 39.8 % (ref 37–48.5)
HGB BLD-MCNC: 12.5 G/DL (ref 12–16)
IMM GRANULOCYTES # BLD AUTO: 0 K/UL (ref 0–0.04)
IMM GRANULOCYTES NFR BLD AUTO: 0 % (ref 0–0.5)
IRON SERPL-MCNC: 59 UG/DL (ref 30–160)
LYMPHOCYTES # BLD AUTO: 1.9 K/UL (ref 1–4.8)
LYMPHOCYTES NFR BLD: 41.9 % (ref 18–48)
MCH RBC QN AUTO: 28.8 PG (ref 27–31)
MCHC RBC AUTO-ENTMCNC: 31.4 G/DL (ref 32–36)
MCV RBC AUTO: 92 FL (ref 82–98)
MONOCYTES # BLD AUTO: 0.3 K/UL (ref 0.3–1)
MONOCYTES NFR BLD: 7.2 % (ref 4–15)
NEUTROPHILS # BLD AUTO: 2.1 K/UL (ref 1.8–7.7)
NEUTROPHILS NFR BLD: 48.2 % (ref 38–73)
NRBC BLD-RTO: 0 /100 WBC
PLATELET # BLD AUTO: 395 K/UL (ref 150–450)
PMV BLD AUTO: 9.9 FL (ref 9.2–12.9)
RBC # BLD AUTO: 4.34 M/UL (ref 4–5.4)
SATURATED IRON: 16 % (ref 20–50)
TOTAL IRON BINDING CAPACITY: 377 UG/DL (ref 250–450)
TRANSFERRIN SERPL-MCNC: 255 MG/DL (ref 200–375)
WBC # BLD AUTO: 4.42 K/UL (ref 3.9–12.7)

## 2023-03-13 PROCEDURE — 82728 ASSAY OF FERRITIN: CPT | Performed by: NURSE PRACTITIONER

## 2023-03-13 PROCEDURE — 83036 HEMOGLOBIN GLYCOSYLATED A1C: CPT | Performed by: FAMILY MEDICINE

## 2023-03-13 PROCEDURE — 84466 ASSAY OF TRANSFERRIN: CPT | Performed by: NURSE PRACTITIONER

## 2023-03-13 PROCEDURE — 36415 COLL VENOUS BLD VENIPUNCTURE: CPT | Mod: PO | Performed by: NURSE PRACTITIONER

## 2023-03-13 PROCEDURE — 85025 COMPLETE CBC W/AUTO DIFF WBC: CPT | Mod: PO | Performed by: NURSE PRACTITIONER

## 2023-03-13 NOTE — TELEPHONE ENCOUNTER
----- Message from Pixwaysoth sent at 3/13/2023  9:09 AM CDT -----  Regarding: Hip and shoulder issue  Ms. rPice is requesting that Dr. Nagel's nurse give her a call to discuss an issue with her hip and neck. Patient can be reached at [phone]

## 2023-03-15 ENCOUNTER — OFFICE VISIT (OUTPATIENT)
Dept: FAMILY MEDICINE | Facility: CLINIC | Age: 63
End: 2023-03-15
Payer: MEDICARE

## 2023-03-15 ENCOUNTER — HOSPITAL ENCOUNTER (OUTPATIENT)
Dept: RADIOLOGY | Facility: HOSPITAL | Age: 63
Discharge: HOME | End: 2023-03-15
Attending: NURSE PRACTITIONER
Payer: MEDICARE

## 2023-03-15 VITALS
OXYGEN SATURATION: 99 % | BODY MASS INDEX: 44.41 KG/M2 | WEIGHT: 293 LBS | DIASTOLIC BLOOD PRESSURE: 70 MMHG | SYSTOLIC BLOOD PRESSURE: 118 MMHG | HEIGHT: 68 IN | HEART RATE: 64 BPM

## 2023-03-15 DIAGNOSIS — M25.551 HIP PAIN, RIGHT: Primary | ICD-10-CM

## 2023-03-15 DIAGNOSIS — E11.65 TYPE 2 DIABETES MELLITUS WITH HYPERGLYCEMIA, WITHOUT LONG-TERM CURRENT USE OF INSULIN: Chronic | ICD-10-CM

## 2023-03-15 DIAGNOSIS — E66.2 CLASS 3 OBESITY WITH ALVEOLAR HYPOVENTILATION, SERIOUS COMORBIDITY, AND BODY MASS INDEX (BMI) OF 50.0 TO 59.9 IN ADULT: Chronic | ICD-10-CM

## 2023-03-15 DIAGNOSIS — M25.551 HIP PAIN, RIGHT: ICD-10-CM

## 2023-03-15 DIAGNOSIS — E03.9 HYPOTHYROIDISM, UNSPECIFIED TYPE: ICD-10-CM

## 2023-03-15 DIAGNOSIS — Z79.899 ENCOUNTER FOR LONG-TERM (CURRENT) USE OF MEDICATIONS: ICD-10-CM

## 2023-03-15 PROCEDURE — 3074F PR MOST RECENT SYSTOLIC BLOOD PRESSURE < 130 MM HG: ICD-10-PCS | Mod: HCNC,CPTII,S$GLB, | Performed by: NURSE PRACTITIONER

## 2023-03-15 PROCEDURE — 99999 PR PBB SHADOW E&M-EST. PATIENT-LVL IV: ICD-10-PCS | Mod: PBBFAC,HCNC,, | Performed by: NURSE PRACTITIONER

## 2023-03-15 PROCEDURE — 99214 OFFICE O/P EST MOD 30 MIN: CPT | Mod: HCNC,S$GLB,, | Performed by: NURSE PRACTITIONER

## 2023-03-15 PROCEDURE — 3078F DIAST BP <80 MM HG: CPT | Mod: HCNC,CPTII,S$GLB, | Performed by: NURSE PRACTITIONER

## 2023-03-15 PROCEDURE — 3078F PR MOST RECENT DIASTOLIC BLOOD PRESSURE < 80 MM HG: ICD-10-PCS | Mod: HCNC,CPTII,S$GLB, | Performed by: NURSE PRACTITIONER

## 2023-03-15 PROCEDURE — 73521 XR HIPS BILATERAL 2 VIEW INCL AP PELVIS: ICD-10-PCS | Mod: 26,,, | Performed by: RADIOLOGY

## 2023-03-15 PROCEDURE — 3044F PR MOST RECENT HEMOGLOBIN A1C LEVEL <7.0%: ICD-10-PCS | Mod: HCNC,CPTII,S$GLB, | Performed by: NURSE PRACTITIONER

## 2023-03-15 PROCEDURE — 3074F SYST BP LT 130 MM HG: CPT | Mod: HCNC,CPTII,S$GLB, | Performed by: NURSE PRACTITIONER

## 2023-03-15 PROCEDURE — 99999 PR PBB SHADOW E&M-EST. PATIENT-LVL IV: CPT | Mod: PBBFAC,HCNC,, | Performed by: NURSE PRACTITIONER

## 2023-03-15 PROCEDURE — 3288F FALL RISK ASSESSMENT DOCD: CPT | Mod: HCNC,CPTII,S$GLB, | Performed by: NURSE PRACTITIONER

## 2023-03-15 PROCEDURE — 1101F PR PT FALLS ASSESS DOC 0-1 FALLS W/OUT INJ PAST YR: ICD-10-PCS | Mod: HCNC,CPTII,S$GLB, | Performed by: NURSE PRACTITIONER

## 2023-03-15 PROCEDURE — 73521 X-RAY EXAM HIPS BI 2 VIEWS: CPT | Mod: TC,PO

## 2023-03-15 PROCEDURE — 73521 X-RAY EXAM HIPS BI 2 VIEWS: CPT | Mod: 26,,, | Performed by: RADIOLOGY

## 2023-03-15 PROCEDURE — 3008F BODY MASS INDEX DOCD: CPT | Mod: HCNC,CPTII,S$GLB, | Performed by: NURSE PRACTITIONER

## 2023-03-15 PROCEDURE — 3288F PR FALLS RISK ASSESSMENT DOCUMENTED: ICD-10-PCS | Mod: HCNC,CPTII,S$GLB, | Performed by: NURSE PRACTITIONER

## 2023-03-15 PROCEDURE — 1101F PT FALLS ASSESS-DOCD LE1/YR: CPT | Mod: HCNC,CPTII,S$GLB, | Performed by: NURSE PRACTITIONER

## 2023-03-15 PROCEDURE — 3044F HG A1C LEVEL LT 7.0%: CPT | Mod: HCNC,CPTII,S$GLB, | Performed by: NURSE PRACTITIONER

## 2023-03-15 PROCEDURE — 3008F PR BODY MASS INDEX (BMI) DOCUMENTED: ICD-10-PCS | Mod: HCNC,CPTII,S$GLB, | Performed by: NURSE PRACTITIONER

## 2023-03-15 PROCEDURE — 99214 PR OFFICE/OUTPT VISIT, EST, LEVL IV, 30-39 MIN: ICD-10-PCS | Mod: HCNC,S$GLB,, | Performed by: NURSE PRACTITIONER

## 2023-03-15 RX ORDER — TOPIRAMATE 100 MG/1
100 TABLET, FILM COATED ORAL 2 TIMES DAILY
Qty: 60 TABLET | Refills: 11 | Status: SHIPPED | OUTPATIENT
Start: 2023-03-15 | End: 2023-03-20 | Stop reason: SDUPTHER

## 2023-03-15 RX ORDER — LORATADINE 10 MG/1
10 TABLET ORAL DAILY
Qty: 90 TABLET | Refills: 4 | Status: SHIPPED | OUTPATIENT
Start: 2023-03-15 | End: 2023-03-20 | Stop reason: SDUPTHER

## 2023-03-15 RX ORDER — MELOXICAM 15 MG/1
15 TABLET ORAL DAILY
Qty: 30 TABLET | Refills: 5 | Status: SHIPPED | OUTPATIENT
Start: 2023-03-15

## 2023-03-15 RX ORDER — EPINEPHRINE 0.3 MG/.3ML
INJECTION SUBCUTANEOUS
COMMUNITY
Start: 2023-01-26

## 2023-03-15 RX ORDER — SEMAGLUTIDE 2.68 MG/ML
2 INJECTION, SOLUTION SUBCUTANEOUS
Qty: 1 EACH | Refills: 1 | Status: SHIPPED | OUTPATIENT
Start: 2023-03-15 | End: 2023-04-03 | Stop reason: SDUPTHER

## 2023-03-15 RX ORDER — LEVOTHYROXINE SODIUM 100 UG/1
100 TABLET ORAL DAILY
Qty: 90 TABLET | Refills: 3 | Status: SHIPPED | OUTPATIENT
Start: 2023-03-15 | End: 2023-03-20 | Stop reason: SDUPTHER

## 2023-03-15 RX ORDER — TIZANIDINE 4 MG/1
TABLET ORAL
Qty: 180 TABLET | Refills: 0 | Status: SHIPPED | OUTPATIENT
Start: 2023-03-15 | End: 2024-01-06 | Stop reason: SDUPTHER

## 2023-03-15 NOTE — PROGRESS NOTES
Assessment/Plan:  Problem List Items Addressed This Visit          Endocrine    Type 2 diabetes mellitus with hyperglycemia, without long-term current use of insulin (Chronic)    Overview     January 2023:  Doing well on current regimen.  Diabetes Medications               metFORMIN (GLUCOPHAGE) 1000 MG tablet Take 1 tablet (1,000 mg total) by mouth 2 (two) times daily with meals.    semaglutide (OZEMPIC) 2 mg/dose (8 mg/3 mL) PnIj Inject 2 mg into the skin every 7 days.     BMI Readings from Last 10 Encounters:   01/13/23 46.53 kg/m²   01/09/23 46.07 kg/m²   12/30/22 46.09 kg/m²   12/22/22 45.55 kg/m²   12/15/22 46.90 kg/m²   10/31/22 46.91 kg/m²   10/03/22 46.73 kg/m²   09/22/22 47.33 kg/m²   08/15/22 47.10 kg/m²   07/15/22 47.09 kg/m²     November 2019:  Reviewed diabetes management status.  Patient was due for LDL less than 100 at that time.  Also needing foot exam.DECEMBER 2019:  Patient reports issues with ACE-inhibitor.  Currently having cough.  Only on amlodipine.Diabetes Management StatusStatin: TakingACE/ARB: Not takingMARCH 2020:  Diabetes Management StatusStatin: TakingACE/ARB: Not takingSEPTEMBER 2020:  Reviewed Diabetes Management Status.  Patient is taking statin but not Ace or Arb.  July 2021:Diabetes Management StatusStatin: TakingACE/ARB: Not taking  January 2022:  Patient reports weight loss with Ozempic.  Diabetes Management Status    Diabetes Management Status    Statin: Taking  ACE/ARB: Not taking    Screening or Prevention Patient's value Goal Complete/Controlled?   HgA1C Testing and Control   Lab Results   Component Value Date    HGBA1C 5.1 09/20/2022      Annually/Less than 8% Yes   Lipid profile : 02/24/2022 Annually Yes   LDL control Lab Results   Component Value Date    LDLCALC 99.6 02/24/2022    Annually/Less than 100 mg/dl  Yes   Nephropathy screening Lab Results   Component Value Date    LABMICR 10.0 11/03/2022     Lab Results   Component Value Date    PROTEINUA Negative 10/28/2015      No results found for: UTPCR   Annually Yes   Blood pressure BP Readings from Last 1 Encounters:   01/13/23 122/78    Less than 140/90 Yes   Dilated retinal exam : 03/23/2021 Annually No   Foot exam   : 04/14/2022 Annually Yes            Current Assessment & Plan     Controlled. Currently taking Ozempic. Tolerated well with no adverse effects reported. Requesting medication refills. Continue current diabetes meds.We will plan to monitor hemoglobin A1c at designated intervals 3 to 6 months.  I recommend ongoing Education for diabetic diet and exercise protocol.  We will continue to monitor for side effects.    Please be advised of symptoms to monitor for and to notify me immediately if persistent or worsening.  Follow up with Ophthalmology/Optometry and Podiatry at least annually.    Lab Results   Component Value Date    HGBA1C 4.9 03/13/2023     Diabetes Medications               metFORMIN (GLUCOPHAGE) 1000 MG tablet Take 1 tablet (1,000 mg total) by mouth 2 (two) times daily with meals.    semaglutide (OZEMPIC) 2 mg/dose (8 mg/3 mL) PnIj Inject 2 mg into the skin every 7 days.                 Relevant Medications    semaglutide (OZEMPIC) 2 mg/dose (8 mg/3 mL) PnIj    Hypothyroidism    Overview     Chronic.  Control is uncertain.  Patient due for TSH.  She is taking levothyroxine 100 micrograms daily.  Reports compliance.  No side effects reported.  Lab Results   Component Value Date    TSH 2.462 04/06/2021    FREET4 1.06 12/04/2019              Current Assessment & Plan     Chronic. Stable  Taking levothyroxine 100 mcg daily. No SE reported. Requesting mediation refill.   Recent labs stable    Lab Results   Component Value Date    TSH 0.902 01/13/2023               Orthopedic    Hip pain, right - Primary    Overview     New problem. Onset a few weeks ago. Progressively worsening. No known injury. Tried taking ibuprofen and zanaflex with no improvement.    Imaging obtained today- see below  Trial of mobic.  Continue zanaflex   Patient recently had steroids; avoid additional steroids   Consider physical therapy    X-Ray Hips Bilateral 2 View Incl AP Pelvis  Narrative: EXAMINATION:  XR HIPS BILATERAL 2 VIEW INCL AP PELVIS    CLINICAL HISTORY:  Pain in right hip    TECHNIQUE:  AP view of the pelvis and frogleg lateral views of both hips were performed.    COMPARISON:  None.    FINDINGS:  There is no radiographic evidence of acute osseous, articular, or soft tissue abnormality.  There are mild degenerative findings present at the bilateral hips.  Impression: As above.  No acute findings.    Electronically signed by: Cordell Jamison MD  Date:    03/15/2023  Time:    10:14           Relevant Medications    tiZANidine (ZANAFLEX) 4 MG tablet    meloxicam (MOBIC) 15 MG tablet    Other Relevant Orders    X-Ray Hips Bilateral 2 View Incl AP Pelvis (Completed)       Other    Class 3 obesity with alveolar hypoventilation, serious comorbidity, and body mass index (BMI) of 50.0 to 59.9 in adult (Chronic)    Overview     Encourage increased physical activity, healthy diet choices, and weight loss for prevention of progression of comorbid conditions.     Wt Readings from Last 3 Encounters:   06/01/22 0926 (!) 140.1 kg (308 lb 13.8 oz)   05/06/22 0720 (!) 142.6 kg (314 lb 6 oz)   04/20/22 0952 (!) 143.9 kg (317 lb 3.2 oz)            Encounter for long-term (current) use of medications (Chronic)    Overview     January 2023:  Reviewed labs.  CHRONIC. Stable. Compliant with medications for managed conditions. See medication list. No SE reported. Routine lab analysis is being monitored. Refills were addressed.  January 2021:CHRONIC. Stable. Compliant with medications for managed conditions. See medication list. No SE reported. Routine lab analysis is being monitored. Refills were addressed.  July 2021:  Reviewed labs.  January 2022:  Reviewed labs.  February 2022:  Reviewed labs.  July 2022:  Reviewed labs.  Lab Results   Component Value  Date    WBC 5.71 12/20/2022    HGB 12.8 12/20/2022    HCT 40.0 12/20/2022    MCV 89 12/20/2022     12/20/2022       Chemistry        Component Value Date/Time     02/24/2022 1255    K 3.5 02/24/2022 1255     (H) 02/24/2022 1255    CO2 20 (L) 02/24/2022 1255    BUN 13 02/24/2022 1255    CREATININE 0.8 02/24/2022 1255    GLU 93 02/24/2022 1255        Component Value Date/Time    CALCIUM 9.6 02/24/2022 1255    ALKPHOS 75 02/24/2022 1255    AST 14 02/24/2022 1255    ALT 12 02/24/2022 1255    BILITOT 0.3 02/24/2022 1255    ESTGFRAFRICA >60.0 02/24/2022 1255    EGFRNONAA >60.0 02/24/2022 1255          Lab Results   Component Value Date    TSH 2.462 04/06/2021    FREET4 1.06 12/04/2019    T3FREE 2.7 12/04/2019     =================================         Current Assessment & Plan     Requesting medication refills. Complete history and physical was completed today.  Complete and thorough medication reconciliation was performed.  Discussed risks and benefits of medications.  Advised patient on orders and health maintenance.  We discussed old records and old labs if available.  Will request any records not available through epic.  Continue current medications listed on your summary sheet.         Relevant Medications    levothyroxine (EUTHYROX) 100 MCG tablet    loratadine (CLARITIN) 10 mg tablet    topiramate (TOPAMAX) 100 MG tablet     Follow up in about 6 months (around 9/15/2023), or if symptoms worsen or fail to improve.  ER precautions for severe or worsening symptoms.     Rose Price NP  _____________________________________________________________________________________________________________________________________________________    CC: hip pain     HPI: Patient is a 63-year-old female who presents in clinic today as an established patient here for hip pain. Bilateral but right worse than left.  Onset of the symptoms was  a few weeks ago . Inciting event: none. Aggravating symptoms:  standing and walking. Patient has had no prior hip problems. Previous visits for this problem: none. Evaluation to date: none.  Treatment to date: ibuprofen with no relief.     Past Medical History:  Past Medical History:   Diagnosis Date    Acute respiratory failure due to COVID-19     COVID-19     Diabetes mellitus, type 2     Diabetic neuropathy 1/27/2014    DJD (degenerative joint disease) of knee     DVT (deep venous thrombosis) around 1990's    in leg, is on no anticoagulant therapy presently    Fatty liver     GERD (gastroesophageal reflux disease)     Hypertension associated with diabetes     HECTOR (iron deficiency anemia) 5/13/2021    Multinodular goiter     Obesity, morbid, BMI 50 or higher     Sleep apnea     has no CPAP     Past Surgical History:   Procedure Laterality Date    COLONOSCOPY N/A 5/12/2021    Procedure: COLONOSCOPY;  Surgeon: Carolina Rizo MD;  Location: [facility];  Service: Endoscopy;  Laterality: N/A;    EPIDURAL STEROID INJECTION N/A 12/10/2021    Procedure: Lumbar L5/S1 IL TEETEE  Would like AM procedure, if possible;  Surgeon: Nae Paul MD;  Location: [facility];  Service: Pain Management;  Laterality: N/A;    EPIDURAL STEROID INJECTION INTO CERVICAL SPINE N/A 10/8/2021    Procedure: C7-T1 IL TEETEE-no sedation.  Needs IV-just incase;  Surgeon: Nae Paul MD;  Location: [facility];  Service: Pain Management;  Laterality: N/A;    ESOPHAGOGASTRODUODENOSCOPY N/A 7/8/2021    Procedure: EGD (ESOPHAGOGASTRODUODENOSCOPY) previous positve covid;  Surgeon: Jann Gutierrez MD;  Location: [facility];  Service: Endoscopy;  Laterality: N/A;    FRACTURE SURGERY      HYSTERECTOMY      INTRALUMINAL GASTROINTESTINAL TRACT IMAGING VIA CAPSULE N/A 10/24/2022    Procedure: IMAGING PROCEDURE, GI TRACT, INTRALUMINAL, VIA CAPSULE;  Surgeon: Hang Lunsford RN;  Location: [facility];  Service: Endoscopy;  Laterality: N/A;    SELECTIVE INJECTION OF ANESTHETIC AGENT AROUND LUMBAR SPINAL NERVE ROOT BY  TRANSFORAMINAL APPROACH Bilateral 5/6/2022    Procedure: Bilateral L5/S1 TF TEETEE with RN IV sedation;  Surgeon: Nae Paul MD;  Location: [facility];  Service: Pain Management;  Laterality: Bilateral;    SELECTIVE INJECTION OF ANESTHETIC AGENT AROUND LUMBAR SPINAL NERVE ROOT BY TRANSFORAMINAL APPROACH Bilateral 12/30/2022    Procedure: Bilateral L5/S1 TF TEETEE RN IV Sedation;  Surgeon: Nae Paul MD;  Location: [facility];  Service: Pain Management;  Laterality: Bilateral;    SHOULDER ARTHROSCOPY      THYROIDECTOMY, PARTIAL Right     and transplatation of right superior parathyroid gland to the sternocleidomastoid muscle     TONSILLECTOMY      TUBAL LIGATION  1984    WRIST FRACTURE SURGERY      left     Review of patient's allergies indicates:   Allergen Reactions    Codeine sulfate      Nausea^    Lisinopril Swelling     angioedema    Codeine Nausea Only and Rash     Social History     Tobacco Use    Smoking status: Never    Smokeless tobacco: Never   Substance Use Topics    Alcohol use: No    Drug use: No     Family History   Problem Relation Age of Onset    Diabetes Mother     Hypertension Mother     Heart disease Mother 50    Cancer Father     Arthritis Father     Breast cancer Sister     Cancer Brother     Cancer Brother     Leukemia Son     Cancer Son      Current Outpatient Medications on File Prior to Visit   Medication Sig Dispense Refill    diltiaZEM (CARDIZEM) 30 MG tablet Take 1 tablet (30 mg total) by mouth every 12 (twelve) hours. 60 tablet 0    EPINEPHrine (EPIPEN) 0.3 mg/0.3 mL AtIn Inject into the muscle.      famotidine (PEPCID) 40 MG tablet Take 40 mg by mouth once daily.      fluticasone propionate (FLONASE) 50 mcg/actuation nasal spray Use 2 sprays to each nostril daily 16 g 12    inhalation spacing device (BREATHERITE VALVED MDI CHAMBER) Use as directed for inhalation. 1 Device prn    metFORMIN (GLUCOPHAGE) 1000 MG tablet Take 1 tablet (1,000 mg total) by mouth 2 (two) times daily  with meals. 180 tablet 4    pantoprazole (PROTONIX) 40 MG tablet Take 1 tablet (40 mg total) by mouth once daily. 30 tablet 11    pravastatin (PRAVACHOL) 80 MG tablet Take 1 tablet (80 mg total) by mouth once daily. 90 tablet 4    pregabalin (LYRICA) 75 MG capsule Take 1 capsule (75 mg total) by mouth 3 (three) times daily. 90 capsule 3    sulfacetamide sodium 10% (BLEPH-10) 10 % ophthalmic solution Place 2 drops into the left eye 4 (four) times daily. 5 mL 0    TRELEGY ELLIPTA 100-62.5-25 mcg DsDv Inhale 1 puff into the lungs once daily.      ferrous sulfate 324 mg (65 mg iron) TbEC Take 1 tablet (324 mg total) by mouth once daily. (Patient not taking: Reported on 3/15/2023) 365 tablet 0    LIDOcaine-prilocaine (EMLA) cream SMARTSI-2 Pump Topical 3-4 Times Daily      rOPINIRole (REQUIP) 0.25 MG tablet Take 1 tablet (0.25 mg total) by mouth every evening. (Patient not taking: Reported on 3/15/2023) 30 tablet 11    [DISCONTINUED] methylPREDNISolone (MEDROL DOSEPACK) 4 mg tablet follow package directions (Patient not taking: Reported on 3/15/2023) 21 tablet 0     No current facility-administered medications on file prior to visit.     Review of Systems   Constitutional:  Negative for chills, fatigue, fever and unexpected weight change.   HENT:  Negative for ear pain and sore throat.    Eyes:  Negative for redness and visual disturbance.   Respiratory:  Negative for cough and shortness of breath.    Cardiovascular:  Negative for chest pain and palpitations.   Gastrointestinal:  Negative for abdominal pain, nausea and vomiting.   Genitourinary:  Negative for difficulty urinating and hematuria.   Musculoskeletal:  Positive for arthralgias and back pain. Negative for myalgias.   Skin:  Negative for rash and wound.   Neurological:  Positive for numbness. Negative for weakness and headaches.   Psychiatric/Behavioral:  The patient is not nervous/anxious.      Vitals:    03/15/23 0856   BP: 118/70   BP Location: Right arm  "  Pulse: 64   SpO2: 99%   Weight: (!) 139.3 kg (307 lb)   Height: 5' 8" (1.727 m)     Wt Readings from Last 3 Encounters:   03/15/23 (!) 139.3 kg (307 lb)   01/31/23 135.1 kg (297 lb 13.5 oz)   01/13/23 (!) 138.8 kg (306 lb)     Physical Exam  Vitals and nursing note reviewed.   Constitutional:       General: She is not in acute distress.     Appearance: She is well-developed. She is obese. She is not ill-appearing, toxic-appearing or diaphoretic.   HENT:      Head: Normocephalic and atraumatic.      Right Ear: Hearing and external ear normal.      Left Ear: Hearing and external ear normal.      Nose: Nose normal. No rhinorrhea.   Eyes:      General: Lids are normal.      Extraocular Movements: Extraocular movements intact.      Conjunctiva/sclera: Conjunctivae normal.      Pupils: Pupils are equal, round, and reactive to light.   Cardiovascular:      Rate and Rhythm: Normal rate.      Pulses: Normal pulses.   Pulmonary:      Effort: Pulmonary effort is normal. No respiratory distress.      Breath sounds: Normal breath sounds.   Abdominal:      General: Bowel sounds are normal.      Palpations: Abdomen is soft.   Musculoskeletal:      Cervical back: Normal range of motion and neck supple. Tenderness present. Pain with movement present.      Lumbar back: Tenderness present. No bony tenderness. Negative right straight leg raise test and negative left straight leg raise test.      Right hip: Decreased range of motion.      Left hip: Decreased range of motion.      Right lower leg: No swelling, deformity, lacerations or bony tenderness. No edema.      Left lower leg: No swelling, deformity, lacerations, tenderness or bony tenderness. No edema.   Skin:     General: Skin is warm and dry.      Capillary Refill: Capillary refill takes less than 2 seconds.      Coloration: Skin is not pale.   Neurological:      General: No focal deficit present.      Mental Status: She is alert and oriented to person, place, and time. Mental " status is at baseline. She is not disoriented.      Cranial Nerves: No cranial nerve deficit.      Motor: No weakness.      Gait: Gait normal.   Psychiatric:         Attention and Perception: She is attentive.         Mood and Affect: Mood normal. Mood is not anxious or depressed.         Speech: Speech is not rapid and pressured or slurred.         Behavior: Behavior normal. Behavior is not agitated, aggressive or hyperactive. Behavior is cooperative.         Thought Content: Thought content normal. Thought content is not paranoid or delusional. Thought content does not include homicidal or suicidal ideation. Thought content does not include homicidal or suicidal plan.         Cognition and Memory: Memory is not impaired.         Judgment: Judgment normal.     Health Maintenance   Topic Date Due    Eye Exam  03/23/2022    Foot Exam  04/14/2023    Hemoglobin A1c  09/13/2023    Mammogram  01/09/2024    Lipid Panel  03/10/2024    Low Dose Statin  03/15/2024    DEXA Scan  04/27/2025    TETANUS VACCINE  10/29/2028    Hepatitis C Screening  Completed

## 2023-03-16 ENCOUNTER — OFFICE VISIT (OUTPATIENT)
Dept: HEMATOLOGY/ONCOLOGY | Facility: CLINIC | Age: 63
End: 2023-03-16
Payer: MEDICARE

## 2023-03-16 DIAGNOSIS — E66.2 CLASS 3 OBESITY WITH ALVEOLAR HYPOVENTILATION, SERIOUS COMORBIDITY, AND BODY MASS INDEX (BMI) OF 50.0 TO 59.9 IN ADULT: Primary | Chronic | ICD-10-CM

## 2023-03-16 DIAGNOSIS — D50.0 IRON DEFICIENCY ANEMIA DUE TO CHRONIC BLOOD LOSS: ICD-10-CM

## 2023-03-16 PROCEDURE — 3044F HG A1C LEVEL LT 7.0%: CPT | Mod: HCNC,CPTII,95, | Performed by: NURSE PRACTITIONER

## 2023-03-16 PROCEDURE — 3044F PR MOST RECENT HEMOGLOBIN A1C LEVEL <7.0%: ICD-10-PCS | Mod: HCNC,CPTII,95, | Performed by: NURSE PRACTITIONER

## 2023-03-16 PROCEDURE — 1159F MED LIST DOCD IN RCRD: CPT | Mod: HCNC,CPTII,95, | Performed by: NURSE PRACTITIONER

## 2023-03-16 PROCEDURE — 1160F PR REVIEW ALL MEDS BY PRESCRIBER/CLIN PHARMACIST DOCUMENTED: ICD-10-PCS | Mod: HCNC,CPTII,95, | Performed by: NURSE PRACTITIONER

## 2023-03-16 PROCEDURE — 99213 PR OFFICE/OUTPT VISIT, EST, LEVL III, 20-29 MIN: ICD-10-PCS | Mod: HCNC,95,, | Performed by: NURSE PRACTITIONER

## 2023-03-16 PROCEDURE — 99213 OFFICE O/P EST LOW 20 MIN: CPT | Mod: HCNC,95,, | Performed by: NURSE PRACTITIONER

## 2023-03-16 PROCEDURE — 1159F PR MEDICATION LIST DOCUMENTED IN MEDICAL RECORD: ICD-10-PCS | Mod: HCNC,CPTII,95, | Performed by: NURSE PRACTITIONER

## 2023-03-16 PROCEDURE — 1160F RVW MEDS BY RX/DR IN RCRD: CPT | Mod: HCNC,CPTII,95, | Performed by: NURSE PRACTITIONER

## 2023-03-16 NOTE — PROGRESS NOTES
Subjective:       Patient ID: Zakia Price is a 63 y.o. female.    Chief Complaint: review labs. Anemia    The patient location is: home  The chief complaint leading to consultation is: h/o HECTOR    Visit type: audiovisual    Face to Face time with patient: 10 minutes  30 minutes of total time spent on the encounter, which includes face to face time and non-face to face time preparing to see the patient (eg, review of tests), Obtaining and/or reviewing separately obtained history, Documenting clinical information in the electronic or other health record, Independently interpreting results (not separately reported) and communicating results to the patient/family/caregiver, or Care coordination (not separately reported).         Each patient to whom he or she provides medical services by telemedicine is:  (1) informed of the relationship between the physician and patient and the respective role of any other health care provider with respect to management of the patient; and (2) notified that he or she may decline to receive medical services by telemedicine and may withdraw from such care at any time.    Notes:     HPI: 63 y.o female presenting today for follow up of her ion deficiency anemia treated with Feraheme 5/2021. Prior EGD/Colonoscopy evaluation reviewed. EGD pathology H. Pylori positive, she completed treatment.. Repeat testing reflect eradication of H. Pylori infection. Colonoscopy unremarkable with recommended repeat in 10 years.     She feels well and is without complaints. Denies abnormal bleeding or anemia symptoms. Has chronic constipation for which she sees GI. Reports issues with indigestion, lack of appetite. She follows up with GI who are managing medically            Social History     Socioeconomic History    Marital status: Single   Tobacco Use    Smoking status: Never    Smokeless tobacco: Never   Substance and Sexual Activity    Alcohol use: No    Drug use: No    Sexual activity: Not  Currently       Past Medical History:   Diagnosis Date    Acute respiratory failure due to COVID-19     COVID-19     Diabetes mellitus, type 2     Diabetic neuropathy 1/27/2014    DJD (degenerative joint disease) of knee     DVT (deep venous thrombosis) around 1990's    in leg, is on no anticoagulant therapy presently    Fatty liver     GERD (gastroesophageal reflux disease)     Hypertension associated with diabetes     HECTOR (iron deficiency anemia) 5/13/2021    Multinodular goiter     Obesity, morbid, BMI 50 or higher     Sleep apnea     has no CPAP       Family History   Problem Relation Age of Onset    Diabetes Mother     Hypertension Mother     Heart disease Mother 50    Cancer Father     Arthritis Father     Breast cancer Sister     Cancer Brother     Cancer Brother     Leukemia Son     Cancer Son        Past Surgical History:   Procedure Laterality Date    COLONOSCOPY N/A 5/12/2021    Procedure: COLONOSCOPY;  Surgeon: Carolina Rizo MD;  Location: [facility];  Service: Endoscopy;  Laterality: N/A;    EPIDURAL STEROID INJECTION N/A 12/10/2021    Procedure: Lumbar L5/S1 IL TEETEE  Would like AM procedure, if possible;  Surgeon: Nae Paul MD;  Location: [facility] PAIN MGT;  Service: Pain Management;  Laterality: N/A;    EPIDURAL STEROID INJECTION INTO CERVICAL SPINE N/A 10/8/2021    Procedure: C7-T1 IL TEETEE-no sedation.  Needs IV-just incase;  Surgeon: Nae Paul MD;  Location: [facility] PAIN MGT;  Service: Pain Management;  Laterality: N/A;    ESOPHAGOGASTRODUODENOSCOPY N/A 7/8/2021    Procedure: EGD (ESOPHAGOGASTRODUODENOSCOPY) previous positve covid;  Surgeon: Jann Gutierrez MD;  Location: [facility];  Service: Endoscopy;  Laterality: N/A;    FRACTURE SURGERY      HYSTERECTOMY      INTRALUMINAL GASTROINTESTINAL TRACT IMAGING VIA CAPSULE N/A 10/24/2022    Procedure: IMAGING PROCEDURE, GI TRACT, INTRALUMINAL, VIA CAPSULE;  Surgeon: Hang Lunsford RN;  Location: [facility] ENDO;  Service: Endoscopy;  Laterality: N/A;     SELECTIVE INJECTION OF ANESTHETIC AGENT AROUND LUMBAR SPINAL NERVE ROOT BY TRANSFORAMINAL APPROACH Bilateral 5/6/2022    Procedure: Bilateral L5/S1 TF TEETEE with RN IV sedation;  Surgeon: Nae Paul MD;  Location: [facility] PAIN MGT;  Service: Pain Management;  Laterality: Bilateral;    SELECTIVE INJECTION OF ANESTHETIC AGENT AROUND LUMBAR SPINAL NERVE ROOT BY TRANSFORAMINAL APPROACH Bilateral 12/30/2022    Procedure: Bilateral L5/S1 TF TEETEE RN IV Sedation;  Surgeon: Nae Paul MD;  Location: [facility] PAIN MGT;  Service: Pain Management;  Laterality: Bilateral;    SHOULDER ARTHROSCOPY      THYROIDECTOMY, PARTIAL Right     and transplatation of right superior parathyroid gland to the sternocleidomastoid muscle     TONSILLECTOMY      TUBAL LIGATION  1984    WRIST FRACTURE SURGERY      left       Review of Systems   Constitutional:  Negative for activity change, appetite change, chills, fatigue, fever and unexpected weight change.   HENT:  Negative for congestion, mouth sores, nosebleeds, sore throat, trouble swallowing and voice change.    Eyes:  Negative for visual disturbance.   Respiratory:  Negative for cough, chest tightness, shortness of breath and wheezing.    Cardiovascular:  Negative for chest pain and leg swelling.   Gastrointestinal:  Positive for constipation. Negative for abdominal distention, abdominal pain, anal bleeding, blood in stool, diarrhea, nausea and vomiting.   Genitourinary:  Negative for difficulty urinating, dysuria and hematuria.   Musculoskeletal:  Negative for arthralgias, back pain and myalgias.   Skin:  Negative for pallor, rash and wound.   Neurological:  Negative for dizziness, syncope, weakness and headaches.   Hematological:  Negative for adenopathy. Does not bruise/bleed easily.   Psychiatric/Behavioral:  The patient is not nervous/anxious.        Medication List with Changes/Refills   Current Medications    DILTIAZEM (CARDIZEM) 30 MG TABLET    Take 1 tablet (30 mg total) by mouth  every 12 (twelve) hours.    EPINEPHRINE (EPIPEN) 0.3 MG/0.3 ML ATIN    Inject into the muscle.    FAMOTIDINE (PEPCID) 40 MG TABLET    Take 40 mg by mouth once daily.    FERROUS SULFATE 324 MG (65 MG IRON) TBEC    Take 1 tablet (324 mg total) by mouth once daily.    FLUTICASONE PROPIONATE (FLONASE) 50 MCG/ACTUATION NASAL SPRAY    Use 2 sprays to each nostril daily    INHALATION SPACING DEVICE (BREATHERITE VALVED MDI CHAMBER)    Use as directed for inhalation.    LEVOTHYROXINE (EUTHYROX) 100 MCG TABLET    Take 1 tablet (100 mcg total) by mouth once daily.    LIDOCAINE-PRILOCAINE (EMLA) CREAM    SMARTSI-2 Pump Topical 3-4 Times Daily    LORATADINE (CLARITIN) 10 MG TABLET    Take 1 tablet (10 mg total) by mouth once daily.    MELOXICAM (MOBIC) 15 MG TABLET    Take 1 tablet (15 mg total) by mouth once daily.    METFORMIN (GLUCOPHAGE) 1000 MG TABLET    Take 1 tablet (1,000 mg total) by mouth 2 (two) times daily with meals.    METHYLPREDNISOLONE (MEDROL DOSEPACK) 4 MG TABLET    follow package directions    PANTOPRAZOLE (PROTONIX) 40 MG TABLET    Take 1 tablet (40 mg total) by mouth once daily.    PRAVASTATIN (PRAVACHOL) 80 MG TABLET    Take 1 tablet (80 mg total) by mouth once daily.    PREGABALIN (LYRICA) 75 MG CAPSULE    Take 1 capsule (75 mg total) by mouth 3 (three) times daily.    ROPINIROLE (REQUIP) 0.25 MG TABLET    Take 1 tablet (0.25 mg total) by mouth every evening.    SEMAGLUTIDE (OZEMPIC) 2 MG/DOSE (8 MG/3 ML) PNIJ    Inject 2 mg into the skin every 7 days.    SULFACETAMIDE SODIUM 10% (BLEPH-10) 10 % OPHTHALMIC SOLUTION    Place 2 drops into the left eye 4 (four) times daily.    TIZANIDINE (ZANAFLEX) 4 MG TABLET    Take 1/2 to 1 tablet by mouth twice daily as needed for muscle spasms. May cause drowsiness.    TOPIRAMATE (TOPAMAX) 100 MG TABLET    Take 1 tablet (100 mg total) by mouth 2 (two) times daily.    TRELEGY ELLIPTA 100-62.5-25 MCG DSDV    Inhale 1 puff into the lungs once daily.     Objective:      There were no vitals filed for this visit.    Lab Results   Component Value Date    WBC 4.42 03/13/2023    HGB 12.5 03/13/2023    HCT 39.8 03/13/2023    MCV 92 03/13/2023     03/13/2023       BMP  Lab Results   Component Value Date     02/24/2022    K 3.5 02/24/2022     (H) 02/24/2022    CO2 20 (L) 02/24/2022    BUN 13 02/24/2022    CREATININE 0.8 02/24/2022    CALCIUM 9.6 02/24/2022    ANIONGAP 11 02/24/2022    ESTGFRAFRICA >60.0 02/24/2022    EGFRNONAA >60.0 02/24/2022     Lab Results   Component Value Date    ALT 12 02/24/2022    AST 14 02/24/2022    ALKPHOS 75 02/24/2022    BILITOT 0.3 02/24/2022     Lab Results   Component Value Date    IRON 59 03/13/2023    TIBC 377 03/13/2023    FERRITIN 49 03/13/2023       Physical Exam  Pulmonary:      Effort: Pulmonary effort is normal. No respiratory distress.   Neurological:      Mental Status: She is alert and oriented to person, place, and time.        Assessment:     Problem List Items Addressed This Visit          Oncology    HECTOR (iron deficiency anemia)     Prior h/o H. Pylori. Repeat H. Pylori testing negative. Patient previously d/c'd oral iron supplementation due to constipation. Previously discussed with patient recommendations for VCE. She declined. Continues GI follow up    Saturated iron level slightly low. No anemia  List of iron rich foods from up to date previously given to patient    -encourage continued iron rich foods  -f/u 3 months with cbc, iron, ferritin  -Discussed S&S to report sooner         Relevant Orders    CBC Auto Differential    Iron and TIBC    Ferritin       Other    Class 3 obesity with alveolar hypoventilation, serious comorbidity, and body mass index (BMI) of 50.0 to 59.9 in adult - Primary (Chronic)         Med Onc Chart Routing      Follow up with physician    Follow up with KIMBER 3 months.   Infusion scheduling note    Injection scheduling note    Labs CBC, ferritin and iron and TIBC   Scheduling:  Preferred  lab:  Lab interval:  1-2 days prior to appt   Imaging None      Pharmacy appointment No pharmacy appointment needed      Other referrals  No additional referrals needed           Plan:     Class 3 obesity with alveolar hypoventilation, serious comorbidity, and body mass index (BMI) of 50.0 to 59.9 in adult    Iron deficiency anemia due to chronic blood loss  -     CBC Auto Differential; Future; Expected date: 03/16/2023  -     Iron and TIBC; Future; Expected date: 03/16/2023  -     Ferritin; Future; Expected date: 03/16/2023            KARO Arroyo

## 2023-03-16 NOTE — PROGRESS NOTES
Make follow-up lab appointment per recommendation below.  Check to see if patient has seen the results through my chart.  If not then,  #CALL THE PATIENT# to discuss results/see if they have questions and document verification of contact. Make F/U appt if needed. [phone]    #My interpretation that was sent to them through [facility]:  Zakia, I have reviewed your recent blood work.     Your complete blood count is stable.  Follow-up with hematology as scheduled.  Your hemoglobin A1c is improved.  Keep up the great work!  This test is gold standard screening test for diabetes.  It is a measures 3 months of your average blood sugar.  =========================  Also please address any outstanding health maintenance that may be due: Eye Exam due on 03/23/2022  Shingles Vaccine(2 of 2) due on 08/11/2022  Foot Exam due on 04/14/2023

## 2023-03-16 NOTE — ASSESSMENT & PLAN NOTE
Prior h/o H. Pylori. Repeat H. Pylori testing negative. Patient previously d/c'd oral iron supplementation due to constipation. Previously discussed with patient recommendations for VCE. She declined. Continues GI follow up    Saturated iron level slightly low. No anemia  List of iron rich foods from up to date previously given to patient    -encourage continued iron rich foods  -f/u 3 months with cbc, iron, ferritin  -Discussed S&S to report sooner

## 2023-03-17 PROBLEM — M25.551 HIP PAIN, RIGHT: Status: ACTIVE | Noted: 2023-03-17

## 2023-03-17 NOTE — ASSESSMENT & PLAN NOTE
Controlled. Currently taking Ozempic. Tolerated well with no adverse effects reported. Requesting medication refills. Continue current diabetes meds.We will plan to monitor hemoglobin A1c at designated intervals 3 to 6 months.  I recommend ongoing Education for diabetic diet and exercise protocol.  We will continue to monitor for side effects.    Please be advised of symptoms to monitor for and to notify me immediately if persistent or worsening.  Follow up with Ophthalmology/Optometry and Podiatry at least annually.    Lab Results   Component Value Date    HGBA1C 4.9 03/13/2023     Diabetes Medications             metFORMIN (GLUCOPHAGE) 1000 MG tablet Take 1 tablet (1,000 mg total) by mouth 2 (two) times daily with meals.    semaglutide (OZEMPIC) 2 mg/dose (8 mg/3 mL) PnIj Inject 2 mg into the skin every 7 days.

## 2023-03-17 NOTE — ASSESSMENT & PLAN NOTE
Requesting medication refills. Complete history and physical was completed today.  Complete and thorough medication reconciliation was performed.  Discussed risks and benefits of medications.  Advised patient on orders and health maintenance.  We discussed old records and old labs if available.  Will request any records not available through epic.  Continue current medications listed on your summary sheet.

## 2023-03-17 NOTE — ASSESSMENT & PLAN NOTE
Chronic. Stable  Taking levothyroxine 100 mcg daily. No SE reported. Requesting mediation refill.   Recent labs stable    Lab Results   Component Value Date    TSH 0.902 01/13/2023

## 2023-03-20 DIAGNOSIS — E78.2 COMBINED HYPERLIPIDEMIA ASSOCIATED WITH TYPE 2 DIABETES MELLITUS: ICD-10-CM

## 2023-03-20 DIAGNOSIS — I73.9 CLAUDICATION OF BOTH LOWER EXTREMITIES: ICD-10-CM

## 2023-03-20 DIAGNOSIS — G25.81 RESTLESS LEGS SYNDROME (RLS): ICD-10-CM

## 2023-03-20 DIAGNOSIS — R11.10 VOMITING, UNSPECIFIED VOMITING TYPE, UNSPECIFIED WHETHER NAUSEA PRESENT: ICD-10-CM

## 2023-03-20 DIAGNOSIS — R55 SYNCOPE, UNSPECIFIED SYNCOPE TYPE: ICD-10-CM

## 2023-03-20 DIAGNOSIS — R00.0 TACHYCARDIA: ICD-10-CM

## 2023-03-20 DIAGNOSIS — E78.5 HYPERLIPIDEMIA, UNSPECIFIED HYPERLIPIDEMIA TYPE: ICD-10-CM

## 2023-03-20 DIAGNOSIS — Z76.0 MEDICATION REFILL: ICD-10-CM

## 2023-03-20 DIAGNOSIS — I15.2 HYPERTENSION ASSOCIATED WITH DIABETES: Chronic | ICD-10-CM

## 2023-03-20 DIAGNOSIS — E11.69 COMBINED HYPERLIPIDEMIA ASSOCIATED WITH TYPE 2 DIABETES MELLITUS: ICD-10-CM

## 2023-03-20 DIAGNOSIS — E61.1 IRON DEFICIENCY: ICD-10-CM

## 2023-03-20 DIAGNOSIS — E11.42 DIABETIC POLYNEUROPATHY ASSOCIATED WITH TYPE 2 DIABETES MELLITUS: ICD-10-CM

## 2023-03-20 DIAGNOSIS — R06.09 DOE (DYSPNEA ON EXERTION): ICD-10-CM

## 2023-03-20 DIAGNOSIS — K21.00 GASTROESOPHAGEAL REFLUX DISEASE WITH ESOPHAGITIS WITHOUT HEMORRHAGE: ICD-10-CM

## 2023-03-20 DIAGNOSIS — R01.1 SYSTOLIC MURMUR: ICD-10-CM

## 2023-03-20 DIAGNOSIS — Z79.899 ENCOUNTER FOR LONG-TERM (CURRENT) USE OF MEDICATIONS: ICD-10-CM

## 2023-03-20 DIAGNOSIS — E11.59 HYPERTENSION ASSOCIATED WITH DIABETES: Chronic | ICD-10-CM

## 2023-03-20 NOTE — TELEPHONE ENCOUNTER
No new care gaps identified.  [facility] Embedded Care Gaps. Reference number: [id number]. 3/20/2023   4:00:27 PM CDT

## 2023-03-20 NOTE — TELEPHONE ENCOUNTER
----- Message from Cande Ocasio sent at 3/20/2023  3:28 PM CDT -----  Type:  Pharmacy Calling to Clarify an RX    Name of Caller:taylor  Pharmacy Name:[facility]  Prescription Name:  metFORMIN (GLUCOPHAGE) 1000 MG tablet  loratadine (CLARITIN) 10 mg tablet  levothyroxine (EUTHYROX) 100 MCG tablet    diltiaZEM (CARDIZEM) 30 MG tablet  pantoprazole (PROTONIX) 40 MG tablet    pravastatin (PRAVACHOL) 80 MG tablet    pregabalin (LYRICA) 75 MG capsule    rOPINIRole (REQUIP) 0.25 MG tablet  topiramate (TOPAMAX) 100 MG tablet      What do they need to clarify?:new script with refills  Best Call Back Number:[phone]  Additional Information: 90 day

## 2023-03-21 ENCOUNTER — OFFICE VISIT (OUTPATIENT)
Dept: PODIATRY | Facility: CLINIC | Age: 63
End: 2023-03-21
Payer: MEDICARE

## 2023-03-21 DIAGNOSIS — E11.42 DIABETIC POLYNEUROPATHY ASSOCIATED WITH TYPE 2 DIABETES MELLITUS: Primary | ICD-10-CM

## 2023-03-21 DIAGNOSIS — E66.01 MORBID OBESITY: ICD-10-CM

## 2023-03-21 DIAGNOSIS — E11.49 TYPE II DIABETES MELLITUS WITH NEUROLOGICAL MANIFESTATIONS: ICD-10-CM

## 2023-03-21 DIAGNOSIS — L60.3 ONYCHODYSTROPHY: ICD-10-CM

## 2023-03-21 DIAGNOSIS — M54.17 LUMBOSACRAL RADICULOPATHY: ICD-10-CM

## 2023-03-21 PROCEDURE — 1100F PR PT FALLS ASSESS DOC 2+ FALLS/FALL W/INJURY/YR: ICD-10-PCS | Mod: HCNC,CPTII,S$GLB, | Performed by: PODIATRIST

## 2023-03-21 PROCEDURE — 99213 PR OFFICE/OUTPT VISIT, EST, LEVL III, 20-29 MIN: ICD-10-PCS | Mod: 25,HCNC,S$GLB, | Performed by: PODIATRIST

## 2023-03-21 PROCEDURE — 99999 PR PBB SHADOW E&M-EST. PATIENT-LVL III: ICD-10-PCS | Mod: PBBFAC,HCNC,, | Performed by: PODIATRIST

## 2023-03-21 PROCEDURE — 3044F PR MOST RECENT HEMOGLOBIN A1C LEVEL <7.0%: ICD-10-PCS | Mod: HCNC,CPTII,S$GLB, | Performed by: PODIATRIST

## 2023-03-21 PROCEDURE — 1160F RVW MEDS BY RX/DR IN RCRD: CPT | Mod: HCNC,CPTII,S$GLB, | Performed by: PODIATRIST

## 2023-03-21 PROCEDURE — 3044F HG A1C LEVEL LT 7.0%: CPT | Mod: HCNC,CPTII,S$GLB, | Performed by: PODIATRIST

## 2023-03-21 PROCEDURE — 11721 DEBRIDE NAIL 6 OR MORE: CPT | Mod: Q9,HCNC,S$GLB, | Performed by: PODIATRIST

## 2023-03-21 PROCEDURE — 99213 OFFICE O/P EST LOW 20 MIN: CPT | Mod: 25,HCNC,S$GLB, | Performed by: PODIATRIST

## 2023-03-21 PROCEDURE — 1159F MED LIST DOCD IN RCRD: CPT | Mod: HCNC,CPTII,S$GLB, | Performed by: PODIATRIST

## 2023-03-21 PROCEDURE — 1160F PR REVIEW ALL MEDS BY PRESCRIBER/CLIN PHARMACIST DOCUMENTED: ICD-10-PCS | Mod: HCNC,CPTII,S$GLB, | Performed by: PODIATRIST

## 2023-03-21 PROCEDURE — 1159F PR MEDICATION LIST DOCUMENTED IN MEDICAL RECORD: ICD-10-PCS | Mod: HCNC,CPTII,S$GLB, | Performed by: PODIATRIST

## 2023-03-21 PROCEDURE — 3288F FALL RISK ASSESSMENT DOCD: CPT | Mod: HCNC,CPTII,S$GLB, | Performed by: PODIATRIST

## 2023-03-21 PROCEDURE — 11721 PR DEBRIDEMENT OF NAILS, 6 OR MORE: ICD-10-PCS | Mod: Q9,HCNC,S$GLB, | Performed by: PODIATRIST

## 2023-03-21 PROCEDURE — 99999 PR PBB SHADOW E&M-EST. PATIENT-LVL III: CPT | Mod: PBBFAC,HCNC,, | Performed by: PODIATRIST

## 2023-03-21 PROCEDURE — 3288F PR FALLS RISK ASSESSMENT DOCUMENTED: ICD-10-PCS | Mod: HCNC,CPTII,S$GLB, | Performed by: PODIATRIST

## 2023-03-21 PROCEDURE — 1100F PTFALLS ASSESS-DOCD GE2>/YR: CPT | Mod: HCNC,CPTII,S$GLB, | Performed by: PODIATRIST

## 2023-03-21 RX ORDER — TOPIRAMATE 100 MG/1
100 TABLET, FILM COATED ORAL 2 TIMES DAILY
Qty: 180 TABLET | Refills: 4 | Status: SHIPPED | OUTPATIENT
Start: 2023-03-21 | End: 2023-11-15 | Stop reason: SDUPTHER

## 2023-03-21 RX ORDER — DILTIAZEM HYDROCHLORIDE 30 MG/1
30 TABLET, FILM COATED ORAL EVERY 12 HOURS
Qty: 90 TABLET | Refills: 4 | Status: SHIPPED | OUTPATIENT
Start: 2023-03-21 | End: 2023-08-16

## 2023-03-21 RX ORDER — LORATADINE 10 MG/1
10 TABLET ORAL DAILY
Qty: 90 TABLET | Refills: 4 | Status: SHIPPED | OUTPATIENT
Start: 2023-03-21 | End: 2023-11-15

## 2023-03-21 RX ORDER — ROPINIROLE 0.25 MG/1
0.25 TABLET, FILM COATED ORAL NIGHTLY
Qty: 90 TABLET | Refills: 4 | Status: SHIPPED | OUTPATIENT
Start: 2023-03-21 | End: 2024-03-20

## 2023-03-21 RX ORDER — PANTOPRAZOLE SODIUM 40 MG/1
40 TABLET, DELAYED RELEASE ORAL DAILY
Qty: 90 TABLET | Refills: 4 | Status: SHIPPED | OUTPATIENT
Start: 2023-03-21 | End: 2023-10-17

## 2023-03-21 RX ORDER — METFORMIN HYDROCHLORIDE 1000 MG/1
1000 TABLET ORAL 2 TIMES DAILY WITH MEALS
Qty: 180 TABLET | Refills: 4 | Status: SHIPPED | OUTPATIENT
Start: 2023-03-21 | End: 2023-05-05

## 2023-03-21 RX ORDER — LEVOTHYROXINE SODIUM 100 UG/1
100 TABLET ORAL DAILY
Qty: 90 TABLET | Refills: 4 | Status: SHIPPED | OUTPATIENT
Start: 2023-03-21

## 2023-03-21 RX ORDER — PREGABALIN 75 MG/1
75 CAPSULE ORAL 3 TIMES DAILY
Qty: 90 CAPSULE | Refills: 2 | Status: SHIPPED | OUTPATIENT
Start: 2023-03-21 | End: 2023-08-24

## 2023-03-21 RX ORDER — PRAVASTATIN SODIUM 80 MG/1
80 TABLET ORAL DAILY
Qty: 90 TABLET | Refills: 4 | Status: SHIPPED | OUTPATIENT
Start: 2023-03-21 | End: 2023-05-25

## 2023-03-21 NOTE — PROGRESS NOTES
Subjective:       Patient ID: Zkaia Price is a 63 y.o. female.    Chief Complaint: Routine Foot Care (Patient is a diabetic and was last seen on 3.15.23 by MOSES Price. She complains of 5/10 pain at present to bilateral feet. She is wearing socks and tennis shoes. )      HPI: Patient presents to the office today with the chief complaint of elongated, thickened and dystrophic nail plates to the B/L foot. Patient also complains of moderate  foot pain to the right left secondary to neuropathy.  States her pain is an 5/10.  Reporting no significant improvement with the use of Lyrica.  This patient is a Diabetic Type II, complicated with morbid obesity and Peripheral Neuropathy. Patient does follow with Primary Care and/or Endocrinology for management of Diabetes Mellitus. This patient's PMD is Oleksandr Nagel MD. This patient last saw his/her primary care provider on 3/15/23.     Hemoglobin A1C   Date Value Ref Range Status   03/13/2023 4.9 4.0 - 5.6 % Final     Comment:     ADA Screening Guidelines:  5.7-6.4%  Consistent with prediabetes  >or=6.5%  Consistent with diabetes    High levels of fetal hemoglobin interfere with the HbA1C  assay. Heterozygous hemoglobin variants (HbS, HgC, etc)do  not significantly interfere with this assay.   However, presence of multiple variants may affect accuracy.     09/20/2022 5.1 4.0 - 5.6 % Final     Comment:     ADA Screening Guidelines:  5.7-6.4%  Consistent with prediabetes  >or=6.5%  Consistent with diabetes    High levels of fetal hemoglobin interfere with the HbA1C  assay. Heterozygous hemoglobin variants (HbS, HgC, etc)do  not significantly interfere with this assay.   However, presence of multiple variants may affect accuracy.     02/24/2022 5.2 4.0 - 5.6 % Final     Comment:     ADA Screening Guidelines:  5.7-6.4%  Consistent with prediabetes  >or=6.5%  Consistent with diabetes    High levels of fetal hemoglobin interfere with the HbA1C  assay.  Heterozygous hemoglobin variants (HbS, HgC, etc)do  not significantly interfere with this assay.   However, presence of multiple variants may affect accuracy.     .     Review of patient's allergies indicates:   Allergen Reactions    Codeine sulfate      Nausea^    Lisinopril Swelling     angioedema    Codeine Nausea Only and Rash       Past Medical History:   Diagnosis Date    Acute respiratory failure due to COVID-19     COVID-19     Diabetes mellitus, type 2     Diabetic neuropathy 1/27/2014    DJD (degenerative joint disease) of knee     DVT (deep venous thrombosis) around 1990's    in leg, is on no anticoagulant therapy presently    Fatty liver     GERD (gastroesophageal reflux disease)     Hypertension associated with diabetes     HECTOR (iron deficiency anemia) 5/13/2021    Multinodular goiter     Obesity, morbid, BMI 50 or higher     Sleep apnea     has no CPAP       Family History   Problem Relation Age of Onset    Diabetes Mother     Hypertension Mother     Heart disease Mother 50    Cancer Father     Arthritis Father     Breast cancer Sister     Cancer Brother     Cancer Brother     Leukemia Son     Cancer Son        Social History     Socioeconomic History    Marital status: Single   Tobacco Use    Smoking status: Never    Smokeless tobacco: Never   Substance and Sexual Activity    Alcohol use: No    Drug use: No    Sexual activity: Not Currently       Past Surgical History:   Procedure Laterality Date    COLONOSCOPY N/A 5/12/2021    Procedure: COLONOSCOPY;  Surgeon: Carolina Rizo MD;  Location: [facility] ENDO;  Service: Endoscopy;  Laterality: N/A;    EPIDURAL STEROID INJECTION N/A 12/10/2021    Procedure: Lumbar L5/S1 IL TEETEE  Would like AM procedure, if possible;  Surgeon: Nae Paul MD;  Location: [facility];  Service: Pain Management;  Laterality: N/A;    EPIDURAL STEROID INJECTION INTO CERVICAL SPINE N/A 10/8/2021    Procedure: C7-T1 IL TEETEE-no sedation.  Needs IV-just incase;  Surgeon: Nae  NELSON Paul MD;  Location: [facility] PAIN MGT;  Service: Pain Management;  Laterality: N/A;    ESOPHAGOGASTRODUODENOSCOPY N/A 7/8/2021    Procedure: EGD (ESOPHAGOGASTRODUODENOSCOPY) previous positve covid;  Surgeon: Jann Gutierrez MD;  Location: [facility] ENDO;  Service: Endoscopy;  Laterality: N/A;    FRACTURE SURGERY      HYSTERECTOMY      INTRALUMINAL GASTROINTESTINAL TRACT IMAGING VIA CAPSULE N/A 10/24/2022    Procedure: IMAGING PROCEDURE, GI TRACT, INTRALUMINAL, VIA CAPSULE;  Surgeon: Hang Lunsford RN;  Location: [facility] ENDO;  Service: Endoscopy;  Laterality: N/A;    SELECTIVE INJECTION OF ANESTHETIC AGENT AROUND LUMBAR SPINAL NERVE ROOT BY TRANSFORAMINAL APPROACH Bilateral 5/6/2022    Procedure: Bilateral L5/S1 TF TEETEE with RN IV sedation;  Surgeon: Nae Paul MD;  Location: [facility] PAIN MGT;  Service: Pain Management;  Laterality: Bilateral;    SELECTIVE INJECTION OF ANESTHETIC AGENT AROUND LUMBAR SPINAL NERVE ROOT BY TRANSFORAMINAL APPROACH Bilateral 12/30/2022    Procedure: Bilateral L5/S1 TF TEETEE RN IV Sedation;  Surgeon: Nae Paul MD;  Location: [facility] PAIN MGT;  Service: Pain Management;  Laterality: Bilateral;    SHOULDER ARTHROSCOPY      THYROIDECTOMY, PARTIAL Right     and transplatation of right superior parathyroid gland to the sternocleidomastoid muscle     TONSILLECTOMY      TUBAL LIGATION  1984    WRIST FRACTURE SURGERY      left       Review of Systems       Objective:   There were no vitals taken for this visit.    Physical Exam  LOWER EXTREMITY PHYSICAL EXAMINATION    VASCULAR:  The right dorsalis pedis pulse 2/4 and the right posterior tibial pulse 1/4.  The left dorsalis pedis pulse 2/4 and posterior tibial pulse on the left is 1/4.  Capillary refill is intact.  Pedal hair growth decreased.     NEUROLOGY: Protective sensation is not intact to the left and right plantar surfaces of the foot and digits, as the patient has no sensation/detection at greater than 4 distinct points of contact with 5.07 [place]ele monofilament. Sensation to light touch is intact on the left and right foot. Proprioception is intact, bilateral. Sensation to pin prick is reduced to absent. Vibratory sensation is diminished.    DERMATOLOGY:  Skin is supple, moist, intact.  There is no signs of callusing, ulcerations, other lesions identified to the dorsal or plantar aspect of the right or left foot.  The R1, 2, 5 and left L1,2, 5 are thickened, discolored dystrophic.  There is subungual debris.  Nail plates have area of dark discoloration.  The remaining nails 3-4 on the right foot and the left foot are elongated but of normal color, thickness, and texture.   There is no signs of ingrowing into the medial or lateral borders.  There is no evidence of wounds or skin breakdown.  No edema or erythema.  No obvious lacerations or fissuring.  Interdigital spaces are clean, dry, intact.  No rashes or scars appreciated.    ORTHOPEDIC: Manual Muscle Testing is 5/5 in all planes on the left and right, without pains, with and without resistance. Gait pattern is non-antalgic.    Assessment:     1. Diabetic polyneuropathy associated with type 2 diabetes mellitus    2. Type II diabetes mellitus with neurological manifestations    3. Lumbosacral radiculopathy    4. Morbid obesity    5. Onychodystrophy        Plan:     Diabetic polyneuropathy associated with type 2 diabetes mellitus    Type II diabetes mellitus with neurological manifestations    Lumbosacral radiculopathy    Morbid obesity    Onychodystrophy      Thorough discussion is had with the patient this afternoon, concerning the diagnosis, its etiology, and the treatment algorithm at present.  Greater than 50% of this visit spent on counseling and coordination of care. Greater than 15 minutes of a 20 minute appointment spent on education about the diabetic foot, neuropathy, and prevention of limb loss.  Shoe inspection. Diabetic Foot Education. Patient reminded of the importance of good  nutrition and blood sugar control to help prevent podiatric complications of diabetes. Patient instructed on proper foot hygeine. We discussed wearing proper and supportive shoe gear, daily foot inspections, never walking barefooted or sock footed, never putting sharp instruments to feet which can cause major complications associated with infection, ulcers, lacerations.      Dystrophic nail plates, as outlined above (R#1,2,5  ; L#1,2,5 ), are sharply debrided with double action nail nipper, and/or with the assistance of a mechanical rotary carlos alberto, with removal of all offending nail and nail border(s), for reduction of pains. Nails are reduced in terms of length, width and girth with removal of subungual debris to facilitate pain free weight bearing and ambulation. The elongated nails as outlined in the objective portion of this note, were trimmed to appropriate length, with a double action nail nipper, for alleviation/reduction of pains as well. Follow up in approx. 3-4 months.    Did discussed with patient that if gabapentin was unsuccessful and patient having no significant improvement with Lyrica to discuss further medication changes in options with pain management who may be able to provide more clarity on other ideas helping to reduce chronic neuropathy pains.  May be a candidate for nerve stimulator.      Future Appointments   Date Time Provider Department Center   5/5/2023  4:00 PM Oleksandr Nagel MD [facility] MED [place]   6/14/2023 10:40 AM LABORATORY, [place] [facility] LAB [place]   6/16/2023  1:30 PM Sol Mckeon NP [facility] HEM [facility]   7/18/2023  9:45 AM Brandie Rey DPM [facility] POD  [facility]

## 2023-04-03 ENCOUNTER — TELEPHONE (OUTPATIENT)
Dept: FAMILY MEDICINE | Facility: CLINIC | Age: 63
End: 2023-04-03
Payer: MEDICARE

## 2023-04-03 DIAGNOSIS — E11.65 TYPE 2 DIABETES MELLITUS WITH HYPERGLYCEMIA, WITHOUT LONG-TERM CURRENT USE OF INSULIN: Chronic | ICD-10-CM

## 2023-04-03 RX ORDER — SEMAGLUTIDE 2.68 MG/ML
2 INJECTION, SOLUTION SUBCUTANEOUS
Qty: 3 ML | Refills: 2 | Status: SHIPPED | OUTPATIENT
Start: 2023-04-03 | End: 2023-06-07

## 2023-04-03 RX ORDER — SEMAGLUTIDE 2.68 MG/ML
2 INJECTION, SOLUTION SUBCUTANEOUS
Qty: 1 EACH | Refills: 1 | Status: CANCELLED | OUTPATIENT
Start: 2023-04-03

## 2023-04-03 NOTE — TELEPHONE ENCOUNTER
No new care gaps identified.  [facility] Embedded Care Gaps. Reference number: [id number]. 4/03/2023   1:43:59 PM CDT

## 2023-04-03 NOTE — TELEPHONE ENCOUNTER
----- Message from Kylie Hull sent at 4/3/2023 10:27 AM CDT -----  Contact: Zakia emeterio [phone]  Requesting an RX refill or new RX.  Is this a refill or new RX: refill  RX name and strength: semaglutide (OZEMPIC) 2 mg/dose (8 mg/3 mL) PnIj   Is this a 30 day or 90 day RX:   Pharmacy name and phone #   [facility] [address]   Phone: [phone]  The doctors have asked that we provide their patients with the following 2 reminders -- prescription refills can take up to 72 hours, and a friendly reminder that in the future you can use your [facility] account to request refills:

## 2023-04-03 NOTE — TELEPHONE ENCOUNTER
Rx pended for refill to pharmacy of pt. Request.   Addendum: unable to pend rx in this encounter follow up in separate encounter.

## 2023-04-19 ENCOUNTER — PATIENT MESSAGE (OUTPATIENT)
Dept: ADMINISTRATIVE | Facility: OTHER | Age: 63
End: 2023-04-19
Payer: MEDICARE

## 2023-04-26 ENCOUNTER — TELEPHONE (OUTPATIENT)
Dept: PAIN MEDICINE | Facility: CLINIC | Age: 63
End: 2023-04-26
Payer: MEDICARE

## 2023-04-26 NOTE — TELEPHONE ENCOUNTER
----- Message from Amy Sosa sent at 4/26/2023  9:33 AM CDT -----  Regarding: medical Question  Contact: Zakia Koehler is requesting a callback from the nurse today in regards to getting an injection.     Zakia can be reached at [phone] ([phone])      Thanks

## 2023-04-26 NOTE — TELEPHONE ENCOUNTER
Reached out to patient to schedule appointment from messages. Pt want to get eval for injection.  Apt has been made.   Pt understand. All questions answered.     Richie Aguilar  Medical Assistant

## 2023-04-27 NOTE — PROGRESS NOTES
"Established Patient Chronic Pain Note     Referring Physician: No ref. provider found    PCP: Oleksandr Nagel MD    Chief Complaint:   No chief complaint on file.      SUBJECTIVE:  Interval History (4/27/2023):  Zakia Price presents today for follow-up visit.  Patient was last seen on 01/30/2023. Last injection  bilateral L5/S1 TF TEETEE on 12/30/22 with 100% relief until the pain returned insidiously a couple of months ago. She reports same pain as previous, located in her lower lumbar spine with radiation into her bilateral lower extremities along posterior aspect and tingling in feet with prolonged standing.  She has been performing physician directed exercises at home since last injection without relief. Patient reports pain as 7/10 today. She continues to take Topamax, Lyrica, Mobic, and Zanaflex. She reports sedation with Zanaflex, gets a lot of spasms but needs something less sedating for when she has her grandkids. Pain interferes with daily activity.    Interval History (1/30/2023): Zakia Price presents today for follow-up visit.  she underwent bilateral L5/S1 TF TEETEE on 12/30/22.  The patient reports that she is/was better following the procedure.  she reports 100% pain relief.  The changes lasted 4 weeks so far.  The changes have continued through this visit.  Patient reports pain as "0/10 today. She reports improvement in her neck pain as well, reports only occasional spasms. She reports improvement in burning sensation in her feet with TEETEE and Topamax and Lyrica, still gets tingling if she stands or walks for too long. Overall, improved functionality following TEETEE.    Interval History (9/16/2022):  Zakia Price presents today for follow-up visit.  Last injection bilateral L5/S1 TF TEETEE on 05/06/2022. Patient reports pain as 6/10 today. She reports she is no longer experiencing relief from the previous TEETEE. Relief lasted for 2-3 months before insidiously returning. Continues to " report pain in low lumbar spine with radiation into bilateral lower extremities along posterior aspect. Reports weakness in lower extremities and decreased balance. She also reports insidious return of neck pain, but low back pain is more persistent, constant, and worse than neck pain. Pain interferes with daily activity. She has been performing physician directed exercises at home without relief. Patient has continued conservative treatments including anti-inflammatory and nerve pain medications, as well as home physical therapy exercises without relief.      Interval history 06/07/2022  Patient presents status post bilateral L5/S1 transforaminal epidural steroid injection 05/06/2022.  Patient reports 100% pain relief initially following transforaminal injection of lower extremity radicular symptoms.  Today pain is rated 0/10.  Patient reports overall 50% sustained relief in lower extremity pain.  Patient reports more significant relief with transforaminal band prior intralaminar approach.  Patient continues to report sustained relief in the neck and upper extremities following C7-T1 interlaminar epidural steroid injection October 2021. Patient is continue Topamax 100 mg twice daily.  She does report noticeable improvement in pain on this medication and denies any side effects.  Today she denies any significant upper lower extremity weakness, bowel or bladder incontinence or saddle anesthesia.      Interval history 03/08/2022   Patient presents for MRI review- lumbar and cervical spine.  Today patient reports sustained pain relief in the neck and upper extremities following C7-T1 interlaminar epidural steroid injection October 2021. She also reports improvement in lower extremity radicular symptoms in the lower back and down the lower extremities.  Today her primary concern is burning sensation on the bottom of the feet.  Patient has continued Topamax and has reached 100 mg twice daily.  She does report noticeable  improvement in her pain on this medication.  She denies any side effects.  She denies any new onset upper or lower extremity weakness, bowel or bladder incontinence or saddle anesthesia.      Interval history 01/11/2022  Patient presents status post L5-S1 interlaminar epidural steroid injection with right paramedian approach 12/10/2021.  Patient reports 80% sustained relief in lower back and bilateral lower extremity radicular symptoms following epidural steroid injection.  Today patient reports her pain as a 5/10.  Patient has continued Topamax 100 mg twice daily.  She denies any significant sedation from this medication and has enjoyed a  20 lb weight loss since initiation as well as improvement in neuropathic pain.      Interval Hx: 11/8/21  Pt is s/p C7-T1 IL TEETEE with R paramedian approach 10/08/21.  Patient reports 60% sustained relief following her cervical epidural steroid injection in both her neck and upper extremities.  Patient does report improvement in range of motion and strength.  Today patient would like to discuss her lower back and leg pain.  Today she again reports pain in the lower back which radiates down the posterior aspects of bilateral lower extremities in L5-S1 distribution to the feet.  Today pain is 5/10.  Patient is currently participating actively in physical therapy.  She is currently reached Topamax 75 mg twice daily without side effects.  She denies any new onset lower extremity weakness, bowel or bladder incontinence or saddle anesthesia.    HPI 07/30/21  Zakia Price is a 63 y.o. female with past medical history significant for history of acute respiratory failure secondary to COVID-19, type 2 diabetes complicated by neuropathy, GERD, hypertension, morbid obesity, obstructive sleep apnea,  who presents to the clinic for the evaluation of neck and lower back and leg pain.  Patient reports that her pain began approximately 5-6 years prior without inciting accident injury or  trauma.  Patient reports lower back pain which begins in a bandlike distribution at her iliac crest with radiation down the posterior aspect of bilateral legs to the feet in L5-S1 distribution.  Patient reports left side is significantly worse than the right.  Patient reports tingling and numbness in bilateral feet and associated subjective weakness in the lower extremities.  Patient does report periodic falls associated with her pain.    Patient also reports longstanding neck pain with radiation down bilateral upper extremities in no particular dermatomal distribution.  Patient reports pain is worse along the right paracervical muscles than on the left.  Patient reports compromise in hand  strength as well as in hand dexterity.  Patient denies ataxia or bowel or bladder incontinence.   Patient's neck and shoulder pain is exacerbated by cervical flexion, extension and lateral rotation as well as overhead activities..    Patient reports that lower extremity pain is exacerbated from moving from a sitting to a standing position and with ambulation.  Patient is able to ambulate approximately 10 minutes before requiring rest.  The pain is rated with a score of  7/10 on the BEST day and a score of 10/10 on the WORST day.  Symptoms interfere with daily activity and sleeping.     Patient reports significant motor weakness.  Patient denies night fever/night sweats, urinary incontinence, bowel incontinence, significant weight loss and loss of sensations.      Pain Disability Index Review:     Last 3 PDI Scores 3/8/2022 1/11/2022 11/8/2021   Pain Disability Index (PDI) 35 35 45       Non-Pharmacologic Treatments:  Physical Therapy/Home Exercise: yes  Ice/Heat:yes  TENS: no  Acupuncture: no  Massage: yes  Chiropractic: no    Other: no    Pain Medications:  - Adjuvant Medications: Neurontin (Gabapentin), Topamax (Topiramate), Tylenol (Acetaminophen) and Zanaflex ( Tizanidine) Diazepam    Pain Procedures:     -bilateral L5/S1  TF TEETEE on 12/30/22 with 100% relief  -05/06/2022:  Bilateral L5/S1 transforaminal epidural steroid injection  -12/10/2021:  L5-S1 intralaminar epidural steroid injection with right paramedian approach  -10/8/21: C7-T1 IL TEETEE with R paramedian approach; Dr. Paul  Prior epidural steroid injection lumbar spine, approximately 3-4 years prior at the [facility] in [place] which confirmed approximately 1 year of relief.    Past Medical History:   Diagnosis Date    Acute respiratory failure due to COVID-19     COVID-19     Diabetes mellitus, type 2     Diabetic neuropathy 1/27/2014    DJD (degenerative joint disease) of knee     DVT (deep venous thrombosis) around 1990's    in leg, is on no anticoagulant therapy presently    Fatty liver     GERD (gastroesophageal reflux disease)     Hypertension associated with diabetes     EHCTOR (iron deficiency anemia) 5/13/2021    Multinodular goiter     Obesity, morbid, BMI 50 or higher     Sleep apnea     has no CPAP     Past Surgical History:   Procedure Laterality Date    COLONOSCOPY N/A 5/12/2021    Procedure: COLONOSCOPY;  Surgeon: Carolina Rizo MD;  Location: [facility];  Service: Endoscopy;  Laterality: N/A;    EPIDURAL STEROID INJECTION N/A 12/10/2021    Procedure: Lumbar L5/S1 IL TEETEE  Would like AM procedure, if possible;  Surgeon: Nae Paul MD;  Location: [facility];  Service: Pain Management;  Laterality: N/A;    EPIDURAL STEROID INJECTION INTO CERVICAL SPINE N/A 10/8/2021    Procedure: C7-T1 IL TEETEE-no sedation.  Needs IV-just incase;  Surgeon: Nae Paul MD;  Location: [facility];  Service: Pain Management;  Laterality: N/A;    ESOPHAGOGASTRODUODENOSCOPY N/A 7/8/2021    Procedure: EGD (ESOPHAGOGASTRODUODENOSCOPY) previous positve covid;  Surgeon: Jann Gutierrez MD;  Location: [facility];  Service: Endoscopy;  Laterality: N/A;    FRACTURE SURGERY      HYSTERECTOMY      INTRALUMINAL GASTROINTESTINAL TRACT IMAGING VIA CAPSULE N/A  10/24/2022    Procedure: IMAGING PROCEDURE, GI TRACT, INTRALUMINAL, VIA CAPSULE;  Surgeon: Hang Lunsford RN;  Location: [facility];  Service: Endoscopy;  Laterality: N/A;    SELECTIVE INJECTION OF ANESTHETIC AGENT AROUND LUMBAR SPINAL NERVE ROOT BY TRANSFORAMINAL APPROACH Bilateral 2022    Procedure: Bilateral L5/S1 TF TEETEE with RN IV sedation;  Surgeon: Nae Paul MD;  Location: [facility];  Service: Pain Management;  Laterality: Bilateral;    SELECTIVE INJECTION OF ANESTHETIC AGENT AROUND LUMBAR SPINAL NERVE ROOT BY TRANSFORAMINAL APPROACH Bilateral 2022    Procedure: Bilateral L5/S1 TF TEETEE RN IV Sedation;  Surgeon: Nae Paul MD;  Location: [facility];  Service: Pain Management;  Laterality: Bilateral;    SHOULDER ARTHROSCOPY      THYROIDECTOMY, PARTIAL Right     and transplatation of right superior parathyroid gland to the sternocleidomastoid muscle     TONSILLECTOMY      TUBAL LIGATION  1984    WRIST FRACTURE SURGERY      left     Review of patient's allergies indicates:   Allergen Reactions    Codeine sulfate      Nausea^    Lisinopril Swelling     angioedema    Codeine Nausea Only and Rash       Current Outpatient Medications   Medication Sig    diltiaZEM (CARDIZEM) 30 MG tablet Take 1 tablet (30 mg total) by mouth every 12 (twelve) hours.    EPINEPHrine (EPIPEN) 0.3 mg/0.3 mL AtIn Inject into the muscle.    famotidine (PEPCID) 40 MG tablet Take 40 mg by mouth once daily.    ferrous sulfate 324 mg (65 mg iron) TbEC Take 1 tablet (324 mg total) by mouth once daily.    fluticasone propionate (FLONASE) 50 mcg/actuation nasal spray Use 2 sprays to each nostril daily    inhalation spacing device (BREATHERITE VALVED MDI CHAMBER) Use as directed for inhalation.    levothyroxine (EUTHYROX) 100 MCG tablet Take 1 tablet (100 mcg total) by mouth once daily.    LIDOcaine-prilocaine (EMLA) cream SMARTSI-2 Pump Topical 3-4 Times Daily    loratadine (CLARITIN) 10 mg tablet Take 1 tablet (10 mg total)  by mouth once daily.    meloxicam (MOBIC) 15 MG tablet Take 1 tablet (15 mg total) by mouth once daily.    metFORMIN (GLUCOPHAGE) 1000 MG tablet Take 1 tablet (1,000 mg total) by mouth 2 (two) times daily with meals.    methocarbamoL (ROBAXIN) 500 MG Tab Take 1 tablet (500 mg total) by mouth 2 (two) times daily as needed (muscle spasms).    pantoprazole (PROTONIX) 40 MG tablet Take 1 tablet (40 mg total) by mouth once daily.    pravastatin (PRAVACHOL) 80 MG tablet Take 1 tablet (80 mg total) by mouth once daily.    pregabalin (LYRICA) 75 MG capsule Take 1 capsule (75 mg total) by mouth 3 (three) times daily.    rOPINIRole (REQUIP) 0.25 MG tablet Take 1 tablet (0.25 mg total) by mouth every evening.    semaglutide (OZEMPIC) 2 mg/dose (8 mg/3 mL) PnIj Inject 2 mg into the skin every 7 days.    sulfacetamide sodium 10% (BLEPH-10) 10 % ophthalmic solution Place 2 drops into the left eye 4 (four) times daily.    tiZANidine (ZANAFLEX) 4 MG tablet Take 1/2 to 1 tablet by mouth twice daily as needed for muscle spasms. May cause drowsiness.    topiramate (TOPAMAX) 100 MG tablet Take 1 tablet (100 mg total) by mouth 2 (two) times daily.    TRELEGY ELLIPTA 100-62.5-25 mcg DsDv Inhale 1 puff into the lungs once daily.     No current facility-administered medications for this visit.       Review of Systems     GENERAL:  No weight loss, malaise or fevers.  HEENT:   No recent changes in vision or hearing  NECK:  Negative for lumps, no difficulty with swallowing.  RESPIRATORY:  Negative for cough, wheezing or shortness of breath, patient denies any recent URI.  CARDIOVASCULAR:  Negative for chest pain, leg swelling or palpitations.  GI:  Negative for abdominal discomfort, blood in stools or black stools or change in bowel habits.  MUSCULOSKELETAL:  See HPI.  SKIN:  Negative for lesions, rash, and itching.  PSYCH:  No mood disorder or recent psychosocial stressors.  Patients sleep is not disturbed secondary to  pain.  HEMATOLOGY/LYMPHOLOGY:  Negative for prolonged bleeding, bruising easily or swollen nodes.  Patient is not currently taking any anti-coagulants  NEURO:   No history of headaches, syncope, paralysis, seizures or tremors.  All other reviewed and negative other than HPI.    OBJECTIVE:    There were no vitals taken for this visit.  Telemedicine Exam  There were no vitals filed for this visit.    There is no height or weight on file to calculate BMI.   (reviewed on 4/28/2023)     GENERAL: Well appearing, in no acute distress, alert and oriented x3.  Cooperative.  PSYCH:  Mood and affect appropriate.  SKIN: Skin color & texture with no obvious abnormalities.    HEAD/FACE:  Normocephalic, atraumatic.    PULM:  No difficulty breathing. No nasal flaring. No obvious wheezing.  EXTREMITIES: No obvious deformities. Moving all extremities well, appears to have symmetric strength throughout.  MUSCULOSKELETAL: No obvious atrophy abnormalities are noted.   NEURO: No obvious neurologic deficit.   GAIT: sitting.     Physical Exam: last in clinic visit:     Physical Exam    GENERAL: Well appearing, in no acute distress, alert and oriented x3.  PSYCH:  Mood and affect appropriate.  SKIN: Skin color, texture, turgor normal, no rashes or lesions.  HEAD/FACE:  Normocephalic, atraumatic. Cranial nerves grossly intact.    NECK: pain to palpation over the cervical paraspinous muscles. Spurling Negative.  pain with neck flexion, extension, or lateral flexion.   Normal testing biceps, triceps and brachioradialis bilaterally.    NegativeHoffmann's bilaterally.    5/5 strength testing deltoid, biceps, triceps, wrist extensor, wrist flexor and ulnar intrinsics bilaterally.    Normal  strength bilaterally    CV: RRR with palpation of the radial artery.  PULM: No evidence of respiratory difficulty, symmetric chest rise.  GI:  Soft and non-tender.    BACK: Straight leg raising in the sitting and supine positions is positive to radicular  pain. No pain to palpation over the facet joints of the lumbar spine or spinous processes. Normal range of motion without pain reproduction.  EXTREMITIES: Peripheral joint ROM is full and pain free without obvious instability or laxity in all four extremities. No deformities, edema, or skin discoloration. Good capillary refill.  MUSCULOSKELETAL: Able to stand on heels & toes.   hip, and knee provocative maneuvers are negative.   pain with palpation over the sacroiliac joints bilaterally.  FABERs test is negative.  FAER test is negative bilaterally.   Bilateral upper and lower extremity strength is normal and symmetric.  No atrophy or tone abnormalities are noted.  -5/5 strength testing hip flexion, quadriceps, gastrocnemius soleus, anterior tibialis and EHL bilaterally.  RIGHT Lower extremity: Hip flexion 5/5, Hip Abduction 5/5, Hip Adduction 5/5, Knee extension 5/5, Knee flexion 5/5, Ankle dorsiflexion5/5, Extensor hallucis longus 5/5, Ankle plantarflexion 5/5  LEFT Lower extremity:  Hip flexion 5/5, Hip Abduction 5/5,Hip Adduction 5/5, Knee extension 5/5, Knee flexion 5/5, Ankle dorsiflexion 5/5, Extensor hallucis longus 5/5, Ankle plantarflexion 5/5  -Normal testing knee (patellar) jerk and ankle (achilles) jerk    NEURO: Bilateral upper and lower extremity coordination and muscle stretch reflexes are physiologic and symmetric. No loss of sensation is noted.  GAIT: normal.    Imaging:   X-Ray Lumbar Spine Complete 5 View  FINDINGS:  Mild dextroconvex curvature of the lumbar spine.  Grade 1 anterolisthesis of L4 on L5, measuring 4 mm.  Vertebral body heights are maintained without evidence of fracture or aggressive osseous lesion.  Multilevel degenerative anterior marginal osteophyte formation, most pronounced in the visualized lower thoracic spine.  Intervertebral disc space narrowing at L5-S1.  Multilevel degenerative facet arthropathy, most pronounced at L4-5 and L5-S1.    X-Ray Cervical Spine 5 View W Flex  Extxt  FINDINGS:  C7 faintly visualized on neutral lateral view.  There is heterotopic bone formation along the anterior/inferior margin C1-2 articulation.  There is smooth prominence of the overlying prevertebral soft tissues at this level.  Pre dens space appears within upper limits normal.  Vertebral body height and alignment maintained with anterior and posterior marginal spurring most prominently at the C4-5 and C5-6 levels.  Posterior subluxation C4 on C5 noted.  There is uniform loss of disc height at C4-5 and C5-6 levels.  Heterotopic bone and positioning combine to limit evaluation of the left side of C1-2 articulation on the open mouth view.  There is suggestion of slight asymmetry of lateral masses of C1.  Flexion and extension views demonstrate stable positioning without additional evidence subluxation or instability.  No other evidence of fracture or subluxation noted.  Multilevel mild uncovertebral osteophyte formation and minimal/mild neural foraminal stenosis identified.  Surgical staples identified anteriorly in the lower neck at and just to the right of midline.  Remaining osseous structures appear intact.  Partially visualized upper apices appear clear.    Impression  Spondylosis with minimal retro listhesis or posterior subluxation C4 on C5.    Hepatopathy ache bone and degenerative change limits evaluation of the C1-2 articulation and levels particularly on the left.  Consideration should be given for CT if not previously performed to further evaluate these findings.    MRI Lumbar Spine 1/11/22  FINDINGS:  Grade 1 anterolisthesis of L4 on L5. No fracture.  Multiple hemangiomas are seen within the lower thoracic and upper lumbar spine vertebral bodies.  Mild disc height loss at the L1-L2 and L4-L5 levels.  Conus medullaris terminates at the L1 level. Distal spinal cord intensity is normal.  There is no paraspinal soft tissue abnormality.     T12-L1: Minimal diffuse disc bulge.  Mild bilateral  facet arthropathy.  No neural foraminal stenosis.  No spinal canal stenosis.     L1-L2: There is a mild diffuse disc bulge.  Mild bilateral facet arthropathy.  No neural foraminal stenosis.  No spinal canal stenosis.     L2-L3: No disc bulge.  Mild bilateral facet arthropathy.  No neural foraminal stenosis.  No spinal canal stenosis.     L3-L4: Minimal diffuse disc bulge.  Moderate bilateral facet arthropathy.  Mild bilateral neural foraminal stenosis.  No significant spinal canal stenosis.     L4-L5: Mild uncovering of the disc space secondary to listhesis.  There is a diffuse disc bulge.  Severe bilateral facet arthropathy.  Moderate bilateral neural foraminal stenosis.  Mild spinal canal stenosis secondary to listhesis, disc bulge, facet arthropathy, and ligamentum flavum hypertrophy.     L5-S1: Minimal diffuse disc bulge.  Moderate facet arthropathy, greater on the right than left.  Mild-to-moderate bilateral neural foraminal stenosis.  No spinal canal stenosis.    MRI cervical spine 1/11/22  FINDINGS:  There is mild retrolisthesis of C4 on C5. Vertebral bodies demonstrate normal signal intensity on all sequences.  No fracture.  Mild-to-moderate disc height loss and desiccation involves the C4 through C7 disc spaces, most pronounced at the C4-C5 and C5-C6 disc spaces.  The craniocervical junction is normal.  Visualized portions of the posterior fossa appear normal.  Mucosal thickening noted within the sphenoid sinus.  The spinal cord demonstrates normal signal intensity on all sequences. No paraspinal soft tissue abnormality is identified.     C2-C3: Small posterior disc osteophyte complex, best seen on the axial sequence, without spinal canal stenosis.  There is no facet arthropathy.  There is no uncovertebral joint disease.  There is no neuroforaminal stenosis.     C3-C4: No disc bulge.  Severe right facet arthropathy.  No significant uncovertebral arthropathy.  Moderate right neural foraminal stenosis.  No  spinal canal stenosis.     C4-C5: Small posterior disc osteophyte complex.  Mild spinal canal stenosis secondary to retrolisthesis and disc osteophyte complex.  There is also a right lateral disc protrusion which involves the right neural foramen, best appreciated on the T2 sagittal sequence.  Mild bilateral facet arthropathy.  Mild-to-moderate bilateral uncovertebral arthropathy.  Moderate to severe neural foraminal stenosis, greater on the right than left.     C5-C6: Small posterior disc osteophyte complex results in mild spinal canal stenosis.  Severe left facet arthropathy.  Mild-to-moderate bilateral uncovertebral arthropathy.  Mild to moderate bilateral neural foraminal stenosis.     C6-C7: Small posterior disc osteophyte complex results in mild spinal canal stenosis.  No significant facet arthropathy.  No significant uncovertebral arthropathy.  No neural foraminal stenosis.     C7-T1: No disc bulge.  No significant facet arthropathy.  No neural foraminal stenosis.  No spinal canal stenosis.    ASSESSMENT: 63 y.o. year old female with neck and shoulder and back and lower extremity pain, consistent with     1. Lumbar radiculopathy  methocarbamoL (ROBAXIN) 500 MG Tab    IR Epidural Transfor 1st Vert Lumbar Adarsh    Case Request-RAD/Other Procedure Area: Bilateral L5/S1 TF TEETEE RN IV Sedation                PLAN:   - Interventions: Schedule  repeat bilateral L5/S1 TF TEETEE     -s/p bilateral L5/S1 TF TEETEE on 12/30/22 with 100% relief    - Anticoagulation use: no no anticoagulation    - we have discussed considering repeat cervical  epidural steroid injection at a more cephalad level, C6-7, in the future should her upper extremity radicular symptoms exacerbate.      report:  Reviewed and consistent with medication use as prescribed.      - Medications:  -Continue Topamax .  We discussed potential side effects of this medication include drowsiness, dizziness, tingling of the hands and feet, diarrhea, dysgeusia, weight  loss/anorexia.    Topamax  100mg BID   -Continue Lyrica 75 mg BID per Dr. Rey  -- Start Robaxin (methocarbamol) 500mg BID PRN muscle spasms (or can take 1/2 tablet).  she understands this could cause drowsiness.       - Therapy:   We discussed continuing HEP to help manage the patient/s painful condition. The patient was counseled that muscle strengthening will improve the long term prognosis in regards to pain and may also help increase range of motion and mobility.    - Imaging: Reviewed available imaging with patient and answered any questions they had regarding study.  - Follow up visit 4 weeks after injection    The above plan and management options were discussed at length with patient. Patient is in agreement with the above and verbalized understanding.    - I discussed the goals of interventional chronic pain management with the patient on today's visit. We discussed a multimodal and systematic approach to pain.  This includes diagnostic and therapeutic injections, adjuvant pharmacologic treatment, physical therapy, and at times psychiatry.  I emphasized the importance of regular exercise, core strengthening and stretching, diet and weight loss as a cornerstone of long-term pain management.    - This condition does not require this patient to take time off of work, and the primary goal of our Pain Management services is to improve the patient's functional capacity.  - Patient Questions: Answered all of the patient's questions regarding diagnoses, therapy, treatment and next steps      Blanca Redman PA-C  Interventional Pain Management  [facility]    Disclaimer:  This note was prepared using voice recognition system and is likely to have sound alike errors that may have been overlooked even after proof reading.  Please call me with any questions

## 2023-04-28 ENCOUNTER — OFFICE VISIT (OUTPATIENT)
Dept: PAIN MEDICINE | Facility: CLINIC | Age: 63
End: 2023-04-28
Payer: MEDICARE

## 2023-04-28 DIAGNOSIS — M54.16 LUMBAR RADICULOPATHY: Primary | ICD-10-CM

## 2023-04-28 PROCEDURE — 1159F PR MEDICATION LIST DOCUMENTED IN MEDICAL RECORD: ICD-10-PCS | Mod: HCNC,CPTII,95, | Performed by: PHYSICIAN ASSISTANT

## 2023-04-28 PROCEDURE — 99214 PR OFFICE/OUTPT VISIT, EST, LEVL IV, 30-39 MIN: ICD-10-PCS | Mod: HCNC,95,, | Performed by: PHYSICIAN ASSISTANT

## 2023-04-28 PROCEDURE — 3044F HG A1C LEVEL LT 7.0%: CPT | Mod: HCNC,CPTII,95, | Performed by: PHYSICIAN ASSISTANT

## 2023-04-28 PROCEDURE — 3044F PR MOST RECENT HEMOGLOBIN A1C LEVEL <7.0%: ICD-10-PCS | Mod: HCNC,CPTII,95, | Performed by: PHYSICIAN ASSISTANT

## 2023-04-28 PROCEDURE — 99214 OFFICE O/P EST MOD 30 MIN: CPT | Mod: HCNC,95,, | Performed by: PHYSICIAN ASSISTANT

## 2023-04-28 PROCEDURE — 1160F PR REVIEW ALL MEDS BY PRESCRIBER/CLIN PHARMACIST DOCUMENTED: ICD-10-PCS | Mod: HCNC,CPTII,95, | Performed by: PHYSICIAN ASSISTANT

## 2023-04-28 PROCEDURE — 1160F RVW MEDS BY RX/DR IN RCRD: CPT | Mod: HCNC,CPTII,95, | Performed by: PHYSICIAN ASSISTANT

## 2023-04-28 PROCEDURE — 1159F MED LIST DOCD IN RCRD: CPT | Mod: HCNC,CPTII,95, | Performed by: PHYSICIAN ASSISTANT

## 2023-04-28 RX ORDER — METHOCARBAMOL 500 MG/1
500 TABLET, FILM COATED ORAL 2 TIMES DAILY PRN
Qty: 180 TABLET | Refills: 2 | Status: SHIPPED | OUTPATIENT
Start: 2023-04-28

## 2023-04-28 NOTE — Clinical Note
Pain Provider: Humberto Patient Name: Zakia Price MRN: [id number] Case: LUMBAR TFESI Level: L5/S1 Laterality: bilateral Anticoagulation: None  4 week follow up

## 2023-05-02 ENCOUNTER — PATIENT MESSAGE (OUTPATIENT)
Dept: PAIN MEDICINE | Facility: CLINIC | Age: 63
End: 2023-05-02
Payer: MEDICARE

## 2023-05-03 ENCOUNTER — PATIENT MESSAGE (OUTPATIENT)
Dept: PAIN MEDICINE | Facility: CLINIC | Age: 63
End: 2023-05-03
Payer: MEDICARE

## 2023-05-05 ENCOUNTER — PATIENT MESSAGE (OUTPATIENT)
Dept: FAMILY MEDICINE | Facility: CLINIC | Age: 63
End: 2023-05-05

## 2023-05-05 ENCOUNTER — OFFICE VISIT (OUTPATIENT)
Dept: FAMILY MEDICINE | Facility: CLINIC | Age: 63
End: 2023-05-05
Payer: MEDICARE

## 2023-05-05 VITALS
RESPIRATION RATE: 18 BRPM | WEIGHT: 293 LBS | TEMPERATURE: 97 F | DIASTOLIC BLOOD PRESSURE: 82 MMHG | HEART RATE: 81 BPM | BODY MASS INDEX: 44.41 KG/M2 | HEIGHT: 68 IN | SYSTOLIC BLOOD PRESSURE: 129 MMHG

## 2023-05-05 DIAGNOSIS — M46.1 SACROILIITIS: ICD-10-CM

## 2023-05-05 DIAGNOSIS — I15.2 HYPERTENSION ASSOCIATED WITH DIABETES: Primary | Chronic | ICD-10-CM

## 2023-05-05 DIAGNOSIS — E11.59 HYPERTENSION ASSOCIATED WITH DIABETES: Primary | Chronic | ICD-10-CM

## 2023-05-05 DIAGNOSIS — H91.90 HEARING LOSS, UNSPECIFIED HEARING LOSS TYPE, UNSPECIFIED LATERALITY: ICD-10-CM

## 2023-05-05 DIAGNOSIS — M25.512 CHRONIC LEFT SHOULDER PAIN: ICD-10-CM

## 2023-05-05 DIAGNOSIS — G89.29 CHRONIC LEFT SHOULDER PAIN: ICD-10-CM

## 2023-05-05 DIAGNOSIS — I73.9 CLAUDICATION OF BOTH LOWER EXTREMITIES: ICD-10-CM

## 2023-05-05 DIAGNOSIS — Z76.0 MEDICATION REFILL: ICD-10-CM

## 2023-05-05 PROCEDURE — 20610 DRAIN/INJ JOINT/BURSA W/O US: CPT | Mod: HCNC,LT,S$GLB, | Performed by: FAMILY MEDICINE

## 2023-05-05 PROCEDURE — 1101F PR PT FALLS ASSESS DOC 0-1 FALLS W/OUT INJ PAST YR: ICD-10-PCS | Mod: HCNC,CPTII,S$GLB, | Performed by: FAMILY MEDICINE

## 2023-05-05 PROCEDURE — 20610 LARGE JOINT ASPIRATION/INJECTION: L SUBACROMIAL BURSA: ICD-10-PCS | Mod: HCNC,LT,S$GLB, | Performed by: FAMILY MEDICINE

## 2023-05-05 PROCEDURE — 1160F RVW MEDS BY RX/DR IN RCRD: CPT | Mod: HCNC,CPTII,S$GLB, | Performed by: FAMILY MEDICINE

## 2023-05-05 PROCEDURE — 3008F PR BODY MASS INDEX (BMI) DOCUMENTED: ICD-10-PCS | Mod: HCNC,CPTII,S$GLB, | Performed by: FAMILY MEDICINE

## 2023-05-05 PROCEDURE — 3079F DIAST BP 80-89 MM HG: CPT | Mod: HCNC,CPTII,S$GLB, | Performed by: FAMILY MEDICINE

## 2023-05-05 PROCEDURE — 3044F PR MOST RECENT HEMOGLOBIN A1C LEVEL <7.0%: ICD-10-PCS | Mod: HCNC,CPTII,S$GLB, | Performed by: FAMILY MEDICINE

## 2023-05-05 PROCEDURE — 99999 PR PBB SHADOW E&M-EST. PATIENT-LVL V: ICD-10-PCS | Mod: PBBFAC,HCNC,, | Performed by: FAMILY MEDICINE

## 2023-05-05 PROCEDURE — 3044F HG A1C LEVEL LT 7.0%: CPT | Mod: HCNC,CPTII,S$GLB, | Performed by: FAMILY MEDICINE

## 2023-05-05 PROCEDURE — 3079F PR MOST RECENT DIASTOLIC BLOOD PRESSURE 80-89 MM HG: ICD-10-PCS | Mod: HCNC,CPTII,S$GLB, | Performed by: FAMILY MEDICINE

## 2023-05-05 PROCEDURE — 99214 PR OFFICE/OUTPT VISIT, EST, LEVL IV, 30-39 MIN: ICD-10-PCS | Mod: 25,HCNC,S$GLB, | Performed by: FAMILY MEDICINE

## 2023-05-05 PROCEDURE — 3288F PR FALLS RISK ASSESSMENT DOCUMENTED: ICD-10-PCS | Mod: HCNC,CPTII,S$GLB, | Performed by: FAMILY MEDICINE

## 2023-05-05 PROCEDURE — 1101F PT FALLS ASSESS-DOCD LE1/YR: CPT | Mod: HCNC,CPTII,S$GLB, | Performed by: FAMILY MEDICINE

## 2023-05-05 PROCEDURE — 99214 OFFICE O/P EST MOD 30 MIN: CPT | Mod: 25,HCNC,S$GLB, | Performed by: FAMILY MEDICINE

## 2023-05-05 PROCEDURE — 3074F PR MOST RECENT SYSTOLIC BLOOD PRESSURE < 130 MM HG: ICD-10-PCS | Mod: HCNC,CPTII,S$GLB, | Performed by: FAMILY MEDICINE

## 2023-05-05 PROCEDURE — 1159F PR MEDICATION LIST DOCUMENTED IN MEDICAL RECORD: ICD-10-PCS | Mod: HCNC,CPTII,S$GLB, | Performed by: FAMILY MEDICINE

## 2023-05-05 PROCEDURE — 1160F PR REVIEW ALL MEDS BY PRESCRIBER/CLIN PHARMACIST DOCUMENTED: ICD-10-PCS | Mod: HCNC,CPTII,S$GLB, | Performed by: FAMILY MEDICINE

## 2023-05-05 PROCEDURE — 1159F MED LIST DOCD IN RCRD: CPT | Mod: HCNC,CPTII,S$GLB, | Performed by: FAMILY MEDICINE

## 2023-05-05 PROCEDURE — 3288F FALL RISK ASSESSMENT DOCD: CPT | Mod: HCNC,CPTII,S$GLB, | Performed by: FAMILY MEDICINE

## 2023-05-05 PROCEDURE — 3074F SYST BP LT 130 MM HG: CPT | Mod: HCNC,CPTII,S$GLB, | Performed by: FAMILY MEDICINE

## 2023-05-05 PROCEDURE — 99999 PR PBB SHADOW E&M-EST. PATIENT-LVL V: CPT | Mod: PBBFAC,HCNC,, | Performed by: FAMILY MEDICINE

## 2023-05-05 PROCEDURE — 3008F BODY MASS INDEX DOCD: CPT | Mod: HCNC,CPTII,S$GLB, | Performed by: FAMILY MEDICINE

## 2023-05-05 RX ORDER — BETAMETHASONE SODIUM PHOSPHATE AND BETAMETHASONE ACETATE 3; 3 MG/ML; MG/ML
12 INJECTION, SUSPENSION INTRA-ARTICULAR; INTRALESIONAL; INTRAMUSCULAR; SOFT TISSUE
Status: DISCONTINUED | OUTPATIENT
Start: 2023-05-05 | End: 2023-05-05 | Stop reason: HOSPADM

## 2023-05-05 RX ORDER — METFORMIN HYDROCHLORIDE 1000 MG/1
1000 TABLET ORAL
Qty: 90 TABLET | Refills: 4 | Status: SHIPPED | OUTPATIENT
Start: 2023-05-05 | End: 2024-01-17 | Stop reason: SINTOL

## 2023-05-05 RX ADMIN — BETAMETHASONE SODIUM PHOSPHATE AND BETAMETHASONE ACETATE 12 MG: 3; 3 INJECTION, SUSPENSION INTRA-ARTICULAR; INTRALESIONAL; INTRAMUSCULAR; SOFT TISSUE at 06:05

## 2023-05-05 RX ADMIN — BETAMETHASONE SODIUM PHOSPHATE AND BETAMETHASONE ACETATE 12 MG: 3; 3 INJECTION, SUSPENSION INTRA-ARTICULAR; INTRALESIONAL; INTRAMUSCULAR; SOFT TISSUE at 04:05

## 2023-05-05 NOTE — ASSESSMENT & PLAN NOTE
Reduce metformin to once daily.  Counseled on importance of hypertension disease course, I recommend ongoing Education for DASH-diet and exercise.  Counseled on medication regimen importance of treating high blood pressure.  Please be advised of risk of untreated blood pressure as discussed.  Please advised of ER precautions were given for symptoms of hypertensive urgency and emergency.

## 2023-05-05 NOTE — PROGRESS NOTES
PLAN:      Problem List Items Addressed This Visit       Hypertension associated with diabetes - Primary (Chronic)     Reduce metformin to once daily.  Counseled on importance of hypertension disease course, I recommend ongoing Education for DASH-diet and exercise.  Counseled on medication regimen importance of treating high blood pressure.  Please be advised of risk of untreated blood pressure as discussed.  Please advised of ER precautions were given for symptoms of hypertensive urgency and emergency.           Relevant Medications    metFORMIN (GLUCOPHAGE) 1000 MG tablet    Chronic left shoulder pain (Chronic)     Proceed with corticosteroid injection into the left shoulder joint.  Patient tolerated well.  See procedure note.  Follow-up sooner if no improvement.           Relevant Medications    betamethasone acetate-betamethasone sodium phosphate injection 12 mg    Other Relevant Orders    Large Joint Aspiration/Injection: L subacromial bursa (Completed)    Claudication of both lower extremities (Chronic)     Follow-up with Cardiology and Neurology.           Sacroiliitis (Chronic)     Follow-up with pain management.           Hearing loss (Chronic)     Referral to audiology for further evaluation treatment.  Avoid loud noises.           Relevant Orders    Ambulatory referral/consult to Audiology    Medication refill     Complete history and physical was completed today.  Complete and thorough medication reconciliation was performed.  Discussed risks and benefits of medications.  Advised patient on orders and health maintenance.  We discussed old records and old labs if available.  Will request any records not available through epic.  Continue current medications listed on your summary sheet.             Relevant Medications    metFORMIN (GLUCOPHAGE) 1000 MG tablet     Future Appointments       Date Provider Specialty Appt Notes    6/14/2023  Lab TY    6/16/2023 Sol Mckeon NP Hematology and Oncology 3mo  fu///labs prior///hm    2023 Brandie Rey DPM Podiatry nail care           Medication Management for assessment above:   Medication List with Changes/Refills   Current Medications    DILTIAZEM (CARDIZEM) 30 MG TABLET    Take 1 tablet (30 mg total) by mouth every 12 (twelve) hours.    EPINEPHRINE (EPIPEN) 0.3 MG/0.3 ML ATIN    Inject into the muscle.    FAMOTIDINE (PEPCID) 40 MG TABLET    Take 40 mg by mouth once daily.    FERROUS SULFATE 324 MG (65 MG IRON) TBEC    Take 1 tablet (324 mg total) by mouth once daily.    FLUTICASONE PROPIONATE (FLONASE) 50 MCG/ACTUATION NASAL SPRAY    Use 2 sprays to each nostril daily    INHALATION SPACING DEVICE (BREATHERITE VALVED MDI CHAMBER)    Use as directed for inhalation.    LEVOTHYROXINE (EUTHYROX) 100 MCG TABLET    Take 1 tablet (100 mcg total) by mouth once daily.    LIDOCAINE-PRILOCAINE (EMLA) CREAM    SMARTSI-2 Pump Topical 3-4 Times Daily    LORATADINE (CLARITIN) 10 MG TABLET    Take 1 tablet (10 mg total) by mouth once daily.    MELOXICAM (MOBIC) 15 MG TABLET    Take 1 tablet (15 mg total) by mouth once daily.    METHOCARBAMOL (ROBAXIN) 500 MG TAB    Take 1 tablet (500 mg total) by mouth 2 (two) times daily as needed (muscle spasms).    PANTOPRAZOLE (PROTONIX) 40 MG TABLET    Take 1 tablet (40 mg total) by mouth once daily.    PRAVASTATIN (PRAVACHOL) 80 MG TABLET    Take 1 tablet (80 mg total) by mouth once daily.    PREGABALIN (LYRICA) 75 MG CAPSULE    Take 1 capsule (75 mg total) by mouth 3 (three) times daily.    ROPINIROLE (REQUIP) 0.25 MG TABLET    Take 1 tablet (0.25 mg total) by mouth every evening.    SEMAGLUTIDE (OZEMPIC) 2 MG/DOSE (8 MG/3 ML) PNIJ    Inject 2 mg into the skin every 7 days.    SULFACETAMIDE SODIUM 10% (BLEPH-10) 10 % OPHTHALMIC SOLUTION    Place 2 drops into the left eye 4 (four) times daily.    TIZANIDINE (ZANAFLEX) 4 MG TABLET    Take 1/2 to 1 tablet by mouth twice daily as needed for muscle spasms. May cause drowsiness.     TOPIRAMATE (TOPAMAX) 100 MG TABLET    Take 1 tablet (100 mg total) by mouth 2 (two) times daily.    TRELEGY ELLIPTA 100-62.5-25 MCG DSDV    Inhale 1 puff into the lungs once daily.   Changed and/or Refilled Medications    Modified Medication Previous Medication    METFORMIN (GLUCOPHAGE) 1000 MG TABLET metFORMIN (GLUCOPHAGE) 1000 MG tablet       Take 1 tablet (1,000 mg total) by mouth daily with breakfast.    Take 1 tablet (1,000 mg total) by mouth 2 (two) times daily with meals.       Oleksandr Nagel M.D.  ==========================================================================  Subjective:   Patient ID: Zakia Price is a 63 y.o. female.  has a past medical history of Acute respiratory failure due to COVID-19, COVID-19, Diabetes mellitus, type 2, Diabetic neuropathy (1/27/2014), DJD (degenerative joint disease) of knee, DVT (deep venous thrombosis) (around 1990's), Fatty liver, GERD (gastroesophageal reflux disease), Hypertension associated with diabetes, HECTOR (iron deficiency anemia) (5/13/2021), Multinodular goiter, Obesity, morbid, BMI 50 or higher, and Sleep apnea.   Chief Complaint: Shoulder Pain      Problem List Items Addressed This Visit       Hypertension associated with diabetes - Primary (Chronic)    Overview     CHRONIC.  May 2023:  A1c currently 4.9.  Patient denies any symptoms of low blood sugar.  She is continuing to take metformin twice daily.  Blood pressure is well controlled on current regimen.  Hypertension Medications               diltiaZEM (CARDIZEM) 30 MG tablet Take 1 tablet (30 mg total) by mouth every 12 (twelve) hours.     February 2022:  Blood pressure remains less than 140/90.  January 2022:  Blood pressure well controlled actually on the lower end of normal today.  Patient is on diltiazem 120 milligrams daily.  She does report some constipation that is being treated with Linzess.  July 2021:  Blood pressure well controlled today.  Previous HPI  Patient recently got a new  blood pressure monitor at home through [employer] however the cuff does not fit around her arm.. BP Reviewed.  Compliant with BP medications. No SE reported.  Patient taking amlodipine 5 mg.  (-) CP, SOB, palpitations, dizziness, lightheadedness, HA, arm numbness, tingling or weakness, syncope.  Creatinine   Date Value Ref Range Status   02/24/2022 0.8 0.5 - 1.4 mg/dL Final   History of hypertension currently on amlodipine with previous reported allergy to ACE inhibitor    Last Assessment & Plan:   Blood pressure stable on current medicine regimen. Continue current medicine regimen and follow low salt diet.  Results for orders placed or performed during the hospital encounter of 10/15/21   EKG 12-lead    Collection Time: 10/15/21  8:04 AM    Narrative    Test Reason : E11.42,E11.59,I15.2,E78.5,R01.1,    Vent. Rate : 072 BPM     Atrial Rate : 072 BPM     P-R Int : 142 ms          QRS Dur : 080 ms      QT Int : 404 ms       P-R-T Axes : 062 016 028 degrees     QTc Int : 442 ms    Normal sinus rhythm  Nonspecific T wave abnormality  Abnormal ECG  When compared with ECG of 26-JAN-2021 14:40,  No significant change was found  Confirmed by Shannon Wilson MD ([id number]) on 10/16/2021 10:13:48 PM    Referred By: RIK WEAVER           Confirmed By:Shannon Wilson MD            Current Assessment & Plan     Reduce metformin to once daily.  Counseled on importance of hypertension disease course, I recommend ongoing Education for DASH-diet and exercise.  Counseled on medication regimen importance of treating high blood pressure.  Please be advised of risk of untreated blood pressure as discussed.  Please advised of ER precautions were given for symptoms of hypertensive urgency and emergency.           Chronic left shoulder pain (Chronic)    Overview     Chronic.  May 2023:  Patient is here today to have steroid injection into the left shoulder.  Patient understands all risk and benefits of corticosteroid injection  into the joint.  January 2023: Patient reports still having left shoulder pain.  She has not taking any medication for this.  Patient thinks that she may have had an injury in the remote past.  She has been to physical therapy which did not help.    Previous history:  Uncontrolled.  Patient is not currently taking any medications for this.  Denies any previous imaging.  Denies any trauma or injury.  X-Ray Shoulder 2 or More Views Left  Narrative: EXAMINATION:  XR SHOULDER COMPLETE 2 OR MORE VIEWS LEFT    CLINICAL HISTORY:  Pain in left shoulder    TECHNIQUE:  Two or three views of the left shoulder were performed.    COMPARISON:  November 2016    FINDINGS:  Osteopenia.  Moderate degenerative changes at the AC and glenohumeral joints with spur formation.  No fracture or dislocation.  Chronic pleuroparenchymal changes in the visualized left mid to lower lung zone.  Impression: As above    Electronically signed by: Angel Robles MD  Date:    04/20/2022  Time:    11:25             Current Assessment & Plan     Proceed with corticosteroid injection into the left shoulder joint.  Patient tolerated well.  See procedure note.  Follow-up sooner if no improvement.           Claudication of both lower extremities (Chronic)    Overview     Chronic.  Intermittent controlled.  Patient follows with Cardiology.           Current Assessment & Plan     Follow-up with Cardiology and Neurology.           Sacroiliitis (Chronic)    Overview     Chronic.  Intermittent control.  Patient is taking tizanidine and meloxicam along with occasionally Robaxin, pregabalin.           Current Assessment & Plan     Follow-up with pain management.           Hearing loss (Chronic)    Overview     Chronic.  Worsening.  Patient states it has been a long time since she had hearing test and would like to be referred to audiology.  Reports decreased hearing bilaterally.           Current Assessment & Plan     Referral to audiology for further evaluation  treatment.  Avoid loud noises.           Medication refill    Overview     CHRONIC. Stable. Compliant with medications for managed conditions. See medication list. No SE reported.   Routine lab analysis is being monitored. Refills were addressed.  Lab Results   Component Value Date    WBC 4.42 03/13/2023    HGB 12.5 03/13/2023    HCT 39.8 03/13/2023    MCV 92 03/13/2023     03/13/2023       Chemistry        Component Value Date/Time     02/24/2022 1255    K 3.5 02/24/2022 1255     (H) 02/24/2022 1255    CO2 20 (L) 02/24/2022 1255    BUN 13 02/24/2022 1255    CREATININE 0.8 02/24/2022 1255    GLU 93 02/24/2022 1255        Component Value Date/Time    CALCIUM 9.6 02/24/2022 1255    ALKPHOS 75 02/24/2022 1255    AST 14 02/24/2022 1255    ALT 12 02/24/2022 1255    BILITOT 0.3 02/24/2022 1255    ESTGFRAFRICA >60.0 02/24/2022 1255    EGFRNONAA >60.0 02/24/2022 1255          Lab Results   Component Value Date    TSH 0.902 01/13/2023    FREET4 1.06 12/04/2019    T3FREE 2.7 12/04/2019            Current Assessment & Plan     Complete history and physical was completed today.  Complete and thorough medication reconciliation was performed.  Discussed risks and benefits of medications.  Advised patient on orders and health maintenance.  We discussed old records and old labs if available.  Will request any records not available through epic.  Continue current medications listed on your summary sheet.                 Review of patient's allergies indicates:   Allergen Reactions    Codeine sulfate      Nausea^    Lisinopril Swelling     angioedema    Codeine Nausea Only and Rash     Current Outpatient Medications   Medication Instructions    diltiaZEM (CARDIZEM) 30 mg, Oral, Every 12 hours    EPINEPHrine (EPIPEN) 0.3 mg/0.3 mL AtIn Intramuscular    famotidine (PEPCID) 40 mg, Oral, Daily    ferrous sulfate 324 mg, Oral, Daily    fluticasone propionate (FLONASE) 50 mcg/actuation nasal spray Use 2 sprays to each  nostril daily    inhalation spacing device (BREATHERITE VALVED MDI CHAMBER) Use as directed for inhalation.    levothyroxine (EUTHYROX) 100 mcg, Oral, Daily    LIDOcaine-prilocaine (EMLA) cream SMARTSI-2 Pump Topical 3-4 Times Daily    loratadine (CLARITIN) 10 mg, Oral, Daily    meloxicam (MOBIC) 15 mg, Oral, Daily    metFORMIN (GLUCOPHAGE) 1,000 mg, Oral, With breakfast    methocarbamoL (ROBAXIN) 500 mg, Oral, 2 times daily PRN    OZEMPIC 2 mg, Subcutaneous, Every 7 days    pantoprazole (PROTONIX) 40 mg, Oral, Daily    pravastatin (PRAVACHOL) 80 mg, Oral, Daily    pregabalin (LYRICA) 75 mg, Oral, 3 times daily    rOPINIRole (REQUIP) 0.25 mg, Oral, Nightly    sulfacetamide sodium 10% (BLEPH-10) 10 % ophthalmic solution 2 drops, Left Eye, 4 times daily    tiZANidine (ZANAFLEX) 4 MG tablet Take 1/2 to 1 tablet by mouth twice daily as needed for muscle spasms. May cause drowsiness.    topiramate (TOPAMAX) 100 mg, Oral, 2 times daily    TRELEGY ELLIPTA 100-62.5-25 mcg DsDv 1 puff, Inhalation, Daily      I have reviewed the PMH, social history, FamilyHx, surgical history, allergies and medications documented / confirmed by the patient at the time of this visit.  Review of Systems   Constitutional:  Negative for chills, fatigue, fever and unexpected weight change.   HENT:  Negative for ear pain and sore throat.    Eyes:  Negative for redness and visual disturbance.   Respiratory:  Negative for cough and shortness of breath.    Cardiovascular:  Negative for chest pain and palpitations.   Gastrointestinal:  Negative for abdominal pain, nausea and vomiting.   Genitourinary:  Negative for difficulty urinating and hematuria.   Musculoskeletal:  Positive for arthralgias and back pain. Negative for myalgias.   Skin:  Negative for rash and wound.   Neurological:  Positive for numbness. Negative for weakness and headaches.   Psychiatric/Behavioral:  Positive for sleep disturbance. The patient is not nervous/anxious.   "  Objective:   /82 (BP Location: Left arm, Patient Position: Sitting, BP Method: Large (Automatic))   Pulse 81   Temp 97.1 °F (36.2 °C)   Resp 18   Ht 5' 8" (1.727 m)   Wt (!) 136.5 kg (301 lb)   BMI 45.77 kg/m²   Physical Exam  Vitals and nursing note reviewed.   Constitutional:       General: She is not in acute distress.     Appearance: She is well-developed. She is obese. She is not ill-appearing, toxic-appearing or diaphoretic.   HENT:      Head: Normocephalic and atraumatic.      Right Ear: Hearing and external ear normal.      Left Ear: Hearing and external ear normal.      Nose: Nose normal. No rhinorrhea.   Eyes:      General: Lids are normal.      Extraocular Movements: Extraocular movements intact.      Conjunctiva/sclera: Conjunctivae normal.      Pupils: Pupils are equal, round, and reactive to light.   Cardiovascular:      Rate and Rhythm: Normal rate.      Pulses: Normal pulses.   Pulmonary:      Effort: Pulmonary effort is normal. No respiratory distress.      Breath sounds: Normal breath sounds.   Abdominal:      General: Bowel sounds are normal.      Palpations: Abdomen is soft.   Musculoskeletal:         General: Tenderness and deformity present. Normal range of motion.      Cervical back: Normal range of motion and neck supple. Bony tenderness present. No tenderness. Pain with movement present.      Lumbar back: Spasms and tenderness present. No bony tenderness. Negative right straight leg raise test and negative left straight leg raise test.      Right lower leg: Tenderness present. No swelling, deformity, lacerations or bony tenderness. No edema.      Left lower leg: No swelling, deformity, lacerations, tenderness or bony tenderness. No edema.   Skin:     General: Skin is warm and dry.      Capillary Refill: Capillary refill takes less than 2 seconds.      Coloration: Skin is not pale.   Neurological:      General: No focal deficit present.      Mental Status: She is alert and " oriented to person, place, and time. Mental status is at baseline. She is not disoriented.      Cranial Nerves: No cranial nerve deficit.      Motor: No weakness.      Gait: Gait normal.   Psychiatric:         Attention and Perception: She is attentive.         Mood and Affect: Mood normal. Mood is not anxious or depressed.         Speech: Speech is not rapid and pressured or slurred.         Behavior: Behavior normal. Behavior is not agitated, aggressive or hyperactive. Behavior is cooperative.         Thought Content: Thought content normal. Thought content is not paranoid or delusional. Thought content does not include homicidal or suicidal ideation. Thought content does not include homicidal or suicidal plan.         Cognition and Memory: Memory is not impaired.         Judgment: Judgment normal.   + Nail deformity   + left shoulder with positive Todd      Assessment:     1. Hypertension associated with diabetes    2. Medication refill    3. Chronic left shoulder pain    4. Hearing loss, unspecified hearing loss type, unspecified laterality    5. Sacroiliitis    6. Claudication of both lower extremities      MDM:   Moderate complexity.  Moderate risk.  Total time: 35 minutes.  This includes total time spent on the encounter, which includes face to face time and non-face to face time preparing to see the patient (eg, review of previous medical records, tests), Obtaining and/or reviewing separately obtained history, documenting clinical information in the electronic or other health record, independently interpreting results (not separately reported)/communicating results to the patient/family/caregiver, and/or care coordination (not separately reported).    I have Reviewed and summarized old records.  I have performed thorough medication reconciliation today and discussed risk and benefits of medications.  I have reviewed x-ray, labs and discussed with patient.  All questions were answered.  I am requesting old  records and will review them once they are available.    I have signed for the following orders AND/OR meds.  Orders Placed This Encounter   Procedures    Large Joint Aspiration/Injection: L subacromial bursa     This order was created via procedure documentation    Ambulatory referral/consult to Audiology     Standing Status:   Future     Standing Expiration Date:   6/5/2024     Referral Priority:   Routine     Referral Type:   Audiology Exam     Referral Reason:   Specialty Services Required     Requested Specialty:   Audiology     Number of Visits Requested:   1     Medications Ordered This Encounter   Medications    betamethasone acetate-betamethasone sodium phosphate injection 12 mg    metFORMIN (GLUCOPHAGE) 1000 MG tablet     Sig: Take 1 tablet (1,000 mg total) by mouth daily with breakfast.     Dispense:  90 tablet     Refill:  4        Follow up in about 6 months (around 11/5/2023), or if symptoms worsen or fail to improve, for Med refills.    If no improvement in symptoms or symptoms worsen, advised to call/follow-up at clinic or go to ER. Patient voiced understanding and all questions/concerns were addressed.   DISCLAIMER: This note was compiled by using a speech recognition dictation system and therefore please be aware that typographical / speech recognition errors can and do occur.  Please contact me if you see any errors specifically.    Oleksandr Nagel M.D.       Office: [phone] [address] [place] [address]  FAX: [phone]

## 2023-05-05 NOTE — PROCEDURES
Large Joint Aspiration/Injection: L subacromial bursa    Date/Time: 5/5/2023 4:00 PM  Performed by: Oleksandr Nagel MD  Authorized by: Oleksandr Nagel MD     Consent Done?:  Yes (Verbal)  Indications:  Arthritis  Site marked: the procedure site was marked    Timeout: prior to procedure the correct patient, procedure, and site was verified    Prep: patient was prepped and draped in usual sterile fashion      Details:  Needle Size:  25 G  Ultrasonic Guidance for needle placement?: No    Approach:  Posterior  Location:  Shoulder  Site:  L subacromial bursa  Medications:  12 mg betamethasone acetate-betamethasone sodium phosphate 6 mg/mL  Patient tolerance:  Patient tolerated the procedure well with no immediate complications

## 2023-05-05 NOTE — ASSESSMENT & PLAN NOTE
Proceed with corticosteroid injection into the left shoulder joint.  Patient tolerated well.  See procedure note.  Follow-up sooner if no improvement.

## 2023-05-05 NOTE — PATIENT INSTRUCTIONS
Follow up in about 6 months (around 11/5/2023), or if symptoms worsen or fail to improve, for Med refills.     Dear patient,   As a result of recent federal legislation (The Federal Cures Act), you may receive lab or pathology results from your visit in your [facility] account before your physician is able to contact you. Your physician or their representative will relay the results to you with their recommendations at their soonest availability.     If no improvement in symptoms or symptoms worsen, please be advised to call MD, follow-up at clinic and/or go to ER if becomes severe.    Oleksandr Nagel M.D.        We Offer TELEHEALTH & Same Day Appointments!   Book your Telehealth appointment with me through my nurse or   Clinic appointments on [employer]!    [address] [place] [address]    Office: [phone]   FAX: [phone]    Check out my Facebook Page and Follow Me at: [email or web address]    Check out my website at [employer] by clicking on: [email or web address]    To Schedule appointments online, go to [employer]: [email or web address]

## 2023-05-15 ENCOUNTER — TELEPHONE (OUTPATIENT)
Dept: FAMILY MEDICINE | Facility: CLINIC | Age: 63
End: 2023-05-15
Payer: MEDICARE

## 2023-05-15 RX ORDER — ALBUTEROL SULFATE 90 UG/1
AEROSOL, METERED RESPIRATORY (INHALATION)
Refills: 0 | OUTPATIENT
Start: 2023-05-15

## 2023-05-15 NOTE — TELEPHONE ENCOUNTER
Refill Decision Note   Zakia Price  is requesting a refill authorization.  Brief Assessment and Rationale for Refill:  Quick Discontinue     Medication Therapy Plan:    Pharmacy is requesting new scripts for the following medications without required information, (sig/ frequency/qty/etc)      Medication Reconciliation Completed: No     Comments: Pharmacies have been requesting medications for patients without required information, (sig, frequency, qty, etc.). In addition, requests are sent for medication(s) pt. are currently not taking, and medications patients have never taken.    We have spoken to the pharmacies about these request types and advised their teams previously that we are unable to assess these New Script requests and require all details for these requests. This is a known issue and has been reported.     Note composed:5:51 PM 05/15/2023

## 2023-05-15 NOTE — TELEPHONE ENCOUNTER
Care Due:                  Date            Visit Type   Department     Provider  --------------------------------------------------------------------------------                                EP -                              PRIMARY      [facility] FAMILY  Last Visit: 05-      CARE ([facility])   MEDICINE       Oleksandr Nagel  Next Visit: None Scheduled  None         None Found                                                            Last  Test          Frequency    Reason                     Performed    Due Date  --------------------------------------------------------------------------------    CMP.........  12 months..  metFORMIN, pravastatin...  02- 02-    Lipid Panel.  12 months..  pravastatin..............  02- 02-    [facility] Embedded Care Due Messages. Reference number: [id number].   5/15/2023 5:02:07 PM CDT

## 2023-05-15 NOTE — TELEPHONE ENCOUNTER
----- Message from Christina Story sent at 5/15/2023  4:53 PM CDT -----  Contact: [phone]  Type:  RX Refill Request    Who Called: Zakia  Refill or New Rx: Refill  RX Name and Strength: Albuterol Inhaler  How is the patient currently taking it? (ex. 1XDay): Prn  Is this a 30 day or 90 day RX:   Preferred Pharmacy with phone number:  [facility] - [facility] [address] [facility]  [address] [facility] [address]  Phone: [phone] Fax: [phone]  Local or Mail Order: Mail  Ordering Provider: Dr. Nagel  Would the patient rather a call back or a response via [facility]? Call  Best Call Back Number: [phone]  Additional Information:

## 2023-05-18 ENCOUNTER — CLINICAL SUPPORT (OUTPATIENT)
Dept: AUDIOLOGY | Facility: CLINIC | Age: 63
End: 2023-05-18
Payer: MEDICARE

## 2023-05-18 DIAGNOSIS — H91.90 HEARING LOSS, UNSPECIFIED HEARING LOSS TYPE, UNSPECIFIED LATERALITY: ICD-10-CM

## 2023-05-18 DIAGNOSIS — H90.3 SENSORINEURAL HEARING LOSS, BILATERAL: Primary | ICD-10-CM

## 2023-05-18 PROCEDURE — 99999 PR PBB SHADOW E&M-EST. PATIENT-LVL I: CPT | Mod: PBBFAC,,, | Performed by: AUDIOLOGIST-HEARING AID FITTER

## 2023-05-18 PROCEDURE — 99211 OFF/OP EST MAY X REQ PHY/QHP: CPT | Performed by: AUDIOLOGIST-HEARING AID FITTER

## 2023-05-18 PROCEDURE — 92557 PR COMPREHENSIVE HEARING TEST: ICD-10-PCS | Mod: ,,, | Performed by: AUDIOLOGIST-HEARING AID FITTER

## 2023-05-18 PROCEDURE — 92557 COMPREHENSIVE HEARING TEST: CPT | Mod: ,,, | Performed by: AUDIOLOGIST-HEARING AID FITTER

## 2023-05-18 PROCEDURE — 99999 PR PBB SHADOW E&M-EST. PATIENT-LVL I: ICD-10-PCS | Mod: PBBFAC,,, | Performed by: AUDIOLOGIST-HEARING AID FITTER

## 2023-05-18 PROCEDURE — 92567 PR TYMPA2METRY: ICD-10-PCS | Mod: ,,, | Performed by: AUDIOLOGIST-HEARING AID FITTER

## 2023-05-18 PROCEDURE — 92567 TYMPANOMETRY: CPT | Mod: ,,, | Performed by: AUDIOLOGIST-HEARING AID FITTER

## 2023-05-18 NOTE — PROGRESS NOTES
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  Patient complains of hearing loss starting many years ago and gradually declining. She reports an equally sided hearing. No tinnitus. No dizziness. No otalgia, aural fullness, pressure or drainage from hears. No history of recurrent ear infections or ear surgery. No family history of hearing loss. No excessive noise history. Patient has history of diabetes type 2.     Results reveal a moderate-to-severe sensorineural hearing loss 250-8000 Hz for the right ear, and a mild-to-moderate sensorineural hearing loss 250-8000 Hz for the left ear.   Speech Reception Thresholds were 35 dBHL for the right ear and 35 dBHL for the left ear.   Word recognition scores were good for the right ear and good for the left ear.   Tympanograms were Type A for the right ear and Type As for the left ear.    Patient was counseled on the above findings.    Recommendations:  Annual audiogram to monitor hearing loss.  Hearing aids, binaural. Patient is provided a copy of her audiogram and is notified about [employer].

## 2023-05-23 ENCOUNTER — OFFICE VISIT (OUTPATIENT)
Dept: FAMILY MEDICINE | Facility: CLINIC | Age: 63
End: 2023-05-23
Payer: MEDICARE

## 2023-05-23 ENCOUNTER — LAB VISIT (OUTPATIENT)
Dept: LAB | Facility: HOSPITAL | Age: 63
End: 2023-05-23
Attending: NURSE PRACTITIONER
Payer: MEDICARE

## 2023-05-23 VITALS
WEIGHT: 293 LBS | DIASTOLIC BLOOD PRESSURE: 55 MMHG | HEART RATE: 71 BPM | SYSTOLIC BLOOD PRESSURE: 100 MMHG | HEIGHT: 68 IN | BODY MASS INDEX: 44.41 KG/M2 | TEMPERATURE: 97 F

## 2023-05-23 DIAGNOSIS — E78.2 COMBINED HYPERLIPIDEMIA ASSOCIATED WITH TYPE 2 DIABETES MELLITUS: ICD-10-CM

## 2023-05-23 DIAGNOSIS — E03.9 HYPOTHYROIDISM, UNSPECIFIED TYPE: ICD-10-CM

## 2023-05-23 DIAGNOSIS — Z00.00 ANNUAL PHYSICAL EXAM: ICD-10-CM

## 2023-05-23 DIAGNOSIS — E04.2 MULTINODULAR GOITER: ICD-10-CM

## 2023-05-23 DIAGNOSIS — M46.1 SACROILIITIS: Chronic | ICD-10-CM

## 2023-05-23 DIAGNOSIS — D50.0 IRON DEFICIENCY ANEMIA DUE TO CHRONIC BLOOD LOSS: ICD-10-CM

## 2023-05-23 DIAGNOSIS — E11.69 COMBINED HYPERLIPIDEMIA ASSOCIATED WITH TYPE 2 DIABETES MELLITUS: ICD-10-CM

## 2023-05-23 DIAGNOSIS — E11.59 HYPERTENSION ASSOCIATED WITH DIABETES: Chronic | ICD-10-CM

## 2023-05-23 DIAGNOSIS — E11.49 TYPE II DIABETES MELLITUS WITH NEUROLOGICAL MANIFESTATIONS: Chronic | ICD-10-CM

## 2023-05-23 DIAGNOSIS — Z00.00 ANNUAL PHYSICAL EXAM: Primary | ICD-10-CM

## 2023-05-23 DIAGNOSIS — I15.2 HYPERTENSION ASSOCIATED WITH DIABETES: Chronic | ICD-10-CM

## 2023-05-23 DIAGNOSIS — K21.00 GASTROESOPHAGEAL REFLUX DISEASE WITH ESOPHAGITIS WITHOUT HEMORRHAGE: Chronic | ICD-10-CM

## 2023-05-23 DIAGNOSIS — R01.1 SYSTOLIC MURMUR: ICD-10-CM

## 2023-05-23 LAB
ALBUMIN SERPL BCP-MCNC: 3.7 G/DL (ref 3.5–5.2)
ALBUMIN/CREAT UR: 3.6 UG/MG (ref 0–30)
ALP SERPL-CCNC: 74 U/L (ref 55–135)
ALT SERPL W/O P-5'-P-CCNC: 9 U/L (ref 10–44)
ANION GAP SERPL CALC-SCNC: 11 MMOL/L (ref 8–16)
AST SERPL-CCNC: 11 U/L (ref 10–40)
BILIRUB SERPL-MCNC: 0.3 MG/DL (ref 0.1–1)
BUN SERPL-MCNC: 18 MG/DL (ref 8–23)
CALCIUM SERPL-MCNC: 9.5 MG/DL (ref 8.7–10.5)
CHLORIDE SERPL-SCNC: 110 MMOL/L (ref 95–110)
CHOLEST SERPL-MCNC: 202 MG/DL (ref 120–199)
CHOLEST/HDLC SERPL: 3.5 {RATIO} (ref 2–5)
CO2 SERPL-SCNC: 21 MMOL/L (ref 23–29)
CREAT SERPL-MCNC: 0.9 MG/DL (ref 0.5–1.4)
CREAT UR-MCNC: 167 MG/DL (ref 15–325)
EST. GFR  (NO RACE VARIABLE): >60 ML/MIN/1.73 M^2
GLUCOSE SERPL-MCNC: 74 MG/DL (ref 70–110)
HDLC SERPL-MCNC: 57 MG/DL (ref 40–75)
HDLC SERPL: 28.2 % (ref 20–50)
LDLC SERPL CALC-MCNC: 129.8 MG/DL (ref 63–159)
MICROALBUMIN UR DL<=1MG/L-MCNC: 6 UG/ML
NONHDLC SERPL-MCNC: 145 MG/DL
POTASSIUM SERPL-SCNC: 4.2 MMOL/L (ref 3.5–5.1)
PROT SERPL-MCNC: 7.1 G/DL (ref 6–8.4)
SODIUM SERPL-SCNC: 142 MMOL/L (ref 136–145)
TRIGL SERPL-MCNC: 76 MG/DL (ref 30–150)

## 2023-05-23 PROCEDURE — 1160F RVW MEDS BY RX/DR IN RCRD: CPT | Mod: CPTII,S$GLB,, | Performed by: NURSE PRACTITIONER

## 2023-05-23 PROCEDURE — 80053 COMPREHEN METABOLIC PANEL: CPT | Performed by: NURSE PRACTITIONER

## 2023-05-23 PROCEDURE — 1159F MED LIST DOCD IN RCRD: CPT | Mod: CPTII,S$GLB,, | Performed by: NURSE PRACTITIONER

## 2023-05-23 PROCEDURE — 3008F PR BODY MASS INDEX (BMI) DOCUMENTED: ICD-10-PCS | Mod: CPTII,S$GLB,, | Performed by: NURSE PRACTITIONER

## 2023-05-23 PROCEDURE — 1160F PR REVIEW ALL MEDS BY PRESCRIBER/CLIN PHARMACIST DOCUMENTED: ICD-10-PCS | Mod: CPTII,S$GLB,, | Performed by: NURSE PRACTITIONER

## 2023-05-23 PROCEDURE — 82570 ASSAY OF URINE CREATININE: CPT | Performed by: NURSE PRACTITIONER

## 2023-05-23 PROCEDURE — 3078F DIAST BP <80 MM HG: CPT | Mod: CPTII,S$GLB,, | Performed by: NURSE PRACTITIONER

## 2023-05-23 PROCEDURE — 80061 LIPID PANEL: CPT | Performed by: NURSE PRACTITIONER

## 2023-05-23 PROCEDURE — 36415 COLL VENOUS BLD VENIPUNCTURE: CPT | Mod: PO | Performed by: NURSE PRACTITIONER

## 2023-05-23 PROCEDURE — 1159F PR MEDICATION LIST DOCUMENTED IN MEDICAL RECORD: ICD-10-PCS | Mod: CPTII,S$GLB,, | Performed by: NURSE PRACTITIONER

## 2023-05-23 PROCEDURE — 3074F SYST BP LT 130 MM HG: CPT | Mod: CPTII,S$GLB,, | Performed by: NURSE PRACTITIONER

## 2023-05-23 PROCEDURE — 99999 PR PBB SHADOW E&M-EST. PATIENT-LVL IV: ICD-10-PCS | Mod: PBBFAC,,, | Performed by: NURSE PRACTITIONER

## 2023-05-23 PROCEDURE — 99397 PER PM REEVAL EST PAT 65+ YR: CPT | Mod: S$GLB,,, | Performed by: NURSE PRACTITIONER

## 2023-05-23 PROCEDURE — 99999 PR PBB SHADOW E&M-EST. PATIENT-LVL IV: CPT | Mod: PBBFAC,,, | Performed by: NURSE PRACTITIONER

## 2023-05-23 PROCEDURE — 3044F PR MOST RECENT HEMOGLOBIN A1C LEVEL <7.0%: ICD-10-PCS | Mod: CPTII,S$GLB,, | Performed by: NURSE PRACTITIONER

## 2023-05-23 PROCEDURE — 99397 PR PREVENTIVE VISIT,EST,65 & OVER: ICD-10-PCS | Mod: S$GLB,,, | Performed by: NURSE PRACTITIONER

## 2023-05-23 PROCEDURE — 3074F PR MOST RECENT SYSTOLIC BLOOD PRESSURE < 130 MM HG: ICD-10-PCS | Mod: CPTII,S$GLB,, | Performed by: NURSE PRACTITIONER

## 2023-05-23 PROCEDURE — 3044F HG A1C LEVEL LT 7.0%: CPT | Mod: CPTII,S$GLB,, | Performed by: NURSE PRACTITIONER

## 2023-05-23 PROCEDURE — 3008F BODY MASS INDEX DOCD: CPT | Mod: CPTII,S$GLB,, | Performed by: NURSE PRACTITIONER

## 2023-05-23 PROCEDURE — 3078F PR MOST RECENT DIASTOLIC BLOOD PRESSURE < 80 MM HG: ICD-10-PCS | Mod: CPTII,S$GLB,, | Performed by: NURSE PRACTITIONER

## 2023-05-23 NOTE — PROGRESS NOTES
Subjective     Patient ID: Zakia Price is a 63 y.o. female.    Chief Complaint: Annual Exam  Pt in today for annual exam. Type 2 DM, HTN, hyperlipidemia managed with medication. HECTOR managed by hematology/oncology. Pt sees pain management for sacroilitis. GERD, hypothyroidism controlled with current medication. Pt states eye exam scheduled to be done in July; states sees podiatry for foot exam. Pt has no other complaints today.     Past Medical History:   Diagnosis Date    Acute respiratory failure due to COVID-19     COVID-19     Diabetes mellitus, type 2     Diabetic neuropathy 1/27/2014    DJD (degenerative joint disease) of knee     DVT (deep venous thrombosis) around 1990's    in leg, is on no anticoagulant therapy presently    Fatty liver     GERD (gastroesophageal reflux disease)     Hypertension associated with diabetes     HECTOR (iron deficiency anemia) 5/13/2021    Multinodular goiter     Obesity, morbid, BMI 50 or higher     Sleep apnea     has no CPAP     Social History     Socioeconomic History    Marital status: Single   Tobacco Use    Smoking status: Never    Smokeless tobacco: Never   Substance and Sexual Activity    Alcohol use: No    Drug use: No    Sexual activity: Not Currently     Past Surgical History:   Procedure Laterality Date    COLONOSCOPY N/A 5/12/2021    Procedure: COLONOSCOPY;  Surgeon: Carolina Rizo MD;  Location: [facility];  Service: Endoscopy;  Laterality: N/A;    EPIDURAL STEROID INJECTION N/A 12/10/2021    Procedure: Lumbar L5/S1 IL TEETEE  Would like AM procedure, if possible;  Surgeon: Nae Paul MD;  [facility];  Service: Pain Management;  Laterality: N/A;    EPIDURAL STEROID INJECTION INTO CERVICAL SPINE N/A 10/8/2021    Procedure: C7-T1 IL TEETEE-no sedation.  Needs IV-just incase;  Surgeon: Nae Paul MD;  Location: [facility] PAIN T;  Service: Pain Management;  Laterality: N/A;    ESOPHAGOGASTRODUODENOSCOPY N/A 7/8/2021    Procedure: EGD  (ESOPHAGOGASTRODUODENOSCOPY) previous positve covid;  Surgeon: Jann Gutierrez MD;  Location: [facility] ENDO;  Service: Endoscopy;  Laterality: N/A;    FRACTURE SURGERY      HYSTERECTOMY      INTRALUMINAL GASTROINTESTINAL TRACT IMAGING VIA CAPSULE N/A 10/24/2022    Procedure: IMAGING PROCEDURE, GI TRACT, INTRALUMINAL, VIA CAPSULE;  Surgeon: Hang Lunsford RN;  Location[facility] ENDO;  Service: Endoscopy;  Laterality: N/A;    SELECTIVE INJECTION OF ANESTHETIC AGENT AROUND LUMBAR SPINAL NERVE ROOT BY TRANSFORAMINAL APPROACH Bilateral 5/6/2022    Procedure: Bilateral L5/S1 TF TEETEE with RN IV sedation;  Surgeon: Nae Paul MD;  Location: [facility] MGT;  Service: Pain Management;  Laterality: Bilateral;    SELECTIVE INJECTION OF ANESTHETIC AGENT AROUND LUMBAR SPINAL NERVE ROOT BY TRANSFORAMINAL APPROACH Bilateral 12/30/2022    Procedure: Bilateral L5/S1 TF TEETEE RN IV Sedation;  Surgeon: Nae Paul MD;  Location: [facility]GT;  Service: Pain Management;  Laterality: Bilateral;    SHOULDER ARTHROSCOPY      THYROIDECTOMY, PARTIAL Right     and transplatation of right superior parathyroid gland to the sternocleidomastoid muscle     TONSILLECTOMY      TUBAL LIGATION  1984    WRIST FRACTURE SURGERY      left       HPI  Review of Systems   Constitutional: Negative.    HENT: Negative.     Eyes: Negative.    Respiratory: Negative.     Cardiovascular: Negative.    Gastrointestinal: Negative.    Endocrine: Negative.    Genitourinary: Negative.    Musculoskeletal: Negative.    Integumentary:  Negative.   Allergic/Immunologic: Negative.    Neurological: Negative.    Psychiatric/Behavioral: Negative.          Objective     Physical Exam  Vitals and nursing note reviewed.   Constitutional:       Appearance: Normal appearance. She is obese.   HENT:      Head: Normocephalic.      Right Ear: Tympanic membrane, ear canal and external ear normal.      Left Ear: Tympanic membrane, ear canal and external ear normal.      Nose: Nose normal.       Mouth/Throat:      Mouth: Mucous membranes are moist.      Pharynx: Oropharynx is clear.   Eyes:      Conjunctiva/sclera: Conjunctivae normal.      Pupils: Pupils are equal, round, and reactive to light.   Cardiovascular:      Rate and Rhythm: Normal rate and regular rhythm.      Pulses: Normal pulses.      Heart sounds: Normal heart sounds.   Pulmonary:      Effort: Pulmonary effort is normal.      Breath sounds: Normal breath sounds.   Abdominal:      General: Bowel sounds are normal.      Palpations: Abdomen is soft.   Musculoskeletal:         General: Normal range of motion.      Cervical back: Normal range of motion and neck supple.   Skin:     General: Skin is warm and dry.      Capillary Refill: Capillary refill takes 2 to 3 seconds.   Neurological:      Mental Status: She is alert and oriented to person, place, and time.   Psychiatric:         Mood and Affect: Mood normal.         Behavior: Behavior normal.         Thought Content: Thought content normal.         Judgment: Judgment normal.          Assessment and Plan     Problem List Items Addressed This Visit       Hypertension associated with diabetes (Chronic)    Relevant Orders    Comprehensive Metabolic Panel    Gastroesophageal reflux disease with esophagitis without hemorrhage (Chronic)    Type II diabetes mellitus with neurological manifestations (Chronic)    Relevant Orders    Microalbumin/creatinine urine ratio    Sacroiliitis (Chronic)    Combined hyperlipidemia associated with type 2 diabetes mellitus    Relevant Orders    Lipid Panel    Multinodular goiter    Systolic murmur    HECTOR (iron deficiency anemia)    Hypothyroidism     Other Visit Diagnoses       Annual physical exam    -  Primary    Relevant Orders    Comprehensive Metabolic Panel    Lipid Panel    Microalbumin/creatinine urine ratio            Zakia was seen today for annual exam.    Diagnoses and all orders for this visit:    Annual physical exam  Type II diabetes mellitus with  neurological manifestations  Hypertension associated with diabetes  Combined hyperlipidemia associated with type 2 diabetes mellitus  Systolic murmur  Iron deficiency anemia due to chronic blood loss  Hypothyroidism, unspecified type  Multinodular goiter  Gastroesophageal reflux disease with esophagitis without hemorrhage  Sacroiliitis  -     Comprehensive Metabolic Panel; Future  -     Lipid Panel; Future  -     Microalbumin/creatinine urine ratio        CBC, TSH, hgb a1c, iron/tibc, ferritin UTD        Hydrate well        Rest        F/U with PCP, specialists as advised        RTC as needed        Continue current plan of care        Report to ER immediately if symptoms worsen or persist

## 2023-05-23 NOTE — PATIENT INSTRUCTIONS
Hydrate well  Rest  F/U with PCP, specialists as advised  RTC as needed  Continue current plan of care  Report to ER immediately if symptoms worsen or persist

## 2023-05-25 DIAGNOSIS — E78.2 COMBINED HYPERLIPIDEMIA ASSOCIATED WITH TYPE 2 DIABETES MELLITUS: Primary | ICD-10-CM

## 2023-05-25 DIAGNOSIS — E11.69 COMBINED HYPERLIPIDEMIA ASSOCIATED WITH TYPE 2 DIABETES MELLITUS: Primary | ICD-10-CM

## 2023-05-25 RX ORDER — ATORVASTATIN CALCIUM 40 MG/1
40 TABLET, FILM COATED ORAL DAILY
Qty: 90 TABLET | Refills: 3 | Status: SHIPPED | OUTPATIENT
Start: 2023-05-25 | End: 2023-05-30 | Stop reason: SDUPTHER

## 2023-05-30 ENCOUNTER — PATIENT MESSAGE (OUTPATIENT)
Dept: FAMILY MEDICINE | Facility: CLINIC | Age: 63
End: 2023-05-30
Payer: MEDICARE

## 2023-05-30 DIAGNOSIS — E11.69 COMBINED HYPERLIPIDEMIA ASSOCIATED WITH TYPE 2 DIABETES MELLITUS: ICD-10-CM

## 2023-05-30 DIAGNOSIS — E78.2 COMBINED HYPERLIPIDEMIA ASSOCIATED WITH TYPE 2 DIABETES MELLITUS: ICD-10-CM

## 2023-05-30 RX ORDER — ATORVASTATIN CALCIUM 40 MG/1
40 TABLET, FILM COATED ORAL DAILY
Qty: 90 TABLET | Refills: 3 | Status: SHIPPED | OUTPATIENT
Start: 2023-05-30 | End: 2023-06-29

## 2023-05-30 NOTE — TELEPHONE ENCOUNTER
No care due was identified.  [facility] Embedded Care Due Messages. Reference number: [id number].   5/30/2023 1:58:55 PM CDT

## 2023-05-30 NOTE — TELEPHONE ENCOUNTER
----- Message from Roya Smalls sent at 5/30/2023 12:59 PM CDT -----  Regarding: pt concern  Name of Who is Calling:KELEL CASEY [[id number]]          What is the request in detail: pt cholesterol medication was sent to wrong pharmacy        Correct pharmacy     [facility] [address] [place] [address] [address] [facility]  [address] [facility] [address]  Phone: [phone] Fax: [phone]                  Can the clinic reply by [facility]: no           What Number to Call Back if not in [facility]: [phone] ([place])

## 2023-06-05 DIAGNOSIS — E11.65 TYPE 2 DIABETES MELLITUS WITH HYPERGLYCEMIA, WITHOUT LONG-TERM CURRENT USE OF INSULIN: Chronic | ICD-10-CM

## 2023-06-06 NOTE — TELEPHONE ENCOUNTER
Refill Routing Note   Medication(s) are not appropriate for processing by [facility] for the following reason(s):      No active prescription written by PCP    [facility] action(s):  Defer Care Due:  None identified          Appointments  past 12m or future 3m with PCP    Date Provider   Last Visit   3/15/2023 Rose Price NP   Next Visit   Visit date not found Rose Price NP   ED visits in past 90 days: 0        Note composed:4:35 PM 06/06/2023

## 2023-06-07 RX ORDER — SEMAGLUTIDE 2.68 MG/ML
2 INJECTION, SOLUTION SUBCUTANEOUS
Qty: 3 ML | Refills: 2 | Status: SHIPPED | OUTPATIENT
Start: 2023-06-07 | End: 2023-08-16

## 2023-06-14 ENCOUNTER — LAB VISIT (OUTPATIENT)
Dept: LAB | Facility: HOSPITAL | Age: 63
End: 2023-06-14
Attending: FAMILY MEDICINE
Payer: MEDICARE

## 2023-06-14 DIAGNOSIS — E03.9 HYPOTHYROIDISM, UNSPECIFIED TYPE: ICD-10-CM

## 2023-06-14 DIAGNOSIS — E78.5 HYPERLIPIDEMIA, UNSPECIFIED HYPERLIPIDEMIA TYPE: ICD-10-CM

## 2023-06-14 DIAGNOSIS — Z79.899 ENCOUNTER FOR LONG-TERM (CURRENT) USE OF MEDICATIONS: ICD-10-CM

## 2023-06-14 DIAGNOSIS — E11.49 TYPE II DIABETES MELLITUS WITH NEUROLOGICAL MANIFESTATIONS: Chronic | ICD-10-CM

## 2023-06-14 DIAGNOSIS — D50.0 IRON DEFICIENCY ANEMIA DUE TO CHRONIC BLOOD LOSS: ICD-10-CM

## 2023-06-14 LAB
ALBUMIN SERPL BCP-MCNC: 3.5 G/DL (ref 3.5–5.2)
ALP SERPL-CCNC: 80 U/L (ref 55–135)
ALT SERPL W/O P-5'-P-CCNC: 14 U/L (ref 10–44)
ANION GAP SERPL CALC-SCNC: 10 MMOL/L (ref 8–16)
AST SERPL-CCNC: 17 U/L (ref 10–40)
BASOPHILS # BLD AUTO: 0.02 K/UL (ref 0–0.2)
BASOPHILS NFR BLD: 0.4 % (ref 0–1.9)
BILIRUB SERPL-MCNC: 0.2 MG/DL (ref 0.1–1)
BUN SERPL-MCNC: 17 MG/DL (ref 8–23)
CALCIUM SERPL-MCNC: 9.4 MG/DL (ref 8.7–10.5)
CHLORIDE SERPL-SCNC: 109 MMOL/L (ref 95–110)
CHOLEST SERPL-MCNC: 166 MG/DL (ref 120–199)
CHOLEST/HDLC SERPL: 3 {RATIO} (ref 2–5)
CO2 SERPL-SCNC: 23 MMOL/L (ref 23–29)
CREAT SERPL-MCNC: 0.9 MG/DL (ref 0.5–1.4)
DIFFERENTIAL METHOD: ABNORMAL
EOSINOPHIL # BLD AUTO: 0.1 K/UL (ref 0–0.5)
EOSINOPHIL NFR BLD: 1.6 % (ref 0–8)
ERYTHROCYTE [DISTWIDTH] IN BLOOD BY AUTOMATED COUNT: 13.5 % (ref 11.5–14.5)
EST. GFR  (NO RACE VARIABLE): >60 ML/MIN/1.73 M^2
ESTIMATED AVG GLUCOSE: 97 MG/DL (ref 68–131)
FERRITIN SERPL-MCNC: 28 NG/ML (ref 20–300)
GLUCOSE SERPL-MCNC: 91 MG/DL (ref 70–110)
HBA1C MFR BLD: 5 % (ref 4–5.6)
HCT VFR BLD AUTO: 39.5 % (ref 37–48.5)
HDLC SERPL-MCNC: 55 MG/DL (ref 40–75)
HDLC SERPL: 33.1 % (ref 20–50)
HGB BLD-MCNC: 12.4 G/DL (ref 12–16)
IMM GRANULOCYTES # BLD AUTO: 0.01 K/UL (ref 0–0.04)
IMM GRANULOCYTES NFR BLD AUTO: 0.2 % (ref 0–0.5)
IRON SERPL-MCNC: 66 UG/DL (ref 30–160)
LDLC SERPL CALC-MCNC: 96.8 MG/DL (ref 63–159)
LYMPHOCYTES # BLD AUTO: 2 K/UL (ref 1–4.8)
LYMPHOCYTES NFR BLD: 40.3 % (ref 18–48)
MCH RBC QN AUTO: 28.7 PG (ref 27–31)
MCHC RBC AUTO-ENTMCNC: 31.4 G/DL (ref 32–36)
MCV RBC AUTO: 91 FL (ref 82–98)
MONOCYTES # BLD AUTO: 0.4 K/UL (ref 0.3–1)
MONOCYTES NFR BLD: 8.3 % (ref 4–15)
NEUTROPHILS # BLD AUTO: 2.4 K/UL (ref 1.8–7.7)
NEUTROPHILS NFR BLD: 49.4 % (ref 38–73)
NONHDLC SERPL-MCNC: 111 MG/DL
NRBC BLD-RTO: 0 /100 WBC
PLATELET # BLD AUTO: 350 K/UL (ref 150–450)
PMV BLD AUTO: 9.9 FL (ref 9.2–12.9)
POTASSIUM SERPL-SCNC: 4.1 MMOL/L (ref 3.5–5.1)
PROT SERPL-MCNC: 7.4 G/DL (ref 6–8.4)
RBC # BLD AUTO: 4.32 M/UL (ref 4–5.4)
SATURATED IRON: 16 % (ref 20–50)
SODIUM SERPL-SCNC: 142 MMOL/L (ref 136–145)
TOTAL IRON BINDING CAPACITY: 425 UG/DL (ref 250–450)
TRANSFERRIN SERPL-MCNC: 287 MG/DL (ref 200–375)
TRIGL SERPL-MCNC: 71 MG/DL (ref 30–150)
TSH SERPL DL<=0.005 MIU/L-ACNC: 1.7 UIU/ML (ref 0.4–4)
WBC # BLD AUTO: 4.94 K/UL (ref 3.9–12.7)

## 2023-06-14 PROCEDURE — 84466 ASSAY OF TRANSFERRIN: CPT | Performed by: NURSE PRACTITIONER

## 2023-06-14 PROCEDURE — 80053 COMPREHEN METABOLIC PANEL: CPT | Performed by: FAMILY MEDICINE

## 2023-06-14 PROCEDURE — 83036 HEMOGLOBIN GLYCOSYLATED A1C: CPT | Performed by: FAMILY MEDICINE

## 2023-06-14 PROCEDURE — 36415 COLL VENOUS BLD VENIPUNCTURE: CPT | Mod: PO | Performed by: FAMILY MEDICINE

## 2023-06-14 PROCEDURE — 82728 ASSAY OF FERRITIN: CPT | Performed by: NURSE PRACTITIONER

## 2023-06-14 PROCEDURE — 84443 ASSAY THYROID STIM HORMONE: CPT | Performed by: FAMILY MEDICINE

## 2023-06-14 PROCEDURE — 80061 LIPID PANEL: CPT | Performed by: FAMILY MEDICINE

## 2023-06-14 PROCEDURE — 85025 COMPLETE CBC W/AUTO DIFF WBC: CPT | Mod: PO | Performed by: NURSE PRACTITIONER

## 2023-06-16 NOTE — PROGRESS NOTES
Make follow-up lab appointment per recommendation below.  Check to see if patient has seen the results through my chart.  If not then,  #CALL THE PATIENT# to discuss results/see if they have questions and document verification of contact. Make F/U appt if needed. [phone]    #My interpretation that was sent to them through [employer]:  Zakia, I have reviewed your recent blood work.     Your complete blood count is stable.  Follow-up with hematology.  Your metabolic panel which shows your glucose, kidney function, electrolytes, and liver function is normal.   Thyroid study is normal.   Your cholesterol is improved.  Keep up the great work!  Your hemoglobin A1c is normal.  This test is gold standard screening test for diabetes.  It is a measures 3 months of your average blood sugar.    =========================  Also please address any outstanding health maintenance that may be due: Eye Exam due on 03/23/2022  Shingles Vaccine(2 of 2) due on 08/11/2022  Foot Exam due on 04/14/2023  COVID-19 Vaccine(6 - Moderna series) due on 05/13/2023

## 2023-06-27 ENCOUNTER — OFFICE VISIT (OUTPATIENT)
Dept: HEMATOLOGY/ONCOLOGY | Facility: CLINIC | Age: 63
End: 2023-06-27
Payer: MEDICARE

## 2023-06-27 VITALS
WEIGHT: 293 LBS | HEIGHT: 68 IN | HEART RATE: 75 BPM | SYSTOLIC BLOOD PRESSURE: 115 MMHG | DIASTOLIC BLOOD PRESSURE: 55 MMHG | TEMPERATURE: 97 F | OXYGEN SATURATION: 98 % | BODY MASS INDEX: 44.41 KG/M2

## 2023-06-27 DIAGNOSIS — D50.0 IRON DEFICIENCY ANEMIA DUE TO CHRONIC BLOOD LOSS: Primary | ICD-10-CM

## 2023-06-27 DIAGNOSIS — E66.2 CLASS 3 OBESITY WITH ALVEOLAR HYPOVENTILATION, SERIOUS COMORBIDITY, AND BODY MASS INDEX (BMI) OF 50.0 TO 59.9 IN ADULT: ICD-10-CM

## 2023-06-27 PROCEDURE — 3288F PR FALLS RISK ASSESSMENT DOCUMENTED: ICD-10-PCS | Mod: HCNC,CPTII,S$GLB, | Performed by: NURSE PRACTITIONER

## 2023-06-27 PROCEDURE — 3044F PR MOST RECENT HEMOGLOBIN A1C LEVEL <7.0%: ICD-10-PCS | Mod: HCNC,CPTII,S$GLB, | Performed by: NURSE PRACTITIONER

## 2023-06-27 PROCEDURE — 3078F DIAST BP <80 MM HG: CPT | Mod: HCNC,CPTII,S$GLB, | Performed by: NURSE PRACTITIONER

## 2023-06-27 PROCEDURE — 1160F PR REVIEW ALL MEDS BY PRESCRIBER/CLIN PHARMACIST DOCUMENTED: ICD-10-PCS | Mod: HCNC,CPTII,S$GLB, | Performed by: NURSE PRACTITIONER

## 2023-06-27 PROCEDURE — 3066F NEPHROPATHY DOC TX: CPT | Mod: HCNC,CPTII,S$GLB, | Performed by: NURSE PRACTITIONER

## 2023-06-27 PROCEDURE — 1101F PR PT FALLS ASSESS DOC 0-1 FALLS W/OUT INJ PAST YR: ICD-10-PCS | Mod: HCNC,CPTII,S$GLB, | Performed by: NURSE PRACTITIONER

## 2023-06-27 PROCEDURE — 3288F FALL RISK ASSESSMENT DOCD: CPT | Mod: HCNC,CPTII,S$GLB, | Performed by: NURSE PRACTITIONER

## 2023-06-27 PROCEDURE — 99213 PR OFFICE/OUTPT VISIT, EST, LEVL III, 20-29 MIN: ICD-10-PCS | Mod: HCNC,S$GLB,, | Performed by: NURSE PRACTITIONER

## 2023-06-27 PROCEDURE — 3078F PR MOST RECENT DIASTOLIC BLOOD PRESSURE < 80 MM HG: ICD-10-PCS | Mod: HCNC,CPTII,S$GLB, | Performed by: NURSE PRACTITIONER

## 2023-06-27 PROCEDURE — 3061F PR NEG MICROALBUMINURIA RESULT DOCUMENTED/REVIEW: ICD-10-PCS | Mod: HCNC,CPTII,S$GLB, | Performed by: NURSE PRACTITIONER

## 2023-06-27 PROCEDURE — 3061F NEG MICROALBUMINURIA REV: CPT | Mod: HCNC,CPTII,S$GLB, | Performed by: NURSE PRACTITIONER

## 2023-06-27 PROCEDURE — 3074F PR MOST RECENT SYSTOLIC BLOOD PRESSURE < 130 MM HG: ICD-10-PCS | Mod: HCNC,CPTII,S$GLB, | Performed by: NURSE PRACTITIONER

## 2023-06-27 PROCEDURE — 3074F SYST BP LT 130 MM HG: CPT | Mod: HCNC,CPTII,S$GLB, | Performed by: NURSE PRACTITIONER

## 2023-06-27 PROCEDURE — 3008F PR BODY MASS INDEX (BMI) DOCUMENTED: ICD-10-PCS | Mod: HCNC,CPTII,S$GLB, | Performed by: NURSE PRACTITIONER

## 2023-06-27 PROCEDURE — 1159F PR MEDICATION LIST DOCUMENTED IN MEDICAL RECORD: ICD-10-PCS | Mod: HCNC,CPTII,S$GLB, | Performed by: NURSE PRACTITIONER

## 2023-06-27 PROCEDURE — 99999 PR PBB SHADOW E&M-EST. PATIENT-LVL IV: CPT | Mod: PBBFAC,,, | Performed by: NURSE PRACTITIONER

## 2023-06-27 PROCEDURE — 3066F PR DOCUMENTATION OF TREATMENT FOR NEPHROPATHY: ICD-10-PCS | Mod: HCNC,CPTII,S$GLB, | Performed by: NURSE PRACTITIONER

## 2023-06-27 PROCEDURE — 1159F MED LIST DOCD IN RCRD: CPT | Mod: HCNC,CPTII,S$GLB, | Performed by: NURSE PRACTITIONER

## 2023-06-27 PROCEDURE — 1101F PT FALLS ASSESS-DOCD LE1/YR: CPT | Mod: HCNC,CPTII,S$GLB, | Performed by: NURSE PRACTITIONER

## 2023-06-27 PROCEDURE — 3044F HG A1C LEVEL LT 7.0%: CPT | Mod: HCNC,CPTII,S$GLB, | Performed by: NURSE PRACTITIONER

## 2023-06-27 PROCEDURE — 1160F RVW MEDS BY RX/DR IN RCRD: CPT | Mod: HCNC,CPTII,S$GLB, | Performed by: NURSE PRACTITIONER

## 2023-06-27 PROCEDURE — 3008F BODY MASS INDEX DOCD: CPT | Mod: HCNC,CPTII,S$GLB, | Performed by: NURSE PRACTITIONER

## 2023-06-27 PROCEDURE — 99213 OFFICE O/P EST LOW 20 MIN: CPT | Mod: HCNC,S$GLB,, | Performed by: NURSE PRACTITIONER

## 2023-06-27 PROCEDURE — 99999 PR PBB SHADOW E&M-EST. PATIENT-LVL IV: ICD-10-PCS | Mod: PBBFAC,,, | Performed by: NURSE PRACTITIONER

## 2023-06-27 RX ORDER — SODIUM CHLORIDE 0.9 % (FLUSH) 0.9 %
10 SYRINGE (ML) INJECTION
OUTPATIENT
Start: 2023-07-25

## 2023-06-27 RX ORDER — HEPARIN 100 UNIT/ML
500 SYRINGE INTRAVENOUS
OUTPATIENT
Start: 2023-07-25

## 2023-06-27 RX ORDER — HEPARIN 100 UNIT/ML
500 SYRINGE INTRAVENOUS
Status: CANCELLED | OUTPATIENT
Start: 2023-07-18

## 2023-06-27 RX ORDER — EPINEPHRINE 0.3 MG/.3ML
0.3 INJECTION SUBCUTANEOUS ONCE AS NEEDED
Status: CANCELLED | OUTPATIENT
Start: 2023-06-27

## 2023-06-27 RX ORDER — EPINEPHRINE 0.3 MG/.3ML
0.3 INJECTION SUBCUTANEOUS ONCE AS NEEDED
Status: CANCELLED | OUTPATIENT
Start: 2023-07-04

## 2023-06-27 RX ORDER — SODIUM CHLORIDE 0.9 % (FLUSH) 0.9 %
10 SYRINGE (ML) INJECTION
Status: CANCELLED | OUTPATIENT
Start: 2023-07-04

## 2023-06-27 RX ORDER — SODIUM CHLORIDE 0.9 % (FLUSH) 0.9 %
10 SYRINGE (ML) INJECTION
OUTPATIENT
Start: 2023-08-01

## 2023-06-27 RX ORDER — HEPARIN 100 UNIT/ML
500 SYRINGE INTRAVENOUS
Status: CANCELLED | OUTPATIENT
Start: 2023-07-04

## 2023-06-27 RX ORDER — SODIUM CHLORIDE 0.9 % (FLUSH) 0.9 %
10 SYRINGE (ML) INJECTION
Status: CANCELLED | OUTPATIENT
Start: 2023-07-18

## 2023-06-27 RX ORDER — HEPARIN 100 UNIT/ML
500 SYRINGE INTRAVENOUS
OUTPATIENT
Start: 2023-08-08

## 2023-06-27 RX ORDER — EPINEPHRINE 0.3 MG/.3ML
0.3 INJECTION SUBCUTANEOUS ONCE AS NEEDED
Status: CANCELLED | OUTPATIENT
Start: 2023-07-18

## 2023-06-27 RX ORDER — HEPARIN 100 UNIT/ML
500 SYRINGE INTRAVENOUS
OUTPATIENT
Start: 2023-08-15

## 2023-06-27 RX ORDER — EPINEPHRINE 0.3 MG/.3ML
0.3 INJECTION SUBCUTANEOUS ONCE AS NEEDED
OUTPATIENT
Start: 2023-08-08

## 2023-06-27 RX ORDER — HEPARIN 100 UNIT/ML
500 SYRINGE INTRAVENOUS
OUTPATIENT
Start: 2023-08-01

## 2023-06-27 RX ORDER — DIPHENHYDRAMINE HYDROCHLORIDE 50 MG/ML
50 INJECTION INTRAMUSCULAR; INTRAVENOUS ONCE AS NEEDED
OUTPATIENT
Start: 2023-08-08

## 2023-06-27 RX ORDER — EPINEPHRINE 0.3 MG/.3ML
0.3 INJECTION SUBCUTANEOUS ONCE AS NEEDED
OUTPATIENT
Start: 2023-08-22

## 2023-06-27 RX ORDER — DIPHENHYDRAMINE HYDROCHLORIDE 50 MG/ML
50 INJECTION INTRAMUSCULAR; INTRAVENOUS ONCE AS NEEDED
OUTPATIENT
Start: 2023-07-25

## 2023-06-27 RX ORDER — SODIUM CHLORIDE 0.9 % (FLUSH) 0.9 %
10 SYRINGE (ML) INJECTION
OUTPATIENT
Start: 2023-08-22

## 2023-06-27 RX ORDER — SODIUM CHLORIDE 0.9 % (FLUSH) 0.9 %
10 SYRINGE (ML) INJECTION
Status: CANCELLED | OUTPATIENT
Start: 2023-06-27

## 2023-06-27 RX ORDER — DIPHENHYDRAMINE HYDROCHLORIDE 50 MG/ML
50 INJECTION INTRAMUSCULAR; INTRAVENOUS ONCE AS NEEDED
Status: CANCELLED | OUTPATIENT
Start: 2023-06-27

## 2023-06-27 RX ORDER — HEPARIN 100 UNIT/ML
500 SYRINGE INTRAVENOUS
Status: CANCELLED | OUTPATIENT
Start: 2023-06-27

## 2023-06-27 RX ORDER — DIPHENHYDRAMINE HYDROCHLORIDE 50 MG/ML
50 INJECTION INTRAMUSCULAR; INTRAVENOUS ONCE AS NEEDED
OUTPATIENT
Start: 2023-08-22

## 2023-06-27 RX ORDER — EPINEPHRINE 0.3 MG/.3ML
0.3 INJECTION SUBCUTANEOUS ONCE AS NEEDED
OUTPATIENT
Start: 2023-07-25

## 2023-06-27 RX ORDER — EPINEPHRINE 0.3 MG/.3ML
0.3 INJECTION SUBCUTANEOUS ONCE AS NEEDED
OUTPATIENT
Start: 2023-08-01

## 2023-06-27 RX ORDER — DIPHENHYDRAMINE HYDROCHLORIDE 50 MG/ML
50 INJECTION INTRAMUSCULAR; INTRAVENOUS ONCE AS NEEDED
OUTPATIENT
Start: 2023-08-15

## 2023-06-27 RX ORDER — DIPHENHYDRAMINE HYDROCHLORIDE 50 MG/ML
50 INJECTION INTRAMUSCULAR; INTRAVENOUS ONCE AS NEEDED
Status: CANCELLED | OUTPATIENT
Start: 2023-07-18

## 2023-06-27 RX ORDER — DIPHENHYDRAMINE HYDROCHLORIDE 50 MG/ML
50 INJECTION INTRAMUSCULAR; INTRAVENOUS ONCE AS NEEDED
OUTPATIENT
Start: 2023-08-01

## 2023-06-27 RX ORDER — DIPHENHYDRAMINE HYDROCHLORIDE 50 MG/ML
50 INJECTION INTRAMUSCULAR; INTRAVENOUS ONCE AS NEEDED
Status: CANCELLED | OUTPATIENT
Start: 2023-07-04

## 2023-06-27 RX ORDER — SODIUM CHLORIDE 0.9 % (FLUSH) 0.9 %
10 SYRINGE (ML) INJECTION
OUTPATIENT
Start: 2023-08-08

## 2023-06-27 RX ORDER — HEPARIN 100 UNIT/ML
500 SYRINGE INTRAVENOUS
OUTPATIENT
Start: 2023-08-22

## 2023-06-27 RX ORDER — EPINEPHRINE 0.3 MG/.3ML
0.3 INJECTION SUBCUTANEOUS ONCE AS NEEDED
OUTPATIENT
Start: 2023-08-15

## 2023-06-27 RX ORDER — SODIUM CHLORIDE 0.9 % (FLUSH) 0.9 %
10 SYRINGE (ML) INJECTION
OUTPATIENT
Start: 2023-08-15

## 2023-06-27 NOTE — ASSESSMENT & PLAN NOTE
Prior h/o H. Pylori. Repeat H. Pylori testing negative. Patient previously d/c'd oral iron supplementation due to constipation. VCE normal. Continues GI follow up    Saturated iron level slightly low. No anemia  List of iron rich foods from up to date previously given to patient    -encourage continued iron rich foods  -proceed with Venofer weekly x 4  -f/u 3 months with cbc, iron, ferritin  -Discussed S&S to report sooner

## 2023-06-27 NOTE — PROGRESS NOTES
Subjective:       Patient ID: Zakia Price is a 63 y.o. female.    Chief Complaint: review labs. Anemia    HPI: 63 y.o female presenting today for follow up of her ion deficiency anemia treated with Feraheme 5/2021. Prior EGD/Colonoscopy evaluation reviewed. EGD pathology H. Pylori positive, she completed treatment.. Repeat testing reflect eradication of H. Pylori infection. Colonoscopy unremarkable with recommended repeat in 10 years. VCE without S&S bleeding    She feels well and is without complaints. Denies abnormal bleeding or anemia symptoms. Has chronic constipation for which she sees GI.           Social History     Socioeconomic History    Marital status: Single   Tobacco Use    Smoking status: Never    Smokeless tobacco: Never   Substance and Sexual Activity    Alcohol use: No    Drug use: No    Sexual activity: Not Currently       Past Medical History:   Diagnosis Date    Acute respiratory failure due to COVID-19     COVID-19     Diabetes mellitus, type 2     Diabetic neuropathy 1/27/2014    DJD (degenerative joint disease) of knee     DVT (deep venous thrombosis) around 1990's    in leg, is on no anticoagulant therapy presently    Fatty liver     GERD (gastroesophageal reflux disease)     Hypertension associated with diabetes     HECTOR (iron deficiency anemia) 5/13/2021    Multinodular goiter     Obesity, morbid, BMI 50 or higher     Sleep apnea     has no CPAP       Family History   Problem Relation Age of Onset    Diabetes Mother     Hypertension Mother     Heart disease Mother     Cancer Father     Arthritis Father     Breast cancer Sister     Cancer Brother     Cancer Brother     Leukemia Son     Cancer Son        Past Surgical History:   Procedure Laterality Date    COLONOSCOPY N/A 5/12/2021    Procedure: COLONOSCOPY;  Surgeon: Carolina Rizo MD;  Location: [facility];  Service: Endoscopy;  Laterality: N/A;    EPIDURAL STEROID INJECTION N/A 12/10/2021     Procedure: Lumbar L5/S1 IL TEETEE  Would like AM procedure, if possible;  Surgeon: Nae Paul MD;  Location: [facility]IN [facility]GT;  Service: Pain Management;  Laterality: N/A;    EPIDURAL STEROID INJECTION INTO CERVICAL SPINE N/A 10/8/2021    Procedure: C7-T1 IL TEETEE-no sedation.  Needs IV-just incase;  Surgeon: Nae Paul MD;  Location: [facility];  Service: Pain Management;  Laterality: N/A;    ESOPHAGOGASTRODUODENOSCOPY N/A 7/8/2021    Procedure: EGD (ESOPHAGOGASTRODUODENOSCOPY) previous positve covid;  Surgeon: Jann Gutierrez MD;  Location: [facility];  Service: Endoscopy;  Laterality: N/A;    FRACTURE SURGERY      HYSTERECTOMY      INTRALUMINAL GASTROINTESTINAL TRACT IMAGING VIA CAPSULE N/A 10/24/2022    Procedure: IMAGING PROCEDURE, GI TRACT, INTRALUMINAL, VIA CAPSULE;  Surgeon: Hang Lunsford RN;  Location: [facility] ENDO;  Service: Endoscopy;  Laterality: N/A;    SELECTIVE INJECTION OF ANESTHETIC AGENT AROUND LUMBAR SPINAL NERVE ROOT BY TRANSFORAMINAL APPROACH Bilateral 5/6/2022    Procedure: Bilateral L5/S1 TF TEETEE with RN IV sedation;  Surgeon: Nae Paul MD;  Location: [facility];  Service: Pain Management;  Laterality: Bilateral;    SELECTIVE INJECTION OF ANESTHETIC AGENT AROUND LUMBAR SPINAL NERVE ROOT BY TRANSFORAMINAL APPROACH Bilateral 12/30/2022    Procedure: Bilateral L5/S1 TF TEETEE RN IV Sedation;  Surgeon: Nae Paul MD;  Location: [facility];  Service: Pain Management;  Laterality: Bilateral;    SHOULDER ARTHROSCOPY      THYROIDECTOMY, PARTIAL Right     and transplatation of right superior parathyroid gland to the sternocleidomastoid muscle     TONSILLECTOMY      TUBAL LIGATION  1984    WRIST FRACTURE SURGERY      left       Review of Systems   Constitutional:  Negative for activity change, appetite change, chills, fatigue, fever and unexpected weight change.   HENT:  Negative for congestion, mouth sores, nosebleeds, sore throat, trouble swallowing and voice change.    Eyes:   Negative for visual disturbance.   Respiratory:  Negative for cough, chest tightness, shortness of breath and wheezing.    Cardiovascular:  Negative for chest pain and leg swelling.   Gastrointestinal:  Positive for constipation. Negative for abdominal distention, abdominal pain, anal bleeding, blood in stool, diarrhea, nausea and vomiting.   Genitourinary:  Negative for difficulty urinating, dysuria and hematuria.   Musculoskeletal:  Negative for arthralgias, back pain and myalgias.   Skin:  Negative for pallor, rash and wound.   Neurological:  Negative for dizziness, syncope, weakness and headaches.   Hematological:  Negative for adenopathy. Does not bruise/bleed easily.   Psychiatric/Behavioral:  The patient is not nervous/anxious.        Medication List with Changes/Refills   Current Medications    ATORVASTATIN (LIPITOR) 40 MG TABLET    Take 1 tablet (40 mg total) by mouth once daily.    DILTIAZEM (CARDIZEM) 30 MG TABLET    Take 1 tablet (30 mg total) by mouth every 12 (twelve) hours.    EPINEPHRINE (EPIPEN) 0.3 MG/0.3 ML ATIN    Inject into the muscle.    FAMOTIDINE (PEPCID) 40 MG TABLET    Take 40 mg by mouth once daily.    FERROUS SULFATE 324 MG (65 MG IRON) TBEC    Take 1 tablet (324 mg total) by mouth once daily.    FLUTICASONE PROPIONATE (FLONASE) 50 MCG/ACTUATION NASAL SPRAY    Use 2 sprays to each nostril daily    INHALATION SPACING DEVICE (BREATHERITE VALVED MDI CHAMBER)    Use as directed for inhalation.    LEVOTHYROXINE (EUTHYROX) 100 MCG TABLET    Take 1 tablet (100 mcg total) by mouth once daily.    LIDOCAINE-PRILOCAINE (EMLA) CREAM    SMARTSI-2 Pump Topical 3-4 Times Daily    LORATADINE (CLARITIN) 10 MG TABLET    Take 1 tablet (10 mg total) by mouth once daily.    MELOXICAM (MOBIC) 15 MG TABLET    Take 1 tablet (15 mg total) by mouth once daily.    METFORMIN (GLUCOPHAGE) 1000 MG TABLET    Take 1 tablet (1,000 mg total) by mouth daily with breakfast.    METHOCARBAMOL (ROBAXIN) 500 MG TAB    Take  1 tablet (500 mg total) by mouth 2 (two) times daily as needed (muscle spasms).    PANTOPRAZOLE (PROTONIX) 40 MG TABLET    Take 1 tablet (40 mg total) by mouth once daily.    PREGABALIN (LYRICA) 75 MG CAPSULE    Take 1 capsule (75 mg total) by mouth 3 (three) times daily.    ROPINIROLE (REQUIP) 0.25 MG TABLET    Take 1 tablet (0.25 mg total) by mouth every evening.    SEMAGLUTIDE (OZEMPIC) 2 MG/DOSE (8 MG/3 ML) PNIJ    INJECT 2 MG INTO THE SKIN EVERY 7 DAYS.    SULFACETAMIDE SODIUM 10% (BLEPH-10) 10 % OPHTHALMIC SOLUTION    Place 2 drops into the left eye 4 (four) times daily.    TIZANIDINE (ZANAFLEX) 4 MG TABLET    Take 1/2 to 1 tablet by mouth twice daily as needed for muscle spasms. May cause drowsiness.    TOPIRAMATE (TOPAMAX) 100 MG TABLET    Take 1 tablet (100 mg total) by mouth 2 (two) times daily.    TRELEGY ELLIPTA 100-62.5-25 MCG DSDV    Inhale 1 puff into the lungs once daily.     Objective:     Vitals:    06/27/23 0932   BP: (!) 115/55   Pulse: 75   Temp: 97.1 °F (36.2 °C)       Lab Results   Component Value Date    WBC 4.94 06/14/2023    HGB 12.4 06/14/2023    HCT 39.5 06/14/2023    MCV 91 06/14/2023     06/14/2023       BMP  Lab Results   Component Value Date     06/14/2023    K 4.1 06/14/2023     06/14/2023    CO2 23 06/14/2023    BUN 17 06/14/2023    CREATININE 0.9 06/14/2023    CALCIUM 9.4 06/14/2023    ANIONGAP 10 06/14/2023    ESTGFRAFRICA >60.0 02/24/2022    EGFRNONAA >60.0 02/24/2022     Lab Results   Component Value Date    ALT 14 06/14/2023    AST 17 06/14/2023    ALKPHOS 80 06/14/2023    BILITOT 0.2 06/14/2023     Lab Results   Component Value Date    IRON 66 06/14/2023    TIBC 425 06/14/2023    FERRITIN 28 06/14/2023       Physical Exam  Pulmonary:      Effort: Pulmonary effort is normal. No respiratory distress.   Neurological:      Mental Status: She is alert and oriented to person, place, and time.        Assessment:     Problem List Items Addressed This Visit           Oncology    Iron deficiency anemia due to chronic blood loss - Primary    Relevant Orders    CBC Auto Differential    Iron and TIBC    Ferritin    CBC Auto Differential    Iron and TIBC    Ferritin       Other    Class 3 obesity with alveolar hypoventilation, serious comorbidity, and body mass index (BMI) of 50.0 to 59.9 in adult     Encourage healthy nutrition along with portion control. Encourage daily exercise               Med Onc Chart Routing      Follow up with physician    Follow up with [facility] 3 months.   Infusion scheduling note   venof[facility] weekly x 4 ([facility])   Injection scheduling note    Labs CBC, ferritin and iron and TIBC   Scheduling:  Preferred lab:  Lab interval:     Imaging None      Pharmacy appointment No pharmacy appointment needed      Other referrals     No additional referrals needed         Plan:     Iron deficiency anemia due to chronic blood loss  -     CBC Auto Differential; Future; Expected date: 06/27/2023  -     Iron and TIBC; Future; Expected date: 06/27/2023  -     Ferritin; Future; Expected date: 06/27/2023  -     CBC Auto Differential; Future; Expected date: 06/27/2023  -     Iron and TIBC; Future; Expected date: 06/27/2023  -     Ferritin; Future; Expected date: 06/27/2023    Class 3 obesity with alveolar hypoventilation, serious comorbidity, and body mass index (BMI) of 50.0 to 59.9 in adult    Other orders  -     iron sucrose (VENOFER) 200 mg in sodium chloride 0.9% 100 mL IVPB  -     sodium chloride 0.9% 250 mL flush bag  -     EPINEPHrine (EPIPEN) 0.3 mg/0.3 mL pen injection 0.3 mg  -     diphenhydrAMINE injection 50 mg  -     hydrocortisone sodium succinate injection 100 mg  -     sodium chloride 0.9% flush 10 mL  -     heparin, porcine (PF) 100 unit/mL injection flush 500 Units  -     alteplase injection 2 mg  -     iron sucrose (VENOFER) 200 mg in sodium chloride 0.9% 100 mL IVPB  -     sodium chloride 0.9% 250 mL flush bag  -     EPINEPHrine  (EPIPEN) 0.3 mg/0.3 mL pen injection 0.3 mg  -     diphenhydrAMINE injection 50 mg  -     hydrocortisone sodium succinate injection 100 mg  -     sodium chloride 0.9% flush 10 mL  -     heparin, porcine (PF) 100 unit/mL injection flush 500 Units  -     alteplase injection 2 mg  -     iron sucrose (VENOFER) 200 mg in sodium chloride 0.9% 100 mL IVPB  -     sodium chloride 0.9% 250 mL flush bag  -     EPINEPHrine (EPIPEN) 0.3 mg/0.3 mL pen injection 0.3 mg  -     diphenhydrAMINE injection 50 mg  -     hydrocortisone sodium succinate injection 100 mg  -     sodium chloride 0.9% flush 10 mL  -     heparin, porcine (PF) 100 unit/mL injection flush 500 Units  -     alteplase injection 2 mg  -     iron sucrose (VENOFER) 200 mg in sodium chloride 0.9% 100 mL IVPB  -     sodium chloride 0.9% 250 mL flush bag  -     EPINEPHrine (EPIPEN) 0.3 mg/0.3 mL pen injection 0.3 mg  -     diphenhydrAMINE injection 50 mg  -     hydrocortisone sodium succinate injection 100 mg  -     sodium chloride 0.9% flush 10 mL  -     heparin, porcine (PF) 100 unit/mL injection flush 500 Units  -     alteplase injection 2 mg  -     iron sucrose (VENOFER) 200 mg in sodium chloride 0.9% 100 mL IVPB  -     sodium chloride 0.9% 250 mL flush bag  -     EPINEPHrine (EPIPEN) 0.3 mg/0.3 mL pen injection 0.3 mg  -     diphenhydrAMINE injection 50 mg  -     hydrocortisone sodium succinate injection 100 mg  -     sodium chloride 0.9% flush 10 mL  -     heparin, porcine (PF) 100 unit/mL injection flush 500 Units  -     alteplase injection 2 mg  -     iron sucrose (VENOFER) 200 mg in sodium chloride 0.9% 100 mL IVPB  -     sodium chloride 0.9% 250 mL flush bag  -     EPINEPHrine (EPIPEN) 0.3 mg/0.3 mL pen injection 0.3 mg  -     diphenhydrAMINE injection 50 mg  -     hydrocortisone sodium succinate injection 100 mg  -     sodium chloride 0.9% flush 10 mL  -     heparin, porcine (PF) 100 unit/mL injection flush 500 Units  -     alteplase injection 2 mg  -     iron  sucrose (VENOFER) 200 mg in sodium chloride 0.9% 100 mL IVPB  -     sodium chloride 0.9% 250 mL flush bag  -     EPINEPHrine (EPIPEN) 0.3 mg/0.3 mL pen injection 0.3 mg  -     diphenhydrAMINE injection 50 mg  -     hydrocortisone sodium succinate injection 100 mg  -     sodium chloride 0.9% flush 10 mL  -     heparin, porcine (PF) 100 unit/mL injection flush 500 Units  -     alteplase injection 2 mg  -     iron sucrose (VENOFER) 200 mg in sodium chloride 0.9% 100 mL IVPB  -     sodium chloride 0.9% 250 mL flush bag  -     EPINEPHrine (EPIPEN) 0.3 mg/0.3 mL pen injection 0.3 mg  -     diphenhydrAMINE injection 50 mg  -     hydrocortisone sodium succinate injection 100 mg  -     sodium chloride 0.9% flush 10 mL  -     heparin, porcine (PF) 100 unit/mL injection flush 500 Units  -     alteplase injection 2 mg              JAKE Arroyo-C

## 2023-06-28 ENCOUNTER — TELEPHONE (OUTPATIENT)
Dept: INFUSION THERAPY | Facility: HOSPITAL | Age: 63
End: 2023-06-28
Payer: MEDICARE

## 2023-06-28 ENCOUNTER — PATIENT MESSAGE (OUTPATIENT)
Dept: INFUSION THERAPY | Facility: HOSPITAL | Age: 63
End: 2023-06-28
Payer: MEDICARE

## 2023-06-28 NOTE — TELEPHONE ENCOUNTER
Patient calling about 4pm appt. She states that it's too late and wishes an earlier appointment.  I explained that I didn't have anything earlier that day, but we could try the [facility] to see what they had if she didn't mind going there.  She acknowledged and appointment made for 8:45 on 7/3. Patient confirmed, call ended well.

## 2023-06-28 NOTE — TELEPHONE ENCOUNTER
Spoke with patient and let her know that Ms. Mckeon ordered iron infusions for her and asked her preferences for scheduling. Patient scheduled. Call ended well.

## 2023-06-29 ENCOUNTER — OFFICE VISIT (OUTPATIENT)
Dept: FAMILY MEDICINE | Facility: CLINIC | Age: 63
End: 2023-06-29
Payer: MEDICARE

## 2023-06-29 VITALS
SYSTOLIC BLOOD PRESSURE: 100 MMHG | OXYGEN SATURATION: 100 % | HEART RATE: 94 BPM | WEIGHT: 293 LBS | DIASTOLIC BLOOD PRESSURE: 80 MMHG | BODY MASS INDEX: 46.3 KG/M2

## 2023-06-29 DIAGNOSIS — E11.69 COMBINED HYPERLIPIDEMIA ASSOCIATED WITH TYPE 2 DIABETES MELLITUS: ICD-10-CM

## 2023-06-29 DIAGNOSIS — M62.838 CERVICAL PARASPINAL MUSCLE SPASM: Primary | ICD-10-CM

## 2023-06-29 DIAGNOSIS — E78.2 COMBINED HYPERLIPIDEMIA ASSOCIATED WITH TYPE 2 DIABETES MELLITUS: ICD-10-CM

## 2023-06-29 DIAGNOSIS — E11.59 HYPERTENSION ASSOCIATED WITH DIABETES: ICD-10-CM

## 2023-06-29 DIAGNOSIS — E61.1 IRON DEFICIENCY: ICD-10-CM

## 2023-06-29 DIAGNOSIS — I15.2 HYPERTENSION ASSOCIATED WITH DIABETES: ICD-10-CM

## 2023-06-29 DIAGNOSIS — Z79.899 ENCOUNTER FOR LONG-TERM (CURRENT) USE OF MEDICATIONS: ICD-10-CM

## 2023-06-29 PROCEDURE — 99214 OFFICE O/P EST MOD 30 MIN: CPT | Mod: HCNC,S$GLB,, | Performed by: FAMILY MEDICINE

## 2023-06-29 PROCEDURE — 3066F PR DOCUMENTATION OF TREATMENT FOR NEPHROPATHY: ICD-10-PCS | Mod: HCNC,CPTII,S$GLB, | Performed by: FAMILY MEDICINE

## 2023-06-29 PROCEDURE — 1101F PT FALLS ASSESS-DOCD LE1/YR: CPT | Mod: HCNC,CPTII,S$GLB, | Performed by: FAMILY MEDICINE

## 2023-06-29 PROCEDURE — 3044F HG A1C LEVEL LT 7.0%: CPT | Mod: HCNC,CPTII,S$GLB, | Performed by: FAMILY MEDICINE

## 2023-06-29 PROCEDURE — 3066F NEPHROPATHY DOC TX: CPT | Mod: HCNC,CPTII,S$GLB, | Performed by: FAMILY MEDICINE

## 2023-06-29 PROCEDURE — 99999 PR PBB SHADOW E&M-EST. PATIENT-LVL IV: CPT | Mod: PBBFAC,HCNC,, | Performed by: FAMILY MEDICINE

## 2023-06-29 PROCEDURE — 3061F NEG MICROALBUMINURIA REV: CPT | Mod: HCNC,CPTII,S$GLB, | Performed by: FAMILY MEDICINE

## 2023-06-29 PROCEDURE — 3074F SYST BP LT 130 MM HG: CPT | Mod: HCNC,CPTII,S$GLB, | Performed by: FAMILY MEDICINE

## 2023-06-29 PROCEDURE — 1159F MED LIST DOCD IN RCRD: CPT | Mod: HCNC,CPTII,S$GLB, | Performed by: FAMILY MEDICINE

## 2023-06-29 PROCEDURE — 1160F PR REVIEW ALL MEDS BY PRESCRIBER/CLIN PHARMACIST DOCUMENTED: ICD-10-PCS | Mod: HCNC,CPTII,S$GLB, | Performed by: FAMILY MEDICINE

## 2023-06-29 PROCEDURE — 3061F PR NEG MICROALBUMINURIA RESULT DOCUMENTED/REVIEW: ICD-10-PCS | Mod: HCNC,CPTII,S$GLB, | Performed by: FAMILY MEDICINE

## 2023-06-29 PROCEDURE — 3008F PR BODY MASS INDEX (BMI) DOCUMENTED: ICD-10-PCS | Mod: HCNC,CPTII,S$GLB, | Performed by: FAMILY MEDICINE

## 2023-06-29 PROCEDURE — 1101F PR PT FALLS ASSESS DOC 0-1 FALLS W/OUT INJ PAST YR: ICD-10-PCS | Mod: HCNC,CPTII,S$GLB, | Performed by: FAMILY MEDICINE

## 2023-06-29 PROCEDURE — 3044F PR MOST RECENT HEMOGLOBIN A1C LEVEL <7.0%: ICD-10-PCS | Mod: HCNC,CPTII,S$GLB, | Performed by: FAMILY MEDICINE

## 2023-06-29 PROCEDURE — 3079F DIAST BP 80-89 MM HG: CPT | Mod: HCNC,CPTII,S$GLB, | Performed by: FAMILY MEDICINE

## 2023-06-29 PROCEDURE — 3074F PR MOST RECENT SYSTOLIC BLOOD PRESSURE < 130 MM HG: ICD-10-PCS | Mod: HCNC,CPTII,S$GLB, | Performed by: FAMILY MEDICINE

## 2023-06-29 PROCEDURE — 3288F PR FALLS RISK ASSESSMENT DOCUMENTED: ICD-10-PCS | Mod: HCNC,CPTII,S$GLB, | Performed by: FAMILY MEDICINE

## 2023-06-29 PROCEDURE — 99999 PR PBB SHADOW E&M-EST. PATIENT-LVL IV: ICD-10-PCS | Mod: PBBFAC,HCNC,, | Performed by: FAMILY MEDICINE

## 2023-06-29 PROCEDURE — 3079F PR MOST RECENT DIASTOLIC BLOOD PRESSURE 80-89 MM HG: ICD-10-PCS | Mod: HCNC,CPTII,S$GLB, | Performed by: FAMILY MEDICINE

## 2023-06-29 PROCEDURE — 1159F PR MEDICATION LIST DOCUMENTED IN MEDICAL RECORD: ICD-10-PCS | Mod: HCNC,CPTII,S$GLB, | Performed by: FAMILY MEDICINE

## 2023-06-29 PROCEDURE — 3288F FALL RISK ASSESSMENT DOCD: CPT | Mod: HCNC,CPTII,S$GLB, | Performed by: FAMILY MEDICINE

## 2023-06-29 PROCEDURE — 1160F RVW MEDS BY RX/DR IN RCRD: CPT | Mod: HCNC,CPTII,S$GLB, | Performed by: FAMILY MEDICINE

## 2023-06-29 PROCEDURE — 3008F BODY MASS INDEX DOCD: CPT | Mod: HCNC,CPTII,S$GLB, | Performed by: FAMILY MEDICINE

## 2023-06-29 PROCEDURE — 99214 PR OFFICE/OUTPT VISIT, EST, LEVL IV, 30-39 MIN: ICD-10-PCS | Mod: HCNC,S$GLB,, | Performed by: FAMILY MEDICINE

## 2023-06-29 RX ORDER — SIMVASTATIN 20 MG/1
20 TABLET, FILM COATED ORAL NIGHTLY
Qty: 90 TABLET | Refills: 3 | Status: SHIPPED | OUTPATIENT
Start: 2023-06-29 | End: 2023-10-17

## 2023-06-29 NOTE — ASSESSMENT & PLAN NOTE
Start physical therapy.  Trial of anti-inflammatory.  Continue muscle relaxer as needed.  Increase hydration with water.  Stretching and massage recommended.

## 2023-06-29 NOTE — PROGRESS NOTES
PLAN:      Problem List Items Addressed This Visit       Combined hyperlipidemia associated with type 2 diabetes mellitus (Chronic)     Patient reports having issues with atorvastatin.  She has also had issues with pravastatin in the past.  Trial of low-dose simvastatin and Co Q enzyme 10 to see if she can tolerate this medication.  Target LDL less than 100.  Counseled on hyperlipidemia disease course, healthy diet and increased need for exercise.  Please be advised of the risk of cardiovascular disease, increase stroke and heart attack risk with uncontrolled/untreated hyperlipidemia.     Patient voiced understanding and understood the treatment plan. All questions were answered.              Relevant Medications    simvastatin (ZOCOR) 20 MG tablet    Hypertension associated with diabetes (Chronic)     Counseled on importance of hypertension disease course, I recommend ongoing Education for DASH-diet and exercise.  Counseled on medication regimen importance of treating high blood pressure.  Please be advised of risk of untreated blood pressure as discussed.  Please advised of ER precautions were given for symptoms of hypertensive urgency and emergency.           Encounter for long-term (current) use of medications (Chronic)     Complete history and physical was completed today.  Complete and thorough medication reconciliation was performed.  Discussed risks and benefits of medications.  Advised patient on orders and health maintenance.  We discussed old records and old labs if available.  Will request any records not available through epic.  Continue current medications listed on your summary sheet.           Cervical paraspinal muscle spasm - Primary     Start physical therapy.  Trial of anti-inflammatory.  Continue muscle relaxer as needed.  Increase hydration with water.  Stretching and massage recommended.         Relevant Orders    Ambulatory referral/consult to Physical/Occupational Therapy    Iron deficiency      Proceed with iron infusions.  Follow-up with Hematology.  Please be advised constipation condition course.  I recommend increase daily water intake to an acceptable level.  Increase fiber.  Recommend stool softener and laxative if needed.  Goal of 1 bowel movement daily.  Follow-up to clinic or notify me if no improvement or symptoms are persistent or worsening.  ER precautions were given.  Recommend daily exercise as tolerated, adequate water intake (six 8-oz glasses of water daily), and high fiber diet. OTC fiber supplements are recommended if diet does not reach daily fiber goal (20-30 grams daily), such as Metamucil, Citrucel, or FiberCon (take as directed, separate from other oral medications by >2 hours).  - CONTINUE OTC stool softener such as Colace as directed to avoid hard stools and straining with bowel movements PRN  -If still no improvement with these measures, call/follow-up            Future Appointments       Date Provider Specialty Appt Notes    7/3/2023  Chemotherapy Venofer IVP 1/4 ow/cbd    7/10/2023  Chemotherapy Venofer IVP 2/4 ow/cbd    7/17/2023  Chemotherapy Venofer IVP 3/4 ow/cbd    7/18/2023 Brandie Rey DPM Podiatry nail care    7/24/2023  Chemotherapy Venofer IVP 4/4 ow/cbd           Medication Management for assessment above:   Medication List with Changes/Refills   New Medications    SIMVASTATIN (ZOCOR) 20 MG TABLET    Take 1 tablet (20 mg total) by mouth every evening.   Current Medications    DILTIAZEM (CARDIZEM) 30 MG TABLET    Take 1 tablet (30 mg total) by mouth every 12 (twelve) hours.    EPINEPHRINE (EPIPEN) 0.3 MG/0.3 ML ATIN    Inject into the muscle.    FERROUS SULFATE 324 MG (65 MG IRON) TBEC    Take 1 tablet (324 mg total) by mouth once daily.    FLUTICASONE PROPIONATE (FLONASE) 50 MCG/ACTUATION NASAL SPRAY    Use 2 sprays to each nostril daily    INHALATION SPACING DEVICE (BREATHERITE VALVED MDI CHAMBER)    Use as directed for inhalation.    LEVOTHYROXINE  (EUTHYROX) 100 MCG TABLET    Take 1 tablet (100 mcg total) by mouth once daily.    LORATADINE (CLARITIN) 10 MG TABLET    Take 1 tablet (10 mg total) by mouth once daily.    MELOXICAM (MOBIC) 15 MG TABLET    Take 1 tablet (15 mg total) by mouth once daily.    METFORMIN (GLUCOPHAGE) 1000 MG TABLET    Take 1 tablet (1,000 mg total) by mouth daily with breakfast.    METHOCARBAMOL (ROBAXIN) 500 MG TAB    Take 1 tablet (500 mg total) by mouth 2 (two) times daily as needed (muscle spasms).    PANTOPRAZOLE (PROTONIX) 40 MG TABLET    Take 1 tablet (40 mg total) by mouth once daily.    PREGABALIN (LYRICA) 75 MG CAPSULE    Take 1 capsule (75 mg total) by mouth 3 (three) times daily.    ROPINIROLE (REQUIP) 0.25 MG TABLET    Take 1 tablet (0.25 mg total) by mouth every evening.    SEMAGLUTIDE (OZEMPIC) 2 MG/DOSE (8 MG/3 ML) PNIJ    INJECT 2 MG INTO THE SKIN EVERY 7 DAYS.    SULFACETAMIDE SODIUM 10% (BLEPH-10) 10 % OPHTHALMIC SOLUTION    Place 2 drops into the left eye 4 (four) times daily.    TIZANIDINE (ZANAFLEX) 4 MG TABLET    Take 1/2 to 1 tablet by mouth twice daily as needed for muscle spasms. May cause drowsiness.    TOPIRAMATE (TOPAMAX) 100 MG TABLET    Take 1 tablet (100 mg total) by mouth 2 (two) times daily.    TRELEGY ELLIPTA 100-62.5-25 MCG DSDV    Inhale 1 puff into the lungs once daily.   Discontinued Medications    ATORVASTATIN (LIPITOR) 40 MG TABLET    Take 1 tablet (40 mg total) by mouth once daily.    FAMOTIDINE (PEPCID) 40 MG TABLET    Take 40 mg by mouth once daily.    LIDOCAINE-PRILOCAINE (EMLA) CREAM    SMARTSI-2 Pump Topical 3-4 Times Daily       Oleksandr Nagel M.D.  ==========================================================================  Subjective:   Patient ID: Zakia Price is a 63 y.o. female.  has a past medical history of Acute respiratory failure due to COVID-19, COVID-19, Diabetes mellitus, type 2, Diabetic neuropathy (2014), DJD (degenerative joint disease) of knee, DVT  (deep venous thrombosis) (around 1990's), Fatty liver, GERD (gastroesophageal reflux disease), Hypertension associated with diabetes, HECTOR (iron deficiency anemia) (5/13/2021), Multinodular goiter, Obesity, morbid, BMI 50 or higher, and Sleep apnea.   Chief Complaint: Hyperlipidemia, Hypertension, and Neck Pain      Problem List Items Addressed This Visit       Combined hyperlipidemia associated with type 2 diabetes mellitus (Chronic)    Overview     CHRONIC. STABLE. Lab analysis reviewed.   (-) CP, SOB, abdominal pain, N/V/D, constipation, jaundice, skin changes.  (-) Myalgias  Lab Results   Component Value Date    CHOL 166 06/14/2023    CHOL 202 (H) 05/23/2023    CHOL 174 02/24/2022     Lab Results   Component Value Date    HDL 55 06/14/2023    HDL 57 05/23/2023    HDL 49 02/24/2022     Lab Results   Component Value Date    LDLCALC 96.8 06/14/2023    LDLCALC 129.8 05/23/2023    LDLCALC 99.6 02/24/2022     Lab Results   Component Value Date    TRIG 71 06/14/2023    TRIG 76 05/23/2023    TRIG 127 02/24/2022     Lab Results   Component Value Date    CHOLHDL 33.1 06/14/2023    CHOLHDL 28.2 05/23/2023    CHOLHDL 28.2 02/24/2022     Lab Results   Component Value Date    TOTALCHOLEST 3.0 06/14/2023    TOTALCHOLEST 3.5 05/23/2023    TOTALCHOLEST 3.6 02/24/2022     Lab Results   Component Value Date    ALT 14 06/14/2023    AST 17 06/14/2023    ALKPHOS 80 06/14/2023    BILITOT 0.2 06/14/2023   ======================================================  The 10-year ASCVD risk score (Fawn DEE, et al., 2019) is: 10%    Values used to calculate the score:      Age: 63 years      Sex: Female      Is Non- : Yes      Diabetic: Yes      Tobacco smoker: No      Systolic Blood Pressure: 100 mmHg      Is BP treated: Yes      HDL Cholesterol: 55 mg/dL      Total Cholesterol: 166 mg/dL           Current Assessment & Plan     Patient reports having issues with atorvastatin.  She has also had issues with pravastatin in  the past.  Trial of low-dose simvastatin and Co Q enzyme 10 to see if she can tolerate this medication.  Target LDL less than 100.  Counseled on hyperlipidemia disease course, healthy diet and increased need for exercise.  Please be advised of the risk of cardiovascular disease, increase stroke and heart attack risk with uncontrolled/untreated hyperlipidemia.     Patient voiced understanding and understood the treatment plan. All questions were answered.              Hypertension associated with diabetes (Chronic)    Overview     CHRONIC.  June 2023:  Blood pressure is doing well on current regimen.  May 2023:  A1c currently 4.9.  Patient denies any symptoms of low blood sugar.  She is continuing to take metformin twice daily.  Blood pressure is well controlled on current regimen.  Hypertension Medications               diltiaZEM (CARDIZEM) 30 MG tablet Take 1 tablet (30 mg total) by mouth every 12 (twelve) hours.    3/21/23 1302 - Original Entry by Oleksandr Nagel MD  Authorizing Provider: Oleksandr Nagel MD               Name:  diltiaZEM (CARDIZEM) 30 MG tablet Start date:  3/21/23 End date:  --    Medication:  DILTIAZEM HCL  30 MG ORAL TAB [2475]    Dose:  30 mg Route:  Oral Frequency:  Every 12 hours    Sig: Take 1 tablet (30 mg total) by mouth every 12 (twelve) hours.    Instructions:  --             Changes to the order are displayed in red.  Note that there may be changes made to the order that are not shown in this report, such as changes to details about ingredients.            February 2022:  Blood pressure remains less than 140/90.  January 2022:  Blood pressure well controlled actually on the lower end of normal today.  Patient is on diltiazem 120 milligrams daily.  She does report some constipation that is being treated with Linzess.  July 2021:  Blood pressure well controlled today.  Previous HPI  Patient recently got a new blood pressure monitor at home through [facility] however the cuff  does not fit around her arm.. BP Reviewed.  Compliant with BP medications. No SE reported.  Patient taking amlodipine 5 mg.  (-) CP, SOB, palpitations, dizziness, lightheadedness, HA, arm numbness, tingling or weakness, syncope.  Creatinine   Date Value Ref Range Status   06/14/2023 0.9 0.5 - 1.4 mg/dL Final   History of hypertension currently on amlodipine with previous reported allergy to ACE inhibitor    Last Assessment & Plan:   Blood pressure stable on current medicine regimen. Continue current medicine regimen and follow low salt diet.  Results for orders placed or performed during the hospital encounter of 10/15/21   EKG 12-lead    Collection Time: 10/15/21  8:04 AM    Narrative    Test Reason : E11.42,E11.59,I15.2,E78.5,R01.1,    Vent. Rate : 072 BPM     Atrial Rate : 072 BPM     P-R Int : 142 ms          QRS Dur : 080 ms      QT Int : 404 ms       P-R-T Axes : 062 016 028 degrees     QTc Int : 442 ms    Normal sinus rhythm  Nonspecific T wave abnormality  Abnormal ECG  When compared with ECG of 26-JAN-2021 14:40,  No significant change was found  Confirmed by Shannon Wilson MD ([id number]) on 10/16/2021 10:13:48 PM    Referred By: RIK WEAVER           Confirmed By:Shannon Wilson MD            Current Assessment & Plan     Counseled on importance of hypertension disease course, I recommend ongoing Education for DASH-diet and exercise.  Counseled on medication regimen importance of treating high blood pressure.  Please be advised of risk of untreated blood pressure as discussed.  Please advised of ER precautions were given for symptoms of hypertensive urgency and emergency.           Encounter for long-term (current) use of medications (Chronic)    Overview     June 2023: Reviewed labs.  January 2023:  Reviewed labs.  CHRONIC. Stable. Compliant with medications for managed conditions. See medication list. No SE reported. Routine lab analysis is being monitored. Refills were addressed.  January  2021:CHRONIC. Stable. Compliant with medications for managed conditions. See medication list. No SE reported. Routine lab analysis is being monitored. Refills were addressed.  July 2021:  Reviewed labs.  January 2022:  Reviewed labs.  February 2022:  Reviewed labs.  July 2022:  Reviewed labs.  Lab Results   Component Value Date    WBC 4.94 06/14/2023    HGB 12.4 06/14/2023    HCT 39.5 06/14/2023    MCV 91 06/14/2023     06/14/2023       Chemistry        Component Value Date/Time     06/14/2023 0902    K 4.1 06/14/2023 0902     06/14/2023 0902    CO2 23 06/14/2023 0902    BUN 17 06/14/2023 0902    CREATININE 0.9 06/14/2023 0902    GLU 91 06/14/2023 0902        Component Value Date/Time    CALCIUM 9.4 06/14/2023 0902    ALKPHOS 80 06/14/2023 0902    AST 17 06/14/2023 0902    ALT 14 06/14/2023 0902    BILITOT 0.2 06/14/2023 0902    ESTGFRAFRICA >60.0 02/24/2022 1255    EGFRNONAA >60.0 02/24/2022 1255          Lab Results   Component Value Date    TSH 1.696 06/14/2023    FREET4 1.06 12/04/2019    T3FREE 2.7 12/04/2019          Current Assessment & Plan     Complete history and physical was completed today.  Complete and thorough medication reconciliation was performed.  Discussed risks and benefits of medications.  Advised patient on orders and health maintenance.  We discussed old records and old labs if available.  Will request any records not available through epic.  Continue current medications listed on your summary sheet.           Cervical paraspinal muscle spasm - Primary    Overview     New problem.  Patient states that she is been having some left-sided neck pain that had some nights over the last few days.  The knots are improving however still having some tension and discomfort.  Patient has tried muscle relaxer with no relief.         Current Assessment & Plan     Start physical therapy.  Trial of anti-inflammatory.  Continue muscle relaxer as needed.  Increase hydration with water.   Stretching and massage recommended.         Iron deficiency    Overview     Lab Results   Component Value Date    IRON 66 06/14/2023    TRANSFERRIN 287 06/14/2023    TIBC 425 06/14/2023    FESATURATED 16 (L) 06/14/2023      Lab Results   Component Value Date    GNOWWQYP09 800 04/06/2021     Lab Results   Component Value Date    FOLATE 5.1 04/06/2021     Lab Results   Component Value Date    WBC 4.94 06/14/2023    HGB 12.4 06/14/2023    HCT 39.5 06/14/2023    MCV 91 06/14/2023     06/14/2023     Patient can not tolerate oral iron.  She is scheduled for iron infusions by Hematology.         Current Assessment & Plan     Proceed with iron infusions.  Follow-up with Hematology.  Please be advised constipation condition course.  I recommend increase daily water intake to an acceptable level.  Increase fiber.  Recommend stool softener and laxative if needed.  Goal of 1 bowel movement daily.  Follow-up to clinic or notify me if no improvement or symptoms are persistent or worsening.  ER precautions were given.  Recommend daily exercise as tolerated, adequate water intake (six 8-oz glasses of water daily), and high fiber diet. OTC fiber supplements are recommended if diet does not reach daily fiber goal (20-30 grams daily), such as Metamucil, Citrucel, or FiberCon (take as directed, separate from other oral medications by >2 hours).  - CONTINUE OTC stool softener such as Colace as directed to avoid hard stools and straining with bowel movements PRN  -If still no improvement with these measures, call/follow-up               Review of patient's allergies indicates:   Allergen Reactions    Codeine sulfate      Nausea^    Lisinopril Swelling     angioedema    Codeine Nausea Only and Rash     Current Outpatient Medications   Medication Instructions    diltiaZEM (CARDIZEM) 30 mg, Oral, Every 12 hours    EPINEPHrine (EPIPEN) 0.3 mg/0.3 mL AtIn Intramuscular    ferrous sulfate 324 mg, Oral, Daily    fluticasone propionate  (FLONASE) 50 mcg/actuation nasal spray Use 2 sprays to each nostril daily    inhalation spacing device (BREATHERITE VALVED MDI CHAMBER) Use as directed for inhalation.    levothyroxine (EUTHYROX) 100 mcg, Oral, Daily    loratadine (CLARITIN) 10 mg, Oral, Daily    meloxicam (MOBIC) 15 mg, Oral, Daily    metFORMIN (GLUCOPHAGE) 1,000 mg, Oral, With breakfast    methocarbamoL (ROBAXIN) 500 mg, Oral, 2 times daily PRN    OZEMPIC 2 mg, Subcutaneous, Every 7 days    pantoprazole (PROTONIX) 40 mg, Oral, Daily    pregabalin (LYRICA) 75 mg, Oral, 3 times daily    rOPINIRole (REQUIP) 0.25 mg, Oral, Nightly    simvastatin (ZOCOR) 20 mg, Oral, Nightly    sulfacetamide sodium 10% (BLEPH-10) 10 % ophthalmic solution 2 drops, Left Eye, 4 times daily    tiZANidine (ZANAFLEX) 4 MG tablet Take 1/2 to 1 tablet by mouth twice daily as needed for muscle spasms. May cause drowsiness.    topiramate (TOPAMAX) 100 mg, Oral, 2 times daily    TRELEGY ELLIPTA 100-62.5-25 mcg DsDv 1 puff, Inhalation, Daily      I have reviewed the PMH, social history, FamilyHx, surgical history, allergies and medications documented / confirmed by the patient at the time of this visit.  Review of Systems   Constitutional:  Negative for chills, fatigue, fever and unexpected weight change.   HENT:  Negative for ear pain and sore throat.    Eyes:  Negative for redness and visual disturbance.   Respiratory:  Negative for cough and shortness of breath.    Cardiovascular:  Negative for chest pain and palpitations.   Gastrointestinal:  Negative for abdominal pain, nausea and vomiting.   Genitourinary:  Negative for difficulty urinating and hematuria.   Musculoskeletal:  Positive for arthralgias and back pain. Negative for myalgias.   Skin:  Negative for rash and wound.   Neurological:  Positive for numbness. Negative for weakness and headaches.   Psychiatric/Behavioral:  Positive for sleep disturbance. The patient is not nervous/anxious.    Objective:   /80 (BP  Location: Left arm)   Pulse 94   Wt (!) 138.1 kg (304 lb 8 oz)   SpO2 100%   BMI 46.30 kg/m²   Physical Exam  Vitals and nursing note reviewed.   Constitutional:       General: She is not in acute distress.     Appearance: She is well-developed. She is obese. She is not ill-appearing, toxic-appearing or diaphoretic.   HENT:      Head: Normocephalic and atraumatic.      Right Ear: Hearing and external ear normal.      Left Ear: Hearing and external ear normal.      Nose: Nose normal. No rhinorrhea.   Eyes:      General: Lids are normal.      Extraocular Movements: Extraocular movements intact.      Conjunctiva/sclera: Conjunctivae normal.      Pupils: Pupils are equal, round, and reactive to light.   Cardiovascular:      Rate and Rhythm: Normal rate.      Pulses: Normal pulses.   Pulmonary:      Effort: Pulmonary effort is normal. No respiratory distress.      Breath sounds: Normal breath sounds.   Abdominal:      General: Bowel sounds are normal.      Palpations: Abdomen is soft.   Musculoskeletal:         General: Tenderness and deformity present. Normal range of motion.      Cervical back: Normal range of motion and neck supple. Spasms and tenderness present. No bony tenderness. Pain with movement present.      Lumbar back: No spasms, tenderness or bony tenderness. Negative right straight leg raise test and negative left straight leg raise test.        Back:       Right lower leg: Tenderness present. No swelling, deformity, lacerations or bony tenderness. No edema.      Left lower leg: No swelling, deformity, lacerations, tenderness or bony tenderness. No edema.   Skin:     General: Skin is warm and dry.      Capillary Refill: Capillary refill takes less than 2 seconds.      Coloration: Skin is not pale.   Neurological:      General: No focal deficit present.      Mental Status: She is alert and oriented to person, place, and time. Mental status is at baseline. She is not disoriented.      Cranial Nerves: No  cranial nerve deficit.      Motor: No weakness.      Gait: Gait normal.   Psychiatric:         Attention and Perception: She is attentive.         Mood and Affect: Mood normal. Mood is not anxious or depressed.         Speech: Speech is not rapid and pressured or slurred.         Behavior: Behavior normal. Behavior is not agitated, aggressive or hyperactive. Behavior is cooperative.         Thought Content: Thought content normal. Thought content is not paranoid or delusional. Thought content does not include homicidal or suicidal ideation. Thought content does not include homicidal or suicidal plan.         Cognition and Memory: Memory is not impaired.         Judgment: Judgment normal.       Assessment:     1. Cervical paraspinal muscle spasm    2. Combined hyperlipidemia associated with type 2 diabetes mellitus    3. Encounter for long-term (current) use of medications    4. Hypertension associated with diabetes    5. Iron deficiency      MDM:   Moderate complexity.  Moderate risk.    Total time: 31 minutes.  This includes total time spent on the encounter, which includes face to face time and non-face to face time preparing to see the patient (eg, review of previous medical records, tests), Obtaining and/or reviewing separately obtained history, documenting clinical information in the electronic or other health record, independently interpreting results (not separately reported)/communicating results to the patient/family/caregiver, and/or care coordination (not separately reported).    I have Reviewed and summarized old records.  I have performed thorough medication reconciliation today and discussed risk and benefits of medications.  I have reviewed labs and discussed with patient.  All questions were answered.  I am requesting old records and will review them once they are available.  Hematology    I have signed for the following orders AND/OR meds.  Orders Placed This Encounter   Procedures    Ambulatory  referral/consult to Physical/Occupational Therapy     Standing Status:   Future     Standing Expiration Date:   7/29/2024     Referral Priority:   Routine     Referral Type:   Physical Medicine     Referral Reason:   Specialty Services Required     Number of Visits Requested:   1     Medications Ordered This Encounter   Medications    simvastatin (ZOCOR) 20 MG tablet     Sig: Take 1 tablet (20 mg total) by mouth every evening.     Dispense:  90 tablet     Refill:  3        Follow up in about 3 months (around 9/29/2023), or if symptoms worsen or fail to improve, for Med refills.    If no improvement in symptoms or symptoms worsen, advised to call/follow-up at clinic or go to ER. Patient voiced understanding and all questions/concerns were addressed.   DISCLAIMER: This note was compiled by using a speech recognition dictation system and therefore please be aware that typographical / speech recognition errors can and do occur.  Please contact me if you see any errors specifically.    Oleksandr Nagel M.D.       Office: [phone]   [address] [place] [address]  FAX: [phone]

## 2023-06-29 NOTE — ASSESSMENT & PLAN NOTE
Patient reports having issues with atorvastatin.  She has also had issues with pravastatin in the past.  Trial of low-dose simvastatin and Co Q enzyme 10 to see if she can tolerate this medication.  Target LDL less than 100.  Counseled on hyperlipidemia disease course, healthy diet and increased need for exercise.  Please be advised of the risk of cardiovascular disease, increase stroke and heart attack risk with uncontrolled/untreated hyperlipidemia.     Patient voiced understanding and understood the treatment plan. All questions were answered.

## 2023-06-29 NOTE — PATIENT INSTRUCTIONS
Follow up in about 3 months (around 9/29/2023), or if symptoms worsen or fail to improve, for Med refills.     Dear patient,   As a result of recent federal legislation (The Federal Cures Act), you may receive lab or pathology results from your visit in your [facility] account before your physician is able to contact you. Your physician or their representative will relay the results to you with their recommendations at their soonest availability.     If no improvement in symptoms or symptoms worsen, please be advised to call MD, follow-up at clinic and/or go to ER if becomes severe.    Oleksandr Nagel M.D.        We Offer TELEHEALTH & Same Day Appointments!   Book your Telehealth appointment with me through my nurse or   Clinic appointments on Whatever!    [address] [place] [address]    Office: [phone]   FAX: [phone]    Check out my Facebook Page and Follow Me at: [email or web address]    Check out my website at natue by clicking on: [email or web address]    To Schedule appointments online, go to Whatever: [email or web address]

## 2023-07-03 ENCOUNTER — TELEPHONE (OUTPATIENT)
Dept: FAMILY MEDICINE | Facility: CLINIC | Age: 63
End: 2023-07-03
Payer: MEDICARE

## 2023-07-03 ENCOUNTER — INFUSION (OUTPATIENT)
Dept: INFUSION THERAPY | Facility: HOSPITAL | Age: 63
End: 2023-07-03
Attending: NURSE PRACTITIONER
Payer: MEDICARE

## 2023-07-03 VITALS
SYSTOLIC BLOOD PRESSURE: 122 MMHG | RESPIRATION RATE: 16 BRPM | HEART RATE: 67 BPM | TEMPERATURE: 98 F | DIASTOLIC BLOOD PRESSURE: 68 MMHG | OXYGEN SATURATION: 98 %

## 2023-07-03 DIAGNOSIS — E78.2 COMBINED HYPERLIPIDEMIA ASSOCIATED WITH TYPE 2 DIABETES MELLITUS: ICD-10-CM

## 2023-07-03 DIAGNOSIS — E11.69 COMBINED HYPERLIPIDEMIA ASSOCIATED WITH TYPE 2 DIABETES MELLITUS: ICD-10-CM

## 2023-07-03 DIAGNOSIS — D50.0 IRON DEFICIENCY ANEMIA DUE TO CHRONIC BLOOD LOSS: Primary | ICD-10-CM

## 2023-07-03 PROCEDURE — 25000003 PHARM REV CODE 250: Mod: HCNC | Performed by: NURSE PRACTITIONER

## 2023-07-03 PROCEDURE — 96374 THER/PROPH/DIAG INJ IV PUSH: CPT | Mod: HCNC

## 2023-07-03 PROCEDURE — 63600175 PHARM REV CODE 636 W HCPCS: Mod: HCNC | Performed by: NURSE PRACTITIONER

## 2023-07-03 RX ADMIN — SODIUM CHLORIDE: 9 INJECTION, SOLUTION INTRAVENOUS at 09:07

## 2023-07-03 RX ADMIN — IRON SUCROSE 200 MG: 20 INJECTION, SOLUTION INTRAVENOUS at 09:07

## 2023-07-03 NOTE — TELEPHONE ENCOUNTER
----- Message from Juanita Preciado sent at 7/3/2023 11:45 AM CDT -----  Contact: self  Pt is asking for an return call in reference to her medication simvastatin (ZOCOR) 20 MG tablet, states medication interacts with another medication ,please call back at .[phone] Thx CJ

## 2023-07-03 NOTE — PLAN OF CARE
Discussed plan of care with pt. Addressed any and ongoing concerns. Pt denies   Problem: Adult Inpatient Plan of Care  Goal: Plan of Care Review  Outcome: Ongoing, Progressing  Flowsheets (Taken 7/3/2023 0951)  Plan of Care Reviewed With: patient  Goal: Patient-Specific Goal (Individualized)  Outcome: Ongoing, Progressing  Goal: Absence of Hospital-Acquired Illness or Injury  Outcome: Ongoing, Progressing  Intervention: Identify and Manage Fall Risk  Flowsheets (Taken 7/3/2023 0951)  Safety Promotion/Fall Prevention: in recliner, wheels locked  Intervention: Prevent Infection  Flowsheets (Taken 7/3/2023 0951)  Infection Prevention:   hand hygiene promoted   equipment surfaces disinfected  Goal: Optimal Comfort and Wellbeing  Outcome: Ongoing, Progressing  Intervention: Provide Person-Centered Care  Flowsheets (Taken 7/3/2023 0951)  Trust Relationship/Rapport:   care explained   reassurance provided   choices provided   thoughts/feelings acknowledged   emotional support provided   empathic listening provided   questions answered   questions encouraged     Problem: Anemia  Goal: Anemia Symptom Improvement  Outcome: Ongoing, Progressing  Intervention: Monitor and Manage Anemia  Flowsheets (Taken 7/3/2023 0951)  Safety Promotion/Fall Prevention: in recliner, wheels locked  Fatigue Management: fatigue-related activity identified

## 2023-07-03 NOTE — TELEPHONE ENCOUNTER
No care due was identified.  [facility] Embedded Care Due Messages. Reference number: [id number].   7/03/2023 11:14:52 AM CDT

## 2023-07-03 NOTE — TELEPHONE ENCOUNTER
Pt called stating that the pharmacy put her Rx for Zocor on hold because it will interfere with a current Rx that the pt is already taking and that Rx is Zanaflex. Please advise.

## 2023-07-04 RX ORDER — SIMVASTATIN 20 MG/1
TABLET, FILM COATED ORAL
Qty: 90 TABLET | Refills: 3 | OUTPATIENT
Start: 2023-07-04

## 2023-07-04 NOTE — TELEPHONE ENCOUNTER
Refill Decision Note   Zakia Price  is requesting a refill authorization.  Brief Assessment and Rationale for Refill:  Quick Discontinue     Medication Therapy Plan:  Receipt confirmed by pharmacy (6/29/2023 10:12 AM CDT)      Comments:     Note composed:3:42 AM 07/04/2023

## 2023-07-05 RX ORDER — EZETIMIBE 10 MG/1
10 TABLET ORAL DAILY
Qty: 90 TABLET | Refills: 3 | Status: SHIPPED | OUTPATIENT
Start: 2023-07-05 | End: 2024-07-04

## 2023-07-05 NOTE — TELEPHONE ENCOUNTER
I have signed for the following orders AND/OR meds.  Please call the patient and ask the patient to schedule the testing AND/OR inform about any medications that were sent.      No orders of the defined types were placed in this encounter.      Medications Ordered This Encounter   Medications    ezetimibe (ZETIA) 10 mg tablet     Sig: Take 1 tablet (10 mg total) by mouth once daily.     Dispense:  90 tablet     Refill:  3

## 2023-07-06 ENCOUNTER — CLINICAL SUPPORT (OUTPATIENT)
Dept: REHABILITATION | Facility: HOSPITAL | Age: 63
End: 2023-07-06
Attending: FAMILY MEDICINE
Payer: MEDICARE

## 2023-07-06 DIAGNOSIS — R29.898 DECREASED STRENGTH OF UPPER EXTREMITY: ICD-10-CM

## 2023-07-06 DIAGNOSIS — M62.838 CERVICAL PARASPINAL MUSCLE SPASM: ICD-10-CM

## 2023-07-06 DIAGNOSIS — M25.612 DECREASED ROM OF LEFT SHOULDER: ICD-10-CM

## 2023-07-06 DIAGNOSIS — R29.898 DECREASED RANGE OF MOTION OF NECK: ICD-10-CM

## 2023-07-06 PROCEDURE — 97110 THERAPEUTIC EXERCISES: CPT | Mod: HCNC,PN

## 2023-07-06 PROCEDURE — 97161 PT EVAL LOW COMPLEX 20 MIN: CPT | Mod: HCNC,PN

## 2023-07-06 PROCEDURE — 97112 NEUROMUSCULAR REEDUCATION: CPT | Mod: HCNC,PN

## 2023-07-06 NOTE — PLAN OF CARE
[facility]   Physical Therapy Initial Evaluation      Name: Zakia Price  Clinic Number: [id number]    Therapy Diagnosis:   Encounter Diagnoses   Name Primary?    Cervical paraspinal muscle spasm     Decreased strength of upper extremity     Decreased ROM of left shoulder     Decreased range of motion of neck         Physician: Oleksandr Nagel MD    Physician Orders: PT Eval and Treat  Medical Diagnosis from Referral: M62.838 (ICD-10-CM) - Cervical paraspinal muscle spasm  Evaluation Date: 7/6/2023  Authorization Period Expiration: 6/28/24  Plan of Care Expiration: 9/6/23  Progress Note Due: 8/6/23  Visit # / Visits authorized: (1 / 1 Eval) Need Auth   FOTO: 1 / 3 (7/6/23-IE)    Precautions: Type 2 Diabetes, HTN, Hypothyroidism     Time In: 7:45 AM  Time Out: 8:30 AM  Total Billable Time: 45 minutes    Subjective     Date of onset: ~2 weeks ago    History of current condition - Zakia reports: that she has been having a lot of tightness and pain along the L side of her neck and down into the left shoulder. Does not recall a true mechanism of injury but it all started bothering her about 2 weeks ago. She does feel like the symptoms have improved overall but can still feel some of the tightness. Reaching and lifting overhead will increase her symptoms. She is currently taking a muscle relaxer but that was not prescribed for this condition. No recent falls or trouble with her balance. Turning her neck side to side has been harder as well as looking up to the ceiling. She does have some discomfort with sleeping at night. MD told her to try therapy and see if that helps. She has not been using any type of modality to assist with symptom management. She does have type 2 diabetes and high blood pressure but both are well controlled. No prior surgeries to the neck or shoulders.    Falls: None    Imaging: none    Prior Therapy: Yes  Social History: lives with their family  (grandson)  Occupation: Retired  Prior Level of Function: independent with all ADL's  Current Level of Function: difficulty with reaching and lifting, difficulty turning and looking with neck    Pain:  Current 5/10, worst 10/10, best 0/10   Location: left neck   Description: Aching and Tight  Aggravating Factors: Lifting and reaching and sleeping at night  Easing Factors: rest and muscle relaxer    Patients goals: decrease pain and tension, improve mobility     Medical History:   Past Medical History:   Diagnosis Date    Acute respiratory failure due to COVID-19     COVID-19     Diabetes mellitus, type 2     Diabetic neuropathy 1/27/2014    DJD (degenerative joint disease) of knee     DVT (deep venous thrombosis) around 1990's    in leg, is on no anticoagulant therapy presently    Fatty liver     GERD (gastroesophageal reflux disease)     Hypertension associated with diabetes     HECTOR (iron deficiency anemia) 5/13/2021    Multinodular goiter     Obesity, morbid, BMI 50 or higher     Sleep apnea     has no CPAP       Surgical History:   Zakia Price  has a past surgical history that includes Hysterectomy; Tonsillectomy; Wrist fracture surgery; Shoulder arthroscopy; Thyroidectomy, partial (Right); Fracture surgery; Tubal ligation (1984); Colonoscopy (N/A, 5/12/2021); Esophagogastroduodenoscopy (N/A, 7/8/2021); Epidural steroid injection into cervical spine (N/A, 10/8/2021); Epidural steroid injection (N/A, 12/10/2021); Selective injection of anesthetic agent around lumbar spinal nerve root by transforaminal approach (Bilateral, 5/6/2022); Intraluminal gastrointestinal tract imaging via capsule (N/A, 10/24/2022); and Selective injection of anesthetic agent around lumbar spinal nerve root by transforaminal approach (Bilateral, 12/30/2022).    Medications:   Zakia has a current medication list which includes the following prescription(s): diltiazem, epinephrine, ezetimibe, ferrous sulfate, fluticasone  propionate, breatherite valved mdi chamber, levothyroxine, loratadine, meloxicam, metformin, methocarbamol, pantoprazole, pregabalin, ropinirole, ozempic, simvastatin, sulfacetamide sodium 10%, tizanidine, topiramate, and trelegy ellipta.    Allergies:   Review of patient's allergies indicates:   Allergen Reactions    Codeine sulfate      Nausea^    Lisinopril Swelling     angioedema    Codeine Nausea Only and Rash        Objective      Posture: FAIR - rounded shoulders while sitting, slightly forward head and thoracic kyphosis present    Gait: non-antalgic in nature- no obvious signs of distress. Very pleasant AAF. No AD used.     Cervical Range of Motion:    Degrees Observation Pain   Flexion 40 - - (tightness only)     Extension 30 - + (painful on L)     Right Rotation 50% limited - +     Left Rotation 50% limited - +     Right Sidebend 30 - - (tightness only)     Left Sidebend 25 - + (pain on L)        Thoracic Range of Motion: decreased when moving into extension    Shoulder Active Range of Motion:   Shoulder Right Left   Flexion   180 110*   Abduction   180 90*   ER at 90   80 80   IR at 90 65 65      Strength:   Right Left   Flexion  4+/5 4-/5*   Abduction 4+/5 4/5*   Scaption 4+/5 4/5*   Shoulder ER at side 4+/5 4/5   Shoulder IR at side  4+/5 4+/5   Middle trap 4+/5 4+/5   Lower trap 4/5 4/5     Pain = *    Special Tests:    Ligamentous Stability    Sharp-Roxana -     Distraction +   Compression -   Spurlings +     Cervical Joint Mobility: decreased along the transverse plane from C3-C7      Thoracic Joint Mobility: decreased when moving into extension    Palpation: mild to moderate TTP along the left UT, LS, and cervical paraspinals     Intake Outcome Measure for FOTO Neck Survey    Therapist reviewed FOTO scores for Zakia Price on 7/6/2023.   FOTO documents entered into [employer] - see Media section.    Intake Score: 46.0     Treatment     Total Treatment time (time-based codes) separate from  Evaluation: 16 minutes     Zakia received the treatments listed below:      Therapeutic Exercises to develop strength, endurance, ROM, and flexibility for 8 minutes including:    Seated UT Stretch 3x10s B  Seated LS Stretch 3x10s B      Neuromuscular Re-education activities to improve: Balance, Coordination, Kinesthetic, Sense, Proprioception, and Posture for 8 minutes. The following activities were included:    Seated Scap Retractions x10 reps (3s holds)  Seated Brugger's x10 reps YTB  Supine Chin Tucks x10 reps B (3s)      Patient Education and Home Exercises     Education provided:   - Home Exercise Program Administration and Review  - Post Exercise Soreness  - Maintaining a pain free range of motion with all activities  - Anatomy/Physiology of the Cervical Spine and the surrounding musculature    Written Home Exercises Provided: yes. Exercises were reviewed and Zakia was able to demonstrate them prior to the end of the session.  Zakia demonstrated good  understanding of the education provided. See EMR under Patient Instructions for exercises provided during therapy sessions.    Assessment     Zakia is a 63 y.o. female referred to outpatient Physical Therapy with a medical diagnosis of Cervical paraspinal muscle spasm. Patient presents with decreased cervical range of motion into all planes, decreased UE strength, poor posture, decreased endurance/tolerance to activity, TTP along the L UT, LS, and cervical paraspinals, and decreased functional mobility overall. Treatment will focus on joint mobility, flexibility, posture, periscapular stabilization, and UE strengthening.     Patient prognosis is Good.   Patient will benefit from skilled outpatient Physical Therapy to address the deficits stated above and in the chart below, provide patient /family education, and to maximize patientt's level of independence.     Plan of care discussed with patient: Yes  Patient's spiritual, cultural and educational needs  considered and patient is agreeable to the plan of care and goals as stated below:     Anticipated Barriers for therapy: none stated    Medical Necessity is demonstrated by the following  History  Co-morbidities and personal factors that may impact the plan of care [] LOW: no personal factors / co-morbidities  [] MODERATE: 1-2 personal factors / co-morbidities  [x] HIGH: 3+ personal factors / co-morbidities    Moderate / High Support Documentation:   Co-morbidities affecting plan of care: Type 2 Diabetes, HTN, Hypothyroidism     Personal Factors:   no deficits     Examination  Body Structures and Functions, activity limitations and participation restrictions that may impact the plan of care [x] LOW: addressing 1-2 elements  [] MODERATE: 3+ elements  [] HIGH: 4+ elements (please support below)    Moderate / High Support Documentation: N/A     Clinical Presentation [x] LOW: stable  [] MODERATE: Evolving  [] HIGH: Unstable     Decision Making/ Complexity Score: low       Goals:  Short Term Goals: 4 weeks   - Patient will be able to sit for at least 10 minutes with proper posture for improved mechanical alignment and greater tolerance to household activities.  - Patient will demonstrate improved UE strength, especially into shoulder flexion, by at least 1/2 grade via MMT for increased stability and support with functional activities.   - Patient will demonstrate improved cervical range of motion, especially into cervical rotation, by at least 25% degrees for increased ability to perform functional tasks.  - Patient will demonstrate improved LUE shoulder range of motion, especially into shoulder scaption, by at least 10 degrees for improved performance of every day tasks.    Long Term Goals: 8 weeks   - Patient will be able to sit for at least 20 minutes with proper posture for improved mechanical alignment and greater tolerance to household activities.  - Patient will demonstrate improved UE strength, especially into  shoulder scaption, by at least 1/2 grade via MMT for increased stability and support with functional activities.   - Patient will demonstrate improved cervical range of motion, especially into cervical side-bending, by at least 5 degrees for increased ability to perform functional tasks.  - Patient will demonstrate improved LUE shoulder range of motion, especially into shoulder flexion, by at least 10 degrees for improved performance of every day tasks.  - Patient will demonstrate independence with Home Exercise Program for continued improvements outside the clinical setting.  - Patient will demonstrate improved FOTO score that is greater than or equal to the predicted value for increased ability to perform ADL's.    Plan     Plan of care Certification: 7/6/2023 to 9/6/23.    Outpatient Physical Therapy 2 times weekly for 8 weeks to include the following interventions: Aquatic Therapy, Cervical/Lumbar Traction, Electrical Stimulation IFC/TENS/PREMOD, Gait Training, Manual Therapy, Moist Heat/ Ice, Neuromuscular Re-ed, Patient Education, Self Care, Therapeutic Activities, Therapeutic Exercise, Ultrasound, and Dry Needling (by a certified therapist).     This patient CAN be treated by a PTA.46    Linda Horowitz, PT, DPT, Cert. DN

## 2023-07-10 ENCOUNTER — TELEPHONE (OUTPATIENT)
Dept: INFUSION THERAPY | Facility: HOSPITAL | Age: 63
End: 2023-07-10
Payer: MEDICARE

## 2023-07-10 NOTE — TELEPHONE ENCOUNTER
----- Message from Candice Cobb sent at 7/10/2023  9:12 AM CDT -----  Contact: Patient  Zakia Buchanan Albert would like a call back at [phone], in regards to rescheduling the infusion she missed on today.

## 2023-07-10 NOTE — TELEPHONE ENCOUNTER
Spoke w/ pt she stated she went to the [place] this AM for her appt not the [facility]. Appt rescheduled for 7/31 and all future appts confirmed w/ pt at this time.

## 2023-07-14 ENCOUNTER — CLINICAL SUPPORT (OUTPATIENT)
Dept: REHABILITATION | Facility: HOSPITAL | Age: 63
End: 2023-07-14
Attending: FAMILY MEDICINE
Payer: MEDICARE

## 2023-07-14 DIAGNOSIS — R29.898 DECREASED RANGE OF MOTION OF NECK: ICD-10-CM

## 2023-07-14 DIAGNOSIS — R29.898 DECREASED STRENGTH OF UPPER EXTREMITY: Primary | ICD-10-CM

## 2023-07-14 DIAGNOSIS — M25.612 DECREASED ROM OF LEFT SHOULDER: ICD-10-CM

## 2023-07-14 PROCEDURE — 97140 MANUAL THERAPY 1/> REGIONS: CPT | Mod: HCNC,PN

## 2023-07-14 PROCEDURE — 97112 NEUROMUSCULAR REEDUCATION: CPT | Mod: HCNC,PN

## 2023-07-14 PROCEDURE — 97110 THERAPEUTIC EXERCISES: CPT | Mod: HCNC,PN

## 2023-07-14 NOTE — PROGRESS NOTES
"     [facility]   Physical Therapy Treatment Note       Name: Zakia Price  Clinic Number: [id number]    Therapy Diagnosis:   Encounter Diagnoses   Name Primary?    Decreased strength of upper extremity Yes    Decreased ROM of left shoulder     Decreased range of motion of neck      Physician: Oleksandr Nagel MD    Visit Date: 7/14/2023    Physician Orders: PT Eval and Treat  Medical Diagnosis from Referral: M62.838 (ICD-10-CM) - Cervical paraspinal muscle spasm  Evaluation Date: 7/6/2023  Authorization Period Expiration: 6/28/24  Plan of Care Expiration: 9/6/23  Progress Note Due: 8/6/23  Visit # / Visits authorized: (1 / 1 Eval) Need Auth   FOTO: 1 / 3 (7/6/23-IE)     Precautions: Type 2 Diabetes, HTN, Hypothyroidism   Date of Surgery: NA     PTA Visit #: 0/5     Time In: 8:45 am  Time Out: 9:30 am  Total Appointment Time: 45 minutes  Total Billable Time: 45 minutes  SUBJECTIVE      Pt reports: she did okay with the stretches given for HEP.  She was compliant with home exercise program.  Response to previous treatment: no complaints  Functional change: none yet    Pain: 5/10  Location: left neck     OBJECTIVE     Objective Measures updated at progress report unless specified.    Treatment      Zakia received therapeutic exercises to develop strength, ROM, and posture for 15 minutes including:  Upper trapezius stretch 30"H x 2 ea  Levator scap stretch 30"H x 2 ea  Posterior shoulder rolls x 15  Supine flexion punch up, left x 15  Supine shoulder flexion, left x 10  Supine horizontal abduction, left x 10      Zakia received the following manual therapy techniques for 10 minutes:   Grade II-III mid to upper cervical rotational mobs and side glides  Grade II-III inferior gleno humeral glides in varying degrees of flexion     Zakia participated in neuromuscular re-education activities to improve: Kinesthetic and Posture for 15 minutes. The following activities were " "included:  Posture correction seated edge of mat  Scap retractions 3"H x 20  Bilateral ER  x 15 yellow thera-band   Supine chin tucks 3"H x 2 min      Zakia participated in dynamic functional therapeutic activities to improve functional performance for 5  minutes, including:  Supine shoulder flexion with 1# dowel 1 min x 2  Pulley flexion x 3 min.         Patient Education and Home Exercises     Home Exercises Provided and Patient Education Provided     Education provided:   - gentle ROM for improved shoulder mobility    Written Home Exercises Provided: Patient instructed to cont prior HEP.  Exercises were reviewed and Zakia was able to demonstrate them prior to the end of the session.  Zakia demonstrated good  understanding of the education provided.     See EMR under Patient Instructions for exercises provided prior visit.       ASSESSMENT      Patient provided good effort and participation in therapy today with focus on improving AROM left shoulder. Patient with good tolerance to manual treatment as PT did find cervical restrictions in mid and upper cervical spine. Patient able to complete all exercises and activities with minimal complaints of pain and good tolerance overall    Anticipated Barriers for therapy: none stated     Paste from jannette Zakia is a 63 y.o. female referred to outpatient Physical Therapy with a medical diagnosis of Cervical paraspinal muscle spasm. Patient presents with decreased cervical range of motion into all planes, decreased UE strength, poor posture, decreased endurance/tolerance to activity, TTP along the L UT, LS, and cervical paraspinals, and decreased functional mobility overall. Treatment will focus on joint mobility, flexibility, posture, periscapular stabilization, and UE strengthening.      Patient prognosis is Good.      Pt will continue to benefit from skilled outpatient physical therapy to address the deficits listed in the problem list box on initial evaluation, provide " pt/family education and to maximize pt's level of independence in the home and community environment.     Goals:   Short Term Goals: 4 weeks   - Patient will be able to sit for at least 10 minutes with proper posture for improved mechanical alignment and greater tolerance to household activities.  - Patient will demonstrate improved UE strength, especially into shoulder flexion, by at least 1/2 grade via MMT for increased stability and support with functional activities.   - Patient will demonstrate improved cervical range of motion, especially into cervical rotation, by at least 25% degrees for increased ability to perform functional tasks.  - Patient will demonstrate improved LUE shoulder range of motion, especially into shoulder scaption, by at least 10 degrees for improved performance of every day tasks.     Long Term Goals: 8 weeks   - Patient will be able to sit for at least 20 minutes with proper posture for improved mechanical alignment and greater tolerance to household activities.  - Patient will demonstrate improved UE strength, especially into shoulder scaption, by at least 1/2 grade via MMT for increased stability and support with functional activities.   - Patient will demonstrate improved cervical range of motion, especially into cervical side-bending, by at least 5 degrees for increased ability to perform functional tasks.  - Patient will demonstrate improved LUE shoulder range of motion, especially into shoulder flexion, by at least 10 degrees for improved performance of every day tasks.  - Patient will demonstrate independence with Home Exercise Program for continued improvements outside the clinical setting.  - Patient will demonstrate improved FOTO score that is greater than or equal to the predicted value for increased ability to perform ADL's.       PLAN    Plan of care Certification: 7/6/2023 to 9/6/23.     Continue to progress per plan of care. Advance as tolerated     Zoe Zhu, PT

## 2023-07-17 ENCOUNTER — INFUSION (OUTPATIENT)
Dept: INFUSION THERAPY | Facility: HOSPITAL | Age: 63
End: 2023-07-17
Attending: INTERNAL MEDICINE
Payer: MEDICARE

## 2023-07-17 VITALS
OXYGEN SATURATION: 99 % | TEMPERATURE: 97 F | SYSTOLIC BLOOD PRESSURE: 156 MMHG | HEART RATE: 71 BPM | DIASTOLIC BLOOD PRESSURE: 70 MMHG | RESPIRATION RATE: 18 BRPM

## 2023-07-17 DIAGNOSIS — D50.0 IRON DEFICIENCY ANEMIA DUE TO CHRONIC BLOOD LOSS: Primary | ICD-10-CM

## 2023-07-17 PROCEDURE — 96374 THER/PROPH/DIAG INJ IV PUSH: CPT | Mod: HCNC

## 2023-07-17 PROCEDURE — 63600175 PHARM REV CODE 636 W HCPCS: Mod: HCNC | Performed by: NURSE PRACTITIONER

## 2023-07-17 RX ORDER — SODIUM CHLORIDE 0.9 % (FLUSH) 0.9 %
10 SYRINGE (ML) INJECTION
Status: DISCONTINUED | OUTPATIENT
Start: 2023-07-17 | End: 2023-07-17 | Stop reason: HOSPADM

## 2023-07-17 RX ADMIN — IRON SUCROSE 200 MG: 20 INJECTION, SOLUTION INTRAVENOUS at 08:07

## 2023-07-17 NOTE — DISCHARGE INSTRUCTIONS
[place] [place] [place] [facility] [place]  [address] [address]  [address]  [phone] phone     [phone] fax  Hours of Operation: Monday- Friday 8:00am- 5:00pm  After hours phone  [phone]  Hematology / Oncology Physicians on call      SARKIS Julian Dr., Dr., NP Sydney Prescott, MOSES Cullen FNP    Please call with any concerns regarding your appointment today.

## 2023-07-17 NOTE — PLAN OF CARE
Problem: Adult Inpatient Plan of Care  Goal: Plan of Care Review  Outcome: Ongoing, Progressing  Flowsheets (Taken 7/17/2023 0838)  Plan of Care Reviewed With: patient  Goal: Patient-Specific Goal (Individualized)  Outcome: Ongoing, Progressing  Flowsheets (Taken 7/17/2023 0838)  Anxieties, Fears or Concerns: patient voices no concerns at this time  Individualized Care Needs: None  Goal: Optimal Comfort and Wellbeing  Outcome: Ongoing, Progressing  Intervention: Provide Person-Centered Care  Flowsheets (Taken 7/17/2023 0838)  Trust Relationship/Rapport:   care explained   reassurance provided   choices provided   thoughts/feelings acknowledged   emotional support provided   empathic listening provided   questions encouraged   questions answered

## 2023-07-18 ENCOUNTER — OFFICE VISIT (OUTPATIENT)
Dept: PODIATRY | Facility: CLINIC | Age: 63
End: 2023-07-18
Payer: MEDICARE

## 2023-07-18 DIAGNOSIS — E11.9 ENCOUNTER FOR COMPREHENSIVE DIABETIC FOOT EXAMINATION, TYPE 2 DIABETES MELLITUS: Primary | ICD-10-CM

## 2023-07-18 DIAGNOSIS — M54.17 LUMBOSACRAL RADICULOPATHY: ICD-10-CM

## 2023-07-18 DIAGNOSIS — E66.01 MORBID OBESITY: ICD-10-CM

## 2023-07-18 DIAGNOSIS — E11.49 TYPE II DIABETES MELLITUS WITH NEUROLOGICAL MANIFESTATIONS: ICD-10-CM

## 2023-07-18 DIAGNOSIS — L60.3 ONYCHODYSTROPHY: ICD-10-CM

## 2023-07-18 DIAGNOSIS — E11.42 DIABETIC POLYNEUROPATHY ASSOCIATED WITH TYPE 2 DIABETES MELLITUS: ICD-10-CM

## 2023-07-18 PROCEDURE — 99213 PR OFFICE/OUTPT VISIT, EST, LEVL III, 20-29 MIN: ICD-10-PCS | Mod: 25,HCNC,S$GLB, | Performed by: PODIATRIST

## 2023-07-18 PROCEDURE — 1160F RVW MEDS BY RX/DR IN RCRD: CPT | Mod: HCNC,CPTII,S$GLB, | Performed by: PODIATRIST

## 2023-07-18 PROCEDURE — 1101F PR PT FALLS ASSESS DOC 0-1 FALLS W/OUT INJ PAST YR: ICD-10-PCS | Mod: HCNC,CPTII,S$GLB, | Performed by: PODIATRIST

## 2023-07-18 PROCEDURE — 1159F MED LIST DOCD IN RCRD: CPT | Mod: HCNC,CPTII,S$GLB, | Performed by: PODIATRIST

## 2023-07-18 PROCEDURE — 3066F NEPHROPATHY DOC TX: CPT | Mod: HCNC,CPTII,S$GLB, | Performed by: PODIATRIST

## 2023-07-18 PROCEDURE — 99999 PR PBB SHADOW E&M-EST. PATIENT-LVL III: ICD-10-PCS | Mod: PBBFAC,HCNC,, | Performed by: PODIATRIST

## 2023-07-18 PROCEDURE — 3044F HG A1C LEVEL LT 7.0%: CPT | Mod: HCNC,CPTII,S$GLB, | Performed by: PODIATRIST

## 2023-07-18 PROCEDURE — 3061F PR NEG MICROALBUMINURIA RESULT DOCUMENTED/REVIEW: ICD-10-PCS | Mod: HCNC,CPTII,S$GLB, | Performed by: PODIATRIST

## 2023-07-18 PROCEDURE — 11721 DEBRIDE NAIL 6 OR MORE: CPT | Mod: Q9,HCNC,S$GLB, | Performed by: PODIATRIST

## 2023-07-18 PROCEDURE — 1159F PR MEDICATION LIST DOCUMENTED IN MEDICAL RECORD: ICD-10-PCS | Mod: HCNC,CPTII,S$GLB, | Performed by: PODIATRIST

## 2023-07-18 PROCEDURE — 99213 OFFICE O/P EST LOW 20 MIN: CPT | Mod: 25,HCNC,S$GLB, | Performed by: PODIATRIST

## 2023-07-18 PROCEDURE — 3288F FALL RISK ASSESSMENT DOCD: CPT | Mod: HCNC,CPTII,S$GLB, | Performed by: PODIATRIST

## 2023-07-18 PROCEDURE — 1160F PR REVIEW ALL MEDS BY PRESCRIBER/CLIN PHARMACIST DOCUMENTED: ICD-10-PCS | Mod: HCNC,CPTII,S$GLB, | Performed by: PODIATRIST

## 2023-07-18 PROCEDURE — 3288F PR FALLS RISK ASSESSMENT DOCUMENTED: ICD-10-PCS | Mod: HCNC,CPTII,S$GLB, | Performed by: PODIATRIST

## 2023-07-18 PROCEDURE — 11721 PR DEBRIDEMENT OF NAILS, 6 OR MORE: ICD-10-PCS | Mod: Q9,HCNC,S$GLB, | Performed by: PODIATRIST

## 2023-07-18 PROCEDURE — 99999 PR PBB SHADOW E&M-EST. PATIENT-LVL III: CPT | Mod: PBBFAC,HCNC,, | Performed by: PODIATRIST

## 2023-07-18 PROCEDURE — 3044F PR MOST RECENT HEMOGLOBIN A1C LEVEL <7.0%: ICD-10-PCS | Mod: HCNC,CPTII,S$GLB, | Performed by: PODIATRIST

## 2023-07-18 PROCEDURE — 3066F PR DOCUMENTATION OF TREATMENT FOR NEPHROPATHY: ICD-10-PCS | Mod: HCNC,CPTII,S$GLB, | Performed by: PODIATRIST

## 2023-07-18 PROCEDURE — 1101F PT FALLS ASSESS-DOCD LE1/YR: CPT | Mod: HCNC,CPTII,S$GLB, | Performed by: PODIATRIST

## 2023-07-18 PROCEDURE — 3061F NEG MICROALBUMINURIA REV: CPT | Mod: HCNC,CPTII,S$GLB, | Performed by: PODIATRIST

## 2023-07-18 NOTE — PROGRESS NOTES
Subjective:       Patient ID: Zakia Price is a 63 y.o. female.    Chief Complaint: Diabetic Foot Exam (Patient in for diabetic nail care, was last seen by her pcp on 6/29/2023.)    HPI: Patient presents to the office today for her yearly diabetic foot exam and risk evaluation.  She also presents with the chief complaint of elongated, thickened and dystrophic nail plates to the B/L foot. Patient also complains of moderate  foot pain to the right left secondary to neuropathy. [facility] being managed by Dr. Nagel. This patient is a Diabetic Type II, complicated with morbid obesity and Peripheral Neuropathy. Patient does follow with Primary Care and/or Endocrinology for management of Diabetes Mellitus. This patient's PMD is Oleksandr Nagel MD. This patient last saw his/her primary care provider on 06/29/2023    Hemoglobin A1C   Date Value Ref Range Status   06/14/2023 5.0 4.0 - 5.6 % Final     Comment:     ADA Screening Guidelines:  5.7-6.4%  Consistent with prediabetes  >or=6.5%  Consistent with diabetes    High levels of fetal hemoglobin interfere with the HbA1C  assay. Heterozygous hemoglobin variants (HbS, HgC, etc)do  not significantly interfere with this assay.   However, presence of multiple variants may affect accuracy.     03/13/2023 4.9 4.0 - 5.6 % Final     Comment:     ADA Screening Guidelines:  5.7-6.4%  Consistent with prediabetes  >or=6.5%  Consistent with diabetes    High levels of fetal hemoglobin interfere with the HbA1C  assay. Heterozygous hemoglobin variants (HbS, HgC, etc)do  not significantly interfere with this assay.   However, presence of multiple variants may affect accuracy.     09/20/2022 5.1 4.0 - 5.6 % Final     Comment:     ADA Screening Guidelines:  5.7-6.4%  Consistent with prediabetes  >or=6.5%  Consistent with diabetes    High levels of fetal hemoglobin interfere with the HbA1C  assay. Heterozygous hemoglobin variants (HbS, HgC, etc)do  not significantly interfere with  this assay.   However, presence of multiple variants may affect accuracy.     .     Review of patient's allergies indicates:   Allergen Reactions    Codeine sulfate      Nausea^    Lisinopril Swelling     angioedema    Codeine Nausea Only and Rash       Past Medical History:   Diagnosis Date    Acute respiratory failure due to COVID-19     COVID-19     Diabetes mellitus, type 2     Diabetic neuropathy 1/27/2014    DJD (degenerative joint disease) of knee     DVT (deep venous thrombosis) around 1990's    in leg, is on no anticoagulant therapy presently    Fatty liver     GERD (gastroesophageal reflux disease)     Hypertension associated with diabetes     HECTOR (iron deficiency anemia) 5/13/2021    Multinodular goiter     Obesity, morbid, BMI 50 or higher     Sleep apnea     has no CPAP       Family History   Problem Relation Age of Onset    Diabetes Mother     Hypertension Mother     Heart disease Mother     Cancer Father     Arthritis Father     Breast cancer Sister     Cancer Brother     Cancer Brother     Leukemia Son     Cancer Son        Social History     Socioeconomic History    Marital status: Single   Tobacco Use    Smoking status: Never    Smokeless tobacco: Never   Substance and Sexual Activity    Alcohol use: No    Drug use: No    Sexual activity: Not Currently       Past Surgical History:   Procedure Laterality Date    COLONOSCOPY N/A 5/12/2021    Procedure: COLONOSCOPY;  Surgeon: Carolina Rizo MD;  Location: [facility] ENDO;  Service: Endoscopy;  Laterality: N/A;    EPIDURAL STEROID INJECTION N/A 12/10/2021    Procedure: Lumbar L5/S1 IL TEETEE  Would like AM procedure, if possible;  Surgeon: Nae Paul MD;  Location: [facility] PAIN MGT;  Service: Pain Management;  Laterality: N/A;    EPIDURAL STEROID INJECTION INTO CERVICAL SPINE N/A 10/8/2021    Procedure: C7-T1 IL TEETEE-no sedation.  Needs IV-just incase;  Surgeon: Nae Paul MD;  Location: [facility] PAIN MGT;  Service: Pain Management;  Laterality: N/A;     ESOPHAGOGASTRODUODENOSCOPY N/A 7/8/2021    Procedure: EGD (ESOPHAGOGASTRODUODENOSCOPY) previous positve covid;  Surgeon: Jann Gutierrez MD;  Location: [facility] ENDO;  Service: Endoscopy;  Laterality: N/A;    FRACTURE SURGERY      HYSTERECTOMY      INTRALUMINAL GASTROINTESTINAL TRACT IMAGING VIA CAPSULE N/A 10/24/2022    Procedure: IMAGING PROCEDURE, GI TRACT, INTRALUMINAL, VIA CAPSULE;  Surgeon: Hang Lunsford RN;  Location: [facility] ENDO;  Service: Endoscopy;  Laterality: N/A;    SELECTIVE INJECTION OF ANESTHETIC AGENT AROUND LUMBAR SPINAL NERVE ROOT BY TRANSFORAMINAL APPROACH Bilateral 5/6/2022    Procedure: Bilateral L5/S1 TF TEETEE with RN IV sedation;  Surgeon: Nae Paul MD;  Location: [facility] PAIN MGT;  Service: Pain Management;  Laterality: Bilateral;    SELECTIVE INJECTION OF ANESTHETIC AGENT AROUND LUMBAR SPINAL NERVE ROOT BY TRANSFORAMINAL APPROACH Bilateral 12/30/2022    Procedure: Bilateral L5/S1 TF TEETEE RN IV Sedation;  Surgeon: Nae Paul MD;  Location: [facility] PAIN MGT;  Service: Pain Management;  Laterality: Bilateral;    SHOULDER ARTHROSCOPY      THYROIDECTOMY, PARTIAL Right     and transplatation of right superior parathyroid gland to the sternocleidomastoid muscle     TONSILLECTOMY      TUBAL LIGATION  1984    WRIST FRACTURE SURGERY      left       Review of Systems       Objective:   There were no vitals taken for this visit.    Physical Exam  LOWER EXTREMITY PHYSICAL EXAMINATION    VASCULAR:  The right dorsalis pedis pulse 2/4 and the right posterior tibial pulse 1/4.  The left dorsalis pedis pulse 2/4 and posterior tibial pulse on the left is 1/4.  Capillary refill is intact.  Pedal hair growth decreased.     NEUROLOGY: Protective sensation is not intact to the left and right plantar surfaces of the foot and digits, as the patient has no sensation/detection at greater than 4 distinct points of contact with 5.07 [place] Cele monofilament. Sensation to light touch is intact on the left and right foot.  Proprioception is intact, bilateral. Sensation to pin prick is reduced to absent. Vibratory sensation is diminished.    DERMATOLOGY:  Skin is supple, moist, intact.  There is no signs of callusing, ulcerations, other lesions identified to the dorsal or plantar aspect of the right or left foot.  The R1, 2, 5 and left L1,2, 5 are thickened, discolored dystrophic.  There is subungual debris.  Nail plates have area of dark discoloration.  The remaining nails 3-4 on the right foot and the left foot are elongated but of normal color, thickness, and texture.   There is no signs of ingrowing into the medial or lateral borders.  There is no evidence of wounds or skin breakdown.  No edema or erythema.  No obvious lacerations or fissuring.  Interdigital spaces are clean, dry, intact.  No rashes or scars appreciated.    ORTHOPEDIC: Manual Muscle Testing is 5/5 in all planes on the left and right, without pains, with and without resistance. Gait pattern is non-antalgic.    Assessment:     1. Encounter for comprehensive diabetic foot examination, type 2 diabetes mellitus    2. Diabetic polyneuropathy associated with type 2 diabetes mellitus    3. Type II diabetes mellitus with neurological manifestations    4. Lumbosacral radiculopathy    5. Morbid obesity    6. Onychodystrophy        Plan:     Encounter for comprehensive diabetic foot examination, type 2 diabetes mellitus    Diabetic polyneuropathy associated with type 2 diabetes mellitus    Type II diabetes mellitus with neurological manifestations    Lumbosacral radiculopathy    Morbid obesity    Onychodystrophy      I counseled the patient on his/her Diabetic Mellitus regarding today's clinical examination and objection findings. We did also discuss recent medication changes, pertinent labs and imaging evaluations and other medical consultation notes and progress notes. Greater than 50% of this visit was spent on counseling and coordination of care. Greater than 20 minutes of  this appt. was spent on education about the diabetic foot, in relation to PVD and/or neuropathy, and the prevention of limb loss.     Shoe gear is inspected and wear and proper fit/type. Patient is reminded of the importance of good nutrition and blood sugar control to help prevent podiatric complications of diabetes. Patient instructed on proper foot hygeine. We discussed wearing proper shoe gear, daily foot inspections, never walking without protective shoe gear, never putting sharp instruments to feet.  Patient  will continue to monitor the areas daily, inspect feet, wear protective shoe gear when ambulatory, moisturizer to maintain skin integrity.     Patient's DMI/DMII is managed by Internal/Family Medicine Physician and/or Endocrinology Advanced Practice Provider.    Dystrophic nail plates, as outlined above (R#1,2,5  ; L#1,2,5 ), are sharply debrided with double action nail nipper, and/or with the assistance of a mechanical rotary carlos alberto, with removal of all offending nail and nail border(s), for reduction of pains. Nails are reduced in terms of length, width and girth with removal of subungual debris to facilitate pain free weight bearing and ambulation. The elongated nails as outlined in the objective portion of this note, were trimmed to appropriate length, with a double action nail nipper, for alleviation/reduction of pains as well. Follow up in approx. 3-4 months.    Patient is counseled and reminded concerning the importance of good nutrition and healthy eating habits, which may include blood sugar control, to prevent and/or help podiatric foot and ankle complications.      Future Appointments   Date Time Provider Department Center   7/19/2023  9:15 AM Sameer Salazar PT [facility]   7/21/2023 10:15 AM Linda Horowitz PT [facility]   7/24/2023  8:00 AM CHAIR Rekha MEYER   7/25/2023  1:30 PM Linda Horowitz PT [facility]   7/27/2023  2:15 PM Linda  Carlos Manuel PT [facility]   7/31/2023  9:30 AM CHAIR 05 RICHARD TAFOYA

## 2023-07-19 ENCOUNTER — CLINICAL SUPPORT (OUTPATIENT)
Dept: REHABILITATION | Facility: HOSPITAL | Age: 63
End: 2023-07-19
Attending: FAMILY MEDICINE
Payer: MEDICARE

## 2023-07-19 DIAGNOSIS — R29.898 DECREASED RANGE OF MOTION OF NECK: ICD-10-CM

## 2023-07-19 DIAGNOSIS — M25.612 DECREASED ROM OF LEFT SHOULDER: ICD-10-CM

## 2023-07-19 DIAGNOSIS — R29.898 DECREASED STRENGTH OF UPPER EXTREMITY: Primary | ICD-10-CM

## 2023-07-19 PROCEDURE — 97112 NEUROMUSCULAR REEDUCATION: CPT | Mod: HCNC,PN

## 2023-07-19 PROCEDURE — 97140 MANUAL THERAPY 1/> REGIONS: CPT | Mod: HCNC,PN

## 2023-07-19 NOTE — PROGRESS NOTES
"[facility]   Physical Therapy Treatment Note       Name: Zakia Price  Clinic Number: [id number]    Therapy Diagnosis:   Encounter Diagnoses   Name Primary?    Decreased strength of upper extremity Yes    Decreased ROM of left shoulder     Decreased range of motion of neck      Physician: Oleksandr Nagel MD    Visit Date: 7/19/2023    Physician Orders: PT Eval and Treat  Medical Diagnosis from Referral: M62.838 (ICD-10-CM) - Cervical paraspinal muscle spasm  Evaluation Date: 7/6/2023  Authorization Period Expiration: 6/28/24  Plan of Care Expiration: 9/6/23  Progress Note Due: 8/6/23  Visit # / Visits authorized: 2/20 auth (1/1 eval)  FOTO: 1 / 3 (7/6/23-IE)     Precautions: Type 2 Diabetes, HTN, Hypothyroidism   Date of Surgery: NA     PTA Visit #: 0/5     Time In: 0915 am  Time Out: 0955 am  Total Appointment Time: 40 minutes  Total Billable Time: 30 minutes    SUBJECTIVE      Pt reports: little to no discomfort in the neck this morning. She has noticed increased tension and onset of headaches when stretching the neck to the left side.     She was compliant with home exercise program.  Response to previous treatment: no complaints  Functional change: none yet    Pain: 0/10  Location: left neck     OBJECTIVE     Objective Measures updated at progress report unless specified.    Treatment      Zakia received therapeutic exercises to develop strength, ROM, and posture for 15 minutes including:  Upper trapezius stretch 30"H x 2 ea  Levator scap stretch 30"H x 2 ea  Posterior shoulder rolls x 15  Supine flexion punch up, left x 15  Supine shoulder flexion, left x 10  Supine horizontal abduction, left x 10      Zakia received the following manual therapy techniques for 10 minutes:   Grade II-III mid to upper cervical rotational mobs and side glides  Grade II-III inferior gleno humeral glides in varying degrees of flexion     Zakia participated in neuromuscular re-education " "activities to improve: Kinesthetic and Posture for 15 minutes. The following activities were included:  Posture correction seated edge of mat  Scap retractions 3"H x 20  Bilateral ER  x 15 yellow thera-band   Supine chin tucks 3"H x 2 min      Zakia participated in dynamic functional therapeutic activities to improve functional performance for 5  minutes, including:  Supine shoulder flexion with 1# dowel 1 min x 2  Pulley flexion x 3 min.    *A portion of this treatment session is provided with the assistance of a skilled rehbailitation technician under the supervision of a licensed physical therapist         Patient Education and Home Exercises     Home Exercises Provided and Patient Education Provided     Education provided:   - gentle ROM for improved shoulder mobility    Written Home Exercises Provided: Patient instructed to cont prior HEP.  Exercises were reviewed and Zakia was able to demonstrate them prior to the end of the session.  Zakia demonstrated good  understanding of the education provided.     See EMR under Patient Instructions for exercises provided prior visit.       ASSESSMENT      Patient did well with all prescribed exercises for ROM, posture, and strengthening. She notes a positive response to manual techniques. Progress is being made toward established goals.     Anticipated Barriers for therapy: none stated     Paste from jannette Zakia is a 63 y.o. female referred to outpatient Physical Therapy with a medical diagnosis of Cervical paraspinal muscle spasm. Patient presents with decreased cervical range of motion into all planes, decreased UE strength, poor posture, decreased endurance/tolerance to activity, TTP along the L UT, LS, and cervical paraspinals, and decreased functional mobility overall. Treatment will focus on joint mobility, flexibility, posture, periscapular stabilization, and UE strengthening.      Patient prognosis is Good.      Pt will continue to benefit from skilled " outpatient physical therapy to address the deficits listed in the problem list box on initial evaluation, provide pt/family education and to maximize pt's level of independence in the home and community environment.     Goals:   Short Term Goals: 4 weeks   - Patient will be able to sit for at least 10 minutes with proper posture for improved mechanical alignment and greater tolerance to household activities. (progressing, not met)  - Patient will demonstrate improved UE strength, especially into shoulder flexion, by at least 1/2 grade via MMT for increased stability and support with functional activities. (progressing, not met)  - Patient will demonstrate improved cervical range of motion, especially into cervical rotation, by at least 25% degrees for increased ability to perform functional tasks. (progressing, not met)  - Patient will demonstrate improved LUE shoulder range of motion, especially into shoulder scaption, by at least 10 degrees for improved performance of every day tasks. (progressing, not met)     Long Term Goals: 8 weeks   - Patient will be able to sit for at least 20 minutes with proper posture for improved mechanical alignment and greater tolerance to household activities.  - Patient will demonstrate improved UE strength, especially into shoulder scaption, by at least 1/2 grade via MMT for increased stability and support with functional activities. (progressing, not met)  - Patient will demonstrate improved cervical range of motion, especially into cervical side-bending, by at least 5 degrees for increased ability to perform functional tasks. (progressing, not met)  - Patient will demonstrate improved LUE shoulder range of motion, especially into shoulder flexion, by at least 10 degrees for improved performance of every day tasks. (progressing, not met)  - Patient will demonstrate independence with Home Exercise Program for continued improvements outside the clinical setting. (progressing, not  met)  - Patient will demonstrate improved FOTO score that is greater than or equal to the predicted value for increased ability to perform ADL's. (progressing, not met)       PLAN   Plan of care Certification: 7/6/2023 to 9/6/23.     Continue to progress per plan of care. Advance as tolerated     Sameer Salazar, PT

## 2023-07-20 NOTE — PROGRESS NOTES
"[facility]   Physical Therapy Treatment Note     Name: Zakia Price  Clinic Number: [id number]    Therapy Diagnosis:   Encounter Diagnoses   Name Primary?    Decreased strength of upper extremity Yes    Decreased ROM of left shoulder     Decreased range of motion of neck      Physician: Oleksandr Nagel MD    Visit Date: 7/21/2023    Physician Orders: PT Eval and Treat  Medical Diagnosis from Referral: M62.838 (ICD-10-CM) - Cervical paraspinal muscle spasm  Evaluation Date: 7/6/2023  Authorization Period Expiration: 6/28/24  Plan of Care Expiration: 9/6/23  Progress Note Due: 8/6/23  Visit # / Visits authorized: 3 / 20 auth (1/1 eval)  FOTO: 2 / 3 (7/6/23-IE, 7/21/23)     Precautions: Type 2 Diabetes, HTN, Hypothyroidism   Date of Surgery: NA     PTA Visit #: 0/5     Time In: 10:00 AM  Time Out: 10:47 AM  Total Appointment Time: 47 minutes  Total Billable Time: 47 minutes    SUBJECTIVE      Pt reports: that she feels like her neck is doing better overall. Not having any pain this morning. No soreness or complaints after last session.    She was compliant with home exercise program.  Response to previous treatment: no complaints  Functional change: none yet    Pain: 0/10  Location: left neck     OBJECTIVE     Objective Measures updated at progress report unless specified.    Intake Outcome Measure for FOTO Neck Survey     Therapist reviewed FOTO scores for Zakia Price on 7/21/2023.   FOTO documents entered into [employer] - see Media section.     Intake Score: 34.0      Treatment      Zakia received therapeutic exercises to develop strength, ROM, and posture for 8 minutes including:    Upper trapezius stretch 30"H x 2 ea  Levator scap stretch 30"H x 2 ea  Posterior shoulder rolls x 15 reps  Supine shoulder flexion, left x 10 reps    Zakia received the following manual therapy techniques for 8 minutes: (LE's over wedge)    Grade II-III mid to upper cervical rotational mobs " "and side glides  Grade II-III inferior gleno humeral glides in varying degrees of flexion     Zakia participated in neuromuscular re-education activities to improve: Kinesthetic and Posture for 23 minutes. The following activities were included:    Posture correction seated edge of mat x10 reps  Scap retractions 3"H x 20  Seated Bilateral Shoulder ER  x 20 reps + YTB  Supine chin tucks 3"H x 2 min  Standing Shoulder Rows 2x10 reps (RTB)  Supine Serratus Punch, left only x20 reps  Supine horizontal abduction 2x10 reps + YTB    Zakia participated in dynamic functional therapeutic activities to improve functional performance for 8 minutes, including:    Supine shoulder flexion with 1# dowel 1 min x 2  Pulley flexion x 3 min.  Pulley Abduction x3 min      Patient received a hot pack to the cervical musculature x5 minutes post treatment session to assist with tissue extensibility and for pain relief. No adverse reactions observed. LE's elevated on wedge.     Patient Education and Home Exercises     Home Exercises Provided and Patient Education Provided     Education provided:   - gentle ROM for improved shoulder mobility  - post exercise soreness    Written Home Exercises Provided: Patient instructed to cont prior HEP.  Exercises were reviewed and Zakia was able to demonstrate them prior to the end of the session.  Zakia demonstrated good  understanding of the education provided.     See EMR under Patient Instructions for exercises provided prior visit.    ASSESSMENT      No increase in pain reported during treatment session this date. Manual therapy techniques continue to feel beneficial to the patient in terms of improving tissue extensibility and overall mobility. Introduced resisted shoulder rows to assist with periscapular strengthening and posture correction. Encouraged patient to continue with Home Exercise Program for maintenance outside the clinical setting.    Anticipated Barriers for therapy: none stated "     Paste from jannette Zakia is a 63 y.o. female referred to outpatient Physical Therapy with a medical diagnosis of Cervical paraspinal muscle spasm. Patient presents with decreased cervical range of motion into all planes, decreased UE strength, poor posture, decreased endurance/tolerance to activity, TTP along the L UT, LS, and cervical paraspinals, and decreased functional mobility overall. Treatment will focus on joint mobility, flexibility, posture, periscapular stabilization, and UE strengthening.      Patient prognosis is Good.      Pt will continue to benefit from skilled outpatient physical therapy to address the deficits listed in the problem list box on initial evaluation, provide pt/family education and to maximize pt's level of independence in the home and community environment.     Goals:   Short Term Goals: 4 weeks   - Patient will be able to sit for at least 10 minutes with proper posture for improved mechanical alignment and greater tolerance to household activities. (progressing, not met)  - Patient will demonstrate improved UE strength, especially into shoulder flexion, by at least 1/2 grade via MMT for increased stability and support with functional activities. (progressing, not met)  - Patient will demonstrate improved cervical range of motion, especially into cervical rotation, by at least 25% degrees for increased ability to perform functional tasks. (progressing, not met)  - Patient will demonstrate improved LUE shoulder range of motion, especially into shoulder scaption, by at least 10 degrees for improved performance of every day tasks. (progressing, not met)     Long Term Goals: 8 weeks   - Patient will be able to sit for at least 20 minutes with proper posture for improved mechanical alignment and greater tolerance to household activities.  - Patient will demonstrate improved UE strength, especially into shoulder scaption, by at least 1/2 grade via MMT for increased stability and support  with functional activities. (progressing, not met)  - Patient will demonstrate improved cervical range of motion, especially into cervical side-bending, by at least 5 degrees for increased ability to perform functional tasks. (progressing, not met)  - Patient will demonstrate improved LUE shoulder range of motion, especially into shoulder flexion, by at least 10 degrees for improved performance of every day tasks. (progressing, not met)  - Patient will demonstrate independence with Home Exercise Program for continued improvements outside the clinical setting. (progressing, not met)  - Patient will demonstrate improved FOTO score that is greater than or equal to the predicted value for increased ability to perform ADL's. (progressing, not met)       PLAN   Plan of care Certification: 7/6/2023 to 9/6/23.     Continue to progress per plan of care. Advance as tolerated     Linda Horowitz, PT, DPT, Cert. DN

## 2023-07-21 ENCOUNTER — CLINICAL SUPPORT (OUTPATIENT)
Dept: REHABILITATION | Facility: HOSPITAL | Age: 63
End: 2023-07-21
Attending: FAMILY MEDICINE
Payer: MEDICARE

## 2023-07-21 DIAGNOSIS — R29.898 DECREASED STRENGTH OF UPPER EXTREMITY: Primary | ICD-10-CM

## 2023-07-21 DIAGNOSIS — R29.898 DECREASED RANGE OF MOTION OF NECK: ICD-10-CM

## 2023-07-21 DIAGNOSIS — M25.612 DECREASED ROM OF LEFT SHOULDER: ICD-10-CM

## 2023-07-21 PROCEDURE — 97112 NEUROMUSCULAR REEDUCATION: CPT | Mod: HCNC,PN

## 2023-07-21 PROCEDURE — 97530 THERAPEUTIC ACTIVITIES: CPT | Mod: HCNC,PN

## 2023-07-24 ENCOUNTER — INFUSION (OUTPATIENT)
Dept: INFUSION THERAPY | Facility: HOSPITAL | Age: 63
End: 2023-07-24
Attending: NURSE PRACTITIONER
Payer: MEDICARE

## 2023-07-24 VITALS
HEART RATE: 88 BPM | TEMPERATURE: 98 F | OXYGEN SATURATION: 100 % | SYSTOLIC BLOOD PRESSURE: 148 MMHG | DIASTOLIC BLOOD PRESSURE: 83 MMHG | RESPIRATION RATE: 18 BRPM

## 2023-07-24 DIAGNOSIS — D50.0 IRON DEFICIENCY ANEMIA DUE TO CHRONIC BLOOD LOSS: Primary | ICD-10-CM

## 2023-07-24 PROCEDURE — 96374 THER/PROPH/DIAG INJ IV PUSH: CPT | Mod: HCNC

## 2023-07-24 PROCEDURE — 63600175 PHARM REV CODE 636 W HCPCS: Mod: HCNC | Performed by: NURSE PRACTITIONER

## 2023-07-24 RX ORDER — DIPHENHYDRAMINE HYDROCHLORIDE 50 MG/ML
50 INJECTION INTRAMUSCULAR; INTRAVENOUS ONCE AS NEEDED
Status: DISCONTINUED | OUTPATIENT
Start: 2023-07-24 | End: 2023-07-24 | Stop reason: HOSPADM

## 2023-07-24 RX ORDER — EPINEPHRINE 0.3 MG/.3ML
0.3 INJECTION SUBCUTANEOUS ONCE AS NEEDED
Status: DISCONTINUED | OUTPATIENT
Start: 2023-07-24 | End: 2023-07-24 | Stop reason: HOSPADM

## 2023-07-24 RX ADMIN — IRON SUCROSE 200 MG: 20 INJECTION, SOLUTION INTRAVENOUS at 08:07

## 2023-07-24 NOTE — PLAN OF CARE
Problem: Adult Inpatient Plan of Care  Goal: Plan of Care Review  Outcome: Ongoing, Progressing  Flowsheets (Taken 7/24/2023 0753)  Plan of Care Reviewed With: patient  Goal: Optimal Comfort and Wellbeing  Outcome: Ongoing, Progressing  Intervention: Provide Person-Centered Care  Flowsheets (Taken 7/24/2023 0753)  Trust Relationship/Rapport:   care explained   choices provided   emotional support provided   empathic listening provided   questions answered   questions encouraged   thoughts/feelings acknowledged   reassurance provided

## 2023-07-31 ENCOUNTER — INFUSION (OUTPATIENT)
Dept: INFUSION THERAPY | Facility: HOSPITAL | Age: 63
End: 2023-07-31
Attending: NURSE PRACTITIONER
Payer: MEDICARE

## 2023-07-31 VITALS
HEART RATE: 66 BPM | SYSTOLIC BLOOD PRESSURE: 150 MMHG | TEMPERATURE: 98 F | OXYGEN SATURATION: 100 % | RESPIRATION RATE: 18 BRPM | DIASTOLIC BLOOD PRESSURE: 67 MMHG

## 2023-07-31 DIAGNOSIS — D50.0 IRON DEFICIENCY ANEMIA DUE TO CHRONIC BLOOD LOSS: Primary | ICD-10-CM

## 2023-07-31 PROCEDURE — 63600175 PHARM REV CODE 636 W HCPCS: Mod: HCNC | Performed by: NURSE PRACTITIONER

## 2023-07-31 PROCEDURE — 96374 THER/PROPH/DIAG INJ IV PUSH: CPT | Mod: HCNC

## 2023-07-31 RX ADMIN — IRON SUCROSE 200 MG: 20 INJECTION, SOLUTION INTRAVENOUS at 08:07

## 2023-07-31 NOTE — PROGRESS NOTES
"[facility]   Physical Therapy Treatment Note     Name: Zakia Price  Clinic Number: [id number]    Therapy Diagnosis:   Encounter Diagnoses   Name Primary?    Decreased strength of upper extremity Yes    Decreased ROM of left shoulder     Decreased range of motion of neck      Physician: Oleksandr Nagel MD    Visit Date: 8/1/2023    Physician Orders: PT Eval and Treat  Medical Diagnosis from Referral: M62.838 (ICD-10-CM) - Cervical paraspinal muscle spasm  Evaluation Date: 7/6/2023  Authorization Period Expiration: 6/28/24  Plan of Care Expiration: 9/6/23  Progress Note Due: 8/6/23  Visit # / Visits authorized: 4 / 20 auth (1/1 eval)  FOTO: 2 / 3 (7/6/23-IE, 7/21/23)     Precautions: Type 2 Diabetes, HTN, Hypothyroidism   Date of Surgery: NA     PTA Visit #: 0/5     Time In: 9:00 AM  Time Out: 9:47 AM  Total Appointment Time: 47 minutes  Total Billable Time: 47 minutes    SUBJECTIVE      Pt reports: that the neck is doing okay for the most part. Not having any pain this morning. Did fine after last session that she can remember.    She was compliant with home exercise program.  Response to previous treatment: no complaints  Functional change: in progress    Pain: 0/10  Location: left neck     OBJECTIVE     Objective Measures updated at progress report unless specified.    Treatment      Zakia received therapeutic exercises to develop strength, ROM, and posture for 8 minutes including:    Upper trapezius stretch 30"H x 2 ea  Levator scap stretch 30"H x 2 ea  Posterior shoulder rolls x20 reps  Supine shoulder flexion, left x10 reps  Standing Wall Slides x10 reps (2-3s holds)      Zakia received the following manual therapy techniques for 8 minutes: (LE's over wedge)    Grade II-III mid to upper cervical rotational mobs and side glides  Grade II-III inferior gleno humeral glides in varying degrees of flexion       Zakia participated in Neuromuscular Re-education activities to " "improve: Kinesthetic and Posture for 23 minutes. The following activities were included:    Standing Shoulder Rows 3x10 reps (RTB)  Posture correction seated edge of mat x10 reps  Scap retractions 3"H x30 reps  Seated Bilateral Shoulder ER  x 30 reps + YTB  Supine Chin Tucks 3"H x 2 min  Supine Serratus Punch, left only x20 reps  Supine Horizontal Abduction 2x10 reps + YTB      Zakia participated in dynamic functional therapeutic activities to improve functional performance for 8 minutes, including:    Supine shoulder flexion with 1# dowel 1 min x 2  Pulley flexion x 3 min.  Pulley Abduction x3 min      Patient received a hot pack to the cervical musculature x5 minutes post treatment session to assist with tissue extensibility and for pain relief. No adverse reactions observed. LE's elevated on wedge.     Patient Education and Home Exercises     Home Exercises Provided and Patient Education Provided     Education provided:   - gentle ROM for improved shoulder mobility  - post exercise soreness    Written Home Exercises Provided: Patient instructed to cont prior HEP.  Exercises were reviewed and Zakia was able to demonstrate them prior to the end of the session.  Zakia demonstrated good  understanding of the education provided.     See EMR under Patient Instructions for exercises provided prior visit.    ASSESSMENT      Able to progress reps and or intensity of some exercises without exacerbation of symptoms. Wall slides were pain free initially but the left shoulder became irritated after a few reps so exercise was terminated. Manual therapy techniques continue to feel beneficial for the patient. Encouraged continued performance of Home Exercise Program.    Anticipated Barriers for therapy: none stated     Paste abilio jannette Zakia is a 63 y.o. female referred to outpatient Physical Therapy with a medical diagnosis of Cervical paraspinal muscle spasm. Patient presents with decreased cervical range of motion into all " planes, decreased UE strength, poor posture, decreased endurance/tolerance to activity, TTP along the L UT, LS, and cervical paraspinals, and decreased functional mobility overall. Treatment will focus on joint mobility, flexibility, posture, periscapular stabilization, and UE strengthening.      Patient prognosis is Good.      Pt will continue to benefit from skilled outpatient physical therapy to address the deficits listed in the problem list box on initial evaluation, provide pt/family education and to maximize pt's level of independence in the home and community environment.     Goals:   Short Term Goals: 4 weeks   - Patient will be able to sit for at least 10 minutes with proper posture for improved mechanical alignment and greater tolerance to household activities. (progressing, not met)  - Patient will demonstrate improved UE strength, especially into shoulder flexion, by at least 1/2 grade via MMT for increased stability and support with functional activities. (progressing, not met)  - Patient will demonstrate improved cervical range of motion, especially into cervical rotation, by at least 25% degrees for increased ability to perform functional tasks. (progressing, not met)  - Patient will demonstrate improved LUE shoulder range of motion, especially into shoulder scaption, by at least 10 degrees for improved performance of every day tasks. (progressing, not met)     Long Term Goals: 8 weeks   - Patient will be able to sit for at least 20 minutes with proper posture for improved mechanical alignment and greater tolerance to household activities.  - Patient will demonstrate improved UE strength, especially into shoulder scaption, by at least 1/2 grade via MMT for increased stability and support with functional activities. (progressing, not met)  - Patient will demonstrate improved cervical range of motion, especially into cervical side-bending, by at least 5 degrees for increased ability to perform  functional tasks. (progressing, not met)  - Patient will demonstrate improved LUE shoulder range of motion, especially into shoulder flexion, by at least 10 degrees for improved performance of every day tasks. (progressing, not met)  - Patient will demonstrate independence with Home Exercise Program for continued improvements outside the clinical setting. (progressing, not met)  - Patient will demonstrate improved FOTO score that is greater than or equal to the predicted value for increased ability to perform ADL's. (progressing, not met)       PLAN   Plan of care Certification: 7/6/2023 to 9/6/23.     Continue to progress per plan of care. Advance as tolerated     Linda Horowitz, PT, DPT, Cert. DN

## 2023-08-01 ENCOUNTER — CLINICAL SUPPORT (OUTPATIENT)
Dept: REHABILITATION | Facility: HOSPITAL | Age: 63
End: 2023-08-01
Attending: FAMILY MEDICINE
Payer: MEDICARE

## 2023-08-01 DIAGNOSIS — M25.612 DECREASED ROM OF LEFT SHOULDER: ICD-10-CM

## 2023-08-01 DIAGNOSIS — R29.898 DECREASED RANGE OF MOTION OF NECK: ICD-10-CM

## 2023-08-01 DIAGNOSIS — R29.898 DECREASED STRENGTH OF UPPER EXTREMITY: Primary | ICD-10-CM

## 2023-08-01 PROCEDURE — 97530 THERAPEUTIC ACTIVITIES: CPT | Mod: HCNC,PN

## 2023-08-01 PROCEDURE — 97112 NEUROMUSCULAR REEDUCATION: CPT | Mod: HCNC,PN

## 2023-08-03 ENCOUNTER — TELEPHONE (OUTPATIENT)
Dept: FAMILY MEDICINE | Facility: CLINIC | Age: 63
End: 2023-08-03
Payer: MEDICARE

## 2023-08-03 NOTE — TELEPHONE ENCOUNTER
----- Message from Oleksandr Nagel MD sent at 8/3/2023  3:46 PM CDT -----  Contact: self  I recommend that she hold the medication for two weeks in follow-up to see if the symptoms improved.  Follow-up sooner if no improvement.  ----- Message -----  From: Mara Guillen MA  Sent: 8/3/2023   3:42 PM CDT  To: Oleksandr Nagel MD    Please advise  ----- Message -----  From: Juanita Preciado  Sent: 8/3/2023   3:22 PM CDT  To: Shona Leggett Staff    Pt is asking for an return call in reference to medication prescribed for her cholesterol is causing her to have headaches and abdominal pain ,please call back at .[phone] Thx CJ

## 2023-08-03 NOTE — PROGRESS NOTES
[facility]   Physical Therapy Treatment Note     Name: Zakia Price  Clinic Number: [id number]    Therapy Diagnosis:   Encounter Diagnoses   Name Primary?    Decreased strength of upper extremity Yes    Decreased ROM of left shoulder     Decreased range of motion of neck      Physician: Oleksandr Nagel MD    Visit Date: 8/4/2023    Physician Orders: PT Eval and Treat  Medical Diagnosis from Referral: M62.838 (ICD-10-CM) - Cervical paraspinal muscle spasm  Evaluation Date: 7/6/2023  Authorization Period Expiration: 6/28/24  Plan of Care Expiration: 9/6/23  Progress Note Due: 8/6/23  Visit # / Visits authorized: 5 / 20 auth (1/1 eval)  FOTO: 2 / 3 (7/6/23-IE, 7/21/23)     Precautions: Type 2 Diabetes, HTN, Hypothyroidism   Date of Surgery: NA     PTA Visit #: 0/5     Time In: 9:30 AM  Time Out: 10:11 AM  Total Appointment Time: 41 minutes  Total Billable Time: 41 minutes    SUBJECTIVE      Pt reports: that she is not having pain in the neck at the moment. Felt okay after last time for the most part.    She was compliant with home exercise program.  Response to previous treatment: no complaints  Functional change: in progress    Pain: 0/10  Location: left neck     OBJECTIVE     Objective Measures updated at progress report unless specified.    Treatment      Zakia received therapeutic exercises to develop strength, ROM, and posture for 8 minutes including:    UBE x2 min (forward and backward) - level 1  Posterior shoulder rolls x20 reps  Standing Wall Slides x10 reps (2-3s holds)      Zakia received the following manual therapy techniques for 10 minutes: (LE's over wedge)    Grade II-III mid to upper cervical rotational mobs and side glides  Percussion Gun to Thoracic Paraspinals B      Zakia participated in Neuromuscular Re-education activities to improve: Kinesthetic and Posture for 15 minutes. The following activities were included:    Standing Shoulder Rows 3x10 reps  "(RTB)  Standing Shoulder Extensions 3x10 reps B (RTB)  Posture correction seated edge of mat x10 reps  Scap retractions 3"H x30 reps  Seated Bilateral Shoulder ER  x 30 reps + YTB  Supine Chin Tucks 3"H x 2 min  Supine Horizontal Abduction 2x10 reps + YTB      Zakia participated in dynamic functional therapeutic activities to improve functional performance for 8 minutes, including:    Supine shoulder flexion with 1# dowel 1 min x 2  Pulley flexion x 3 min.  Pulley Abduction x3 min         Patient Education and Home Exercises     Home Exercises Provided and Patient Education Provided     Education provided:   - gentle ROM for improved shoulder mobility  - post exercise soreness    Written Home Exercises Provided: Patient instructed to cont prior HEP.  Exercises were reviewed and Zakia was able to demonstrate them prior to the end of the session.  Zakia demonstrated good  understanding of the education provided.     See EMR under Patient Instructions for exercises provided prior visit.    ASSESSMENT      Verbal cues for correct performance of shoulder rows with emphasis on proper periscapular stabilization. Manual therapy techniques felt beneficial to the patient with no adverse reactions observed. Encouraged continued performance of Home Exercise Program. Will progress patient as able next session.    Anticipated Barriers for therapy: none stated     Paste from jannette Zakia is a 63 y.o. female referred to outpatient Physical Therapy with a medical diagnosis of Cervical paraspinal muscle spasm. Patient presents with decreased cervical range of motion into all planes, decreased UE strength, poor posture, decreased endurance/tolerance to activity, TTP along the L UT, LS, and cervical paraspinals, and decreased functional mobility overall. Treatment will focus on joint mobility, flexibility, posture, periscapular stabilization, and UE strengthening.      Patient prognosis is Good.      Pt will continue to benefit from " skilled outpatient physical therapy to address the deficits listed in the problem list box on initial evaluation, provide pt/family education and to maximize pt's level of independence in the home and community environment.     Goals:   Short Term Goals: 4 weeks   - Patient will be able to sit for at least 10 minutes with proper posture for improved mechanical alignment and greater tolerance to household activities. (progressing, not met)  - Patient will demonstrate improved UE strength, especially into shoulder flexion, by at least 1/2 grade via MMT for increased stability and support with functional activities. (progressing, not met)  - Patient will demonstrate improved cervical range of motion, especially into cervical rotation, by at least 25% degrees for increased ability to perform functional tasks. (progressing, not met)  - Patient will demonstrate improved LUE shoulder range of motion, especially into shoulder scaption, by at least 10 degrees for improved performance of every day tasks. (progressing, not met)     Long Term Goals: 8 weeks   - Patient will be able to sit for at least 20 minutes with proper posture for improved mechanical alignment and greater tolerance to household activities.  - Patient will demonstrate improved UE strength, especially into shoulder scaption, by at least 1/2 grade via MMT for increased stability and support with functional activities. (progressing, not met)  - Patient will demonstrate improved cervical range of motion, especially into cervical side-bending, by at least 5 degrees for increased ability to perform functional tasks. (progressing, not met)  - Patient will demonstrate improved LUE shoulder range of motion, especially into shoulder flexion, by at least 10 degrees for improved performance of every day tasks. (progressing, not met)  - Patient will demonstrate independence with Home Exercise Program for continued improvements outside the clinical setting. (progressing,  not met)  - Patient will demonstrate improved FOTO score that is greater than or equal to the predicted value for increased ability to perform ADL's. (progressing, not met)       PLAN   Plan of care Certification: 7/6/2023 to 9/6/23.     Continue to progress per plan of care. Advance as tolerated     Linda Horowitz, PT, DPT, Cert. DN

## 2023-08-03 NOTE — TELEPHONE ENCOUNTER
Dr. Nagel recommend that she hold the medication for two weeks in follow-up to see if the symptoms improved.  Follow-up sooner if no improvement.

## 2023-08-04 ENCOUNTER — CLINICAL SUPPORT (OUTPATIENT)
Dept: REHABILITATION | Facility: HOSPITAL | Age: 63
End: 2023-08-04
Attending: FAMILY MEDICINE
Payer: MEDICARE

## 2023-08-04 DIAGNOSIS — R29.898 DECREASED STRENGTH OF UPPER EXTREMITY: Primary | ICD-10-CM

## 2023-08-04 DIAGNOSIS — M25.612 DECREASED ROM OF LEFT SHOULDER: ICD-10-CM

## 2023-08-04 DIAGNOSIS — R29.898 DECREASED RANGE OF MOTION OF NECK: ICD-10-CM

## 2023-08-04 PROCEDURE — 97140 MANUAL THERAPY 1/> REGIONS: CPT | Mod: HCNC,PN

## 2023-08-04 PROCEDURE — 97530 THERAPEUTIC ACTIVITIES: CPT | Mod: HCNC,PN

## 2023-08-04 PROCEDURE — 97112 NEUROMUSCULAR REEDUCATION: CPT | Mod: HCNC,PN

## 2023-08-10 NOTE — PROGRESS NOTES
[facility]   Physical Therapy Treatment Note     Name: Zakia Price  Clinic Number: [id number]    Therapy Diagnosis:   Encounter Diagnoses   Name Primary?    Decreased strength of upper extremity Yes    Decreased ROM of left shoulder     Decreased range of motion of neck      Physician: Oleksandr Nagel MD    Visit Date: 8/11/2023    Physician Orders: PT Eval and Treat  Medical Diagnosis from Referral: M62.838 (ICD-10-CM) - Cervical paraspinal muscle spasm  Evaluation Date: 7/6/2023  Authorization Period Expiration: 6/28/24  Plan of Care Expiration: 9/6/23  Progress Note Due: 8/6/23  Visit # / Visits authorized: 6 / 20 auth (1/1 eval)  FOTO: 2 / 3 (7/6/23-IE, 7/21/23)     Precautions: Type 2 Diabetes, HTN, Hypothyroidism   Date of Surgery: NA     PTA Visit #: 0/5     Time In: 9:30 AM  Time Out: 10:10 AM  Total Appointment Time: 40 minutes  Total Billable Time: 40 minutes    SUBJECTIVE      Pt reports: that she is having a lot of pain in the shoulder and neck area this morning. Had trouble getting dressed earlier.    She was compliant with home exercise program.  Response to previous treatment: no complaints  Functional change: in progress    Pain: 9/10  Location: left neck     OBJECTIVE     Objective Measures updated at progress report unless specified.    Treatment      Zakia received therapeutic exercises to develop strength, ROM, and posture for 8 minutes including:    UBE x2.5 min (forward and backward) - level 1  Posterior shoulder rolls x30 reps      Zakia received the following manual therapy techniques for 10 minutes: (LE's over wedge)    Percussion Gun to UT's, LS, and Thoracic Paraspinals B      Zakia participated in Neuromuscular Re-education activities to improve: Kinesthetic and Posture for 14 minutes. The following activities were included:    Standing Shoulder Rows 3x10 reps (RTB)  Standing Shoulder Extensions 3x10 reps B (RTB)  Posture correction seated edge  "of mat x10 reps  Scap retractions 3"H x30 reps  Seated Bilateral Shoulder ER  x 30 reps + YTB      Zakia participated in dynamic functional therapeutic activities to improve functional performance for 8 minutes, including:    Supine shoulder flexion with 1# dowel 1 min x 2  Pulley flexion x 3 min.  Pulley Abduction x3 min      Supervised Modalities after being cleared for contradictions: IFC Electrical Stimulation:  Zakia received IFC Electrical Stimulation for pain control applied to the Upper Back Musculature. Pt received stimulation at 100% scan for 8 minutes. Zakia tolderated treatment well without any adverse effects. Patient seated for comfort.    Patient received a hot pack to the cervical musculature x8 minutes post treatment session to assist with tissue extensibility and for pain relief. No adverse reactions observed.      Patient Education and Home Exercises     Home Exercises Provided and Patient Education Provided     Education provided:   - gentle ROM for improved shoulder mobility  - post exercise soreness    Written Home Exercises Provided: Patient instructed to cont prior HEP.  Exercises were reviewed and Zakia was able to demonstrate them prior to the end of the session.  Zakia demonstrated good  understanding of the education provided.     See EMR under Patient Instructions for exercises provided prior visit.    ASSESSMENT      Patient with increased shoulder and neck pain this date. Limited tolerance to overhead movements. Emphasized scapular mobility throughout treatment session. Manual therapy techniques continues to be beneficial for the patient. Electrical stimulation was introduced to assist with pain relief and tissue extensibility. No adverse reactions noted.    Anticipated Barriers for therapy: none stated     Paste abilio Koehler is a 63 y.o. female referred to outpatient Physical Therapy with a medical diagnosis of Cervical paraspinal muscle spasm. Patient presents with decreased " cervical range of motion into all planes, decreased UE strength, poor posture, decreased endurance/tolerance to activity, TTP along the L UT, LS, and cervical paraspinals, and decreased functional mobility overall. Treatment will focus on joint mobility, flexibility, posture, periscapular stabilization, and UE strengthening.      Patient prognosis is Good.      Pt will continue to benefit from skilled outpatient physical therapy to address the deficits listed in the problem list box on initial evaluation, provide pt/family education and to maximize pt's level of independence in the home and community environment.     Goals:   Short Term Goals: 4 weeks   - Patient will be able to sit for at least 10 minutes with proper posture for improved mechanical alignment and greater tolerance to household activities. (progressing, not met)  - Patient will demonstrate improved UE strength, especially into shoulder flexion, by at least 1/2 grade via MMT for increased stability and support with functional activities. (progressing, not met)  - Patient will demonstrate improved cervical range of motion, especially into cervical rotation, by at least 25% degrees for increased ability to perform functional tasks. (progressing, not met)  - Patient will demonstrate improved LUE shoulder range of motion, especially into shoulder scaption, by at least 10 degrees for improved performance of every day tasks. (progressing, not met)     Long Term Goals: 8 weeks   - Patient will be able to sit for at least 20 minutes with proper posture for improved mechanical alignment and greater tolerance to household activities.  - Patient will demonstrate improved UE strength, especially into shoulder scaption, by at least 1/2 grade via MMT for increased stability and support with functional activities. (progressing, not met)  - Patient will demonstrate improved cervical range of motion, especially into cervical side-bending, by at least 5 degrees for  increased ability to perform functional tasks. (progressing, not met)  - Patient will demonstrate improved LUE shoulder range of motion, especially into shoulder flexion, by at least 10 degrees for improved performance of every day tasks. (progressing, not met)  - Patient will demonstrate independence with Home Exercise Program for continued improvements outside the clinical setting. (progressing, not met)  - Patient will demonstrate improved FOTO score that is greater than or equal to the predicted value for increased ability to perform ADL's. (progressing, not met)       PLAN   Plan of care Certification: 7/6/2023 to 9/6/23.     Continue to progress per plan of care. Advance as tolerated     Linda Horowitz, PT, DPT, Cert. DN

## 2023-08-11 ENCOUNTER — CLINICAL SUPPORT (OUTPATIENT)
Dept: REHABILITATION | Facility: HOSPITAL | Age: 63
End: 2023-08-11
Attending: FAMILY MEDICINE
Payer: MEDICARE

## 2023-08-11 DIAGNOSIS — R29.898 DECREASED RANGE OF MOTION OF NECK: ICD-10-CM

## 2023-08-11 DIAGNOSIS — M25.612 DECREASED ROM OF LEFT SHOULDER: ICD-10-CM

## 2023-08-11 DIAGNOSIS — R29.898 DECREASED STRENGTH OF UPPER EXTREMITY: Primary | ICD-10-CM

## 2023-08-11 PROCEDURE — 97014 ELECTRIC STIMULATION THERAPY: CPT | Mod: HCNC,PN

## 2023-08-11 PROCEDURE — 97112 NEUROMUSCULAR REEDUCATION: CPT | Mod: HCNC,PN

## 2023-08-11 PROCEDURE — 97530 THERAPEUTIC ACTIVITIES: CPT | Mod: HCNC,PN

## 2023-08-11 PROCEDURE — 97110 THERAPEUTIC EXERCISES: CPT | Mod: HCNC,PN

## 2023-08-16 ENCOUNTER — CLINICAL SUPPORT (OUTPATIENT)
Dept: REHABILITATION | Facility: HOSPITAL | Age: 63
End: 2023-08-16
Payer: MEDICARE

## 2023-08-16 DIAGNOSIS — E11.42 DIABETIC POLYNEUROPATHY ASSOCIATED WITH TYPE 2 DIABETES MELLITUS: ICD-10-CM

## 2023-08-16 DIAGNOSIS — R06.09 DOE (DYSPNEA ON EXERTION): ICD-10-CM

## 2023-08-16 DIAGNOSIS — E78.2 COMBINED HYPERLIPIDEMIA ASSOCIATED WITH TYPE 2 DIABETES MELLITUS: ICD-10-CM

## 2023-08-16 DIAGNOSIS — E11.65 TYPE 2 DIABETES MELLITUS WITH HYPERGLYCEMIA, WITHOUT LONG-TERM CURRENT USE OF INSULIN: Chronic | ICD-10-CM

## 2023-08-16 DIAGNOSIS — I15.2 HYPERTENSION ASSOCIATED WITH DIABETES: Chronic | ICD-10-CM

## 2023-08-16 DIAGNOSIS — E11.69 COMBINED HYPERLIPIDEMIA ASSOCIATED WITH TYPE 2 DIABETES MELLITUS: ICD-10-CM

## 2023-08-16 DIAGNOSIS — M25.612 DECREASED ROM OF LEFT SHOULDER: ICD-10-CM

## 2023-08-16 DIAGNOSIS — I73.9 CLAUDICATION OF BOTH LOWER EXTREMITIES: ICD-10-CM

## 2023-08-16 DIAGNOSIS — R29.898 DECREASED RANGE OF MOTION OF NECK: ICD-10-CM

## 2023-08-16 DIAGNOSIS — E11.59 HYPERTENSION ASSOCIATED WITH DIABETES: Chronic | ICD-10-CM

## 2023-08-16 DIAGNOSIS — E78.5 HYPERLIPIDEMIA, UNSPECIFIED HYPERLIPIDEMIA TYPE: ICD-10-CM

## 2023-08-16 DIAGNOSIS — R29.898 DECREASED STRENGTH OF UPPER EXTREMITY: Primary | ICD-10-CM

## 2023-08-16 DIAGNOSIS — R00.0 TACHYCARDIA: ICD-10-CM

## 2023-08-16 DIAGNOSIS — R01.1 SYSTOLIC MURMUR: ICD-10-CM

## 2023-08-16 DIAGNOSIS — R55 SYNCOPE, UNSPECIFIED SYNCOPE TYPE: ICD-10-CM

## 2023-08-16 PROCEDURE — 97530 THERAPEUTIC ACTIVITIES: CPT | Mod: HCNC,PN

## 2023-08-16 PROCEDURE — 97112 NEUROMUSCULAR REEDUCATION: CPT | Mod: HCNC,PN

## 2023-08-16 PROCEDURE — 97110 THERAPEUTIC EXERCISES: CPT | Mod: HCNC,PN

## 2023-08-16 PROCEDURE — 97014 ELECTRIC STIMULATION THERAPY: CPT | Mod: HCNC,PN

## 2023-08-16 RX ORDER — DILTIAZEM HYDROCHLORIDE 30 MG/1
30 TABLET, FILM COATED ORAL EVERY 12 HOURS
Qty: 180 TABLET | Refills: 1 | Status: SHIPPED | OUTPATIENT
Start: 2023-08-16 | End: 2024-01-08

## 2023-08-16 RX ORDER — SEMAGLUTIDE 2.68 MG/ML
2 INJECTION, SOLUTION SUBCUTANEOUS
Qty: 3 EACH | Refills: 1 | Status: SHIPPED | OUTPATIENT
Start: 2023-08-16 | End: 2024-01-06 | Stop reason: SDUPTHER

## 2023-08-16 NOTE — PROGRESS NOTES
[facility]   Physical Therapy Treatment Note     Name: Zakia Price  Clinic Number: [id number]    Therapy Diagnosis:   Encounter Diagnoses   Name Primary?    Decreased strength of upper extremity Yes    Decreased ROM of left shoulder     Decreased range of motion of neck      Physician: Oleksandr Nagel MD    Visit Date: 8/16/2023    Physician Orders: PT Eval and Treat  Medical Diagnosis from Referral: M62.838 (ICD-10-CM) - Cervical paraspinal muscle spasm  Evaluation Date: 7/6/2023  Authorization Period Expiration: 6/28/24  Plan of Care Expiration: 9/6/23  Progress Note Due: 8/6/23  Visit # / Visits authorized: 7 / 20 auth (1/1 eval)  FOTO: 2 / 3 (7/6/23-IE, 7/21/23)     Precautions: Type 2 Diabetes, HTN, Hypothyroidism   Date of Surgery: NA     PTA Visit #: 0/5     Time In: 0840 AM  Time Out: 0928 AM  Total Appointment Time: 48 minutes  Total Billable Time: 48 minutes    SUBJECTIVE      Pt reports: not much pain in the neck this morning. Symptoms have been manageable.    She was compliant with home exercise program.  Response to previous treatment: no complaints  Functional change: in progress    Pain: 0/10  Location: left neck     OBJECTIVE     Objective Measures updated at progress report unless specified.    Treatment      Zakia received therapeutic exercises to develop strength, ROM, and posture for 8 minutes including:    UBE x2.5 min (forward and backward) - level 1  Posterior shoulder rolls x30 reps with 2# DB      Zakia received the following manual therapy techniques for 8 minutes: (LE's over wedge)    Percussion Gun to UT's, LS, and Thoracic Paraspinals B      Zakia participated in Neuromuscular Re-education activities to improve: Kinesthetic and Posture for 14 minutes. The following activities were included:    Standing Shoulder Rows 3x10 reps (RTB)  Standing Shoulder Extensions 3x10 reps B (RTB)  Posture correction seated edge of mat x10 reps  Scap retractions  "3"H x30 reps  Seated Bilateral Shoulder ER  x 30 reps + YTB      Zakia participated in dynamic functional therapeutic activities to improve functional performance for 8 minutes, including:    Supine shoulder flexion with 2# dowel 1 min x 2  Pulley flexion x 3 min.  Pulley Abduction x3 min      Supervised Modalities after being cleared for contradictions: IFC Electrical Stimulation:  Zakia received IFC Electrical Stimulation for pain control applied to the Upper Back Musculature. Pt received stimulation at 100% scan for 10 minutes. Zakia tolderated treatment well without any adverse effects. Patient seated for comfort.    Patient received a hot pack to the cervical musculature x10 minutes post treatment session to assist with tissue extensibility and for pain relief. No adverse reactions observed.      Patient Education and Home Exercises     Home Exercises Provided and Patient Education Provided     Education provided:   - gentle ROM for improved shoulder mobility  - post exercise soreness    Written Home Exercises Provided: Patient instructed to cont prior HEP.  Exercises were reviewed and Zakia was able to demonstrate them prior to the end of the session.  Zakia demonstrated good  understanding of the education provided.     See EMR under Patient Instructions for exercises provided prior visit.    ASSESSMENT      Pt able to tolerate light strengthening activities for postural correction. There was no increase in neck pain throughout session. She notes positive response to modalities.    Anticipated Barriers for therapy: none stated     Paste from jannette Zakia is a 63 y.o. female referred to outpatient Physical Therapy with a medical diagnosis of Cervical paraspinal muscle spasm. Patient presents with decreased cervical range of motion into all planes, decreased UE strength, poor posture, decreased endurance/tolerance to activity, TTP along the L UT, LS, and cervical paraspinals, and decreased functional " mobility overall. Treatment will focus on joint mobility, flexibility, posture, periscapular stabilization, and UE strengthening.      Patient prognosis is Good.      Pt will continue to benefit from skilled outpatient physical therapy to address the deficits listed in the problem list box on initial evaluation, provide pt/family education and to maximize pt's level of independence in the home and community environment.     Goals:   Short Term Goals: 4 weeks   - Patient will be able to sit for at least 10 minutes with proper posture for improved mechanical alignment and greater tolerance to household activities. (progressing, not met)  - Patient will demonstrate improved UE strength, especially into shoulder flexion, by at least 1/2 grade via MMT for increased stability and support with functional activities. (progressing, not met)  - Patient will demonstrate improved cervical range of motion, especially into cervical rotation, by at least 25% degrees for increased ability to perform functional tasks. (progressing, not met)  - Patient will demonstrate improved LUE shoulder range of motion, especially into shoulder scaption, by at least 10 degrees for improved performance of every day tasks. (progressing, not met)     Long Term Goals: 8 weeks   - Patient will be able to sit for at least 20 minutes with proper posture for improved mechanical alignment and greater tolerance to household activities.  - Patient will demonstrate improved UE strength, especially into shoulder scaption, by at least 1/2 grade via MMT for increased stability and support with functional activities. (progressing, not met)  - Patient will demonstrate improved cervical range of motion, especially into cervical side-bending, by at least 5 degrees for increased ability to perform functional tasks. (progressing, not met)  - Patient will demonstrate improved LUE shoulder range of motion, especially into shoulder flexion, by at least 10 degrees for  improved performance of every day tasks. (progressing, not met)  - Patient will demonstrate independence with Home Exercise Program for continued improvements outside the clinical setting. (progressing, not met)  - Patient will demonstrate improved FOTO score that is greater than or equal to the predicted value for increased ability to perform ADL's. (progressing, not met)       PLAN   Plan of care Certification: 7/6/2023 to 9/6/23.     Continue to progress per plan of care. Advance as tolerated     Sameer Salazar, PT, DPT, Cert. DN

## 2023-08-16 NOTE — TELEPHONE ENCOUNTER
No care due was identified.  [facility] Embedded Care Due Messages. Reference number: [id number].   8/16/2023 10:11:10 AM CDT

## 2023-08-16 NOTE — TELEPHONE ENCOUNTER
Refill Routing Note   Medication(s) are not appropriate for processing by [facility] for the following reason(s):      Required vitals abnormal    [facility] action(s):  Defer Care Due:  None identified     Medication Therapy Plan: BP 7/31/23 (!) 150/67      Appointments  past 12m or future 3m with PCP    Date Provider   Last Visit   6/29/2023 Oleksandr Nagel MD   Next Visit   Visit date not found Oleksandr Nagel MD   ED visits in past 90 days: 0        Note composed:10:37 AM 08/16/2023

## 2023-08-16 NOTE — TELEPHONE ENCOUNTER
Refill Routing Note   Medication(s) are not appropriate for processing by [facility] for the following reason(s):      No active prescription written by provider    [facility] action(s):  Defer Care Due:  None identified              Appointments  past 12m or future 3m with PCP    Date Provider   Last Visit   6/29/2023 Oleksandr Nagel MD   Next Visit   Visit date not found Oleksandr Nagel MD   ED visits in past 90 days: 0        Note composed:2:30 PM 08/16/2023

## 2023-08-24 ENCOUNTER — TELEPHONE (OUTPATIENT)
Dept: FAMILY MEDICINE | Facility: CLINIC | Age: 63
End: 2023-08-24
Payer: MEDICARE

## 2023-08-24 RX ORDER — PREGABALIN 75 MG/1
75 CAPSULE ORAL 3 TIMES DAILY
Qty: 90 CAPSULE | Refills: 0 | Status: SHIPPED | OUTPATIENT
Start: 2023-08-24 | End: 2023-09-06

## 2023-08-24 NOTE — TELEPHONE ENCOUNTER
No care due was identified.  [facility] Embedded Care Due Messages. Reference number: [id number].   8/24/2023 2:45:44 PM CDT

## 2023-08-24 NOTE — TELEPHONE ENCOUNTER
----- Message from Shari Upton sent at 8/24/2023 12:15 PM CDT -----  Pt stated Dr. Nagel took her off medication that he thought was affecting her stomach, but she still have pain. Call the pt back at [phone]. Thx. EL

## 2023-08-24 NOTE — TELEPHONE ENCOUNTER
Patient wanted to let you know that she is still having headaches even after the medication you stopped that you thought may have been the cause. Please advise.

## 2023-08-25 ENCOUNTER — PATIENT MESSAGE (OUTPATIENT)
Dept: ADMINISTRATIVE | Facility: HOSPITAL | Age: 63
End: 2023-08-25
Payer: MEDICARE

## 2023-08-28 NOTE — TELEPHONE ENCOUNTER
No care due was identified.  [facility] Embedded Care Due Messages. Reference number: [id number].   8/28/2023 1:33:49 PM CDT

## 2023-08-29 ENCOUNTER — OFFICE VISIT (OUTPATIENT)
Dept: FAMILY MEDICINE | Facility: CLINIC | Age: 63
End: 2023-08-29
Payer: MEDICARE

## 2023-08-29 DIAGNOSIS — E03.9 HYPOTHYROIDISM, UNSPECIFIED TYPE: ICD-10-CM

## 2023-08-29 DIAGNOSIS — K21.00 GASTROESOPHAGEAL REFLUX DISEASE WITH ESOPHAGITIS WITHOUT HEMORRHAGE: Primary | ICD-10-CM

## 2023-08-29 DIAGNOSIS — E11.49 TYPE II DIABETES MELLITUS WITH NEUROLOGICAL MANIFESTATIONS: Chronic | ICD-10-CM

## 2023-08-29 DIAGNOSIS — Z79.899 ENCOUNTER FOR LONG-TERM (CURRENT) USE OF MEDICATIONS: ICD-10-CM

## 2023-08-29 DIAGNOSIS — R51.9 SINUS HEADACHE: ICD-10-CM

## 2023-08-29 PROCEDURE — 1159F MED LIST DOCD IN RCRD: CPT | Mod: HCNC,CPTII,95, | Performed by: FAMILY MEDICINE

## 2023-08-29 PROCEDURE — 1160F RVW MEDS BY RX/DR IN RCRD: CPT | Mod: HCNC,CPTII,95, | Performed by: FAMILY MEDICINE

## 2023-08-29 PROCEDURE — 3044F PR MOST RECENT HEMOGLOBIN A1C LEVEL <7.0%: ICD-10-PCS | Mod: HCNC,CPTII,95, | Performed by: FAMILY MEDICINE

## 2023-08-29 PROCEDURE — 3066F PR DOCUMENTATION OF TREATMENT FOR NEPHROPATHY: ICD-10-PCS | Mod: HCNC,CPTII,95, | Performed by: FAMILY MEDICINE

## 2023-08-29 PROCEDURE — 3061F PR NEG MICROALBUMINURIA RESULT DOCUMENTED/REVIEW: ICD-10-PCS | Mod: HCNC,CPTII,95, | Performed by: FAMILY MEDICINE

## 2023-08-29 PROCEDURE — 1160F PR REVIEW ALL MEDS BY PRESCRIBER/CLIN PHARMACIST DOCUMENTED: ICD-10-PCS | Mod: HCNC,CPTII,95, | Performed by: FAMILY MEDICINE

## 2023-08-29 PROCEDURE — 99214 OFFICE O/P EST MOD 30 MIN: CPT | Mod: HCNC,95,, | Performed by: FAMILY MEDICINE

## 2023-08-29 PROCEDURE — 3061F NEG MICROALBUMINURIA REV: CPT | Mod: HCNC,CPTII,95, | Performed by: FAMILY MEDICINE

## 2023-08-29 PROCEDURE — 3044F HG A1C LEVEL LT 7.0%: CPT | Mod: HCNC,CPTII,95, | Performed by: FAMILY MEDICINE

## 2023-08-29 PROCEDURE — 99214 PR OFFICE/OUTPT VISIT, EST, LEVL IV, 30-39 MIN: ICD-10-PCS | Mod: HCNC,95,, | Performed by: FAMILY MEDICINE

## 2023-08-29 PROCEDURE — 1159F PR MEDICATION LIST DOCUMENTED IN MEDICAL RECORD: ICD-10-PCS | Mod: HCNC,CPTII,95, | Performed by: FAMILY MEDICINE

## 2023-08-29 PROCEDURE — 3066F NEPHROPATHY DOC TX: CPT | Mod: HCNC,CPTII,95, | Performed by: FAMILY MEDICINE

## 2023-08-29 RX ORDER — MONTELUKAST SODIUM 10 MG/1
10 TABLET ORAL NIGHTLY
Qty: 30 TABLET | Refills: 0 | Status: SHIPPED | OUTPATIENT
Start: 2023-08-29 | End: 2023-09-28

## 2023-08-29 RX ORDER — FAMOTIDINE 40 MG/1
TABLET, FILM COATED ORAL
Qty: 90 TABLET | Refills: 0 | OUTPATIENT
Start: 2023-08-29

## 2023-08-29 NOTE — TELEPHONE ENCOUNTER
Refill Decision Note   Zakia Albert  is requesting a refill authorization.  Brief Assessment and Rationale for Refill:  Quick Discontinue     Medication Therapy Plan: Pharmacy is requesting new scripts for the following medications without required information, (sig/ frequency/qty/etc)     Medication Reconciliation Completed: No   Comments:     No Care Gaps recommended.     Note composed:8:33 AM 08/29/2023

## 2023-08-29 NOTE — ASSESSMENT & PLAN NOTE
Referral for EGD.  Referral to GI for further evaluation and treatment.  Continue PPI.  GERD RECOMMENDATIONS  Please be advised of condition course.  - Take PPI in the morning 30-60 minutes before breakfast  - I recommend ongoing Education for lifestyle modifications to help control/reduce reflux/abdominal pain including: avoid large meals, avoid eating within 2-3 hours of bedtime (avoid late night eating & lying down soon after eating), elevate head of bed if nocturnal symptoms are present, smoking cessation (if current smoker), & weight loss (if overweight).   - please be advised to avoid known foods which trigger reflux symptoms & to minimize/avoid high-fat foods, chocolate, caffeine, citrus, alcohol, & tomato products.  - Advised to avoid/limit use of NSAID's, since they can cause GI upset, bleeding, and/or ulcers. If needed, take with food.

## 2023-08-29 NOTE — PROGRESS NOTES
Primary Care Telemedicine Note  The patient location is:  Patient's Home - [place]  The chief complaint leading to consultation is:   Chief Complaint   Patient presents with    Gastroesophageal Reflux    Diabetes    Headache      Total time:  see MDM below. The total time spent on the encounter, which includes face to face time and non-face to face time preparing to see the patient (eg, review of previous medical records, tests), Obtaining and/or reviewing separately obtained history, documenting clinical information in the electronic or other health record, independently interpreting results (not separately reported)/communicating results to the patient/family/caregiver, and/or care coordination (not separately reported).    Visit type: Virtual visit with synchronous audio and video  Each patient to whom he or she provides medical services by telemedicine is:  (1) informed of the relationship between the physician and patient and the respective role of any other health care provider with respect to management of the patient; and (2) notified that he or she may decline to receive medical services by telemedicine and may withdraw from such care at any time.  =================================================================    PLAN:      Problem List Items Addressed This Visit       Gastroesophageal reflux disease with esophagitis without hemorrhage - Primary (Chronic)     Referral for EGD.  Referral to GI for further evaluation and treatment.  Continue PPI.  GERD RECOMMENDATIONS  Please be advised of condition course.  - Take PPI in the morning 30-60 minutes before breakfast  - I recommend ongoing Education for lifestyle modifications to help control/reduce reflux/abdominal pain including: avoid large meals, avoid eating within 2-3 hours of bedtime (avoid late night eating & lying down soon after eating), elevate head of bed if nocturnal symptoms are present, smoking cessation (if current smoker), & weight loss  (if overweight).   - please be advised to avoid known foods which trigger reflux symptoms & to minimize/avoid high-fat foods, chocolate, caffeine, citrus, alcohol, & tomato products.  - Advised to avoid/limit use of NSAID's, since they can cause GI upset, bleeding, and/or ulcers. If needed, take with food.          Relevant Orders    Ambulatory referral/consult to Gastroenterology    Ambulatory referral/consult to Endo Procedure     Type II diabetes mellitus with neurological manifestations (Chronic)     Referral to optometry.We will plan to monitor hemoglobin A1c at designated intervals 3 to 6 months.  I recommend ongoing Education for diabetic diet and exercise protocol.  We will continue to monitor for side effects.    Please be advised of symptoms to monitor for and to notify me immediately if persistent or worsening.  Follow up with Ophthalmology/Optometry and Podiatry at least annually.           Relevant Orders    Ambulatory referral/consult to Optometry    Encounter for long-term (current) use of medications (Chronic)     Complete history and physical was completed today.  Complete and thorough medication reconciliation was performed.  Discussed risks and benefits of medications.  Advised patient on orders and health maintenance.  We discussed old records and old labs if available.  Will request any records not available through epic.  Continue current medications listed on your summary sheet.           Sinus headache     Start Singulair.  Follow-up sooner if no improvement.  Avoid triggers.  Discussed condition course and signs and symptoms to expect.  Patient advised take anti-inflammatories and or Tylenol for pain or fever.  ER precautions.  Call MD or follow-up to clinic if not improving or worsening symptoms.           Relevant Medications    montelukast (SINGULAIR) 10 mg tablet    Hypothyroidism     Thyroid labs are stable.Please be advised of hypothyroidism disease course.  We will plan to  monitor thyroid labs at routine intervals.  Please be advised of risks and benefits of medication use, see medication insert for complete details.  ER precautions.  Common complaints from hypothyroidism include weight gain, fatigue, cold intolerance, constipation, dry and flaky shin, coarse or loss of hair, and trouble with memory.     Complaints with hyperthyroidism are weight loss or decrease in appetite, weakness and fatigue, visual changes, fast heart rate, decreased heat tolerance, thinning scalp hair, change in bowel habits, and palpations.         Relevant Orders    TSH    T3, Free    T4, Free     Future Appointments       Date Provider Specialty Appt Notes    9/1/2023 Linda Horowitz, [facility] 2x a week  No Copay    9/25/2023  Lab ty    9/27/2023 Sol Mckeon NP Hematology and Oncology 3 mo f/u post iron/prior lab/cbd           Medication Management for assessment above:   Medication List with Changes/Refills   New Medications    MONTELUKAST (SINGULAIR) 10 MG TABLET    Take 1 tablet (10 mg total) by mouth every evening.   Current Medications    DILTIAZEM (CARDIZEM) 30 MG TABLET    TAKE 1 TABLET EVERY 12 HOURS    EPINEPHRINE (EPIPEN) 0.3 MG/0.3 ML ATIN    Inject into the muscle.    EZETIMIBE (ZETIA) 10 MG TABLET    Take 1 tablet (10 mg total) by mouth once daily.    FERROUS SULFATE 324 MG (65 MG IRON) TBEC    Take 1 tablet (324 mg total) by mouth once daily.    FLUTICASONE PROPIONATE (FLONASE) 50 MCG/ACTUATION NASAL SPRAY    Use 2 sprays to each nostril daily    INHALATION SPACING DEVICE (BREATHERITE VALVED MDI CHAMBER)    Use as directed for inhalation.    LEVOTHYROXINE (EUTHYROX) 100 MCG TABLET    Take 1 tablet (100 mcg total) by mouth once daily.    LORATADINE (CLARITIN) 10 MG TABLET    Take 1 tablet (10 mg total) by mouth once daily.    MELOXICAM (MOBIC) 15 MG TABLET    Take 1 tablet (15 mg total) by mouth once daily.    METFORMIN (GLUCOPHAGE) 1000 MG TABLET    Take 1 tablet (1,000 mg  total) by mouth daily with breakfast.    METHOCARBAMOL (ROBAXIN) 500 MG TAB    Take 1 tablet (500 mg total) by mouth 2 (two) times daily as needed (muscle spasms).    PANTOPRAZOLE (PROTONIX) 40 MG TABLET    Take 1 tablet (40 mg total) by mouth once daily.    PREGABALIN (LYRICA) 75 MG CAPSULE    TAKE 1 CAPSULE THREE TIMES DAILY    ROPINIROLE (REQUIP) 0.25 MG TABLET    Take 1 tablet (0.25 mg total) by mouth every evening.    SEMAGLUTIDE (OZEMPIC) 2 MG/DOSE (8 MG/3 ML) PNIJ    INJECT 2 MG INTO THE SKIN EVERY 7 DAYS.    SIMVASTATIN (ZOCOR) 20 MG TABLET    Take 1 tablet (20 mg total) by mouth every evening.    SULFACETAMIDE SODIUM 10% (BLEPH-10) 10 % OPHTHALMIC SOLUTION    Place 2 drops into the left eye 4 (four) times daily.    TIZANIDINE (ZANAFLEX) 4 MG TABLET    Take 1/2 to 1 tablet by mouth twice daily as needed for muscle spasms. May cause drowsiness.    TOPIRAMATE (TOPAMAX) 100 MG TABLET    Take 1 tablet (100 mg total) by mouth 2 (two) times daily.    TRELEGY ELLIPTA 100-62.5-25 MCG DSDV    Inhale 1 puff into the lungs once daily.       Oleksandr Nagel M.D.  ==========================================================================  Subjective:   Patient ID: Zakia Price is a 63 y.o. female.  has a past medical history of Acute respiratory failure due to COVID-19, COVID-19, Diabetes mellitus, type 2, Diabetic neuropathy (1/27/2014), DJD (degenerative joint disease) of knee, DVT (deep venous thrombosis) (around 1990's), Fatty liver, GERD (gastroesophageal reflux disease), Hypertension associated with diabetes, HECTOR (iron deficiency anemia) (5/13/2021), Multinodular goiter, Obesity, morbid, BMI 50 or higher, and Sleep apnea.   Chief Complaint: Gastroesophageal Reflux, Diabetes, and Headache      Problem List Items Addressed This Visit       Gastroesophageal reflux disease with esophagitis without hemorrhage - Primary (Chronic)    Overview     Chronic.  August 2023: Patient reports compliance with  pantoprazole.  Reports abdominal pain after eating.  History of H pylori.    April 2022:  Currently uncontrolled.  History of H pylori.  Previous HPI:  on Zantac.  However medication has been pharmacy recall.  Patient needing replacement medication for this condition.  Reported compliance.  Symptoms are controlled.  No side effects reported.    Final Pathologic Diagnosis 1. Small bowel, biopsies:   - Small bowel mucosa without significant histopathologic alteration   - Negative for active inflammation or celiac-like injury   - Negative for dysplasia or malignancy   2. Stomach, antrum, biopsies:   - Acute active gastritis   - Positive for Helicobacter pylori on immunostain   - Negative for intestinal metaplasia, dysplasia or malignancy   NOTE: Positive control and internal negative control for Helicobacter pylori   immunostain were examined and were appropriate.   3. Stomach, nodule, biopsies:   - Reactive gastropathy with foveolar hyperplasia   - Negative for Helicobacter pylori on H&E stain   - Negative for intestinal metaplasia, dysplasia or malignancy    Comment: Interp By Didier Reina MD, PhD, Signed on 07/14/2021 at 07:58         Findings:        The cricopharyngeus, upper third of the esophagus, middle third of        the esophagus, lower third of the esophagus, lower esophageal        sphincter and gastroesophageal junction were normal.        The Z-line was regular and was found 40 cm from the incisors.        The cardia, gastric fundus, gastric body and pylorus were normal.        Patchy mildly erythematous mucosa without bleeding was found in the        gastric antrum. Biopsies were taken with a cold forceps for        histology. Biopsies were taken with a cold forceps for Helicobacter        pylori testing. Estimated blood loss was minimal.        A single small submucosal papule (nodule) with no bleeding and no        stigmata of recent bleeding was found in the gastric antrum.        Biopsies were  taken with a cold forceps for histology. Estimated        blood loss was minimal.        The first portion of the duodenum and second portion of the duodenum        were normal. Biopsies for histology were taken with a cold forceps        for evaluation of celiac disease. Estimated blood loss was minimal.   Impression:            - Normal cricopharyngeus, upper third of                          esophagus, middle third of esophagus, lower third                          of esophagus, lower esophageal sphincter and                          gastroesophageal junction.                          - Z-line regular, 40 cm from the incisors.                          - Normal cardia, gastric fundus, gastric body and                          pylorus.                          - Erythematous mucosa in the antrum. Biopsied.                          - A single submucosal papule (nodule) found in the                          stomach. Biopsied.                          - Normal first portion of the duodenum and second                          portion of the duodenum. Biopsied.   Recommendation:        - Patient has a contact number available for                          emergencies. The signs and symptoms of potential                          delayed complications were discussed with the                          patient. Return to normal activities tomorrow.                          Written discharge instructions were provided to                          the patient.                          - The patient should eat a bland diet and avoid                          caffeine, carbonation, alcohol, nicotine, aspirin                          and NSAID's including ibuprofen and advil.                          - Continue present medications.                          - Telephone my office for pathology results in 2                          weeks.                          - Discharge patient to home (via wheelchair).   Jann Gutierrez MD  "  7/8/2021 1:53:14 PM          Current Assessment & Plan     Referral for EGD.  Referral to GI for further evaluation and treatment.  Continue PPI.  GERD RECOMMENDATIONS  Please be advised of condition course.  - Take PPI in the morning 30-60 minutes before breakfast  - I recommend ongoing Education for lifestyle modifications to help control/reduce reflux/abdominal pain including: avoid large meals, avoid eating within 2-3 hours of bedtime (avoid late night eating & lying down soon after eating), elevate head of bed if nocturnal symptoms are present, smoking cessation (if current smoker), & weight loss (if overweight).   - please be advised to avoid known foods which trigger reflux symptoms & to minimize/avoid high-fat foods, chocolate, caffeine, citrus, alcohol, & tomato products.  - Advised to avoid/limit use of NSAID's, since they can cause GI upset, bleeding, and/or ulcers. If needed, take with food.          Type II diabetes mellitus with neurological manifestations (Chronic)    Overview     August 2023: Patient is due for eye exam.  Requesting referral.  Diabetes Medications               metFORMIN (GLUCOPHAGE) 1000 MG tablet Take 1 tablet (1,000 mg total) by mouth daily with breakfast.    semaglutide (OZEMPIC) 2 mg/dose (8 mg/3 mL) PnIj INJECT 2 MG INTO THE SKIN EVERY 7 DAYS.     Diabetes Management Status    Statin: Taking  ACE/ARB: Not taking    Screening or Prevention Patient's value Goal Complete/Controlled?   HgA1C Testing and Control   Lab Results   Component Value Date    HGBA1C 5.0 06/14/2023      Annually/Less than 8% Yes   Lipid profile : 06/14/2023 Annually Yes   LDL control Lab Results   Component Value Date    LDLCALC 96.8 06/14/2023    Annually/Less than 100 mg/dl  Yes   Nephropathy screening Lab Results   Component Value Date    LABMICR 6.0 05/23/2023     Lab Results   Component Value Date    PROTEINUA Negative 10/28/2015     No results found for: "UTPCR"   Annually Yes   Blood pressure BP " Readings from Last 1 Encounters:   07/31/23 (!) 150/67    Less than 140/90 No   Dilated retinal exam : 03/23/2021 Annually No   Foot exam   : 07/18/2023 Annually Yes            Current Assessment & Plan     Referral to optometry.We will plan to monitor hemoglobin A1c at designated intervals 3 to 6 months.  I recommend ongoing Education for diabetic diet and exercise protocol.  We will continue to monitor for side effects.    Please be advised of symptoms to monitor for and to notify me immediately if persistent or worsening.  Follow up with Ophthalmology/Optometry and Podiatry at least annually.           Encounter for long-term (current) use of medications (Chronic)    Overview     August 2023: Reviewed labs.  June 2023: Reviewed labs.  January 2023:  Reviewed labs.  CHRONIC. Stable. Compliant with medications for managed conditions. See medication list. No SE reported. Routine lab analysis is being monitored. Refills were addressed.  January 2021:CHRONIC. Stable. Compliant with medications for managed conditions. See medication list. No SE reported. Routine lab analysis is being monitored. Refills were addressed.  July 2021:  Reviewed labs.  January 2022:  Reviewed labs.  February 2022:  Reviewed labs.  July 2022:  Reviewed labs.  Lab Results   Component Value Date    WBC 4.94 06/14/2023    HGB 12.4 06/14/2023    HCT 39.5 06/14/2023    MCV 91 06/14/2023     06/14/2023       Chemistry        Component Value Date/Time     06/14/2023 0902    K 4.1 06/14/2023 0902     06/14/2023 0902    CO2 23 06/14/2023 0902    BUN 17 06/14/2023 0902    CREATININE 0.9 06/14/2023 0902    GLU 91 06/14/2023 0902        Component Value Date/Time    CALCIUM 9.4 06/14/2023 0902    ALKPHOS 80 06/14/2023 0902    AST 17 06/14/2023 0902    ALT 14 06/14/2023 0902    BILITOT 0.2 06/14/2023 0902    ESTGFRAFRICA >60.0 02/24/2022 1255    EGFRNONAA >60.0 02/24/2022 1255          Lab Results   Component Value Date    TSH 1.696  06/14/2023    FREET4 1.06 12/04/2019    T3FREE 2.7 12/04/2019          Current Assessment & Plan     Complete history and physical was completed today.  Complete and thorough medication reconciliation was performed.  Discussed risks and benefits of medications.  Advised patient on orders and health maintenance.  We discussed old records and old labs if available.  Will request any records not available through epic.  Continue current medications listed on your summary sheet.           Sinus headache    Overview     Chronic.  Patient reports sinus headache.  She is been taking over-the-counter sinus medication with no relief.         Current Assessment & Plan     Start Singulair.  Follow-up sooner if no improvement.  Avoid triggers.  Discussed condition course and signs and symptoms to expect.  Patient advised take anti-inflammatories and or Tylenol for pain or fever.  ER precautions.  Call MD or follow-up to clinic if not improving or worsening symptoms.           Hypothyroidism    Overview     Chronic.  August 2023: Postoperative patient reports half of her thyroid has been removed previously.  Previous history:  Control is uncertain.  Patient due for TSH.  She is taking levothyroxine 100 micrograms daily.  Reports compliance.  No side effects reported.  Lab Results   Component Value Date    TSH 1.696 06/14/2023    FREET4 1.06 12/04/2019              Current Assessment & Plan     Thyroid labs are stable.Please be advised of hypothyroidism disease course.  We will plan to monitor thyroid labs at routine intervals.  Please be advised of risks and benefits of medication use, see medication insert for complete details.  ER precautions.  Common complaints from hypothyroidism include weight gain, fatigue, cold intolerance, constipation, dry and flaky shin, coarse or loss of hair, and trouble with memory.     Complaints with hyperthyroidism are weight loss or decrease in appetite, weakness and fatigue, visual changes,  fast heart rate, decreased heat tolerance, thinning scalp hair, change in bowel habits, and palpations.             Review of patient's allergies indicates:   Allergen Reactions    Codeine sulfate      Nausea^    Lisinopril Swelling     angioedema    Codeine Nausea Only and Rash     Current Outpatient Medications   Medication Instructions    diltiaZEM (CARDIZEM) 30 mg, Oral, Every 12 hours    EPINEPHrine (EPIPEN) 0.3 mg/0.3 mL AtIn Intramuscular    ezetimibe (ZETIA) 10 mg, Oral, Daily    ferrous sulfate 324 mg, Oral, Daily    fluticasone propionate (FLONASE) 50 mcg/actuation nasal spray Use 2 sprays to each nostril daily    inhalation spacing device (BREATHERITE VALVED MDI CHAMBER) Use as directed for inhalation.    levothyroxine (EUTHYROX) 100 mcg, Oral, Daily    loratadine (CLARITIN) 10 mg, Oral, Daily    meloxicam (MOBIC) 15 mg, Oral, Daily    metFORMIN (GLUCOPHAGE) 1,000 mg, Oral, With breakfast    methocarbamoL (ROBAXIN) 500 mg, Oral, 2 times daily PRN    montelukast (SINGULAIR) 10 mg, Oral, Nightly    OZEMPIC 2 mg, Subcutaneous, Every 7 days    pantoprazole (PROTONIX) 40 mg, Oral, Daily    pregabalin (LYRICA) 75 mg, Oral, 3 times daily    rOPINIRole (REQUIP) 0.25 mg, Oral, Nightly    simvastatin (ZOCOR) 20 mg, Oral, Nightly    sulfacetamide sodium 10% (BLEPH-10) 10 % ophthalmic solution 2 drops, Left Eye, 4 times daily    tiZANidine (ZANAFLEX) 4 MG tablet Take 1/2 to 1 tablet by mouth twice daily as needed for muscle spasms. May cause drowsiness.    topiramate (TOPAMAX) 100 mg, Oral, 2 times daily    TRELEGY ELLIPTA 100-62.5-25 mcg DsDv 1 puff, Inhalation, Daily      I have reviewed the PMH, social history, FamilyHx, surgical history, allergies and medications documented / confirmed by the patient at the time of this visit.  Review of Systems  Objective:   There were no vitals taken for this visit.  Physical Exam  Constitutional:       General: She is not in acute distress.      Appearance: She is well-developed. She is not ill-appearing, toxic-appearing or diaphoretic.   HENT:      Head: Normocephalic and atraumatic.      Right Ear: Hearing and external ear normal.      Left Ear: Hearing and external ear normal.   Eyes:      General: Lids are normal.      Conjunctiva/sclera: Conjunctivae normal.   Pulmonary:      Effort: Pulmonary effort is normal. No respiratory distress.   Musculoskeletal:         General: Normal range of motion.      Cervical back: Normal range of motion.   Skin:     Coloration: Skin is not pale.   Neurological:      Mental Status: She is alert. She is not disoriented.   Psychiatric:         Attention and Perception: She is attentive.         Mood and Affect: Mood is not anxious or depressed.         Speech: Speech is not rapid and pressured or slurred.         Behavior: Behavior normal. Behavior is not agitated, aggressive or hyperactive. Behavior is cooperative.         Thought Content: Thought content normal. Thought content is not paranoid or delusional. Thought content does not include homicidal or suicidal ideation. Thought content does not include homicidal or suicidal plan.         Cognition and Memory: Memory is not impaired.         Judgment: Judgment normal.       Assessment:     1. Gastroesophageal reflux disease with esophagitis without hemorrhage    2. Encounter for long-term (current) use of medications    3. Hypothyroidism, unspecified type    4. Sinus headache    5. Type II diabetes mellitus with neurological manifestations      MDM:   Moderate medical complexity.  Moderate risk.   Total time: 32 minutes.  This includes total time spent on the encounter, which includes face to face time and non-face to face time preparing to see the patient (eg, review of previous medical records, tests), Obtaining and/or reviewing separately obtained history, documenting clinical information in the electronic or other health record, independently interpreting results  (not separately reported)/communicating results to the patient/family/caregiver, and/or care coordination (not separately reported).    I have Reviewed and summarized old records.  I have performed thorough medication reconciliation today and discussed risk and benefits of medications.  I have reviewed labs and discussed with patient.  All questions were answered.  I am requesting old records and will review them once they are available.    I have signed for the following orders AND/OR meds.  Orders Placed This Encounter   Procedures    TSH     Standing Status:   Future     Standing Expiration Date:   10/27/2024    T3, Free     Standing Status:   Future     Standing Expiration Date:   10/27/2024    T4, Free     Standing Status:   Future     Standing Expiration Date:   10/27/2024    Ambulatory referral/consult to Gastroenterology     Standing Status:   Future     Standing Expiration Date:   9/29/2024     Referral Priority:   Routine     Referral Type:   Consultation     Referral Reason:   Specialty Services Required     Requested Specialty:   Gastroenterology     Number of Visits Requested:   1    Ambulatory referral/consult to Endo Procedure      Standing Status:   Future     Standing Expiration Date:   2/29/2024     Referral Priority:   Urgent     Referral Type:   Consultation     Number of Visits Requested:   1    Ambulatory referral/consult to Optometry     Standing Status:   Future     Standing Expiration Date:   9/29/2024     Referral Priority:   Routine     Referral Type:   Vision (Optometry)     Referral Reason:   Specialty Services Required     Requested Specialty:   Optometry     Number of Visits Requested:   1     Medications Ordered This Encounter   Medications    montelukast (SINGULAIR) 10 mg tablet     Sig: Take 1 tablet (10 mg total) by mouth every evening.     Dispense:  30 tablet     Refill:  0        Follow up in about 3 months (around 11/29/2023), or if symptoms worsen or fail to  improve, for 3 month labs fasting.  Future Appointments       Date Provider Specialty Appt Notes    9/1/2023 Linda Horowitz, [facility] 2x a week  No Copay    9/25/2023  Lab ty    9/27/2023 Sol Mckeon NP Hematology and Oncology 3 mo f/u post iron/prior lab/cbd          If no improvement in symptoms or symptoms worsen, advised to call/follow-up at clinic or go to ER. Patient voiced understanding and all questions/concerns were addressed.   DISCLAIMER: This note was compiled by using a speech recognition dictation system and therefore please be aware that typographical / speech recognition errors can and do occur.  Please contact me if you see any errors specifically.    Oleksandr Nagel M.D.       Office: [phone] [address] [place] [address]  FAX: [phone]

## 2023-08-29 NOTE — ASSESSMENT & PLAN NOTE
Referral to optometry.We will plan to monitor hemoglobin A1c at designated intervals 3 to 6 months.  I recommend ongoing Education for diabetic diet and exercise protocol.  We will continue to monitor for side effects.    Please be advised of symptoms to monitor for and to notify me immediately if persistent or worsening.  Follow up with Ophthalmology/Optometry and Podiatry at least annually.

## 2023-08-29 NOTE — ASSESSMENT & PLAN NOTE
Start Singulair.  Follow-up sooner if no improvement.  Avoid triggers.  Discussed condition course and signs and symptoms to expect.  Patient advised take anti-inflammatories and or Tylenol for pain or fever.  ER precautions.  Call MD or follow-up to clinic if not improving or worsening symptoms.

## 2023-08-29 NOTE — ASSESSMENT & PLAN NOTE
Thyroid labs are stable.Please be advised of hypothyroidism disease course.  We will plan to monitor thyroid labs at routine intervals.  Please be advised of risks and benefits of medication use, see medication insert for complete details.  ER precautions.  Common complaints from hypothyroidism include weight gain, fatigue, cold intolerance, constipation, dry and flaky shin, coarse or loss of hair, and trouble with memory.     Complaints with hyperthyroidism are weight loss or decrease in appetite, weakness and fatigue, visual changes, fast heart rate, decreased heat tolerance, thinning scalp hair, change in bowel habits, and palpations.

## 2023-08-29 NOTE — PATIENT INSTRUCTIONS
Follow up in about 3 months (around 11/29/2023), or if symptoms worsen or fail to improve, for 3 month labs fasting.     Dear patient,   As a result of recent federal legislation (The Federal Cures Act), you may receive lab or pathology results from your visit in your [facility] account before your physician is able to contact you. Your physician or their representative will relay the results to you with their recommendations at their soonest availability.     If no improvement in symptoms or symptoms worsen, please be advised to call MD, follow-up at clinic and/or go to ER if becomes severe.    Oleksandr Nagel M.D.        We Offer TELEHEALTH & Same Day Appointments!   Book your Telehealth appointment with me through my nurse or   Clinic appointments on [employer]!    [address] [place] [address]    Office: [phone]   FAX: [phone]    Check out my Facebook Page and Follow Me at: [email or web address]    Check out my website at [employer] by clicking on: [email or web address]    To Schedule appointments online, go to [employer]: [email or web address]

## 2023-08-30 ENCOUNTER — HOSPITAL ENCOUNTER (OUTPATIENT)
Dept: PREADMISSION TESTING | Facility: HOSPITAL | Age: 63
Discharge: HOME | End: 2023-08-30
Attending: FAMILY MEDICINE
Payer: MEDICARE

## 2023-08-30 VITALS — BODY MASS INDEX: 44.41 KG/M2 | HEIGHT: 68 IN | WEIGHT: 293 LBS

## 2023-08-30 DIAGNOSIS — K21.00 GASTROESOPHAGEAL REFLUX DISEASE WITH ESOPHAGITIS WITHOUT HEMORRHAGE: Primary | ICD-10-CM

## 2023-09-06 ENCOUNTER — LAB VISIT (OUTPATIENT)
Dept: LAB | Facility: HOSPITAL | Age: 63
End: 2023-09-06
Attending: FAMILY MEDICINE
Payer: MEDICARE

## 2023-09-06 ENCOUNTER — OFFICE VISIT (OUTPATIENT)
Dept: FAMILY MEDICINE | Facility: CLINIC | Age: 63
End: 2023-09-06
Payer: MEDICARE

## 2023-09-06 VITALS
WEIGHT: 293 LBS | OXYGEN SATURATION: 100 % | DIASTOLIC BLOOD PRESSURE: 78 MMHG | BODY MASS INDEX: 45.2 KG/M2 | HEART RATE: 61 BPM | TEMPERATURE: 97 F | SYSTOLIC BLOOD PRESSURE: 134 MMHG

## 2023-09-06 DIAGNOSIS — I15.2 HYPERTENSION ASSOCIATED WITH DIABETES: Chronic | ICD-10-CM

## 2023-09-06 DIAGNOSIS — E11.49 TYPE II DIABETES MELLITUS WITH NEUROLOGICAL MANIFESTATIONS: Chronic | ICD-10-CM

## 2023-09-06 DIAGNOSIS — E11.69 COMBINED HYPERLIPIDEMIA ASSOCIATED WITH TYPE 2 DIABETES MELLITUS: Chronic | ICD-10-CM

## 2023-09-06 DIAGNOSIS — D50.0 IRON DEFICIENCY ANEMIA DUE TO CHRONIC BLOOD LOSS: ICD-10-CM

## 2023-09-06 DIAGNOSIS — E03.9 HYPOTHYROIDISM, UNSPECIFIED TYPE: ICD-10-CM

## 2023-09-06 DIAGNOSIS — G89.29 OTHER CHRONIC PAIN: ICD-10-CM

## 2023-09-06 DIAGNOSIS — Z00.00 ANNUAL PHYSICAL EXAM: Primary | ICD-10-CM

## 2023-09-06 DIAGNOSIS — E11.65 TYPE 2 DIABETES MELLITUS WITH HYPERGLYCEMIA, WITHOUT LONG-TERM CURRENT USE OF INSULIN: Chronic | ICD-10-CM

## 2023-09-06 DIAGNOSIS — E11.59 HYPERTENSION ASSOCIATED WITH DIABETES: Chronic | ICD-10-CM

## 2023-09-06 DIAGNOSIS — K21.00 GASTROESOPHAGEAL REFLUX DISEASE WITH ESOPHAGITIS WITHOUT HEMORRHAGE: Chronic | ICD-10-CM

## 2023-09-06 DIAGNOSIS — E78.2 COMBINED HYPERLIPIDEMIA ASSOCIATED WITH TYPE 2 DIABETES MELLITUS: Chronic | ICD-10-CM

## 2023-09-06 LAB
ESTIMATED AVG GLUCOSE: 94 MG/DL (ref 68–131)
HBA1C MFR BLD: 4.9 % (ref 4–5.6)

## 2023-09-06 PROCEDURE — 1101F PR PT FALLS ASSESS DOC 0-1 FALLS W/OUT INJ PAST YR: ICD-10-PCS | Mod: HCNC,CPTII,S$GLB, | Performed by: NURSE PRACTITIONER

## 2023-09-06 PROCEDURE — 3078F DIAST BP <80 MM HG: CPT | Mod: HCNC,CPTII,S$GLB, | Performed by: NURSE PRACTITIONER

## 2023-09-06 PROCEDURE — 3008F BODY MASS INDEX DOCD: CPT | Mod: HCNC,CPTII,S$GLB, | Performed by: NURSE PRACTITIONER

## 2023-09-06 PROCEDURE — 3044F HG A1C LEVEL LT 7.0%: CPT | Mod: HCNC,CPTII,S$GLB, | Performed by: NURSE PRACTITIONER

## 2023-09-06 PROCEDURE — 1160F RVW MEDS BY RX/DR IN RCRD: CPT | Mod: HCNC,CPTII,S$GLB, | Performed by: NURSE PRACTITIONER

## 2023-09-06 PROCEDURE — 36415 COLL VENOUS BLD VENIPUNCTURE: CPT | Mod: HCNC,PO | Performed by: FAMILY MEDICINE

## 2023-09-06 PROCEDURE — 3288F FALL RISK ASSESSMENT DOCD: CPT | Mod: HCNC,CPTII,S$GLB, | Performed by: NURSE PRACTITIONER

## 2023-09-06 PROCEDURE — 3066F NEPHROPATHY DOC TX: CPT | Mod: HCNC,CPTII,S$GLB, | Performed by: NURSE PRACTITIONER

## 2023-09-06 PROCEDURE — 83036 HEMOGLOBIN GLYCOSYLATED A1C: CPT | Mod: HCNC | Performed by: FAMILY MEDICINE

## 2023-09-06 PROCEDURE — 99999 PR PBB SHADOW E&M-EST. PATIENT-LVL V: ICD-10-PCS | Mod: PBBFAC,HCNC,, | Performed by: NURSE PRACTITIONER

## 2023-09-06 PROCEDURE — 3044F PR MOST RECENT HEMOGLOBIN A1C LEVEL <7.0%: ICD-10-PCS | Mod: HCNC,CPTII,S$GLB, | Performed by: NURSE PRACTITIONER

## 2023-09-06 PROCEDURE — 1159F PR MEDICATION LIST DOCUMENTED IN MEDICAL RECORD: ICD-10-PCS | Mod: HCNC,CPTII,S$GLB, | Performed by: NURSE PRACTITIONER

## 2023-09-06 PROCEDURE — 3288F PR FALLS RISK ASSESSMENT DOCUMENTED: ICD-10-PCS | Mod: HCNC,CPTII,S$GLB, | Performed by: NURSE PRACTITIONER

## 2023-09-06 PROCEDURE — 3075F SYST BP GE 130 - 139MM HG: CPT | Mod: HCNC,CPTII,S$GLB, | Performed by: NURSE PRACTITIONER

## 2023-09-06 PROCEDURE — 3078F PR MOST RECENT DIASTOLIC BLOOD PRESSURE < 80 MM HG: ICD-10-PCS | Mod: HCNC,CPTII,S$GLB, | Performed by: NURSE PRACTITIONER

## 2023-09-06 PROCEDURE — 99397 PR PREVENTIVE VISIT,EST,65 & OVER: ICD-10-PCS | Mod: HCNC,S$GLB,, | Performed by: NURSE PRACTITIONER

## 2023-09-06 PROCEDURE — 99999 PR PBB SHADOW E&M-EST. PATIENT-LVL V: CPT | Mod: PBBFAC,HCNC,, | Performed by: NURSE PRACTITIONER

## 2023-09-06 PROCEDURE — 1160F PR REVIEW ALL MEDS BY PRESCRIBER/CLIN PHARMACIST DOCUMENTED: ICD-10-PCS | Mod: HCNC,CPTII,S$GLB, | Performed by: NURSE PRACTITIONER

## 2023-09-06 PROCEDURE — 1159F MED LIST DOCD IN RCRD: CPT | Mod: HCNC,CPTII,S$GLB, | Performed by: NURSE PRACTITIONER

## 2023-09-06 PROCEDURE — 3066F PR DOCUMENTATION OF TREATMENT FOR NEPHROPATHY: ICD-10-PCS | Mod: HCNC,CPTII,S$GLB, | Performed by: NURSE PRACTITIONER

## 2023-09-06 PROCEDURE — 3061F NEG MICROALBUMINURIA REV: CPT | Mod: HCNC,CPTII,S$GLB, | Performed by: NURSE PRACTITIONER

## 2023-09-06 PROCEDURE — 3008F PR BODY MASS INDEX (BMI) DOCUMENTED: ICD-10-PCS | Mod: HCNC,CPTII,S$GLB, | Performed by: NURSE PRACTITIONER

## 2023-09-06 PROCEDURE — 3061F PR NEG MICROALBUMINURIA RESULT DOCUMENTED/REVIEW: ICD-10-PCS | Mod: HCNC,CPTII,S$GLB, | Performed by: NURSE PRACTITIONER

## 2023-09-06 PROCEDURE — 99397 PER PM REEVAL EST PAT 65+ YR: CPT | Mod: HCNC,S$GLB,, | Performed by: NURSE PRACTITIONER

## 2023-09-06 PROCEDURE — 1101F PT FALLS ASSESS-DOCD LE1/YR: CPT | Mod: HCNC,CPTII,S$GLB, | Performed by: NURSE PRACTITIONER

## 2023-09-06 PROCEDURE — 3075F PR MOST RECENT SYSTOLIC BLOOD PRESS GE 130-139MM HG: ICD-10-PCS | Mod: HCNC,CPTII,S$GLB, | Performed by: NURSE PRACTITIONER

## 2023-09-06 RX ORDER — PREGABALIN 75 MG/1
75 CAPSULE ORAL 3 TIMES DAILY
Qty: 90 CAPSULE | Refills: 3 | Status: SHIPPED | OUTPATIENT
Start: 2023-09-06 | End: 2024-03-08

## 2023-09-06 NOTE — PROGRESS NOTES
Subjective     Patient ID: Zakia Price is a 63 y.o. female.    Chief Complaint: Annual Exam  Pt in today for annual exam. Type 2 DM, HTN, hyperlipidemia, hypotension managed with medication. HECTOR managed by hematology/oncology. Chronic pain managed by pain management. GERD controlled with current medications. Labs UTD. Pt requests lyrica refill. Pt has no other complaints today.   Past Medical History:   Diagnosis Date    Acute respiratory failure due to COVID-19     COVID-19     Diabetes mellitus, type 2     Diabetic neuropathy 1/27/2014    DJD (degenerative joint disease) of knee     DVT (deep venous thrombosis) around 1990's    in leg, is on no anticoagulant therapy presently    Fatty liver     GERD (gastroesophageal reflux disease)     Hypertension associated with diabetes     HECTOR (iron deficiency anemia) 5/13/2021    Multinodular goiter     Obesity, morbid, BMI 50 or higher     Sleep apnea     has no CPAP     Social History     Socioeconomic History    Marital status: Single   Tobacco Use    Smoking status: Never    Smokeless tobacco: Never   Substance and Sexual Activity    Alcohol use: No    Drug use: No    Sexual activity: Not Currently     Social Determinants of Health     Financial Resource Strain: Medium Risk (11/14/2019)    Overall Financial Resource Strain (CARDIA)     Difficulty of Paying Living Expenses: Somewhat hard   Food Insecurity: Food Insecurity Present (11/14/2019)    Hunger Vital Sign     Worried About Running Out of Food in the Last Year: Sometimes true     Ran Out of Food in the Last Year: Sometimes true   Transportation Needs: Unmet Transportation Needs (11/14/2019)    PRAPARE - Transportation     Lack of Transportation (Medical): Yes     Lack of Transportation (Non-Medical): Yes   Physical Activity: Inactive (11/14/2019)    Exercise Vital Sign     Days of Exercise per Week: 0 days     Minutes of Exercise per Session: 0 min   Stress: No Stress Concern Present (11/14/2019)     [place] [employer] - Occupational Stress Questionnaire     Feeling of Stress : Only a little    Social Connection and Isolation Panel [NHANES]     Past Surgical History:   Procedure Laterality Date    COLONOSCOPY N/A 5/12/2021    Procedure: COLONOSCOPY;  Surgeon: Carolina Rizo MD;  Location: [facility];  Service: Endoscopy;  Laterality: N/A;    EPIDURAL STEROID INJECTION N/A 12/10/2021    Procedure: Lumbar L5/S1 IL TEETEE  Would like AM procedure, if possible;  Surgeon: Nae Paul MD;  Location: [facility];  Service: Pain Management;  Laterality: N/A;    EPIDURAL STEROID INJECTION INTO CERVICAL SPINE N/A 10/8/2021    Procedure: C7-T1 IL TEETEE-no sedation.  Needs IV-just incase;  Surgeon: Nae Paul MD;  Location: [facility];  Service: Pain Management;  Laterality: N/A;    ESOPHAGOGASTRODUODENOSCOPY N/A 7/8/2021    Procedure: EGD (ESOPHAGOGASTRODUODENOSCOPY) previous positve covid;  Surgeon: Jann Gutierrez MD;  Location: [facility];  Service: Endoscopy;  Laterality: N/A;    FRACTURE SURGERY      HYSTERECTOMY      INTRALUMINAL GASTROINTESTINAL TRACT IMAGING VIA CAPSULE N/A 10/24/2022    Procedure: IMAGING PROCEDURE, GI TRACT, INTRALUMINAL, VIA CAPSULE;  Surgeon: Hang Lunsford RN;  Location: [facility];  Service: Endoscopy;  Laterality: N/A;    SELECTIVE INJECTION OF ANESTHETIC AGENT AROUND LUMBAR SPINAL NERVE ROOT BY TRANSFORAMINAL APPROACH Bilateral 5/6/2022    Procedure: Bilateral L5/S1 TF TEETEE with RN IV sedation;  Surgeon: Nae Paul MD;  Location: [facility];  Service: Pain Management;  Laterality: Bilateral;    SELECTIVE INJECTION OF ANESTHETIC AGENT AROUND LUMBAR SPINAL NERVE ROOT BY TRANSFORAMINAL APPROACH Bilateral 12/30/2022    Procedure: Bilateral L5/S1 TF TEETEE RN IV Sedation;  Surgeon: Nae Paul MD;  Location: [facility];  Service: Pain Management;  Laterality: Bilateral;    SHOULDER ARTHROSCOPY      THYROIDECTOMY, PARTIAL Right     and transplatation of  right superior parathyroid gland to the sternocleidomastoid muscle     TONSILLECTOMY      TUBAL LIGATION  1984    WRIST FRACTURE SURGERY      left       HPI  Review of Systems   Constitutional: Negative.    HENT: Negative.     Eyes: Negative.    Respiratory: Negative.     Cardiovascular: Negative.    Gastrointestinal: Negative.    Endocrine: Negative.    Genitourinary: Negative.    Musculoskeletal: Negative.    Integumentary:  Negative.   Allergic/Immunologic: Negative.    Neurological: Negative.    Psychiatric/Behavioral: Negative.            Objective     Physical Exam  Vitals and nursing note reviewed.   Constitutional:       Appearance: Normal appearance. She is obese.   HENT:      Head: Normocephalic.      Right Ear: Tympanic membrane, ear canal and external ear normal.      Left Ear: Tympanic membrane, ear canal and external ear normal.      Nose: Nose normal.      Mouth/Throat:      Mouth: Mucous membranes are moist.      Pharynx: Oropharynx is clear.   Eyes:      Conjunctiva/sclera: Conjunctivae normal.      Pupils: Pupils are equal, round, and reactive to light.   Cardiovascular:      Rate and Rhythm: Normal rate and regular rhythm.      Pulses: Normal pulses.      Heart sounds: Normal heart sounds.   Pulmonary:      Effort: Pulmonary effort is normal.      Breath sounds: Normal breath sounds.   Abdominal:      General: Bowel sounds are normal.      Palpations: Abdomen is soft.   Musculoskeletal:         General: Normal range of motion.      Cervical back: Normal range of motion and neck supple.   Skin:     General: Skin is warm and dry.      Capillary Refill: Capillary refill takes 2 to 3 seconds.   Neurological:      Mental Status: She is alert and oriented to person, place, and time.   Psychiatric:         Mood and Affect: Mood normal.         Behavior: Behavior normal.         Thought Content: Thought content normal.         Judgment: Judgment normal.            Assessment and Plan     Zakia was seen  today for annual exam.    Diagnoses and all orders for this visit:    Annual physical exam  Type 2 diabetes mellitus with hyperglycemia, without long-term current use of insulin  Hypertension associated with diabetes  Combined hyperlipidemia associated with type 2 diabetes mellitus  Hypothyroidism, unspecified type  Gastroesophageal reflux disease with esophagitis without hemorrhage  Other chronic pain  Iron deficiency anemia due to chronic blood loss  Repeat Hgb A1C due today; ordered prior to visit  Labs UTD (CBC, CMP, Lipids, TSH, urine microalbumin, hgb a1c)  Continue current plan of care  F/U with specialists as advised  Lyrica refill request sent to PCP to approve  RTC as needed  Report to ER immediately if symptoms worsen or persist               No follow-ups on file.

## 2023-09-06 NOTE — PATIENT INSTRUCTIONS
Continue current plan of care  F/U with specialists as advised  RTC as needed  Lyrica refill request sent to PCP   Report to ER immediately if symptoms worsen or persist

## 2023-09-07 NOTE — PROGRESS NOTES
Make follow-up lab appointment per recommendation below.  Check to see if patient has seen the results through my chart.  If not then,  #CALL THE PATIENT# to discuss results/see if they have questions and document verification of contact. Make F/U appt if needed. [phone]    #My interpretation that was sent to them through [employer]:  Zakia, I have reviewed your recent blood work.     Your hemoglobin A1c is stable.  This test is gold standard screening test for diabetes.  It is a measures 3 months of your average blood sugar.    =========================  Also please address any outstanding health maintenance that may be due: Eye Exam due on 03/23/2022  Shingles Vaccine(2 of 2) due on 08/11/2022  COVID-19 Vaccine(6 - Moderna series) due on 05/13/2023  Influenza Vaccine(1) due on 09/01/2023

## 2023-09-18 ENCOUNTER — ANESTHESIA EVENT (OUTPATIENT)
Dept: ENDOSCOPY | Facility: HOSPITAL | Age: 63
End: 2023-09-18
Payer: MEDICARE

## 2023-09-18 ENCOUNTER — HOSPITAL ENCOUNTER (OUTPATIENT)
Facility: HOSPITAL | Age: 63
Discharge: HOME | End: 2023-09-18
Attending: INTERNAL MEDICINE | Admitting: INTERNAL MEDICINE
Payer: MEDICARE

## 2023-09-18 ENCOUNTER — ANESTHESIA (OUTPATIENT)
Dept: ENDOSCOPY | Facility: HOSPITAL | Age: 63
End: 2023-09-18
Payer: MEDICARE

## 2023-09-18 DIAGNOSIS — K21.9 GERD (GASTROESOPHAGEAL REFLUX DISEASE): ICD-10-CM

## 2023-09-18 LAB
GLUCOSE SERPL-MCNC: 57 MG/DL (ref 70–110)
POCT GLUCOSE: 57 MG/DL (ref 70–110)

## 2023-09-18 PROCEDURE — 88342 CHG IMMUNOCYTOCHEMISTRY: ICD-10-PCS | Mod: 26,HCNC,, | Performed by: STUDENT IN AN ORGANIZED HEALTH CARE EDUCATION/TRAINING PROGRAM

## 2023-09-18 PROCEDURE — 43239 EGD BIOPSY SINGLE/MULTIPLE: CPT | Mod: HCNC | Performed by: INTERNAL MEDICINE

## 2023-09-18 PROCEDURE — 88305 TISSUE EXAM BY PATHOLOGIST: CPT | Mod: 26,HCNC,, | Performed by: STUDENT IN AN ORGANIZED HEALTH CARE EDUCATION/TRAINING PROGRAM

## 2023-09-18 PROCEDURE — 88342 IMHCHEM/IMCYTCHM 1ST ANTB: CPT | Mod: 26,HCNC,, | Performed by: STUDENT IN AN ORGANIZED HEALTH CARE EDUCATION/TRAINING PROGRAM

## 2023-09-18 PROCEDURE — 27201012 HC FORCEPS, HOT/COLD, DISP: Mod: HCNC | Performed by: INTERNAL MEDICINE

## 2023-09-18 PROCEDURE — 88305 TISSUE EXAM BY PATHOLOGIST: ICD-10-PCS | Mod: 26,HCNC,, | Performed by: STUDENT IN AN ORGANIZED HEALTH CARE EDUCATION/TRAINING PROGRAM

## 2023-09-18 PROCEDURE — 88342 IMHCHEM/IMCYTCHM 1ST ANTB: CPT | Mod: HCNC | Performed by: STUDENT IN AN ORGANIZED HEALTH CARE EDUCATION/TRAINING PROGRAM

## 2023-09-18 PROCEDURE — 88305 TISSUE EXAM BY PATHOLOGIST: CPT | Mod: HCNC | Performed by: STUDENT IN AN ORGANIZED HEALTH CARE EDUCATION/TRAINING PROGRAM

## 2023-09-18 PROCEDURE — 37000008 HC ANESTHESIA 1ST 15 MINUTES: Mod: HCNC | Performed by: INTERNAL MEDICINE

## 2023-09-18 PROCEDURE — 43239 PR EGD, FLEX, W/BIOPSY, SGL/MULTI: ICD-10-PCS | Mod: HCNC,,, | Performed by: INTERNAL MEDICINE

## 2023-09-18 PROCEDURE — 37000009 HC ANESTHESIA EA ADD 15 MINS: Mod: HCNC | Performed by: INTERNAL MEDICINE

## 2023-09-18 PROCEDURE — 43239 EGD BIOPSY SINGLE/MULTIPLE: CPT | Mod: HCNC,,, | Performed by: INTERNAL MEDICINE

## 2023-09-18 PROCEDURE — 63600175 PHARM REV CODE 636 W HCPCS: Mod: HCNC | Performed by: NURSE ANESTHETIST, CERTIFIED REGISTERED

## 2023-09-18 PROCEDURE — 25000003 PHARM REV CODE 250: Mod: HCNC | Performed by: NURSE ANESTHETIST, CERTIFIED REGISTERED

## 2023-09-18 RX ORDER — PROPOFOL 10 MG/ML
VIAL (ML) INTRAVENOUS
Status: DISCONTINUED | OUTPATIENT
Start: 2023-09-18 | End: 2023-09-18

## 2023-09-18 RX ORDER — LIDOCAINE HYDROCHLORIDE 10 MG/ML
INJECTION, SOLUTION EPIDURAL; INFILTRATION; INTRACAUDAL; PERINEURAL
Status: DISCONTINUED | OUTPATIENT
Start: 2023-09-18 | End: 2023-09-18

## 2023-09-18 RX ORDER — SODIUM CHLORIDE, SODIUM LACTATE, POTASSIUM CHLORIDE, CALCIUM CHLORIDE 600; 310; 30; 20 MG/100ML; MG/100ML; MG/100ML; MG/100ML
INJECTION, SOLUTION INTRAVENOUS CONTINUOUS PRN
Status: DISCONTINUED | OUTPATIENT
Start: 2023-09-18 | End: 2023-09-18

## 2023-09-18 RX ADMIN — PROPOFOL 80 MG: 10 INJECTION, EMULSION INTRAVENOUS at 10:09

## 2023-09-18 RX ADMIN — PROPOFOL 40 MG: 10 INJECTION, EMULSION INTRAVENOUS at 10:09

## 2023-09-18 RX ADMIN — SODIUM CHLORIDE, SODIUM LACTATE, POTASSIUM CHLORIDE, AND CALCIUM CHLORIDE: 600; 310; 30; 20 INJECTION, SOLUTION INTRAVENOUS at 10:09

## 2023-09-18 RX ADMIN — LIDOCAINE HYDROCHLORIDE 50 MG: 10 SOLUTION INTRAVENOUS at 10:09

## 2023-09-18 NOTE — DISCHARGE SUMMARY
[facility] - Endoscopy (Hospital)  Discharge Note  Short Stay    Procedure(s) (LRB):  EGD (ESOPHAGOGASTRODUODENOSCOPY) (N/A)      OUTCOME: Patient tolerated treatment/procedure well without complication and is now ready for discharge.    DISPOSITION: Home or Self Care    FINAL DIAGNOSIS:  <principal problem not specified>    FOLLOWUP: With primary care provider    DISCHARGE INSTRUCTIONS:  No discharge procedures on file.     TIME SPENT ON DISCHARGE: 20 minutes

## 2023-09-18 NOTE — PROVATION PATIENT INSTRUCTIONS
Discharge Summary/Instructions after an Endoscopic Procedure  Patient Name: Zakia Price  Patient MRN: [id number]  Patient YOB: 1960 Monday, September 18, 2023 Gm Leon MD  Dear patient,  As a result of recent federal legislation (The Federal Cures Act), you may   receive lab or pathology results from your procedure in your [facility]   account before your physician is able to contact you. Your physician or   their representative will relay the results to you with their   recommendations at their soonest availability.  Thank you,  RESTRICTIONS:  During your procedure today, you received medications for sedation.  These   medications may affect your judgment, balance and coordination.  Therefore,   for 24 hours, you have the following restrictions:   - DO NOT drive a car, operate machinery, make legal/financial decisions,   sign important papers or drink alcohol.    ACTIVITY:  Today: no heavy lifting, straining or running due to procedural   sedation/anesthesia.  The following day: return to full activity including work.  DIET:  Eat and drink normally unless instructed otherwise.     TREATMENT FOR COMMON SIDE EFFECTS:  - Mild abdominal pain, nausea, belching, bloating or excessive gas:  rest,   eat lightly and use a heating pad.  - Sore Throat: treat with throat lozenges and/or gargle with warm salt   water.  - Because air was used during the procedure, expelling large amounts of air   from your rectum or belching is normal.  - If a bowel prep was taken, you may not have a bowel movement for 1-3 days.    This is normal.  SYMPTOMS TO WATCH FOR AND REPORT TO YOUR PHYSICIAN:  1. Abdominal pain or bloating, other than gas cramps.  2. Chest pain.  3. Back pain.  4. Signs of infection such as: chills or fever occurring within 24 hours   after the procedure.  5. Rectal bleeding, which would show as bright red, maroon, or black stools.   (A tablespoon of blood from the rectum is not serious, especially  if   hemorrhoids are present.)  6. Vomiting.  7. Weakness or dizziness.  GO DIRECTLY TO THE NEAREST EMERGENCY ROOM IF YOU HAVE ANY OF THE FOLLOWING:      Difficulty breathing              Chills and/or fever over 101 F   Persistent vomiting and/or vomiting blood   Severe abdominal pain   Severe chest pain   Black, tarry stools   Bleeding- more than one tablespoon   Any other symptom or condition that you feel may need urgent attention  Your doctor recommends these additional instructions:  If any biopsies were taken, your doctors clinic will contact you in 1 to 2   weeks with any results.  - Discharge patient to home.   - Resume previous diet.   - Continue present medications.   - Await pathology results.   - Return to referring physician as previously scheduled.  For questions, problems or results please call your physician Gm Leon MD at Work:  [phone]  If you have any questions about the above instructions, call the GI   department at [phone] or call the endoscopy unit at [phone]   from 7am until 3 pm.  [facility], EMERGENCY ROOM PHONE NUMBER:   [phone]  IF A COMPLICATION OR EMERGENCY SITUATION ARISES AND YOU ARE UNABLE TO REACH   YOUR PHYSICIAN - GO DIRECTLY TO THE EMERGENCY ROOM.  I have read or have had read to me these discharge instructions for my   procedure and have received a written copy.  I understand these   instructions and will follow-up with my physician if I have any questions.     __________________________________       _____________________________________  Nurse Signature                                          Patient/Designated   Responsible Party Signature  MD Gm Reynolds MD  9/18/2023 10:57:18 AM  This report has been verified and signed electronically.  Dear patient,  As a result of recent federal legislation (The Federal Cures Act), you may   receive lab or pathology results from your  procedure in your [facility]   account before your physician is able to contact you. Your physician or   their representative will relay the results to you with their   recommendations at their soonest availability.  Thank you,  PROVATION

## 2023-09-18 NOTE — H&P
Endoscopy History and Physical    PCP - Oleksandr Nagel MD  Referring Physician - Oleksandr Nagel MD  [address] [facility] [place]  [place] [address]      ASA - per anesthesia  Mallampati - per anesthesia  History of Anesthesia problems - no  Family history Anesthesia problems -  no   Plan of anesthesia - General    HPI  63 y.o. female    Planned Procedure: EGD  Diagnosis: GERD  Chief Complaint: Same as above        ROS:  Constitutional: No fevers, chills, No weight loss  CV: No chest pain  Pulm: No cough, No shortness of breath  GI: see HPI    Medical History:  has a past medical history of Acute respiratory failure due to COVID-19, COVID-19, Diabetes mellitus, type 2, Diabetic neuropathy (1/27/2014), DJD (degenerative joint disease) of knee, DVT (deep venous thrombosis) (around 1990's), Fatty liver, GERD (gastroesophageal reflux disease), Hypertension associated with diabetes, HECTOR (iron deficiency anemia) (5/13/2021), Multinodular goiter, Obesity, morbid, BMI 50 or higher, and Sleep apnea.    Surgical History:  has a past surgical history that includes Hysterectomy; Tonsillectomy; Wrist fracture surgery; Shoulder arthroscopy; Thyroidectomy, partial (Right); Fracture surgery; Tubal ligation (1984); Colonoscopy (N/A, 5/12/2021); Esophagogastroduodenoscopy (N/A, 7/8/2021); Epidural steroid injection into cervical spine (N/A, 10/8/2021); Epidural steroid injection (N/A, 12/10/2021); Selective injection of anesthetic agent around lumbar spinal nerve root by transforaminal approach (Bilateral, 5/6/2022); Intraluminal gastrointestinal tract imaging via capsule (N/A, 10/24/2022); and Selective injection of anesthetic agent around lumbar spinal nerve root by transforaminal approach (Bilateral, 12/30/2022).    Family History: family history includes Arthritis in her father; Breast cancer in her sister; Cancer in her brother, brother, father, and son; Diabetes in her mother; Heart disease (age of onset: 50) in her mother;  Hypertension in her mother; Leukemia in her son..    Social History:  reports that she has never smoked. She has never used smokeless tobacco. She reports that she does not drink alcohol and does not use drugs.    Review of patient's allergies indicates:   Allergen Reactions    Codeine sulfate      Nausea^    Lisinopril Swelling     angioedema    Codeine Nausea Only and Rash       Medications:   Medications Prior to Admission   Medication Sig Dispense Refill Last Dose    diltiaZEM (CARDIZEM) 30 MG tablet TAKE 1 TABLET EVERY 12 HOURS 180 tablet 1     EPINEPHrine (EPIPEN) 0.3 mg/0.3 mL AtIn Inject into the muscle.       ezetimibe (ZETIA) 10 mg tablet Take 1 tablet (10 mg total) by mouth once daily. 90 tablet 3     ferrous sulfate 324 mg (65 mg iron) TbEC Take 1 tablet (324 mg total) by mouth once daily. 365 tablet 0     fluticasone propionate (FLONASE) 50 mcg/actuation nasal spray Use 2 sprays to each nostril daily 16 g 12     inhalation spacing device (BREATHERITE VALVED MDI CHAMBER) Use as directed for inhalation. 1 Device prn     levothyroxine (EUTHYROX) 100 MCG tablet Take 1 tablet (100 mcg total) by mouth once daily. 90 tablet 4     loratadine (CLARITIN) 10 mg tablet Take 1 tablet (10 mg total) by mouth once daily. 90 tablet 4     meloxicam (MOBIC) 15 MG tablet Take 1 tablet (15 mg total) by mouth once daily. 30 tablet 5     metFORMIN (GLUCOPHAGE) 1000 MG tablet Take 1 tablet (1,000 mg total) by mouth daily with breakfast. 90 tablet 4     methocarbamoL (ROBAXIN) 500 MG Tab Take 1 tablet (500 mg total) by mouth 2 (two) times daily as needed (muscle spasms). 180 tablet 2     montelukast (SINGULAIR) 10 mg tablet Take 1 tablet (10 mg total) by mouth every evening. 30 tablet 0     pantoprazole (PROTONIX) 40 MG tablet Take 1 tablet (40 mg total) by mouth once daily. 90 tablet 4     pregabalin (LYRICA) 75 MG capsule Take 1 capsule (75 mg total) by mouth 3 (three) times daily. 90 capsule 3     rOPINIRole (REQUIP) 0.25 MG  tablet Take 1 tablet (0.25 mg total) by mouth every evening. 90 tablet 4     semaglutide (OZEMPIC) 2 mg/dose (8 mg/3 mL) PnIj INJECT 2 MG INTO THE SKIN EVERY 7 DAYS. 3 each 1     simvastatin (ZOCOR) 20 MG tablet Take 1 tablet (20 mg total) by mouth every evening. 90 tablet 3     sulfacetamide sodium 10% (BLEPH-10) 10 % ophthalmic solution Place 2 drops into the left eye 4 (four) times daily. 5 mL 0     tiZANidine (ZANAFLEX) 4 MG tablet Take 1/2 to 1 tablet by mouth twice daily as needed for muscle spasms. May cause drowsiness. 180 tablet 0     topiramate (TOPAMAX) 100 MG tablet Take 1 tablet (100 mg total) by mouth 2 (two) times daily. 180 tablet 4     TRELEGY ELLIPTA 100-62.5-25 mcg DsDv Inhale 1 puff into the lungs once daily.          Physical Exam:    Vital Signs: There were no vitals filed for this visit.    General Appearance: Well appearing in no acute distress  Abdomen: Soft, non tender, non distended with normal bowel sounds, no masses    Labs:  Lab Results   Component Value Date    WBC 4.94 06/14/2023    HGB 12.4 06/14/2023    HCT 39.5 06/14/2023     06/14/2023    CHOL 166 06/14/2023    TRIG 71 06/14/2023    HDL 55 06/14/2023    ALT 14 06/14/2023    AST 17 06/14/2023     06/14/2023    K 4.1 06/14/2023     06/14/2023    CREATININE 0.9 06/14/2023    BUN 17 06/14/2023    CO2 23 06/14/2023    TSH 1.696 06/14/2023    HGBA1C 4.9 09/06/2023       I have explained the risks and benefits of this endoscopic procedure to the patient including but not limited to bleeding, inflammation, infection, perforation, and death.    SEDATION PLAN: per anesthesia       History reviewed, vital signs satisfactory, cardiopulmonary status satisfactory, sedation options, risks and plans have been discussed with the patient  All their questions were answered and the patient agrees to the sedation procedures as planned and the patient is deemed an appropriate candidate for the sedation as planned.     The risks,  benefits and alternatives of the procedure were discussed with the patient in detail. This discussion was had in the presence of endoscopy staff. The risks include, risks of adverse reaction to sedation requiring the use of reversal agents, bleeding requiring blood transfusion, perforation requiring surgical intervention and technical failure. Other risks include aspiration leading to respiratory distress and respiratory failure resulting in endotracheal intubation and mechanical ventilation including death. If anesthesia is being utilized for this procedure, it is up to the anesthesiologist to determine airway safety including elective endotracheal intubation. Questions were answered, they agree to proceed. There was no language barriers.       Procedure explained to patient, informed consent obtained and placed in chart.       Gm Leon MD

## 2023-09-18 NOTE — ANESTHESIA PREPROCEDURE EVALUATION
09/18/2023  Zakia Price is a 63 y.o., female.  Patient Active Problem List   Diagnosis    Combined hyperlipidemia associated with type 2 diabetes mellitus    Chronic right-sided low back pain with bilateral sciatica    Type 2 diabetes mellitus with hyperglycemia, without long-term current use of insulin    Hypertension associated with diabetes    DJD (degenerative joint disease) of knee    Class 3 obesity with alveolar hypoventilation, serious comorbidity, and body mass index (BMI) of 50.0 to 59.9 in adult    Diabetic neuropathy    Complete rupture of rotator cuff    Multinodular goiter    Acquired varus deformity of knee    Chronic pain of both knees    Osteoarthritis of spine with radiculopathy, cervical region    Hallux valgus, acquired, bilateral    Acquired genu varum of right lower extremity    Acquired genu varum of left lower extremity    Heel pain    Hyperlipidemia    Nontoxic single thyroid nodule    Systolic murmur    Gastroesophageal reflux disease with esophagitis without hemorrhage    Cough    Postoperative hypothyroidism    Encounter for screening mammogram for breast cancer    Type II diabetes mellitus with neurological manifestations    Encounter for long-term (current) use of medications    Chronic left shoulder pain    Chronic bilateral thoracic back pain    Angioedema    Dry skin dermatitis    Sinus headache    Iron deficiency anemia due to chronic blood loss    Breast hypertrophy    Gait instability    Claudication of both lower extremities    HECTOR (iron deficiency anemia)    Chronic superficial gastritis without bleeding    Gastric nodule    Constipation    Cervical radiculopathy    Lumbar radiculopathy, chronic    Pain in both hands    Right leg pain    History of Helicobacter pylori infection    Abnormal x-ray    Menopause     Restless legs syndrome (RLS)    Insomnia    Medication refill    Hypothyroidism    Hip pain, right    Sacroiliitis    Hearing loss    Cervical paraspinal muscle spasm    Iron deficiency    Decreased strength of upper extremity    Decreased ROM of left shoulder    Decreased range of motion of neck     Pre-op Assessment    I have reviewed the Patient Summary Reports.   I have reviewed the NPO Status.   I have reviewed the Medications.     Review of Systems  Anesthesia Hx:  No problems with previous Anesthesia    Social:  Non-Smoker, No Alcohol Use    Hematology/Oncology:     Oncology Normal    -- Anemia:   EENT/Dental:EENT/Dental Normal   Cardiovascular:   Hypertension, well controlled hyperlipidemia ECG has been reviewed. · The left ventricle is normal in size with concentric remodeling and low normal systolic function. The estimated ejection fraction is 50%  · Normal left ventricular diastolic function.  · Normal right ventricular size with normal right ventricular systolic function.  · There is mild left ventricular global hypokinesis.  · Technically difficult study, recommend contrast for better visualization of LV cavity      Pulmonary:   Pneumonia Asthma mild Shortness of breath Sleep Apnea    Renal/:  Renal/ Normal     Hepatic/GI:   GERD Liver Disease, Fatty liver   Musculoskeletal:   Arthritis   Spine Disorders: lumbar Chronic Pain    Neurological:   Headaches   Peripheral Neuropathy    Endocrine:   Diabetes, well controlled Hypothyroidism    Dermatological:  Skin Normal    Psych:  Psychiatric Normal           Physical Exam  General:  Well nourished and Morbid Obesity      Airway/Jaw/Neck:  AIRWAY FINDINGS: Normal Mallampati: II Jaw/Neck Findings:  Neck ROM: Normal ROM       Dental:  Dental Findings: Upper Dentures, Lower partial dentures      Chest/Lungs:  Chest/Lungs Findings: Normal Respiratory Rate      Heart/Vascular:  Heart Findings: Rate: Normal        Mental Status:  Mental Status  Findings: Normal        Anesthesia Plan  Type of Anesthesia, risks & benefits discussed:  Anesthesia Type:  MAC    Patient's Preference:   Plan Factors:          Intra-op Monitoring Plan: standard ASA monitors  Intra-op Monitoring Plan Comments:   Post Op Pain Control Plan: IV/PO Opioids PRN  Post Op Pain Control Plan Comments:     Induction:    Beta Blocker:  Patient is not currently on a Beta-Blocker (No further documentation required).       Informed Consent: Informed consent signed with the Patient and all parties understand the risks and agree with anesthesia plan.  All questions answered.  Anesthesia consent signed with patient.  ASA Score: 3     Day of Surgery Review of History & Physical: I have interviewed and examined the patient. I have reviewed the patient's H&P dated:  There are no significant changes.            Ready For Surgery From Anesthesia Perspective.           Physical Exam  General: Well nourished and Morbid Obesity    Airway:  Mallampati: II   Neck ROM: Normal ROM    Dental:  Upper Dentures, Lower partial dentures    Chest/Lungs:  Normal Respiratory Rate    Heart:  Rate: Normal          Anesthesia Plan  Type of Anesthesia, risks & benefits discussed:    Anesthesia Type: MAC  Intra-op Monitoring Plan: standard ASA monitors  Post Op Pain Control Plan: IV/PO Opioids PRN  Informed Consent: Informed consent signed with the Patient and all parties understand the risks and agree with anesthesia plan.  All questions answered.   ASA Score: 3  Day of Surgery Review of History & Physical: I have interviewed and examined the patient. I have reviewed the patient's H&P dated:     Ready For Surgery From Anesthesia Perspective.       .

## 2023-09-18 NOTE — ANESTHESIA POSTPROCEDURE EVALUATION
Anesthesia Post Evaluation    Patient: Zakia Price    Procedure(s) Performed: Procedure(s) (LRB):  EGD (ESOPHAGOGASTRODUODENOSCOPY) (N/A)    Final Anesthesia Type: MAC      Patient location during evaluation: GI PACU  Patient participation: Yes- Able to Participate  Level of consciousness: awake and alert and oriented  Post-procedure vital signs: reviewed and stable  Pain management: adequate  Airway patency: patent  ETHAN mitigation strategies: Multimodal analgesia  PONV status at discharge: No PONV  Anesthetic complications: no      Cardiovascular status: hemodynamically stable  Respiratory status: unassisted, spontaneous ventilation and room air  Hydration status: euvolemic  Follow-up not needed.          Vitals Value Taken Time   /80 09/18/23 1122   Temp 36.4 °C (97.6 °F) 09/18/23 1122   Pulse 46 09/18/23 1122   Resp 16 09/18/23 1122   SpO2 98 % 09/18/23 1122         Event Time   Out of Recovery 11:24:18         Pain/Jefe Score: Jefe Score: 10 (9/18/2023 11:24 AM)

## 2023-09-19 VITALS
BODY MASS INDEX: 44.41 KG/M2 | HEIGHT: 68 IN | RESPIRATION RATE: 16 BRPM | HEART RATE: 46 BPM | WEIGHT: 293 LBS | DIASTOLIC BLOOD PRESSURE: 80 MMHG | OXYGEN SATURATION: 98 % | TEMPERATURE: 98 F | SYSTOLIC BLOOD PRESSURE: 134 MMHG

## 2023-09-22 LAB
FINAL PATHOLOGIC DIAGNOSIS: NORMAL
GROSS: NORMAL
Lab: NORMAL
MICROSCOPIC EXAM: NORMAL

## 2023-09-25 ENCOUNTER — LAB VISIT (OUTPATIENT)
Dept: LAB | Facility: HOSPITAL | Age: 63
End: 2023-09-25
Attending: NURSE PRACTITIONER
Payer: MEDICARE

## 2023-09-25 DIAGNOSIS — D50.0 IRON DEFICIENCY ANEMIA DUE TO CHRONIC BLOOD LOSS: ICD-10-CM

## 2023-09-25 LAB
BASOPHILS # BLD AUTO: NORMAL K/UL (ref 0–0.2)
BASOPHILS NFR BLD: 0 % (ref 0–1.9)
DIFFERENTIAL METHOD: NORMAL
EOSINOPHIL # BLD AUTO: NORMAL K/UL (ref 0–0.5)
EOSINOPHIL NFR BLD: 3 % (ref 0–8)
ERYTHROCYTE [DISTWIDTH] IN BLOOD BY AUTOMATED COUNT: 14.5 % (ref 11.5–14.5)
FERRITIN SERPL-MCNC: 165 NG/ML (ref 20–300)
HCT VFR BLD AUTO: 40.2 % (ref 37–48.5)
HGB BLD-MCNC: 12.9 G/DL (ref 12–16)
IMM GRANULOCYTES # BLD AUTO: NORMAL K/UL (ref 0–0.04)
IMM GRANULOCYTES NFR BLD AUTO: NORMAL % (ref 0–0.5)
IRON SERPL-MCNC: 93 UG/DL (ref 30–160)
LYMPHOCYTES # BLD AUTO: NORMAL K/UL (ref 1–4.8)
LYMPHOCYTES NFR BLD: 39 % (ref 18–48)
MCH RBC QN AUTO: 29.2 PG (ref 27–31)
MCHC RBC AUTO-ENTMCNC: 32.1 G/DL (ref 32–36)
MCV RBC AUTO: 91 FL (ref 82–98)
MONOCYTES # BLD AUTO: NORMAL K/UL (ref 0.3–1)
MONOCYTES NFR BLD: 8 % (ref 4–15)
NEUTROPHILS NFR BLD: 48 % (ref 38–73)
NEUTS BAND NFR BLD MANUAL: 2 %
NRBC BLD-RTO: 0 /100 WBC
PLATELET # BLD AUTO: 308 K/UL (ref 150–450)
PLATELET BLD QL SMEAR: NORMAL
PMV BLD AUTO: 10.6 FL (ref 9.2–12.9)
RBC # BLD AUTO: 4.42 M/UL (ref 4–5.4)
SATURATED IRON: 32 % (ref 20–50)
TOTAL IRON BINDING CAPACITY: 295 UG/DL (ref 250–450)
TRANSFERRIN SERPL-MCNC: 199 MG/DL (ref 200–375)
WBC # BLD AUTO: 4.15 K/UL (ref 3.9–12.7)

## 2023-09-25 PROCEDURE — 84466 ASSAY OF TRANSFERRIN: CPT | Mod: HCNC | Performed by: NURSE PRACTITIONER

## 2023-09-25 PROCEDURE — 85007 BL SMEAR W/DIFF WBC COUNT: CPT | Mod: HCNC,PO | Performed by: NURSE PRACTITIONER

## 2023-09-25 PROCEDURE — 85027 COMPLETE CBC AUTOMATED: CPT | Mod: HCNC,PO | Performed by: NURSE PRACTITIONER

## 2023-09-25 PROCEDURE — 36415 COLL VENOUS BLD VENIPUNCTURE: CPT | Mod: HCNC,PO | Performed by: NURSE PRACTITIONER

## 2023-09-25 PROCEDURE — 82728 ASSAY OF FERRITIN: CPT | Mod: HCNC | Performed by: NURSE PRACTITIONER

## 2023-09-25 PROCEDURE — 83540 ASSAY OF IRON: CPT | Mod: HCNC | Performed by: NURSE PRACTITIONER

## 2023-09-27 ENCOUNTER — OFFICE VISIT (OUTPATIENT)
Dept: HEMATOLOGY/ONCOLOGY | Facility: CLINIC | Age: 63
End: 2023-09-27
Payer: MEDICARE

## 2023-09-27 VITALS
SYSTOLIC BLOOD PRESSURE: 126 MMHG | HEART RATE: 84 BPM | TEMPERATURE: 97 F | DIASTOLIC BLOOD PRESSURE: 84 MMHG | BODY MASS INDEX: 44.41 KG/M2 | OXYGEN SATURATION: 97 % | HEIGHT: 68 IN | WEIGHT: 293 LBS

## 2023-09-27 DIAGNOSIS — E66.2 CLASS 3 OBESITY WITH ALVEOLAR HYPOVENTILATION, SERIOUS COMORBIDITY, AND BODY MASS INDEX (BMI) OF 50.0 TO 59.9 IN ADULT: ICD-10-CM

## 2023-09-27 DIAGNOSIS — D50.0 IRON DEFICIENCY ANEMIA DUE TO CHRONIC BLOOD LOSS: Primary | ICD-10-CM

## 2023-09-27 PROCEDURE — 1159F MED LIST DOCD IN RCRD: CPT | Mod: HCNC,CPTII,S$GLB, | Performed by: NURSE PRACTITIONER

## 2023-09-27 PROCEDURE — 3074F SYST BP LT 130 MM HG: CPT | Mod: HCNC,CPTII,S$GLB, | Performed by: NURSE PRACTITIONER

## 2023-09-27 PROCEDURE — 1159F PR MEDICATION LIST DOCUMENTED IN MEDICAL RECORD: ICD-10-PCS | Mod: HCNC,CPTII,S$GLB, | Performed by: NURSE PRACTITIONER

## 2023-09-27 PROCEDURE — 1101F PR PT FALLS ASSESS DOC 0-1 FALLS W/OUT INJ PAST YR: ICD-10-PCS | Mod: HCNC,CPTII,S$GLB, | Performed by: NURSE PRACTITIONER

## 2023-09-27 PROCEDURE — 3079F PR MOST RECENT DIASTOLIC BLOOD PRESSURE 80-89 MM HG: ICD-10-PCS | Mod: HCNC,CPTII,S$GLB, | Performed by: NURSE PRACTITIONER

## 2023-09-27 PROCEDURE — 3008F BODY MASS INDEX DOCD: CPT | Mod: HCNC,CPTII,S$GLB, | Performed by: NURSE PRACTITIONER

## 2023-09-27 PROCEDURE — 99999 PR PBB SHADOW E&M-EST. PATIENT-LVL IV: CPT | Mod: PBBFAC,HCNC,, | Performed by: NURSE PRACTITIONER

## 2023-09-27 PROCEDURE — 3079F DIAST BP 80-89 MM HG: CPT | Mod: HCNC,CPTII,S$GLB, | Performed by: NURSE PRACTITIONER

## 2023-09-27 PROCEDURE — 99999 PR PBB SHADOW E&M-EST. PATIENT-LVL IV: ICD-10-PCS | Mod: PBBFAC,HCNC,, | Performed by: NURSE PRACTITIONER

## 2023-09-27 PROCEDURE — 3074F PR MOST RECENT SYSTOLIC BLOOD PRESSURE < 130 MM HG: ICD-10-PCS | Mod: HCNC,CPTII,S$GLB, | Performed by: NURSE PRACTITIONER

## 2023-09-27 PROCEDURE — 3066F PR DOCUMENTATION OF TREATMENT FOR NEPHROPATHY: ICD-10-PCS | Mod: HCNC,CPTII,S$GLB, | Performed by: NURSE PRACTITIONER

## 2023-09-27 PROCEDURE — 1101F PT FALLS ASSESS-DOCD LE1/YR: CPT | Mod: HCNC,CPTII,S$GLB, | Performed by: NURSE PRACTITIONER

## 2023-09-27 PROCEDURE — 3044F PR MOST RECENT HEMOGLOBIN A1C LEVEL <7.0%: ICD-10-PCS | Mod: HCNC,CPTII,S$GLB, | Performed by: NURSE PRACTITIONER

## 2023-09-27 PROCEDURE — 99214 OFFICE O/P EST MOD 30 MIN: CPT | Mod: HCNC,S$GLB,, | Performed by: NURSE PRACTITIONER

## 2023-09-27 PROCEDURE — 3061F NEG MICROALBUMINURIA REV: CPT | Mod: HCNC,CPTII,S$GLB, | Performed by: NURSE PRACTITIONER

## 2023-09-27 PROCEDURE — 1160F RVW MEDS BY RX/DR IN RCRD: CPT | Mod: HCNC,CPTII,S$GLB, | Performed by: NURSE PRACTITIONER

## 2023-09-27 PROCEDURE — 3066F NEPHROPATHY DOC TX: CPT | Mod: HCNC,CPTII,S$GLB, | Performed by: NURSE PRACTITIONER

## 2023-09-27 PROCEDURE — 3288F FALL RISK ASSESSMENT DOCD: CPT | Mod: HCNC,CPTII,S$GLB, | Performed by: NURSE PRACTITIONER

## 2023-09-27 PROCEDURE — 3044F HG A1C LEVEL LT 7.0%: CPT | Mod: HCNC,CPTII,S$GLB, | Performed by: NURSE PRACTITIONER

## 2023-09-27 PROCEDURE — 3288F PR FALLS RISK ASSESSMENT DOCUMENTED: ICD-10-PCS | Mod: HCNC,CPTII,S$GLB, | Performed by: NURSE PRACTITIONER

## 2023-09-27 PROCEDURE — 99214 PR OFFICE/OUTPT VISIT, EST, LEVL IV, 30-39 MIN: ICD-10-PCS | Mod: HCNC,S$GLB,, | Performed by: NURSE PRACTITIONER

## 2023-09-27 PROCEDURE — 3061F PR NEG MICROALBUMINURIA RESULT DOCUMENTED/REVIEW: ICD-10-PCS | Mod: HCNC,CPTII,S$GLB, | Performed by: NURSE PRACTITIONER

## 2023-09-27 PROCEDURE — 3008F PR BODY MASS INDEX (BMI) DOCUMENTED: ICD-10-PCS | Mod: HCNC,CPTII,S$GLB, | Performed by: NURSE PRACTITIONER

## 2023-09-27 PROCEDURE — 1160F PR REVIEW ALL MEDS BY PRESCRIBER/CLIN PHARMACIST DOCUMENTED: ICD-10-PCS | Mod: HCNC,CPTII,S$GLB, | Performed by: NURSE PRACTITIONER

## 2023-09-27 NOTE — PROGRESS NOTES
Subjective:       Patient ID: Zakia Price is a 63 y.o. female.    Chief Complaint: review labs. Anemia    HPI: 63 y.o female presenting today for follow up of her ion deficiency anemia treated with Feraheme 5/2021. Most recently received Venofer weekly x 4 completed 7/31/2023. Prior EGD/Colonoscopy evaluation reviewed. EGD pathology H. Pylori positive, she completed treatment.. Repeat testing reflect eradication of H. Pylori infection. Colonoscopy unremarkable with recommended repeat in 10 years. VCE without S&S bleeding    Recent EGD for evaluation of c/o N/V 9/2023 unremarkable.     She feels well overall but notes ongoing fatigue. Denies abnormal bleeding or anemia symptoms. Has chronic constipation for which she sees GI.           Social History     Socioeconomic History    Marital status: Single   Tobacco Use    Smoking status: Never    Smokeless tobacco: Never   Substance and Sexual Activity    Alcohol use: No    Drug use: No    Sexual activity: Not Currently     Social Determinants of Health     Financial Resource Strain: Medium Risk (11/14/2019)    Overall Financial Resource Strain (CARDIA)     Difficulty of Paying Living Expenses: Somewhat hard   Food Insecurity: Food Insecurity Present (11/14/2019)    Hunger Vital Sign     Worried About Running Out of Food in the Last Year: Sometimes true     Ran Out of Food in the Last Year: Sometimes true   Transportation Needs: Unmet Transportation Needs (11/14/2019)    PRAPARE - Transportation     Lack of Transportation (Medical): Yes     Lack of Transportation (Non-Medical): Yes   Physical Activity: Inactive (11/14/2019)    Exercise Vital Sign     Days of Exercise per Week: 0 days     Minutes of Exercise per Session: 0 min   Stress: No Stress Concern Present (11/14/2019)    [employer] [place] [employer] - Occupational Stress Questionnaire     Feeling of Stress : Only a little    Social Connection and Isolation Panel [NHANES]       Past Medical  History:   Diagnosis Date    Acute respiratory failure due to COVID-19     COVID-19     Diabetes mellitus, type 2     Diabetic neuropathy 1/27/2014    DJD (degenerative joint disease) of knee     DVT (deep venous thrombosis) around 1990's    in leg, is on no anticoagulant therapy presently    Fatty liver     GERD (gastroesophageal reflux disease)     Hypertension associated with diabetes     HECTOR (iron deficiency anemia) 5/13/2021    Multinodular goiter     Obesity, morbid, BMI 50 or higher     Sleep apnea     has no CPAP       Family History   Problem Relation Age of Onset    Diabetes Mother     Hypertension Mother     Heart disease Mother 50    Cancer Father     Arthritis Father     Breast cancer Sister     Cancer Brother     Cancer Brother     Leukemia Son     Cancer Son        Past Surgical History:   Procedure Laterality Date    COLONOSCOPY N/A 5/12/2021    Procedure: COLONOSCOPY;  Surgeon: Carolina Rizo MD;  Location: [facility];  Service: Endoscopy;  Laterality: N/A;    EPIDURAL STEROID INJECTION N/A 12/10/2021    Procedure: Lumbar L5/S1 IL TEETEE  Would like AM procedure, if possible;  Surgeon: Nae Paul MD;  Location: [facility] PAIN [facility]GT;  Service: Pain Management;  Laterality: N/A;    EPIDURAL STEROID INJECTION INTO CERVICAL SPINE N/A 10/8/2021    Procedure: C7-T1 IL TEETEE-no sedation.  Needs IV-just incase;  Surgeon: Nae Paul MD;  Location: [facility]IN [facility];  Service: Pain Management;  Laterality: N/A;    ESOPHAGOGASTRODUODENOSCOPY N/A 7/8/2021    Procedure: EGD (ESOPHAGOGASTRODUODENOSCOPY) previous positve covid;  Surgeon: Jann Gutierrez MD;  Location: [facility];  Service: Endoscopy;  Laterality: N/A;    ESOPHAGOGASTRODUODENOSCOPY N/A 9/18/2023    Procedure: EGD (ESOPHAGOGASTRODUODENOSCOPY);  Surgeon: Gm Leon MD;  Location: [facility];  Service: Endoscopy;  Laterality: N/A;    FRACTURE SURGERY      HYSTERECTOMY      INTRALUMINAL GASTROINTESTINAL TRACT IMAGING VIA CAPSULE N/A 10/24/2022     Procedure: IMAGING PROCEDURE, GI TRACT, INTRALUMINAL, VIA CAPSULE;  Surgeon: Hang Lunsford RN;  Location: [facility] END[facility];  Service: Endoscopy;  Laterality: N/A;    SELECTIVE INJECTION OF ANESTHETIC AGENT AROUND LUMBAR SPINAL NERVE ROOT BY TRANSFORAMINAL APPROACH Bilateral 5/6/2022    Procedure: Bilateral L5/S1 TF TEETEE with RN IV sedation;  Surgeon: Nae Paul MD;  Location: [facility] PAIN [facility]GT;  Service: Pain Management;  Laterality: Bilateral;    SELECTIVE INJECTION OF ANESTHETIC AGENT AROUND LUMBAR SPINAL NERVE ROOT BY TRANSFORAMINAL APPROACH Bilateral 12/30/2022    Procedure: Bilateral L5/S1 TF TEETEE RN IV Sedation;  Surgeon: Nae Paul MD;  Location: [facility] PAIN [facility]GT;  Service: Pain Management;  Laterality: Bilateral;    SHOULDER ARTHROSCOPY      THYROIDECTOMY, PARTIAL Right     and transplatation of right superior parathyroid gland to the sternocleidomastoid muscle     TONSILLECTOMY      TUBAL LIGATION  1984    WRIST FRACTURE SURGERY      left       Review of Systems   Constitutional:  Negative for activity change, appetite change, chills, fatigue, fever and unexpected weight change.   HENT:  Negative for congestion, mouth sores, nosebleeds, sore throat, trouble swallowing and voice change.    Eyes:  Negative for visual disturbance.   Respiratory:  Negative for cough, chest tightness, shortness of breath and wheezing.    Cardiovascular:  Negative for chest pain and leg swelling.   Gastrointestinal:  Positive for constipation. Negative for abdominal distention, abdominal pain, anal bleeding, blood in stool, diarrhea, nausea and vomiting.   Genitourinary:  Negative for difficulty urinating, dysuria and hematuria.   Musculoskeletal:  Negative for arthralgias, back pain and myalgias.   Skin:  Negative for pallor, rash and wound.   Neurological:  Negative for dizziness, syncope, weakness and headaches.   Hematological:  Negative for adenopathy. Does not bruise/bleed easily.   Psychiatric/Behavioral:  The patient is not  nervous/anxious.        Medication List with Changes/Refills   Current Medications    DILTIAZEM (CARDIZEM) 30 MG TABLET    TAKE 1 TABLET EVERY 12 HOURS    EPINEPHRINE (EPIPEN) 0.3 MG/0.3 ML ATIN    Inject into the muscle.    EZETIMIBE (ZETIA) 10 MG TABLET    Take 1 tablet (10 mg total) by mouth once daily.    FERROUS SULFATE 324 MG (65 MG IRON) TBEC    Take 1 tablet (324 mg total) by mouth once daily.    FLUTICASONE PROPIONATE (FLONASE) 50 MCG/ACTUATION NASAL SPRAY    Use 2 sprays to each nostril daily    INHALATION SPACING DEVICE (BREATHERITE VALVED MDI CHAMBER)    Use as directed for inhalation.    LEVOTHYROXINE (EUTHYROX) 100 MCG TABLET    Take 1 tablet (100 mcg total) by mouth once daily.    LORATADINE (CLARITIN) 10 MG TABLET    Take 1 tablet (10 mg total) by mouth once daily.    MELOXICAM (MOBIC) 15 MG TABLET    Take 1 tablet (15 mg total) by mouth once daily.    METFORMIN (GLUCOPHAGE) 1000 MG TABLET    Take 1 tablet (1,000 mg total) by mouth daily with breakfast.    METHOCARBAMOL (ROBAXIN) 500 MG TAB    Take 1 tablet (500 mg total) by mouth 2 (two) times daily as needed (muscle spasms).    MONTELUKAST (SINGULAIR) 10 MG TABLET    Take 1 tablet (10 mg total) by mouth every evening.    PANTOPRAZOLE (PROTONIX) 40 MG TABLET    Take 1 tablet (40 mg total) by mouth once daily.    PREGABALIN (LYRICA) 75 MG CAPSULE    Take 1 capsule (75 mg total) by mouth 3 (three) times daily.    ROPINIROLE (REQUIP) 0.25 MG TABLET    Take 1 tablet (0.25 mg total) by mouth every evening.    SEMAGLUTIDE (OZEMPIC) 2 MG/DOSE (8 MG/3 ML) PNIJ    INJECT 2 MG INTO THE SKIN EVERY 7 DAYS.    SIMVASTATIN (ZOCOR) 20 MG TABLET    Take 1 tablet (20 mg total) by mouth every evening.    SULFACETAMIDE SODIUM 10% (BLEPH-10) 10 % OPHTHALMIC SOLUTION    Place 2 drops into the left eye 4 (four) times daily.    TIZANIDINE (ZANAFLEX) 4 MG TABLET    Take 1/2 to 1 tablet by mouth twice daily as needed for muscle spasms. May cause drowsiness.    TOPIRAMATE  (TOPAMAX) 100 MG TABLET    Take 1 tablet (100 mg total) by mouth 2 (two) times daily.    TRELEGY ELLIPTA 100-62.5-25 MCG DSDV    Inhale 1 puff into the lungs once daily.     Objective:     Vitals:    09/27/23 1520   BP: 126/84   Pulse: 84   Temp: 97.1 °F (36.2 °C)       Lab Results   Component Value Date    WBC 4.15 09/25/2023    HGB 12.9 09/25/2023    HCT 40.2 09/25/2023    MCV 91 09/25/2023     09/25/2023       BMP  Lab Results   Component Value Date     06/14/2023    K 4.1 06/14/2023     06/14/2023    CO2 23 06/14/2023    BUN 17 06/14/2023    CREATININE 0.9 06/14/2023    CALCIUM 9.4 06/14/2023    ANIONGAP 10 06/14/2023    ESTGFRAFRICA >60.0 02/24/2022    EGFRNONAA >60.0 02/24/2022     Lab Results   Component Value Date    ALT 14 06/14/2023    AST 17 06/14/2023    ALKPHOS 80 06/14/2023    BILITOT 0.2 06/14/2023     Lab Results   Component Value Date    IRON 93 09/25/2023    TIBC 295 09/25/2023    FERRITIN 165 09/25/2023       Physical Exam  HENT:      Nose: Nose normal.   Eyes:      Conjunctiva/sclera: Conjunctivae normal.   Pulmonary:      Effort: Pulmonary effort is normal. No respiratory distress.   Musculoskeletal:      Cervical back: Normal range of motion.   Skin:     General: Skin is warm and dry.   Neurological:      Mental Status: She is alert and oriented to person, place, and time.        Assessment:     Problem List Items Addressed This Visit          Oncology    HECTOR (iron deficiency anemia) - Primary     Prior h/o H. Pylori. Repeat H. Pylori testing negative. Patient previously d/c'd oral iron supplementation due to constipation. VCE normal. Continues GI follow up. Recent EGD 9/2023 unremarkable    List of iron rich foods from up to date previously given to patient    Patient s/p weekly venofer x 4. Iron deficiency resolved    -no further intervention at present time  -f/u 3 months with cbc, iron, ferritin  -Discussed S&S to report sooner         Relevant Orders    CBC Auto  Differential    Iron and TIBC    Ferritin       Endocrine    Class 3 obesity with alveolar hypoventilation, serious comorbidity, and body mass index (BMI) of 50.0 to 59.9 in adult     Encourage healthy nutrition along with portion control. Encourage daily exercise               Med Onc Chart Routing      Follow up with physician    Follow up with KIM[facility] 3 months.   Infusion scheduling note    Injection scheduling note    Labs CBC, ferritin and iron and TIBC   Scheduling:  Preferred lab:  Lab interval:  1-2 days prior   Imaging None      Pharmacy appointment No pharmacy appointment needed      Other referrals       No additional referrals needed           Plan:     Iron deficiency anemia due to chronic blood loss  -     CBC Auto Differential; Future; Expected date: 09/27/2023  -     Iron and TIBC; Future; Expected date: 09/27/2023  -     Ferritin; Future; Expected date: 09/27/2023    Class 3 obesity with alveolar hypoventilation, serious comorbidity, and body mass index (BMI) of 50.0 to 59.9 in adult              KARO Arroyo

## 2023-09-27 NOTE — ASSESSMENT & PLAN NOTE
Prior h/o H. Pylori. Repeat H. Pylori testing negative. Patient previously d/c'd oral iron supplementation due to constipation. VCE normal. Continues GI follow up. Recent EGD 9/2023 unremarkable    List of iron rich foods from up to date previously given to patient    Patient s/p weekly venofer x 4. Iron deficiency resolved    -no further intervention at present time  -f/u 3 months with cbc, iron, ferritin  -Discussed S&S to report sooner

## 2023-10-05 ENCOUNTER — OFFICE VISIT (OUTPATIENT)
Dept: OPHTHALMOLOGY | Facility: CLINIC | Age: 63
End: 2023-10-05
Payer: MEDICARE

## 2023-10-05 ENCOUNTER — PATIENT MESSAGE (OUTPATIENT)
Dept: OPHTHALMOLOGY | Facility: CLINIC | Age: 63
End: 2023-10-05

## 2023-10-05 DIAGNOSIS — E11.9 DIABETES MELLITUS TYPE 2 WITHOUT RETINOPATHY: Primary | ICD-10-CM

## 2023-10-05 DIAGNOSIS — E11.36 DIABETIC CATARACT: ICD-10-CM

## 2023-10-05 DIAGNOSIS — H52.7 REFRACTIVE ERRORS: ICD-10-CM

## 2023-10-05 PROCEDURE — 92004 PR EYE EXAM, NEW PATIENT,COMPREHESV: ICD-10-PCS | Mod: HCNC,S$GLB,, | Performed by: OPTOMETRIST

## 2023-10-05 PROCEDURE — 1159F PR MEDICATION LIST DOCUMENTED IN MEDICAL RECORD: ICD-10-PCS | Mod: HCNC,CPTII,S$GLB, | Performed by: OPTOMETRIST

## 2023-10-05 PROCEDURE — 99999 PR PBB SHADOW E&M-EST. PATIENT-LVL III: CPT | Mod: PBBFAC,HCNC,, | Performed by: OPTOMETRIST

## 2023-10-05 PROCEDURE — 99999 PR PBB SHADOW E&M-EST. PATIENT-LVL III: ICD-10-PCS | Mod: PBBFAC,HCNC,, | Performed by: OPTOMETRIST

## 2023-10-05 PROCEDURE — 3066F PR DOCUMENTATION OF TREATMENT FOR NEPHROPATHY: ICD-10-PCS | Mod: HCNC,CPTII,S$GLB, | Performed by: OPTOMETRIST

## 2023-10-05 PROCEDURE — 2023F DILAT RTA XM W/O RTNOPTHY: CPT | Mod: HCNC,CPTII,S$GLB, | Performed by: OPTOMETRIST

## 2023-10-05 PROCEDURE — 92015 PR REFRACTION: ICD-10-PCS | Mod: HCNC,S$GLB,, | Performed by: OPTOMETRIST

## 2023-10-05 PROCEDURE — 1159F MED LIST DOCD IN RCRD: CPT | Mod: HCNC,CPTII,S$GLB, | Performed by: OPTOMETRIST

## 2023-10-05 PROCEDURE — 3044F HG A1C LEVEL LT 7.0%: CPT | Mod: HCNC,CPTII,S$GLB, | Performed by: OPTOMETRIST

## 2023-10-05 PROCEDURE — 3044F PR MOST RECENT HEMOGLOBIN A1C LEVEL <7.0%: ICD-10-PCS | Mod: HCNC,CPTII,S$GLB, | Performed by: OPTOMETRIST

## 2023-10-05 PROCEDURE — 3061F PR NEG MICROALBUMINURIA RESULT DOCUMENTED/REVIEW: ICD-10-PCS | Mod: HCNC,CPTII,S$GLB, | Performed by: OPTOMETRIST

## 2023-10-05 PROCEDURE — 92004 COMPRE OPH EXAM NEW PT 1/>: CPT | Mod: HCNC,S$GLB,, | Performed by: OPTOMETRIST

## 2023-10-05 PROCEDURE — 92015 DETERMINE REFRACTIVE STATE: CPT | Mod: HCNC,S$GLB,, | Performed by: OPTOMETRIST

## 2023-10-05 PROCEDURE — 3066F NEPHROPATHY DOC TX: CPT | Mod: HCNC,CPTII,S$GLB, | Performed by: OPTOMETRIST

## 2023-10-05 PROCEDURE — 3061F NEG MICROALBUMINURIA REV: CPT | Mod: HCNC,CPTII,S$GLB, | Performed by: OPTOMETRIST

## 2023-10-05 PROCEDURE — 2023F PR DILATED RETINAL EXAM W/O EVID OF RETINOPATHY: ICD-10-PCS | Mod: HCNC,CPTII,S$GLB, | Performed by: OPTOMETRIST

## 2023-10-05 NOTE — PROGRESS NOTES
HPI    NIDDM exam  Decrease distance  visual acuity  with glasses.  Patient wearing old glasses, glasses broke x 1 year  New patient last eye exam over a year.  Update glasses RX.  Lab Results       Component                Value               Date                       HGBA1C                   4.9                 09/06/2023              Last edited by Melody Josue MA on 10/5/2023  1:37 PM.            Assessment /Plan     For exam results, see Encounter Report.    Diabetes mellitus type 2 without retinopathy    Diabetic cataract    Refractive errors      No Background Diabetic Retinopathy  Strict BG control, f/u w/ PCP, and annual DFE  Stressed importance of DM control to preserve vision    Moderate cataracts OU, not surgical  Follow annually.    Dispense Final Rx for glasses.  RTC 1 year  Discussed above and answered questions.                      Principal Discharge DX:	Neck pain

## 2023-10-09 ENCOUNTER — TELEPHONE (OUTPATIENT)
Dept: ADMINISTRATIVE | Facility: CLINIC | Age: 63
End: 2023-10-09
Payer: MEDICARE

## 2023-10-11 ENCOUNTER — OFFICE VISIT (OUTPATIENT)
Dept: FAMILY MEDICINE | Facility: CLINIC | Age: 63
End: 2023-10-11
Payer: MEDICARE

## 2023-10-11 VITALS
SYSTOLIC BLOOD PRESSURE: 109 MMHG | TEMPERATURE: 97 F | WEIGHT: 288.38 LBS | HEART RATE: 94 BPM | DIASTOLIC BLOOD PRESSURE: 65 MMHG | BODY MASS INDEX: 43.85 KG/M2 | OXYGEN SATURATION: 98 %

## 2023-10-11 DIAGNOSIS — E66.2 CLASS 3 OBESITY WITH ALVEOLAR HYPOVENTILATION, SERIOUS COMORBIDITY, AND BODY MASS INDEX (BMI) OF 50.0 TO 59.9 IN ADULT: ICD-10-CM

## 2023-10-11 DIAGNOSIS — Z79.899 ENCOUNTER FOR LONG-TERM (CURRENT) USE OF MEDICATIONS: Chronic | ICD-10-CM

## 2023-10-11 DIAGNOSIS — E78.2 COMBINED HYPERLIPIDEMIA ASSOCIATED WITH TYPE 2 DIABETES MELLITUS: Chronic | ICD-10-CM

## 2023-10-11 DIAGNOSIS — E11.65 TYPE 2 DIABETES MELLITUS WITH HYPERGLYCEMIA, WITHOUT LONG-TERM CURRENT USE OF INSULIN: Chronic | ICD-10-CM

## 2023-10-11 DIAGNOSIS — I73.9 CLAUDICATION OF BOTH LOWER EXTREMITIES: Chronic | ICD-10-CM

## 2023-10-11 DIAGNOSIS — I15.2 HYPERTENSION ASSOCIATED WITH DIABETES: Chronic | ICD-10-CM

## 2023-10-11 DIAGNOSIS — Z00.00 ENCOUNTER FOR PREVENTIVE CARE: Primary | ICD-10-CM

## 2023-10-11 DIAGNOSIS — D50.0 IRON DEFICIENCY ANEMIA DUE TO CHRONIC BLOOD LOSS: ICD-10-CM

## 2023-10-11 DIAGNOSIS — M54.12 CERVICAL RADICULOPATHY: Chronic | ICD-10-CM

## 2023-10-11 DIAGNOSIS — G47.00 INSOMNIA, UNSPECIFIED TYPE: ICD-10-CM

## 2023-10-11 DIAGNOSIS — M54.16 LUMBAR RADICULOPATHY, CHRONIC: Chronic | ICD-10-CM

## 2023-10-11 DIAGNOSIS — E11.42 DIABETIC POLYNEUROPATHY ASSOCIATED WITH TYPE 2 DIABETES MELLITUS: Chronic | ICD-10-CM

## 2023-10-11 DIAGNOSIS — K21.00 GASTROESOPHAGEAL REFLUX DISEASE WITH ESOPHAGITIS WITHOUT HEMORRHAGE: Chronic | ICD-10-CM

## 2023-10-11 DIAGNOSIS — E11.59 HYPERTENSION ASSOCIATED WITH DIABETES: Chronic | ICD-10-CM

## 2023-10-11 DIAGNOSIS — M47.22 OSTEOARTHRITIS OF SPINE WITH RADICULOPATHY, CERVICAL REGION: ICD-10-CM

## 2023-10-11 DIAGNOSIS — M46.1 SACROILIITIS: ICD-10-CM

## 2023-10-11 DIAGNOSIS — E11.69 COMBINED HYPERLIPIDEMIA ASSOCIATED WITH TYPE 2 DIABETES MELLITUS: Chronic | ICD-10-CM

## 2023-10-11 DIAGNOSIS — J06.9 UPPER RESPIRATORY TRACT INFECTION, UNSPECIFIED TYPE: ICD-10-CM

## 2023-10-11 DIAGNOSIS — E89.0 POSTOPERATIVE HYPOTHYROIDISM: Chronic | ICD-10-CM

## 2023-10-11 PROCEDURE — 3074F SYST BP LT 130 MM HG: CPT | Mod: HCNC,CPTII,S$GLB, | Performed by: NURSE PRACTITIONER

## 2023-10-11 PROCEDURE — 1160F RVW MEDS BY RX/DR IN RCRD: CPT | Mod: HCNC,CPTII,S$GLB, | Performed by: NURSE PRACTITIONER

## 2023-10-11 PROCEDURE — 1101F PR PT FALLS ASSESS DOC 0-1 FALLS W/OUT INJ PAST YR: ICD-10-PCS | Mod: HCNC,CPTII,S$GLB, | Performed by: NURSE PRACTITIONER

## 2023-10-11 PROCEDURE — 3066F NEPHROPATHY DOC TX: CPT | Mod: HCNC,CPTII,S$GLB, | Performed by: NURSE PRACTITIONER

## 2023-10-11 PROCEDURE — 3288F PR FALLS RISK ASSESSMENT DOCUMENTED: ICD-10-PCS | Mod: HCNC,CPTII,S$GLB, | Performed by: NURSE PRACTITIONER

## 2023-10-11 PROCEDURE — 1101F PT FALLS ASSESS-DOCD LE1/YR: CPT | Mod: HCNC,CPTII,S$GLB, | Performed by: NURSE PRACTITIONER

## 2023-10-11 PROCEDURE — 99213 OFFICE O/P EST LOW 20 MIN: CPT | Mod: HCNC,25,S$GLB, | Performed by: NURSE PRACTITIONER

## 2023-10-11 PROCEDURE — 3044F PR MOST RECENT HEMOGLOBIN A1C LEVEL <7.0%: ICD-10-PCS | Mod: HCNC,CPTII,S$GLB, | Performed by: NURSE PRACTITIONER

## 2023-10-11 PROCEDURE — G0402 INITIAL PREVENTIVE EXAM: HCPCS | Mod: HCNC,S$GLB,, | Performed by: NURSE PRACTITIONER

## 2023-10-11 PROCEDURE — 1160F PR REVIEW ALL MEDS BY PRESCRIBER/CLIN PHARMACIST DOCUMENTED: ICD-10-PCS | Mod: HCNC,CPTII,S$GLB, | Performed by: NURSE PRACTITIONER

## 2023-10-11 PROCEDURE — 3066F PR DOCUMENTATION OF TREATMENT FOR NEPHROPATHY: ICD-10-PCS | Mod: HCNC,CPTII,S$GLB, | Performed by: NURSE PRACTITIONER

## 2023-10-11 PROCEDURE — 99999 PR PBB SHADOW E&M-EST. PATIENT-LVL V: CPT | Mod: PBBFAC,HCNC,, | Performed by: NURSE PRACTITIONER

## 2023-10-11 PROCEDURE — 3008F PR BODY MASS INDEX (BMI) DOCUMENTED: ICD-10-PCS | Mod: HCNC,CPTII,S$GLB, | Performed by: NURSE PRACTITIONER

## 2023-10-11 PROCEDURE — 99213 PR OFFICE/OUTPT VISIT, EST, LEVL III, 20-29 MIN: ICD-10-PCS | Mod: HCNC,25,S$GLB, | Performed by: NURSE PRACTITIONER

## 2023-10-11 PROCEDURE — G0402 PR WELCOME MEDICARE PREVENTIVE VISIT NEW ENROLLEE: ICD-10-PCS | Mod: HCNC,S$GLB,, | Performed by: NURSE PRACTITIONER

## 2023-10-11 PROCEDURE — 99999 PR PBB SHADOW E&M-EST. PATIENT-LVL V: ICD-10-PCS | Mod: PBBFAC,HCNC,, | Performed by: NURSE PRACTITIONER

## 2023-10-11 PROCEDURE — 3008F BODY MASS INDEX DOCD: CPT | Mod: HCNC,CPTII,S$GLB, | Performed by: NURSE PRACTITIONER

## 2023-10-11 PROCEDURE — G9920 SCRNING PERF AND NEGATIVE: HCPCS | Mod: HCNC,CPTII,S$GLB, | Performed by: NURSE PRACTITIONER

## 2023-10-11 PROCEDURE — 3078F DIAST BP <80 MM HG: CPT | Mod: HCNC,CPTII,S$GLB, | Performed by: NURSE PRACTITIONER

## 2023-10-11 PROCEDURE — 1159F MED LIST DOCD IN RCRD: CPT | Mod: HCNC,CPTII,S$GLB, | Performed by: NURSE PRACTITIONER

## 2023-10-11 PROCEDURE — 3074F PR MOST RECENT SYSTOLIC BLOOD PRESSURE < 130 MM HG: ICD-10-PCS | Mod: HCNC,CPTII,S$GLB, | Performed by: NURSE PRACTITIONER

## 2023-10-11 PROCEDURE — G9920 PR SCREENING AND NEGATIVE: ICD-10-PCS | Mod: HCNC,CPTII,S$GLB, | Performed by: NURSE PRACTITIONER

## 2023-10-11 PROCEDURE — 3061F NEG MICROALBUMINURIA REV: CPT | Mod: HCNC,CPTII,S$GLB, | Performed by: NURSE PRACTITIONER

## 2023-10-11 PROCEDURE — 3061F PR NEG MICROALBUMINURIA RESULT DOCUMENTED/REVIEW: ICD-10-PCS | Mod: HCNC,CPTII,S$GLB, | Performed by: NURSE PRACTITIONER

## 2023-10-11 PROCEDURE — 1159F PR MEDICATION LIST DOCUMENTED IN MEDICAL RECORD: ICD-10-PCS | Mod: HCNC,CPTII,S$GLB, | Performed by: NURSE PRACTITIONER

## 2023-10-11 PROCEDURE — 3288F FALL RISK ASSESSMENT DOCD: CPT | Mod: HCNC,CPTII,S$GLB, | Performed by: NURSE PRACTITIONER

## 2023-10-11 PROCEDURE — 3078F PR MOST RECENT DIASTOLIC BLOOD PRESSURE < 80 MM HG: ICD-10-PCS | Mod: HCNC,CPTII,S$GLB, | Performed by: NURSE PRACTITIONER

## 2023-10-11 PROCEDURE — 3044F HG A1C LEVEL LT 7.0%: CPT | Mod: HCNC,CPTII,S$GLB, | Performed by: NURSE PRACTITIONER

## 2023-10-11 RX ORDER — BENZONATATE 200 MG/1
200 CAPSULE ORAL 3 TIMES DAILY PRN
Qty: 30 CAPSULE | Refills: 0 | Status: SHIPPED | OUTPATIENT
Start: 2023-10-11 | End: 2023-10-21

## 2023-10-11 RX ORDER — CEPHALEXIN 500 MG/1
500 CAPSULE ORAL EVERY 12 HOURS
Qty: 20 CAPSULE | Refills: 0 | Status: SHIPPED | OUTPATIENT
Start: 2023-10-11 | End: 2023-10-21

## 2023-10-11 NOTE — PROGRESS NOTES
Subjective     Patient ID: Zakia Price is a 63 y.o. female.    Chief Complaint: Health Risk Assessment    URI   This is a new problem. The current episode started 1 to 4 weeks ago (x 1 w per pt report). The problem has been gradually worsening. There has been no fever. Associated symptoms include congestion, coughing and rhinorrhea. Pertinent negatives include no abdominal pain, chest pain, diarrhea, dysuria, ear pain, headaches, joint pain, joint swelling, nausea, neck pain, plugged ear sensation, rash, sinus pain, sneezing, sore throat, swollen glands, vomiting or wheezing. Associated symptoms comments: Pt went to [facility] ER on . She has tried nothing for the symptoms. The treatment provided no relief.   X-Ray Chest AP Portable  Order: [id number]  Impression      Chronic pleural and parenchymal scarring in the lateral left hemithorax. No evidence of superimposed acute abnormality.     Electronically signed by Sergio Hope MD on 9/30/2023 7:18 AM  Narrative    REASON FOR EXAM: cough     TECHNICAL FACTORS:  1 view(s)     COMPARISON: 1/30/2021     FINDINGS: Low inspiratory lung volumes.  There is chronic pleural and parenchymal scarring in the lateral left hemithorax.  No lobar infiltrate, pleural effusion, or pneumothorax. The heart is partially obscured but appears unchanged.  Included skeletal   structures are notable for degenerative changes.   Exam End: 09/30/23 02:27    Specimen Collected: 09/30/23 07:17 Last Resulted: 09/30/23 07:18   Received From: [facility]  Result Received: 10/05/23 13:11     Respiratory Virus Panel  Order: [id number]   Ref Range & Units 12 d ago   Adenovirus NOT DETECTED NOT DETECTED    Human Metapneumovirus NOT DETECTED NOT DETECTED    Parainfluenza 1 NOT DETECTED NOT DETECTED    Parainfluenza 2 NOT DETECTED NOT DETECTED    Parainfluenza 3 NOT DETECTED NOT DETECTED    Parainfluenza 4 NOT DETECTED NOT DETECTED    Rhinovirus NOT DETECTED NOT DETECTED    B. pertussis  "NOT DETECTED NOT DETECTED    B. parapertussis/bronch NOT DETECTED NOT DETECTED    B. holmesii NOT DETECTED NOT DETECTED    Resulting [facility]     Specimen Collected: 09/29/23 23:15    Performed by: [facility] Last Resulted: 09/30/23 03:44   Received From: [facility]  Result Received: 10/05/23 13:12   COVID-19/RSV/INFLUENZA A&B PCR W/ RFX RVP  Order: [id number]   Ref Range & Units 12 d ago   RSV PCR NEGATIVE NEGATIVE    Influenza A PCR NEGATIVE NEGATIVE    Influenza B PCR NEGATIVE NEGATIVE    SARS-CoV2 (COVID-19) Qualitative PCR NEGATIVE NEGATIVE    Comment: The specimen is NEGATIVE for SARS-CoV-2, the   coronavirus associated with COVID-19. A negative   result does not rule out the possibility of COVID-19   and should not be used as the sole basis for patient   management decisions.   This test has been authorized by the FDA under an   Emergency Use Authorization (EAU) for use by   authorized laboratories.   Please review the authorized "Fact Sheets" for health   care providers and patients.   Provider: https://www.fda.gov/media/639233/download   Patient:  https://www.fda.gov/media/036519/download   Resulting Agency  [facility]     Specimen Collected: 09/29/23 23:15    Performed by: [facility] Last Resulted: 09/29/23 23:57   Received From: [facility]  Result Received: 10/05/23 13:12     Review of Systems   Constitutional: Negative.    HENT:  Positive for nasal congestion, postnasal drip, rhinorrhea and sinus pressure/congestion. Negative for ear pain, sneezing and sore throat.    Eyes: Negative.    Respiratory:  Positive for cough. Negative for wheezing.    Cardiovascular: Negative.  Negative for chest pain.   Gastrointestinal: Negative.  Negative for abdominal pain, diarrhea, nausea and vomiting.   Endocrine: Negative.    Genitourinary: Negative.  Negative for dysuria.   Musculoskeletal: Negative.  Negative for joint pain and neck pain.   Integumentary:  Negative for rash. " Negative.   Allergic/Immunologic: Negative.    Neurological: Negative.  Negative for headaches.   Psychiatric/Behavioral: Negative.            Objective     Physical Exam  Vitals and nursing note reviewed.   Constitutional:       Appearance: Normal appearance.   HENT:      Head: Normocephalic.      Right Ear: Tympanic membrane, ear canal and external ear normal.      Left Ear: Tympanic membrane, ear canal and external ear normal.      Nose: Congestion and rhinorrhea present.      Mouth/Throat:      Mouth: Mucous membranes are moist.      Pharynx: Oropharynx is clear.   Eyes:      Conjunctiva/sclera: Conjunctivae normal.      Pupils: Pupils are equal, round, and reactive to light.   Cardiovascular:      Rate and Rhythm: Normal rate and regular rhythm.      Pulses: Normal pulses.      Heart sounds: Normal heart sounds.   Pulmonary:      Effort: Pulmonary effort is normal.      Breath sounds: Normal breath sounds.   Abdominal:      General: Bowel sounds are normal.      Palpations: Abdomen is soft.   Musculoskeletal:         General: Normal range of motion.      Cervical back: Normal range of motion and neck supple.   Skin:     General: Skin is warm and dry.      Capillary Refill: Capillary refill takes 2 to 3 seconds.   Neurological:      Mental Status: She is alert and oriented to person, place, and time.   Psychiatric:         Mood and Affect: Mood normal.         Behavior: Behavior normal.         Thought Content: Thought content normal.         Judgment: Judgment normal.            Assessment and Plan     1. Upper respiratory tract infection, unspecified type         -     cephALEXin (KEFLEX) 500 MG capsule; Take 1 capsule (500 mg total) by mouth every 12 (twelve) hours. for 10 days  Dispense: 20 capsule; Refill: 0  -     benzonatate (TESSALON) 200 MG capsule; Take 1 capsule (200 mg total) by mouth 3 (three) times daily as needed.  Dispense: 30 capsule; Refill: 0  Hydrate well  Rest  F/U with PCP  Report to ER  immediately if symptoms worsen or persist               No follow-ups on file.

## 2023-10-11 NOTE — PROGRESS NOTES
Zakia Price presented for a follow-up Medicare AWV today. The following components were reviewed and updated:    Medical history  Family History  Social history  Allergies and Current Medications  Health Risk Assessment  Health Maintenance  Care Team    **See Completed Assessments for Annual Wellness visit with in the encounter summary    The following assessments were completed:  Depression Screening  Cognitive function Screening  Timed Get Up Test  Whisper Test  PHQ-2  Nutrition screen  ADL  PAQ  Osteoporosis risk  CAGE  Living situation  Vitals:    10/11/23 1054   BP: 109/65   Pulse: 94   Temp: 96.6 °F (35.9 °C)   SpO2: 98%   Weight: 130.8 kg (288 lb 6.4 oz)     Body mass index is 43.85 kg/m².   ]        Diagnoses and health risks identified today and associated recommendations/orders:  1. Encounter for preventive care  Stable  Up to date    2. Type 2 diabetes mellitus with hyperglycemia, without long-term current use of insulin  Stable  Managed by PCP  Continue current medications, plan of care  F/U with PCP as advised    3. Hypertension associated with diabetes  Stable  Managed by PCP  Low sodium diet recommended  Continue current medications, plan of care  F/U with PCP as advised    4. Combined hyperlipidemia associated with type 2 diabetes mellitus  Stable  Managed by PCP  Low cholesterol diet recommended  Continue current medications, plan of care  F/U with PCP as advised    5. Diabetic polyneuropathy associated with type 2 diabetes mellitus  Stable  Managed by PCP, pain management  Continue current medications, plan of care  F/U with PCP, specialist as advised    6. Class 3 obesity with alveolar hypoventilation, serious comorbidity, and body mass index (BMI) of 50.0 to 59.9 in adult  Unstable  Healthy diet, weight loss recommended  Consider dietitian    7. Sacroiliitis  Stable  Managed by pain management  Continue current medications, plan of care  F/U with specialist as advised    8. Claudication of  both lower extremities  Stable  Managed by pain management  Continue current medications, plan of care  F/U with specialist as advised    9. Osteoarthritis of spine with radiculopathy, cervical region  Stable  Managed by pain management  Continue current medications, plan of care  F/U with specialist as advised    10. Lumbar radiculopathy, chronic  Stable  Managed by pain management  Continue current medications, plan of care  F/U with specialist as advised    11. Cervical radiculopathy  Stable  Managed by pain management  Continue current medications, plan of care  F/U with specialist as advised    12. Iron deficiency anemia due to chronic blood loss  Stable  Managed by hematology/oncology  Continue current medications, plan of care  F/U with specialist as advised    13. Postoperative hypothyroidism  Stable  Managed by PCP  Continue current medications, plan of care  F/U with PCP as advised    14. Gastroesophageal reflux disease with esophagitis without hemorrhage  Stable  Managed by PCP  Avoid GERD inducing foods, beverages  Continue current medications, plan of care  F/U with PCP as advised    15. Insomnia, unspecified type  Stable  Managed by PCP  Continue current medications, plan of care  F/U with PCP as advised    16. Encounter for long-term (current) use of medications  Stable  Continue current plan of care      No current opioid use  I offered to discuss end of life issues, including information on how to make advance directives that the patient could use to name someone who would make medical decisions on their behalf if they became too ill to make themselves.    ___Patient declined - already done.    _x__Patient is interested, I provided paperwork and offered to discuss  Provided Zakia with a 5-10 year written screening schedule and personal prevention plan. Recommendations were developed using the USPSTF age appropriate recommendations. Education, counseling, and referrals were provided as needed.   After Visit Summary printed and given to patient which includes a list of additional screenings\tests needed.    No follow-ups on file.      Jessika Quintero, DNP

## 2023-10-11 NOTE — PATIENT INSTRUCTIONS
Continue current plan of care  F/U with PCP, specialists as advised  RTC as needed  Report to ER immediately if symptoms worsen or persist    [place] Koehler,     If you are due for any health screening(s) below please notify me so we can arrange them to be ordered and scheduled. Most healthy patients at your age complete them, but you are free to accept or refuse.     If you can't do it, I'll definitely understand. If you can, I'd certainly appreciate it!    All of your core healthy metrics are met.

## 2023-10-17 ENCOUNTER — OFFICE VISIT (OUTPATIENT)
Dept: FAMILY MEDICINE | Facility: CLINIC | Age: 63
End: 2023-10-17
Payer: MEDICARE

## 2023-10-17 VITALS
SYSTOLIC BLOOD PRESSURE: 120 MMHG | WEIGHT: 284 LBS | BODY MASS INDEX: 43.04 KG/M2 | HEART RATE: 96 BPM | DIASTOLIC BLOOD PRESSURE: 70 MMHG | HEIGHT: 68 IN | OXYGEN SATURATION: 100 %

## 2023-10-17 DIAGNOSIS — Z79.899 ENCOUNTER FOR LONG-TERM (CURRENT) USE OF MEDICATIONS: ICD-10-CM

## 2023-10-17 DIAGNOSIS — R49.0 HOARSENESS: Primary | ICD-10-CM

## 2023-10-17 DIAGNOSIS — E11.59 HYPERTENSION ASSOCIATED WITH DIABETES: ICD-10-CM

## 2023-10-17 DIAGNOSIS — K21.00 GASTROESOPHAGEAL REFLUX DISEASE WITH ESOPHAGITIS WITHOUT HEMORRHAGE: ICD-10-CM

## 2023-10-17 DIAGNOSIS — I15.2 HYPERTENSION ASSOCIATED WITH DIABETES: ICD-10-CM

## 2023-10-17 PROCEDURE — 3288F FALL RISK ASSESSMENT DOCD: CPT | Mod: HCNC,CPTII,S$GLB, | Performed by: FAMILY MEDICINE

## 2023-10-17 PROCEDURE — 96372 PR INJECTION,THERAP/PROPH/DIAG2ST, IM OR SUBCUT: ICD-10-PCS | Mod: HCNC,S$GLB,, | Performed by: FAMILY MEDICINE

## 2023-10-17 PROCEDURE — 1159F PR MEDICATION LIST DOCUMENTED IN MEDICAL RECORD: ICD-10-PCS | Mod: HCNC,CPTII,S$GLB, | Performed by: FAMILY MEDICINE

## 2023-10-17 PROCEDURE — 3044F HG A1C LEVEL LT 7.0%: CPT | Mod: HCNC,CPTII,S$GLB, | Performed by: FAMILY MEDICINE

## 2023-10-17 PROCEDURE — 3288F PR FALLS RISK ASSESSMENT DOCUMENTED: ICD-10-PCS | Mod: HCNC,CPTII,S$GLB, | Performed by: FAMILY MEDICINE

## 2023-10-17 PROCEDURE — 3078F PR MOST RECENT DIASTOLIC BLOOD PRESSURE < 80 MM HG: ICD-10-PCS | Mod: HCNC,CPTII,S$GLB, | Performed by: FAMILY MEDICINE

## 2023-10-17 PROCEDURE — 3008F PR BODY MASS INDEX (BMI) DOCUMENTED: ICD-10-PCS | Mod: HCNC,CPTII,S$GLB, | Performed by: FAMILY MEDICINE

## 2023-10-17 PROCEDURE — 3078F DIAST BP <80 MM HG: CPT | Mod: HCNC,CPTII,S$GLB, | Performed by: FAMILY MEDICINE

## 2023-10-17 PROCEDURE — 96372 THER/PROPH/DIAG INJ SC/IM: CPT | Mod: HCNC,S$GLB,, | Performed by: FAMILY MEDICINE

## 2023-10-17 PROCEDURE — 99214 PR OFFICE/OUTPT VISIT, EST, LEVL IV, 30-39 MIN: ICD-10-PCS | Mod: HCNC,25,S$GLB, | Performed by: FAMILY MEDICINE

## 2023-10-17 PROCEDURE — 3044F PR MOST RECENT HEMOGLOBIN A1C LEVEL <7.0%: ICD-10-PCS | Mod: HCNC,CPTII,S$GLB, | Performed by: FAMILY MEDICINE

## 2023-10-17 PROCEDURE — 1159F MED LIST DOCD IN RCRD: CPT | Mod: HCNC,CPTII,S$GLB, | Performed by: FAMILY MEDICINE

## 2023-10-17 PROCEDURE — 99999 PR PBB SHADOW E&M-EST. PATIENT-LVL V: ICD-10-PCS | Mod: PBBFAC,HCNC,, | Performed by: FAMILY MEDICINE

## 2023-10-17 PROCEDURE — 3066F PR DOCUMENTATION OF TREATMENT FOR NEPHROPATHY: ICD-10-PCS | Mod: HCNC,CPTII,S$GLB, | Performed by: FAMILY MEDICINE

## 2023-10-17 PROCEDURE — 1101F PT FALLS ASSESS-DOCD LE1/YR: CPT | Mod: HCNC,CPTII,S$GLB, | Performed by: FAMILY MEDICINE

## 2023-10-17 PROCEDURE — 99214 OFFICE O/P EST MOD 30 MIN: CPT | Mod: HCNC,25,S$GLB, | Performed by: FAMILY MEDICINE

## 2023-10-17 PROCEDURE — 3074F PR MOST RECENT SYSTOLIC BLOOD PRESSURE < 130 MM HG: ICD-10-PCS | Mod: HCNC,CPTII,S$GLB, | Performed by: FAMILY MEDICINE

## 2023-10-17 PROCEDURE — 3074F SYST BP LT 130 MM HG: CPT | Mod: HCNC,CPTII,S$GLB, | Performed by: FAMILY MEDICINE

## 2023-10-17 PROCEDURE — 99999 PR PBB SHADOW E&M-EST. PATIENT-LVL V: CPT | Mod: PBBFAC,HCNC,, | Performed by: FAMILY MEDICINE

## 2023-10-17 PROCEDURE — 3008F BODY MASS INDEX DOCD: CPT | Mod: HCNC,CPTII,S$GLB, | Performed by: FAMILY MEDICINE

## 2023-10-17 PROCEDURE — 1101F PR PT FALLS ASSESS DOC 0-1 FALLS W/OUT INJ PAST YR: ICD-10-PCS | Mod: HCNC,CPTII,S$GLB, | Performed by: FAMILY MEDICINE

## 2023-10-17 PROCEDURE — 3061F NEG MICROALBUMINURIA REV: CPT | Mod: HCNC,CPTII,S$GLB, | Performed by: FAMILY MEDICINE

## 2023-10-17 PROCEDURE — 3066F NEPHROPATHY DOC TX: CPT | Mod: HCNC,CPTII,S$GLB, | Performed by: FAMILY MEDICINE

## 2023-10-17 PROCEDURE — 3061F PR NEG MICROALBUMINURIA RESULT DOCUMENTED/REVIEW: ICD-10-PCS | Mod: HCNC,CPTII,S$GLB, | Performed by: FAMILY MEDICINE

## 2023-10-17 RX ORDER — AZELASTINE 1 MG/ML
1 SPRAY, METERED NASAL 2 TIMES DAILY
Qty: 30 ML | Refills: 0 | Status: SHIPPED | OUTPATIENT
Start: 2023-10-17 | End: 2024-10-16

## 2023-10-17 RX ORDER — ALBUTEROL SULFATE 90 UG/1
AEROSOL, METERED RESPIRATORY (INHALATION)
COMMUNITY
Start: 2023-08-24

## 2023-10-17 RX ORDER — FAMOTIDINE 40 MG/1
40 TABLET, FILM COATED ORAL DAILY
Qty: 30 TABLET | Refills: 11 | Status: SHIPPED | OUTPATIENT
Start: 2023-10-17 | End: 2024-10-16

## 2023-10-17 RX ORDER — PREDNISONE 20 MG/1
20 TABLET ORAL DAILY
Qty: 5 TABLET | Refills: 0 | Status: SHIPPED | OUTPATIENT
Start: 2023-10-17 | End: 2023-10-22

## 2023-10-17 RX ORDER — DEXAMETHASONE SODIUM PHOSPHATE 4 MG/ML
8 INJECTION, SOLUTION INTRA-ARTICULAR; INTRALESIONAL; INTRAMUSCULAR; INTRAVENOUS; SOFT TISSUE ONCE
Status: COMPLETED | OUTPATIENT
Start: 2023-10-17 | End: 2023-10-17

## 2023-10-17 RX ADMIN — DEXAMETHASONE SODIUM PHOSPHATE 8 MG: 4 INJECTION, SOLUTION INTRA-ARTICULAR; INTRALESIONAL; INTRAMUSCULAR; INTRAVENOUS; SOFT TISSUE at 10:10

## 2023-10-17 NOTE — PROGRESS NOTES
PLAN:      Problem List Items Addressed This Visit       Hypertension associated with diabetes (Chronic)     Counseled on importance of hypertension disease course, I recommend ongoing Education for DASH-diet and exercise.  Counseled on medication regimen importance of treating high blood pressure.  Please be advised of risk of untreated blood pressure as discussed.  Please advised of ER precautions were given for symptoms of hypertensive urgency and emergency.           Gastroesophageal reflux disease with esophagitis without hemorrhage (Chronic)     Worsening reflux.  Stop pantoprazole and switch to Pepcid.  Follow-up sooner if no improvement.  GERD RECOMMENDATIONS  Please be advised of condition course.  - Take PPI in the morning 30-60 minutes before breakfast  - I recommend ongoing Education for lifestyle modifications to help control/reduce reflux/abdominal pain including: avoid large meals, avoid eating within 2-3 hours of bedtime (avoid late night eating & lying down soon after eating), elevate head of bed if nocturnal symptoms are present, smoking cessation (if current smoker), & weight loss (if overweight).   - please be advised to avoid known foods which trigger reflux symptoms & to minimize/avoid high-fat foods, chocolate, caffeine, citrus, alcohol, & tomato products.  - Advised to avoid/limit use of NSAID's, since they can cause GI upset, bleeding, and/or ulcers. If needed, take with food.          Relevant Medications    famotidine (PEPCID) 40 MG tablet    Encounter for long-term (current) use of medications (Chronic)     Complete history and physical was completed today.  Complete and thorough medication reconciliation was performed.  Discussed risks and benefits of medications.  Advised patient on orders and health maintenance.  We discussed old records and old labs if available.  Will request any records not available through epic.  Continue current medications listed on your summary sheet.            Hoarseness - Primary     Adding Astelin nasal spray for the upper respiratory symptoms and Pepcid for reflux.  If no improvement consider ENT or GI consult for scope.             Relevant Medications    predniSONE (DELTASONE) 20 MG tablet    azelastine (ASTELIN) 137 mcg (0.1 %) nasal spray    dexAMETHasone injection 8 mg (Completed)    Other Relevant Orders    Ambulatory referral/consult to ENT     Future Appointments       Date Provider Specialty Appt Notes    11/29/2023 Oleksandr Nagel MD Family Medicine 3 mo fu    12/26/2023  Lab ty    12/28/2023 Sol Mckeon NP Hematology and Oncology 3 mo prior lab           Medication Management for assessment above:   Medication List with Changes/Refills   New Medications    AZELASTINE (ASTELIN) 137 MCG (0.1 %) NASAL SPRAY    1 spray (137 mcg total) by Nasal route 2 (two) times daily.    FAMOTIDINE (PEPCID) 40 MG TABLET    Take 1 tablet (40 mg total) by mouth once daily.    PREDNISONE (DELTASONE) 20 MG TABLET    Take 1 tablet (20 mg total) by mouth once daily. for 5 days   Current Medications    ALBUTEROL (PROVENTIL/VENTOLIN HFA) 90 MCG/ACTUATION INHALER        BENZONATATE (TESSALON) 200 MG CAPSULE    Take 1 capsule (200 mg total) by mouth 3 (three) times daily as needed.    CEPHALEXIN (KEFLEX) 500 MG CAPSULE    Take 1 capsule (500 mg total) by mouth every 12 (twelve) hours. for 10 days    DILTIAZEM (CARDIZEM) 30 MG TABLET    TAKE 1 TABLET EVERY 12 HOURS    EPINEPHRINE (EPIPEN) 0.3 MG/0.3 ML ATIN    Inject into the muscle.    EZETIMIBE (ZETIA) 10 MG TABLET    Take 1 tablet (10 mg total) by mouth once daily.    FLUTICASONE PROPIONATE (FLONASE) 50 MCG/ACTUATION NASAL SPRAY    Use 2 sprays to each nostril daily    INHALATION SPACING DEVICE (BREATHERITE VALVED MDI CHAMBER)    Use as directed for inhalation.    LEVOTHYROXINE (EUTHYROX) 100 MCG TABLET    Take 1 tablet (100 mcg total) by mouth once daily.    LORATADINE (CLARITIN) 10 MG TABLET    Take 1 tablet (10 mg total)  by mouth once daily.    MELOXICAM (MOBIC) 15 MG TABLET    Take 1 tablet (15 mg total) by mouth once daily.    METFORMIN (GLUCOPHAGE) 1000 MG TABLET    Take 1 tablet (1,000 mg total) by mouth daily with breakfast.    METHOCARBAMOL (ROBAXIN) 500 MG TAB    Take 1 tablet (500 mg total) by mouth 2 (two) times daily as needed (muscle spasms).    PREGABALIN (LYRICA) 75 MG CAPSULE    Take 1 capsule (75 mg total) by mouth 3 (three) times daily.    ROPINIROLE (REQUIP) 0.25 MG TABLET    Take 1 tablet (0.25 mg total) by mouth every evening.    SEMAGLUTIDE (OZEMPIC) 2 MG/DOSE (8 MG/3 ML) PNIJ    INJECT 2 MG INTO THE SKIN EVERY 7 DAYS.    SULFACETAMIDE SODIUM 10% (BLEPH-10) 10 % OPHTHALMIC SOLUTION    Place 2 drops into the left eye 4 (four) times daily.    TIZANIDINE (ZANAFLEX) 4 MG TABLET    Take 1/2 to 1 tablet by mouth twice daily as needed for muscle spasms. May cause drowsiness.    TOPIRAMATE (TOPAMAX) 100 MG TABLET    Take 1 tablet (100 mg total) by mouth 2 (two) times daily.    TRELEGY ELLIPTA 100-62.5-25 MCG DSDV    Inhale 1 puff into the lungs once daily.   Discontinued Medications    FERROUS SULFATE 324 MG (65 MG IRON) TBEC    Take 1 tablet (324 mg total) by mouth once daily.    PANTOPRAZOLE (PROTONIX) 40 MG TABLET    Take 1 tablet (40 mg total) by mouth once daily.    SIMVASTATIN (ZOCOR) 20 MG TABLET    Take 1 tablet (20 mg total) by mouth every evening.       Oleksandr Nagel M.D.  ==========================================================================  Subjective:   Patient ID: Zakia Price is a 63 y.o. female.  has a past medical history of Acute respiratory failure due to COVID-19, COVID-19, Diabetes mellitus, type 2 (1993), Diabetic neuropathy (01/27/2014), DJD (degenerative joint disease) of knee, DVT (deep venous thrombosis) (around 1990's), Fatty liver, GERD (gastroesophageal reflux disease), Hypertension associated with diabetes, HECTOR (iron deficiency anemia) (05/13/2021), Multinodular goiter,  Obesity, morbid, BMI 50 or higher, and Sleep apnea.   Chief Complaint: Cough, Nasal Congestion, and Shortness of Breath      Problem List Items Addressed This Visit       Hypertension associated with diabetes (Chronic)    Overview     CHRONIC.  October 2023:  Hypertension Medications               diltiaZEM (CARDIZEM) 30 MG tablet TAKE 1 TABLET EVERY 12 HOURS     June 2023:  Blood pressure is doing well on current regimen.  May 2023:  A1c currently 4.9.  Patient denies any symptoms of low blood sugar.  She is continuing to take metformin twice daily.  Blood pressure is well controlled on current regimen.    February 2022:  Blood pressure remains less than 140/90.  January 2022:  Blood pressure well controlled actually on the lower end of normal today.  Patient is on diltiazem 120 milligrams daily.  She does report some constipation that is being treated with Linzess.  July 2021:  Blood pressure well controlled today.  Previous HPI  Patient recently got a new blood pressure monitor at home through [facility] however the cuff does not fit around her arm.. BP Reviewed.  Compliant with BP medications. No SE reported.  Patient taking amlodipine 5 mg.  (-) CP, SOB, palpitations, dizziness, lightheadedness, HA, arm numbness, tingling or weakness, syncope.  Creatinine   Date Value Ref Range Status   09/30/2023 0.81 0.60 - 1.10 mg/dL Final   06/14/2023 0.9 0.5 - 1.4 mg/dL Final   History of hypertension currently on amlodipine with previous reported allergy to ACE inhibitor    Last Assessment & Plan:   Blood pressure stable on current medicine regimen. Continue current medicine regimen and follow low salt diet.  Results for orders placed or performed during the hospital encounter of 10/15/21   EKG 12-lead    Collection Time: 10/15/21  8:04 AM    Narrative    Test Reason : E11.42,E11.59,I15.2,E78.5,R01.1,    Vent. Rate : 072 BPM     Atrial Rate : 072 BPM     P-R Int : 142 ms          QRS Dur : 080 ms      QT Int : 404  ms       P-R-T Axes : 062 016 028 degrees     QTc Int : 442 ms    Normal sinus rhythm  Nonspecific T wave abnormality  Abnormal ECG  When compared with ECG of 26-JAN-2021 14:40,  No significant change was found  Confirmed by Shannon Wilson MD ([id number]) on 10/16/2021 10:13:48 PM    Referred By: RIK WEAVER           Confirmed By:Shannon Wilson MD            Current Assessment & Plan     Counseled on importance of hypertension disease course, I recommend ongoing Education for DASH-diet and exercise.  Counseled on medication regimen importance of treating high blood pressure.  Please be advised of risk of untreated blood pressure as discussed.  Please advised of ER precautions were given for symptoms of hypertensive urgency and emergency.           Gastroesophageal reflux disease with esophagitis without hemorrhage (Chronic)    Overview     Chronic.  October 2023: Patient reports hoarseness that has been ongoing for the last few days.  Also having some upper respiratory symptoms.  August 2023: Patient reports compliance with pantoprazole.  Reports abdominal pain after eating.  History of H pylori.    April 2022:  Currently uncontrolled.  History of H pylori.  Previous HPI:  on Zantac.  However medication has been pharmacy recall.  Patient needing replacement medication for this condition.  Reported compliance.  Symptoms are controlled.  No side effects reported.    Final Pathologic Diagnosis 1. Small bowel, biopsies:   - Small bowel mucosa without significant histopathologic alteration   - Negative for active inflammation or celiac-like injury   - Negative for dysplasia or malignancy   2. Stomach, antrum, biopsies:   - Acute active gastritis   - Positive for Helicobacter pylori on immunostain   - Negative for intestinal metaplasia, dysplasia or malignancy   NOTE: Positive control and internal negative control for Helicobacter pylori   immunostain were examined and were appropriate.   3. Stomach, nodule,  biopsies:   - Reactive gastropathy with foveolar hyperplasia   - Negative for Helicobacter pylori on H&E stain   - Negative for intestinal metaplasia, dysplasia or malignancy    Comment: Interp By Dideir Reina MD, PhD, Signed on 07/14/2021 at 07:58         Findings:        The cricopharyngeus, upper third of the esophagus, middle third of        the esophagus, lower third of the esophagus, lower esophageal        sphincter and gastroesophageal junction were normal.        The Z-line was regular and was found 40 cm from the incisors.        The cardia, gastric fundus, gastric body and pylorus were normal.        Patchy mildly erythematous mucosa without bleeding was found in the        gastric antrum. Biopsies were taken with a cold forceps for        histology. Biopsies were taken with a cold forceps for Helicobacter        pylori testing. Estimated blood loss was minimal.        A single small submucosal papule (nodule) with no bleeding and no        stigmata of recent bleeding was found in the gastric antrum.        Biopsies were taken with a cold forceps for histology. Estimated        blood loss was minimal.        The first portion of the duodenum and second portion of the duodenum        were normal. Biopsies for histology were taken with a cold forceps        for evaluation of celiac disease. Estimated blood loss was minimal.   Impression:            - Normal cricopharyngeus, upper third of                          esophagus, middle third of esophagus, lower third                          of esophagus, lower esophageal sphincter and                          gastroesophageal junction.                          - Z-line regular, 40 cm from the incisors.                          - Normal cardia, gastric fundus, gastric body and                          pylorus.                          - Erythematous mucosa in the antrum. Biopsied.                          - A single submucosal papule (nodule) found in the                           stomach. Biopsied.                          - Normal first portion of the duodenum and second                          portion of the duodenum. Biopsied.   Recommendation:        - Patient has a contact number available for                          emergencies. The signs and symptoms of potential                          delayed complications were discussed with the                          patient. Return to normal activities tomorrow.                          Written discharge instructions were provided to                          the patient.                          - The patient should eat a bland diet and avoid                          caffeine, carbonation, alcohol, nicotine, aspirin                          and NSAID's including ibuprofen and advil.                          - Continue present medications.                          - Telephone my office for pathology results in 2                          weeks.                          - Discharge patient to home (via wheelchair).   Jann Gutierrez MD   7/8/2021 1:53:14 PM          Current Assessment & Plan     Worsening reflux.  Stop pantoprazole and switch to Pepcid.  Follow-up sooner if no improvement.  GERD RECOMMENDATIONS  Please be advised of condition course.  - Take PPI in the morning 30-60 minutes before breakfast  - I recommend ongoing Education for lifestyle modifications to help control/reduce reflux/abdominal pain including: avoid large meals, avoid eating within 2-3 hours of bedtime (avoid late night eating & lying down soon after eating), elevate head of bed if nocturnal symptoms are present, smoking cessation (if current smoker), & weight loss (if overweight).   - please be advised to avoid known foods which trigger reflux symptoms & to minimize/avoid high-fat foods, chocolate, caffeine, citrus, alcohol, & tomato products.  - Advised to avoid/limit use of NSAID's, since they can cause GI upset, bleeding, and/or ulcers. If  needed, take with food.          Encounter for long-term (current) use of medications (Chronic)    Overview     October 2023: Reviewed labs.  August 2023: Reviewed labs.  June 2023: Reviewed labs.  January 2023:  Reviewed labs.  CHRONIC. Stable. Compliant with medications for managed conditions. See medication list. No SE reported. Routine lab analysis is being monitored. Refills were addressed.  January 2021:CHRONIC. Stable. Compliant with medications for managed conditions. See medication list. No SE reported. Routine lab analysis is being monitored. Refills were addressed.  July 2021:  Reviewed labs.  January 2022:  Reviewed labs.  February 2022:  Reviewed labs.  July 2022:  Reviewed labs.  Lab Results   Component Value Date    WBC 4.15 09/25/2023    HGB 12.9 09/25/2023    HCT 40.2 09/25/2023    MCV 91 09/25/2023     09/25/2023       Chemistry        Component Value Date/Time     09/30/2023 0110     06/14/2023 0902    K 3.8 09/30/2023 0110    K 4.1 06/14/2023 0902     06/14/2023 0902    CO2 19 (L) 09/30/2023 0110    CO2 23 06/14/2023 0902    BUN 13 09/30/2023 0110    BUN 17 06/14/2023 0902    CREATININE 0.81 09/30/2023 0110    CREATININE 0.9 06/14/2023 0902    GLU 91 06/14/2023 0902        Component Value Date/Time    CALCIUM 9.4 09/30/2023 0110    CALCIUM 9.4 06/14/2023 0902    ALKPHOS 80 06/14/2023 0902    AST 17 06/14/2023 0902    ALT 14 06/14/2023 0902    BILITOT 0.2 06/14/2023 0902    ESTGFRAFRICA >60.0 02/24/2022 1255    EGFRNONAA >60.0 02/24/2022 1255          Lab Results   Component Value Date    TSH 1.696 06/14/2023    FREET4 1.06 12/04/2019    T3FREE 2.7 12/04/2019          Current Assessment & Plan     Complete history and physical was completed today.  Complete and thorough medication reconciliation was performed.  Discussed risks and benefits of medications.  Advised patient on orders and health maintenance.  We discussed old records and old labs if available.  Will request  any records not available through epic.  Continue current medications listed on your summary sheet.           Hoarseness - Primary    Overview     New problem.  Patient upper respiratory symptoms postnasal drip and worsening reflux.  She is using Flonase and pantoprazole.         Current Assessment & Plan     Adding Astelin nasal spray for the upper respiratory symptoms and Pepcid for reflux.  If no improvement consider ENT or GI consult for scope.                 Review of patient's allergies indicates:   Allergen Reactions    Codeine sulfate      Nausea^    Lisinopril Swelling     angioedema    Codeine Nausea Only and Rash     Current Outpatient Medications   Medication Instructions    albuterol (PROVENTIL/VENTOLIN HFA) 90 mcg/actuation inhaler No dose, route, or frequency recorded.    azelastine (ASTELIN) 137 mcg, Nasal, 2 times daily    benzonatate (TESSALON) 200 mg, Oral, 3 times daily PRN    cephALEXin (KEFLEX) 500 mg, Oral, Every 12 hours    diltiaZEM (CARDIZEM) 30 mg, Oral, Every 12 hours    EPINEPHrine (EPIPEN) 0.3 mg/0.3 mL AtIn Intramuscular    ezetimibe (ZETIA) 10 mg, Oral, Daily    famotidine (PEPCID) 40 mg, Oral, Daily    fluticasone propionate (FLONASE) 50 mcg/actuation nasal spray Use 2 sprays to each nostril daily    inhalation spacing device (BREATHERITE VALVED MDI CHAMBER) Use as directed for inhalation.    levothyroxine (EUTHYROX) 100 mcg, Oral, Daily    loratadine (CLARITIN) 10 mg, Oral, Daily    meloxicam (MOBIC) 15 mg, Oral, Daily    metFORMIN (GLUCOPHAGE) 1,000 mg, Oral, With breakfast    methocarbamoL (ROBAXIN) 500 mg, Oral, 2 times daily PRN    OZEMPIC 2 mg, Subcutaneous, Every 7 days    predniSONE (DELTASONE) 20 mg, Oral, Daily    pregabalin (LYRICA) 75 mg, Oral, 3 times daily    rOPINIRole (REQUIP) 0.25 mg, Oral, Nightly    sulfacetamide sodium 10% (BLEPH-10) 10 % ophthalmic solution 2 drops, Left Eye, 4 times daily    tiZANidine (ZANAFLEX) 4 MG tablet Take 1/2 to 1 tablet by mouth twice  "daily as needed for muscle spasms. May cause drowsiness.    topiramate (TOPAMAX) 100 mg, Oral, 2 times daily    TRELEGY ELLIPTA 100-62.5-25 mcg DsDv 1 puff, Inhalation, Daily      I have reviewed the PMH, social history, FamilyHx, surgical history, allergies and medications documented / confirmed by the patient at the time of this visit.  Review of Systems   Constitutional:  Negative for chills, fatigue, fever and unexpected weight change.   HENT:  Positive for congestion, postnasal drip, rhinorrhea and voice change. Negative for ear pain and sore throat.    Eyes:  Negative for redness and visual disturbance.   Respiratory:  Negative for cough and shortness of breath.    Cardiovascular:  Negative for chest pain and palpitations.   Gastrointestinal:  Negative for abdominal pain, nausea and vomiting.   Genitourinary:  Negative for difficulty urinating and hematuria.   Musculoskeletal:  Positive for arthralgias and back pain. Negative for myalgias.   Skin:  Negative for rash and wound.   Neurological:  Positive for numbness. Negative for weakness and headaches.   Psychiatric/Behavioral:  Positive for sleep disturbance. The patient is not nervous/anxious.      Objective:   /70   Pulse 96   Ht 5' 8" (1.727 m)   Wt 128.8 kg (284 lb)   SpO2 100%   BMI 43.18 kg/m²   Physical Exam  Vitals and nursing note reviewed.   Constitutional:       General: She is not in acute distress.     Appearance: She is well-developed. She is obese. She is not ill-appearing, toxic-appearing or diaphoretic.   HENT:      Head: Normocephalic and atraumatic.      Right Ear: Hearing, tympanic membrane, ear canal and external ear normal. There is no impacted cerumen.      Left Ear: Hearing, tympanic membrane, ear canal and external ear normal. There is no impacted cerumen.      Nose: Rhinorrhea present.      Mouth/Throat:      Pharynx: Posterior oropharyngeal erythema present. No oropharyngeal exudate.   Eyes:      General: Lids are normal. "      Extraocular Movements: Extraocular movements intact.      Conjunctiva/sclera: Conjunctivae normal.      Pupils: Pupils are equal, round, and reactive to light.   Cardiovascular:      Rate and Rhythm: Normal rate.      Pulses: Normal pulses.   Pulmonary:      Effort: Pulmonary effort is normal. No respiratory distress.      Breath sounds: Normal breath sounds.   Abdominal:      General: Bowel sounds are normal.      Palpations: Abdomen is soft.   Musculoskeletal:         General: Tenderness and deformity present. Normal range of motion.      Cervical back: Normal range of motion and neck supple. Spasms and tenderness present. No bony tenderness. Pain with movement present.      Lumbar back: No spasms, tenderness or bony tenderness. Negative right straight leg raise test and negative left straight leg raise test.        Back:       Right lower leg: Tenderness present. No swelling, deformity, lacerations or bony tenderness. No edema.      Left lower leg: No swelling, deformity, lacerations, tenderness or bony tenderness. No edema.   Skin:     General: Skin is warm and dry.      Capillary Refill: Capillary refill takes less than 2 seconds.      Coloration: Skin is not pale.   Neurological:      General: No focal deficit present.      Mental Status: She is alert and oriented to person, place, and time. Mental status is at baseline. She is not disoriented.      Cranial Nerves: No cranial nerve deficit.      Motor: No weakness.      Gait: Gait normal.   Psychiatric:         Attention and Perception: She is attentive.         Mood and Affect: Mood normal. Mood is not anxious or depressed.         Speech: Speech is not rapid and pressured or slurred.         Behavior: Behavior normal. Behavior is not agitated, aggressive or hyperactive. Behavior is cooperative.         Thought Content: Thought content normal. Thought content is not paranoid or delusional. Thought content does not include homicidal or suicidal ideation.  Thought content does not include homicidal or suicidal plan.         Cognition and Memory: Memory is not impaired.         Judgment: Judgment normal.         Assessment:     1. Hoarseness    2. Encounter for long-term (current) use of medications    3. Gastroesophageal reflux disease with esophagitis without hemorrhage    4. Hypertension associated with diabetes      MDM:   Moderate medical complexity.  Moderate risk.  Total time: 31 minutes.  This includes total time spent on the encounter, which includes face to face time and non-face to face time preparing to see the patient (eg, review of previous medical records, tests), Obtaining and/or reviewing separately obtained history, documenting clinical information in the electronic or other health record, independently interpreting results (not separately reported)/communicating results to the patient/family/caregiver, and/or care coordination (not separately reported).    I have Reviewed and summarized old records.  I have performed thorough medication reconciliation today and discussed risk and benefits of medications.  I have reviewed labs and discussed with patient.  All questions were answered.  I am requesting old records and will review them once they are available.  Hematology    I have signed for the following orders AND/OR meds.  Orders Placed This Encounter   Procedures    Ambulatory referral/consult to ENT     Standing Status:   Future     Standing Expiration Date:   2024     Referral Priority:   Routine     Referral Type:   Consultation     Referral Reason:   Specialty Services Required     Referred to Provider:   Felisha Quintana MD     Requested Specialty:   Otolaryngology     Number of Visits Requested:   1     Medications Ordered This Encounter   Medications    azelastine (ASTELIN) 137 mcg (0.1 %) nasal spray     Si spray (137 mcg total) by Nasal route 2 (two) times daily.     Dispense:  30 mL     Refill:  0    dexAMETHasone injection 8 mg     famotidine (PEPCID) 40 MG tablet     Sig: Take 1 tablet (40 mg total) by mouth once daily.     Dispense:  30 tablet     Refill:  11    predniSONE (DELTASONE) 20 MG tablet     Sig: Take 1 tablet (20 mg total) by mouth once daily. for 5 days     Dispense:  5 tablet     Refill:  0        Follow up if symptoms worsen or fail to improve.    If no improvement in symptoms or symptoms worsen, advised to call/follow-up at clinic or go to ER. Patient voiced understanding and all questions/concerns were addressed.   DISCLAIMER: This note was compiled by using a speech recognition dictation system and therefore please be aware that typographical / speech recognition errors can and do occur.  Please contact me if you see any errors specifically.    Oleksandr Nagel M.D.       Office: [phone] [address] [place] [address]  FAX: [phone]

## 2023-10-17 NOTE — PATIENT INSTRUCTIONS
Follow up if symptoms worsen or fail to improve.     Dear patient,   As a result of recent federal legislation (The Federal Cures Act), you may receive lab or pathology results from your visit in your [facility] account before your physician is able to contact you. Your physician or their representative will relay the results to you with their recommendations at their soonest availability.     If no improvement in symptoms or symptoms worsen, please be advised to call MD, follow-up at clinic and/or go to ER if becomes severe.    Oleksandr Nagel M.D.        We Offer TELEHEALTH & Same Day Appointments!   Book your Telehealth appointment with me through my nurse or   Clinic appointments on [facility]!    [address] [place] [address]    Office: [phone]   FAX: [phone]    Check out my Facebook Page and Follow Me at: [email or web address]   - risk of corticosteroids reviewed (elevated BP/glucose, insomnia, psychosis, bone loss, etc) and patient expressed understanding    Check out my website at [facility] by clicking on: [email or web address]    To Schedule appointments online, go to [facility]: [email or web address]

## 2023-10-18 NOTE — ASSESSMENT & PLAN NOTE
Worsening reflux.  Stop pantoprazole and switch to Pepcid.  Follow-up sooner if no improvement.  GERD RECOMMENDATIONS  Please be advised of condition course.  - Take PPI in the morning 30-60 minutes before breakfast  - I recommend ongoing Education for lifestyle modifications to help control/reduce reflux/abdominal pain including: avoid large meals, avoid eating within 2-3 hours of bedtime (avoid late night eating & lying down soon after eating), elevate head of bed if nocturnal symptoms are present, smoking cessation (if current smoker), & weight loss (if overweight).   - please be advised to avoid known foods which trigger reflux symptoms & to minimize/avoid high-fat foods, chocolate, caffeine, citrus, alcohol, & tomato products.  - Advised to avoid/limit use of NSAID's, since they can cause GI upset, bleeding, and/or ulcers. If needed, take with food.

## 2023-10-18 NOTE — ASSESSMENT & PLAN NOTE
Adding Astelin nasal spray for the upper respiratory symptoms and Pepcid for reflux.  If no improvement consider ENT or GI consult for scope.

## 2023-11-02 ENCOUNTER — OFFICE VISIT (OUTPATIENT)
Dept: PODIATRY | Facility: CLINIC | Age: 63
End: 2023-11-02
Payer: MEDICARE

## 2023-11-02 VITALS — WEIGHT: 284 LBS | HEIGHT: 68 IN | BODY MASS INDEX: 43.04 KG/M2

## 2023-11-02 DIAGNOSIS — E11.49 TYPE II DIABETES MELLITUS WITH NEUROLOGICAL MANIFESTATIONS: ICD-10-CM

## 2023-11-02 DIAGNOSIS — E11.42 DIABETIC POLYNEUROPATHY ASSOCIATED WITH TYPE 2 DIABETES MELLITUS: Primary | ICD-10-CM

## 2023-11-02 DIAGNOSIS — L60.3 ONYCHODYSTROPHY: ICD-10-CM

## 2023-11-02 DIAGNOSIS — E66.01 MORBID OBESITY: ICD-10-CM

## 2023-11-02 PROCEDURE — 11721 PR DEBRIDEMENT OF NAILS, 6 OR MORE: ICD-10-PCS | Mod: Q9,HCNC,S$GLB, | Performed by: PODIATRIST

## 2023-11-02 PROCEDURE — 99499 NO LOS: ICD-10-PCS | Mod: HCNC,S$GLB,, | Performed by: PODIATRIST

## 2023-11-02 PROCEDURE — 99999 PR PBB SHADOW E&M-EST. PATIENT-LVL III: ICD-10-PCS | Mod: PBBFAC,HCNC,, | Performed by: PODIATRIST

## 2023-11-02 PROCEDURE — 11721 DEBRIDE NAIL 6 OR MORE: CPT | Mod: Q9,HCNC,S$GLB, | Performed by: PODIATRIST

## 2023-11-02 PROCEDURE — 99999 PR PBB SHADOW E&M-EST. PATIENT-LVL III: CPT | Mod: PBBFAC,HCNC,, | Performed by: PODIATRIST

## 2023-11-02 PROCEDURE — 99499 UNLISTED E&M SERVICE: CPT | Mod: HCNC,S$GLB,, | Performed by: PODIATRIST

## 2023-11-02 NOTE — PROGRESS NOTES
Subjective:       Patient ID: Zakia Price is a 63 y.o. female.    Chief Complaint: Nail Care (Pt presents for nail care. Pain 0/10, diabetic wearing closed toe shoes, Oleksandr Nagel MD at 10/17/2023)      HPI: Patient presents to the office today with the chief complaint of elongated, thickened and dystrophic nail plates to the B/L foot.  Reports improving neuropathy pains with Lyrica. States her pain is an 0/10.  Does complain of hoarseness, nausea, vomiting.  States decreased appetite.  Recently evaluated by primary care provider.  This patient is a Diabetic Type II, complicated with morbid obesity and Peripheral Neuropathy. Patient does follow with Primary Care and/or Endocrinology for management of Diabetes Mellitus. This patient's PMD is Oleksandr Nagel MD. This patient last saw his/her primary care provider on 10/17/2023    Hemoglobin A1C   Date Value Ref Range Status   09/06/2023 4.9 4.0 - 5.6 % Final     Comment:     ADA Screening Guidelines:  5.7-6.4%  Consistent with prediabetes  >or=6.5%  Consistent with diabetes    High levels of fetal hemoglobin interfere with the HbA1C  assay. Heterozygous hemoglobin variants (HbS, HgC, etc)do  not significantly interfere with this assay.   However, presence of multiple variants may affect accuracy.     06/14/2023 5.0 4.0 - 5.6 % Final     Comment:     ADA Screening Guidelines:  5.7-6.4%  Consistent with prediabetes  >or=6.5%  Consistent with diabetes    High levels of fetal hemoglobin interfere with the HbA1C  assay. Heterozygous hemoglobin variants (HbS, HgC, etc)do  not significantly interfere with this assay.   However, presence of multiple variants may affect accuracy.     03/13/2023 4.9 4.0 - 5.6 % Final     Comment:     ADA Screening Guidelines:  5.7-6.4%  Consistent with prediabetes  >or=6.5%  Consistent with diabetes    High levels of fetal hemoglobin interfere with the HbA1C  assay. Heterozygous hemoglobin variants (HbS, HgC,  etc)do  not significantly interfere with this assay.   However, presence of multiple variants may affect accuracy.     .     Review of patient's allergies indicates:   Allergen Reactions    Codeine sulfate      Nausea^    Lisinopril Swelling     angioedema    Codeine Nausea Only and Rash       Past Medical History:   Diagnosis Date    Acute respiratory failure due to COVID-19     COVID-19     Diabetes mellitus, type 2 1993    BS  didn't check 10/04/2023 Insulin x 2 years    Diabetic neuropathy 01/27/2014    DJD (degenerative joint disease) of knee     DVT (deep venous thrombosis) around 1990's    in leg, is on no anticoagulant therapy presently    Fatty liver     GERD (gastroesophageal reflux disease)     Hypertension associated with diabetes     HECTOR (iron deficiency anemia) 05/13/2021    Multinodular goiter     Obesity, morbid, BMI 50 or higher     Sleep apnea     has no CPAP       Family History   Problem Relation Age of Onset    Diabetes Mother     Hypertension Mother     Heart disease Mother 50    Cancer Father     Arthritis Father     Breast cancer Sister     Cancer Brother     Cancer Brother     Leukemia Son     Cancer Son        Social History     Socioeconomic History    Marital status: Single   Tobacco Use    Smoking status: Never    Smokeless tobacco: Never   Substance and Sexual Activity    Alcohol use: No    Drug use: No    Sexual activity: Not Currently     Social Determinants of Health     Financial Resource Strain: Medium Risk (11/14/2019)    Overall Financial Resource Strain (CARDIA)     Difficulty of Paying Living Expenses: Somewhat hard   Food Insecurity: Food Insecurity Present (11/14/2019)    Hunger Vital Sign     Worried About Running Out of Food in the Last Year: Sometimes true     Ran Out of Food in the Last Year: Sometimes true   Transportation Needs: Unmet Transportation Needs (11/14/2019)    PRAPARE - Transportation     Lack of Transportation (Medical): Yes     Lack of Transportation  (Non-Medical): Yes   Physical Activity: Inactive (11/14/2019)    Exercise Vital Sign     Days of Exercise per Week: 0 days     Minutes of Exercise per Session: 0 min   Stress: No Stress Concern Present (11/14/2019)    [employer] [place] [employer] - Occupational Stress Questionnaire     Feeling of Stress : Only a little    Social Connection and Isolation Panel [NHANES]       Past Surgical History:   Procedure Laterality Date    COLONOSCOPY N/A 5/12/2021    Procedure: COLONOSCOPY;  Surgeon: Carolina Rizo MD;  Location: [facility];  Service: Endoscopy;  Laterality: N/A;    EPIDURAL STEROID INJECTION N/A 12/10/2021    Procedure: Lumbar L5/S1 IL TEETEE  Would like AM procedure, if possible;  Surgeon: Nae Paul MD;  Location: [facility]IN [facility];  Service: Pain Management;  Laterality: N/A;    EPIDURAL STEROID INJECTION INTO CERVICAL SPINE N/A 10/8/2021    Procedure: C7-T1 IL TEETEE-no sedation.  Needs IV-just incase;  Surgeon: Nae Paul MD;  Location: [facility];  Service: Pain Management;  Laterality: N/A;    ESOPHAGOGASTRODUODENOSCOPY N/A 7/8/2021    Procedure: EGD (ESOPHAGOGASTRODUODENOSCOPY) previous positve covid;  Surgeon: Jann Gutierrez MD;  Location: [facility];  Service: Endoscopy;  Laterality: N/A;    ESOPHAGOGASTRODUODENOSCOPY N/A 9/18/2023    Procedure: EGD (ESOPHAGOGASTRODUODENOSCOPY);  Surgeon: Gm Leon MD;  Location: [facility];  Service: Endoscopy;  Laterality: N/A;    FRACTURE SURGERY      HYSTERECTOMY      INTRALUMINAL GASTROINTESTINAL TRACT IMAGING VIA CAPSULE N/A 10/24/2022    Procedure: IMAGING PROCEDURE, GI TRACT, INTRALUMINAL, VIA CAPSULE;  Surgeon: Hang Lunsford RN;  Location: [facility];  Service: Endoscopy;  Laterality: N/A;    SELECTIVE INJECTION OF ANESTHETIC AGENT AROUND LUMBAR SPINAL NERVE ROOT BY TRANSFORAMINAL APPROACH Bilateral 5/6/2022    Procedure: Bilateral L5/S1 TF TEETEE with RN IV sedation;  Surgeon: Nae Paul MD;  Location: [facility];  Service:  "Pain Management;  Laterality: Bilateral;    SELECTIVE INJECTION OF ANESTHETIC AGENT AROUND LUMBAR SPINAL NERVE ROOT BY TRANSFORAMINAL APPROACH Bilateral 12/30/2022    Procedure: Bilateral L5/S1 TF TEETEE RN IV Sedation;  Surgeon: Nae Paul MD;  Location: [facility];  Service: Pain Management;  Laterality: Bilateral;    SHOULDER ARTHROSCOPY      THYROIDECTOMY, PARTIAL Right     and transplatation of right superior parathyroid gland to the sternocleidomastoid muscle     TONSILLECTOMY      TUBAL LIGATION  1984    WRIST FRACTURE SURGERY      left       Review of Systems       Objective:   Ht 5' 8" (1.727 m)   Wt 128.8 kg (284 lb)   BMI 43.18 kg/m²     Physical Exam  LOWER EXTREMITY PHYSICAL EXAMINATION    VASCULAR:  The right dorsalis pedis pulse 2/4 and the right posterior tibial pulse 1/4.  The left dorsalis pedis pulse 2/4 and posterior tibial pulse on the left is 1/4.  Capillary refill is intact.  Pedal hair growth decreased.     NEUROLOGY: Protective sensation is not intact to the left and right plantar surfaces of the foot and digits, as the patient has no sensation/detection at greater than 4 distinct points of contact with 5.07 [place] monofilament. Sensation to light touch is intact on the left and right foot. Proprioception is intact, bilateral. Sensation to pin prick is reduced to absent. Vibratory sensation is diminished.    DERMATOLOGY:  Skin is supple, moist, intact.  There is no signs of callusing, ulcerations, other lesions identified to the dorsal or plantar aspect of the right or left foot.  The R1, 2, 5 and left L1,2, 5 are thickened, discolored dystrophic.  There is subungual debris.  Nail plates have area of dark discoloration.  The remaining nails 3-4 on the right foot and the left foot are elongated but of normal color, thickness, and texture.   There is no signs of ingrowing into the medial or lateral borders.  There is no evidence of wounds or skin breakdown.  No edema or " erythema.  No obvious lacerations or fissuring.  Interdigital spaces are clean, dry, intact.  No rashes or scars appreciated.    ORTHOPEDIC: Manual Muscle Testing is 5/5 in all planes on the left and right, without pains, with and without resistance. Gait pattern is non-antalgic.    Assessment:     1. Diabetic polyneuropathy associated with type 2 diabetes mellitus    2. Type II diabetes mellitus with neurological manifestations    3. Morbid obesity    4. Onychodystrophy        Plan:     Diabetic polyneuropathy associated with type 2 diabetes mellitus    Type II diabetes mellitus with neurological manifestations    Morbid obesity    Onychodystrophy        Thorough discussion is had with the patient this afternoon, concerning the diagnosis, its etiology, and the treatment algorithm at present.  Greater than 50% of this visit spent on counseling and coordination of care. Greater than 15 minutes of a 20 minute appointment spent on education about the diabetic foot, neuropathy, and prevention of limb loss.  Shoe inspection. Diabetic Foot Education. Patient reminded of the importance of good nutrition and blood sugar control to help prevent podiatric complications of diabetes. Patient instructed on proper foot hygeine. We discussed wearing proper and supportive shoe gear, daily foot inspections, never walking barefooted or sock footed, never putting sharp instruments to feet which can cause major complications associated with infection, ulcers, lacerations.      Dystrophic nail plates, as outlined above (R#1,2,5  ; L#1,2,5 ), are sharply debrided with double action nail nipper, and/or with the assistance of a mechanical rotary carlos alberto, with removal of all offending nail and nail border(s), for reduction of pains. Nails are reduced in terms of length, width and girth with removal of subungual debris to facilitate pain free weight bearing and ambulation. The elongated nails as outlined in the objective portion of this note,  were trimmed to appropriate length, with a double action nail nipper, for alleviation/reduction of pains as well. Follow up in approx. 3-4 months.    Continue to follow with primary care in regards of hoarseness, gastric reflux.  We discuss current diabetic medications can sometimes cause nausea and vomiting and can worsen reflux disease due to slowing of the stomach.  Would encourage patient to discuss this with PCP      Future Appointments   Date Time Provider Department Center   11/2/2023 10:15 AM Brandie Rey DPM [facility]   11/29/2023 12:00 PM Oleksandr Nagel MD [facility] MED [place]   12/26/2023 11:40 AM LABORATORY, [facility] LAB [place]   12/28/2023 11:00 AM Sol Mckeon NP [facility] HEM ON[facility]

## 2023-11-15 ENCOUNTER — HOSPITAL ENCOUNTER (OUTPATIENT)
Dept: RADIOLOGY | Facility: HOSPITAL | Age: 63
Discharge: HOME | End: 2023-11-15
Attending: NURSE PRACTITIONER
Payer: MEDICARE

## 2023-11-15 ENCOUNTER — DOCUMENTATION ONLY (OUTPATIENT)
Dept: REHABILITATION | Facility: HOSPITAL | Age: 63
End: 2023-11-15
Payer: MEDICARE

## 2023-11-15 ENCOUNTER — OFFICE VISIT (OUTPATIENT)
Dept: FAMILY MEDICINE | Facility: CLINIC | Age: 63
End: 2023-11-15
Payer: MEDICARE

## 2023-11-15 VITALS
HEIGHT: 68 IN | BODY MASS INDEX: 43.09 KG/M2 | DIASTOLIC BLOOD PRESSURE: 89 MMHG | SYSTOLIC BLOOD PRESSURE: 138 MMHG | TEMPERATURE: 98 F | HEART RATE: 98 BPM | WEIGHT: 284.31 LBS

## 2023-11-15 DIAGNOSIS — M12.812 ROTATOR CUFF ARTHROPATHY OF LEFT SHOULDER: ICD-10-CM

## 2023-11-15 DIAGNOSIS — M25.512 ACUTE PAIN OF LEFT SHOULDER: ICD-10-CM

## 2023-11-15 DIAGNOSIS — M25.512 ACUTE PAIN OF LEFT SHOULDER: Primary | ICD-10-CM

## 2023-11-15 PROBLEM — M25.612 DECREASED ROM OF LEFT SHOULDER: Status: RESOLVED | Noted: 2023-07-06 | Resolved: 2023-11-15

## 2023-11-15 PROBLEM — R29.898 DECREASED RANGE OF MOTION OF NECK: Status: RESOLVED | Noted: 2023-07-06 | Resolved: 2023-11-15

## 2023-11-15 PROBLEM — R29.898 DECREASED STRENGTH OF UPPER EXTREMITY: Status: RESOLVED | Noted: 2023-07-06 | Resolved: 2023-11-15

## 2023-11-15 PROCEDURE — 99214 OFFICE O/P EST MOD 30 MIN: CPT | Mod: HCNC,S$GLB,, | Performed by: NURSE PRACTITIONER

## 2023-11-15 PROCEDURE — 3008F BODY MASS INDEX DOCD: CPT | Mod: HCNC,CPTII,S$GLB, | Performed by: NURSE PRACTITIONER

## 2023-11-15 PROCEDURE — 1159F MED LIST DOCD IN RCRD: CPT | Mod: HCNC,CPTII,S$GLB, | Performed by: NURSE PRACTITIONER

## 2023-11-15 PROCEDURE — 1159F PR MEDICATION LIST DOCUMENTED IN MEDICAL RECORD: ICD-10-PCS | Mod: HCNC,CPTII,S$GLB, | Performed by: NURSE PRACTITIONER

## 2023-11-15 PROCEDURE — 1101F PT FALLS ASSESS-DOCD LE1/YR: CPT | Mod: HCNC,CPTII,S$GLB, | Performed by: NURSE PRACTITIONER

## 2023-11-15 PROCEDURE — 73030 X-RAY EXAM OF SHOULDER: CPT | Mod: 26,HCNC,LT, | Performed by: RADIOLOGY

## 2023-11-15 PROCEDURE — 99999 PR PBB SHADOW E&M-EST. PATIENT-LVL V: CPT | Mod: PBBFAC,HCNC,, | Performed by: NURSE PRACTITIONER

## 2023-11-15 PROCEDURE — 99214 PR OFFICE/OUTPT VISIT, EST, LEVL IV, 30-39 MIN: ICD-10-PCS | Mod: HCNC,S$GLB,, | Performed by: NURSE PRACTITIONER

## 2023-11-15 PROCEDURE — 3066F NEPHROPATHY DOC TX: CPT | Mod: HCNC,CPTII,S$GLB, | Performed by: NURSE PRACTITIONER

## 2023-11-15 PROCEDURE — 3061F NEG MICROALBUMINURIA REV: CPT | Mod: HCNC,CPTII,S$GLB, | Performed by: NURSE PRACTITIONER

## 2023-11-15 PROCEDURE — 3080F DIAST BP >= 90 MM HG: CPT | Mod: HCNC,CPTII,S$GLB, | Performed by: NURSE PRACTITIONER

## 2023-11-15 PROCEDURE — 99999 PR PBB SHADOW E&M-EST. PATIENT-LVL V: ICD-10-PCS | Mod: PBBFAC,HCNC,, | Performed by: NURSE PRACTITIONER

## 2023-11-15 PROCEDURE — 1101F PR PT FALLS ASSESS DOC 0-1 FALLS W/OUT INJ PAST YR: ICD-10-PCS | Mod: HCNC,CPTII,S$GLB, | Performed by: NURSE PRACTITIONER

## 2023-11-15 PROCEDURE — 3008F PR BODY MASS INDEX (BMI) DOCUMENTED: ICD-10-PCS | Mod: HCNC,CPTII,S$GLB, | Performed by: NURSE PRACTITIONER

## 2023-11-15 PROCEDURE — 3288F PR FALLS RISK ASSESSMENT DOCUMENTED: ICD-10-PCS | Mod: HCNC,CPTII,S$GLB, | Performed by: NURSE PRACTITIONER

## 2023-11-15 PROCEDURE — 3075F PR MOST RECENT SYSTOLIC BLOOD PRESS GE 130-139MM HG: ICD-10-PCS | Mod: HCNC,CPTII,S$GLB, | Performed by: NURSE PRACTITIONER

## 2023-11-15 PROCEDURE — 73030 X-RAY EXAM OF SHOULDER: CPT | Mod: TC,HCNC,PO,LT

## 2023-11-15 PROCEDURE — 3044F PR MOST RECENT HEMOGLOBIN A1C LEVEL <7.0%: ICD-10-PCS | Mod: HCNC,CPTII,S$GLB, | Performed by: NURSE PRACTITIONER

## 2023-11-15 PROCEDURE — 3061F PR NEG MICROALBUMINURIA RESULT DOCUMENTED/REVIEW: ICD-10-PCS | Mod: HCNC,CPTII,S$GLB, | Performed by: NURSE PRACTITIONER

## 2023-11-15 PROCEDURE — 1160F PR REVIEW ALL MEDS BY PRESCRIBER/CLIN PHARMACIST DOCUMENTED: ICD-10-PCS | Mod: HCNC,CPTII,S$GLB, | Performed by: NURSE PRACTITIONER

## 2023-11-15 PROCEDURE — 3044F HG A1C LEVEL LT 7.0%: CPT | Mod: HCNC,CPTII,S$GLB, | Performed by: NURSE PRACTITIONER

## 2023-11-15 PROCEDURE — 73030 XR SHOULDER COMPLETE 2 OR MORE VIEWS LEFT: ICD-10-PCS | Mod: 26,HCNC,LT, | Performed by: RADIOLOGY

## 2023-11-15 PROCEDURE — 3080F PR MOST RECENT DIASTOLIC BLOOD PRESSURE >= 90 MM HG: ICD-10-PCS | Mod: HCNC,CPTII,S$GLB, | Performed by: NURSE PRACTITIONER

## 2023-11-15 PROCEDURE — 3288F FALL RISK ASSESSMENT DOCD: CPT | Mod: HCNC,CPTII,S$GLB, | Performed by: NURSE PRACTITIONER

## 2023-11-15 PROCEDURE — 3075F SYST BP GE 130 - 139MM HG: CPT | Mod: HCNC,CPTII,S$GLB, | Performed by: NURSE PRACTITIONER

## 2023-11-15 PROCEDURE — 3066F PR DOCUMENTATION OF TREATMENT FOR NEPHROPATHY: ICD-10-PCS | Mod: HCNC,CPTII,S$GLB, | Performed by: NURSE PRACTITIONER

## 2023-11-15 PROCEDURE — 1160F RVW MEDS BY RX/DR IN RCRD: CPT | Mod: HCNC,CPTII,S$GLB, | Performed by: NURSE PRACTITIONER

## 2023-11-15 RX ORDER — DICLOFENAC SODIUM 75 MG/1
75 TABLET, DELAYED RELEASE ORAL 2 TIMES DAILY PRN
Qty: 30 TABLET | Refills: 0 | Status: SHIPPED | OUTPATIENT
Start: 2023-11-15

## 2023-11-15 RX ORDER — TOPIRAMATE 100 MG/1
1 TABLET, FILM COATED ORAL 2 TIMES DAILY
COMMUNITY
Start: 2023-03-21 | End: 2024-03-20

## 2023-11-15 NOTE — PATIENT INSTRUCTIONS
Boston Koehler,     If you are due for any health screening(s) below please notify me so we can arrange them to be ordered and scheduled. Most healthy patients at your age complete them, but you are free to accept or refuse.     If you can't do it, I'll definitely understand. If you can, I'd certainly appreciate it!    All of your core healthy metrics are met.

## 2023-11-15 NOTE — PROGRESS NOTES
Assessment/Plan:  Problem List Items Addressed This Visit    None  Visit Diagnoses       Acute pain of left shoulder    -  Primary    Relevant Medications    diclofenac (VOLTAREN) 75 MG EC tablet    Other Relevant Orders    X-Ray Shoulder 2 or More Views Left (Completed)    Ambulatory referral/consult to Orthopedics    Rotator cuff arthropathy of left shoulder        Relevant Medications    diclofenac (VOLTAREN) 75 MG EC tablet    Other Relevant Orders    Ambulatory referral/consult to Orthopedics        Xray today shows moderate degenerative changes as well as possible chronic rotator cuff injury   Continue current medication. Switch Meloxicam to Diclofenac.  Discussed avoiding additional steroids since recently received steroid shot. Risk of corticosteroids reviewed (elevated BP/glucose, insomnia, psychosis, bone loss, etc)  Recommend physical therapy; patient declined  Referral to ortho for further evaluation   Supportive care- rest, ice, heat, avoid heavy lifting, limit strenuous activity.   Follow up if symptoms worsen or fail to improve.  ER precautions for severe or worsening symptoms.     Rose Price NP  _____________________________________________________________________________________________________________________________________________________    CC: left shoulder     HPI: Patient is a 63-year-old female who presents in clinic today as an established patient here for left shoulder pain. This is a new problem. Onset 1-2 weeks ago. No known injuries, falls, or known trauma. Constant. Worse with movement and lifting. She is currently taking lyrica, robaxin, and tizanidine. She has Rx for mobic but states this does not help. She was recently given a steroid shot last month for URI symptoms. There is no numbness or tingling.     Lab Results   Component Value Date    HGBA1C 4.9 09/06/2023     X-Ray Shoulder 2 or More Views Left  Narrative: EXAM:  XR SHOULDER COMPLETE 2 OR MORE VIEWS LEFT    CLINICAL  HISTORY: Left shoulder pain.    FINDINGS: Glenohumeral and acromioclavicular alignment is normal.  No fracture or other acute osseous abnormality is seen.  Moderate AC joint degenerative changes with inferior spurring.  Mild degenerative changes of the lower glenoid.  Irregularity seen at the rotator cuff insertion without definite calcific tendinosis.  Findings could reflect chronic rotator cuff injury.  No evidence of rotator cuff or bursal calcium deposition.  Impression:   No acute radiographic abnormality of the left shoulder.    Finalized on: 11/15/2023 12:15 PM By:  Jarvis Celestin MD  [facility]# [id number]      2023-11-15 12:17:[id number]    [facility]    Past Medical History:  Past Medical History:   Diagnosis Date    Acute respiratory failure due to COVID-19     COVID-19     Diabetes mellitus, type 2 1993    BS  didn't check 10/04/2023 Insulin x 2 years    Diabetic neuropathy 01/27/2014    DJD (degenerative joint disease) of knee     DVT (deep venous thrombosis) around 1990's    in leg, is on no anticoagulant therapy presently    Fatty liver     GERD (gastroesophageal reflux disease)     Hypertension associated with diabetes     HECTOR (iron deficiency anemia) 05/13/2021    Multinodular goiter     Obesity, morbid, BMI 50 or higher     Sleep apnea     has no CPAP     Past Surgical History:   Procedure Laterality Date    COLONOSCOPY N/A 5/12/2021    Procedure: COLONOSCOPY;  Surgeon: Carolina Rizo MD;  Location: [facility];  Service: Endoscopy;  Laterality: N/A;    EPIDURAL STEROID INJECTION N/A 12/10/2021    Procedure: Lumbar L5/S1 IL TEETEE  Would like AM procedure, if possible;  Surgeon: Nae Paul MD;  Location: [facility];  Service: Pain Management;  Laterality: N/A;    EPIDURAL STEROID INJECTION INTO CERVICAL SPINE N/A 10/8/2021    Procedure: C7-T1 IL TEETEE-no sedation.  Needs IV-just incase;  Surgeon: Nae Paul MD;  Location: [facility];  Service: Pain Management;  Laterality: N/A;     ESOPHAGOGASTRODUODENOSCOPY N/A 7/8/2021    Procedure: EGD (ESOPHAGOGASTRODUODENOSCOPY) previous positve covid;  Surgeon: Jann Gutierrez MD;  Location: [facility];  Service: Endoscopy;  Laterality: N/A;    ESOPHAGOGASTRODUODENOSCOPY N/A 9/18/2023    Procedure: EGD (ESOPHAGOGASTRODUODENOSCOPY);  Surgeon: Gm Leon MD;  Location: [facility];  Service: Endoscopy;  Laterality: N/A;    FRACTURE SURGERY      HYSTERECTOMY      INTRALUMINAL GASTROINTESTINAL TRACT IMAGING VIA CAPSULE N/A 10/24/2022    Procedure: IMAGING PROCEDURE, GI TRACT, INTRALUMINAL, VIA CAPSULE;  Surgeon: Hang Lunsford RN;  Location: [facility];  Service: Endoscopy;  Laterality: N/A;    SELECTIVE INJECTION OF ANESTHETIC AGENT AROUND LUMBAR SPINAL NERVE ROOT BY TRANSFORAMINAL APPROACH Bilateral 5/6/2022    Procedure: Bilateral L5/S1 TF TEETEE with RN IV sedation;  Surgeon: Nae Paul MD;  Location: [facility];  Service: Pain Management;  Laterality: Bilateral;    SELECTIVE INJECTION OF ANESTHETIC AGENT AROUND LUMBAR SPINAL NERVE ROOT BY TRANSFORAMINAL APPROACH Bilateral 12/30/2022    Procedure: Bilateral L5/S1 TF TEETEE RN IV Sedation;  Surgeon: Nae Paul MD;  Location: [facility];  Service: Pain Management;  Laterality: Bilateral;    SHOULDER ARTHROSCOPY      THYROIDECTOMY, PARTIAL Right     and transplatation of right superior parathyroid gland to the sternocleidomastoid muscle     TONSILLECTOMY      TUBAL LIGATION  1984    WRIST FRACTURE SURGERY      left     Review of patient's allergies indicates:   Allergen Reactions    Codeine sulfate      Nausea^    Lisinopril Swelling     angioedema    Codeine Nausea Only and Rash     Social History     Tobacco Use    Smoking status: Never    Smokeless tobacco: Never   Substance Use Topics    Alcohol use: No    Drug use: No     Family History   Problem Relation Age of Onset    Diabetes Mother     Hypertension Mother     Heart disease Mother 50    Cancer Father     Arthritis Father     Breast  cancer Sister     Cancer Brother     Cancer Brother     Leukemia Son     Cancer Son      Current Outpatient Medications on File Prior to Visit   Medication Sig Dispense Refill    albuterol (PROVENTIL/VENTOLIN HFA) 90 mcg/actuation inhaler       azelastine (ASTELIN) 137 mcg (0.1 %) nasal spray 1 spray (137 mcg total) by Nasal route 2 (two) times daily. 30 mL 0    diltiaZEM (CARDIZEM) 30 MG tablet TAKE 1 TABLET EVERY 12 HOURS 180 tablet 1    EPINEPHrine (EPIPEN) 0.3 mg/0.3 mL AtIn Inject into the muscle.      ezetimibe (ZETIA) 10 mg tablet Take 1 tablet (10 mg total) by mouth once daily. 90 tablet 3    famotidine (PEPCID) 40 MG tablet Take 1 tablet (40 mg total) by mouth once daily. 30 tablet 11    fluticasone propionate (FLONASE) 50 mcg/actuation nasal spray Use 2 sprays to each nostril daily 16 g 12    inhalation spacing device (BREATHERITE VALVED MDI CHAMBER) Use as directed for inhalation. 1 Device prn    levothyroxine (EUTHYROX) 100 MCG tablet Take 1 tablet (100 mcg total) by mouth once daily. 90 tablet 4    meloxicam (MOBIC) 15 MG tablet Take 1 tablet (15 mg total) by mouth once daily. 30 tablet 5    metFORMIN (GLUCOPHAGE) 1000 MG tablet Take 1 tablet (1,000 mg total) by mouth daily with breakfast. 90 tablet 4    methocarbamoL (ROBAXIN) 500 MG Tab Take 1 tablet (500 mg total) by mouth 2 (two) times daily as needed (muscle spasms). 180 tablet 2    pregabalin (LYRICA) 75 MG capsule Take 1 capsule (75 mg total) by mouth 3 (three) times daily. 90 capsule 3    rOPINIRole (REQUIP) 0.25 MG tablet Take 1 tablet (0.25 mg total) by mouth every evening. 90 tablet 4    semaglutide (OZEMPIC) 2 mg/dose (8 mg/3 mL) PnIj INJECT 2 MG INTO THE SKIN EVERY 7 DAYS. 3 each 1    sulfacetamide sodium 10% (BLEPH-10) 10 % ophthalmic solution Place 2 drops into the left eye 4 (four) times daily. 5 mL 0    tiZANidine (ZANAFLEX) 4 MG tablet Take 1/2 to 1 tablet by mouth twice daily as needed for muscle spasms. May cause drowsiness. 180  "tablet 0    topiramate (TOPAMAX) 100 MG tablet Take 1 tablet by mouth 2 (two) times daily.      TRELEGY ELLIPTA 100-62.5-25 mcg DsDv Inhale 1 puff into the lungs once daily.      [DISCONTINUED] topiramate (TOPAMAX) 100 MG tablet Take 1 tablet (100 mg total) by mouth 2 (two) times daily. 180 tablet 4    [DISCONTINUED] loratadine (CLARITIN) 10 mg tablet Take 1 tablet (10 mg total) by mouth once daily. 90 tablet 4     No current facility-administered medications on file prior to visit.     Review of Systems   Constitutional:  Negative for appetite change, chills, fatigue and fever.   HENT:  Negative for congestion, rhinorrhea and sore throat.    Eyes:  Negative for visual disturbance.   Respiratory:  Negative for cough and shortness of breath.    Cardiovascular:  Negative for chest pain, palpitations and leg swelling.   Gastrointestinal:  Negative for abdominal pain, diarrhea and vomiting.   Genitourinary:  Negative for difficulty urinating, dysuria and hematuria.   Musculoskeletal:  Positive for arthralgias. Negative for myalgias.   Skin:  Negative for rash and wound.   Neurological:  Negative for dizziness and headaches.   Psychiatric/Behavioral:  Negative for behavioral problems. The patient is not nervous/anxious.      Vitals:    11/15/23 0848   BP: 138/89   Pulse: 98   Temp: 97.9 °F (36.6 °C)   TempSrc: Temporal   Weight: 129 kg (284 lb 4.8 oz)   Height: 5' 8" (1.727 m)     Wt Readings from Last 3 Encounters:   11/15/23 129 kg (284 lb 4.8 oz)   11/02/23 128.8 kg (284 lb)   10/17/23 128.8 kg (284 lb)     Physical Exam  Vitals reviewed.   Constitutional:       General: She is not in acute distress.     Appearance: Normal appearance. She is not ill-appearing.   HENT:      Head: Normocephalic and atraumatic.      Right Ear: External ear normal.      Left Ear: External ear normal.      Nose: Nose normal.   Eyes:      Extraocular Movements: Extraocular movements intact.      Conjunctiva/sclera: Conjunctivae normal. "   Cardiovascular:      Rate and Rhythm: Normal rate.      Heart sounds: Normal heart sounds.   Pulmonary:      Effort: Pulmonary effort is normal. No respiratory distress.      Breath sounds: Normal breath sounds.   Abdominal:      General: Abdomen is flat. There is no distension.   Musculoskeletal:      Right shoulder: Normal.      Left shoulder: Tenderness present. No swelling or deformity. Decreased range of motion.      Cervical back: Normal range of motion.   Skin:     General: Skin is warm and dry.      Capillary Refill: Capillary refill takes less than 2 seconds.      Coloration: Skin is not pale.      Findings: No rash.   Neurological:      General: No focal deficit present.      Mental Status: She is alert and oriented to person, place, and time. Mental status is at baseline.   Psychiatric:         Mood and Affect: Mood normal.         Speech: Speech normal. Speech is not rapid and pressured, delayed or slurred.         Behavior: Behavior normal. Behavior is not agitated, slowed, aggressive, withdrawn, hyperactive or combative. Behavior is cooperative.         Thought Content: Thought content normal.         Judgment: Judgment normal.       Health Maintenance   Topic Date Due    Low Dose Statin  Never done    Shingles Vaccine (2 of 2) 08/11/2022    Mammogram  01/09/2024    Hemoglobin A1c  03/06/2024    Lipid Panel  06/14/2024    Foot Exam  07/18/2024    Eye Exam  10/05/2024    DEXA Scan  04/27/2025    TETANUS VACCINE  10/29/2028    Colorectal Cancer Screening  05/12/2031    Hepatitis C Screening  Completed

## 2023-11-15 NOTE — PROGRESS NOTES
[facility]  PT Discharge Note    Name: Zakia Price  [facility] Number: [id number]    Therapy Diagnosis:        Encounter Diagnoses   Name Primary?    Decreased strength of upper extremity Yes    Decreased ROM of left shoulder      Decreased range of motion of neck        Physician: Oleksandr Nagel MD     Visit Date: 8/16/2023     Physician Orders: PT Eval and Treat  Medical Diagnosis from Referral: M62.838 (ICD-10-CM) - Cervical paraspinal muscle spasm  Evaluation Date: 7/6/2023  Authorization Period Expiration: 6/28/24  Plan of Care Expiration: 9/6/23  Progress Note Due: 8/6/23  Visit # / Visits authorized: 7 / 20 auth (1/1 eval)  FOTO: 2 / 3 (7/6/23-IE, 7/21/23)     Precautions: Type 2 Diabetes, HTN, Hypothyroidism   Date of Surgery: NA     PTA Visit #: 0/5     Date of Last visit: 8/16/23  Total Visits Received: 8    ASSESSMENT      This patient has not attended therapy since 8/16/23. This patient is now discharged from skilled outpatient therapy.    Discharge reason: Patient has not attended therapy since 8/16/23    Discharge FOTO Score: NA    Goals:   Short Term Goals: 4 weeks   - Patient will be able to sit for at least 10 minutes with proper posture for improved mechanical alignment and greater tolerance to household activities.(Not met - pt stopped attending therapy)  - Patient will demonstrate improved UE strength, especially into shoulder flexion, by at least 1/2 grade via MMT for increased stability and support with functional activities. (Not met - pt stopped attending therapy)  - Patient will demonstrate improved cervical range of motion, especially into cervical rotation, by at least 25% degrees for increased ability to perform functional tasks. (Not met - pt stopped attending therapy)  - Patient will demonstrate improved LUE shoulder range of motion, especially into shoulder scaption, by at least 10 degrees for improved performance of every day tasks. (Not met - pt  stopped attending therapy)     Long Term Goals: 8 weeks   - Patient will be able to sit for at least 20 minutes with proper posture for improved mechanical alignment and greater tolerance to household activities. (Not met - pt stopped attending therapy)  - Patient will demonstrate improved UE strength, especially into shoulder scaption, by at least 1/2 grade via MMT for increased stability and support with functional activities. (Not met - pt stopped attending therapy)  - Patient will demonstrate improved cervical range of motion, especially into cervical side-bending, by at least 5 degrees for increased ability to perform functional tasks. (Not met - pt stopped attending therapy)  - Patient will demonstrate improved LUE shoulder range of motion, especially into shoulder flexion, by at least 10 degrees for improved performance of every day tasks. (Not met - pt stopped attending therapy)  - Patient will demonstrate independence with Home Exercise Program for continued improvements outside the clinical setting. (Not met - pt stopped attending therapy)  - Patient will demonstrate improved FOTO score that is greater than or equal to the predicted value for increased ability to perform ADL's. (Not met - pt stopped attending therapy)    PLAN     This patient is discharged from skilled outpatient Physical Therapy services.    Linda Horowitz, PT, DPT, Cert. DN

## 2023-11-17 ENCOUNTER — TELEPHONE (OUTPATIENT)
Dept: FAMILY MEDICINE | Facility: CLINIC | Age: 63
End: 2023-11-17
Payer: MEDICARE

## 2023-11-17 NOTE — TELEPHONE ENCOUNTER
----- Message from Rose Price NP sent at 11/15/2023 12:36 PM CST -----  Please notify patient of results and recommendations.     X-ray left shoulder shows moderate degenerative changes, bone spurring, inflammation, and possible chronic rotator cuff injury. I recommend proceeding with medications as prescribed. I have placed a referral to ortho. Follow up with specialist if worsening or no improvement.

## 2023-11-20 ENCOUNTER — TELEPHONE (OUTPATIENT)
Dept: FAMILY MEDICINE | Facility: CLINIC | Age: 63
End: 2023-11-20
Payer: MEDICARE

## 2023-11-27 ENCOUNTER — OFFICE VISIT (OUTPATIENT)
Dept: SPORTS MEDICINE | Facility: CLINIC | Age: 63
End: 2023-11-27
Payer: MEDICARE

## 2023-11-27 VITALS — BODY MASS INDEX: 43.04 KG/M2 | WEIGHT: 284 LBS | HEIGHT: 68 IN | RESPIRATION RATE: 17 BRPM

## 2023-11-27 DIAGNOSIS — M12.812 ROTATOR CUFF ARTHROPATHY OF LEFT SHOULDER: Primary | ICD-10-CM

## 2023-11-27 DIAGNOSIS — M25.512 ACUTE PAIN OF LEFT SHOULDER: ICD-10-CM

## 2023-11-27 PROCEDURE — 3044F PR MOST RECENT HEMOGLOBIN A1C LEVEL <7.0%: ICD-10-PCS | Mod: HCNC,CPTII,S$GLB, | Performed by: PHYSICIAN ASSISTANT

## 2023-11-27 PROCEDURE — 99214 OFFICE O/P EST MOD 30 MIN: CPT | Mod: 25,HCNC,S$GLB, | Performed by: PHYSICIAN ASSISTANT

## 2023-11-27 PROCEDURE — 3044F HG A1C LEVEL LT 7.0%: CPT | Mod: HCNC,CPTII,S$GLB, | Performed by: PHYSICIAN ASSISTANT

## 2023-11-27 PROCEDURE — 3066F NEPHROPATHY DOC TX: CPT | Mod: HCNC,CPTII,S$GLB, | Performed by: PHYSICIAN ASSISTANT

## 2023-11-27 PROCEDURE — 1159F MED LIST DOCD IN RCRD: CPT | Mod: HCNC,CPTII,S$GLB, | Performed by: PHYSICIAN ASSISTANT

## 2023-11-27 PROCEDURE — 3008F BODY MASS INDEX DOCD: CPT | Mod: HCNC,CPTII,S$GLB, | Performed by: PHYSICIAN ASSISTANT

## 2023-11-27 PROCEDURE — 1160F RVW MEDS BY RX/DR IN RCRD: CPT | Mod: HCNC,CPTII,S$GLB, | Performed by: PHYSICIAN ASSISTANT

## 2023-11-27 PROCEDURE — 3008F PR BODY MASS INDEX (BMI) DOCUMENTED: ICD-10-PCS | Mod: HCNC,CPTII,S$GLB, | Performed by: PHYSICIAN ASSISTANT

## 2023-11-27 PROCEDURE — 97110 PR THERAPEUTIC EXERCISES: ICD-10-PCS | Mod: GP,HCNC,97,S$GLB | Performed by: PHYSICIAN ASSISTANT

## 2023-11-27 PROCEDURE — 3061F NEG MICROALBUMINURIA REV: CPT | Mod: HCNC,CPTII,S$GLB, | Performed by: PHYSICIAN ASSISTANT

## 2023-11-27 PROCEDURE — 1160F PR REVIEW ALL MEDS BY PRESCRIBER/CLIN PHARMACIST DOCUMENTED: ICD-10-PCS | Mod: HCNC,CPTII,S$GLB, | Performed by: PHYSICIAN ASSISTANT

## 2023-11-27 PROCEDURE — 99214 PR OFFICE/OUTPT VISIT, EST, LEVL IV, 30-39 MIN: ICD-10-PCS | Mod: 25,HCNC,S$GLB, | Performed by: PHYSICIAN ASSISTANT

## 2023-11-27 PROCEDURE — 3066F PR DOCUMENTATION OF TREATMENT FOR NEPHROPATHY: ICD-10-PCS | Mod: HCNC,CPTII,S$GLB, | Performed by: PHYSICIAN ASSISTANT

## 2023-11-27 PROCEDURE — 99999 PR PBB SHADOW E&M-EST. PATIENT-LVL V: ICD-10-PCS | Mod: PBBFAC,HCNC,, | Performed by: PHYSICIAN ASSISTANT

## 2023-11-27 PROCEDURE — 1100F PR PT FALLS ASSESS DOC 2+ FALLS/FALL W/INJURY/YR: ICD-10-PCS | Mod: HCNC,CPTII,S$GLB, | Performed by: PHYSICIAN ASSISTANT

## 2023-11-27 PROCEDURE — 3061F PR NEG MICROALBUMINURIA RESULT DOCUMENTED/REVIEW: ICD-10-PCS | Mod: HCNC,CPTII,S$GLB, | Performed by: PHYSICIAN ASSISTANT

## 2023-11-27 PROCEDURE — 99999 PR PBB SHADOW E&M-EST. PATIENT-LVL V: CPT | Mod: PBBFAC,HCNC,, | Performed by: PHYSICIAN ASSISTANT

## 2023-11-27 PROCEDURE — 20610 LARGE JOINT ASPIRATION/INJECTION: L SUBACROMIAL BURSA: ICD-10-PCS | Mod: HCNC,LT,S$GLB, | Performed by: PHYSICIAN ASSISTANT

## 2023-11-27 PROCEDURE — 20610 DRAIN/INJ JOINT/BURSA W/O US: CPT | Mod: HCNC,LT,S$GLB, | Performed by: PHYSICIAN ASSISTANT

## 2023-11-27 PROCEDURE — 3288F PR FALLS RISK ASSESSMENT DOCUMENTED: ICD-10-PCS | Mod: HCNC,CPTII,S$GLB, | Performed by: PHYSICIAN ASSISTANT

## 2023-11-27 PROCEDURE — 3288F FALL RISK ASSESSMENT DOCD: CPT | Mod: HCNC,CPTII,S$GLB, | Performed by: PHYSICIAN ASSISTANT

## 2023-11-27 PROCEDURE — 1159F PR MEDICATION LIST DOCUMENTED IN MEDICAL RECORD: ICD-10-PCS | Mod: HCNC,CPTII,S$GLB, | Performed by: PHYSICIAN ASSISTANT

## 2023-11-27 PROCEDURE — 1100F PTFALLS ASSESS-DOCD GE2>/YR: CPT | Mod: HCNC,CPTII,S$GLB, | Performed by: PHYSICIAN ASSISTANT

## 2023-11-27 PROCEDURE — 97110 THERAPEUTIC EXERCISES: CPT | Mod: GP,HCNC,97,S$GLB | Performed by: PHYSICIAN ASSISTANT

## 2023-11-27 RX ORDER — TRIAMCINOLONE ACETONIDE 40 MG/ML
40 INJECTION, SUSPENSION INTRA-ARTICULAR; INTRAMUSCULAR
Status: DISCONTINUED | OUTPATIENT
Start: 2023-11-27 | End: 2023-11-27 | Stop reason: HOSPADM

## 2023-11-27 RX ADMIN — TRIAMCINOLONE ACETONIDE 40 MG: 40 INJECTION, SUSPENSION INTRA-ARTICULAR; INTRAMUSCULAR at 09:11

## 2023-11-27 NOTE — PROCEDURES
Large Joint Aspiration/Injection: L subacromial bursa    Date/Time: 11/27/2023 9:00 AM    Performed by: Rosemary Alonso PA-C  Authorized by: Rosemary Alonso PA-C    Consent Done?:  Yes (Verbal)  Indications:  Pain  Site marked: the procedure site was marked    Timeout: prior to procedure the correct patient, procedure, and site was verified    Prep: patient was prepped and draped in usual sterile fashion      Local anesthesia used?: Yes    Anesthesia:  Local infiltration  Local anesthetic:  Lidocaine 1% without epinephrine    Details:  Needle Size:  22 G  Ultrasonic Guidance for needle placement?: No    Approach:  Posterior  Location:  Shoulder  Site:  L subacromial bursa  Medications:  40 mg triamcinolone acetonide 40 mg/mL  Patient tolerance:  Patient tolerated the procedure well with no immediate complications    Procedure Note:  We discussed the risk and benefits of injections, including pain, infection, bleeding, damage to adjacent structures, risk of reaction to injection. We discussed the steroid/cortisone injections will not heal the problem but mat help decrease inflammation and help with symptoms. We discussed the risk of repeated injections. The patient expressed understanding and wanted to proceed with the injection. We performed a timeout to verify the proper patient, proper procedure, and the proper site. The injection site was prepared in a sterile fashion. The patient tolerated it well and there were no complication. We did discuss with the patient that steroid injections can cause some increase in blood sugar and blood pressure for up to a week after the injection.

## 2023-11-27 NOTE — PROGRESS NOTES
Orthopaedics Sports Medicine     Shoulder Initial Visit         11/27/2023    Referring MD: Rose Price NP    Chief Complaint   Patient presents with    Left Shoulder - Pain         History of Present Illness:   Zakia Price is a 63 y.o. right and left-hand dominant female who presents with LEFT shoulder pain and dysfunction.    Onset of the symptoms was years ago, has been worsening and becoming more frequent      Inciting event:no acute injury or trauma; however, patient were got a  for a long time and attributes a lot of her shoulder pain to this      Current symptoms include anterior shoulder pain, limited range of motion, night pain, morning stiffness     Pain is aggravated by overhead movements, repetitive movements      Evaluation to date: X-Ray     Treatment to date: Rest, activity modification, ibuprofen, tylenol, home exercises     Past Medical History:   Past Medical History:   Diagnosis Date    Acute respiratory failure due to COVID-19     COVID-19     Diabetes mellitus, type 2 1993    BS  didn't check 10/04/2023 Insulin x 2 years    Diabetic neuropathy 01/27/2014    DJD (degenerative joint disease) of knee     DVT (deep venous thrombosis) around 1990's    in leg, is on no anticoagulant therapy presently    Fatty liver     GERD (gastroesophageal reflux disease)     Hypertension associated with diabetes     HECTOR (iron deficiency anemia) 05/13/2021    Multinodular goiter     Obesity, morbid, BMI 50 or higher     Sleep apnea     has no CPAP       Past Surgical History:   Past Surgical History:   Procedure Laterality Date    COLONOSCOPY N/A 5/12/2021    Procedure: COLONOSCOPY;  Surgeon: Carolina Rizo MD;  Location: [facility] ENDO;  Service: Endoscopy;  Laterality: N/A;    EPIDURAL STEROID INJECTION N/A 12/10/2021    Procedure: Lumbar L5/S1 IL TEETEE  Would like AM procedure, if possible;  Surgeon: Nae Paul MD;  Location: [facility] PAIN [facility] [place], [place];  Service: Pain Management;  Laterality: N/A;     EPIDURAL STEROID INJECTION INTO CERVICAL SPINE N/A 10/8/2021    Procedure: C7-T1 IL TEEETE-no sedation.  Needs IV-just incase;  Surgeon: Nae Paul MD;  Location: [facility] PAIN MGT;  Service: Pain Management;  Laterality: N/A;    ESOPHAGOGASTRODUODENOSCOPY N/A 7/8/2021    Procedure: EGD (ESOPHAGOGASTRODUODENOSCOPY) previous positve covid;  Surgeon: Jann Gutierrez MD;  Location: [facility];  Service: Endoscopy;  Laterality: N/A;    ESOPHAGOGASTRODUODENOSCOPY N/A 9/18/2023    Procedure: EGD (ESOPHAGOGASTRODUODENOSCOPY);  Surgeon: Gm Leon MD;  Location: [facility];  Service: Endoscopy;  Laterality: N/A;    FRACTURE SURGERY      HYSTERECTOMY      INTRALUMINAL GASTROINTESTINAL TRACT IMAGING VIA CAPSULE N/A 10/24/2022    Procedure: IMAGING PROCEDURE, GI TRACT, INTRALUMINAL, VIA CAPSULE;  Surgeon: Hang Lunsford RN;  Location: [facility];  Service: Endoscopy;  Laterality: N/A;    SELECTIVE INJECTION OF ANESTHETIC AGENT AROUND LUMBAR SPINAL NERVE ROOT BY TRANSFORAMINAL APPROACH Bilateral 5/6/2022    Procedure: Bilateral L5/S1 TF TEETEE with RN IV sedation;  Surgeon: Nae Paul MD;  Location: [facility] PAIN MGT;  Service: Pain Management;  Laterality: Bilateral;    SELECTIVE INJECTION OF ANESTHETIC AGENT AROUND LUMBAR SPINAL NERVE ROOT BY TRANSFORAMINAL APPROACH Bilateral 12/30/2022    Procedure: Bilateral L5/S1 TF TEETEE RN IV Sedation;  Surgeon: Nae Paul MD;  Location: [facility] PAIN [facility]GT;  Service: Pain Management;  Laterality: Bilateral;    SHOULDER ARTHROSCOPY      THYROIDECTOMY, PARTIAL Right     and transplatation of right superior parathyroid gland to the sternocleidomastoid muscle     TONSILLECTOMY      TUBAL LIGATION  1984    WRIST FRACTURE SURGERY      left       Medications:  Patient's Medications   New Prescriptions    No medications on file   Previous Medications    ALBUTEROL (PROVENTIL/VENTOLIN HFA) 90 MCG/ACTUATION INHALER        AZELASTINE (ASTELIN) 137 MCG (0.1 %) NASAL SPRAY    1 spray (137 mcg total)  by Nasal route 2 (two) times daily.    DICLOFENAC (VOLTAREN) 75 MG EC TABLET    Take 1 tablet (75 mg total) by mouth 2 (two) times daily as needed (pain).    DILTIAZEM (CARDIZEM) 30 MG TABLET    TAKE 1 TABLET EVERY 12 HOURS    EPINEPHRINE (EPIPEN) 0.3 MG/0.3 ML ATIN    Inject into the muscle.    EZETIMIBE (ZETIA) 10 MG TABLET    Take 1 tablet (10 mg total) by mouth once daily.    FAMOTIDINE (PEPCID) 40 MG TABLET    Take 1 tablet (40 mg total) by mouth once daily.    FLUTICASONE PROPIONATE (FLONASE) 50 MCG/ACTUATION NASAL SPRAY    Use 2 sprays to each nostril daily    INHALATION SPACING DEVICE (BREATHERITE VALVED MDI CHAMBER)    Use as directed for inhalation.    LEVOTHYROXINE (EUTHYROX) 100 MCG TABLET    Take 1 tablet (100 mcg total) by mouth once daily.    MELOXICAM (MOBIC) 15 MG TABLET    Take 1 tablet (15 mg total) by mouth once daily.    METFORMIN (GLUCOPHAGE) 1000 MG TABLET    Take 1 tablet (1,000 mg total) by mouth daily with breakfast.    METHOCARBAMOL (ROBAXIN) 500 MG TAB    Take 1 tablet (500 mg total) by mouth 2 (two) times daily as needed (muscle spasms).    PREGABALIN (LYRICA) 75 MG CAPSULE    Take 1 capsule (75 mg total) by mouth 3 (three) times daily.    ROPINIROLE (REQUIP) 0.25 MG TABLET    Take 1 tablet (0.25 mg total) by mouth every evening.    SEMAGLUTIDE (OZEMPIC) 2 MG/DOSE (8 MG/3 ML) PNIJ    INJECT 2 MG INTO THE SKIN EVERY 7 DAYS.    SULFACETAMIDE SODIUM 10% (BLEPH-10) 10 % OPHTHALMIC SOLUTION    Place 2 drops into the left eye 4 (four) times daily.    TIZANIDINE (ZANAFLEX) 4 MG TABLET    Take 1/2 to 1 tablet by mouth twice daily as needed for muscle spasms. May cause drowsiness.    TOPIRAMATE (TOPAMAX) 100 MG TABLET    Take 1 tablet by mouth 2 (two) times daily.    TRELEGY ELLIPTA 100-62.5-25 MCG DSDV    Inhale 1 puff into the lungs once daily.   Modified Medications    No medications on file   Discontinued Medications    No medications on file       Allergies:   Review of patient's allergies  "indicates:   Allergen Reactions    Codeine sulfate      Nausea^    Lisinopril Swelling     angioedema    Codeine Nausea Only and Rash       Social History:   Home town: [place], [place]  Occupation: Retired, prior   Alcohol use: She reports no history of alcohol use.  Tobacco use: She reports that she has never smoked. She has never used smokeless tobacco.    Review of systems:  History of recent illness, fevers, shakes, or chills: no  History of cardiac problems or chest pain: HTN, HLD  History of pulmonary problems or asthma: no  History of diabetes: yes, dm2  History of prior dvt or clotting problems: no  History of sleep apnea: no      Physical Examination:  Estimated body mass index is 43.18 kg/m² as calculated from the following:    Height as of this encounter: 5' 8" (1.727 m).    Weight as of this encounter: 128.8 kg (284 lb).    General  Healthy appearing female in no acute distress  Alert and oriented, normal mood, appropriate affect    Shoulder Examination:  Patient is alert and oriented, no distress. Skin is intact. Neuro is normal with no focal motor or sensory findings.    Cervical exam is unremarkable. Intact cervical ROM. Negative Spurling's test    Physical Exam:  RIGHT    LEFT    Scap Dyskinesis/Winging (-)    (-)    Tenderness:          Greater Tuberosity             (-)    +  Bicipital Groove  (-)    (-)  AC joint   (-)    (-)  Other:     ROM:  Forward Elevation 160    160  Abduction  120    110  ER (at side)  70    60  IR   T8    T8    Strength:   Supraspinatus  5/5    4+/5  Infraspinatus  5/5    4+/5  Subscap / IR  5/5    4+/5     Special Tests:   Neer:    (-)    +   Todd:   (-)    +   SS Stress:   (-)    +   Bear Hug:   (-)    (-)   [place]'s:   (-)    +   Resisted Thrower's:   (-)    +   Cross Arm Abduction:  (-)    +    Neurovascular examination  - Motor grossly intact bilaterally to shoulder abduction, elbow flexion and extension, wrist flexion and extension, and intrinsic hand " musculature  - Sensation intact to light touch bilaterally in axillary, median, radial, and ulnar distributions  - Symmetrical radial pulses      Imaging:  XR Results:  Results for orders placed during the hospital encounter of 11/15/23    X-Ray Shoulder 2 or More Views Left    Narrative  EXAM:  XR SHOULDER COMPLETE 2 OR MORE VIEWS LEFT    CLINICAL HISTORY: Left shoulder pain.    FINDINGS: Glenohumeral and acromioclavicular alignment is normal.  No fracture or other acute osseous abnormality is seen.  Moderate AC joint degenerative changes with inferior spurring.  Mild degenerative changes of the lower glenoid.  Irregularity seen at the rotator cuff insertion without definite calcific tendinosis.  Findings could reflect chronic rotator cuff injury.  No evidence of rotator cuff or bursal calcium deposition.    Impression  No acute radiographic abnormality of the left shoulder.    Finalized on: 11/15/2023 12:15 PM By:  Jarvis Celestin MD  [facility]# [id number]      2023-11-15 12:17:[id number]    [facility]    Physician Read: I agree with the above impression.      Impression:  63 y.o. female with left shoulder rotator cuff tear arthropathy      Plan:  Discussed diagnosis and treatment options with patient today.  Her history, physical exam, and imaging is consistent with left shoulder rotator cuff tear arthropathy  We discussed operative versus nonoperative treatment options today.  Operative treatment to include a reverse total shoulder arthroplasty versus nonoperative treatment to include rest, activity modification, oral anti-inflammatories, home exercise program versus formal physical therapy, intermittent injections.  She has not had an injection thus far  Today I recommend we move forward with a left shoulder corticosteroid injection and initiation into a home exercise program  Left shoulder SAS CSI given in clinic, patient tolerated the procedure well with no immediate complications   [facility] shoulder strengthening conditioning  program printed, reviewed, and provided  8 minutes were spent developing, teaching, and performing a home exercise program.  A written summary was provided and all questions were answered.  This service was performed by Rosemary Alonso PA-C under direction of Rosemary Alonso PA-C.  CPT 57080-HQ.  Follow up with me in 3 months to check progress with injection          Rosemary Alonso PA-C  Sports Medicine Physician Assistant       Disclaimer: This note was prepared using a voice recognition system and is likely to have sound alike errors within the text.

## 2023-11-29 ENCOUNTER — OFFICE VISIT (OUTPATIENT)
Dept: FAMILY MEDICINE | Facility: CLINIC | Age: 63
End: 2023-11-29
Payer: MEDICARE

## 2023-11-29 DIAGNOSIS — E11.65 TYPE 2 DIABETES MELLITUS WITH HYPERGLYCEMIA, WITHOUT LONG-TERM CURRENT USE OF INSULIN: ICD-10-CM

## 2023-11-29 DIAGNOSIS — E11.59 HYPERTENSION ASSOCIATED WITH DIABETES: Chronic | ICD-10-CM

## 2023-11-29 DIAGNOSIS — R05.9 COUGH IN ADULT: Primary | ICD-10-CM

## 2023-11-29 DIAGNOSIS — I15.2 HYPERTENSION ASSOCIATED WITH DIABETES: Chronic | ICD-10-CM

## 2023-11-29 DIAGNOSIS — Z79.899 ENCOUNTER FOR LONG-TERM (CURRENT) USE OF MEDICATIONS: ICD-10-CM

## 2023-11-29 PROCEDURE — 3066F NEPHROPATHY DOC TX: CPT | Mod: HCNC,CPTII,95, | Performed by: FAMILY MEDICINE

## 2023-11-29 PROCEDURE — 1160F PR REVIEW ALL MEDS BY PRESCRIBER/CLIN PHARMACIST DOCUMENTED: ICD-10-PCS | Mod: HCNC,CPTII,95, | Performed by: FAMILY MEDICINE

## 2023-11-29 PROCEDURE — 3061F PR NEG MICROALBUMINURIA RESULT DOCUMENTED/REVIEW: ICD-10-PCS | Mod: HCNC,CPTII,95, | Performed by: FAMILY MEDICINE

## 2023-11-29 PROCEDURE — 3288F FALL RISK ASSESSMENT DOCD: CPT | Mod: HCNC,CPTII,95, | Performed by: FAMILY MEDICINE

## 2023-11-29 PROCEDURE — 1101F PT FALLS ASSESS-DOCD LE1/YR: CPT | Mod: HCNC,CPTII,95, | Performed by: FAMILY MEDICINE

## 2023-11-29 PROCEDURE — 1159F MED LIST DOCD IN RCRD: CPT | Mod: HCNC,CPTII,95, | Performed by: FAMILY MEDICINE

## 2023-11-29 PROCEDURE — 99214 PR OFFICE/OUTPT VISIT, EST, LEVL IV, 30-39 MIN: ICD-10-PCS | Mod: HCNC,95,, | Performed by: FAMILY MEDICINE

## 2023-11-29 PROCEDURE — 3066F PR DOCUMENTATION OF TREATMENT FOR NEPHROPATHY: ICD-10-PCS | Mod: HCNC,CPTII,95, | Performed by: FAMILY MEDICINE

## 2023-11-29 PROCEDURE — 3288F PR FALLS RISK ASSESSMENT DOCUMENTED: ICD-10-PCS | Mod: HCNC,CPTII,95, | Performed by: FAMILY MEDICINE

## 2023-11-29 PROCEDURE — 1159F PR MEDICATION LIST DOCUMENTED IN MEDICAL RECORD: ICD-10-PCS | Mod: HCNC,CPTII,95, | Performed by: FAMILY MEDICINE

## 2023-11-29 PROCEDURE — 3044F PR MOST RECENT HEMOGLOBIN A1C LEVEL <7.0%: ICD-10-PCS | Mod: HCNC,CPTII,95, | Performed by: FAMILY MEDICINE

## 2023-11-29 PROCEDURE — 1101F PR PT FALLS ASSESS DOC 0-1 FALLS W/OUT INJ PAST YR: ICD-10-PCS | Mod: HCNC,CPTII,95, | Performed by: FAMILY MEDICINE

## 2023-11-29 PROCEDURE — 99214 OFFICE O/P EST MOD 30 MIN: CPT | Mod: HCNC,95,, | Performed by: FAMILY MEDICINE

## 2023-11-29 PROCEDURE — 1160F RVW MEDS BY RX/DR IN RCRD: CPT | Mod: HCNC,CPTII,95, | Performed by: FAMILY MEDICINE

## 2023-11-29 PROCEDURE — 3044F HG A1C LEVEL LT 7.0%: CPT | Mod: HCNC,CPTII,95, | Performed by: FAMILY MEDICINE

## 2023-11-29 PROCEDURE — 3061F NEG MICROALBUMINURIA REV: CPT | Mod: HCNC,CPTII,95, | Performed by: FAMILY MEDICINE

## 2023-11-29 RX ORDER — BENZONATATE 200 MG/1
200 CAPSULE ORAL 3 TIMES DAILY PRN
Qty: 20 CAPSULE | Refills: 0 | Status: SHIPPED | OUTPATIENT
Start: 2023-11-29 | End: 2023-12-14

## 2023-11-29 RX ORDER — PROMETHAZINE HYDROCHLORIDE AND DEXTROMETHORPHAN HYDROBROMIDE 6.25; 15 MG/5ML; MG/5ML
5 SYRUP ORAL EVERY 4 HOURS PRN
Qty: 120 ML | Refills: 0 | Status: SHIPPED | OUTPATIENT
Start: 2023-11-29 | End: 2023-12-09

## 2023-11-29 NOTE — ASSESSMENT & PLAN NOTE
Start Tessalon Perles and cough syrup.  Follow-up with me sooner if no improvement.  Follow-up with ENT and allergy specialist.  Discussed condition course and signs and symptoms to expect.  Patient advised take anti-inflammatories and or Tylenol for pain or fever.  ER precautions.  Call MD or follow-up to clinic if not improving or worsening symptoms.  Avoid triggers.

## 2023-11-29 NOTE — PATIENT INSTRUCTIONS
Boston Koehler,     If you are due for any health screening(s) below please notify me so we can arrange them to be ordered and scheduled. Most healthy patients at your age complete them, but you are free to accept or refuse.     If you can't do it, I'll definitely understand. If you can, I'd certainly appreciate it!    All of your core healthy metrics are met.                Follow up in about 6 months (around 5/29/2024), or if symptoms worsen or fail to improve, for Med refills.     Dear patient,   As a result of recent federal legislation (The Federal Cures Act), you may receive lab or pathology results from your visit in your [facility] account before your physician is able to contact you. Your physician or their representative will relay the results to you with their recommendations at their soonest availability.     If no improvement in symptoms or symptoms worsen, please be advised to call MD, follow-up at clinic and/or go to ER if becomes severe.    Oleksandr Nagel M.D.        We Offer TELEHEALTH & Same Day Appointments!   Book your Telehealth appointment with me through my nurse or   Clinic appointments on [employer]!    [address] [place] [address]    Office: [phone]   FAX: [phone]    Check out my Facebook Page and Follow Me at: [email or web address]    Check out my website at [place] by clicking on: [email or web address]    To Schedule appointments online, go to [employer]: [email or web address]

## 2023-11-29 NOTE — PROGRESS NOTES
Primary Care Telemedicine Note  The patient location is:  Patient's Home - [place]  The chief complaint leading to consultation is:   Chief Complaint   Patient presents with    Follow-up     3 month      Total time:  see MDM below. The total time spent on the encounter, which includes face to face time and non-face to face time preparing to see the patient (eg, review of previous medical records, tests), Obtaining and/or reviewing separately obtained history, documenting clinical information in the electronic or other health record, independently interpreting results (not separately reported)/communicating results to the patient/family/caregiver, and/or care coordination (not separately reported).    Visit type: Virtual visit with synchronous audio and video  Each patient to whom he or she provides medical services by telemedicine is:  (1) informed of the relationship between the physician and patient and the respective role of any other health care provider with respect to management of the patient; and (2) notified that he or she may decline to receive medical services by telemedicine and may withdraw from such care at any time.  =================================================================  PLAN:      Problem List Items Addressed This Visit       Type 2 diabetes mellitus with hyperglycemia, without long-term current use of insulin (Chronic)     Continue current medications.We will plan to monitor hemoglobin A1c at designated intervals 3 to 6 months.  I recommend ongoing Education for diabetic diet and exercise protocol.  We will continue to monitor for side effects.    Please be advised of symptoms to monitor for and to notify me immediately if persistent or worsening.  Follow up with Ophthalmology/Optometry and Podiatry at least annually.           Relevant Orders    CBC Without Differential    Comprehensive Metabolic Panel    TSH    Hemoglobin A1C    Lipid Panel    Hypertension associated with diabetes  (Chronic)     Counseled on importance of hypertension disease course, I recommend ongoing Education for DASH-diet and exercise.  Counseled on medication regimen importance of treating high blood pressure.  Please be advised of risk of untreated blood pressure as discussed.  Please advised of ER precautions were given for symptoms of hypertensive urgency and emergency.           Relevant Orders    CBC Without Differential    Comprehensive Metabolic Panel    TSH    Hemoglobin A1C    Lipid Panel    Encounter for long-term (current) use of medications (Chronic)     Complete history and physical was completed today.  Complete and thorough medication reconciliation was performed.  Discussed risks and benefits of medications.  Advised patient on orders and health maintenance.  We discussed old records and old labs if available.  Will request any records not available through epic.  Continue current medications listed on your summary sheet.           Relevant Orders    CBC Without Differential    Comprehensive Metabolic Panel    TSH    Hemoglobin A1C    Lipid Panel    Cough in adult - Primary     Start Tessalon Perles and cough syrup.  Follow-up with me sooner if no improvement.  Follow-up with ENT and allergy specialist.  Discussed condition course and signs and symptoms to expect.  Patient advised take anti-inflammatories and or Tylenol for pain or fever.  ER precautions.  Call MD or follow-up to clinic if not improving or worsening symptoms.  Avoid triggers.         Relevant Medications    benzonatate (TESSALON) 200 MG capsule    promethazine-dextromethorphan (PROMETHAZINE-DM) 6.25-15 mg/5 mL Syrp     Future Appointments       Date Provider Specialty Appt Notes    12/26/2023  Lab ty    12/28/2023 Sol Mckeon NP Hematology and Oncology 3 mo prior lab    2/27/2024 Rosemary Alonso PA-C Sports Medicine L shoulder F/u    3/4/2024 Brandie Rey DPM Podiatry 4 month nail care           Medication Management for  assessment above:   Medication List with Changes/Refills   New Medications    BENZONATATE (TESSALON) 200 MG CAPSULE    Take 1 capsule (200 mg total) by mouth 3 (three) times daily as needed for Cough.    PROMETHAZINE-DEXTROMETHORPHAN (PROMETHAZINE-DM) 6.25-15 MG/5 ML SYRP    Take 5 mLs by mouth every 4 (four) hours as needed (cough).   Current Medications    ALBUTEROL (PROVENTIL/VENTOLIN HFA) 90 MCG/ACTUATION INHALER        AZELASTINE (ASTELIN) 137 MCG (0.1 %) NASAL SPRAY    1 spray (137 mcg total) by Nasal route 2 (two) times daily.    DICLOFENAC (VOLTAREN) 75 MG EC TABLET    Take 1 tablet (75 mg total) by mouth 2 (two) times daily as needed (pain).    DILTIAZEM (CARDIZEM) 30 MG TABLET    TAKE 1 TABLET EVERY 12 HOURS    EPINEPHRINE (EPIPEN) 0.3 MG/0.3 ML ATIN    Inject into the muscle.    EZETIMIBE (ZETIA) 10 MG TABLET    Take 1 tablet (10 mg total) by mouth once daily.    FAMOTIDINE (PEPCID) 40 MG TABLET    Take 1 tablet (40 mg total) by mouth once daily.    FLUTICASONE PROPIONATE (FLONASE) 50 MCG/ACTUATION NASAL SPRAY    Use 2 sprays to each nostril daily    INHALATION SPACING DEVICE (BREATHERITE VALVED MDI CHAMBER)    Use as directed for inhalation.    LEVOTHYROXINE (EUTHYROX) 100 MCG TABLET    Take 1 tablet (100 mcg total) by mouth once daily.    MELOXICAM (MOBIC) 15 MG TABLET    Take 1 tablet (15 mg total) by mouth once daily.    METFORMIN (GLUCOPHAGE) 1000 MG TABLET    Take 1 tablet (1,000 mg total) by mouth daily with breakfast.    METHOCARBAMOL (ROBAXIN) 500 MG TAB    Take 1 tablet (500 mg total) by mouth 2 (two) times daily as needed (muscle spasms).    PREGABALIN (LYRICA) 75 MG CAPSULE    Take 1 capsule (75 mg total) by mouth 3 (three) times daily.    ROPINIROLE (REQUIP) 0.25 MG TABLET    Take 1 tablet (0.25 mg total) by mouth every evening.    SEMAGLUTIDE (OZEMPIC) 2 MG/DOSE (8 MG/3 ML) PNIJ    INJECT 2 MG INTO THE SKIN EVERY 7 DAYS.    SULFACETAMIDE SODIUM 10% (BLEPH-10) 10 % OPHTHALMIC SOLUTION     Place 2 drops into the left eye 4 (four) times daily.    TIZANIDINE (ZANAFLEX) 4 MG TABLET    Take 1/2 to 1 tablet by mouth twice daily as needed for muscle spasms. May cause drowsiness.    TOPIRAMATE (TOPAMAX) 100 MG TABLET    Take 1 tablet by mouth 2 (two) times daily.    TRELEGY ELLIPTA 100-62.5-25 MCG DSDV    Inhale 1 puff into the lungs once daily.       Oleksandr Nagel M.D.  ==========================================================================  Subjective:   Patient ID: Zakia Price is a 63 y.o. female.  has a past medical history of Acute respiratory failure due to COVID-19, COVID-19, Diabetes mellitus, type 2 (1993), Diabetic neuropathy (01/27/2014), DJD (degenerative joint disease) of knee, DVT (deep venous thrombosis) (around 1990's), Fatty liver, GERD (gastroesophageal reflux disease), Hypertension associated with diabetes, HECTOR (iron deficiency anemia) (05/13/2021), Multinodular goiter, Obesity, morbid, BMI 50 or higher, and Sleep apnea.   Chief Complaint: Follow-up (3 month)    Answers submitted by the patient for this visit:  Cough Questionnaire (Submitted on 11/29/2023)  Chief Complaint: Cough  Chronicity: recurrent  Onset: more than 1 year ago  Progression since onset: unchanged  Frequency: every few hours  Cough characteristics: non-productive  ear congestion: No  hemoptysis: No  sweats: No  weight loss: No  asthma: Yes  bronchitis: Yes  COPD: No  emphysema: No  pneumonia: No    Problem List Items Addressed This Visit       Type 2 diabetes mellitus with hyperglycemia, without long-term current use of insulin (Chronic)    Overview     November 2023:  Patient reports compliance.  Patient has lost weight on medication.  Diabetes Medications               metFORMIN (GLUCOPHAGE) 1000 MG tablet Take 1 tablet (1,000 mg total) by mouth daily with breakfast.    semaglutide (OZEMPIC) 2 mg/dose (8 mg/3 mL) PnIj INJECT 2 MG INTO THE SKIN EVERY 7 DAYS.        January 2023:  Doing well on current  regimen.  BMI Readings from Last 10 Encounters:   11/27/23 43.18 kg/m²   11/15/23 43.23 kg/m²   11/02/23 43.18 kg/m²   10/17/23 43.18 kg/m²   10/11/23 43.85 kg/m²   09/27/23 45.34 kg/m²   09/18/23 45.16 kg/m²   09/06/23 45.20 kg/m²   08/30/23 46.30 kg/m²   06/29/23 46.30 kg/m²   November 2019:  Reviewed diabetes management status.  Patient was due for LDL less than 100 at that time.  Also needing foot exam.DECEMBER 2019:  Patient reports issues with ACE-inhibitor.  Currently having cough.  Only on amlodipine.Diabetes Management StatusStatin: TakingACE/ARB: Not takingMARCH 2020:  Diabetes Management StatusStatin: TakingACE/ARB: Not takingSEPTEMBER 2020:  Reviewed Diabetes Management Status.  Patient is taking statin but not Ace or Arb.  July 2021:Diabetes Management StatusStatin: TakingACE/ARB: Not taking  January 2022:  Patient reports weight loss with Ozempic.           Current Assessment & Plan     Continue current medications.We will plan to monitor hemoglobin A1c at designated intervals 3 to 6 months.  I recommend ongoing Education for diabetic diet and exercise protocol.  We will continue to monitor for side effects.    Please be advised of symptoms to monitor for and to notify me immediately if persistent or worsening.  Follow up with Ophthalmology/Optometry and Podiatry at least annually.           Hypertension associated with diabetes (Chronic)    Overview     CHRONIC.  November 2023:  Hypertension Medications               diltiaZEM (CARDIZEM) 30 MG tablet TAKE 1 TABLET EVERY 12 HOURS          October 2023:  Reviewed meds.  June 2023:  Blood pressure is doing well on current regimen.  May 2023:  A1c currently 4.9.  Patient denies any symptoms of low blood sugar.  She is continuing to take metformin twice daily.  Blood pressure is well controlled on current regimen.    February 2022:  Blood pressure remains less than 140/90.  January 2022:  Blood pressure well controlled actually on the lower end of normal  today.  Patient is on diltiazem 120 milligrams daily.  She does report some constipation that is being treated with Linzess.  July 2021:  Blood pressure well controlled today.  Previous HPI  Patient recently got a new blood pressure monitor at home through [facility] however the cuff does not fit around her arm.. BP Reviewed.  Compliant with BP medications. No SE reported.  Patient taking amlodipine 5 mg.  (-) CP, SOB, palpitations, dizziness, lightheadedness, HA, arm numbness, tingling or weakness, syncope.  Creatinine   Date Value Ref Range Status   09/30/2023 0.81 0.60 - 1.10 mg/dL Final   06/14/2023 0.9 0.5 - 1.4 mg/dL Final   History of hypertension currently on amlodipine with previous reported allergy to ACE inhibitor    Last Assessment & Plan:   Blood pressure stable on current medicine regimen. Continue current medicine regimen and follow low salt diet.  Results for orders placed or performed during the hospital encounter of 10/15/21   EKG 12-lead    Collection Time: 10/15/21  8:04 AM    Narrative    Test Reason : E11.42,E11.59,I15.2,E78.5,R01.1,    Vent. Rate : 072 BPM     Atrial Rate : 072 BPM     P-R Int : 142 ms          QRS Dur : 080 ms      QT Int : 404 ms       P-R-T Axes : 062 016 028 degrees     QTc Int : 442 ms    Normal sinus rhythm  Nonspecific T wave abnormality  Abnormal ECG  When compared with ECG of 26-JAN-2021 14:40,  No significant change was found  Confirmed by Shannon Wilson MD ([id number]) on 10/16/2021 10:13:48 PM    Referred By: RIK WEAVER           Confirmed By:Shannon Wilson MD            Current Assessment & Plan     Counseled on importance of hypertension disease course, I recommend ongoing Education for DASH-diet and exercise.  Counseled on medication regimen importance of treating high blood pressure.  Please be advised of risk of untreated blood pressure as discussed.  Please advised of ER precautions were given for symptoms of hypertensive urgency and  emergency.           Encounter for long-term (current) use of medications (Chronic)    Overview     November 2023: Reviewed labs.  October 2023: Reviewed labs.  August 2023: Reviewed labs.  June 2023: Reviewed labs.  January 2023:  Reviewed labs.  CHRONIC. Stable. Compliant with medications for managed conditions. See medication list. No SE reported. Routine lab analysis is being monitored. Refills were addressed.  January 2021:CHRONIC. Stable. Compliant with medications for managed conditions. See medication list. No SE reported. Routine lab analysis is being monitored. Refills were addressed.  July 2021:  Reviewed labs.  January 2022:  Reviewed labs.  February 2022:  Reviewed labs.  July 2022:  Reviewed labs.  Lab Results   Component Value Date    WBC 4.15 09/25/2023    HGB 12.9 09/25/2023    HCT 40.2 09/25/2023    MCV 91 09/25/2023     09/25/2023       Chemistry        Component Value Date/Time     09/30/2023 0110     06/14/2023 0902    K 3.8 09/30/2023 0110    K 4.1 06/14/2023 0902     06/14/2023 0902    CO2 19 (L) 09/30/2023 0110    CO2 23 06/14/2023 0902    BUN 13 09/30/2023 0110    BUN 17 06/14/2023 0902    CREATININE 0.81 09/30/2023 0110    CREATININE 0.9 06/14/2023 0902    GLU 91 06/14/2023 0902        Component Value Date/Time    CALCIUM 9.4 09/30/2023 0110    CALCIUM 9.4 06/14/2023 0902    ALKPHOS 80 06/14/2023 0902    AST 17 06/14/2023 0902    ALT 14 06/14/2023 0902    BILITOT 0.2 06/14/2023 0902    ESTGFRAFRICA >60.0 02/24/2022 1255    EGFRNONAA >60.0 02/24/2022 1255        Lab Results   Component Value Date    TSH 1.696 06/14/2023    FREET4 1.06 12/04/2019    T3FREE 2.7 12/04/2019          Current Assessment & Plan     Complete history and physical was completed today.  Complete and thorough medication reconciliation was performed.  Discussed risks and benefits of medications.  Advised patient on orders and health maintenance.  We discussed old records and old labs if  available.  Will request any records not available through epic.  Continue current medications listed on your summary sheet.           Cough in adult - Primary    Overview     November 2023:  Recurrent.  Patient following with Allergy and ENT.  Recently had series of shots for the cough presumably antibiotics?  Patient unsure.  Patient does not think it was steroids.  She is currently taking Zyrtec.    Subacute.  Started 1 to 2 weeks ago.  Dry cough.  Nonproductive.  Denies any fever chills shortness of breath chest pain.         Current Assessment & Plan     Start Tessalon Perles and cough syrup.  Follow-up with me sooner if no improvement.  Follow-up with ENT and allergy specialist.  Discussed condition course and signs and symptoms to expect.  Patient advised take anti-inflammatories and or Tylenol for pain or fever.  ER precautions.  Call MD or follow-up to clinic if not improving or worsening symptoms.  Avoid triggers.             Review of patient's allergies indicates:   Allergen Reactions    Codeine sulfate      Nausea^    Lisinopril Swelling     angioedema    Codeine Nausea Only and Rash     Current Outpatient Medications   Medication Instructions    albuterol (PROVENTIL/VENTOLIN HFA) 90 mcg/actuation inhaler No dose, route, or frequency recorded.    azelastine (ASTELIN) 137 mcg, Nasal, 2 times daily    benzonatate (TESSALON) 200 mg, Oral, 3 times daily PRN    diclofenac (VOLTAREN) 75 mg, Oral, 2 times daily PRN    diltiaZEM (CARDIZEM) 30 mg, Oral, Every 12 hours    EPINEPHrine (EPIPEN) 0.3 mg/0.3 mL AtIn Intramuscular    ezetimibe (ZETIA) 10 mg, Oral, Daily    famotidine (PEPCID) 40 mg, Oral, Daily    fluticasone propionate (FLONASE) 50 mcg/actuation nasal spray Use 2 sprays to each nostril daily    inhalation spacing device (BREATHERITE VALVED MDI CHAMBER) Use as directed for inhalation.    levothyroxine (EUTHYROX) 100 mcg, Oral, Daily    meloxicam (MOBIC) 15 mg, Oral, Daily    metFORMIN (GLUCOPHAGE)  1,000 mg, Oral, With breakfast    methocarbamoL (ROBAXIN) 500 mg, Oral, 2 times daily PRN    OZEMPIC 2 mg, Subcutaneous, Every 7 days    pregabalin (LYRICA) 75 mg, Oral, 3 times daily    promethazine-dextromethorphan (PROMETHAZINE-DM) 6.25-15 mg/5 mL Syrp 5 mLs, Oral, Every 4 hours PRN    rOPINIRole (REQUIP) 0.25 mg, Oral, Nightly    sulfacetamide sodium 10% (BLEPH-10) 10 % ophthalmic solution 2 drops, Left Eye, 4 times daily    tiZANidine (ZANAFLEX) 4 MG tablet Take 1/2 to 1 tablet by mouth twice daily as needed for muscle spasms. May cause drowsiness.    topiramate (TOPAMAX) 100 MG tablet 1 tablet, Oral, 2 times daily    TRELEGY ELLIPTA 100-62.5-25 mcg DsDv 1 puff, Inhalation, Daily      I have reviewed the PMH, social history, FamilyHx, surgical history, allergies and medications documented / confirmed by the patient at the time of this visit.  Review of Systems   Constitutional:  Negative for chills and fever.   HENT:  Positive for postnasal drip. Negative for ear pain, rhinorrhea and sore throat.    Respiratory:  Positive for cough. Negative for shortness of breath and wheezing.    Cardiovascular:  Negative for chest pain.   Musculoskeletal:  Negative for myalgias.   Skin:  Negative for rash.   Allergic/Immunologic: Negative for environmental allergies.   Neurological:  Positive for headaches.     Objective:   There were no vitals taken for this visit.  Physical Exam  Constitutional:       General: She is not in acute distress.     Appearance: She is well-developed. She is not ill-appearing, toxic-appearing or diaphoretic.   HENT:      Head: Normocephalic and atraumatic.      Right Ear: Hearing and external ear normal.      Left Ear: Hearing and external ear normal.   Eyes:      General: Lids are normal.      Conjunctiva/sclera: Conjunctivae normal.   Pulmonary:      Effort: Pulmonary effort is normal. No respiratory distress.   Musculoskeletal:         General: Normal range of motion.      Cervical back:  Normal range of motion.   Skin:     Coloration: Skin is not pale.   Neurological:      Mental Status: She is alert. She is not disoriented.   Psychiatric:         Attention and Perception: She is attentive.         Mood and Affect: Mood is not anxious or depressed.         Speech: Speech is not rapid and pressured or slurred.         Behavior: Behavior normal. Behavior is not agitated, aggressive or hyperactive. Behavior is cooperative.         Thought Content: Thought content normal. Thought content is not paranoid or delusional. Thought content does not include homicidal or suicidal ideation. Thought content does not include homicidal or suicidal plan.         Cognition and Memory: Memory is not impaired.         Judgment: Judgment normal.         Assessment:     1. Cough in adult    2. Hypertension associated with diabetes    3. Encounter for long-term (current) use of medications    4. Type 2 diabetes mellitus with hyperglycemia, without long-term current use of insulin      MDM:   Moderate medical complexity. Moderate risk.   Total time: 31 minutes.  This includes total time spent on the encounter, which includes face to face time and non-face to face time preparing to see the patient (eg, review of previous medical records, tests), Obtaining and/or reviewing separately obtained history, documenting clinical information in the electronic or other health record, independently interpreting results (not separately reported)/communicating results to the patient/family/caregiver, and/or care coordination (not separately reported).    I have Reviewed and summarized old records.  I have performed thorough medication reconciliation today and discussed risk and benefits of medications.  I have reviewed labs and discussed with patient.  All questions were answered.  I am requesting old records and will review them once they are available. Allergy    I have signed for the following orders AND/OR meds.  Orders Placed This  Encounter   Procedures    CBC Without Differential     Standing Status:   Future     Standing Expiration Date:   1/27/2025    Comprehensive Metabolic Panel     Standing Status:   Future     Standing Expiration Date:   1/27/2025    TSH     Standing Status:   Future     Standing Expiration Date:   1/27/2025    Hemoglobin A1C     Standing Status:   Future     Standing Expiration Date:   1/27/2025    Lipid Panel     Standing Status:   Future     Standing Expiration Date:   1/27/2025     Medications Ordered This Encounter   Medications    benzonatate (TESSALON) 200 MG capsule     Sig: Take 1 capsule (200 mg total) by mouth 3 (three) times daily as needed for Cough.     Dispense:  20 capsule     Refill:  0    promethazine-dextromethorphan (PROMETHAZINE-DM) 6.25-15 mg/5 mL Syrp     Sig: Take 5 mLs by mouth every 4 (four) hours as needed (cough).     Dispense:  120 mL     Refill:  0        Follow up in about 6 months (around 5/29/2024), or if symptoms worsen or fail to improve, for Med refills.  Future Appointments       Date Provider Specialty Appt Notes    12/26/2023  Lab ty    12/28/2023 Sol Mckeon NP Hematology and Oncology 3 mo prior lab    2/27/2024 Rosemary Alonso PA-C Sports Medicine L shoulder F/u    3/4/2024 Brandie Rey DPM Podiatry 4 month nail care          If no improvement in symptoms or symptoms worsen, advised to call/follow-up at clinic or go to ER. Patient voiced understanding and all questions/concerns were addressed.   DISCLAIMER: This note was compiled by using a speech recognition dictation system and therefore please be aware that typographical / speech recognition errors can and do occur.  Please contact me if you see any errors specifically.    Oleksandr Nagel M.D.       Office: [phone] [address] [place] [address]  FAX: [phone]

## 2023-12-26 ENCOUNTER — LAB VISIT (OUTPATIENT)
Dept: LAB | Facility: HOSPITAL | Age: 63
End: 2023-12-26
Attending: FAMILY MEDICINE
Payer: MEDICARE

## 2023-12-26 DIAGNOSIS — E11.65 TYPE 2 DIABETES MELLITUS WITH HYPERGLYCEMIA, WITHOUT LONG-TERM CURRENT USE OF INSULIN: ICD-10-CM

## 2023-12-26 DIAGNOSIS — Z79.899 ENCOUNTER FOR LONG-TERM (CURRENT) USE OF MEDICATIONS: ICD-10-CM

## 2023-12-26 DIAGNOSIS — I15.2 HYPERTENSION ASSOCIATED WITH DIABETES: Chronic | ICD-10-CM

## 2023-12-26 DIAGNOSIS — D50.0 IRON DEFICIENCY ANEMIA DUE TO CHRONIC BLOOD LOSS: ICD-10-CM

## 2023-12-26 DIAGNOSIS — E11.49 TYPE II DIABETES MELLITUS WITH NEUROLOGICAL MANIFESTATIONS: Chronic | ICD-10-CM

## 2023-12-26 DIAGNOSIS — E03.9 HYPOTHYROIDISM, UNSPECIFIED TYPE: ICD-10-CM

## 2023-12-26 DIAGNOSIS — E11.59 HYPERTENSION ASSOCIATED WITH DIABETES: Chronic | ICD-10-CM

## 2023-12-26 LAB
ALBUMIN SERPL BCP-MCNC: 3.5 G/DL (ref 3.5–5.2)
ALBUMIN/CREAT UR: 7 UG/MG (ref 0–30)
ALP SERPL-CCNC: 72 U/L (ref 55–135)
ALT SERPL W/O P-5'-P-CCNC: 10 U/L (ref 10–44)
ANION GAP SERPL CALC-SCNC: 12 MMOL/L (ref 8–16)
AST SERPL-CCNC: 13 U/L (ref 10–40)
BASOPHILS # BLD AUTO: 0.02 K/UL (ref 0–0.2)
BASOPHILS NFR BLD: 0.5 % (ref 0–1.9)
BILIRUB SERPL-MCNC: 0.3 MG/DL (ref 0.1–1)
BUN SERPL-MCNC: 11 MG/DL (ref 8–23)
CALCIUM SERPL-MCNC: 9 MG/DL (ref 8.7–10.5)
CHLORIDE SERPL-SCNC: 110 MMOL/L (ref 95–110)
CHOLEST SERPL-MCNC: 215 MG/DL (ref 120–199)
CHOLEST/HDLC SERPL: 3.2 {RATIO} (ref 2–5)
CO2 SERPL-SCNC: 22 MMOL/L (ref 23–29)
CREAT SERPL-MCNC: 0.8 MG/DL (ref 0.5–1.4)
CREAT UR-MCNC: 143 MG/DL (ref 15–325)
DIFFERENTIAL METHOD: ABNORMAL
EOSINOPHIL # BLD AUTO: 0.1 K/UL (ref 0–0.5)
EOSINOPHIL NFR BLD: 3.1 % (ref 0–8)
ERYTHROCYTE [DISTWIDTH] IN BLOOD BY AUTOMATED COUNT: 14.3 % (ref 11.5–14.5)
ERYTHROCYTE [DISTWIDTH] IN BLOOD BY AUTOMATED COUNT: 14.3 % (ref 11.5–14.5)
EST. GFR  (NO RACE VARIABLE): >60 ML/MIN/1.73 M^2
ESTIMATED AVG GLUCOSE: 91 MG/DL (ref 68–131)
FERRITIN SERPL-MCNC: 121 NG/ML (ref 20–300)
GLUCOSE SERPL-MCNC: 86 MG/DL (ref 70–110)
HBA1C MFR BLD: 4.8 % (ref 4–5.6)
HCT VFR BLD AUTO: 38.6 % (ref 37–48.5)
HCT VFR BLD AUTO: 38.6 % (ref 37–48.5)
HDLC SERPL-MCNC: 67 MG/DL (ref 40–75)
HDLC SERPL: 31.2 % (ref 20–50)
HGB BLD-MCNC: 12.2 G/DL (ref 12–16)
HGB BLD-MCNC: 12.2 G/DL (ref 12–16)
IMM GRANULOCYTES # BLD AUTO: 0 K/UL (ref 0–0.04)
IMM GRANULOCYTES NFR BLD AUTO: 0 % (ref 0–0.5)
IRON SERPL-MCNC: 105 UG/DL (ref 30–160)
LDLC SERPL CALC-MCNC: 134.6 MG/DL (ref 63–159)
LYMPHOCYTES # BLD AUTO: 1.3 K/UL (ref 1–4.8)
LYMPHOCYTES NFR BLD: 32.1 % (ref 18–48)
MCH RBC QN AUTO: 29.5 PG (ref 27–31)
MCH RBC QN AUTO: 29.5 PG (ref 27–31)
MCHC RBC AUTO-ENTMCNC: 31.6 G/DL (ref 32–36)
MCHC RBC AUTO-ENTMCNC: 31.6 G/DL (ref 32–36)
MCV RBC AUTO: 94 FL (ref 82–98)
MCV RBC AUTO: 94 FL (ref 82–98)
MICROALBUMIN UR DL<=1MG/L-MCNC: 10 UG/ML
MONOCYTES # BLD AUTO: 0.3 K/UL (ref 0.3–1)
MONOCYTES NFR BLD: 6.8 % (ref 4–15)
NEUTROPHILS # BLD AUTO: 2.4 K/UL (ref 1.8–7.7)
NEUTROPHILS NFR BLD: 57.5 % (ref 38–73)
NONHDLC SERPL-MCNC: 148 MG/DL
NRBC BLD-RTO: 0 /100 WBC
PLATELET # BLD AUTO: 334 K/UL (ref 150–450)
PLATELET # BLD AUTO: 334 K/UL (ref 150–450)
PMV BLD AUTO: 9.5 FL (ref 9.2–12.9)
PMV BLD AUTO: 9.5 FL (ref 9.2–12.9)
POTASSIUM SERPL-SCNC: 3.8 MMOL/L (ref 3.5–5.1)
PROT SERPL-MCNC: 7.1 G/DL (ref 6–8.4)
RBC # BLD AUTO: 4.13 M/UL (ref 4–5.4)
RBC # BLD AUTO: 4.13 M/UL (ref 4–5.4)
SATURATED IRON: 33 % (ref 20–50)
SODIUM SERPL-SCNC: 144 MMOL/L (ref 136–145)
T3FREE SERPL-MCNC: 2.5 PG/ML (ref 2.3–4.2)
T4 FREE SERPL-MCNC: 0.95 NG/DL (ref 0.71–1.51)
TOTAL IRON BINDING CAPACITY: 321 UG/DL (ref 250–450)
TRANSFERRIN SERPL-MCNC: 217 MG/DL (ref 200–375)
TRIGL SERPL-MCNC: 67 MG/DL (ref 30–150)
TSH SERPL DL<=0.005 MIU/L-ACNC: 1.48 UIU/ML (ref 0.4–4)
WBC # BLD AUTO: 4.14 K/UL (ref 3.9–12.7)
WBC # BLD AUTO: 4.14 K/UL (ref 3.9–12.7)

## 2023-12-26 PROCEDURE — 83036 HEMOGLOBIN GLYCOSYLATED A1C: CPT | Mod: HCNC | Performed by: FAMILY MEDICINE

## 2023-12-26 PROCEDURE — 36415 COLL VENOUS BLD VENIPUNCTURE: CPT | Mod: HCNC,PO | Performed by: FAMILY MEDICINE

## 2023-12-26 PROCEDURE — 82728 ASSAY OF FERRITIN: CPT | Mod: HCNC | Performed by: NURSE PRACTITIONER

## 2023-12-26 PROCEDURE — 85025 COMPLETE CBC W/AUTO DIFF WBC: CPT | Mod: HCNC,PO | Performed by: NURSE PRACTITIONER

## 2023-12-26 PROCEDURE — 82043 UR ALBUMIN QUANTITATIVE: CPT | Mod: HCNC | Performed by: FAMILY MEDICINE

## 2023-12-26 PROCEDURE — 84481 FREE ASSAY (FT-3): CPT | Mod: HCNC | Performed by: FAMILY MEDICINE

## 2023-12-26 PROCEDURE — 84439 ASSAY OF FREE THYROXINE: CPT | Mod: HCNC | Performed by: FAMILY MEDICINE

## 2023-12-26 PROCEDURE — 80061 LIPID PANEL: CPT | Mod: HCNC | Performed by: FAMILY MEDICINE

## 2023-12-26 PROCEDURE — 84443 ASSAY THYROID STIM HORMONE: CPT | Mod: HCNC | Performed by: FAMILY MEDICINE

## 2023-12-26 PROCEDURE — 83540 ASSAY OF IRON: CPT | Mod: HCNC | Performed by: NURSE PRACTITIONER

## 2023-12-26 PROCEDURE — 84466 ASSAY OF TRANSFERRIN: CPT | Mod: HCNC | Performed by: NURSE PRACTITIONER

## 2023-12-26 PROCEDURE — 80053 COMPREHEN METABOLIC PANEL: CPT | Mod: HCNC | Performed by: FAMILY MEDICINE

## 2023-12-28 ENCOUNTER — OFFICE VISIT (OUTPATIENT)
Dept: HEMATOLOGY/ONCOLOGY | Facility: CLINIC | Age: 63
End: 2023-12-28
Payer: MEDICARE

## 2023-12-28 VITALS
OXYGEN SATURATION: 100 % | SYSTOLIC BLOOD PRESSURE: 131 MMHG | WEIGHT: 291.31 LBS | HEART RATE: 56 BPM | HEIGHT: 68 IN | BODY MASS INDEX: 44.15 KG/M2 | TEMPERATURE: 97 F | DIASTOLIC BLOOD PRESSURE: 65 MMHG

## 2023-12-28 DIAGNOSIS — E66.2 CLASS 3 OBESITY WITH ALVEOLAR HYPOVENTILATION, SERIOUS COMORBIDITY, AND BODY MASS INDEX (BMI) OF 40.0 TO 44.9 IN ADULT: ICD-10-CM

## 2023-12-28 DIAGNOSIS — D50.0 IRON DEFICIENCY ANEMIA DUE TO CHRONIC BLOOD LOSS: Primary | ICD-10-CM

## 2023-12-28 PROCEDURE — 3078F PR MOST RECENT DIASTOLIC BLOOD PRESSURE < 80 MM HG: ICD-10-PCS | Mod: HCNC,CPTII,S$GLB, | Performed by: NURSE PRACTITIONER

## 2023-12-28 PROCEDURE — 3066F PR DOCUMENTATION OF TREATMENT FOR NEPHROPATHY: ICD-10-PCS | Mod: HCNC,CPTII,S$GLB, | Performed by: NURSE PRACTITIONER

## 2023-12-28 PROCEDURE — 99999 PR PBB SHADOW E&M-EST. PATIENT-LVL IV: ICD-10-PCS | Mod: PBBFAC,HCNC,, | Performed by: NURSE PRACTITIONER

## 2023-12-28 PROCEDURE — 3288F FALL RISK ASSESSMENT DOCD: CPT | Mod: HCNC,CPTII,S$GLB, | Performed by: NURSE PRACTITIONER

## 2023-12-28 PROCEDURE — 3061F NEG MICROALBUMINURIA REV: CPT | Mod: HCNC,CPTII,S$GLB, | Performed by: NURSE PRACTITIONER

## 2023-12-28 PROCEDURE — 1159F PR MEDICATION LIST DOCUMENTED IN MEDICAL RECORD: ICD-10-PCS | Mod: HCNC,CPTII,S$GLB, | Performed by: NURSE PRACTITIONER

## 2023-12-28 PROCEDURE — 3061F PR NEG MICROALBUMINURIA RESULT DOCUMENTED/REVIEW: ICD-10-PCS | Mod: HCNC,CPTII,S$GLB, | Performed by: NURSE PRACTITIONER

## 2023-12-28 PROCEDURE — 99213 PR OFFICE/OUTPT VISIT, EST, LEVL III, 20-29 MIN: ICD-10-PCS | Mod: HCNC,S$GLB,, | Performed by: NURSE PRACTITIONER

## 2023-12-28 PROCEDURE — 3288F PR FALLS RISK ASSESSMENT DOCUMENTED: ICD-10-PCS | Mod: HCNC,CPTII,S$GLB, | Performed by: NURSE PRACTITIONER

## 2023-12-28 PROCEDURE — 3066F NEPHROPATHY DOC TX: CPT | Mod: HCNC,CPTII,S$GLB, | Performed by: NURSE PRACTITIONER

## 2023-12-28 PROCEDURE — 1159F MED LIST DOCD IN RCRD: CPT | Mod: HCNC,CPTII,S$GLB, | Performed by: NURSE PRACTITIONER

## 2023-12-28 PROCEDURE — 99999 PR PBB SHADOW E&M-EST. PATIENT-LVL IV: CPT | Mod: PBBFAC,HCNC,, | Performed by: NURSE PRACTITIONER

## 2023-12-28 PROCEDURE — 3075F SYST BP GE 130 - 139MM HG: CPT | Mod: HCNC,CPTII,S$GLB, | Performed by: NURSE PRACTITIONER

## 2023-12-28 PROCEDURE — 1101F PT FALLS ASSESS-DOCD LE1/YR: CPT | Mod: HCNC,CPTII,S$GLB, | Performed by: NURSE PRACTITIONER

## 2023-12-28 PROCEDURE — 3044F PR MOST RECENT HEMOGLOBIN A1C LEVEL <7.0%: ICD-10-PCS | Mod: HCNC,CPTII,S$GLB, | Performed by: NURSE PRACTITIONER

## 2023-12-28 PROCEDURE — 99213 OFFICE O/P EST LOW 20 MIN: CPT | Mod: HCNC,S$GLB,, | Performed by: NURSE PRACTITIONER

## 2023-12-28 PROCEDURE — 3078F DIAST BP <80 MM HG: CPT | Mod: HCNC,CPTII,S$GLB, | Performed by: NURSE PRACTITIONER

## 2023-12-28 PROCEDURE — 1160F PR REVIEW ALL MEDS BY PRESCRIBER/CLIN PHARMACIST DOCUMENTED: ICD-10-PCS | Mod: HCNC,CPTII,S$GLB, | Performed by: NURSE PRACTITIONER

## 2023-12-28 PROCEDURE — 3008F PR BODY MASS INDEX (BMI) DOCUMENTED: ICD-10-PCS | Mod: HCNC,CPTII,S$GLB, | Performed by: NURSE PRACTITIONER

## 2023-12-28 PROCEDURE — 3044F HG A1C LEVEL LT 7.0%: CPT | Mod: HCNC,CPTII,S$GLB, | Performed by: NURSE PRACTITIONER

## 2023-12-28 PROCEDURE — 3075F PR MOST RECENT SYSTOLIC BLOOD PRESS GE 130-139MM HG: ICD-10-PCS | Mod: HCNC,CPTII,S$GLB, | Performed by: NURSE PRACTITIONER

## 2023-12-28 PROCEDURE — 1160F RVW MEDS BY RX/DR IN RCRD: CPT | Mod: HCNC,CPTII,S$GLB, | Performed by: NURSE PRACTITIONER

## 2023-12-28 PROCEDURE — 3008F BODY MASS INDEX DOCD: CPT | Mod: HCNC,CPTII,S$GLB, | Performed by: NURSE PRACTITIONER

## 2023-12-28 PROCEDURE — 1101F PR PT FALLS ASSESS DOC 0-1 FALLS W/OUT INJ PAST YR: ICD-10-PCS | Mod: HCNC,CPTII,S$GLB, | Performed by: NURSE PRACTITIONER

## 2023-12-28 NOTE — ASSESSMENT & PLAN NOTE
Iron levels remain wnl. No anemia  List of iron rich foods from up to date previously given to patient    -encourage continued iron rich foods  -f/u 3 months with cbc, iron, ferritin  -Discussed S&S to report sooner

## 2023-12-28 NOTE — PROGRESS NOTES
Subjective:       Patient ID: Zakia Price is a 63 y.o. female.    Chief Complaint: review labs. Anemia    HPI: 63 y.o female presenting today for follow up of her ion deficiency anemia treated with Feraheme 5/2021. Most recently received Venofer weekly x 4 completed 7/31/2023. Prior EGD/Colonoscopy evaluation reviewed. EGD pathology H. Pylori positive, she completed treatment.. Repeat testing reflect eradication of H. Pylori infection. Colonoscopy unremarkable with recommended repeat in 10 years. VCE without S&S bleeding    EGD for evaluation of c/o N/V 9/2023 unremarkable.     Today she notes feeling well overall          Social History     Socioeconomic History    Marital status: Single   Tobacco Use    Smoking status: Never    Smokeless tobacco: Never   Substance and Sexual Activity    Alcohol use: No    Drug use: No    Sexual activity: Not Currently     Social Determinants of Health     Financial Resource Strain: Low Risk  (11/14/2023)    Overall Financial Resource Strain (CARDIA)     Difficulty of Paying Living Expenses: Not hard at all   Food Insecurity: Food Insecurity Present (11/14/2023)    Hunger Vital Sign     Worried About Running Out of Food in the Last Year: Never true     Ran Out of Food in the Last Year: Sometimes true   Transportation Needs: No Transportation Needs (11/14/2023)    PRAPARE - Transportation     Lack of Transportation (Medical): No     Lack of Transportation (Non-Medical): No   Physical Activity: Insufficiently Active (11/14/2023)    Exercise Vital Sign     Days of Exercise per Week: 3 days     Minutes of Exercise per Session: 20 min   Stress: No Stress Concern Present (11/14/2023)    [employer] - Occupational Stress Questionnaire     Feeling of Stress : Not at all   Social Connections: Unknown (11/14/2023)    Social Connection and Isolation Panel [NHANES]     Frequency of Communication with Friends and Family: More than three times a week      Frequency of Social Gatherings with Friends and Family: More than three times a week     Active Member of Clubs or Organizations: Yes     Attends Club or Organization Meetings: More than 4 times per year     Marital Status:    Housing Stability: Low Risk  (11/14/2023)    Housing Stability Vital Sign     Unable to Pay for Housing in the Last Year: No     Number of Places Lived in the Last Year: 1     Unstable Housing in the Last Year: No       Past Medical History:   Diagnosis Date    Acute respiratory failure due to COVID-19     COVID-19     Diabetes mellitus, type 2 1993    BS  didn't check 10/04/2023 Insulin x 2 years    Diabetic neuropathy 01/27/2014    DJD (degenerative joint disease) of knee     DVT (deep venous thrombosis) around 1990's    in leg, is on no anticoagulant therapy presently    Fatty liver     GERD (gastroesophageal reflux disease)     Hypertension associated with diabetes     HECTOR (iron deficiency anemia) 05/13/2021    Multinodular goiter     Obesity, morbid, BMI 50 or higher     Sleep apnea     has no CPAP       Family History   Problem Relation Age of Onset    Diabetes Mother     Hypertension Mother     Heart disease Mother 50    Cancer Father     Arthritis Father     Breast cancer Sister     Cancer Brother     Cancer Brother     Leukemia Son     Cancer Son        Past Surgical History:   Procedure Laterality Date    COLONOSCOPY N/A 5/12/2021    Procedure: COLONOSCOPY;  Surgeon: Carolina Rizo MD;  Location: [facility];  Service: Endoscopy;  Laterality: N/A;    EPIDURAL STEROID INJECTION N/A 12/10/2021    Procedure: Lumbar L5/S1 IL TEETEE  Would like AM procedure, if possible;  Surgeon: Nae Paul MD;  Location: [facility];  Service: Pain Management;  Laterality: N/A;    EPIDURAL STEROID INJECTION INTO CERVICAL SPINE N/A 10/8/2021    Procedure: C7-T1 IL TEETEE-no sedation.  Needs IV-just incase;  Surgeon: Nae Paul MD;  Location: [facility];  Service: Pain Management;   Laterality: N/A;    ESOPHAGOGASTRODUODENOSCOPY N/A 7/8/2021    Procedure: EGD (ESOPHAGOGASTRODUODENOSCOPY) previous positve covid;  Surgeon: Jann Gutierrez MD;  Location: [facility];  Service: Endoscopy;  Laterality: N/A;    ESOPHAGOGASTRODUODENOSCOPY N/A 9/18/2023    Procedure: EGD (ESOPHAGOGASTRODUODENOSCOPY);  Surgeon: Gm Leon MD;  Location: [facility];  Service: Endoscopy;  Laterality: N/A;    FRACTURE SURGERY      HYSTERECTOMY      INTRALUMINAL GASTROINTESTINAL TRACT IMAGING VIA CAPSULE N/A 10/24/2022    Procedure: IMAGING PROCEDURE, GI TRACT, INTRALUMINAL, VIA CAPSULE;  Surgeon: Hang Lunsford RN;  Location: [facility];  Service: Endoscopy;  Laterality: N/A;    SELECTIVE INJECTION OF ANESTHETIC AGENT AROUND LUMBAR SPINAL NERVE ROOT BY TRANSFORAMINAL APPROACH Bilateral 5/6/2022    Procedure: Bilateral L5/S1 TF TEETEE with RN IV sedation;  Surgeon: Nae Paul MD;  Location: [facility];  Service: Pain Management;  Laterality: Bilateral;    SELECTIVE INJECTION OF ANESTHETIC AGENT AROUND LUMBAR SPINAL NERVE ROOT BY TRANSFORAMINAL APPROACH Bilateral 12/30/2022    Procedure: Bilateral L5/S1 TF TEETEE RN IV Sedation;  Surgeon: Nae Paul MD;  Location: [facility];  Service: Pain Management;  Laterality: Bilateral;    SHOULDER ARTHROSCOPY      THYROIDECTOMY, PARTIAL Right     and transplatation of right superior parathyroid gland to the sternocleidomastoid muscle     TONSILLECTOMY      TUBAL LIGATION  1984    WRIST FRACTURE SURGERY      left       Review of Systems   Constitutional:  Negative for activity change, appetite change, chills, fatigue, fever and unexpected weight change.   HENT:  Negative for congestion, mouth sores, nosebleeds, sore throat, trouble swallowing and voice change.    Eyes:  Negative for visual disturbance.   Respiratory:  Negative for cough, chest tightness, shortness of breath and wheezing.    Cardiovascular:  Negative for chest pain and leg swelling.   Gastrointestinal:   Negative for abdominal distention, abdominal pain, anal bleeding, blood in stool, diarrhea, nausea and vomiting.   Genitourinary:  Negative for difficulty urinating, dysuria and hematuria.   Musculoskeletal:  Negative for arthralgias, back pain and myalgias.   Skin:  Negative for pallor, rash and wound.   Neurological:  Negative for dizziness, syncope, weakness and headaches.   Hematological:  Negative for adenopathy. Does not bruise/bleed easily.   Psychiatric/Behavioral:  The patient is not nervous/anxious.        Medication List with Changes/Refills   Current Medications    ALBUTEROL (PROVENTIL/VENTOLIN HFA) 90 MCG/ACTUATION INHALER        AZELASTINE (ASTELIN) 137 MCG (0.1 %) NASAL SPRAY    1 spray (137 mcg total) by Nasal route 2 (two) times daily.    DICLOFENAC (VOLTAREN) 75 MG EC TABLET    Take 1 tablet (75 mg total) by mouth 2 (two) times daily as needed (pain).    DILTIAZEM (CARDIZEM) 30 MG TABLET    TAKE 1 TABLET EVERY 12 HOURS    EPINEPHRINE (EPIPEN) 0.3 MG/0.3 ML ATIN    Inject into the muscle.    EZETIMIBE (ZETIA) 10 MG TABLET    Take 1 tablet (10 mg total) by mouth once daily.    FAMOTIDINE (PEPCID) 40 MG TABLET    Take 1 tablet (40 mg total) by mouth once daily.    FLUTICASONE PROPIONATE (FLONASE) 50 MCG/ACTUATION NASAL SPRAY    Use 2 sprays to each nostril daily    INHALATION SPACING DEVICE (BREATHERITE VALVED MDI CHAMBER)    Use as directed for inhalation.    LEVOTHYROXINE (EUTHYROX) 100 MCG TABLET    Take 1 tablet (100 mcg total) by mouth once daily.    MELOXICAM (MOBIC) 15 MG TABLET    Take 1 tablet (15 mg total) by mouth once daily.    METFORMIN (GLUCOPHAGE) 1000 MG TABLET    Take 1 tablet (1,000 mg total) by mouth daily with breakfast.    METHOCARBAMOL (ROBAXIN) 500 MG TAB    Take 1 tablet (500 mg total) by mouth 2 (two) times daily as needed (muscle spasms).    PREGABALIN (LYRICA) 75 MG CAPSULE    Take 1 capsule (75 mg total) by mouth 3 (three) times daily.    ROPINIROLE (REQUIP) 0.25 MG TABLET     Take 1 tablet (0.25 mg total) by mouth every evening.    SEMAGLUTIDE (OZEMPIC) 2 MG/DOSE (8 MG/3 ML) PNIJ    INJECT 2 MG INTO THE SKIN EVERY 7 DAYS.    SULFACETAMIDE SODIUM 10% (BLEPH-10) 10 % OPHTHALMIC SOLUTION    Place 2 drops into the left eye 4 (four) times daily.    TIZANIDINE (ZANAFLEX) 4 MG TABLET    Take 1/2 to 1 tablet by mouth twice daily as needed for muscle spasms. May cause drowsiness.    TOPIRAMATE (TOPAMAX) 100 MG TABLET    Take 1 tablet by mouth 2 (two) times daily.    TRELEGY ELLIPTA 100-62.5-25 MCG DSDV    Inhale 1 puff into the lungs once daily.     Objective:     Vitals:    12/28/23 1046   BP: 131/65   Pulse: (!) 56   Temp: 97.3 °F (36.3 °C)       Lab Results   Component Value Date    WBC 4.14 12/26/2023    WBC 4.14 12/26/2023    HGB 12.2 12/26/2023    HGB 12.2 12/26/2023    HCT 38.6 12/26/2023    HCT 38.6 12/26/2023    MCV 94 12/26/2023    MCV 94 12/26/2023     12/26/2023     12/26/2023       BMP  Lab Results   Component Value Date     12/26/2023    K 3.8 12/26/2023     12/26/2023    CO2 22 (L) 12/26/2023    BUN 11 12/26/2023    CREATININE 0.8 12/26/2023    CALCIUM 9.0 12/26/2023    ANIONGAP 12 12/26/2023    ESTGFRAFRICA >60.0 02/24/2022    EGFRNONAA >60.0 02/24/2022     Lab Results   Component Value Date    ALT 10 12/26/2023    AST 13 12/26/2023    ALKPHOS 72 12/26/2023    BILITOT 0.3 12/26/2023     Lab Results   Component Value Date    IRON 105 12/26/2023    TIBC 321 12/26/2023    FERRITIN 121 12/26/2023       Physical Exam  HENT:      Nose: Nose normal.   Eyes:      Conjunctiva/sclera: Conjunctivae normal.   Pulmonary:      Effort: Pulmonary effort is normal. No respiratory distress.   Musculoskeletal:      Cervical back: Normal range of motion.   Skin:     General: Skin is warm and dry.   Neurological:      Mental Status: She is alert and oriented to person, place, and time.        Assessment:     Problem List Items Addressed This Visit          Oncology    Iron  deficiency anemia due to chronic blood loss - Primary     Iron levels remain wnl. No anemia  List of iron rich foods from up to date previously given to patient    -encourage continued iron rich foods  -f/u 3 months with cbc, iron, ferritin  -Discussed S&S to report sooner         Relevant Orders    CBC Auto Differential    Iron and TIBC    Ferritin       Endocrine    Class 3 obesity with alveolar hypoventilation, serious comorbidity, and body mass index (BMI) of 40.0 to 44.9 in adult     Encourage healthy nutrition along with portion control. Encourage daily exercise               Med Onc Chart Routing      Follow up with physician    Follow up with KIMBER 4 months.   Infusion scheduling note    Injection scheduling note    Labs CBC, ferritin and iron and TIBC   Scheduling:  Preferred lab:  Lab interval:     Imaging None      Pharmacy appointment No pharmacy appointment needed      Other referrals       No additional referrals needed         Plan:     Iron deficiency anemia due to chronic blood loss  -     CBC Auto Differential; Future; Expected date: 12/28/2023  -     Iron and TIBC; Future; Expected date: 12/28/2023  -     Ferritin; Future; Expected date: 12/28/2023    Class 3 obesity with alveolar hypoventilation, serious comorbidity, and body mass index (BMI) of 40.0 to 44.9 in adult              KARO Arroyo

## 2024-01-01 NOTE — ASSESSMENT & PLAN NOTE
Referral to orthopedic hand for further evaluation treatment.  Start Lodine.   - may have an elongated or misshapen head.  The head is shaped according to the birth canal for easier birth.  This is called molding of the head and will round out in a few days.

## 2024-01-02 NOTE — PROGRESS NOTES
Make follow-up lab appointment per recommendation below.  Check to see if patient has seen the results through my chart.  If not then,  #CALL THE PATIENT# to discuss results/see if they have questions and document verification of contact. Make F/U appt if needed. [phone]    #My interpretation that was sent to them through Results Scorecard:  Zakia, I have reviewed your recent blood work.     Your complete blood count is stable.    Your metabolic panel which shows your glucose, kidney function, electrolytes, and liver function is stable.   Thyroid study is normal.   Your cholesterol is elevated from previous.  Please follow-up to discuss changes in cholesterol from previous.  Urine microalbumin test is normal.  Your hemoglobin A1c is normal.  This test is gold standard screening test for diabetes.  It is a measures 3 months of your average blood sugar.  =========================  Also please address any outstanding health maintenance that may be due: High Dose Statin Never done  RSV Vaccine (Age 60+ and Pregnant patients)(1 - 1-dose 60+ series) Never done  Shingles Vaccine(2 of 2) due on 08/11/2022  Influenza Vaccine(1) due on 09/01/2023  COVID-19 Vaccine(6 - 2023-24 season) due on 09/01/2023  Mammogram due on 01/09/2024

## 2024-01-04 ENCOUNTER — TELEPHONE (OUTPATIENT)
Dept: FAMILY MEDICINE | Facility: CLINIC | Age: 64
End: 2024-01-04
Payer: MEDICARE

## 2024-01-04 DIAGNOSIS — Z12.31 SCREENING MAMMOGRAM FOR HIGH-RISK PATIENT: Primary | ICD-10-CM

## 2024-01-04 NOTE — TELEPHONE ENCOUNTER
----- Message from Amy Sosa sent at 1/4/2024  1:11 PM CST -----  Regarding: Mammo Orders  Contact: Zakia  .Type:  Mammogram    Caller is requesting to schedule their annual mammogram appointment.  Order is not listed in EPIC.  Please enter order and contact patient to schedule.  Name of Caller: Zakia  Where would they like the mammogram performed? [facility]   Would the patient rather a call back or a response via [facility]? call  Best Call Back Number: [phone] (home)    Additional Information: Zakia received her letter to schedule her mammo and need orders please.

## 2024-01-06 DIAGNOSIS — E11.65 TYPE 2 DIABETES MELLITUS WITH HYPERGLYCEMIA, WITHOUT LONG-TERM CURRENT USE OF INSULIN: Chronic | ICD-10-CM

## 2024-01-06 DIAGNOSIS — M25.551 HIP PAIN, RIGHT: ICD-10-CM

## 2024-01-08 DIAGNOSIS — R01.1 SYSTOLIC MURMUR: ICD-10-CM

## 2024-01-08 DIAGNOSIS — E11.59 HYPERTENSION ASSOCIATED WITH DIABETES: Chronic | ICD-10-CM

## 2024-01-08 DIAGNOSIS — E78.2 COMBINED HYPERLIPIDEMIA ASSOCIATED WITH TYPE 2 DIABETES MELLITUS: ICD-10-CM

## 2024-01-08 DIAGNOSIS — I15.2 HYPERTENSION ASSOCIATED WITH DIABETES: Chronic | ICD-10-CM

## 2024-01-08 DIAGNOSIS — I73.9 CLAUDICATION OF BOTH LOWER EXTREMITIES: ICD-10-CM

## 2024-01-08 DIAGNOSIS — E11.69 COMBINED HYPERLIPIDEMIA ASSOCIATED WITH TYPE 2 DIABETES MELLITUS: ICD-10-CM

## 2024-01-08 DIAGNOSIS — R06.09 DOE (DYSPNEA ON EXERTION): ICD-10-CM

## 2024-01-08 DIAGNOSIS — E11.65 TYPE 2 DIABETES MELLITUS WITH HYPERGLYCEMIA, WITHOUT LONG-TERM CURRENT USE OF INSULIN: Chronic | ICD-10-CM

## 2024-01-08 DIAGNOSIS — E11.42 DIABETIC POLYNEUROPATHY ASSOCIATED WITH TYPE 2 DIABETES MELLITUS: ICD-10-CM

## 2024-01-08 DIAGNOSIS — E78.5 HYPERLIPIDEMIA, UNSPECIFIED HYPERLIPIDEMIA TYPE: ICD-10-CM

## 2024-01-08 DIAGNOSIS — R55 SYNCOPE, UNSPECIFIED SYNCOPE TYPE: ICD-10-CM

## 2024-01-08 DIAGNOSIS — R00.0 TACHYCARDIA: ICD-10-CM

## 2024-01-08 RX ORDER — SEMAGLUTIDE 2.68 MG/ML
2 INJECTION, SOLUTION SUBCUTANEOUS
Qty: 9 EACH | Refills: 3 | OUTPATIENT
Start: 2024-01-08 | End: 2025-05-26

## 2024-01-08 RX ORDER — DILTIAZEM HYDROCHLORIDE 30 MG/1
30 TABLET, FILM COATED ORAL EVERY 12 HOURS
Qty: 180 TABLET | Refills: 3 | Status: SHIPPED | OUTPATIENT
Start: 2024-01-08

## 2024-01-08 RX ORDER — TIZANIDINE 4 MG/1
TABLET ORAL
Qty: 180 TABLET | Refills: 0 | Status: SHIPPED | OUTPATIENT
Start: 2024-01-08

## 2024-01-08 RX ORDER — SEMAGLUTIDE 2.68 MG/ML
2 INJECTION, SOLUTION SUBCUTANEOUS
Qty: 3 EACH | Refills: 1 | Status: SHIPPED | OUTPATIENT
Start: 2024-01-08 | End: 2025-05-26

## 2024-01-08 NOTE — TELEPHONE ENCOUNTER
No care due was identified.  [facility] Embedded Care Due Messages. Reference number: [id number].   1/08/2024 10:24:46 AM CST  
Refill Routing Note   Medication(s) are not appropriate for processing by [facility] for the following reason(s):        Outside of protocol    [facility] action(s):  Route  Approve               Appointments  past 12m or future 3m with PCP    Date Provider   Last Visit   11/29/2023 Oleksandr Nagel MD   Next Visit   1/8/2024 Oleksandr Nagel MD   ED visits in past 90 days: 0        Note composed:10:49 AM 01/08/2024          
Gi Morales

## 2024-01-08 NOTE — TELEPHONE ENCOUNTER
No care due was identified.  [facility] Embedded Care Due Messages. Reference number: [id number].   1/08/2024 8:18:06 AM CST

## 2024-01-08 NOTE — TELEPHONE ENCOUNTER
Refill Decision Note   Zakia Price  is requesting a refill authorization.  Brief Assessment and Rationale for Refill:  Quick Discontinue  Approve     Medication Therapy Plan:        Comments:     Note composed:10:51 AM 01/08/2024

## 2024-01-12 ENCOUNTER — HOSPITAL ENCOUNTER (OUTPATIENT)
Dept: RADIOLOGY | Facility: HOSPITAL | Age: 64
Discharge: HOME | End: 2024-01-12
Attending: FAMILY MEDICINE
Payer: MEDICARE

## 2024-01-12 DIAGNOSIS — Z12.31 SCREENING MAMMOGRAM FOR HIGH-RISK PATIENT: ICD-10-CM

## 2024-01-12 PROCEDURE — 77067 SCR MAMMO BI INCL CAD: CPT | Mod: 26,HCNC,, | Performed by: RADIOLOGY

## 2024-01-12 PROCEDURE — 77067 SCR MAMMO BI INCL CAD: CPT | Mod: TC,HCNC,PO

## 2024-01-16 NOTE — PROGRESS NOTES
Negative mammogram, repeat in 1 year, result released through [employer].  Please call the patient if not enrolled with my chart. [phone]   High Dose Statin Never done  RSV Vaccine (Age 60+ and Pregnant patients)(1 - 1-dose 60+ series) Never done  Shingles Vaccine(2 of 2) due on 08/11/2022  Influenza Vaccine(1) due on 09/01/2023  COVID-19 Vaccine(6 - 2023-24 season) due on 09/01/2023

## 2024-01-23 ENCOUNTER — TELEPHONE (OUTPATIENT)
Dept: FAMILY MEDICINE | Facility: CLINIC | Age: 64
End: 2024-01-23
Payer: MEDICARE

## 2024-01-23 NOTE — TELEPHONE ENCOUNTER
----- Message from Joel Duncan sent at 1/23/2024  2:17 PM CST -----  Contact: Zakia Roberto received a letter requesting that she schedule an appt but there is no provider info on the letter. She is needing to know if Dr. Nagel is requesting that she makes an appt. Please call patient at .[phone].

## 2024-02-15 ENCOUNTER — TELEPHONE (OUTPATIENT)
Dept: FAMILY MEDICINE | Facility: CLINIC | Age: 64
End: 2024-02-15
Payer: MEDICARE

## 2024-02-15 NOTE — TELEPHONE ENCOUNTER
----- Message from Joy Mauricio sent at 2/15/2024 11:01 AM CST -----  Contact: Zakia Koehler is needing a call back in regards to her medication and having some ear pain on the right side. Please give her a call back at [phone]

## 2024-02-19 ENCOUNTER — OFFICE VISIT (OUTPATIENT)
Dept: FAMILY MEDICINE | Facility: CLINIC | Age: 64
End: 2024-02-19
Payer: MEDICARE

## 2024-02-19 VITALS
HEART RATE: 78 BPM | SYSTOLIC BLOOD PRESSURE: 120 MMHG | HEIGHT: 68 IN | BODY MASS INDEX: 44.41 KG/M2 | WEIGHT: 293 LBS | DIASTOLIC BLOOD PRESSURE: 73 MMHG | OXYGEN SATURATION: 95 %

## 2024-02-19 DIAGNOSIS — B96.89 ACUTE BACTERIAL SINUSITIS: Primary | ICD-10-CM

## 2024-02-19 DIAGNOSIS — J01.90 ACUTE BACTERIAL SINUSITIS: Primary | ICD-10-CM

## 2024-02-19 PROCEDURE — 1160F RVW MEDS BY RX/DR IN RCRD: CPT | Mod: HCNC,CPTII,S$GLB, | Performed by: NURSE PRACTITIONER

## 2024-02-19 PROCEDURE — 99213 OFFICE O/P EST LOW 20 MIN: CPT | Mod: HCNC,S$GLB,, | Performed by: NURSE PRACTITIONER

## 2024-02-19 PROCEDURE — 3078F DIAST BP <80 MM HG: CPT | Mod: HCNC,CPTII,S$GLB, | Performed by: NURSE PRACTITIONER

## 2024-02-19 PROCEDURE — 3074F SYST BP LT 130 MM HG: CPT | Mod: HCNC,CPTII,S$GLB, | Performed by: NURSE PRACTITIONER

## 2024-02-19 PROCEDURE — 3008F BODY MASS INDEX DOCD: CPT | Mod: HCNC,CPTII,S$GLB, | Performed by: NURSE PRACTITIONER

## 2024-02-19 PROCEDURE — 3288F FALL RISK ASSESSMENT DOCD: CPT | Mod: HCNC,CPTII,S$GLB, | Performed by: NURSE PRACTITIONER

## 2024-02-19 PROCEDURE — 3072F LOW RISK FOR RETINOPATHY: CPT | Mod: HCNC,CPTII,S$GLB, | Performed by: NURSE PRACTITIONER

## 2024-02-19 PROCEDURE — 1159F MED LIST DOCD IN RCRD: CPT | Mod: HCNC,CPTII,S$GLB, | Performed by: NURSE PRACTITIONER

## 2024-02-19 PROCEDURE — 1101F PT FALLS ASSESS-DOCD LE1/YR: CPT | Mod: HCNC,CPTII,S$GLB, | Performed by: NURSE PRACTITIONER

## 2024-02-19 PROCEDURE — 99999 PR PBB SHADOW E&M-EST. PATIENT-LVL IV: CPT | Mod: PBBFAC,HCNC,, | Performed by: NURSE PRACTITIONER

## 2024-02-19 RX ORDER — TIZANIDINE 2 MG/1
4 TABLET ORAL EVERY 8 HOURS PRN
Qty: 30 TABLET | Refills: 0 | Status: SHIPPED | OUTPATIENT
Start: 2024-02-19 | End: 2024-02-19 | Stop reason: SDUPTHER

## 2024-02-19 RX ORDER — HYDROCODONE BITARTRATE AND ACETAMINOPHEN 5; 325 MG/1; MG/1
1 TABLET ORAL EVERY 6 HOURS PRN
COMMUNITY
Start: 2024-01-27

## 2024-02-19 RX ORDER — AZITHROMYCIN 250 MG/1
TABLET, FILM COATED ORAL
Qty: 6 TABLET | Refills: 0 | Status: SHIPPED | OUTPATIENT
Start: 2024-02-19 | End: 2024-02-24

## 2024-02-19 RX ORDER — METHYLPREDNISOLONE 4 MG/1
TABLET ORAL
Qty: 21 TABLET | Refills: 0 | Status: SHIPPED | OUTPATIENT
Start: 2024-02-19 | End: 2024-05-29

## 2024-02-19 NOTE — PROGRESS NOTES
Assessment/Plan:  Problem List Items Addressed This Visit    None  Visit Diagnoses       Acute bacterial sinusitis    -  Primary    Relevant Medications    azithromycin (Z-ANA) 250 MG tablet    methylPREDNISolone (MEDROL DOSEPACK) 4 mg tablet          Start steroids and ABX as prescribed  Continue Zyrtec and Flonase for maintenance   Follow up with ENT/allergy if worsening or no improvement  Supportive care- rest, increase hydration with water, OTC Tylenol/Ibuprofen for pain/fever.  Follow up if symptoms worsen or fail to improve.  ER precautions for severe or worsening symptoms.     Rose Price NP  _____________________________________________________________________________________________________________________________________________________    CC: ear pain, sinus      HPI: Patient is a 64-year-old female who presents in clinic today as an established patient here for sinus problem. This is a new problem. The current episode started x1 week ago. The problem is gradually worsening. There has been no fever. Associated symptoms include congestion, postnasal drip, cough (productive), ear pain, and sinus pressure. Pertinent negatives include no chills, diaphoresis, headaches, hoarse voice, neck pain, shortness of breath, sneezing, sore throat or swollen glands. Past treatments include zyrtec, claritin, flonase. The treatment provided no relief. She follows with ENT and allergy. She has had no known sick contacts.  She denies recent steroids or ABX. She is not diabetic.     Lab Results   Component Value Date    HGBA1C 4.8 12/26/2023     Past Medical History:  Past Medical History:   Diagnosis Date    Acute respiratory failure due to COVID-19     COVID-19     Diabetes mellitus, type 2 1993    BS  didn't check 10/04/2023 Insulin x 2 years    Diabetic neuropathy 01/27/2014    DJD (degenerative joint disease) of knee     DVT (deep venous thrombosis) around 1990's    in leg, is on no anticoagulant therapy presently     Fatty liver     GERD (gastroesophageal reflux disease)     Hypertension associated with diabetes     HECTOR (iron deficiency anemia) 05/13/2021    Multinodular goiter     Obesity, morbid, BMI 50 or higher     Sleep apnea     has no CPAP     Past Surgical History:   Procedure Laterality Date    COLONOSCOPY N/A 5/12/2021    Procedure: COLONOSCOPY;  Surgeon: Carolina Rizo MD;  Location: [facility];  Service: Endoscopy;  Laterality: N/A;    EPIDURAL STEROID INJECTION N/A 12/10/2021    Procedure: Lumbar L5/S1 IL TEETEE  Would like AM procedure, if possible;  Surgeon: Nae Paul MD;  Location: [facility] PAIN [facility]GT;  Service: Pain Management;  Laterality: N/A;    EPIDURAL STEROID INJECTION INTO CERVICAL SPINE N/A 10/8/2021    Procedure: C7-T1 IL TEETEE-no sedation.  Needs IV-just incase;  Surgeon: Nae Paul MD;  Location: [facility] MGT;  Service: Pain Management;  Laterality: N/A;    ESOPHAGOGASTRODUODENOSCOPY N/A 7/8/2021    Procedure: EGD (ESOPHAGOGASTRODUODENOSCOPY) previous positve covid;  Surgeon: Jann Gutierrez MD;  Location: [facility];  Service: Endoscopy;  Laterality: N/A;    ESOPHAGOGASTRODUODENOSCOPY N/A 9/18/2023    Procedure: EGD (ESOPHAGOGASTRODUODENOSCOPY);  Surgeon: Gm Leon MD;  Location: [facility];  Service: Endoscopy;  Laterality: N/A;    FRACTURE SURGERY      HYSTERECTOMY      INTRALUMINAL GASTROINTESTINAL TRACT IMAGING VIA CAPSULE N/A 10/24/2022    Procedure: IMAGING PROCEDURE, GI TRACT, INTRALUMINAL, VIA CAPSULE;  Surgeon: Hang Lunsford RN;  Location: [facility];  Service: Endoscopy;  Laterality: N/A;    SELECTIVE INJECTION OF ANESTHETIC AGENT AROUND LUMBAR SPINAL NERVE ROOT BY TRANSFORAMINAL APPROACH Bilateral 5/6/2022    Procedure: Bilateral L5/S1 TF TEETEE with RN IV sedation;  Surgeon: Nae Paul MD;  Location: [facility]GT;  Service: Pain Management;  Laterality: Bilateral;    SELECTIVE INJECTION OF ANESTHETIC AGENT AROUND LUMBAR SPINAL NERVE ROOT BY TRANSFORAMINAL APPROACH Bilateral  12/30/2022    Procedure: Bilateral L5/S1 TF TEETEE RN IV Sedation;  Surgeon: Nae Paul MD;  Location: [facility] PAIN [facility]GT;  Service: Pain Management;  Laterality: Bilateral;    SHOULDER ARTHROSCOPY      THYROIDECTOMY, PARTIAL Right     and transplatation of right superior parathyroid gland to the sternocleidomastoid muscle     TONSILLECTOMY      TUBAL LIGATION  1984    WRIST FRACTURE SURGERY      left     Review of patient's allergies indicates:   Allergen Reactions    Codeine sulfate      Nausea^    Lisinopril Swelling     angioedema    Codeine Nausea Only and Rash     Social History     Tobacco Use    Smoking status: Never    Smokeless tobacco: Never   Substance Use Topics    Alcohol use: No    Drug use: No     Family History   Problem Relation Age of Onset    Diabetes Mother     Hypertension Mother     Heart disease Mother 50    Cancer Father     Arthritis Father     Breast cancer Sister     Cancer Brother     Cancer Brother     Leukemia Son     Cancer Son      Current Outpatient Medications on File Prior to Visit   Medication Sig Dispense Refill    albuterol (PROVENTIL/VENTOLIN HFA) 90 mcg/actuation inhaler       azelastine (ASTELIN) 137 mcg (0.1 %) nasal spray 1 spray (137 mcg total) by Nasal route 2 (two) times daily. 30 mL 0    diclofenac (VOLTAREN) 75 MG EC tablet Take 1 tablet (75 mg total) by mouth 2 (two) times daily as needed (pain). 30 tablet 0    diltiaZEM (CARDIZEM) 30 MG tablet TAKE 1 TABLET EVERY 12 HOURS 180 tablet 3    EPINEPHrine (EPIPEN) 0.3 mg/0.3 mL AtIn Inject into the muscle.      ezetimibe (ZETIA) 10 mg tablet Take 1 tablet (10 mg total) by mouth once daily. 90 tablet 3    famotidine (PEPCID) 40 MG tablet Take 1 tablet (40 mg total) by mouth once daily. 30 tablet 11    fluticasone propionate (FLONASE) 50 mcg/actuation nasal spray Use 2 sprays to each nostril daily 16 g 12    HYDROcodone-acetaminophen (NORCO) 5-325 mg per tablet Take 1 tablet by mouth every 6 (six) hours as needed.       inhalation spacing device (BREATHERITE VALVED MDI CHAMBER) Use as directed for inhalation. 1 Device prn    levothyroxine (EUTHYROX) 100 MCG tablet Take 1 tablet (100 mcg total) by mouth once daily. 90 tablet 4    meloxicam (MOBIC) 15 MG tablet Take 1 tablet (15 mg total) by mouth once daily. 30 tablet 5    methocarbamoL (ROBAXIN) 500 MG Tab Take 1 tablet (500 mg total) by mouth 2 (two) times daily as needed (muscle spasms). 180 tablet 2    pregabalin (LYRICA) 75 MG capsule Take 1 capsule (75 mg total) by mouth 3 (three) times daily. 90 capsule 3    rOPINIRole (REQUIP) 0.25 MG tablet Take 1 tablet (0.25 mg total) by mouth every evening. 90 tablet 4    semaglutide (OZEMPIC) 2 mg/dose (8 mg/3 mL) PnIj Inject 2 mg into the skin every 7 days. 3 each 1    sulfacetamide sodium 10% (BLEPH-10) 10 % ophthalmic solution Place 2 drops into the left eye 4 (four) times daily. 5 mL 0    tiZANidine (ZANAFLEX) 4 MG tablet Take 1/2 to 1 tablet by mouth twice daily as needed for muscle spasms. May cause drowsiness. 180 tablet 0    topiramate (TOPAMAX) 100 MG tablet Take 1 tablet by mouth 2 (two) times daily.      TRELEGY ELLIPTA 100-62.5-25 mcg DsDv Inhale 1 puff into the lungs once daily.       No current facility-administered medications on file prior to visit.     Review of Systems   Constitutional:  Negative for appetite change, chills, fatigue and fever.   HENT:  Positive for congestion, ear pain, postnasal drip and sinus pressure. Negative for rhinorrhea and sore throat.    Eyes:  Negative for visual disturbance.   Respiratory:  Positive for cough. Negative for shortness of breath.    Cardiovascular:  Negative for chest pain, palpitations and leg swelling.   Gastrointestinal:  Negative for abdominal pain, diarrhea and vomiting.   Genitourinary:  Negative for difficulty urinating, dysuria and hematuria.   Musculoskeletal:  Negative for arthralgias and myalgias.   Skin:  Negative for rash and wound.   Neurological:  Negative for  "dizziness and headaches.   Psychiatric/Behavioral:  Negative for behavioral problems. The patient is not nervous/anxious.      Vitals:    02/19/24 1457   BP: 120/73   Pulse: 78   SpO2: 95%   Weight: (!) 139.2 kg (306 lb 12.8 oz)   Height: 5' 8" (1.727 m)     Wt Readings from Last 3 Encounters:   02/19/24 (!) 139.2 kg (306 lb 12.8 oz)   12/28/23 132.2 kg (291 lb 5.4 oz)   11/27/23 128.8 kg (284 lb)     Physical Exam  Vitals reviewed.   Constitutional:       General: She is not in acute distress.     Appearance: Normal appearance. She is not ill-appearing.   HENT:      Head: Normocephalic and atraumatic.      Right Ear: External ear normal. A middle ear effusion is present.      Left Ear: External ear normal. A middle ear effusion is present.      Nose: Mucosal edema and congestion present.      Mouth/Throat:      Mouth: Mucous membranes are moist.      Pharynx: No posterior oropharyngeal erythema.   Eyes:      Extraocular Movements: Extraocular movements intact.      Conjunctiva/sclera: Conjunctivae normal.   Cardiovascular:      Rate and Rhythm: Normal rate.      Heart sounds: Normal heart sounds.   Pulmonary:      Effort: Pulmonary effort is normal. No respiratory distress.      Breath sounds: Normal breath sounds.   Abdominal:      General: Abdomen is flat. There is no distension.   Musculoskeletal:         General: Normal range of motion.      Cervical back: Normal range of motion and neck supple.   Lymphadenopathy:      Cervical: No cervical adenopathy.   Skin:     General: Skin is warm and dry.      Capillary Refill: Capillary refill takes less than 2 seconds.      Coloration: Skin is not pale.      Findings: No rash.   Neurological:      General: No focal deficit present.      Mental Status: She is alert and oriented to person, place, and time. Mental status is at baseline.   Psychiatric:         Mood and Affect: Mood normal.         Speech: Speech normal. Speech is not rapid and pressured, delayed or slurred. "         Behavior: Behavior normal. Behavior is not agitated, slowed, aggressive, withdrawn, hyperactive or combative. Behavior is cooperative.         Thought Content: Thought content normal.         Judgment: Judgment normal.       Health Maintenance   Topic Date Due    Shingles Vaccine (2 of 2) 08/11/2022    Hemoglobin A1c  06/26/2024    Foot Exam  07/18/2024    Eye Exam  10/05/2024    Lipid Panel  12/26/2024    Mammogram  01/12/2025    DEXA Scan  04/27/2025    TETANUS VACCINE  10/29/2028    Colorectal Cancer Screening  05/12/2031    Hepatitis C Screening  Completed

## 2024-02-27 ENCOUNTER — OFFICE VISIT (OUTPATIENT)
Dept: SPORTS MEDICINE | Facility: CLINIC | Age: 64
End: 2024-02-27
Payer: MEDICARE

## 2024-02-27 VITALS — HEIGHT: 68 IN | RESPIRATION RATE: 17 BRPM | WEIGHT: 293 LBS | BODY MASS INDEX: 44.41 KG/M2

## 2024-02-27 DIAGNOSIS — M25.561 CHRONIC PAIN OF BOTH KNEES: ICD-10-CM

## 2024-02-27 DIAGNOSIS — G89.29 CHRONIC PAIN OF BOTH KNEES: ICD-10-CM

## 2024-02-27 DIAGNOSIS — M25.562 CHRONIC PAIN OF BOTH KNEES: ICD-10-CM

## 2024-02-27 DIAGNOSIS — M12.812 ROTATOR CUFF ARTHROPATHY OF LEFT SHOULDER: Primary | ICD-10-CM

## 2024-02-27 DIAGNOSIS — M17.12 PRIMARY OSTEOARTHRITIS OF LEFT KNEE: ICD-10-CM

## 2024-02-27 PROCEDURE — 3072F LOW RISK FOR RETINOPATHY: CPT | Mod: HCNC,CPTII,S$GLB, | Performed by: PHYSICIAN ASSISTANT

## 2024-02-27 PROCEDURE — 99214 OFFICE O/P EST MOD 30 MIN: CPT | Mod: HCNC,25,S$GLB, | Performed by: PHYSICIAN ASSISTANT

## 2024-02-27 PROCEDURE — 3008F BODY MASS INDEX DOCD: CPT | Mod: HCNC,CPTII,S$GLB, | Performed by: PHYSICIAN ASSISTANT

## 2024-02-27 PROCEDURE — 3288F FALL RISK ASSESSMENT DOCD: CPT | Mod: HCNC,CPTII,S$GLB, | Performed by: PHYSICIAN ASSISTANT

## 2024-02-27 PROCEDURE — 20610 DRAIN/INJ JOINT/BURSA W/O US: CPT | Mod: HCNC,LT,S$GLB, | Performed by: PHYSICIAN ASSISTANT

## 2024-02-27 PROCEDURE — 1160F RVW MEDS BY RX/DR IN RCRD: CPT | Mod: HCNC,CPTII,S$GLB, | Performed by: PHYSICIAN ASSISTANT

## 2024-02-27 PROCEDURE — 99999 PR PBB SHADOW E&M-EST. PATIENT-LVL IV: CPT | Mod: PBBFAC,HCNC,, | Performed by: PHYSICIAN ASSISTANT

## 2024-02-27 PROCEDURE — 1125F AMNT PAIN NOTED PAIN PRSNT: CPT | Mod: HCNC,CPTII,S$GLB, | Performed by: PHYSICIAN ASSISTANT

## 2024-02-27 PROCEDURE — 1100F PTFALLS ASSESS-DOCD GE2>/YR: CPT | Mod: HCNC,CPTII,S$GLB, | Performed by: PHYSICIAN ASSISTANT

## 2024-02-27 PROCEDURE — 1159F MED LIST DOCD IN RCRD: CPT | Mod: HCNC,CPTII,S$GLB, | Performed by: PHYSICIAN ASSISTANT

## 2024-02-27 RX ORDER — TRIAMCINOLONE ACETONIDE 40 MG/ML
40 INJECTION, SUSPENSION INTRA-ARTICULAR; INTRAMUSCULAR
Status: DISCONTINUED | OUTPATIENT
Start: 2024-02-27 | End: 2024-02-27 | Stop reason: HOSPADM

## 2024-02-27 RX ADMIN — TRIAMCINOLONE ACETONIDE 40 MG: 40 INJECTION, SUSPENSION INTRA-ARTICULAR; INTRAMUSCULAR at 08:02

## 2024-02-27 NOTE — PROCEDURES
Large Joint Aspiration/Injection: L subacromial bursa    Date/Time: 2/27/2024 8:30 AM    Performed by: Rosemary Alonso PA-C  Authorized by: Rosemary Alonso PA-C    Consent Done?:  Yes (Verbal)  Indications:  Pain  Site marked: the procedure site was marked    Timeout: prior to procedure the correct patient, procedure, and site was verified    Prep: patient was prepped and draped in usual sterile fashion      Local anesthesia used?: Yes    Anesthesia:  Local infiltration  Local anesthetic:  Lidocaine 1% without epinephrine    Details:  Needle Size:  22 G  Ultrasonic Guidance for needle placement?: No    Approach:  Posterior  Location:  Shoulder  Site:  L subacromial bursa  Medications:  40 mg triamcinolone acetonide 40 mg/mL  Patient tolerance:  Patient tolerated the procedure well with no immediate complications

## 2024-02-27 NOTE — PROGRESS NOTES
Orthopaedic Follow-Up Visit    Last Appointment:  11/27/2023  Diagnosis:  Rotator cuff tear arthropathy of left shoulder  Prior Procedure:  Left shoulder CSI, home exercise program    Zakia Price is a 64 y.o. female who is here for f/u evaluation of left shoulder pain. The patient was last seen here by me on 11/27/2023 at which point we decided to try a left shoulder corticosteroid injection prior to considering further treatment options. The patient returns today reporting that the symptoms initially improved with the previously administered corticosteroid injection but have since returned about 4 weeks ago and is interested in discussing further treatment options.     Of note, she is also complaining of left knee pain, this is chronic in nature. She has a history of arthritis in both of her knees. Old x-rays in the chart confirm this diagnosis.    To review her history, Zakia Price is a 63 y.o. right and left-hand dominant female who initially presented to clinic on 11/27/2023 for left shoulder pain that has been present for several years but has been becoming more frequent at that time.  She had no acute injury or trauma; however, she works as a  for several years and she attributes a lot of her shoulder pain that this.  Her symptoms include anterior shoulder pain, limited range of motion, night pain, morning stiffness.  Her pain is made worse by overhead movements and repetitive movements.  Her treatment has included rest, activity modification, oral anti-inflammatories, Tylenol, home exercises, corticosteroid injection.    Patient's medications, allergies, past medical, surgical, social and family histories were reviewed and updated as appropriate.    Review of Systems   All systems reviewed were negative.  Specifically, the patient denies fever, chills, weight loss, chest pain, shortness of breath, or dyspnea on exertion.      Past Medical History:   Diagnosis Date    Acute  respiratory failure due to COVID-19     COVID-19     Diabetes mellitus, type 2 1993    BS  didn't check 10/04/2023 Insulin x 2 years    Diabetic neuropathy 01/27/2014    DJD (degenerative joint disease) of knee     DVT (deep venous thrombosis) around 1990's    in leg, is on no anticoagulant therapy presently    Fatty liver     GERD (gastroesophageal reflux disease)     Hypertension associated with diabetes     HECTOR (iron deficiency anemia) 05/13/2021    Multinodular goiter     Obesity, morbid, BMI 50 or higher     Sleep apnea     has no CPAP       Past Surgical History:   Procedure Laterality Date    COLONOSCOPY N/A 5/12/2021    Procedure: COLONOSCOPY;  Surgeon: Carolina Rizo MD;  Location: [facility];  Service: Endoscopy;  Laterality: N/A;    EPIDURAL STEROID INJECTION N/A 12/10/2021    Procedure: Lumbar L5/S1 IL TEETEE  Would like AM procedure, if possible;  Surgeon: Nae Paul MD;  Location: [facility];  Service: Pain Management;  Laterality: N/A;    EPIDURAL STEROID INJECTION INTO CERVICAL SPINE N/A 10/8/2021    Procedure: C7-T1 IL TEETEE-no sedation.  Needs IV-just incase;  Surgeon: Nae Paul MD;  Location: [facility];  Service: Pain Management;  Laterality: N/A;    ESOPHAGOGASTRODUODENOSCOPY N/A 7/8/2021    Procedure: EGD (ESOPHAGOGASTRODUODENOSCOPY) previous positve covid;  Surgeon: Jann Gutierrez MD;  Location: [facility];  Service: Endoscopy;  Laterality: N/A;    ESOPHAGOGASTRODUODENOSCOPY N/A 9/18/2023    Procedure: EGD (ESOPHAGOGASTRODUODENOSCOPY);  Surgeon: Gm Leon MD;  Location: [facility];  Service: Endoscopy;  Laterality: N/A;    FRACTURE SURGERY      HYSTERECTOMY      INTRALUMINAL GASTROINTESTINAL TRACT IMAGING VIA CAPSULE N/A 10/24/2022    Procedure: IMAGING PROCEDURE, GI TRACT, INTRALUMINAL, VIA CAPSULE;  Surgeon: Hang Lunsford RN;  Location: [facility];  Service: Endoscopy;  Laterality: N/A;    SELECTIVE INJECTION OF ANESTHETIC AGENT AROUND LUMBAR SPINAL NERVE ROOT BY  TRANSFORAMINAL APPROACH Bilateral 5/6/2022    Procedure: Bilateral L5/S1 TF TEETEE with RN IV sedation;  Surgeon: Nae Paul MD;  Location: [facility];  Service: Pain Management;  Laterality: Bilateral;    SELECTIVE INJECTION OF ANESTHETIC AGENT AROUND LUMBAR SPINAL NERVE ROOT BY TRANSFORAMINAL APPROACH Bilateral 12/30/2022    Procedure: Bilateral L5/S1 TF TEETEE RN IV Sedation;  Surgeon: Nae Paul MD;  Location: [facility];  Service: Pain Management;  Laterality: Bilateral;    SHOULDER ARTHROSCOPY      THYROIDECTOMY, PARTIAL Right     and transplatation of right superior parathyroid gland to the sternocleidomastoid muscle     TONSILLECTOMY      TUBAL LIGATION  1984    WRIST FRACTURE SURGERY      left       Patient's Medications   New Prescriptions    No medications on file   Previous Medications    ALBUTEROL (PROVENTIL/VENTOLIN HFA) 90 MCG/ACTUATION INHALER        AZELASTINE (ASTELIN) 137 MCG (0.1 %) NASAL SPRAY    1 spray (137 mcg total) by Nasal route 2 (two) times daily.    DICLOFENAC (VOLTAREN) 75 MG EC TABLET    Take 1 tablet (75 mg total) by mouth 2 (two) times daily as needed (pain).    DILTIAZEM (CARDIZEM) 30 MG TABLET    TAKE 1 TABLET EVERY 12 HOURS    EPINEPHRINE (EPIPEN) 0.3 MG/0.3 ML ATIN    Inject into the muscle.    EZETIMIBE (ZETIA) 10 MG TABLET    Take 1 tablet (10 mg total) by mouth once daily.    FAMOTIDINE (PEPCID) 40 MG TABLET    Take 1 tablet (40 mg total) by mouth once daily.    FLUTICASONE PROPIONATE (FLONASE) 50 MCG/ACTUATION NASAL SPRAY    Use 2 sprays to each nostril daily    HYDROCODONE-ACETAMINOPHEN (NORCO) 5-325 MG PER TABLET    Take 1 tablet by mouth every 6 (six) hours as needed.    INHALATION SPACING DEVICE (BREATHERITE VALVED MDI CHAMBER)    Use as directed for inhalation.    LEVOTHYROXINE (EUTHYROX) 100 MCG TABLET    Take 1 tablet (100 mcg total) by mouth once daily.    MELOXICAM (MOBIC) 15 MG TABLET    Take 1 tablet (15 mg total) by mouth once daily.    METHOCARBAMOL  (ROBAXIN) 500 MG TAB    Take 1 tablet (500 mg total) by mouth 2 (two) times daily as needed (muscle spasms).    METHYLPREDNISOLONE (MEDROL DOSEPACK) 4 MG TABLET    follow package directions    PREGABALIN (LYRICA) 75 MG CAPSULE    Take 1 capsule (75 mg total) by mouth 3 (three) times daily.    ROPINIROLE (REQUIP) 0.25 MG TABLET    Take 1 tablet (0.25 mg total) by mouth every evening.    SEMAGLUTIDE (OZEMPIC) 2 MG/DOSE (8 MG/3 ML) PNIJ    Inject 2 mg into the skin every 7 days.    SULFACETAMIDE SODIUM 10% (BLEPH-10) 10 % OPHTHALMIC SOLUTION    Place 2 drops into the left eye 4 (four) times daily.    TIZANIDINE (ZANAFLEX) 4 MG TABLET    Take 1/2 to 1 tablet by mouth twice daily as needed for muscle spasms. May cause drowsiness.    TOPIRAMATE (TOPAMAX) 100 MG TABLET    Take 1 tablet by mouth 2 (two) times daily.    TRELEGY ELLIPTA 100-62.5-25 MCG DSDV    Inhale 1 puff into the lungs once daily.   Modified Medications    No medications on file   Discontinued Medications    No medications on file       Family History   Problem Relation Age of Onset    Diabetes Mother     Hypertension Mother     Heart disease Mother 50    Cancer Father     Arthritis Father     Breast cancer Sister     Cancer Brother     Cancer Brother     Leukemia Son     Cancer Son        Review of patient's allergies indicates:   Allergen Reactions    Codeine sulfate      Nausea^    Lisinopril Swelling     angioedema    Codeine Nausea Only and Rash         Objective:      Physical Exam  Patient is alert and oriented, no distress. Skin is intact. Neuro is normal with no focal motor or sensory findings.    Cervical exam is unremarkable. Intact cervical ROM. Negative Spurling's test     Physical Exam:                       RIGHT                                     LEFT     Scap Dyskinesis/Winging       (-)                                             (-)     Tenderness:                                                                              Greater  Tuberosity                  (-)                                            +  Bicipital Groove                       (-)                                             (-)  AC joint                                   (-)                                             (-)  Other:      ROM:  Forward Elevation       160                                          160  Abduction                    120                                          110  ER (at side)                 70                                            60  IR                                 T8                                            T8     Strength:   Supraspinatus             5/5                                           4+/5  Infraspinatus               5/5                                           4+/5  Subscap / IR               5/5                                           4+/5      Special Tests:              Neer:                                       (-)                                             +              Todd:                                 (-)                                             +              SS Stress:                               (-)                                             +              Bear Hug:                                (-)                                             (-)              [place]'s:                                 (-)                                             +              Resisted Thrower's:                (-)                                             +              Cross Arm Abduction:             (-)                                             +     Neurovascular examination  - Motor grossly intact bilaterally to shoulder abduction, elbow flexion and extension, wrist flexion and extension, and intrinsic hand musculature  - Sensation intact to light touch bilaterally in axillary, median, radial, and ulnar distributions  - Symmetrical radial pulses    Left Knee:  Inspection: normal, mild effusion;  patella crepitus     Palpation tenderness:  TTP medial joint line    Range of motion: 0 deg extension - 120 deg flexion    Strength:  4/5 Extension    4/5 Flexion    4/5 Hip Abduction    All compartments are soft and compressible. Calf soft non-tender. Intact EHL, FHL, gastroc soleus, and tibialis anterior. Sensation intact to light touch in superficial peroneal, deep peroneal, tibial, sural, and saphenous nerve distributions. Foot warm and well perfused with capillary refill of less than 2 seconds and palpable pedal pulses.        Imaging:    XR Results:  Results for orders placed during the hospital encounter of 11/15/23    X-Ray Shoulder 2 or More Views Left    Narrative  EXAM:  XR SHOULDER COMPLETE 2 OR MORE VIEWS LEFT    CLINICAL HISTORY: Left shoulder pain.    FINDINGS: Glenohumeral and acromioclavicular alignment is normal.  No fracture or other acute osseous abnormality is seen.  Moderate AC joint degenerative changes with inferior spurring.  Mild degenerative changes of the lower glenoid.  Irregularity seen at the rotator cuff insertion without definite calcific tendinosis.  Findings could reflect chronic rotator cuff injury.  No evidence of rotator cuff or bursal calcium deposition.    Impression  No acute radiographic abnormality of the left shoulder.    Finalized on: 11/15/2023 12:15 PM By:  Jarvis Celestin MD  [facility]# [id number]      2023-11-15 12:17:[id number]    [facility]    EXAMINATION:  XR KNEE ORTHO BILAT     CLINICAL HISTORY:  Pain in right knee     TECHNIQUE:  AP standing of both knees, PA flexion standing views of both knees, and Merchant views of both knees were performed.  Lateral views of both knees were also performed.     COMPARISON:  03/01/2018     FINDINGS:  Right knee: There is no radiographic evidence of acute osseous, articular, or soft tissue abnormality. There is severe patellofemoral, severe medial, and moderate lateral tricompartmental osteoarthritis.  No appreciable change from prior.     Left  knee:  No acute fractures or dislocations visualized.  Suggestion of multiple possible ossified intra-articular bodies in the suprapatellar recess.  There is severe patellofemoral, severe medial, and moderate lateral tricompartmental osteoarthritis.  No appreciable change from prior.     Impression:     Degenerative changes as above.        Electronically signed by: Cordell Jamison MD  Date:                                            11/14/2019  Time:                                           15:34    Physician read: I agree with the above impression.    Assessment/Plan:   Zakia Price is a 64 y.o. female with left shoulder rotator cuff tear arthropathy, left knee osteoarthritis    Plan:    Discussed diagnosis and treatment options with the patient today.  She has known left shoulder rotator cuff tear arthropathy.  We again discussed operative and nonoperative treatment options.  Operative treatment to include a reverse total shoulder arthroplasty versus nonoperative treatment to include rest, activity modification, oral anti-inflammatories, home exercises, formal physical therapy, intermittent injections.  We last completed a corticosteroid injection 3 months ago, she had 2 months of good pain relief with this injection, her pain has since returned.  She voiced that she would like to avoid operative management  I recommend move forward with a repeat left shoulder corticosteroid injection, patient tolerated the procedure well with no immediate complications  She is also complaining of knee pain today, reviewed old x-rays in her chart that revealed she has significant osteoarthritis of both of her knees.  She has a Kellgren Av grade 4 in her left knee.  For her acute knee pain a day, I recommend we move forward with a left knee corticosteroid injection, patient tolerated the procedure well with no immediate complications  Follow up with me in 8 weeks with the knees, new xrays prior to appointment, we  can consider Visco in the future            Rosemary Alonso PA-C  Sports Medicine Physician Assistant       Disclaimer: This note was prepared using a voice recognition system and is likely to have sound alike errors within the text.

## 2024-02-27 NOTE — PROCEDURES
Large Joint Aspiration/Injection: L knee    Date/Time: 2/27/2024 8:30 AM    Performed by: Rosemary Alonso PA-C  Authorized by: Rosemary Alonso PA-C    Consent Done?:  Yes (Verbal)  Indications:  Pain  Site marked: the procedure site was marked    Timeout: prior to procedure the correct patient, procedure, and site was verified    Prep: patient was prepped and draped in usual sterile fashion      Local anesthesia used?: Yes    Anesthesia:  Local infiltration  Local anesthetic:  Lidocaine 1% without epinephrine    Details:  Needle Size:  22 G  Ultrasonic Guidance for needle placement?: No    Approach:  Anterolateral  Location:  Knee  Site:  L knee  Medications:  40 mg triamcinolone acetonide 40 mg/mL  Aspirate amount (mL):  0  Patient tolerance:  Patient tolerated the procedure well with no immediate complications    MEDICAL NECESSITY FOR VISCOSUPPLEMENTATION: After thorough evaluation of the patient, I have determined that visco-supplementation is medically necessary. The patient has painful DJD of the knee with failure of conservative therapy including lifestyle modifications and rehabilitation exercises. Oral analgesis/NSAIDs have not adequately controlled symptoms and there is radiographic evidence of joint space narrowing, subchondral sclerosis, and some early osteophytic changes Kellgren- Av grade 2 or greater, or in lack of radiographic evidence, there is arthroscopic or other evidence of chondrosis.

## 2024-03-01 DIAGNOSIS — M25.551 HIP PAIN, RIGHT: ICD-10-CM

## 2024-03-04 ENCOUNTER — OFFICE VISIT (OUTPATIENT)
Dept: PODIATRY | Facility: CLINIC | Age: 64
End: 2024-03-04
Payer: MEDICARE

## 2024-03-04 DIAGNOSIS — E66.01 MORBID OBESITY: ICD-10-CM

## 2024-03-04 DIAGNOSIS — G25.81 RESTLESS LEGS SYNDROME (RLS): ICD-10-CM

## 2024-03-04 DIAGNOSIS — L60.3 ONYCHODYSTROPHY: ICD-10-CM

## 2024-03-04 DIAGNOSIS — E11.49 TYPE II DIABETES MELLITUS WITH NEUROLOGICAL MANIFESTATIONS: ICD-10-CM

## 2024-03-04 DIAGNOSIS — E11.42 DIABETIC POLYNEUROPATHY ASSOCIATED WITH TYPE 2 DIABETES MELLITUS: Primary | ICD-10-CM

## 2024-03-04 PROCEDURE — 3288F FALL RISK ASSESSMENT DOCD: CPT | Mod: HCNC,CPTII,S$GLB, | Performed by: PODIATRIST

## 2024-03-04 PROCEDURE — 3072F LOW RISK FOR RETINOPATHY: CPT | Mod: HCNC,CPTII,S$GLB, | Performed by: PODIATRIST

## 2024-03-04 PROCEDURE — 1101F PT FALLS ASSESS-DOCD LE1/YR: CPT | Mod: HCNC,CPTII,S$GLB, | Performed by: PODIATRIST

## 2024-03-04 PROCEDURE — 99999 PR PBB SHADOW E&M-EST. PATIENT-LVL III: CPT | Mod: PBBFAC,HCNC,, | Performed by: PODIATRIST

## 2024-03-04 PROCEDURE — 1160F RVW MEDS BY RX/DR IN RCRD: CPT | Mod: HCNC,CPTII,S$GLB, | Performed by: PODIATRIST

## 2024-03-04 PROCEDURE — 1159F MED LIST DOCD IN RCRD: CPT | Mod: HCNC,CPTII,S$GLB, | Performed by: PODIATRIST

## 2024-03-04 PROCEDURE — 99214 OFFICE O/P EST MOD 30 MIN: CPT | Mod: 25,HCNC,S$GLB, | Performed by: PODIATRIST

## 2024-03-04 PROCEDURE — 11721 DEBRIDE NAIL 6 OR MORE: CPT | Mod: Q9,HCNC,S$GLB, | Performed by: PODIATRIST

## 2024-03-04 RX ORDER — TIZANIDINE 4 MG/1
TABLET ORAL
Qty: 90 TABLET | Refills: 1 | Status: SHIPPED | OUTPATIENT
Start: 2024-03-04 | End: 2024-05-09 | Stop reason: SDUPTHER

## 2024-03-04 RX ORDER — ROPINIROLE 0.5 MG/1
0.5 TABLET, FILM COATED ORAL NIGHTLY
Qty: 30 TABLET | Refills: 2 | Status: SHIPPED | OUTPATIENT
Start: 2024-03-20 | End: 2024-06-18

## 2024-03-04 NOTE — TELEPHONE ENCOUNTER
No care due was identified.  [facility] Embedded Care Due Messages. Reference number: [id number].   3/04/2024 8:14:09 AM CST

## 2024-03-04 NOTE — PROGRESS NOTES
Subjective:       Patient ID: Zakia Price is a 64 y.o. female.    Chief Complaint: Routine Foot Care (2.19.24 last seen NP Rose Price. She denies pain at present and is wearing sandals. )    HPI: Patient presents to the office today with the chief complaint of elongated, thickened and dystrophic nail plates to the B/L foot.  Reports improving neuropathy pains with Lyrica. States her pain is an 0/10.  Does complain of pain at night.  She is concerned that her restless legs syndrome is not improving.  She reports that she discuss with pharmacy about possibly increasing dosage.  She was previously scheduled to take 0.25 mg at night.  States that this has been taken appropriately.  Recently evaluated by primary care provider.  This patient is a Diabetic Type II, complicated with morbid obesity and Peripheral Neuropathy. Patient does follow with Primary Care and/or Endocrinology for management of Diabetes Mellitus. This patient's PMD is Oleksandr Nagel MD. This patient last saw his/her primary care provider on 02/19/2024  Hemoglobin A1C   Date Value Ref Range Status   12/26/2023 4.8 4.0 - 5.6 % Final     Comment:     ADA Screening Guidelines:  5.7-6.4%  Consistent with prediabetes  >or=6.5%  Consistent with diabetes    High levels of fetal hemoglobin interfere with the HbA1C  assay. Heterozygous hemoglobin variants (HbS, HgC, etc)do  not significantly interfere with this assay.   However, presence of multiple variants may affect accuracy.     09/06/2023 4.9 4.0 - 5.6 % Final     Comment:     ADA Screening Guidelines:  5.7-6.4%  Consistent with prediabetes  >or=6.5%  Consistent with diabetes    High levels of fetal hemoglobin interfere with the HbA1C  assay. Heterozygous hemoglobin variants (HbS, HgC, etc)do  not significantly interfere with this assay.   However, presence of multiple variants may affect accuracy.     06/14/2023 5.0 4.0 - 5.6 % Final     Comment:     ADA Screening Guidelines:  5.7-6.4%   Consistent with prediabetes  >or=6.5%  Consistent with diabetes    High levels of fetal hemoglobin interfere with the HbA1C  assay. Heterozygous hemoglobin variants (HbS, HgC, etc)do  not significantly interfere with this assay.   However, presence of multiple variants may affect accuracy.     .     Review of patient's allergies indicates:   Allergen Reactions    Codeine sulfate      Nausea^    Lisinopril Swelling     angioedema    Codeine Nausea Only and Rash       Past Medical History:   Diagnosis Date    Acute respiratory failure due to COVID-19     COVID-19     Diabetes mellitus, type 2 1993    BS  didn't check 10/04/2023 Insulin x 2 years    Diabetic neuropathy 01/27/2014    DJD (degenerative joint disease) of knee     DVT (deep venous thrombosis) around 1990's    in leg, is on no anticoagulant therapy presently    Fatty liver     GERD (gastroesophageal reflux disease)     Hypertension associated with diabetes     HECTOR (iron deficiency anemia) 05/13/2021    Multinodular goiter     Obesity, morbid, BMI 50 or higher     Sleep apnea     has no CPAP       Family History   Problem Relation Age of Onset    Diabetes Mother     Hypertension Mother     Heart disease Mother 50    Cancer Father     Arthritis Father     Breast cancer Sister     Cancer Brother     Cancer Brother     Leukemia Son     Cancer Son        Social History     Socioeconomic History    Marital status: Single   Tobacco Use    Smoking status: Never    Smokeless tobacco: Never   Substance and Sexual Activity    Alcohol use: No    Drug use: No    Sexual activity: Not Currently     Social Determinants of Health     Financial Resource Strain: Low Risk  (11/14/2023)    Overall Financial Resource Strain (CARDIA)     Difficulty of Paying Living Expenses: Not hard at all   Food Insecurity: Food Insecurity Present (11/14/2023)    Hunger Vital Sign     Worried About Running Out of Food in the Last Year: Never true     Ran Out of Food in the Last Year: Sometimes  true   Transportation Needs: No Transportation Needs (11/14/2023)    PRAPARE - Transportation     Lack of Transportation (Medical): No     Lack of Transportation (Non-Medical): No   Physical Activity: Insufficiently Active (11/14/2023)    Exercise Vital Sign     Days of Exercise per Week: 3 days     Minutes of Exercise per Session: 20 min   Stress: No Stress Concern Present (11/14/2023)    [employer] [employer] [employer] - Occupational Stress Questionnaire     Feeling of Stress : Not at all   Social Connections: Unknown (11/14/2023)    Social Connection and Isolation Panel [NHANES]     Frequency of Communication with Friends and Family: More than three times a week     Frequency of Social Gatherings with Friends and Family: More than three times a week     Active Member of Clubs or Organizations: Yes     Attends Club or Organization Meetings: More than 4 times per year     Marital Status:    Housing Stability: Low Risk  (11/14/2023)    Housing Stability Vital Sign     Unable to Pay for Housing in the Last Year: No     Number of Places Lived in the Last Year: 1     Unstable Housing in the Last Year: No       Past Surgical History:   Procedure Laterality Date    COLONOSCOPY N/A 5/12/2021    Procedure: COLONOSCOPY;  Surgeon: Carolina Rizo MD;  Location: [facility];  Service: Endoscopy;  Laterality: N/A;    EPIDURAL STEROID INJECTION N/A 12/10/2021    Procedure: Lumbar L5/S1 IL TEETEE  Would like AM procedure, if possible;  Surgeon: Nae Paul MD;  Location: [facility];  Service: Pain Management;  Laterality: N/A;    EPIDURAL STEROID INJECTION INTO CERVICAL SPINE N/A 10/8/2021    Procedure: C7-T1 IL TEETEE-no sedation.  Needs IV-just incase;  Surgeon: Nae Paul MD;  [facility];  Service: Pain Management;  Laterality: N/A;    ESOPHAGOGASTRODUODENOSCOPY N/A 7/8/2021    Procedure: EGD (ESOPHAGOGASTRODUODENOSCOPY) previous positve covid;  Surgeon: Jann Gutierrez MD;  Location: [facility]  ENDO;  Service: Endoscopy;  Laterality: N/A;    ESOPHAGOGASTRODUODENOSCOPY N/A 9/18/2023    Procedure: EGD (ESOPHAGOGASTRODUODENOSCOPY);  Surgeon: Gm Leon MD;  Location: [facility];  Service: Endoscopy;  Laterality: N/A;    FRACTURE SURGERY      HYSTERECTOMY      INTRALUMINAL GASTROINTESTINAL TRACT IMAGING VIA CAPSULE N/A 10/24/2022    Procedure: IMAGING PROCEDURE, GI TRACT, INTRALUMINAL, VIA CAPSULE;  Surgeon: Hang Lunsford RN;  Location: [facility] ENDO;  Service: Endoscopy;  Laterality: N/A;    SELECTIVE INJECTION OF ANESTHETIC AGENT AROUND LUMBAR SPINAL NERVE ROOT BY TRANSFORAMINAL APPROACH Bilateral 5/6/2022    Procedure: Bilateral L5/S1 TF TEETEE with RN IV sedation;  Surgeon: Nae Paul MD;  Location: [facility] PAIN MGT;  Service: Pain Management;  Laterality: Bilateral;    SELECTIVE INJECTION OF ANESTHETIC AGENT AROUND LUMBAR SPINAL NERVE ROOT BY TRANSFORAMINAL APPROACH Bilateral 12/30/2022    Procedure: Bilateral L5/S1 TF TEETEE RN IV Sedation;  Surgeon: Nae Paul MD;  Location: [facility] PAIN [facility]GT;  Service: Pain Management;  Laterality: Bilateral;    SHOULDER ARTHROSCOPY      THYROIDECTOMY, PARTIAL Right     and transplatation of right superior parathyroid gland to the sternocleidomastoid muscle     TONSILLECTOMY      TUBAL LIGATION  1984    WRIST FRACTURE SURGERY      left       Review of Systems       Objective:   There were no vitals taken for this visit.    Physical Exam  LOWER EXTREMITY PHYSICAL EXAMINATION    VASCULAR:  The right dorsalis pedis pulse 2/4 and the right posterior tibial pulse 1/4.  The left dorsalis pedis pulse 2/4 and posterior tibial pulse on the left is 1/4.  Capillary refill is intact.  Pedal hair growth decreased.     NEUROLOGY: Protective sensation is not intact to the left and right plantar surfaces of the foot and digits, as the patient has no sensation/detection at greater than 4 distinct points of contact with 5.07 Kemmerer Cele monofilament. Sensation to light touch is  intact on the left and right foot. Proprioception is intact, bilateral. Sensation to pin prick is reduced to absent. Vibratory sensation is diminished.    DERMATOLOGY:  Skin is supple, moist, intact.  There is no signs of callusing, ulcerations, other lesions identified to the dorsal or plantar aspect of the right or left foot.  The R1, 2, 5 and left L1,2, 5 are thickened, discolored dystrophic.  There is subungual debris.  Nail plates have area of dark discoloration.  The remaining nails 3-4 on the right foot and the left foot are elongated but of normal color, thickness, and texture.   There is no signs of ingrowing into the medial or lateral borders.  There is no evidence of wounds or skin breakdown.  No edema or erythema.  No obvious lacerations or fissuring.  Interdigital spaces are clean, dry, intact.  No rashes or scars appreciated.    ORTHOPEDIC: Manual Muscle Testing is 5/5 in all planes on the left and right, without pains, with and without resistance. Gait pattern is non-antalgic.    Assessment:     1. Diabetic polyneuropathy associated with type 2 diabetes mellitus    2. Type II diabetes mellitus with neurological manifestations    3. Restless legs syndrome (RLS)    4. Morbid obesity    5. Onychodystrophy          Plan:     Diabetic polyneuropathy associated with type 2 diabetes mellitus    Type II diabetes mellitus with neurological manifestations    Restless legs syndrome (RLS)    Morbid obesity    Onychodystrophy    Other orders  -     rOPINIRole (REQUIP) 0.5 MG tablet; Take 1 tablet (0.5 mg total) by mouth every evening.  Dispense: 30 tablet; Refill: 2          Thorough discussion is had with the patient this afternoon, concerning the diagnosis, its etiology, and the treatment algorithm at present.  Greater than 50% of this visit spent on counseling and coordination of care. Greater than 15 minutes of a 20 minute appointment spent on education about the diabetic foot, neuropathy, and prevention of  limb loss.  Shoe inspection. Diabetic Foot Education. Patient reminded of the importance of good nutrition and blood sugar control to help prevent podiatric complications of diabetes. Patient instructed on proper foot hygeine. We discussed wearing proper and supportive shoe gear, daily foot inspections, never walking barefooted or sock footed, never putting sharp instruments to feet which can cause major complications associated with infection, ulcers, lacerations.      Continue with current medication of Lyrica as this appears to be improving diabetic peripheral polyneuropathy.  Recent labs show normal renal function.    Dystrophic nail plates, as outlined above (R#1,2,5  ; L#1,2,5 ), are sharply debrided with double action nail nipper, and/or with the assistance of a mechanical rotary carlos alberto, with removal of all offending nail and nail border(s), for reduction of pains. Nails are reduced in terms of length, width and girth with removal of subungual debris to facilitate pain free weight bearing and ambulation. The elongated nails as outlined in the objective portion of this note, were trimmed to appropriate length, with a double action nail nipper, for alleviation/reduction of pains as well. Follow up in approx. 3-4 months.    Discussed restless legs syndrome.  Patient is scheduled to finalize her previous prescription on 03/20/24.  We will increase the dosage to 0.5 mg at night following the completion of her previous prescription.  She will follow-up with her PCP thereafter to resume refills.    Future Appointments   Date Time Provider Department Center   4/23/2024  8:00 AM [facility]   4/23/2024  8:30 AM Rosemary Alonso PA-C [facility]   4/29/2024  9:30 AM LABORATORY, [facility] LAB [place]   5/1/2024  9:30 AM Sol Mckeon NP [facility]   5/29/2024 11:40 AM Oleksandr Nagel MD [facility] MED [place]   7/8/2024  8:15 AM Brandie Rey, [facility] POD [facility]  C

## 2024-03-04 NOTE — TELEPHONE ENCOUNTER
Refill Routing Note   Medication(s) are not appropriate for processing by [facility] for the following reason(s):        Outside of protocol    [facility] action(s):  Route               Appointments  past 12m or future 3m with PCP    Date Provider   Last Visit   11/29/2023 Oleksandr Nagel MD   Next Visit   5/29/2024 Oleksandr Nagel MD   ED visits in past 90 days: 0        Note composed:8:16 AM 03/04/2024

## 2024-03-07 NOTE — TELEPHONE ENCOUNTER
No care due was identified.  [facility] Embedded Care Due Messages. Reference number: [id number].   3/07/2024 3:49:16 PM CST

## 2024-03-07 NOTE — TELEPHONE ENCOUNTER
Refill Routing Note   Medication(s) are not appropriate for processing by [facility] for the following reason(s):        Outside of protocol    [facility] action(s):  Route        Medication Therapy Plan: Duplicate      Appointments  past 12m or future 3m with PCP    Date Provider   Last Visit   11/29/2023 Oleksandr Nagel MD   Next Visit   3/7/2024 Oleksandr Nagel MD   ED visits in past 90 days: 0        Note composed:5:46 PM 03/07/2024

## 2024-03-08 RX ORDER — PREGABALIN 75 MG/1
75 CAPSULE ORAL 3 TIMES DAILY
Qty: 90 CAPSULE | Refills: 0 | Status: SHIPPED | OUTPATIENT
Start: 2024-03-08 | End: 2024-04-12 | Stop reason: SDUPTHER

## 2024-04-13 NOTE — TELEPHONE ENCOUNTER
Care Due:                  Date            Visit Type   Department     Provider  --------------------------------------------------------------------------------                                ESTABLISHED                              PATIENT -    [facility]  Last Visit: 11-      [facility]      MEDICINE       Oleksandr Nagel                               -                              PRIMARY      [facility]  Next Visit: 05-      CARE ([facility])   MEDICINE       Oleksandr Nagel                                                            Last  Test          Frequency    Reason                     Performed    Due Date  --------------------------------------------------------------------------------    HBA1C.......  6 months...  semaglutide..............  12- 06-    [facility] Embedded Care Due Messages. Reference number: [id number].   4/12/2024 10:50:28 PM CDT

## 2024-04-15 RX ORDER — PREGABALIN 75 MG/1
75 CAPSULE ORAL 3 TIMES DAILY
Qty: 90 CAPSULE | Refills: 0 | Status: SHIPPED | OUTPATIENT
Start: 2024-04-15 | End: 2024-05-24 | Stop reason: SDUPTHER

## 2024-04-15 NOTE — TELEPHONE ENCOUNTER
Refill Routing Note   Medication(s) are not appropriate for processing by [facility] for the following reason(s):        Outside of protocol    [facility] action(s):  Route     Requires labs : Yes             Appointments  past 12m or future 3m with PCP    Date Provider   Last Visit   11/29/2023 Oleksandr Nagel MD   Next Visit   5/29/2024 Oleksandr Nagel MD   ED visits in past 90 days: 0        Note composed:8:16 AM 04/15/2024

## 2024-04-22 NOTE — PROGRESS NOTES
Orthopaedic Follow-Up Visit    Last Appointment:  02/27/2024  Diagnosis:  Rotator cuff tear arthropathy of left shoulder, primary osteoarthritis of the left knee  Prior Procedure:  Left knee CSI 02/27/2024, left shoulder CSI 02/27/2024    Zakia Price is a 64 y.o. female who is here for f/u evaluation of left knee and left shoulder pain. The patient was last seen here by me on 02/27/2024 at which point we decided to try a left knee CSI and a repeat left shoulder CSI prior to considering further treatment options. The patient returns today reporting that the symptoms have returned in BOTH knees and her left shoulder and is interested in proceeding with treatment options.     To review her history, Zakia Price is a 63 y.o. right and left-hand dominant female who initially presented to clinic on 11/27/2023 for left shoulder pain that has been present for several years but has been becoming more frequent at that time.  She had no acute injury or trauma; however, she works as a  for several years and she attributes a lot of her shoulder pain that this.  Her symptoms include anterior shoulder pain, limited range of motion, night pain, morning stiffness.  Her pain is made worse by overhead movements and repetitive movements.  Her treatment has included rest, activity modification, oral anti-inflammatories, Tylenol, home exercises, corticosteroid injection.  As for her knees, she has had chronic left knee pain for several years.  She has had corticosteroid injections in the past with good relief of symptoms.    Patient's medications, allergies, past medical, surgical, social and family histories were reviewed and updated as appropriate.    Review of Systems   All systems reviewed were negative.  Specifically, the patient denies fever, chills, weight loss, chest pain, shortness of breath, or dyspnea on exertion.      Past Medical History:   Diagnosis Date    Acute respiratory failure due to  COVID-19     COVID-19     Diabetes mellitus, type 2 1993    BS  didn't check 10/04/2023 Insulin x 2 years    Diabetic neuropathy 01/27/2014    DJD (degenerative joint disease) of knee     DVT (deep venous thrombosis) around 1990's    in leg, is on no anticoagulant therapy presently    Fatty liver     GERD (gastroesophageal reflux disease)     Hypertension associated with diabetes     HECTOR (iron deficiency anemia) 05/13/2021    Multinodular goiter     Obesity, morbid, BMI 50 or higher     Sleep apnea     has no CPAP       Objective:      Physical Exam  Patient is alert and oriented, no distress. Skin is intact. Neuro is normal with no focal motor or sensory findings.    Cervical exam is unremarkable. Intact cervical ROM. Negative Spurling's test     Physical Exam:                       RIGHT                                     LEFT     Scap Dyskinesis/Winging       (-)                                             (-)     Tenderness:                                                                              Greater Tuberosity                  (-)                                            +  Bicipital Groove                       (-)                                             (-)  AC joint                                   (-)                                             (-)  Other:      ROM:  Forward Elevation       160                                          160  Abduction                    120                                          110  ER (at side)                 70                                            60  IR                                 T8                                            T8     Strength:   Supraspinatus             5/5                                           4+/5  Infraspinatus               5/5                                           4+/5  Subscap / IR               5/5                                           4+/5      Special Tests:              Neer:                                        (-)                                             +              Todd:                                 (-)                                             +              SS Stress:                               (-)                                             +              Bear Hug:                                (-)                                             (-)              Eugene's:                                 (-)                                             +              Resisted Thrower's:                (-)                                             +              Cross Arm Abduction:             (-)                                             +     Neurovascular examination  - Motor grossly intact bilaterally to shoulder abduction, elbow flexion and extension, wrist flexion and extension, and intrinsic hand musculature  - Sensation intact to light touch bilaterally in axillary, median, radial, and ulnar distributions  - Symmetrical radial pulses    Standing exam  stance: normal alignment, no significant leg-length discrepancy  gait: no limp    Knee      RIGHT  LEFT  Skin:     Intact   Intact  ROM:     0-110  5-110  Effusion:    +   +  Medial joint line tenderness:  +   +  Lateral joint line tenderness:  Neg   Neg  Ash:     Neg   Neg  Patella crepitus:   +   +  Patella tenderness:   Neg   Neg  Patella grind:      Neg   Neg  Lachman:    Neg   Neg  Valgus stress:    Neg   Neg  Varus stress:    Neg   Neg  Posterior drawer:   Neg   Neg  N-V               intact  intact  Hip:    nml    nml   Lower extremity edema: Negative negative    Neurovascular exam  - motor function grossly intact bilaterally to hip flexion, knee extension and flexion, ankle dorsiflexion and plantarflexion  - sensation intact to light touch bilaterally to femoral, tibial, tibial and peroneal distributions  - symmetrical pedal pulses    Imaging:   XR Results:  X-Ray Shoulder 2 or More Views Left     Narrative  EXAM:  XR  SHOULDER COMPLETE 2 OR MORE VIEWS LEFT     CLINICAL HISTORY: Left shoulder pain.     FINDINGS: Glenohumeral and acromioclavicular alignment is normal.  No fracture or other acute osseous abnormality is seen.  Moderate AC joint degenerative changes with inferior spurring.  Mild degenerative changes of the lower glenoid.  Irregularity seen at the rotator cuff insertion without definite calcific tendinosis.  Findings could reflect chronic rotator cuff injury.  No evidence of rotator cuff or bursal calcium deposition.     Impression  No acute radiographic abnormality of the left shoulder.     Finalized on: 11/15/2023 12:15 PM By:  Jarvis Celestin MD  [facility]# [id number]      2023-11-15 12:17:[id number]    [facility]     EXAMINATION:  EXAM: XR KNEE ORTHO BILAT WITH FLEXION     CLINICAL INDICATION:   Pain in right knee.  Pain in left knee.     FINDINGS:  Comparisons are made to a right tibia/fibula x-ray from January 2022.  8 total images of the bilateral knees were submitted for interpretation.  There are multiple intra-articular loose bodies involving both knee joints with adjacent small effusions.  Largest on the left is within the suprapatellar recess measuring 3 cm in length.  The largest on the right patella recess measuring 2.5 cm in length.     Marked tricompartment joint space narrowing and osteophyte formation, most significantly involving the medial tibiofemoral compartments and patellofemoral compartments.     Alignment is satisfactory. No     fractures, dislocations, or erosive arthritic change.  Negative for radiopaque foreign bodies or air in the soft tissues.        Impression:     1.  Multiple intra-articular loose bodies involving the knee joints measuring up to 1.5 cm on the right and 3 cm on the left.  Small effusions.  Negative for underlying fractures.  2.  Marked tricompartment degenerative changes most significant involving the medial tibiofemoral compartments and patellofemoral compartments bilaterally.         Finalized on: 4/23/2024 8:50 AM By:  Ronen Fitzgerald MD  RG# [id number]      2024-04-23 08:52:[id number]    [facility]     Physician Read: I agree with the above impression.  There is complete joint space loss present in the medial compartment as well as marginal osteophyte formation present in intra-articular loose bodies.  All these findings are consistent with a Kellgren Av grade 4    Assessment/Plan:   Assessment:  Zakia Price is a 64 y.o. female with bilateral knee osteoarthritis, left shoulder rotator cuff tear arthropathy    Plan:    Discussed diagnosis and treatment options with the patient today. She has known osteoarthritis of both of her knees, her left knee is worse than her right knee.  At last visit we elected to try a corticosteroid injection into her left knee, she had good relief of symptoms with this injection up until 1 week ago  We discussed the natural progression of osteoarthritis and treatment algorithm.  Treatment includes rest, activity modification, oral anti-inflammatories, physical therapy, intermittent injections.  For her acute pain today, we will move forward with bilateral knee corticosteroid injections, patient tolerated the procedure well with no immediate complications   She has been taking over-the-counter ibuprofen, encouraged her to discontinue use of ibuprofen and switch to prescription anti-inflammatories. Rx for Celebrex 200 mg twice a day provided, encouraged her to take with meals  I would also like to get her started in physical therapy, she has previously done therapy at [facility].  External physical therapy referral placed to [facility] in [place] for aquatic therapy  I would also like to place a prior authorization for viscosupplementation for both knees in hopes to provide longer lasting pain relief  Prior authorization placed for bilateral knee Monovisc  MEDICAL NECESSITY FOR VISCOSUPPLEMENTATION: After thorough evaluation of the  patient, I have determined that visco-supplementation is medically necessary. The patient has painful DJD of the knee with failure of conservative therapy including lifestyle modifications and rehabilitation exercises. Oral analgesis/NSAIDs have not adequately controlled symptoms and there is radiographic evidence of joint space narrowing, subchondral sclerosis, and some early osteophytic changes Kellgren- Av grade 4  Of note, she has requesting a left shoulder corticosteroid injection today as well.  Discussed with her that I would like to hold off on an injection in her shoulder as she received 2 knee injections today, discussed that when she returns to clinic for her Monovisc injection we can do a left shoulder corticosteroid injection, patient voiced understanding  Follow-up with me in about 1 month for bilateral Monovisc          Rosemary Alonso PA-C  Sports Medicine Physician Assistant       Disclaimer: This note was prepared using a voice recognition system and is likely to have sound alike errors within the text.

## 2024-04-23 ENCOUNTER — OFFICE VISIT (OUTPATIENT)
Dept: SPORTS MEDICINE | Facility: CLINIC | Age: 64
End: 2024-04-23
Payer: MEDICARE

## 2024-04-23 ENCOUNTER — HOSPITAL ENCOUNTER (OUTPATIENT)
Dept: RADIOLOGY | Facility: HOSPITAL | Age: 64
Discharge: HOME | End: 2024-04-23
Attending: PHYSICIAN ASSISTANT
Payer: MEDICARE

## 2024-04-23 VITALS — WEIGHT: 293 LBS | RESPIRATION RATE: 17 BRPM | BODY MASS INDEX: 44.41 KG/M2 | HEIGHT: 68 IN

## 2024-04-23 DIAGNOSIS — M17.12 PRIMARY OSTEOARTHRITIS OF LEFT KNEE: Primary | ICD-10-CM

## 2024-04-23 DIAGNOSIS — M25.562 CHRONIC PAIN OF BOTH KNEES: ICD-10-CM

## 2024-04-23 DIAGNOSIS — M12.812 ROTATOR CUFF ARTHROPATHY OF LEFT SHOULDER: ICD-10-CM

## 2024-04-23 DIAGNOSIS — M25.561 CHRONIC PAIN OF BOTH KNEES: ICD-10-CM

## 2024-04-23 DIAGNOSIS — M17.11 PRIMARY OSTEOARTHRITIS OF RIGHT KNEE: ICD-10-CM

## 2024-04-23 DIAGNOSIS — G89.29 CHRONIC PAIN OF BOTH KNEES: ICD-10-CM

## 2024-04-23 PROCEDURE — 1160F RVW MEDS BY RX/DR IN RCRD: CPT | Mod: HCNC,CPTII,S$GLB, | Performed by: PHYSICIAN ASSISTANT

## 2024-04-23 PROCEDURE — 20610 DRAIN/INJ JOINT/BURSA W/O US: CPT | Mod: 50,HCNC,S$GLB, | Performed by: PHYSICIAN ASSISTANT

## 2024-04-23 PROCEDURE — 3008F BODY MASS INDEX DOCD: CPT | Mod: HCNC,CPTII,S$GLB, | Performed by: PHYSICIAN ASSISTANT

## 2024-04-23 PROCEDURE — 1125F AMNT PAIN NOTED PAIN PRSNT: CPT | Mod: HCNC,CPTII,S$GLB, | Performed by: PHYSICIAN ASSISTANT

## 2024-04-23 PROCEDURE — 3072F LOW RISK FOR RETINOPATHY: CPT | Mod: HCNC,CPTII,S$GLB, | Performed by: PHYSICIAN ASSISTANT

## 2024-04-23 PROCEDURE — 99214 OFFICE O/P EST MOD 30 MIN: CPT | Mod: HCNC,25,S$GLB, | Performed by: PHYSICIAN ASSISTANT

## 2024-04-23 PROCEDURE — 3288F FALL RISK ASSESSMENT DOCD: CPT | Mod: HCNC,CPTII,S$GLB, | Performed by: PHYSICIAN ASSISTANT

## 2024-04-23 PROCEDURE — 73564 X-RAY EXAM KNEE 4 OR MORE: CPT | Mod: 26,50,HCNC, | Performed by: RADIOLOGY

## 2024-04-23 PROCEDURE — 1101F PT FALLS ASSESS-DOCD LE1/YR: CPT | Mod: HCNC,CPTII,S$GLB, | Performed by: PHYSICIAN ASSISTANT

## 2024-04-23 PROCEDURE — 99999 PR PBB SHADOW E&M-EST. PATIENT-LVL V: CPT | Mod: PBBFAC,HCNC,, | Performed by: PHYSICIAN ASSISTANT

## 2024-04-23 PROCEDURE — 73564 X-RAY EXAM KNEE 4 OR MORE: CPT | Mod: TC,50,HCNC

## 2024-04-23 PROCEDURE — 1159F MED LIST DOCD IN RCRD: CPT | Mod: HCNC,CPTII,S$GLB, | Performed by: PHYSICIAN ASSISTANT

## 2024-04-23 RX ORDER — TRIAMCINOLONE ACETONIDE 40 MG/ML
40 INJECTION, SUSPENSION INTRA-ARTICULAR; INTRAMUSCULAR
Status: DISCONTINUED | OUTPATIENT
Start: 2024-04-23 | End: 2024-04-23 | Stop reason: HOSPADM

## 2024-04-23 RX ORDER — CELECOXIB 200 MG/1
200 CAPSULE ORAL 2 TIMES DAILY WITH MEALS
Qty: 60 CAPSULE | Refills: 1 | Status: SHIPPED | OUTPATIENT
Start: 2024-04-23 | End: 2024-05-29

## 2024-04-23 RX ADMIN — TRIAMCINOLONE ACETONIDE 40 MG: 40 INJECTION, SUSPENSION INTRA-ARTICULAR; INTRAMUSCULAR at 08:04

## 2024-04-23 NOTE — PATIENT INSTRUCTIONS
"Arthritis of the Knee    This information does not include all information related to this topic. For more information please visit the [employer] website using the following link: Knee Osteoarthritis    Arthritis is inflammation of one or more of your joints. Pain, swelling, and stiffness are the primary symptoms of arthritis. Any joint in the body may be affected by the disease, but it is particularly common in the knee. Knee arthritis can make it hard to do many everyday activities, such as walking or climbing stairs. It is a major cause of lost work time and a serious disability for many people.    The most common types of arthritis are osteoarthritis and rheumatoid arthritis, but there are more than 100 different forms. While arthritis is mainly an adult disease, some forms affect children. Although there is no cure for arthritis, there are many treatment options available to help manage pain and keep people staying active.    Anatomy  The knee is the largest and strongest joint in your body. It is made up of the lower end of the femur (thighbone), the upper end of the tibia (shinbone), and the patella (kneecap). The ends of the three bones that form the knee joint are covered with articular cartilage, a smooth, slippery substance that protects and cushions the bones as you bend and straighten your knee.    Two wedge-shaped pieces of cartilage called meniscus act as "shock absorbers" between your thighbone and shinbone. They are tough and rubbery to help cushion the joint and keep it stable.    The knee joint is surrounded by a thin lining called the synovial membrane. This membrane releases a fluid that lubricates the cartilage and reduces friction.        Description  The major types of arthritis that affect the knee are osteoarthritis, rheumatoid arthritis, and posttraumatic arthritis.    Osteoarthritis  Osteoarthritis is the most common form of arthritis in the knee. It is a " "degenerative, "wear-and-tear" type of arthritis that occurs most often in people 50 years of age and older, although it may occur in younger people, too. In osteoarthritis, the cartilage in the knee joint gradually wears away. As the cartilage wears away, it becomes frayed and rough, and the protective space between the bones decreases. This can result in bone rubbing on bone, and produce painful bone spurs. Osteoarthritis usually develops slowly and the pain it causes worsens over time.      Rheumatoid Arthritis  Rheumatoid arthritis is a chronic disease that attacks multiple joints throughout the body, including the knee joint. It is symmetrical, meaning that it usually affects the same joint on both sides of the body. In rheumatoid arthritis, the synovial membrane that covers the knee joint begins to swell. This results in knee pain and stiffness. Rheumatoid arthritis is an autoimmune disease. This means that the immune system attacks its own tissues. The immune system damages normal tissue (such as cartilage and ligaments) and softens the bone.    Posttraumatic Arthritis  Posttraumatic arthritis is form of arthritis that develops after an injury to the knee. For example, a broken bone may damage the joint surface and lead to arthritis years after the injury. Meniscal tears and ligament injuries can cause instability and additional wear on the knee joint which, over time, can result in arthritis.    Symptoms  A knee joint affected by arthritis may be painful and inflamed. Generally, the pain develops gradually over time, although sudden onset is also possible. There are other symptoms, as well:  The joint may become stiff and swollen, making it difficult to bend and straighten the knee.  Pain and swelling may be worse in the morning, or after sitting or resting.  Vigorous activity may cause pain to flare up.  Loose fragments of cartilage and other tissue can interfere with the smooth motion of joints. The knee " "may "lock" or "stick" during movement. It may creak, click, snap or make a grinding noise (crepitus).  Pain may cause a feeling of weakness or buckling in the knee.  Many people with arthritis note increased joint pain with changes in the weather.    Doctor Examination  During your appointment, your doctor will talk with you about your symptoms and medical history, conduct a physical examination, and possibly order diagnostic tests, such as x-rays or blood tests.    Physical Examination  During the physical examination, your doctor will look for:  Joint swelling, warmth, or redness  Tenderness around the knee  Range of passive (assisted) and active (self-directed) motion  Instability of the joint  Crepitus (a grating sensation inside the joint) with movement  Pain when weight is placed on the knee  Problems with your gait (the way you walk)  Any signs of injury to the muscles, tendons, and ligaments surrounding the knee  Involvement of other joints (an indication of rheumatoid arthritis)    Imaging Tests  X-rays: These imaging tests provide detailed pictures of dense structures, such as bone. They can help distinguish among various forms of arthritis. X-rays of an arthritic knee may show a narrowing of the joint space, changes in the bone, and the formation of bone spurs (osteophytes).  Other tests: Occasionally, a magnetic resonance imaging (MRI) scan or a computerized tomography (CT) scan may be needed to determine the condition of the bone and soft tissues of your knee.          Laboratory Tests  Your doctor may also recommend blood tests to determine which type of arthritis you have. With some types of arthritis, including rheumatoid arthritis, blood tests will help with a proper diagnosis.    Treatment  There is no cure for arthritis but there are a number of treatments that may help relieve the pain and disability it can cause.    Nonsurgical Treatment  As with other arthritic conditions, initial treatment of " "arthritis of the knee is nonsurgical. Your doctor may recommend a range of treatment options.    Lifestyle modifications  Some changes in your daily life can protect your knee joint and slow the progress of arthritis.  Minimize activities that aggravate the condition, such as climbing stairs.  Exercise is recommended for osteoarthritis to improve pain and function. Switching from high-impact activities (like jogging or tennis) to lower impact activities (like swimming or cycling) will enable you to be active while putting less stress on your knee. Balance, agility, and coordination exercises, combined with traditional exercise, may help to improve function and walking speed.  Losing weight can reduce stress on the knee joint, resulting in less pain and increased function.    Physical therapy  Specific exercises can help increase range of motion and flexibility, as well as help strengthen the muscles in your leg. Your doctor or a physical therapist can help develop an individualized exercise program that meets your needs and lifestyle.  Examples of knee exercises can be found at the following link: Knee Conditioning Program    Assistive devices  Using devices such as a cane, or wearing a brace or knee sleeve can be helpful. A brace assists with stability and function, and may be especially helpful if the arthritis is centered on one side of the knee. There are two types of braces that are often used for knee arthritis: An "" brace shifts weight away from the affected portion of the knee, while a "support" brace helps support the entire knee load.    Other remedies  Applying heat or ice, or wearing elastic bandages to provide support to the knee may provide some relief from pain.    Medications  Several types of drugs are useful in treating arthritis of the knee. Because people respond differently to medications, your doctor will work closely with you to determine the medications and dosages that are safe and " "effective for you.    Over-the-counter, non-narcotic pain relievers and anti-inflammatory medications are usually the first choice of therapy for arthritis of the knee. Acetaminophen is a simple, over-the-counter pain reliever that can be effective in reducing arthritis pain. Like all medications, over-the-counter pain relievers can cause side effects and interact with other medications you are taking. Be sure to discuss potential side effects with your doctor.    Another type of pain reliever is a nonsteroidal anti-inflammatory drug, or NSAID (pronounced "en-said"). NSAIDs, such as ibuprofen and naproxen, are available both over-the-counter and by prescription and in oral and topical (gel) forms. Oral NSAIDs are recommended to improve pain and function in people with knee osteoarthritis. Topical NSAIDs can help improve function and quality of life for people with knee osteoarthritis. However, NSAIDs should be used with caution, or avoided, in people with certain health conditions, such as coronary artery disease, congestive heart failure, and chronic kidney disease. Talk to your doctor about whether NSAIDs are right for you.    A EDGAR-2 inhibitor is a special type of NSAID that may cause fewer gastrointestinal side effects. Common brand names of EDGAR-2 inhibitors include Celebrex (celecoxib) and Mobic (meloxicam, which is a partial EDGAR-2 inhibitor). A EDGAR-2 inhibitor reduces pain and inflammation so that you can function better. If you are taking a EDGAR-2 inhibitor, you should not use a traditional NSAID (prescription or over-the-counter). Be sure to tell your doctor if you have had a heart attack, stroke, angina, blood clot, hypertension, or if you are sensitive to aspirin, sulfa drugs, or other NSAIDs.    Corticosteroids (also known as cortisone) are powerful anti-inflammatory agents that can be injected into the joint. These injections provide pain relief and reduce inflammation; however, the effects do not last " "indefinitely. Your doctor may recommend limiting the number of injections to three or four per year, per joint, due to possible side effects. In some cases, pain and swelling may "flare" immediately after the injection, and the potential exists for long-term joint damage or infection. With frequent repeated injections, or injections over an extended period of time, joint damage can actually increase rather than decrease.    Disease-modifying anti-rheumatic drugs (DMARDs) are used to slow the progression of rheumatoid arthritis. Drugs like methotrexate, sulfasalazine, and hydroxychloroquine are commonly prescribed.  In addition, biologic DMARDs like etanercept (Enbrel) and adalimumab (Humira) may reduce the body's overactive immune response. Because there are many different drugs today for rheumatoid arthritis, a rheumatology specialist is often required to effectively manage medications.    Glucosamine and chondroitin sulfate, substances found naturally in joint cartilage, can be taken as dietary supplements. Although patient reports indicate that these supplements may relieve pain, there is no evidence to support the use of glucosamine and chondroitin sulfate to decrease or reverse the progression of arthritis.     In addition, the [place] Food and Drug Administration does not test dietary supplements before they are sold to consumers. These compounds may cause side effects, as well as negative interactions with other medications. Always consult your doctor before taking dietary supplements.    Alternative therapies  Many alternative forms of therapy are unproven, but may be helpful to try, provided you find a qualified practitioner and keep your doctor informed of your decision. Alternative therapies to treat pain include the use of acupuncture, magnetic pulse therapy, platelet-rich plasma, and stem cell injections.  Acupuncture uses fine needles to stimulate specific body areas to relieve pain or temporarily numb an " area. Although it is used in many parts of the world and evidence suggests that it can help ease the pain of arthritis, there are few scientific studies of its effectiveness. Be sure your acupuncturist is certified, and do not hesitate to ask about his or her sterilization practices.  Magnetic pulse therapy is painless and works by applying a pulsed signal to the knee, which is placed in an electromagnetic field. Like many alternative therapies, magnetic pulse therapy has yet to be proven.  Treatments such as platelet-rich plasma (PRP) and stem cell injections involve taking cells from your own body and re-injecting them into a painful joint.  PRP uses a component of your own blood, platelets, that have been  from your blood, concentrated, and injected into your knee. The platelets contain growth factors thought to be helpful in reducing the symptoms of inflammation.   Stem cells are precursor cells that can also be taken from your own body and injected into your knee. Since they are basic cells, they may have potential to grow into new tissue and thus heal damaged joint surfaces.  While both treatments show promise, clinical studies have yet to confirm their value in treating osteoarthritis.    Surgical Treatment  Your doctor may recommend surgery if your pain from arthritis causes disability and is not relieved with nonsurgical treatment. As with all surgeries, there are some risks and possible complications with different knee procedures. Your doctor will discuss the possible complications with you before your operation.    Arthroscopy  During arthroscopy, doctors use small incisions and thin instruments to diagnose and treat joint problems. Arthroscopic surgery is not often used to treat arthritis of the knee. In cases where osteoarthritis is accompanied by a degenerative meniscal tear, arthroscopic surgery may be recommended to treat the torn meniscus.    Cartilage grafting  Normal, healthy cartilage  tissue may be taken from another part of the knee or from a tissue bank to fill a hole in the articular cartilage. This procedure is typically considered only for younger patients who have small areas of cartilage damage.    Synovectomy  The joint lining damaged by rheumatoid arthritis is removed to reduce pain and swelling.    Osteotomy   In a knee osteotomy, either the tibia (shinbone) or femur (thighbone) is cut and then reshaped to relieve pressure on the knee joint. Knee osteotomy is used when you have early-stage osteoarthritis that has damaged just one side of the knee joint. By shifting your weight off the damaged side of the joint, an osteotomy can relieve pain and significantly improve function in your arthritic knee.    Total or partial knee replacement (arthroplasty)   Your doctor will remove the damaged cartilage and bone, and then position new metal or plastic joint surfaces to restore the function of your knee.        Recovery  After any type of surgery for arthritis of the knee, there is a period of recovery. Recovery time and rehabilitation depends on the type of surgery performed. Your doctor may recommend physical therapy to help you regain strength in your knee and to restore range of motion. Depending upon your procedure, you may need to wear a knee brace, or use crutches or a cane for a time. In most cases, surgery relieves pain and makes it possible to perform daily activities more easily.    Links  [employer] Clinical Practice Guideline on the Management of Osteoarthritis of the Knee  Knee Osteoarthritis  Knee Conditioning Program

## 2024-04-23 NOTE — PROCEDURES
Large Joint Aspiration/Injection: bilateral knee    Date/Time: 4/23/2024 8:30 AM    Performed by: Rosemary Alonso PA-C  Authorized by: Rosemary Alonso PA-C    Consent Done?:  Yes (Verbal)  Indications:  Pain  Site marked: the procedure site was marked    Timeout: prior to procedure the correct patient, procedure, and site was verified    Prep: patient was prepped and draped in usual sterile fashion      Local anesthesia used?: Yes    Anesthesia:  Local infiltration  Local anesthetic:  Lidocaine 1% without epinephrine    Details:  Needle Size:  22 G  Ultrasonic Guidance for needle placement?: No    Approach:  Anterolateral  Location:  Knee  Laterality:  Bilateral  Site:  Bilateral knee  Medications (Right):  40 mg triamcinolone acetonide 40 mg/mL  Medications (Left):  40 mg triamcinolone acetonide 40 mg/mL  Patient tolerance:  Patient tolerated the procedure well with no immediate complications    Procedure Note:  We discussed the risk and benefits of injections, including pain, infection, bleeding, damage to adjacent structures, risk of reaction to injection. We discussed the steroid/cortisone injections will not heal the problem but mat help decrease inflammation and help with symptoms. We discussed the risk of repeated injections. The patient expressed understanding and wanted to proceed with the injection. We performed a timeout to verify the proper patient, proper procedure, and the proper site. The injection site was prepared in a sterile fashion. The patient tolerated it well and there were no complication. We did discuss with the patient that steroid injections can cause some increase in blood sugar and blood pressure for up to a week after the injection.

## 2024-04-29 ENCOUNTER — LAB VISIT (OUTPATIENT)
Dept: LAB | Facility: HOSPITAL | Age: 64
End: 2024-04-29
Payer: MEDICARE

## 2024-04-29 DIAGNOSIS — D50.0 IRON DEFICIENCY ANEMIA DUE TO CHRONIC BLOOD LOSS: ICD-10-CM

## 2024-04-29 LAB
BASOPHILS # BLD AUTO: 0.01 K/UL (ref 0–0.2)
BASOPHILS NFR BLD: 0.2 % (ref 0–1.9)
DIFFERENTIAL METHOD BLD: ABNORMAL
EOSINOPHIL # BLD AUTO: 0.2 K/UL (ref 0–0.5)
EOSINOPHIL NFR BLD: 3.6 % (ref 0–8)
ERYTHROCYTE [DISTWIDTH] IN BLOOD BY AUTOMATED COUNT: 13.7 % (ref 11.5–14.5)
FERRITIN SERPL-MCNC: 58 NG/ML (ref 20–300)
HCT VFR BLD AUTO: 39.2 % (ref 37–48.5)
HGB BLD-MCNC: 12.5 G/DL (ref 12–16)
IMM GRANULOCYTES # BLD AUTO: 0.01 K/UL (ref 0–0.04)
IMM GRANULOCYTES NFR BLD AUTO: 0.2 % (ref 0–0.5)
IRON SERPL-MCNC: 80 UG/DL (ref 30–160)
LYMPHOCYTES # BLD AUTO: 2.4 K/UL (ref 1–4.8)
LYMPHOCYTES NFR BLD: 43.7 % (ref 18–48)
MCH RBC QN AUTO: 29.5 PG (ref 27–31)
MCHC RBC AUTO-ENTMCNC: 31.9 G/DL (ref 32–36)
MCV RBC AUTO: 93 FL (ref 82–98)
MONOCYTES # BLD AUTO: 0.5 K/UL (ref 0.3–1)
MONOCYTES NFR BLD: 8.4 % (ref 4–15)
NEUTROPHILS # BLD AUTO: 2.5 K/UL (ref 1.8–7.7)
NEUTROPHILS NFR BLD: 44.1 % (ref 38–73)
NRBC BLD-RTO: 0 /100 WBC
PLATELET # BLD AUTO: 318 K/UL (ref 150–450)
PMV BLD AUTO: 10 FL (ref 9.2–12.9)
RBC # BLD AUTO: 4.24 M/UL (ref 4–5.4)
SATURATED IRON: 24 % (ref 20–50)
TOTAL IRON BINDING CAPACITY: 334 UG/DL (ref 250–450)
TRANSFERRIN SERPL-MCNC: 226 MG/DL (ref 200–375)
WBC # BLD AUTO: 5.58 K/UL (ref 3.9–12.7)

## 2024-04-29 PROCEDURE — 36415 COLL VENOUS BLD VENIPUNCTURE: CPT | Mod: HCNC,PO | Performed by: NURSE PRACTITIONER

## 2024-04-29 PROCEDURE — 83540 ASSAY OF IRON: CPT | Mod: HCNC | Performed by: NURSE PRACTITIONER

## 2024-04-29 PROCEDURE — 85025 COMPLETE CBC W/AUTO DIFF WBC: CPT | Mod: HCNC,PO | Performed by: NURSE PRACTITIONER

## 2024-04-29 PROCEDURE — 82728 ASSAY OF FERRITIN: CPT | Mod: HCNC | Performed by: NURSE PRACTITIONER

## 2024-05-01 ENCOUNTER — OFFICE VISIT (OUTPATIENT)
Dept: HEMATOLOGY/ONCOLOGY | Facility: CLINIC | Age: 64
End: 2024-05-01
Payer: MEDICARE

## 2024-05-01 VITALS
TEMPERATURE: 98 F | BODY MASS INDEX: 44.41 KG/M2 | WEIGHT: 293 LBS | DIASTOLIC BLOOD PRESSURE: 63 MMHG | SYSTOLIC BLOOD PRESSURE: 103 MMHG | HEIGHT: 68 IN | HEART RATE: 51 BPM | OXYGEN SATURATION: 99 %

## 2024-05-01 DIAGNOSIS — E66.2 CLASS 3 OBESITY WITH ALVEOLAR HYPOVENTILATION, SERIOUS COMORBIDITY, AND BODY MASS INDEX (BMI) OF 40.0 TO 44.9 IN ADULT: ICD-10-CM

## 2024-05-01 DIAGNOSIS — D50.0 IRON DEFICIENCY ANEMIA DUE TO CHRONIC BLOOD LOSS: Primary | ICD-10-CM

## 2024-05-01 PROCEDURE — 1101F PT FALLS ASSESS-DOCD LE1/YR: CPT | Mod: HCNC,CPTII,S$GLB, | Performed by: NURSE PRACTITIONER

## 2024-05-01 PROCEDURE — 3008F BODY MASS INDEX DOCD: CPT | Mod: HCNC,CPTII,S$GLB, | Performed by: NURSE PRACTITIONER

## 2024-05-01 PROCEDURE — 3074F SYST BP LT 130 MM HG: CPT | Mod: HCNC,CPTII,S$GLB, | Performed by: NURSE PRACTITIONER

## 2024-05-01 PROCEDURE — 99999 PR PBB SHADOW E&M-EST. PATIENT-LVL IV: CPT | Mod: PBBFAC,HCNC,, | Performed by: NURSE PRACTITIONER

## 2024-05-01 PROCEDURE — 3288F FALL RISK ASSESSMENT DOCD: CPT | Mod: HCNC,CPTII,S$GLB, | Performed by: NURSE PRACTITIONER

## 2024-05-01 PROCEDURE — 99213 OFFICE O/P EST LOW 20 MIN: CPT | Mod: HCNC,S$GLB,, | Performed by: NURSE PRACTITIONER

## 2024-05-01 PROCEDURE — 1125F AMNT PAIN NOTED PAIN PRSNT: CPT | Mod: HCNC,CPTII,S$GLB, | Performed by: NURSE PRACTITIONER

## 2024-05-01 PROCEDURE — 1159F MED LIST DOCD IN RCRD: CPT | Mod: HCNC,CPTII,S$GLB, | Performed by: NURSE PRACTITIONER

## 2024-05-01 PROCEDURE — 3072F LOW RISK FOR RETINOPATHY: CPT | Mod: HCNC,CPTII,S$GLB, | Performed by: NURSE PRACTITIONER

## 2024-05-01 PROCEDURE — 3078F DIAST BP <80 MM HG: CPT | Mod: HCNC,CPTII,S$GLB, | Performed by: NURSE PRACTITIONER

## 2024-05-01 PROCEDURE — 1160F RVW MEDS BY RX/DR IN RCRD: CPT | Mod: HCNC,CPTII,S$GLB, | Performed by: NURSE PRACTITIONER

## 2024-05-01 NOTE — ASSESSMENT & PLAN NOTE
Iron levels remain adequate. No anemia    -no further intervention at present time  -f/u 4 months with cbc, iron, ferritin  -Discussed S&S to report sooner

## 2024-05-01 NOTE — PROGRESS NOTES
Subjective:       Patient ID: Zakia Price is a 64 y.o. female.    Chief Complaint: review labs. Anemia    HPI: 64 y.o female presenting today for follow up of her ion deficiency anemia previously treated with Feraheme 5/2021. Most recently received Venofer weekly x 4 completed 7/31/2023. Prior EGD/Colonoscopy evaluation reviewed. EGD pathology H. Pylori positive, she completed treatment.. Repeat testing reflect eradication of H. Pylori infection. Colonoscopy unremarkable with recommended repeat in 10 years. VCE without S&S bleeding    EGD for evaluation of c/o N/V 9/2023 unremarkable.     Today she notes feeling well overall. Notes feet/hand feeling cold intermittently.           Social History     Socioeconomic History    Marital status: Single   Tobacco Use    Smoking status: Never    Smokeless tobacco: Never   Substance and Sexual Activity    Alcohol use: No    Drug use: No    Sexual activity: Not Currently     Social Determinants of Health     Financial Resource Strain: Low Risk  (11/14/2023)    Overall Financial Resource Strain (CARDIA)     Difficulty of Paying Living Expenses: Not hard at all   Food Insecurity: Food Insecurity Present (11/14/2023)    Hunger Vital Sign     Worried About Running Out of Food in the Last Year: Never true     Ran Out of Food in the Last Year: Sometimes true   Transportation Needs: No Transportation Needs (11/14/2023)    PRAPARE - Transportation     Lack of Transportation (Medical): No     Lack of Transportation (Non-Medical): No   Physical Activity: Insufficiently Active (11/14/2023)    Exercise Vital Sign     Days of Exercise per Week: 3 days     Minutes of Exercise per Session: 20 min   Stress: No Stress Concern Present (11/14/2023)    [employer] [place] [employer] - Occupational Stress Questionnaire     Feeling of Stress : Not at all   Housing Stability: Low Risk  (11/14/2023)    Housing Stability Vital Sign     Unable to Pay for Housing in the Last Year: No      Number of Places Lived in the Last Year: 1     Unstable Housing in the Last Year: No       Past Medical History:   Diagnosis Date    Acute respiratory failure due to COVID-19     COVID-19     Diabetes mellitus, type 2 1993    BS  didn't check 10/04/2023 Insulin x 2 years    Diabetic neuropathy 01/27/2014    DJD (degenerative joint disease) of knee     DVT (deep venous thrombosis) around 1990's    in leg, is on no anticoagulant therapy presently    Fatty liver     GERD (gastroesophageal reflux disease)     Hypertension associated with diabetes     HECTOR (iron deficiency anemia) 05/13/2021    Multinodular goiter     Obesity, morbid, BMI 50 or higher     Sleep apnea     has no CPAP       Family History   Problem Relation Name Age of Onset    Diabetes Mother Heather Villanueva     Hypertension Mother Heather Villanueva     Heart disease Mother Heather Villanueva 50    Cancer Father Gurwinder Dotson     Arthritis Father Gurwinder Dotson     Breast cancer Sister      Cancer Brother      Cancer Brother Gurwinder Buchanan     Leukemia Son Rafa Price     Cancer Son Rafa Price        Past Surgical History:   Procedure Laterality Date    COLONOSCOPY N/A 5/12/2021    Procedure: COLONOSCOPY;  Surgeon: Carolina Rizo MD;  Location: [facility];  Service: Endoscopy;  Laterality: N/A;    EPIDURAL STEROID INJECTION N/A 12/10/2021    Procedure: Lumbar L5/S1 IL TEETEE  Would like AM procedure, if possible;  Surgeon: Nae Paul MD;  Location: [facility]IN [facility];  Service: Pain Management;  Laterality: N/A;    EPIDURAL STEROID INJECTION INTO CERVICAL SPINE N/A 10/8/2021    Procedure: C7-T1 IL TEETEE-no sedation.  Needs IV-just incase;  Surgeon: Nae Paul MD;  Location: [facility];  Service: Pain Management;  Laterality: N/A;    ESOPHAGOGASTRODUODENOSCOPY N/A 7/8/2021    Procedure: EGD (ESOPHAGOGASTRODUODENOSCOPY) previous positve covid;  Surgeon: Jann Gutierrez MD;  Location: [facility];  Service: Endoscopy;  Laterality: N/A;    ESOPHAGOGASTRODUODENOSCOPY  N/A 9/18/2023    Procedure: EGD (ESOPHAGOGASTRODUODENOSCOPY);  Surgeon: Gm Leon MD;  Location: [facility] ENDO;  Service: Endoscopy;  Laterality: N/A;    FRACTURE SURGERY      HYSTERECTOMY      INTRALUMINAL GASTROINTESTINAL TRACT IMAGING VIA CAPSULE N/A 10/24/2022    Procedure: IMAGING PROCEDURE, GI TRACT, INTRALUMINAL, VIA CAPSULE;  Surgeon: Hang Lunsford RN;  Location: [facility] ENDO;  Service: Endoscopy;  Laterality: N/A;    SELECTIVE INJECTION OF ANESTHETIC AGENT AROUND LUMBAR SPINAL NERVE ROOT BY TRANSFORAMINAL APPROACH Bilateral 5/6/2022    Procedure: Bilateral L5/S1 TF TEETEE with RN IV sedation;  Surgeon: Nae Paul MD;  Location: [facility];  Service: Pain Management;  Laterality: Bilateral;    SELECTIVE INJECTION OF ANESTHETIC AGENT AROUND LUMBAR SPINAL NERVE ROOT BY TRANSFORAMINAL APPROACH Bilateral 12/30/2022    Procedure: Bilateral L5/S1 TF TEETEE RN IV Sedation;  Surgeon: Nae Paul MD;  Location: [facility]IN [facility];  Service: Pain Management;  Laterality: Bilateral;    SHOULDER ARTHROSCOPY      THYROIDECTOMY, PARTIAL Right     and transplatation of right superior parathyroid gland to the sternocleidomastoid muscle     TONSILLECTOMY      TUBAL LIGATION  1984    WRIST FRACTURE SURGERY      left       Review of Systems   Constitutional:  Negative for activity change, appetite change, chills, fatigue, fever and unexpected weight change.   HENT:  Negative for congestion, mouth sores, nosebleeds, sore throat, trouble swallowing and voice change.    Eyes:  Negative for visual disturbance.   Respiratory:  Negative for cough, chest tightness, shortness of breath and wheezing.    Cardiovascular:  Negative for chest pain and leg swelling.   Gastrointestinal:  Negative for abdominal distention, abdominal pain, anal bleeding, blood in stool, diarrhea, nausea and vomiting.   Endocrine: Positive for cold intolerance.   Genitourinary:  Negative for difficulty urinating, dysuria and hematuria.   Musculoskeletal:   Negative for arthralgias, back pain and myalgias.   Skin:  Negative for pallor, rash and wound.   Neurological:  Negative for dizziness, syncope, weakness and headaches.   Hematological:  Negative for adenopathy. Does not bruise/bleed easily.   Psychiatric/Behavioral:  The patient is not nervous/anxious.        Medication List with Changes/Refills   Current Medications    ALBUTEROL (PROVENTIL/VENTOLIN HFA) 90 MCG/ACTUATION INHALER        AZELASTINE (ASTELIN) 137 MCG (0.1 %) NASAL SPRAY    1 spray (137 mcg total) by Nasal route 2 (two) times daily.    CELECOXIB (CELEBREX) 200 MG CAPSULE    Take 1 capsule (200 mg total) by mouth 2 (two) times daily with meals.    DICLOFENAC (VOLTAREN) 75 MG EC TABLET    Take 1 tablet (75 mg total) by mouth 2 (two) times daily as needed (pain).    DILTIAZEM (CARDIZEM) 30 MG TABLET    TAKE 1 TABLET EVERY 12 HOURS    EPINEPHRINE (EPIPEN) 0.3 MG/0.3 ML ATIN    Inject into the muscle.    EZETIMIBE (ZETIA) 10 MG TABLET    Take 1 tablet (10 mg total) by mouth once daily.    FAMOTIDINE (PEPCID) 40 MG TABLET    Take 1 tablet (40 mg total) by mouth once daily.    FLUTICASONE PROPIONATE (FLONASE) 50 MCG/ACTUATION NASAL SPRAY    Use 2 sprays to each nostril daily    HYDROCODONE-ACETAMINOPHEN (NORCO) 5-325 MG PER TABLET    Take 1 tablet by mouth every 6 (six) hours as needed.    INHALATION SPACING DEVICE (BREATHERITE VALVED MDI CHAMBER)    Use as directed for inhalation.    LEVOTHYROXINE (EUTHYROX) 100 MCG TABLET    Take 1 tablet (100 mcg total) by mouth once daily.    MELOXICAM (MOBIC) 15 MG TABLET    Take 1 tablet (15 mg total) by mouth once daily.    METHOCARBAMOL (ROBAXIN) 500 MG TAB    Take 1 tablet (500 mg total) by mouth 2 (two) times daily as needed (muscle spasms).    METHYLPREDNISOLONE (MEDROL DOSEPACK) 4 MG TABLET    follow package directions    PREGABALIN (LYRICA) 75 MG CAPSULE    Take 1 capsule (75 mg total) by mouth 3 (three) times daily.    ROPINIROLE (REQUIP) 0.5 MG TABLET    Take  1 tablet (0.5 mg total) by mouth every evening.    SEMAGLUTIDE (OZEMPIC) 2 MG/DOSE (8 MG/3 ML) PNIJ    Inject 2 mg into the skin every 7 days.    SULFACETAMIDE SODIUM 10% (BLEPH-10) 10 % OPHTHALMIC SOLUTION    Place 2 drops into the left eye 4 (four) times daily.    TIZANIDINE (ZANAFLEX) 4 MG TABLET    TAKE 1/2 TO 1 TABLET TWICE DAILY AS NEEDED FOR MUSCLE SPASMS. MAY CAUSE DROWSINESS.    TOPIRAMATE (TOPAMAX) 100 MG TABLET    Take 1 tablet by mouth 2 (two) times daily.    TRELEGY ELLIPTA 100-62.5-25 MCG DSDV    Inhale 1 puff into the lungs once daily.     Objective:     Vitals:    05/01/24 0923   BP: 103/63   Pulse: (!) 51   Temp: 97.7 °F (36.5 °C)       Lab Results   Component Value Date    WBC 5.58 04/29/2024    HGB 12.5 04/29/2024    HCT 39.2 04/29/2024    MCV 93 04/29/2024     04/29/2024       BMP  Lab Results   Component Value Date     12/26/2023    K 3.8 12/26/2023     12/26/2023    CO2 22 (L) 12/26/2023    BUN 11 12/26/2023    CREATININE 0.8 12/26/2023    CALCIUM 9.0 12/26/2023    ANIONGAP 12 12/26/2023    ESTGFRAFRICA >60.0 02/24/2022    EGFRNONAA >60.0 02/24/2022     Lab Results   Component Value Date    ALT 10 12/26/2023    AST 13 12/26/2023    ALKPHOS 72 12/26/2023    BILITOT 0.3 12/26/2023     Lab Results   Component Value Date    IRON 80 04/29/2024    TIBC 334 04/29/2024    FERRITIN 58 04/29/2024       Physical Exam  HENT:      Nose: Nose normal.   Eyes:      Conjunctiva/sclera: Conjunctivae normal.   Pulmonary:      Effort: Pulmonary effort is normal. No respiratory distress.   Musculoskeletal:      Cervical back: Normal range of motion.   Skin:     General: Skin is warm and dry.   Neurological:      Mental Status: She is alert and oriented to person, place, and time.        Assessment:     Problem List Items Addressed This Visit          Oncology    HECTOR (iron deficiency anemia) - Primary     Iron levels remain adequate. No anemia    -no further intervention at present time  -f/u 4  months with cbc, iron, ferritin  -Discussed S&S to report sooner         Relevant Orders    CBC Auto Differential    Iron and TIBC    Ferritin       Endocrine    Class 3 obesity with alveolar hypoventilation, serious comorbidity, and body mass index (BMI) of 40.0 to 44.9 in adult     Encourage healthy nutrition along with portion control. Encourage daily exercise               Med Onc Chart Routing      Follow up with physician    Follow up with KIMBER 4 months.   Infusion scheduling note    Injection scheduling note    Labs CBC, ferritin and iron and TIBC   Scheduling:  Preferred lab:  Lab interval:  1-2 days prior   Imaging None      Pharmacy appointment No pharmacy appointment needed      Other referrals       No additional referrals needed         Plan:     Iron deficiency anemia due to chronic blood loss  -     CBC Auto Differential; Future; Expected date: 05/01/2024  -     Iron and TIBC; Future; Expected date: 05/01/2024  -     Ferritin; Future; Expected date: 05/01/2024    Class 3 obesity with alveolar hypoventilation, serious comorbidity, and body mass index (BMI) of 40.0 to 44.9 in adult              KARO Arroyo

## 2024-05-09 DIAGNOSIS — M25.551 HIP PAIN, RIGHT: ICD-10-CM

## 2024-05-09 RX ORDER — TIZANIDINE 4 MG/1
TABLET ORAL
Qty: 90 TABLET | Refills: 1 | Status: SHIPPED | OUTPATIENT
Start: 2024-05-09

## 2024-05-09 NOTE — TELEPHONE ENCOUNTER
Refill Routing Note   Medication(s) are not appropriate for processing by [facility] for the following reason(s):        Non-participating provider    [facility] action(s):  Route               Appointments  past 12m or future 3m with PCP    Date Provider   Last Visit   2/19/2024 Rose Price NP   Next Visit   Visit date not found Rose Price NP   ED visits in past 90 days: 0        Note composed:12:56 PM 05/09/2024

## 2024-05-13 DIAGNOSIS — Z79.899 ENCOUNTER FOR LONG-TERM (CURRENT) USE OF MEDICATIONS: ICD-10-CM

## 2024-05-13 RX ORDER — LEVOTHYROXINE SODIUM 100 UG/1
100 TABLET ORAL
Qty: 90 TABLET | Refills: 1 | Status: SHIPPED | OUTPATIENT
Start: 2024-05-13

## 2024-05-13 NOTE — TELEPHONE ENCOUNTER
No care due was identified.  [facility] Embedded Care Due Messages. Reference number: [id number].   5/13/2024 8:21:29 AM CDT

## 2024-05-13 NOTE — TELEPHONE ENCOUNTER
Refill Decision Note   Zakia Price  is requesting a refill authorization.  Brief Assessment and Rationale for Refill:  Approve     Medication Therapy Plan:         Comments:     Note composed:2:15 PM 05/13/2024

## 2024-05-18 ENCOUNTER — PATIENT MESSAGE (OUTPATIENT)
Dept: FAMILY MEDICINE | Facility: CLINIC | Age: 64
End: 2024-05-18
Payer: MEDICARE

## 2024-05-18 DIAGNOSIS — E11.65 TYPE 2 DIABETES MELLITUS WITH HYPERGLYCEMIA, WITHOUT LONG-TERM CURRENT USE OF INSULIN: Chronic | ICD-10-CM

## 2024-05-18 NOTE — TELEPHONE ENCOUNTER
No care due was identified.  [facility] Embedded Care Due Messages. Reference number: [id number].   5/18/2024 10:18:49 AM CDT   Pt lost her vaccine card. Stopped in requesting a new one.

## 2024-05-20 RX ORDER — SEMAGLUTIDE 2.68 MG/ML
2 INJECTION, SOLUTION SUBCUTANEOUS
Qty: 9 ML | Refills: 0 | Status: SHIPPED | OUTPATIENT
Start: 2024-05-20 | End: 2025-10-06

## 2024-05-20 NOTE — TELEPHONE ENCOUNTER
Refill Decision Note   Zakia Price  is requesting a refill authorization.  Brief Assessment and Rationale for Refill:  Approve     Medication Therapy Plan:        Comments:     Note composed:10:13 AM 05/20/2024

## 2024-05-24 RX ORDER — PREGABALIN 75 MG/1
75 CAPSULE ORAL 3 TIMES DAILY
Qty: 90 CAPSULE | Refills: 0 | Status: SHIPPED | OUTPATIENT
Start: 2024-05-24

## 2024-05-24 NOTE — TELEPHONE ENCOUNTER
Refill Routing Note   Medication(s) are not appropriate for processing by [facility] for the following reason(s):        Outside of protocol    [facility] action(s):  Route             Appointments  past 12m or future 3m with PCP    Date Provider   Last Visit   11/29/2023 Oleksandr Nagel MD   Next Visit   5/29/2024 Oleksandr Nagel MD   ED visits in past 90 days: 0        Note composed:1:43 PM 05/24/2024

## 2024-05-27 ENCOUNTER — PROCEDURE VISIT (OUTPATIENT)
Dept: SPORTS MEDICINE | Facility: CLINIC | Age: 64
End: 2024-05-27
Payer: MEDICARE

## 2024-05-27 DIAGNOSIS — M17.12 PRIMARY OSTEOARTHRITIS OF LEFT KNEE: ICD-10-CM

## 2024-05-27 DIAGNOSIS — M17.11 PRIMARY OSTEOARTHRITIS OF RIGHT KNEE: Primary | ICD-10-CM

## 2024-05-27 DIAGNOSIS — M12.812 ROTATOR CUFF ARTHROPATHY OF LEFT SHOULDER: ICD-10-CM

## 2024-05-27 PROCEDURE — 99499 UNLISTED E&M SERVICE: CPT | Mod: HCNC,S$GLB,, | Performed by: PHYSICIAN ASSISTANT

## 2024-05-27 PROCEDURE — 20610 DRAIN/INJ JOINT/BURSA W/O US: CPT | Mod: 50,HCNC,S$GLB, | Performed by: PHYSICIAN ASSISTANT

## 2024-05-27 PROCEDURE — 20610 DRAIN/INJ JOINT/BURSA W/O US: CPT | Mod: HCNC,51,XS,LT | Performed by: PHYSICIAN ASSISTANT

## 2024-05-27 RX ORDER — TRIAMCINOLONE ACETONIDE 40 MG/ML
40 INJECTION, SUSPENSION INTRA-ARTICULAR; INTRAMUSCULAR
Status: DISCONTINUED | OUTPATIENT
Start: 2024-05-27 | End: 2024-05-27 | Stop reason: HOSPADM

## 2024-05-27 RX ADMIN — TRIAMCINOLONE ACETONIDE 40 MG: 40 INJECTION, SUSPENSION INTRA-ARTICULAR; INTRAMUSCULAR at 10:05

## 2024-05-27 NOTE — PROCEDURES
Large Joint Aspiration/Injection: L subacromial bursa    Date/Time: 5/27/2024 10:30 AM    Performed by: Rosemary Alonso PA-C  Authorized by: Rosemary Alonso PA-C    Consent Done?:  Yes (Verbal)  Indications:  Pain  Site marked: the procedure site was marked    Timeout: prior to procedure the correct patient, procedure, and site was verified    Prep: patient was prepped and draped in usual sterile fashion      Local anesthesia used?: Yes    Anesthesia:  Local infiltration  Local anesthetic:  Lidocaine 1% without epinephrine    Details:  Needle Size:  22 G  Ultrasonic Guidance for needle placement?: No    Approach:  Posterior  Location:  Shoulder  Site:  L subacromial bursa  Medications:  40 mg triamcinolone acetonide 40 mg/mL  Patient tolerance:  Patient tolerated the procedure well with no immediate complications      Left shoulder SAS CSI given, see last clinic note for details   F/u PRN for shoulder

## 2024-05-27 NOTE — PROCEDURES
Large Joint Aspiration/Injection: bilateral knee    Date/Time: 5/27/2024 10:30 AM    Performed by: Rosemary Alonso PA-C  Authorized by: Rosemary Alonso PA-C    Consent Done?:  Yes (Verbal)  Indications:  Joint swelling and pain  Site marked: the procedure site was marked    Timeout: prior to procedure the correct patient, procedure, and site was verified    Prep: patient was prepped and draped in usual sterile fashion      Local anesthesia used?: Yes    Local anesthetic:  Topical anesthetic    Details:  Needle Size:  22 G  Ultrasonic Guidance for needle placement?: No    Approach:  Anterolateral  Location:  Knee  Laterality:  Bilateral  Site:  Bilateral knee  Medications (Right):  88 mg hyaluronate sodium, stabilized 88 mg/4 mL  Medications (Left):  88 mg hyaluronate sodium, stabilized 88 mg/4 mL  Patient tolerance:  Patient tolerated the procedure well with no immediate complications    Bilateral monovisc 1/1  Left shoulder SAS CSI given, see last clinic note for details  Follow up in 5 months for knee, PRN for shoulder

## 2024-05-29 ENCOUNTER — OFFICE VISIT (OUTPATIENT)
Dept: FAMILY MEDICINE | Facility: CLINIC | Age: 64
End: 2024-05-29
Payer: MEDICARE

## 2024-05-29 ENCOUNTER — HOSPITAL ENCOUNTER (OUTPATIENT)
Dept: RADIOLOGY | Facility: HOSPITAL | Age: 64
Discharge: HOME | End: 2024-05-29
Attending: FAMILY MEDICINE
Payer: MEDICARE

## 2024-05-29 VITALS
SYSTOLIC BLOOD PRESSURE: 108 MMHG | WEIGHT: 293 LBS | OXYGEN SATURATION: 98 % | HEIGHT: 68 IN | DIASTOLIC BLOOD PRESSURE: 64 MMHG | HEART RATE: 72 BPM | BODY MASS INDEX: 44.41 KG/M2

## 2024-05-29 DIAGNOSIS — M46.1 SACROILIITIS: ICD-10-CM

## 2024-05-29 DIAGNOSIS — M19.049 CMC ARTHRITIS: ICD-10-CM

## 2024-05-29 DIAGNOSIS — K21.00 GASTROESOPHAGEAL REFLUX DISEASE WITH ESOPHAGITIS WITHOUT HEMORRHAGE: Primary | ICD-10-CM

## 2024-05-29 DIAGNOSIS — I73.9 CLAUDICATION OF BOTH LOWER EXTREMITIES: ICD-10-CM

## 2024-05-29 DIAGNOSIS — M19.049 HAND ARTHRITIS: ICD-10-CM

## 2024-05-29 DIAGNOSIS — Z79.899 ENCOUNTER FOR LONG-TERM (CURRENT) USE OF MEDICATIONS: ICD-10-CM

## 2024-05-29 DIAGNOSIS — E11.65 TYPE 2 DIABETES MELLITUS WITH HYPERGLYCEMIA, WITHOUT LONG-TERM CURRENT USE OF INSULIN: ICD-10-CM

## 2024-05-29 DIAGNOSIS — H92.09 OTALGIA, UNSPECIFIED LATERALITY: ICD-10-CM

## 2024-05-29 PROCEDURE — 3072F LOW RISK FOR RETINOPATHY: CPT | Mod: HCNC,CPTII,S$GLB, | Performed by: FAMILY MEDICINE

## 2024-05-29 PROCEDURE — 3078F DIAST BP <80 MM HG: CPT | Mod: HCNC,CPTII,S$GLB, | Performed by: FAMILY MEDICINE

## 2024-05-29 PROCEDURE — G2211 COMPLEX E/M VISIT ADD ON: HCPCS | Mod: HCNC,S$GLB,, | Performed by: FAMILY MEDICINE

## 2024-05-29 PROCEDURE — 3074F SYST BP LT 130 MM HG: CPT | Mod: HCNC,CPTII,S$GLB, | Performed by: FAMILY MEDICINE

## 2024-05-29 PROCEDURE — 3008F BODY MASS INDEX DOCD: CPT | Mod: HCNC,CPTII,S$GLB, | Performed by: FAMILY MEDICINE

## 2024-05-29 PROCEDURE — 73130 X-RAY EXAM OF HAND: CPT | Mod: 26,50,HCNC, | Performed by: RADIOLOGY

## 2024-05-29 PROCEDURE — 3288F FALL RISK ASSESSMENT DOCD: CPT | Mod: HCNC,CPTII,S$GLB, | Performed by: FAMILY MEDICINE

## 2024-05-29 PROCEDURE — 99999 PR PBB SHADOW E&M-EST. PATIENT-LVL V: CPT | Mod: PBBFAC,HCNC,, | Performed by: FAMILY MEDICINE

## 2024-05-29 PROCEDURE — 99214 OFFICE O/P EST MOD 30 MIN: CPT | Mod: HCNC,S$GLB,, | Performed by: FAMILY MEDICINE

## 2024-05-29 PROCEDURE — 1126F AMNT PAIN NOTED NONE PRSNT: CPT | Mod: HCNC,CPTII,S$GLB, | Performed by: FAMILY MEDICINE

## 2024-05-29 PROCEDURE — 73130 X-RAY EXAM OF HAND: CPT | Mod: TC,50,HCNC,PO

## 2024-05-29 PROCEDURE — 1101F PT FALLS ASSESS-DOCD LE1/YR: CPT | Mod: HCNC,CPTII,S$GLB, | Performed by: FAMILY MEDICINE

## 2024-05-29 PROCEDURE — 1159F MED LIST DOCD IN RCRD: CPT | Mod: HCNC,CPTII,S$GLB, | Performed by: FAMILY MEDICINE

## 2024-05-29 PROCEDURE — 1160F RVW MEDS BY RX/DR IN RCRD: CPT | Mod: HCNC,CPTII,S$GLB, | Performed by: FAMILY MEDICINE

## 2024-05-29 RX ORDER — DICLOFENAC SODIUM 10 MG/G
2 GEL TOPICAL DAILY
Qty: 100 G | Refills: 0 | Status: SHIPPED | OUTPATIENT
Start: 2024-05-29

## 2024-05-29 RX ORDER — OMEPRAZOLE 40 MG/1
40 CAPSULE, DELAYED RELEASE ORAL DAILY
Qty: 90 CAPSULE | Refills: 3 | Status: SHIPPED | OUTPATIENT
Start: 2024-05-29 | End: 2025-05-29

## 2024-05-29 RX ORDER — HYDROCODONE BITARTRATE AND ACETAMINOPHEN 5; 325 MG/1; MG/1
1 TABLET ORAL EVERY 6 HOURS PRN
Status: CANCELLED | OUTPATIENT
Start: 2024-05-29

## 2024-05-29 NOTE — ASSESSMENT & PLAN NOTE
Avoid heavy lifting.  Consider physical therapy if flaring up.  Avoid anti-inflammatory to GI upset.  Follow-up with pain management.

## 2024-05-29 NOTE — PROGRESS NOTES
1st check to see if patient has seen the results.  If not then  CALL patient with results and Document verification.  Schedule follow-up if needed.  [phone]  X-ray reviewed by radiology.  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  Other findings per report.  I recommend that you follow-up/establish care with orthopedic hand specialist.  Referral placed.

## 2024-05-29 NOTE — PROGRESS NOTES
PLAN:      Assessment & Plan  1. Gastroesophageal Reflux Disease (GERD).  The patient will be initiated on a regimen of omeprazole. Should there be no improvement in her symptoms, a follow-up consultation with her gastroenterologist will be necessary.    2. Hand Pain.  The patient's A1c levels have consistently been within the normal range. Voltaren gel will be prescribed for the patient to apply to her hands. Additionally, an x-ray of her hands will be conducted to rule out any erosion or other serious conditions. Should her symptoms persist, steroid injections may be considered.    Problem List Items Addressed This Visit       Type 2 diabetes mellitus with hyperglycemia, without long-term current use of insulin (Chronic)     Continue current medications.We will plan to monitor hemoglobin A1c at designated intervals 3 to 6 months.  I recommend ongoing Education for diabetic diet and exercise protocol.  We will continue to monitor for side effects.    Please be advised of symptoms to monitor for and to notify me immediately if persistent or worsening.  Follow up with Ophthalmology/Optometry and Podiatry at least annually.           Relevant Orders    Hemoglobin A1C    Gastroesophageal reflux disease with esophagitis without hemorrhage - Primary (Chronic)     Start omeprazole.  Caution with Ozempic could be worsening her GI issues.  Referral to Gastroenterology if no improvement she has not seen them since 2022Worsening reflux.  Stop pantoprazole and switch to Pepcid.  Follow-up sooner if no improvement.  GERD RECOMMENDATIONS  Please be advised of condition course.  - Take PPI in the morning 30-60 minutes before breakfast  - I recommend ongoing Education for lifestyle modifications to help control/reduce reflux/abdominal pain including: avoid large meals, avoid eating within 2-3 hours of bedtime (avoid late night eating & lying down soon after eating), elevate head of bed if nocturnal symptoms are present, smoking  cessation (if current smoker), & weight loss (if overweight).   - please be advised to avoid known foods which trigger reflux symptoms & to minimize/avoid high-fat foods, chocolate, caffeine, citrus, alcohol, & tomato products.  - Advised to avoid/limit use of NSAID's, since they can cause GI upset, bleeding, and/or ulcers. If needed, take with food.          Relevant Medications    omeprazole (PRILOSEC) 40 MG capsule    Encounter for long-term (current) use of medications (Chronic)     Complete history and physical was completed today.  Complete and thorough medication reconciliation was performed.  Discussed risks and benefits of medications.  Advised patient on orders and health maintenance.  We discussed old records and old labs if available.  Will request any records not available through epic.  Continue current medications listed on your summary sheet.           Claudication of both lower extremities (Chronic)     Follow-up with Cardiology and Neurology.         Sacroiliitis (Chronic)     Avoid heavy lifting.  Consider physical therapy if flaring up.  Avoid anti-inflammatory to GI upset.  Follow-up with pain management.         Relevant Medications    diclofenac sodium (VOLTAREN ARTHRITIS PAIN) 1 % Gel    Hand arthritis     Set up x-ray of the hands.  Start Voltaren gel topically.  Stop oral anti-inflammatory.  Patient having some GI upset.  Starting omeprazole for GI protection.           Relevant Medications    diclofenac sodium (VOLTAREN ARTHRITIS PAIN) 1 % Gel    Other Relevant Orders    X-Ray Hand 3 View Bilateral    Otalgia   Likely related to Eustachian tube dysfunction.  Patient has been advised to start Flonase in addition to her other treatments for sinus allergies.  Follow-up with ENT.       Medication Management for assessment above:   Medication List with Changes/Refills   New Medications    DICLOFENAC SODIUM (VOLTAREN ARTHRITIS PAIN) 1 % GEL    Apply 2 g topically once daily.    OMEPRAZOLE  (PRILOSEC) 40 MG CAPSULE    Take 1 capsule (40 mg total) by mouth once daily.   Current Medications    ALBUTEROL (PROVENTIL/VENTOLIN HFA) 90 MCG/ACTUATION INHALER        AZELASTINE (ASTELIN) 137 MCG (0.1 %) NASAL SPRAY    1 spray (137 mcg total) by Nasal route 2 (two) times daily.    DILTIAZEM (CARDIZEM) 30 MG TABLET    TAKE 1 TABLET EVERY 12 HOURS    EPINEPHRINE (EPIPEN) 0.3 MG/0.3 ML ATIN    Inject into the muscle.    EZETIMIBE (ZETIA) 10 MG TABLET    Take 1 tablet (10 mg total) by mouth once daily.    FAMOTIDINE (PEPCID) 40 MG TABLET    Take 1 tablet (40 mg total) by mouth once daily.    FLUTICASONE PROPIONATE (FLONASE) 50 MCG/ACTUATION NASAL SPRAY    Use 2 sprays to each nostril daily    HYDROCODONE-ACETAMINOPHEN (NORCO) 5-325 MG PER TABLET    Take 1 tablet by mouth every 6 (six) hours as needed.    INHALATION SPACING DEVICE (BREATHERITE VALVED MDI CHAMBER)    Use as directed for inhalation.    LEVOTHYROXINE (SYNTHROID) 100 MCG TABLET    TAKE 1 TABLET ONE TIME DAILY    METHOCARBAMOL (ROBAXIN) 500 MG TAB    Take 1 tablet (500 mg total) by mouth 2 (two) times daily as needed (muscle spasms).    PREGABALIN (LYRICA) 75 MG CAPSULE    Take 1 capsule (75 mg total) by mouth 3 (three) times daily.    ROPINIROLE (REQUIP) 0.5 MG TABLET    Take 1 tablet (0.5 mg total) by mouth every evening.    SEMAGLUTIDE (OZEMPIC) 2 MG/DOSE (8 MG/3 ML) PNIJ    Inject 2 mg into the skin every 7 days.    SULFACETAMIDE SODIUM 10% (BLEPH-10) 10 % OPHTHALMIC SOLUTION    Place 2 drops into the left eye 4 (four) times daily.    TIZANIDINE (ZANAFLEX) 4 MG TABLET    TAKE 1/2 TO 1 TABLET TWICE DAILY AS NEEDED FOR MUSCLE SPASMS. MAY CAUSE DROWSINESS.    TOPIRAMATE (TOPAMAX) 100 MG TABLET    Take 1 tablet by mouth 2 (two) times daily.    TRELEGY ELLIPTA 100-62.5-25 MCG DSDV    Inhale 1 puff into the lungs once daily.   Discontinued Medications    CELECOXIB (CELEBREX) 200 MG CAPSULE    Take 1 capsule (200 mg total) by mouth 2 (two) times daily with  meals.    DICLOFENAC (VOLTAREN) 75 MG EC TABLET    Take 1 tablet (75 mg total) by mouth 2 (two) times daily as needed (pain).    DICLOFENAC SODIUM 1 % GEL    Apply 2 g topically 4 (four) times daily. Dispense covered medication (brand or generic)    MELOXICAM (MOBIC) 15 MG TABLET    Take 1 tablet (15 mg total) by mouth once daily.    METHYLPREDNISOLONE (MEDROL DOSEPACK) 4 MG TABLET    follow package directions       Oleksandr Nagel M.D.  ==========================================================================  Subjective:   Patient ID: Zakia Price is a 64 y.o. female.  has a past medical history of Acute respiratory failure due to COVID-19, COVID-19, Diabetes mellitus, type 2 (1993), Diabetic neuropathy (01/27/2014), DJD (degenerative joint disease) of knee, DVT (deep venous thrombosis) (around 1990's), Fatty liver, GERD (gastroesophageal reflux disease), Hypertension associated with diabetes, HECTOR (iron deficiency anemia) (05/13/2021), Multinodular goiter, Obesity, morbid, BMI 50 or higher, and Sleep apnea.   Chief Complaint: Follow-up      History of Present Illness  The patient is a 64-year-old female here for follow-up on chronic medical conditions. She is accompanied by an adult female.    The patient reports experiencing upper abdominal pain, particularly postprandial, which intensifies upon lying down. She is currently managing her reflux with Pepcid. She is uncertain if she has previously taken omeprazole. An upper endoscopy conducted in 09/2023 revealed normal esophagus, stomach, and duodenum, along with biopsies taken. Mild chronic gastritis was observed, but negative for H. pylori.    The patient is seeking advice on potential treatment options for her hand pain. She has a history of intermittent hand pain, which has recently intensified. Her current medication regimen includes Celebrex, meloxicam, and diclofenac, the latter of which she has not used recently.    The patient is currently on  Ozempic 2 mg.    Supplemental Information  Her ears have been bothering her. She has sinus congestion. She is using a nasal spray.    Problem List Items Addressed This Visit       Type 2 diabetes mellitus with hyperglycemia, without long-term current use of insulin (Chronic)    Overview     May 2024:  Patient is no longer on metformin.  She continues on Ozempic.  She is having some GI issues.  Diabetes Medications               semaglutide (OZEMPIC) 2 mg/dose (8 mg/3 mL) PnIj Inject 2 mg into the skin every 7 days.          November 2023:  Patient reports compliance.  Patient has lost weight on medication.  Diabetes Medications               metFORMIN (GLUCOPHAGE) 1000 MG tablet Take 1 tablet (1,000 mg total) by mouth daily with breakfast.    semaglutide (OZEMPIC) 2 mg/dose (8 mg/3 mL) PnIj INJECT 2 MG INTO THE SKIN EVERY 7 DAYS.        January 2023:  Doing well on current regimen.  BMI Readings from Last 10 Encounters:   05/29/24 48.29 kg/m²   05/01/24 48.07 kg/m²   04/23/24 46.53 kg/m²   02/27/24 46.53 kg/m²   02/19/24 46.65 kg/m²   12/28/23 44.30 kg/m²   11/27/23 43.18 kg/m²   11/15/23 43.23 kg/m²   11/02/23 43.18 kg/m²   10/17/23 43.18 kg/m²   November 2019:  Reviewed diabetes management status.  Patient was due for LDL less than 100 at that time.  Also needing foot exam.DECEMBER 2019:  Patient reports issues with ACE-inhibitor.  Currently having cough.  Only on amlodipine.Diabetes Management StatusStatin: TakingACE/ARB: Not takingMARCH 2020:  Diabetes Management StatusStatin: TakingACE/ARB: Not takingSEPTEMBER 2020:  Reviewed Diabetes Management Status.  Patient is taking statin but not Ace or Arb.  July 2021:Diabetes Management StatusStatin: TakingACE/ARB: Not taking  January 2022:  Patient reports weight loss with Ozempic.  Diabetes Management Status    Statin: Not taking  ACE/ARB: Not taking    Screening or Prevention Patient's value Goal Complete/Controlled?   HgA1C Testing and Control   Lab Results  "  Component Value Date    HGBA1C 4.8 12/26/2023      Annually/Less than 8% Yes   Lipid profile : 12/26/2023 Annually Yes   LDL control Lab Results   Component Value Date    LDLCALC 134.6 12/26/2023    Annually/Less than 100 mg/dl  No   Nephropathy screening Lab Results   Component Value Date    LABMICR 10.0 12/26/2023     Lab Results   Component Value Date    PROTEINUA Negative 10/28/2015     No results found for: "UTPCR"   Annually Yes   Blood pressure BP Readings from Last 1 Encounters:   05/29/24 108/64    Less than 140/90 Yes   Dilated retinal exam : 10/05/2023 Annually Yes   Foot exam   : 07/18/2023 Annually Yes              Current Assessment & Plan     Continue current medications.We will plan to monitor hemoglobin A1c at designated intervals 3 to 6 months.  I recommend ongoing Education for diabetic diet and exercise protocol.  We will continue to monitor for side effects.    Please be advised of symptoms to monitor for and to notify me immediately if persistent or worsening.  Follow up with Ophthalmology/Optometry and Podiatry at least annually.           Gastroesophageal reflux disease with esophagitis without hemorrhage - Primary (Chronic)    Overview     May 2024: Patient had upper EGD which showed mild chronic gastritis on biopsy negative for H pylori.  She is taking Pepcid with minimal improvement her symptoms.  Worse lying flat.    Chronic.  October 2023: Patient reports hoarseness that has been ongoing for the last few days.  Also having some upper respiratory symptoms.  August 2023: Patient reports compliance with pantoprazole.  Reports abdominal pain after eating.  History of H pylori.    April 2022:  Currently uncontrolled.  History of H pylori.  Previous HPI:  on Zantac.  However medication has been pharmacy recall.  Patient needing replacement medication for this condition.  Reported compliance.  Symptoms are controlled.  No side effects reported.  Final Pathologic DiagnosisStomach, biopsy:  - " Mild chronic inactive gastritis  - Negative for Helicobacter pylori on H&E stain and immunostain  - Negative for intestinal metaplasia, dysplasia or malignancy    NOTE: Positive control and internal negative control for Helicobacter pylori immunostain were examined and were appropriate.  Comment: Interp By Didier Reina MD, PhD, Signed on 09/22/2023 at 10:03Microscopic Exam  Microscopic examination performed.  GrossSurgery ID:  [id number]   Pathology ID:  [id number]    1. Received in a formalin filled container and designated as &quot;gastric biopsy rule out H pylori&quot;, is 1 fragment of tan-brown tissue measuring 0.2 x 0.2 x 0.1 cm. The specimen is entirely submitted in cassette 1A.  BBV--1-A      Grossed by: Sabra Barahona  DisclaimerUnless the case is a 'gross only' or additional testing only, the final diagnosis for each specimen is based on a microscopic examination of appropriate tissue sections.Resulting Agency    Impression:            - Normal esophagus.                          - Normal stomach. Biopsied.                          - Normal examined duodenum.   Recommendation:        - Discharge patient to home.                          - Resume previous diet.                          - Continue present medications.                          - Await pathology results.                          - Return to referring physician as previously                          scheduled.   MD Gm Reynolds MD   9/18/2023 10:57:18 AM     Final Pathologic Diagnosis 1. Small bowel, biopsies:   - Small bowel mucosa without significant histopathologic alteration   - Negative for active inflammation or celiac-like injury   - Negative for dysplasia or malignancy   2. Stomach, antrum, biopsies:   - Acute active gastritis   - Positive for Helicobacter pylori on immunostain   - Negative for intestinal metaplasia, dysplasia or malignancy   NOTE: Positive control and internal negative control for  Helicobacter pylori   immunostain were examined and were appropriate.   3. Stomach, nodule, biopsies:   - Reactive gastropathy with foveolar hyperplasia   - Negative for Helicobacter pylori on H&E stain   - Negative for intestinal metaplasia, dysplasia or malignancy    Comment: Interp By Didier Reina MD, PhD, Signed on 07/14/2021 at 07:58         Findings:        The cricopharyngeus, upper third of the esophagus, middle third of        the esophagus, lower third of the esophagus, lower esophageal        sphincter and gastroesophageal junction were normal.        The Z-line was regular and was found 40 cm from the incisors.        The cardia, gastric fundus, gastric body and pylorus were normal.        Patchy mildly erythematous mucosa without bleeding was found in the        gastric antrum. Biopsies were taken with a cold forceps for        histology. Biopsies were taken with a cold forceps for Helicobacter        pylori testing. Estimated blood loss was minimal.        A single small submucosal papule (nodule) with no bleeding and no        stigmata of recent bleeding was found in the gastric antrum.        Biopsies were taken with a cold forceps for histology. Estimated        blood loss was minimal.        The first portion of the duodenum and second portion of the duodenum        were normal. Biopsies for histology were taken with a cold forceps        for evaluation of celiac disease. Estimated blood loss was minimal.   Impression:            - Normal cricopharyngeus, upper third of                          esophagus, middle third of esophagus, lower third                          of esophagus, lower esophageal sphincter and                          gastroesophageal junction.                          - Z-line regular, 40 cm from the incisors.                          - Normal cardia, gastric fundus, gastric body and                          pylorus.                          - Erythematous mucosa in the  antrum. Biopsied.                          - A single submucosal papule (nodule) found in the                          stomach. Biopsied.                          - Normal first portion of the duodenum and second                          portion of the duodenum. Biopsied.   Recommendation:        - Patient has a contact number available for                          emergencies. The signs and symptoms of potential                          delayed complications were discussed with the                          patient. Return to normal activities tomorrow.                          Written discharge instructions were provided to                          the patient.                          - The patient should eat a bland diet and avoid                          caffeine, carbonation, alcohol, nicotine, aspirin                          and NSAID's including ibuprofen and advil.                          - Continue present medications.                          - Telephone my office for pathology results in 2                          weeks.                          - Discharge patient to home (via wheelchair).   Jann Gutierrez MD   7/8/2021 1:53:14 PM          Current Assessment & Plan     Start omeprazole.  Caution with Ozempic could be worsening her GI issues.  Referral to Gastroenterology if no improvement she has not seen them since 2022Worsening reflux.  Stop pantoprazole and switch to Pepcid.  Follow-up sooner if no improvement.  GERD RECOMMENDATIONS  Please be advised of condition course.  - Take PPI in the morning 30-60 minutes before breakfast  - I recommend ongoing Education for lifestyle modifications to help control/reduce reflux/abdominal pain including: avoid large meals, avoid eating within 2-3 hours of bedtime (avoid late night eating & lying down soon after eating), elevate head of bed if nocturnal symptoms are present, smoking cessation (if current smoker), & weight loss (if overweight).   - please be  advised to avoid known foods which trigger reflux symptoms & to minimize/avoid high-fat foods, chocolate, caffeine, citrus, alcohol, & tomato products.  - Advised to avoid/limit use of NSAID's, since they can cause GI upset, bleeding, and/or ulcers. If needed, take with food.          Encounter for long-term (current) use of medications (Chronic)    Overview     May 2024:  Reviewed labs.  November 2023: Reviewed labs.  October 2023: Reviewed labs.  August 2023: Reviewed labs.  June 2023: Reviewed labs.  January 2023:  Reviewed labs.  CHRONIC. Stable. Compliant with medications for managed conditions. See medication list. No SE reported. Routine lab analysis is being monitored. Refills were addressed.  January 2021:CHRONIC. Stable. Compliant with medications for managed conditions. See medication list. No SE reported. Routine lab analysis is being monitored. Refills were addressed.  July 2021:  Reviewed labs.  January 2022:  Reviewed labs.  February 2022:  Reviewed labs.  July 2022:  Reviewed labs.  Lab Results   Component Value Date    WBC 5.58 04/29/2024    HGB 12.5 04/29/2024    HCT 39.2 04/29/2024    MCV 93 04/29/2024     04/29/2024         Chemistry        Component Value Date/Time     12/26/2023 1015    K 3.8 12/26/2023 1015     12/26/2023 1015    CO2 22 (L) 12/26/2023 1015    BUN 11 12/26/2023 1015    CREATININE 0.8 12/26/2023 1015    GLU 86 12/26/2023 1015        Component Value Date/Time    CALCIUM 9.0 12/26/2023 1015    ALKPHOS 72 12/26/2023 1015    AST 13 12/26/2023 1015    ALT 10 12/26/2023 1015    BILITOT 0.3 12/26/2023 1015    ESTGFRAFRICA >60.0 02/24/2022 1255    EGFRNONAA >60.0 02/24/2022 1255          Lab Results   Component Value Date    TSH 1.482 12/26/2023    FREET4 0.95 12/26/2023    T3FREE 2.5 12/26/2023            Current Assessment & Plan     Complete history and physical was completed today.  Complete and thorough medication reconciliation was performed.  Discussed risks  and benefits of medications.  Advised patient on orders and health maintenance.  We discussed old records and old labs if available.  Will request any records not available through epic.  Continue current medications listed on your summary sheet.           Claudication of both lower extremities (Chronic)    Overview     Chronic.  Intermittent controlled.  Patient follows with Cardiology.         Current Assessment & Plan     Follow-up with Cardiology and Neurology.         Sacroiliitis (Chronic)    Overview     May 2024:  Patient states active helping keep grandkids.  Previous history:  Chronic.  Intermittent control.  Patient is taking tizanidine and meloxicam along with occasionally Robaxin, pregabalin.         Current Assessment & Plan     Avoid heavy lifting.  Consider physical therapy if flaring up.  Avoid anti-inflammatory to GI upset.  Follow-up with pain management.         Hand arthritis    Overview     Chronic.  Worsening.  Patient reports bilateral hand pain worse in the knuckles.         Current Assessment & Plan     Set up x-ray of the hands.  Start Voltaren gel topically.  Stop oral anti-inflammatory.  Patient having some GI upset.  Starting omeprazole for GI protection.           Otalgia        Review of patient's allergies indicates:   Allergen Reactions    Codeine sulfate      Nausea^    Lisinopril Swelling     angioedema    Codeine Nausea Only and Rash     Current Outpatient Medications   Medication Instructions    albuterol (PROVENTIL/VENTOLIN HFA) 90 mcg/actuation inhaler No dose, route, or frequency recorded.    azelastine (ASTELIN) 137 mcg, Nasal, 2 times daily    diclofenac sodium (VOLTAREN ARTHRITIS PAIN) 2 g, Topical (Top), Daily    diltiaZEM (CARDIZEM) 30 mg, Oral, Every 12 hours    EPINEPHrine (EPIPEN) 0.3 mg/0.3 mL AtIn Intramuscular    ezetimibe (ZETIA) 10 mg, Oral, Daily    famotidine (PEPCID) 40 mg, Oral, Daily    fluticasone propionate (FLONASE) 50 mcg/actuation nasal spray Use 2  "sprays to each nostril daily    HYDROcodone-acetaminophen (NORCO) 5-325 mg per tablet 1 tablet, Oral, Every 6 hours PRN    inhalation spacing device (BREATHERITE VALVED MDI CHAMBER) Use as directed for inhalation.    levothyroxine (SYNTHROID) 100 mcg, Oral    methocarbamoL (ROBAXIN) 500 mg, Oral, 2 times daily PRN    omeprazole (PRILOSEC) 40 mg, Oral, Daily    OZEMPIC 2 mg, Subcutaneous, Every 7 days    pregabalin (LYRICA) 75 mg, Oral, 3 times daily    rOPINIRole (REQUIP) 0.5 mg, Oral, Nightly    sulfacetamide sodium 10% (BLEPH-10) 10 % ophthalmic solution 2 drops, Left Eye, 4 times daily    tiZANidine (ZANAFLEX) 4 MG tablet TAKE 1/2 TO 1 TABLET TWICE DAILY AS NEEDED FOR MUSCLE SPASMS. MAY CAUSE DROWSINESS.    topiramate (TOPAMAX) 100 MG tablet 1 tablet, Oral, 2 times daily    TRELEGY ELLIPTA 100-62.5-25 mcg DsDv 1 puff, Inhalation, Daily      I have reviewed the PMH, social history, FamilyHx, surgical history, allergies and medications documented / confirmed by the patient at the time of this visit.  Review of Systems   Constitutional:  Negative for chills, fatigue, fever and unexpected weight change.   HENT:  Positive for congestion, ear pain and postnasal drip. Negative for rhinorrhea and sore throat.    Eyes:  Negative for redness and visual disturbance.   Respiratory:  Negative for cough, shortness of breath and wheezing.    Cardiovascular:  Negative for chest pain and palpitations.   Gastrointestinal:  Negative for nausea and vomiting.   Genitourinary:  Negative for difficulty urinating and hematuria.   Musculoskeletal:  Positive for arthralgias and myalgias.   Skin:  Negative for rash and wound.   Allergic/Immunologic: Negative for environmental allergies.   Neurological:  Negative for weakness and headaches.   Psychiatric/Behavioral:  Negative for sleep disturbance. The patient is not nervous/anxious.      Objective:   /64   Pulse 72   Ht 5' 8" (1.727 m)   Wt (!) 144.1 kg (317 lb 9.6 oz)   SpO2 98% "   BMI 48.29 kg/m²   Physical Exam  Vitals and nursing note reviewed.   Constitutional:       General: She is not in acute distress.     Appearance: She is well-developed. She is not ill-appearing, toxic-appearing or diaphoretic.   HENT:      Head: Normocephalic and atraumatic.      Right Ear: Hearing, tympanic membrane, ear canal and external ear normal. There is no impacted cerumen.      Left Ear: Hearing, tympanic membrane, ear canal and external ear normal. There is no impacted cerumen.      Nose: Congestion present. No rhinorrhea.      Mouth/Throat:      Pharynx: No oropharyngeal exudate or posterior oropharyngeal erythema.   Eyes:      General: Lids are normal.      Extraocular Movements: Extraocular movements intact.      Conjunctiva/sclera: Conjunctivae normal.      Pupils: Pupils are equal, round, and reactive to light.   Neck:      Vascular: No carotid bruit.   Cardiovascular:      Rate and Rhythm: Normal rate.      Pulses: Normal pulses.   Pulmonary:      Effort: Pulmonary effort is normal. No respiratory distress.      Breath sounds: Normal breath sounds.   Abdominal:      General: Bowel sounds are normal.      Palpations: Abdomen is soft.   Musculoskeletal:         General: Tenderness and deformity (Hands) present. Normal range of motion.      Cervical back: Normal range of motion and neck supple.   Lymphadenopathy:      Cervical: No cervical adenopathy.   Skin:     General: Skin is warm and dry.      Capillary Refill: Capillary refill takes less than 2 seconds.      Coloration: Skin is not pale.   Neurological:      General: No focal deficit present.      Mental Status: She is alert and oriented to person, place, and time. She is not disoriented.   Psychiatric:         Attention and Perception: She is attentive.         Mood and Affect: Mood normal. Mood is not anxious or depressed.         Speech: Speech is not rapid and pressured or slurred.         Behavior: Behavior normal. Behavior is not  agitated, aggressive or hyperactive. Behavior is cooperative.         Thought Content: Thought content normal. Thought content is not paranoid or delusional. Thought content does not include homicidal or suicidal ideation. Thought content does not include homicidal or suicidal plan.         Cognition and Memory: Memory is not impaired.         Judgment: Judgment normal.       Physical Exam      Results  Laboratory Studies  Blood counts and iron levels are good. A1c has been perfect.    Imaging  Upper endoscopy showed normal esophagus, normal stomach, normal duodenum, mild chronic gastritis, negative for H. pylori.    Assessment:     1. Gastroesophageal reflux disease with esophagitis without hemorrhage    2. Encounter for long-term (current) use of medications    3. Sacroiliitis    4. Claudication of both lower extremities    5. Hand arthritis    6. Type 2 diabetes mellitus with hyperglycemia, without long-term current use of insulin    7. Otalgia, unspecified laterality      MDM:   Moderate medical complexity.  Moderate risk.  Total time: 21 minutes.  This includes total time spent on the encounter, which includes face to face time and non-face to face time preparing to see the patient (eg, review of previous medical records, tests), Obtaining and/or reviewing separately obtained history, documenting clinical information in the electronic or other health record, independently interpreting results (not separately reported)/communicating results to the patient/family/caregiver, and/or care coordination (not separately reported).    I have Reviewed and summarized old records.  I have performed thorough medication reconciliation today and discussed risk and benefits of medications.  I have reviewed labs and discussed with patient.  All questions were answered.  I am requesting old records and will review them once they are available.Visit today included increased complexity associated with the care of the episodic  problem see above assessment addressed and managing the longitudinal care of the patient due to the serious and/or complex managed problem(s) see above.    I have signed for the following orders AND/OR meds.  Orders Placed This Encounter   Procedures    X-Ray Hand 3 View Bilateral     Standing Status:   Future     Number of Occurrences:   1     Standing Expiration Date:   5/29/2025     Order Specific Question:   May the Radiologist modify the order per protocol to meet the clinical needs of the patient?     Answer:   Yes     Order Specific Question:   Release to patient     Answer:   Immediate    Hemoglobin A1C     Standing Status:   Future     Standing Expiration Date:   8/27/2025     Medications Ordered This Encounter   Medications    diclofenac sodium (VOLTAREN ARTHRITIS PAIN) 1 % Gel     Sig: Apply 2 g topically once daily.     Dispense:  100 g     Refill:  0    omeprazole (PRILOSEC) 40 MG capsule     Sig: Take 1 capsule (40 mg total) by mouth once daily.     Dispense:  90 capsule     Refill:  3        Follow up in about 3 months (around 8/29/2024), or if symptoms worsen or fail to improve, for Med refills, LAB RESULTS.      If no improvement in symptoms or symptoms worsen, advised to call/follow-up at clinic or go to ER. Patient voiced understanding and all questions/concerns were addressed.   DISCLAIMER: This note was compiled by using a speech recognition dictation system and therefore please be aware that typographical / speech recognition errors can and do occur.  Please contact me if you see any errors specifically.  Consent was obtained for JEROME recording system prior to the visit.    Oleksandr Nagel M.D.       Office: [phone]   [address] [place] [address]  FAX: [phone]

## 2024-05-29 NOTE — ASSESSMENT & PLAN NOTE
Set up x-ray of the hands.  Start Voltaren gel topically.  Stop oral anti-inflammatory.  Patient having some GI upset.  Starting omeprazole for GI protection.

## 2024-05-29 NOTE — ASSESSMENT & PLAN NOTE
Start omeprazole.  Caution with Ozempic could be worsening her GI issues.  Referral to Gastroenterology if no improvement she has not seen them since 2022Worsening reflux.  Stop pantoprazole and switch to Pepcid.  Follow-up sooner if no improvement.  GERD RECOMMENDATIONS  Please be advised of condition course.  - Take PPI in the morning 30-60 minutes before breakfast  - I recommend ongoing Education for lifestyle modifications to help control/reduce reflux/abdominal pain including: avoid large meals, avoid eating within 2-3 hours of bedtime (avoid late night eating & lying down soon after eating), elevate head of bed if nocturnal symptoms are present, smoking cessation (if current smoker), & weight loss (if overweight).   - please be advised to avoid known foods which trigger reflux symptoms & to minimize/avoid high-fat foods, chocolate, caffeine, citrus, alcohol, & tomato products.  - Advised to avoid/limit use of NSAID's, since they can cause GI upset, bleeding, and/or ulcers. If needed, take with food.

## 2024-05-29 NOTE — PATIENT INSTRUCTIONS
Follow up in about 3 months (around 8/29/2024).     Dear patient,   As a result of recent federal legislation (The Federal Cures Act), you may receive lab or pathology results from your visit in your [facility] account before your physician is able to contact you. Your physician or their representative will relay the results to you with their recommendations at their soonest availability.     If no improvement in symptoms or symptoms worsen, please be advised to call MD, follow-up at clinic and/or go to ER if becomes severe.    Oleksandr Nagel M.D.        We Offer TELEHEALTH & Same Day Appointments!   Book your Telehealth appointment with me through my nurse or   Clinic appointments on [employer]!    [address] [place] [address]    Office: [phone]   FAX: [phone]    Check out my Facebook Page and Follow Me at: [email or web address]    Check out my website at [employer] by clicking on: [email or web address]    To Schedule appointments online, go to [email or web address]: [email or web address]

## 2024-06-01 ENCOUNTER — PATIENT MESSAGE (OUTPATIENT)
Dept: ADMINISTRATIVE | Facility: OTHER | Age: 64
End: 2024-06-01
Payer: MEDICARE

## 2024-06-22 DIAGNOSIS — E11.69 COMBINED HYPERLIPIDEMIA ASSOCIATED WITH TYPE 2 DIABETES MELLITUS: ICD-10-CM

## 2024-06-22 DIAGNOSIS — E78.2 COMBINED HYPERLIPIDEMIA ASSOCIATED WITH TYPE 2 DIABETES MELLITUS: ICD-10-CM

## 2024-06-22 RX ORDER — EZETIMIBE 10 MG/1
10 TABLET ORAL
Qty: 90 TABLET | Refills: 1 | Status: SHIPPED | OUTPATIENT
Start: 2024-06-22

## 2024-06-22 NOTE — TELEPHONE ENCOUNTER
Refill Decision Note   Zakia Price  is requesting a refill authorization.  Brief Assessment and Rationale for Refill:  Approve     Medication Therapy Plan:  FLOS 08/30/24      Comments:     Note composed:9:39 AM 06/22/2024

## 2024-06-22 NOTE — TELEPHONE ENCOUNTER
Care Due:                  Date            Visit Type   Department     Provider  --------------------------------------------------------------------------------                                EP -                              PRIMARY      [facility] FAMILY  Last Visit: 05-      CARE ([facility])   MEDICINE       Oleksandr Nagel  Next Visit: None Scheduled  None         None Found                                                            Last  Test          Frequency    Reason                     Performed    Due Date  --------------------------------------------------------------------------------    HBA1C.......  6 months...  semaglutide..............  12- 06-    [facility] Embedded Care Due Messages. Reference number: [id number].   6/22/2024 5:34:12 AM CDT

## 2024-07-01 ENCOUNTER — PATIENT MESSAGE (OUTPATIENT)
Dept: OTHER | Facility: OTHER | Age: 64
End: 2024-07-01
Payer: MEDICARE

## 2024-07-03 DIAGNOSIS — E11.65 TYPE 2 DIABETES MELLITUS WITH HYPERGLYCEMIA, WITHOUT LONG-TERM CURRENT USE OF INSULIN: Chronic | ICD-10-CM

## 2024-07-03 RX ORDER — SEMAGLUTIDE 2.68 MG/ML
2 INJECTION, SOLUTION SUBCUTANEOUS
Qty: 9 EACH | Refills: 0 | Status: SHIPPED | OUTPATIENT
Start: 2024-07-03 | End: 2025-11-19

## 2024-07-03 NOTE — TELEPHONE ENCOUNTER
No care due was identified.  [facility] Embedded Care Due Messages. Reference number: [id number].   7/03/2024 2:34:03 PM CDT

## 2024-07-03 NOTE — TELEPHONE ENCOUNTER
Refill Routing Note   Medication(s) are not appropriate for processing by [facility] for the following reason(s):        Required labs outdated    [facility] action(s):  Defer             Appointments  past 12m or future 3m with PCP    Date Provider   Last Visit   5/29/2024 Oleksandr Nagel MD   Next Visit   Visit date not found Oleksandr Nagel MD   ED visits in past 90 days: 0        Note composed:6:22 PM 07/03/2024

## 2024-07-05 RX ORDER — PREGABALIN 75 MG/1
75 CAPSULE ORAL 3 TIMES DAILY
Qty: 90 CAPSULE | Refills: 0 | Status: SHIPPED | OUTPATIENT
Start: 2024-07-05

## 2024-07-05 RX ORDER — TOPIRAMATE 100 MG/1
100 TABLET, FILM COATED ORAL 2 TIMES DAILY
Qty: 60 TABLET | Refills: 0 | Status: SHIPPED | OUTPATIENT
Start: 2024-07-05 | End: 2024-08-04

## 2024-07-05 NOTE — TELEPHONE ENCOUNTER
No care due was identified.  Health [facility] Embedded Care Due Messages. Reference number: [id number].   7/05/2024 10:52:30 AM CDT

## 2024-07-05 NOTE — TELEPHONE ENCOUNTER
----- Message from Verenice Rivrea MA sent at 7/5/2024 10:04 AM CDT -----  Type: RX Refill Request    Who Called:  Pt   Have you contacted your pharmacy:    Refill    RX Name and Strength:  topiramate (TOPAMAX) 100 MG tablet  pregabalin (LYRICA) 75 MG capsule    Preferred Pharmacy with phone number:  [facility] [address] [place] [address] - [address] [facility]  Telephone Fax  [phone]        Local or Mail Order:    Would the patient rather a call back or a response via [facility]?    Best Call Back Number:   [phone]  Additional Information:    Thank you.

## 2024-07-08 ENCOUNTER — OFFICE VISIT (OUTPATIENT)
Dept: PODIATRY | Facility: CLINIC | Age: 64
End: 2024-07-08
Payer: MEDICARE

## 2024-07-08 DIAGNOSIS — G25.81 RESTLESS LEGS SYNDROME (RLS): ICD-10-CM

## 2024-07-08 DIAGNOSIS — E11.42 DIABETIC POLYNEUROPATHY ASSOCIATED WITH TYPE 2 DIABETES MELLITUS: ICD-10-CM

## 2024-07-08 DIAGNOSIS — L60.3 ONYCHODYSTROPHY: ICD-10-CM

## 2024-07-08 DIAGNOSIS — E66.01 MORBID OBESITY: ICD-10-CM

## 2024-07-08 DIAGNOSIS — E11.49 TYPE II DIABETES MELLITUS WITH NEUROLOGICAL MANIFESTATIONS: Primary | ICD-10-CM

## 2024-07-08 PROCEDURE — 3288F FALL RISK ASSESSMENT DOCD: CPT | Mod: HCNC,CPTII,S$GLB, | Performed by: PODIATRIST

## 2024-07-08 PROCEDURE — 99214 OFFICE O/P EST MOD 30 MIN: CPT | Mod: 25,HCNC,S$GLB, | Performed by: PODIATRIST

## 2024-07-08 PROCEDURE — 1101F PT FALLS ASSESS-DOCD LE1/YR: CPT | Mod: HCNC,CPTII,S$GLB, | Performed by: PODIATRIST

## 2024-07-08 PROCEDURE — 1125F AMNT PAIN NOTED PAIN PRSNT: CPT | Mod: HCNC,CPTII,S$GLB, | Performed by: PODIATRIST

## 2024-07-08 PROCEDURE — 99999 PR PBB SHADOW E&M-EST. PATIENT-LVL III: CPT | Mod: PBBFAC,HCNC,, | Performed by: PODIATRIST

## 2024-07-08 PROCEDURE — 1159F MED LIST DOCD IN RCRD: CPT | Mod: HCNC,CPTII,S$GLB, | Performed by: PODIATRIST

## 2024-07-08 PROCEDURE — 1160F RVW MEDS BY RX/DR IN RCRD: CPT | Mod: HCNC,CPTII,S$GLB, | Performed by: PODIATRIST

## 2024-07-08 PROCEDURE — 11721 DEBRIDE NAIL 6 OR MORE: CPT | Mod: Q9,HCNC,S$GLB, | Performed by: PODIATRIST

## 2024-07-08 PROCEDURE — 3072F LOW RISK FOR RETINOPATHY: CPT | Mod: HCNC,CPTII,S$GLB, | Performed by: PODIATRIST

## 2024-07-08 NOTE — PROGRESS NOTES
Subjective:       Patient ID: Zakia Price is a 64 y.o. female.    Chief Complaint: Diabetic Foot Exam (Patient is a diabetic and was last seen on 5.29.24 by Oleksandr Nagel. She complains of 8/10 pain at present to bilateral feet and describes as needles and throbbing. She is wearing open sandals and no socks. )    HPI: Patient presents to the office today for her yearly diabetic foot exam and risk evaluation.  She also presents with the chief complaint of elongated, thickened and dystrophic nail plates to the B/L foot. Patient also complains of moderate  foot pain to the right left secondary to neuropathy. States 8/10. Lyrica being managed by Dr. Nagel. This patient is a Diabetic Type II, complicated with morbid obesity and Peripheral Neuropathy. Patient does follow with Primary Care and/or Endocrinology for management of Diabetes Mellitus. This patient's PMD is Oleksandr Nagel MD. This patient last saw his/her primary care provider on 05/29/2024    Hemoglobin A1C   Date Value Ref Range Status   12/26/2023 4.8 4.0 - 5.6 % Final     Comment:     ADA Screening Guidelines:  5.7-6.4%  Consistent with prediabetes  >or=6.5%  Consistent with diabetes    High levels of fetal hemoglobin interfere with the HbA1C  assay. Heterozygous hemoglobin variants (HbS, HgC, etc)do  not significantly interfere with this assay.   However, presence of multiple variants may affect accuracy.     09/06/2023 4.9 4.0 - 5.6 % Final     Comment:     ADA Screening Guidelines:  5.7-6.4%  Consistent with prediabetes  >or=6.5%  Consistent with diabetes    High levels of fetal hemoglobin interfere with the HbA1C  assay. Heterozygous hemoglobin variants (HbS, HgC, etc)do  not significantly interfere with this assay.   However, presence of multiple variants may affect accuracy.     06/14/2023 5.0 4.0 - 5.6 % Final     Comment:     ADA Screening Guidelines:  5.7-6.4%  Consistent with prediabetes  >or=6.5%  Consistent with  diabetes    High levels of fetal hemoglobin interfere with the HbA1C  assay. Heterozygous hemoglobin variants (HbS, HgC, etc)do  not significantly interfere with this assay.   However, presence of multiple variants may affect accuracy.     .     Review of patient's allergies indicates:   Allergen Reactions    Codeine sulfate      Nausea^    Lisinopril Swelling     angioedema    Codeine Nausea Only and Rash       Past Medical History:   Diagnosis Date    Acute respiratory failure due to COVID-19     COVID-19     Diabetes mellitus, type 2 1993    BS  didn't check 10/04/2023 Insulin x 2 years    Diabetic neuropathy 01/27/2014    DJD (degenerative joint disease) of knee     DVT (deep venous thrombosis) around 1990's    in leg, is on no anticoagulant therapy presently    Fatty liver     GERD (gastroesophageal reflux disease)     Hypertension associated with diabetes     HECTOR (iron deficiency anemia) 05/13/2021    Multinodular goiter     Obesity, morbid, BMI 50 or higher     Sleep apnea     has no CPAP       Family History   Problem Relation Name Age of Onset    Diabetes Mother Heather Villanueva     Hypertension Mother Heather Villanueva     Heart disease Mother Heather Villanueva 50    Cancer Father Gurwinder Dotson     Arthritis Father Gurwinder Dotson     Breast cancer Sister      Cancer Brother      Cancer Brother Gurwinder Buchanan     Leukemia Son Rafa Price     Cancer Son Rafa Price        Social History     Socioeconomic History    Marital status: Single   Tobacco Use    Smoking status: Never    Smokeless tobacco: Never   Substance and Sexual Activity    Alcohol use: No    Drug use: No    Sexual activity: Not Currently     Social Determinants of Health     Financial Resource Strain: Low Risk  (11/14/2023)    Overall Financial Resource Strain (CARDIA)     Difficulty of Paying Living Expenses: Not hard at all   Food Insecurity: Food Insecurity Present (11/14/2023)    Hunger Vital Sign     Worried About Running Out of Food in the Last Year:  Never true     Ran Out of Food in the Last Year: Sometimes true   Transportation Needs: No Transportation Needs (11/14/2023)    PRAPARE - Transportation     Lack of Transportation (Medical): No     Lack of Transportation (Non-Medical): No   Physical Activity: Insufficiently Active (11/14/2023)    Exercise Vital Sign     Days of Exercise per Week: 3 days     Minutes of Exercise per Session: 20 min   Stress: No Stress Concern Present (11/14/2023)    [employer] - Occupational Stress Questionnaire     Feeling of Stress : Not at all   Housing Stability: Low Risk  (11/14/2023)    Housing Stability Vital Sign     Unable to Pay for Housing in the Last Year: No     Number of Places Lived in the Last Year: 1     Unstable Housing in the Last Year: No       Past Surgical History:   Procedure Laterality Date    COLONOSCOPY N/A 5/12/2021    Procedure: COLONOSCOPY;  Surgeon: Carolina Rizo MD;  Location: [facility];  Service: Endoscopy;  Laterality: N/A;    EPIDURAL STEROID INJECTION N/A 12/10/2021    Procedure: Lumbar L5/S1 IL TEETEE  Would like AM procedure, if possible;  Surgeon: Nae Paul MD;  Location: [facility] PAIN [facility]GT;  Service: Pain Management;  Laterality: N/A;    EPIDURAL STEROID INJECTION INTO CERVICAL SPINE N/A 10/8/2021    Procedure: C7-T1 IL TEETEE-no sedation.  Needs IV-just incase;  Surgeon: Nae Paul MD;  Location: [facility];  Service: Pain Management;  Laterality: N/A;    ESOPHAGOGASTRODUODENOSCOPY N/A 7/8/2021    Procedure: EGD (ESOPHAGOGASTRODUODENOSCOPY) previous positve covid;  Surgeon: Jann Gutierrez MD;  Location: [facility];  Service: Endoscopy;  Laterality: N/A;    ESOPHAGOGASTRODUODENOSCOPY N/A 9/18/2023    Procedure: EGD (ESOPHAGOGASTRODUODENOSCOPY);  Surgeon: Gm Leon MD;  Location: [facility];  Service: Endoscopy;  Laterality: N/A;    FRACTURE SURGERY      HYSTERECTOMY      INTRALUMINAL GASTROINTESTINAL TRACT IMAGING VIA CAPSULE N/A 10/24/2022     Procedure: IMAGING PROCEDURE, GI TRACT, INTRALUMINAL, VIA CAPSULE;  Surgeon: Hang Lunsford RN;  Location: [facility] ENDO;  Service: Endoscopy;  Laterality: N/A;    SELECTIVE INJECTION OF ANESTHETIC AGENT AROUND LUMBAR SPINAL NERVE ROOT BY TRANSFORAMINAL APPROACH Bilateral 5/6/2022    Procedure: Bilateral L5/S1 TF TEETEE with RN IV sedation;  Surgeon: Nae Paul MD;  Location: [facility];  Service: Pain Management;  Laterality: Bilateral;    SELECTIVE INJECTION OF ANESTHETIC AGENT AROUND LUMBAR SPINAL NERVE ROOT BY TRANSFORAMINAL APPROACH Bilateral 12/30/2022    Procedure: Bilateral L5/S1 TF TEETEE RN IV Sedation;  Surgeon: Nae Paul MD;  Location: [facility];  Service: Pain Management;  Laterality: Bilateral;    SHOULDER ARTHROSCOPY      THYROIDECTOMY, PARTIAL Right     and transplatation of right superior parathyroid gland to the sternocleidomastoid muscle     TONSILLECTOMY      TUBAL LIGATION  1984    WRIST FRACTURE SURGERY      left       Review of Systems       Objective:   There were no vitals taken for this visit.    Physical Exam  LOWER EXTREMITY PHYSICAL EXAMINATION    VASCULAR:  The right dorsalis pedis pulse 2/4 and the right posterior tibial pulse 1/4.  The left dorsalis pedis pulse 2/4 and posterior tibial pulse on the left is 1/4.  Capillary refill is intact.  Pedal hair growth decreased.     NEUROLOGY: Protective sensation is not intact to the left and right plantar surfaces of the foot and digits, as the patient has no sensation/detection at greater than 4 distinct points of contact with 5.07 [place] Cele monofilament. Sensation to light touch is intact on the left and right foot. Proprioception is intact, bilateral. Sensation to pin prick is reduced to absent. Vibratory sensation is diminished.    DERMATOLOGY:  Skin is supple, moist, intact.  There is no signs of callusing, ulcerations, other lesions identified to the dorsal or plantar aspect of the right or left foot.  The R1, 2, 5 and left  L1,2, 5 are thickened, discolored dystrophic.  There is subungual debris.  Nail plates have area of dark discoloration.  The remaining nails 3-4 on the right foot and the left foot are elongated but of normal color, thickness, and texture.   There is no signs of ingrowing into the medial or lateral borders.  There is no evidence of wounds or skin breakdown.  No edema or erythema.  No obvious lacerations or fissuring.  Interdigital spaces are clean, dry, intact.  No rashes or scars appreciated.    ORTHOPEDIC: Manual Muscle Testing is 5/5 in all planes on the left and right, without pains, with and without resistance. Gait pattern is non-antalgic.    Assessment:     1. Type II diabetes mellitus with neurological manifestations    2. Diabetic polyneuropathy associated with type 2 diabetes mellitus    3. Restless legs syndrome (RLS)    4. Morbid obesity    5. Onychodystrophy        Plan:     Type II diabetes mellitus with neurological manifestations    Diabetic polyneuropathy associated with type 2 diabetes mellitus    Restless legs syndrome (RLS)    Morbid obesity    Onychodystrophy      I counseled the patient on his/her Diabetic Mellitus regarding today's clinical examination and objection findings. We did also discuss recent medication changes, pertinent labs and imaging evaluations and other medical consultation notes and progress notes. Greater than 50% of this visit was spent on counseling and coordination of care. Greater than 20 minutes of this appt. was spent on education about the diabetic foot, in relation to PVD and/or neuropathy, and the prevention of limb loss.     Shoe gear is inspected and wear and proper fit/type. Patient is reminded of the importance of good nutrition and blood sugar control to help prevent podiatric complications of diabetes. Patient instructed on proper foot hygeine. We discussed wearing proper shoe gear, daily foot inspections, never walking without protective shoe gear, never  putting sharp instruments to feet.  Patient  will continue to monitor the areas daily, inspect feet, wear protective shoe gear when ambulatory, moisturizer to maintain skin integrity.     Patient's DMI/DMII is managed by Internal/Family Medicine Physician and/or Endocrinology Advanced Practice Provider.    Dystrophic nail plates, as outlined above (R#1,2,5  ; L#1,2,5 ), are sharply debrided with double action nail nipper, and/or with the assistance of a mechanical rotary carlos alberto, with removal of all offending nail and nail border(s), for reduction of pains. Nails are reduced in terms of length, width and girth with removal of subungual debris to facilitate pain free weight bearing and ambulation. The elongated nails as outlined in the objective portion of this note, were trimmed to appropriate length, with a double action nail nipper, for alleviation/reduction of pains as well. Follow up in approx. 3-4 months.    Patient is counseled and reminded concerning the importance of good nutrition and healthy eating habits, which may include blood sugar control, to prevent and/or help podiatric foot and ankle complications.    May require increase in lyrica to 100mg TID if not improving with 75mg TID.       Future Appointments   Date Time Provider Department Center   7/29/2024 10:00 AM Zenon Erwin MD [facility] ORTHO [place]   8/21/2024 10:30 [facility]   8/30/2024 10:50 AM LABORATORY, [place] [facility] LAB [place]   9/4/2024  9:30 AM Sol Mckeon NP [facility]

## 2024-07-29 ENCOUNTER — OFFICE VISIT (OUTPATIENT)
Dept: ORTHOPEDICS | Facility: CLINIC | Age: 64
End: 2024-07-29
Payer: MEDICARE

## 2024-07-29 VITALS — WEIGHT: 293 LBS | BODY MASS INDEX: 44.41 KG/M2 | HEIGHT: 68 IN

## 2024-07-29 DIAGNOSIS — M18.0 PRIMARY OSTEOARTHRITIS OF BOTH FIRST CARPOMETACARPAL JOINTS: Primary | ICD-10-CM

## 2024-07-29 DIAGNOSIS — M79.642 LEFT HAND PAIN: ICD-10-CM

## 2024-07-29 PROCEDURE — 20604 DRAIN/INJ JOINT/BURSA W/US: CPT | Mod: HCNC,RT,S$GLB, | Performed by: STUDENT IN AN ORGANIZED HEALTH CARE EDUCATION/TRAINING PROGRAM

## 2024-07-29 PROCEDURE — 3008F BODY MASS INDEX DOCD: CPT | Mod: HCNC,CPTII,S$GLB, | Performed by: STUDENT IN AN ORGANIZED HEALTH CARE EDUCATION/TRAINING PROGRAM

## 2024-07-29 PROCEDURE — 3288F FALL RISK ASSESSMENT DOCD: CPT | Mod: HCNC,CPTII,S$GLB, | Performed by: STUDENT IN AN ORGANIZED HEALTH CARE EDUCATION/TRAINING PROGRAM

## 2024-07-29 PROCEDURE — 3072F LOW RISK FOR RETINOPATHY: CPT | Mod: HCNC,CPTII,S$GLB, | Performed by: STUDENT IN AN ORGANIZED HEALTH CARE EDUCATION/TRAINING PROGRAM

## 2024-07-29 PROCEDURE — 99999 PR PBB SHADOW E&M-EST. PATIENT-LVL III: CPT | Mod: PBBFAC,HCNC,, | Performed by: STUDENT IN AN ORGANIZED HEALTH CARE EDUCATION/TRAINING PROGRAM

## 2024-07-29 PROCEDURE — 1159F MED LIST DOCD IN RCRD: CPT | Mod: HCNC,CPTII,S$GLB, | Performed by: STUDENT IN AN ORGANIZED HEALTH CARE EDUCATION/TRAINING PROGRAM

## 2024-07-29 PROCEDURE — 1101F PT FALLS ASSESS-DOCD LE1/YR: CPT | Mod: HCNC,CPTII,S$GLB, | Performed by: STUDENT IN AN ORGANIZED HEALTH CARE EDUCATION/TRAINING PROGRAM

## 2024-07-29 PROCEDURE — 1160F RVW MEDS BY RX/DR IN RCRD: CPT | Mod: HCNC,CPTII,S$GLB, | Performed by: STUDENT IN AN ORGANIZED HEALTH CARE EDUCATION/TRAINING PROGRAM

## 2024-07-29 PROCEDURE — 99204 OFFICE O/P NEW MOD 45 MIN: CPT | Mod: 25,HCNC,S$GLB, | Performed by: STUDENT IN AN ORGANIZED HEALTH CARE EDUCATION/TRAINING PROGRAM

## 2024-07-29 PROCEDURE — 1125F AMNT PAIN NOTED PAIN PRSNT: CPT | Mod: HCNC,CPTII,S$GLB, | Performed by: STUDENT IN AN ORGANIZED HEALTH CARE EDUCATION/TRAINING PROGRAM

## 2024-07-29 RX ORDER — BETAMETHASONE SODIUM PHOSPHATE AND BETAMETHASONE ACETATE 3; 3 MG/ML; MG/ML
6 INJECTION, SUSPENSION INTRA-ARTICULAR; INTRALESIONAL; INTRAMUSCULAR; SOFT TISSUE
Status: DISCONTINUED | OUTPATIENT
Start: 2024-07-29 | End: 2024-07-29 | Stop reason: HOSPADM

## 2024-07-29 RX ADMIN — BETAMETHASONE SODIUM PHOSPHATE AND BETAMETHASONE ACETATE 6 MG: 3; 3 INJECTION, SUSPENSION INTRA-ARTICULAR; INTRALESIONAL; INTRAMUSCULAR; SOFT TISSUE at 10:07

## 2024-07-29 NOTE — PROCEDURES
Small Joint Aspiration/Injection: R thumb CMC    Date/Time: 7/29/2024 10:00 AM    Performed by: Zenon Erwin MD  Authorized by: Zenon Erwin MD    Consent Done?:  Yes (Verbal)  Indications:  Arthritis and pain  Site marked: the procedure site was marked    Timeout: prior to procedure the correct patient, procedure, and site was verified    Prep: patient was prepped and draped in usual sterile fashion      Local anesthetic:  Bupivacaine 0.5% without epinephrine and lidocaine 1% without epinephrine  Location:  Thumb  Site:  R thumb CMC  Ultrasonic guidance for needle placement?: Yes    Needle size:  25 G  Approach:  Dorsal  Medications:  6 mg betamethasone acetate-betamethasone sodium phosphate 6 mg/mL  Patient tolerance:  Patient tolerated the procedure well with no immediate complications    Additional Comments: Ultrasound guidance was used for needle localization. Images were saved and stored for documentation. The appropriate structures were visualized. Dynamic visualization of the needle was continuous throughout the procedures and maintained good position.

## 2024-07-29 NOTE — PROCEDURES
Sports Medicine US - Guidance for Needle Placement    Date/Time: 7/29/2024 10:00 AM    Performed by: Zenon Erwin MD  Authorized by: Zenon Erwin MD  Preparation: Patient was prepped and draped in the usual sterile fashion.  Local anesthesia used: no    Anesthesia:  Local anesthesia used: no    Sedation:  Patient sedated: no    Patient tolerance: patient tolerated the procedure well with no immediate complications  Comments: Ultrasound guidance was used for needle localization. Images were saved and stored for documentation. The appropriate structures were visualized. Dynamic visualization of the needle was continuous throughout the procedures and maintained good position.

## 2024-07-29 NOTE — PATIENT INSTRUCTIONS
Assessment:  Zakia Price is a 64 y.o. female   Chief Complaint   Patient presents with    Left Hand - Pain    Right Hand - Pain       Encounter Diagnosis   Name Primary?    Primary osteoarthritis of both first carpometacarpal joints Yes        Plan:  Ultrasound guided cortisone injection to the right CMC joint of thumb  We discussed the proper protocols after the injection such as no submerging pools, baths tubs, or hot tubs for 24 hr.  Showering is okay today.  We also discussed that blood sugars can be elevated after an injection and asked patient to properly checked her sugars over the next few days and contact their PCP if there are any concerns.  We discussed red flags such as fevers, chills, red, warm, tender joint at the area of injection to please seek medical care immediately.    Apply topical diclofenac (Voltaren) up to 4 times a day to the affected area.  It can be bought over the counter at any local pharmacy.    Patient may ice every 2 hours for 15 minutes as needed to control pain and swelling.   Referral to Dr. Donnie Cordoba, hand specialist, for left hand  Follow up with Dr. Cordoba    Follow-up: with Dr. Cordoba.    Thank you for choosing [facility] and Dr. Zenon Erwin for your orthopedic & sports medicine care. It is our goal to provide you with exceptional care that will help keep you healthy, active, and get you back in the game.    Please do not hesitate to reach out to us via email, phone, or MyChart with any questions, concerns, or feedback.    If you felt that you received exemplary care today, please consider leaving us feedback on [email or web address] at:  [email or web address]    If you are experiencing pain/discomfort ,or have questions after 5pm and would like to be connected to the [facility] on-call team, please call this number and specify which Sports Medicine provider is  treating you: [phone]

## 2024-07-29 NOTE — PROGRESS NOTES
Patient ID: Zakia Price  YOB: 1960  MRN: [id number]    Chief Complaint: Pain of the Left Hand and Pain of the Right Hand      Referred By: Oleksandr Nagel MD for Bilateral Hand / Wrist Pain    History of Present Illness: Zakia Price is a right-hand dominant 64 y.o. female who presents today with bilateral hand pain.  Reports pain has been present for approximately 1 year.  Reports a previous injury to the left hand several years ago.  Currently pain is a 0/10 but when pain is present rates it a 10/10.  Patient unable to pinpoint an area of pain, states that her whole hands hurt.  Pain described as achy, throbbing and shooting pain with occasional numbness.  She has tried Voltaren gel, OTC NSAIDs and Tylenol and has a brace but states it irritates her hands to wear. Currently taking Lyrica, Methocarbomol and Tizanidine.      The patient is active in none.  Occupation: Retired      Past Medical History:   Past Medical History:   Diagnosis Date    Acute respiratory failure due to COVID-19     COVID-19     Diabetes mellitus, type 2 1993    BS  didn't check 10/04/2023 Insulin x 2 years    Diabetic neuropathy 01/27/2014    DJD (degenerative joint disease) of knee     DVT (deep venous thrombosis) around 1990's    in leg, is on no anticoagulant therapy presently    Fatty liver     GERD (gastroesophageal reflux disease)     Hypertension associated with diabetes     HECTOR (iron deficiency anemia) 05/13/2021    Multinodular goiter     Obesity, morbid, BMI 50 or higher     Sleep apnea     has no CPAP     Past Surgical History:   Procedure Laterality Date    COLONOSCOPY N/A 5/12/2021    Procedure: COLONOSCOPY;  Surgeon: Carolina Rizo MD;  Location: [facility] ENDO;  Service: Endoscopy;  Laterality: N/A;    EPIDURAL STEROID INJECTION N/A 12/10/2021    Procedure: Lumbar L5/S1 IL TEETEE  Would like AM procedure, if possible;  Surgeon: Nae Paul MD;  Location: [facility] PAIN MGT;  Service: Pain  Management;  Laterality: N/A;    EPIDURAL STEROID INJECTION INTO CERVICAL SPINE N/A 10/8/2021    Procedure: C7-T1 IL TEETEE-no sedation.  Needs IV-just incase;  Surgeon: Nae Paul MD;  Location: [facility] PAIN MGT;  Service: Pain Management;  Laterality: N/A;    ESOPHAGOGASTRODUODENOSCOPY N/A 7/8/2021    Procedure: EGD (ESOPHAGOGASTRODUODENOSCOPY) previous positve covid;  Surgeon: Jann Gutierrez MD;  Location: [facility];  Service: Endoscopy;  Laterality: N/A;    ESOPHAGOGASTRODUODENOSCOPY N/A 9/18/2023    Procedure: EGD (ESOPHAGOGASTRODUODENOSCOPY);  Surgeon: Gm Leon MD;  Location: [facility];  Service: Endoscopy;  Laterality: N/A;    FRACTURE SURGERY      HYSTERECTOMY      INTRALUMINAL GASTROINTESTINAL TRACT IMAGING VIA CAPSULE N/A 10/24/2022    Procedure: IMAGING PROCEDURE, GI TRACT, INTRALUMINAL, VIA CAPSULE;  Surgeon: Hang Lunsford RN;  Location: [facility];  Service: Endoscopy;  Laterality: N/A;    SELECTIVE INJECTION OF ANESTHETIC AGENT AROUND LUMBAR SPINAL NERVE ROOT BY TRANSFORAMINAL APPROACH Bilateral 5/6/2022    Procedure: Bilateral L5/S1 TF TEETEE with RN IV sedation;  Surgeon: Nae Paul MD;  Location: [facility] PAIN MGT;  Service: Pain Management;  Laterality: Bilateral;    SELECTIVE INJECTION OF ANESTHETIC AGENT AROUND LUMBAR SPINAL NERVE ROOT BY TRANSFORAMINAL APPROACH Bilateral 12/30/2022    Procedure: Bilateral L5/S1 TF TEETEE RN IV Sedation;  Surgeon: Nae Paul MD;  Location: [facility] PAIN MGT;  Service: Pain Management;  Laterality: Bilateral;    SHOULDER ARTHROSCOPY      THYROIDECTOMY, PARTIAL Right     and transplatation of right superior parathyroid gland to the sternocleidomastoid muscle     TONSILLECTOMY      TUBAL LIGATION  1984    WRIST FRACTURE SURGERY      left     Family History   Problem Relation Name Age of Onset    Diabetes Mother Heather Villanueva     Hypertension Mother Heather Villanueva     Heart disease Mother Heather Villanueva 50    Cancer Father Gurwinder Dotson     Arthritis Father Gurwinder  Mauri     Breast cancer Sister      Cancer Brother      Cancer Brother Gurwinder Buchanan     Leukemia Son Rafa Price     Cancer Son Rafa Price      Social History     Socioeconomic History    Marital status: Single   Tobacco Use    Smoking status: Never    Smokeless tobacco: Never   Substance and Sexual Activity    Alcohol use: No    Drug use: No    Sexual activity: Not Currently     Social Determinants of Health     Financial Resource Strain: Low Risk  (11/14/2023)    Overall Financial Resource Strain (CARDIA)     Difficulty of Paying Living Expenses: Not hard at all   Food Insecurity: Food Insecurity Present (11/14/2023)    Hunger Vital Sign     Worried About Running Out of Food in the Last Year: Never true     Ran Out of Food in the Last Year: Sometimes true   Transportation Needs: No Transportation Needs (11/14/2023)    PRAPARE - Transportation     Lack of Transportation (Medical): No     Lack of Transportation (Non-Medical): No   Physical Activity: Insufficiently Active (11/14/2023)    Exercise Vital Sign     Days of Exercise per Week: 3 days     Minutes of Exercise per Session: 20 min   Stress: No Stress Concern Present (11/14/2023)    [employer] - Occupational Stress Questionnaire     Feeling of Stress : Not at all   Housing Stability: Low Risk  (11/14/2023)    Housing Stability Vital Sign     Unable to Pay for Housing in the Last Year: No     Number of Places Lived in the Last Year: 1     Unstable Housing in the Last Year: No     Medication List with Changes/Refills   Current Medications    ALBUTEROL (PROVENTIL/VENTOLIN HFA) 90 MCG/ACTUATION INHALER        AZELASTINE (ASTELIN) 137 MCG (0.1 %) NASAL SPRAY    1 spray (137 mcg total) by Nasal route 2 (two) times daily.    DICLOFENAC SODIUM (VOLTAREN ARTHRITIS PAIN) 1 % GEL    Apply 2 g topically once daily.    DILTIAZEM (CARDIZEM) 30 MG TABLET    TAKE 1 TABLET EVERY 12 HOURS    EPINEPHRINE (EPIPEN) 0.3 MG/0.3 ML ATIN    Inject  into the muscle.    EZETIMIBE (ZETIA) 10 MG TABLET    Take 1 tablet by mouth once daily    FAMOTIDINE (PEPCID) 40 MG TABLET    Take 1 tablet (40 mg total) by mouth once daily.    FLUTICASONE PROPIONATE (FLONASE) 50 MCG/ACTUATION NASAL SPRAY    Use 2 sprays to each nostril daily    HYDROCODONE-ACETAMINOPHEN (NORCO) 5-325 MG PER TABLET    Take 1 tablet by mouth every 6 (six) hours as needed.    INHALATION SPACING DEVICE (BREATHERITE VALVED MDI CHAMBER)    Use as directed for inhalation.    LEVOTHYROXINE (SYNTHROID) 100 MCG TABLET    TAKE 1 TABLET ONE TIME DAILY    METHOCARBAMOL (ROBAXIN) 500 MG TAB    Take 1 tablet (500 mg total) by mouth 2 (two) times daily as needed (muscle spasms).    OMEPRAZOLE (PRILOSEC) 40 MG CAPSULE    Take 1 capsule (40 mg total) by mouth once daily.    OZEMPIC 2 MG/DOSE (8 MG/3 ML) PNIJ    INJECT 2 MG INTO THE SKIN EVERY 7 DAYS.    PREGABALIN (LYRICA) 75 MG CAPSULE    Take 1 capsule (75 mg total) by mouth 3 (three) times daily.    ROPINIROLE (REQUIP) 0.5 MG TABLET    Take 1 tablet (0.5 mg total) by mouth every evening.    SULFACETAMIDE SODIUM 10% (BLEPH-10) 10 % OPHTHALMIC SOLUTION    Place 2 drops into the left eye 4 (four) times daily.    TIZANIDINE (ZANAFLEX) 4 MG TABLET    TAKE 1/2 TO 1 TABLET TWICE DAILY AS NEEDED FOR MUSCLE SPASMS. MAY CAUSE DROWSINESS.    TOPIRAMATE (TOPAMAX) 100 MG TABLET    Take 1 tablet (100 mg total) by mouth 2 (two) times daily.    TRELEGY ELLIPTA 100-62.5-25 MCG DSDV    Inhale 1 puff into the lungs once daily.     Review of patient's allergies indicates:   Allergen Reactions    Codeine sulfate      Nausea^    Lisinopril Swelling     angioedema    Codeine Nausea Only and Rash       Physical Exam:   Body mass index is 46.53 kg/m².    GENERAL: Well appearing, in no acute distress.  HEAD: Normocephalic and atraumatic.  ENT: External ears and nose grossly normal.  EYES: EOMI bilaterally  PULMONARY: Respirations are grossly even and non-labored.  NEURO: Awake, alert,  and oriented x 3.  SKIN: No obvious rashes appreciated.  PSYCH: Mood & affect are appropriate.    Detailed MSK exam:     Right hand/wrist exam:   -TTP: 1st CMC  -Swelling/ecchymosis: none  -Full ROM  - strength 5/5  -Sensation intact  -Pulses 2+  -Rotational deformity: negative  -Tinel sign: negative  -Phalen sign: negative  -Finkelstein sign: negative    Left hand/wrist exam:   -TTP: 1st CMC  -Swelling/ecchymosis: none  -Full ROM  - strength 5/5  -Sensation intact  -Pulses 2+  -Rotational deformity: negative  -Tinel sign: negative  -Phalen sign: negative  -Finkelstein sign: negative      Imaging:  X-Ray Hand 3 View Bilateral  Narrative: EXAMINATION:  XR HAND COMPLETE 3 VIEWS BILATERAL    CLINICAL HISTORY:  . Primary osteoarthritis, unspecified hand    TECHNIQUE:  PA, lateral, and oblique views of both hands were performed.    COMPARISON:  Left hand films from 08/30/2017    FINDINGS:  Right hand: There is severe joint space narrowing and subluxation seen at the 1st CMC joint on the right.  Mild degenerative changes seen scattered throughout the interphalangeal joints.    Left hand: The left hand demonstrate severe joint space narrowing and osteophyte formation at the 1st CMC joint.  There is evidence for ankylosis of the majority of the comparable bones with the exception of the trapezium and trapezoid.  There is an electrode seen projecting over the scaphoid.  Degenerative changes seen scattered throughout the interphalangeal joints including the 1st interphalangeal joint the appearance is similar to the prior exam..  Impression: As above    Electronically signed by: Jefe Cantu DO  Date:    05/29/2024  Time:    14:04        Relevant imaging results were reviewed and interpreted by me and per my read shows severe arthritic changes bilateral hands.  This was discussed with the patient and / or family today.     Assessment:  Zakia Price is a 64 y.o. female presenting with bilateral hand pain.    History, physical and radiographs are consistent with a likely diagnosis of OA.   Plan: Steroid injection given today (see separate procedure note for details). We discussed the proper protocols after the injection such as no submerging pools, baths tubs, or hot tubs for 24 hr.  Showering is okay today.  We also discussed that blood sugars can be elevated after an injection and asked patient to properly checked her sugars over the next few days and contact their PCP if there are any concerns.  We discussed red flags such as fevers, chills, red, warm, tender joint at the area of injection to please seek medical care immediately.   Referral to Dr Cordoba for surgical consult left hand. Continue conservative management for pain.   Follow up with Dr Cordoba. All questions answered.      Primary osteoarthritis of both first carpometacarpal joints  -     Sports Medicine US - Guidance for Needle Placement  -     Ambulatory referral/consult to Orthopedics; Future; Expected date: 08/05/2024  -     Small Joint Aspiration/Injection: R thumb CMC    Left hand pain  -     Ambulatory referral/consult to Orthopedics; Future; Expected date: 08/05/2024         Ultrasound guidance was used for needle localization. Images were saved and stored for documentation. The appropriate structures were visualized. Dynamic visualization of the needle was continuous throughout the procedures and maintained good position.      A copy of today's visit note has been sent to the referring provider.     Electronically signed:  Zenon Erwin MD, MPH  07/29/2024  10:11 AM

## 2024-08-06 ENCOUNTER — OFFICE VISIT (OUTPATIENT)
Dept: ORTHOPEDICS | Facility: CLINIC | Age: 64
End: 2024-08-06
Payer: MEDICARE

## 2024-08-06 VITALS — BODY MASS INDEX: 44.41 KG/M2 | WEIGHT: 293 LBS | HEIGHT: 68 IN

## 2024-08-06 DIAGNOSIS — M79.642 LEFT HAND PAIN: ICD-10-CM

## 2024-08-06 DIAGNOSIS — M18.0 PRIMARY OSTEOARTHRITIS OF BOTH FIRST CARPOMETACARPAL JOINTS: ICD-10-CM

## 2024-08-06 DIAGNOSIS — G56.02 CARPAL TUNNEL SYNDROME OF LEFT WRIST: Primary | ICD-10-CM

## 2024-08-06 PROCEDURE — 1160F RVW MEDS BY RX/DR IN RCRD: CPT | Mod: HCNC,CPTII,S$GLB, | Performed by: ORTHOPAEDIC SURGERY

## 2024-08-06 PROCEDURE — 3008F BODY MASS INDEX DOCD: CPT | Mod: HCNC,CPTII,S$GLB, | Performed by: ORTHOPAEDIC SURGERY

## 2024-08-06 PROCEDURE — 1100F PTFALLS ASSESS-DOCD GE2>/YR: CPT | Mod: HCNC,CPTII,S$GLB, | Performed by: ORTHOPAEDIC SURGERY

## 2024-08-06 PROCEDURE — 1125F AMNT PAIN NOTED PAIN PRSNT: CPT | Mod: HCNC,CPTII,S$GLB, | Performed by: ORTHOPAEDIC SURGERY

## 2024-08-06 PROCEDURE — 3288F FALL RISK ASSESSMENT DOCD: CPT | Mod: HCNC,CPTII,S$GLB, | Performed by: ORTHOPAEDIC SURGERY

## 2024-08-06 PROCEDURE — 20600 DRAIN/INJ JOINT/BURSA W/O US: CPT | Mod: HCNC,51,XS,LT | Performed by: ORTHOPAEDIC SURGERY

## 2024-08-06 PROCEDURE — 3072F LOW RISK FOR RETINOPATHY: CPT | Mod: HCNC,CPTII,S$GLB, | Performed by: ORTHOPAEDIC SURGERY

## 2024-08-06 PROCEDURE — 99203 OFFICE O/P NEW LOW 30 MIN: CPT | Mod: HCNC,25,S$GLB, | Performed by: ORTHOPAEDIC SURGERY

## 2024-08-06 PROCEDURE — 1159F MED LIST DOCD IN RCRD: CPT | Mod: HCNC,CPTII,S$GLB, | Performed by: ORTHOPAEDIC SURGERY

## 2024-08-06 PROCEDURE — 20526 THER INJECTION CARP TUNNEL: CPT | Mod: HCNC,LT,S$GLB, | Performed by: ORTHOPAEDIC SURGERY

## 2024-08-06 PROCEDURE — 99999 PR PBB SHADOW E&M-EST. PATIENT-LVL IV: CPT | Mod: PBBFAC,HCNC,, | Performed by: ORTHOPAEDIC SURGERY

## 2024-08-06 RX ORDER — TRIAMCINOLONE ACETONIDE 40 MG/ML
40 INJECTION, SUSPENSION INTRA-ARTICULAR; INTRAMUSCULAR
Status: DISCONTINUED | OUTPATIENT
Start: 2024-08-06 | End: 2024-08-06 | Stop reason: HOSPADM

## 2024-08-06 RX ADMIN — TRIAMCINOLONE ACETONIDE 40 MG: 40 INJECTION, SUSPENSION INTRA-ARTICULAR; INTRAMUSCULAR at 02:08

## 2024-08-15 RX ORDER — PREGABALIN 75 MG/1
75 CAPSULE ORAL 3 TIMES DAILY
Qty: 90 CAPSULE | Refills: 0 | Status: SHIPPED | OUTPATIENT
Start: 2024-08-15

## 2024-08-15 RX ORDER — TOPIRAMATE 100 MG/1
100 TABLET, FILM COATED ORAL 2 TIMES DAILY
Qty: 60 TABLET | Refills: 0 | Status: SHIPPED | OUTPATIENT
Start: 2024-08-15 | End: 2024-09-14

## 2024-08-15 RX ORDER — TOPIRAMATE 100 MG/1
100 TABLET, FILM COATED ORAL 2 TIMES DAILY
Qty: 60 TABLET | Refills: 0 | Status: SHIPPED | OUTPATIENT
Start: 2024-08-15

## 2024-08-15 NOTE — TELEPHONE ENCOUNTER
No care due was identified.  [facility] Embedded Care Due Messages. Reference number: [id number].   8/15/2024 2:24:16 PM CDT

## 2024-08-15 NOTE — TELEPHONE ENCOUNTER
Refill Routing Note   Medication(s) are not appropriate for processing by [facility] for the following reason(s):        Outside of protocol    [facility] action(s):  Route               Appointments  past 12m or future 3m with PCP    Date Provider   Last Visit   5/29/2024 Oleksandr Nagel MD   Next Visit   8/15/2024 Oleksandr Nagel MD   ED visits in past 90 days: 0        Note composed:2:48 PM 08/15/2024

## 2024-08-15 NOTE — TELEPHONE ENCOUNTER
Refill Routing Note   Medication(s) are not appropriate for processing by [facility] for the following reason(s):        Outside of protocol    [facility] action(s):  Route               Appointments  past 12m or future 3m with PCP    Date Provider   Last Visit   5/29/2024 Oleksandr Nagel MD   Next Visit   Visit date not found Oleksandr Nagel MD   ED visits in past 90 days: 0        Note composed:2:48 PM 08/15/2024

## 2024-08-16 DIAGNOSIS — M25.551 HIP PAIN, RIGHT: ICD-10-CM

## 2024-08-16 RX ORDER — TIZANIDINE 4 MG/1
TABLET ORAL
Qty: 90 TABLET | Refills: 1 | Status: SHIPPED | OUTPATIENT
Start: 2024-08-16

## 2024-08-16 NOTE — TELEPHONE ENCOUNTER
Refill Routing Note   Medication(s) are not appropriate for processing by [facility] for the following reason(s):        Outside of protocol    [facility] action(s):  Route               Appointments  past 12m or future 3m with PCP    Date Provider   Last Visit   5/29/2024 Oleksandr Nagel MD   Next Visit   Visit date not found Oleksandr Nagel MD   ED visits in past 90 days: 0        Note composed:10:50 AM 08/16/2024

## 2024-08-21 ENCOUNTER — OFFICE VISIT (OUTPATIENT)
Dept: OPHTHALMOLOGY | Facility: CLINIC | Age: 64
End: 2024-08-21
Payer: MEDICARE

## 2024-08-21 ENCOUNTER — PATIENT MESSAGE (OUTPATIENT)
Dept: OPHTHALMOLOGY | Facility: CLINIC | Age: 64
End: 2024-08-21

## 2024-08-21 DIAGNOSIS — E11.36 DIABETIC CATARACT: ICD-10-CM

## 2024-08-21 DIAGNOSIS — H52.7 REFRACTIVE ERRORS: ICD-10-CM

## 2024-08-21 DIAGNOSIS — E11.9 DIABETES MELLITUS TYPE 2 WITHOUT RETINOPATHY: Primary | ICD-10-CM

## 2024-08-21 PROCEDURE — 99999 PR PBB SHADOW E&M-EST. PATIENT-LVL III: CPT | Mod: PBBFAC,HCNC,, | Performed by: OPTOMETRIST

## 2024-08-21 PROCEDURE — 92014 COMPRE OPH EXAM EST PT 1/>: CPT | Mod: HCNC,S$GLB,, | Performed by: OPTOMETRIST

## 2024-08-21 PROCEDURE — 2023F DILAT RTA XM W/O RTNOPTHY: CPT | Mod: HCNC,CPTII,S$GLB, | Performed by: OPTOMETRIST

## 2024-08-21 PROCEDURE — 1159F MED LIST DOCD IN RCRD: CPT | Mod: HCNC,CPTII,S$GLB, | Performed by: OPTOMETRIST

## 2024-08-21 PROCEDURE — 92015 DETERMINE REFRACTIVE STATE: CPT | Mod: HCNC,S$GLB,, | Performed by: OPTOMETRIST

## 2024-08-21 NOTE — PROGRESS NOTES
SUBJECTIVE  Zakia Price is 64 y.o. female  Corrected distance visual acuity was 20/30 in the right eye and 20/30 in the left eye. Corrected near visual acuity was J1 in the right eye and J1 in the left eye.   Chief Complaint   Patient presents with    Diabetic Eye Exam     Lab Results       Component                Value               Date                       HGBA1C                   4.8                 12/26/2023                   HPI     Diabetic Eye Exam     Additional comments: Lab Results       Component                Value               Date                       HGBA1C                   4.8                 12/26/2023                    Comments    Patient states slight decrease with overall vision.  No ocular pain/discomfort and not using any otc drops.  Wear Bifocal glasses.           Last edited by Leila Lepe on 8/21/2024 10:57 AM.         Assessment /Plan :  1. Diabetes mellitus type 2 without retinopathy   No Background Diabetic Retinopathy  Strict BG control, f/u w/ PCP, and annual DFE  Stressed importance of DM control to preserve vision      2. Diabetic cataract   Cataracts are not visually significant and not affecting activities of daily living. Annual observation is recommended at this time. Patient to call or return to clinic with any significant change in vision prior to next visit.  Worsening     3. Refractive errors Dispense Final Rx for glasses.  RTC 1 year  Discussed above and answered questions.

## 2024-08-26 ENCOUNTER — PATIENT OUTREACH (OUTPATIENT)
Dept: ADMINISTRATIVE | Facility: HOSPITAL | Age: 64
End: 2024-08-26
Payer: MEDICARE

## 2024-09-03 ENCOUNTER — PATIENT OUTREACH (OUTPATIENT)
Dept: ADMINISTRATIVE | Facility: HOSPITAL | Age: 64
End: 2024-09-03
Payer: MEDICARE

## 2024-09-03 DIAGNOSIS — K21.00 GASTROESOPHAGEAL REFLUX DISEASE WITH ESOPHAGITIS WITHOUT HEMORRHAGE: ICD-10-CM

## 2024-09-03 RX ORDER — FAMOTIDINE 40 MG/1
40 TABLET, FILM COATED ORAL
Qty: 90 TABLET | Refills: 1 | Status: SHIPPED | OUTPATIENT
Start: 2024-09-03

## 2024-09-03 NOTE — TELEPHONE ENCOUNTER
Care Due:                  Date            Visit Type   Department     Provider  --------------------------------------------------------------------------------                                EP -                              PRIMARY      [facility] FAMILY  Last Visit: 05-      CARE ([facility])   MEDICINE       Oleksandr Nagel  Next Visit: None Scheduled  None         None Found                                                            Last  Test          Frequency    Reason                     Performed    Due Date  --------------------------------------------------------------------------------    HBA1C.......  6 months...  OZEMPIC..................  12- 06-    [facility] Embedded Care Due Messages. Reference number: [id number].   9/03/2024 5:34:51 AM CDT

## 2024-09-03 NOTE — TELEPHONE ENCOUNTER
Refill Decision Note   Zakia Price  is requesting a refill authorization.  Brief Assessment and Rationale for Refill:  Approve     Medication Therapy Plan: FLOS      Alert overridden per protocol: Yes   Comments:     Note composed:10:53 AM 09/03/2024

## 2024-09-09 ENCOUNTER — LAB VISIT (OUTPATIENT)
Dept: LAB | Facility: HOSPITAL | Age: 64
End: 2024-09-09
Attending: FAMILY MEDICINE
Payer: MEDICARE

## 2024-09-09 DIAGNOSIS — E78.5 HYPERLIPIDEMIA, UNSPECIFIED HYPERLIPIDEMIA TYPE: ICD-10-CM

## 2024-09-09 DIAGNOSIS — D50.0 IRON DEFICIENCY ANEMIA DUE TO CHRONIC BLOOD LOSS: ICD-10-CM

## 2024-09-09 DIAGNOSIS — Z79.899 ENCOUNTER FOR LONG-TERM (CURRENT) USE OF MEDICATIONS: ICD-10-CM

## 2024-09-09 DIAGNOSIS — E03.9 HYPOTHYROIDISM, UNSPECIFIED TYPE: ICD-10-CM

## 2024-09-09 DIAGNOSIS — E11.49 TYPE II DIABETES MELLITUS WITH NEUROLOGICAL MANIFESTATIONS: Chronic | ICD-10-CM

## 2024-09-09 LAB
ALBUMIN SERPL BCP-MCNC: 3.3 G/DL (ref 3.5–5.2)
ALBUMIN/CREAT UR: 4.5 UG/MG (ref 0–30)
ALP SERPL-CCNC: 77 U/L (ref 55–135)
ALT SERPL W/O P-5'-P-CCNC: 11 U/L (ref 10–44)
ANION GAP SERPL CALC-SCNC: 11 MMOL/L (ref 8–16)
AST SERPL-CCNC: 10 U/L (ref 10–40)
BASOPHILS # BLD AUTO: 0.02 K/UL (ref 0–0.2)
BASOPHILS NFR BLD: 0.4 % (ref 0–1.9)
BILIRUB SERPL-MCNC: 0.2 MG/DL (ref 0.1–1)
BUN SERPL-MCNC: 11 MG/DL (ref 8–23)
CALCIUM SERPL-MCNC: 9.6 MG/DL (ref 8.7–10.5)
CHLORIDE SERPL-SCNC: 109 MMOL/L (ref 95–110)
CHOLEST SERPL-MCNC: 199 MG/DL (ref 120–199)
CHOLEST/HDLC SERPL: 3 {RATIO} (ref 2–5)
CO2 SERPL-SCNC: 23 MMOL/L (ref 23–29)
CREAT SERPL-MCNC: 0.9 MG/DL (ref 0.5–1.4)
CREAT UR-MCNC: 112 MG/DL (ref 15–325)
DIFFERENTIAL METHOD BLD: ABNORMAL
EOSINOPHIL # BLD AUTO: 0.1 K/UL (ref 0–0.5)
EOSINOPHIL NFR BLD: 2.2 % (ref 0–8)
ERYTHROCYTE [DISTWIDTH] IN BLOOD BY AUTOMATED COUNT: 13.3 % (ref 11.5–14.5)
EST. GFR  (NO RACE VARIABLE): >60 ML/MIN/1.73 M^2
ESTIMATED AVG GLUCOSE: 100 MG/DL (ref 68–131)
FERRITIN SERPL-MCNC: 79 NG/ML (ref 20–300)
GLUCOSE SERPL-MCNC: 79 MG/DL (ref 70–110)
HBA1C MFR BLD: 5.1 % (ref 4–5.6)
HCT VFR BLD AUTO: 41.1 % (ref 37–48.5)
HDLC SERPL-MCNC: 66 MG/DL (ref 40–75)
HDLC SERPL: 33.2 % (ref 20–50)
HGB BLD-MCNC: 12.9 G/DL (ref 12–16)
IMM GRANULOCYTES # BLD AUTO: 0.01 K/UL (ref 0–0.04)
IMM GRANULOCYTES NFR BLD AUTO: 0.2 % (ref 0–0.5)
IRON SERPL-MCNC: 66 UG/DL (ref 30–160)
LDLC SERPL CALC-MCNC: 120.6 MG/DL (ref 63–159)
LYMPHOCYTES # BLD AUTO: 1.6 K/UL (ref 1–4.8)
LYMPHOCYTES NFR BLD: 35.6 % (ref 18–48)
MCH RBC QN AUTO: 29.2 PG (ref 27–31)
MCHC RBC AUTO-ENTMCNC: 31.4 G/DL (ref 32–36)
MCV RBC AUTO: 93 FL (ref 82–98)
MICROALBUMIN UR DL<=1MG/L-MCNC: 5 UG/ML
MONOCYTES # BLD AUTO: 0.3 K/UL (ref 0.3–1)
MONOCYTES NFR BLD: 6.7 % (ref 4–15)
NEUTROPHILS # BLD AUTO: 2.5 K/UL (ref 1.8–7.7)
NEUTROPHILS NFR BLD: 55.1 % (ref 38–73)
NONHDLC SERPL-MCNC: 133 MG/DL
NRBC BLD-RTO: 0 /100 WBC
PLATELET # BLD AUTO: 341 K/UL (ref 150–450)
PMV BLD AUTO: 9.9 FL (ref 9.2–12.9)
POTASSIUM SERPL-SCNC: 4.4 MMOL/L (ref 3.5–5.1)
PROT SERPL-MCNC: 7.1 G/DL (ref 6–8.4)
RBC # BLD AUTO: 4.42 M/UL (ref 4–5.4)
SATURATED IRON: 19 % (ref 20–50)
SODIUM SERPL-SCNC: 143 MMOL/L (ref 136–145)
TOTAL IRON BINDING CAPACITY: 348 UG/DL (ref 250–450)
TRANSFERRIN SERPL-MCNC: 235 MG/DL (ref 200–375)
TRIGL SERPL-MCNC: 62 MG/DL (ref 30–150)
TSH SERPL DL<=0.005 MIU/L-ACNC: 0.51 UIU/ML (ref 0.4–4)
WBC # BLD AUTO: 4.61 K/UL (ref 3.9–12.7)

## 2024-09-09 PROCEDURE — 80061 LIPID PANEL: CPT | Mod: HCNC | Performed by: FAMILY MEDICINE

## 2024-09-09 PROCEDURE — 36415 COLL VENOUS BLD VENIPUNCTURE: CPT | Mod: HCNC,PO | Performed by: FAMILY MEDICINE

## 2024-09-09 PROCEDURE — 85025 COMPLETE CBC W/AUTO DIFF WBC: CPT | Mod: HCNC,PO | Performed by: NURSE PRACTITIONER

## 2024-09-09 PROCEDURE — 83540 ASSAY OF IRON: CPT | Mod: HCNC | Performed by: NURSE PRACTITIONER

## 2024-09-09 PROCEDURE — 83036 HEMOGLOBIN GLYCOSYLATED A1C: CPT | Mod: HCNC | Performed by: FAMILY MEDICINE

## 2024-09-09 PROCEDURE — 82570 ASSAY OF URINE CREATININE: CPT | Mod: HCNC | Performed by: FAMILY MEDICINE

## 2024-09-09 PROCEDURE — 82728 ASSAY OF FERRITIN: CPT | Mod: HCNC | Performed by: NURSE PRACTITIONER

## 2024-09-09 PROCEDURE — 84443 ASSAY THYROID STIM HORMONE: CPT | Mod: HCNC | Performed by: FAMILY MEDICINE

## 2024-09-09 PROCEDURE — 80053 COMPREHEN METABOLIC PANEL: CPT | Mod: HCNC | Performed by: FAMILY MEDICINE

## 2024-09-10 ENCOUNTER — PATIENT MESSAGE (OUTPATIENT)
Dept: SPORTS MEDICINE | Facility: CLINIC | Age: 64
End: 2024-09-10
Payer: MEDICARE

## 2024-09-10 ENCOUNTER — TELEPHONE (OUTPATIENT)
Dept: SPORTS MEDICINE | Facility: CLINIC | Age: 64
End: 2024-09-10
Payer: MEDICARE

## 2024-09-10 NOTE — PROGRESS NOTES
Make follow-up lab appointment per recommendation below.  Check to see if patient has seen the results through my chart.  If not then,  #CALL THE PATIENT# to discuss results/see if they have questions and document verification of contact. Make F/U appt if needed. [phone]    #My interpretation that was sent to them through [employer]:  Zakia, I have reviewed your recent blood work.     Your complete blood count is stable.  Iron levels remain low.  Follow-up with Hematology.  Your metabolic panel which shows your glucose, kidney function, electrolytes, and liver function is stable.  Mildly decreased albumin.  Thyroid study is normal.   Your cholesterol is improved.  Keep up the great work!  Your hemoglobin A1c is normal.  This test is gold standard screening test for diabetes.  It is a measures 3 months of your average blood sugar.  Urine microalbumin creatinine ratio urine test is within normal limits.  Repeat annually.    =========================  Also please address any outstanding health maintenance that may be due: Low Dose Statin Never done  Influenza Vaccine(1) due on 09/01/2024  COVID-19 Vaccine(7 - 2023-24 season) due on 09/01/2024

## 2024-09-10 NOTE — TELEPHONE ENCOUNTER
Spoke to pt about scheduling an appointment for injections, Pt is scheduled for 9/20       ----- Message from Elizabeth Love sent at 9/10/2024 10:18 AM CDT -----  Regarding: appt  Name of Who is Calling:Pt         What is the request in detail: Requesting callback in regards to next injection appt  Pls advise           Can the clinic reply by [facility]: yes         What Number to Call Back if not in [facility]:Telephone Information:  Mobile          [phone]

## 2024-09-11 ENCOUNTER — PATIENT MESSAGE (OUTPATIENT)
Dept: FAMILY MEDICINE | Facility: CLINIC | Age: 64
End: 2024-09-11
Payer: MEDICARE

## 2024-09-16 RX ORDER — TOPIRAMATE 100 MG/1
100 TABLET, FILM COATED ORAL 2 TIMES DAILY
Qty: 60 TABLET | Refills: 0 | Status: SHIPPED | OUTPATIENT
Start: 2024-09-16 | End: 2024-10-16

## 2024-09-16 NOTE — TELEPHONE ENCOUNTER
Refill Routing Note   Medication(s) are not appropriate for processing by [facility] for the following reason(s):        Outside of protocol    [facility] action(s):  Route             Appointments  past 12m or future 3m with PCP    Date Provider   Last Visit   5/29/2024 Oleksandr Nagel MD   Next Visit   12/12/2024 Oleksandr Nagel MD   ED visits in past 90 days: 0        Note composed:7:54 AM 09/16/2024

## 2024-09-17 ENCOUNTER — OFFICE VISIT (OUTPATIENT)
Dept: HEMATOLOGY/ONCOLOGY | Facility: CLINIC | Age: 64
End: 2024-09-17
Payer: MEDICARE

## 2024-09-17 DIAGNOSIS — E61.1 IRON DEFICIENCY: Primary | ICD-10-CM

## 2024-09-17 PROCEDURE — 3061F NEG MICROALBUMINURIA REV: CPT | Mod: HCNC,CPTII,95, | Performed by: NURSE PRACTITIONER

## 2024-09-17 PROCEDURE — 1160F RVW MEDS BY RX/DR IN RCRD: CPT | Mod: HCNC,CPTII,95, | Performed by: NURSE PRACTITIONER

## 2024-09-17 PROCEDURE — 3044F HG A1C LEVEL LT 7.0%: CPT | Mod: HCNC,CPTII,95, | Performed by: NURSE PRACTITIONER

## 2024-09-17 PROCEDURE — 99213 OFFICE O/P EST LOW 20 MIN: CPT | Mod: HCNC,95,, | Performed by: NURSE PRACTITIONER

## 2024-09-17 PROCEDURE — 1159F MED LIST DOCD IN RCRD: CPT | Mod: HCNC,CPTII,95, | Performed by: NURSE PRACTITIONER

## 2024-09-17 PROCEDURE — 3066F NEPHROPATHY DOC TX: CPT | Mod: HCNC,CPTII,95, | Performed by: NURSE PRACTITIONER

## 2024-09-17 NOTE — ASSESSMENT & PLAN NOTE
Saturated iron 19%. Other iron labs normal. No anemia    -hold off on IV iron at present time. Encourage iron rich foods. List from up to date emailed patient. Email verified with patient  -f/u 3 months with cbc, iron, ferritin  -Discussed S&S to report sooner

## 2024-09-17 NOTE — PROGRESS NOTES
Subjective:       Patient ID: Zakia Price is a 64 y.o. female.    Chief Complaint: review labs. Anemia    HPI: 64 y.o female presenting today for follow up of her ion deficiency anemia previously treated with Feraheme 5/2021. Most recently received Venofer weekly x 4 completed 7/31/2023. Prior EGD/Colonoscopy evaluation reviewed. EGD pathology H. Pylori positive, she completed treatment.. Repeat testing reflect eradication of H. Pylori infection. Colonoscopy unremarkable with recommended repeat in 10 years. VCE without S&S bleeding    EGD for evaluation of c/o N/V 9/2023 unremarkable.     Today she notes feeling fatigued. Recent loss of her sister.    Social History     Socioeconomic History    Marital status: Single   Tobacco Use    Smoking status: Never    Smokeless tobacco: Never   Substance and Sexual Activity    Alcohol use: No    Drug use: No    Sexual activity: Not Currently     Social Determinants of Health     Financial Resource Strain: Low Risk  (11/14/2023)    Overall Financial Resource Strain (CARDIA)     Difficulty of Paying Living Expenses: Not hard at all   Food Insecurity: Food Insecurity Present (11/14/2023)    Hunger Vital Sign     Worried About Running Out of Food in the Last Year: Never true     Ran Out of Food in the Last Year: Sometimes true   Transportation Needs: No Transportation Needs (11/14/2023)    PRAPARE - Transportation     Lack of Transportation (Medical): No     Lack of Transportation (Non-Medical): No   Physical Activity: Insufficiently Active (11/14/2023)    Exercise Vital Sign     Days of Exercise per Week: 3 days     Minutes of Exercise per Session: 20 min   Stress: No Stress Concern Present (11/14/2023)    [employer] [place] [employer] - Occupational Stress Questionnaire     Feeling of Stress : Not at all   Housing Stability: Low Risk  (11/14/2023)    Housing Stability Vital Sign     Unable to Pay for Housing in the Last Year: No     Number  of Places Lived in the Last Year: 1     Unstable Housing in the Last Year: No       Past Medical History:   Diagnosis Date    Acute respiratory failure due to COVID-19     COVID-19     Diabetes mellitus, type 2 1993    BS  didn't check 10/04/2023 Insulin x 2 years    Diabetic neuropathy 01/27/2014    DJD (degenerative joint disease) of knee     DVT (deep venous thrombosis) around 1990's    in leg, is on no anticoagulant therapy presently    Fatty liver     GERD (gastroesophageal reflux disease)     Hypertension associated with diabetes     HECTOR (iron deficiency anemia) 05/13/2021    Multinodular goiter     Obesity, morbid, BMI 50 or higher     Sleep apnea     has no CPAP       Family History   Problem Relation Name Age of Onset    Diabetes Mother Heather Villanueva     Hypertension Mother Heather Villanueva     Heart disease Mother Heather Villanueva 50    Cancer Father Gurwinder Dotson     Arthritis Father Gurwinder Dotson     Breast cancer Sister      Cancer Brother      Cancer Brother Gurwinder Buchanan     Leukemia Son Rafa Price     Cancer Son Rafa Price        Past Surgical History:   Procedure Laterality Date    COLONOSCOPY N/A 5/12/2021    Procedure: COLONOSCOPY;  Surgeon: Carolina Rizo MD;  Location: [facility];  Service: Endoscopy;  Laterality: N/A;    EPIDURAL STEROID INJECTION N/A 12/10/2021    Procedure: Lumbar L5/S1 IL TEETEE  Would like AM procedure, if possible;  Surgeon: Nae Paul MD;  Location: [facility]IN [facility]GT;  Service: Pain Management;  Laterality: N/A;    EPIDURAL STEROID INJECTION INTO CERVICAL SPINE N/A 10/8/2021    Procedure: C7-T1 IL TEETEE-no sedation.  Needs IV-just incase;  Surgeon: Nae Paul MD;  Location: [facility];  Service: Pain Management;  Laterality: N/A;    ESOPHAGOGASTRODUODENOSCOPY N/A 7/8/2021    Procedure: EGD (ESOPHAGOGASTRODUODENOSCOPY) previous positve covid;  Surgeon: Jann Gutierrez MD;  Location: [facility];  Service: Endoscopy;  Laterality: N/A;     ESOPHAGOGASTRODUODENOSCOPY N/A 9/18/2023    Procedure: EGD (ESOPHAGOGASTRODUODENOSCOPY);  Surgeon: Gm Leon MD;  Location: [facility];  Service: Endoscopy;  Laterality: N/A;    FRACTURE SURGERY      HYSTERECTOMY      INTRALUMINAL GASTROINTESTINAL TRACT IMAGING VIA CAPSULE N/A 10/24/2022    Procedure: IMAGING PROCEDURE, GI TRACT, INTRALUMINAL, VIA CAPSULE;  Surgeon: Hang Lunsford RN;  Location: [facility] ENDO;  Service: Endoscopy;  Laterality: N/A;    SELECTIVE INJECTION OF ANESTHETIC AGENT AROUND LUMBAR SPINAL NERVE ROOT BY TRANSFORAMINAL APPROACH Bilateral 5/6/2022    Procedure: Bilateral L5/S1 TF TEETEE with RN IV sedation;  Surgeon: Nae Paul MD;  Location: [facility]IN [facility]GT;  Service: Pain Management;  Laterality: Bilateral;    SELECTIVE INJECTION OF ANESTHETIC AGENT AROUND LUMBAR SPINAL NERVE ROOT BY TRANSFORAMINAL APPROACH Bilateral 12/30/2022    Procedure: Bilateral L5/S1 TF TEETEE RN IV Sedation;  Surgeon: Nae Paul MD;  Location: [facility]IN [facility]GT;  Service: Pain Management;  Laterality: Bilateral;    SHOULDER ARTHROSCOPY      THYROIDECTOMY, PARTIAL Right     and transplatation of right superior parathyroid gland to the sternocleidomastoid muscle     TONSILLECTOMY      TUBAL LIGATION  1984    WRIST FRACTURE SURGERY      left       Review of Systems   Constitutional:  Negative for activity change, appetite change, chills, fatigue, fever and unexpected weight change.   HENT:  Negative for congestion, mouth sores, nosebleeds, sore throat, trouble swallowing and voice change.    Eyes:  Negative for visual disturbance.   Respiratory:  Negative for cough, chest tightness, shortness of breath and wheezing.    Cardiovascular:  Negative for chest pain and leg swelling.   Gastrointestinal:  Negative for abdominal distention, abdominal pain, anal bleeding, blood in stool, diarrhea, nausea and vomiting.   Endocrine: Positive for cold intolerance.   Genitourinary:  Negative for difficulty urinating, dysuria  and hematuria.   Musculoskeletal:  Negative for arthralgias, back pain and myalgias.   Skin:  Negative for pallor, rash and wound.   Neurological:  Negative for dizziness, syncope, weakness and headaches.   Hematological:  Negative for adenopathy. Does not bruise/bleed easily.   Psychiatric/Behavioral:  The patient is not nervous/anxious.          Medication List with Changes/Refills   Current Medications    ALBUTEROL (PROVENTIL/VENTOLIN HFA) 90 MCG/ACTUATION INHALER        AZELASTINE (ASTELIN) 137 MCG (0.1 %) NASAL SPRAY    1 spray (137 mcg total) by Nasal route 2 (two) times daily.    DICLOFENAC SODIUM (VOLTAREN ARTHRITIS PAIN) 1 % GEL    Apply 2 g topically once daily.    DILTIAZEM (CARDIZEM) 30 MG TABLET    TAKE 1 TABLET EVERY 12 HOURS    EPINEPHRINE (EPIPEN) 0.3 MG/0.3 ML ATIN    Inject into the muscle.    EZETIMIBE (ZETIA) 10 MG TABLET    Take 1 tablet by mouth once daily    FAMOTIDINE (PEPCID) 40 MG TABLET    Take 1 tablet by mouth once daily    FLUTICASONE PROPIONATE (FLONASE) 50 MCG/ACTUATION NASAL SPRAY    Use 2 sprays to each nostril daily    HYDROCODONE-ACETAMINOPHEN (NORCO) 5-325 MG PER TABLET    Take 1 tablet by mouth every 6 (six) hours as needed.    INHALATION SPACING DEVICE (BREATHERITE VALVED MDI CHAMBER)    Use as directed for inhalation.    LEVOTHYROXINE (SYNTHROID) 100 MCG TABLET    TAKE 1 TABLET ONE TIME DAILY    METHOCARBAMOL (ROBAXIN) 500 MG TAB    Take 1 tablet (500 mg total) by mouth 2 (two) times daily as needed (muscle spasms).    OMEPRAZOLE (PRILOSEC) 40 MG CAPSULE    Take 1 capsule (40 mg total) by mouth once daily.    OZEMPIC 2 MG/DOSE (8 MG/3 ML) PNIJ    INJECT 2 MG INTO THE SKIN EVERY 7 DAYS.    PREGABALIN (LYRICA) 75 MG CAPSULE    Take 1 capsule (75 mg total) by mouth 3 (three) times daily.    ROPINIROLE (REQUIP) 0.5 MG TABLET    Take 1 tablet (0.5 mg total) by mouth every evening.    SULFACETAMIDE SODIUM 10% (BLEPH-10) 10 % OPHTHALMIC SOLUTION    Place 2 drops into the left eye 4  (four) times daily.    TIZANIDINE (ZANAFLEX) 4 MG TABLET    TAKE 1/2 TO 1 TABLET TWICE DAILY AS NEEDED FOR MUSCLE SPASMS. MAY CAUSE DROWSINESS.    TOPIRAMATE (TOPAMAX) 100 MG TABLET    Take 1 tablet (100 mg total) by mouth 2 (two) times daily.    TRELEGY ELLIPTA 100-62.5-25 MCG DSDV    Inhale 1 puff into the lungs once daily.     Objective:     There were no vitals filed for this visit.      Lab Results   Component Value Date    WBC 4.61 09/09/2024    HGB 12.9 09/09/2024    HCT 41.1 09/09/2024    MCV 93 09/09/2024     09/09/2024       BMP  Lab Results   Component Value Date     09/09/2024    K 4.4 09/09/2024     09/09/2024    CO2 23 09/09/2024    BUN 11 09/09/2024    CREATININE 0.9 09/09/2024    CALCIUM 9.6 09/09/2024    ANIONGAP 11 09/09/2024    ESTGFRAFRICA >60.0 02/24/2022    EGFRNONAA >60.0 02/24/2022     Lab Results   Component Value Date    ALT 11 09/09/2024    AST 10 09/09/2024    ALKPHOS 77 09/09/2024    BILITOT 0.2 09/09/2024     Lab Results   Component Value Date    IRON 66 09/09/2024    TIBC 348 09/09/2024    FERRITIN 79 09/09/2024       Physical Exam  Pulmonary:      Effort: Pulmonary effort is normal. No respiratory distress.   Neurological:      Mental Status: She is alert and oriented to person, place, and time.        Assessment:     Problem List Items Addressed This Visit          Oncology    Iron deficiency - Primary     Saturated iron 19%. Other iron labs normal. No anemia    -hold off on IV iron at present time. Encourage iron rich foods. List from up to date emailed patient. Email verified with patient  -f/u 3 months with cbc, iron, ferritin  -Discussed S&S to report sooner         Relevant Orders    CBC Auto Differential    Basic Metabolic Panel    Iron and TIBC    Ferritin         Med Onc Chart Routing      Follow up with physician    Follow up with KIMBER 3 months.   Infusion scheduling note    Injection scheduling note    Labs CBC, ferritin, iron and TIBC and other    Scheduling:  Preferred lab:  Lab interval:  +BMP 1-2 days prior   Imaging None      Pharmacy appointment No pharmacy appointment needed      Other referrals       No additional referrals needed       Plan:     Iron deficiency  -     CBC Auto Differential; Future; Expected date: 09/17/2024  -     Basic Metabolic Panel; Future; Expected date: 09/17/2024  -     Iron and TIBC; Future; Expected date: 09/17/2024  -     Ferritin; Future; Expected date: 09/17/2024              PRECIOUS [facility]

## 2024-09-20 ENCOUNTER — OFFICE VISIT (OUTPATIENT)
Dept: SPORTS MEDICINE | Facility: CLINIC | Age: 64
End: 2024-09-20
Payer: MEDICARE

## 2024-09-20 ENCOUNTER — OFFICE VISIT (OUTPATIENT)
Dept: PODIATRY | Facility: CLINIC | Age: 64
End: 2024-09-20
Payer: MEDICARE

## 2024-09-20 VITALS — HEIGHT: 68 IN | BODY MASS INDEX: 44.41 KG/M2 | RESPIRATION RATE: 17 BRPM | WEIGHT: 293 LBS

## 2024-09-20 VITALS — WEIGHT: 293 LBS | BODY MASS INDEX: 44.41 KG/M2 | HEIGHT: 68 IN

## 2024-09-20 DIAGNOSIS — M17.12 PRIMARY OSTEOARTHRITIS OF LEFT KNEE: Primary | ICD-10-CM

## 2024-09-20 DIAGNOSIS — E11.42 DIABETIC POLYNEUROPATHY ASSOCIATED WITH TYPE 2 DIABETES MELLITUS: ICD-10-CM

## 2024-09-20 DIAGNOSIS — M17.11 PRIMARY OSTEOARTHRITIS OF RIGHT KNEE: ICD-10-CM

## 2024-09-20 DIAGNOSIS — L60.3 ONYCHODYSTROPHY: ICD-10-CM

## 2024-09-20 DIAGNOSIS — E11.49 TYPE II DIABETES MELLITUS WITH NEUROLOGICAL MANIFESTATIONS: Primary | ICD-10-CM

## 2024-09-20 DIAGNOSIS — M12.812 ROTATOR CUFF ARTHROPATHY OF LEFT SHOULDER: ICD-10-CM

## 2024-09-20 DIAGNOSIS — E66.01 MORBID OBESITY: ICD-10-CM

## 2024-09-20 PROCEDURE — 99999 PR PBB SHADOW E&M-EST. PATIENT-LVL IV: CPT | Mod: PBBFAC,HCNC,, | Performed by: PHYSICIAN ASSISTANT

## 2024-09-20 PROCEDURE — 99999 PR PBB SHADOW E&M-EST. PATIENT-LVL III: CPT | Mod: PBBFAC,HCNC,, | Performed by: PODIATRIST

## 2024-09-20 RX ORDER — TRIAMCINOLONE ACETONIDE 40 MG/ML
40 INJECTION, SUSPENSION INTRA-ARTICULAR; INTRAMUSCULAR
Status: DISCONTINUED | OUTPATIENT
Start: 2024-09-20 | End: 2024-09-20 | Stop reason: HOSPADM

## 2024-09-20 RX ORDER — CELECOXIB 200 MG/1
200 CAPSULE ORAL 2 TIMES DAILY
Qty: 60 CAPSULE | Refills: 2 | Status: SHIPPED | OUTPATIENT
Start: 2024-09-20

## 2024-09-20 RX ADMIN — TRIAMCINOLONE ACETONIDE 40 MG: 40 INJECTION, SUSPENSION INTRA-ARTICULAR; INTRAMUSCULAR at 01:09

## 2024-09-20 NOTE — PROGRESS NOTES
Subjective:       Patient ID: Zakia Price is a 64 y.o. female.    Chief Complaint: Nail Care (Nail care, diabetic pt, pt is wearing slippers, rate pain 10/10)    HPI: Patient presents to the office today with the chief complaint of elongated, thickened and dystrophic nail plates to the B/L foot.  Reports previously having 10/10 pain to the right and left foot.  She states that pain has significantly improved.  She reports that unfortunately, she was on her feet due to a death in the family requiring increased amounts of walking in mobility.  Continues to have improving neuropathy pains with Lyrica.  This patient is a Diabetic Type II, complicated with morbid obesity and Peripheral Neuropathy. Patient does follow with Primary Care and/or Endocrinology for management of Diabetes Mellitus. This patient's PMD is Oleksandr Nagel MD. This patient last saw his/her primary care provider on 05/29/2024    Hemoglobin A1C   Date Value Ref Range Status   09/09/2024 5.1 4.0 - 5.6 % Final     Comment:     ADA Screening Guidelines:  5.7-6.4%  Consistent with prediabetes  >or=6.5%  Consistent with diabetes    High levels of fetal hemoglobin interfere with the HbA1C  assay. Heterozygous hemoglobin variants (HbS, HgC, etc)do  not significantly interfere with this assay.   However, presence of multiple variants may affect accuracy.     12/26/2023 4.8 4.0 - 5.6 % Final     Comment:     ADA Screening Guidelines:  5.7-6.4%  Consistent with prediabetes  >or=6.5%  Consistent with diabetes    High levels of fetal hemoglobin interfere with the HbA1C  assay. Heterozygous hemoglobin variants (HbS, HgC, etc)do  not significantly interfere with this assay.   However, presence of multiple variants may affect accuracy.     09/06/2023 4.9 4.0 - 5.6 % Final     Comment:     ADA Screening Guidelines:  5.7-6.4%  Consistent with prediabetes  >or=6.5%  Consistent with diabetes    High levels of fetal hemoglobin interfere with the  HbA1C  assay. Heterozygous hemoglobin variants (HbS, HgC, etc)do  not significantly interfere with this assay.   However, presence of multiple variants may affect accuracy.     .     Review of patient's allergies indicates:   Allergen Reactions    Codeine sulfate      Nausea^    Lisinopril Swelling     angioedema    Codeine Nausea Only and Rash       Past Medical History:   Diagnosis Date    Acute respiratory failure due to COVID-19     COVID-19     Diabetes mellitus, type 2 1993    BS  didn't check 10/04/2023 Insulin x 2 years    Diabetic neuropathy 01/27/2014    DJD (degenerative joint disease) of knee     DVT (deep venous thrombosis) around 1990's    in leg, is on no anticoagulant therapy presently    Fatty liver     GERD (gastroesophageal reflux disease)     Hypertension associated with diabetes     HECTOR (iron deficiency anemia) 05/13/2021    Multinodular goiter     Obesity, morbid, BMI 50 or higher     Sleep apnea     has no CPAP       Family History   Problem Relation Name Age of Onset    Diabetes Mother Heather Villanueva     Hypertension Mother Heather Villanueva     Heart disease Mother Heather Villanueva 50    Cancer Father Gurwinder Dotson     Arthritis Father Gurwinder Dotson     Breast cancer Sister      Cancer Brother      Cancer Brother Gurwinder Buchanan     Leukemia Son Rafa Price     Cancer Son Rafa Price        Social History     Socioeconomic History    Marital status: Single   Tobacco Use    Smoking status: Never    Smokeless tobacco: Never   Substance and Sexual Activity    Alcohol use: No    Drug use: No    Sexual activity: Not Currently     Social Determinants of Health     Financial Resource Strain: Low Risk  (11/14/2023)    Overall Financial Resource Strain (CARDIA)     Difficulty of Paying Living Expenses: Not hard at all   Food Insecurity: Food Insecurity Present (11/14/2023)    Hunger Vital Sign     Worried About Running Out of Food in the Last Year: Never true     Ran Out of Food in the Last Year: Sometimes true    Transportation Needs: No Transportation Needs (11/14/2023)    PRAPARE - Transportation     Lack of Transportation (Medical): No     Lack of Transportation (Non-Medical): No   Physical Activity: Insufficiently Active (11/14/2023)    Exercise Vital Sign     Days of Exercise per Week: 3 days     Minutes of Exercise per Session: 20 min   Stress: No Stress Concern Present (11/14/2023)    [employer] [place] [employer] - Occupational Stress Questionnaire     Feeling of Stress : Not at all   Housing Stability: Low Risk  (11/14/2023)    Housing Stability Vital Sign     Unable to Pay for Housing in the Last Year: No     Number of Places Lived in the Last Year: 1     Unstable Housing in the Last Year: No       Past Surgical History:   Procedure Laterality Date    COLONOSCOPY N/A 5/12/2021    Procedure: COLONOSCOPY;  Surgeon: Carolina Rizo MD;  Location: [facility];  Service: Endoscopy;  Laterality: N/A;    EPIDURAL STEROID INJECTION N/A 12/10/2021    Procedure: Lumbar L5/S1 IL TEETEE  Would like AM procedure, if possible;  Surgeon: Nae Paul MD;  Location: [facility];  Service: Pain Management;  Laterality: N/A;    EPIDURAL STEROID INJECTION INTO CERVICAL SPINE N/A 10/8/2021    Procedure: C7-T1 IL TEETEE-no sedation.  Needs IV-just incase;  Surgeon: Nae Paul MD;  Location: [facility];  Service: Pain Management;  Laterality: N/A;    ESOPHAGOGASTRODUODENOSCOPY N/A 7/8/2021    Procedure: EGD (ESOPHAGOGASTRODUODENOSCOPY) previous positve covid;  Surgeon: Jann Gutierrez MD;  Location: [facility];  Service: Endoscopy;  Laterality: N/A;    ESOPHAGOGASTRODUODENOSCOPY N/A 9/18/2023    Procedure: EGD (ESOPHAGOGASTRODUODENOSCOPY);  Surgeon: Gm Leon MD;  Location: [facility];  Service: Endoscopy;  Laterality: N/A;    FRACTURE SURGERY      HYSTERECTOMY      INTRALUMINAL GASTROINTESTINAL TRACT IMAGING VIA CAPSULE N/A 10/24/2022    Procedure: IMAGING PROCEDURE, GI TRACT, INTRALUMINAL, VIA CAPSULE;   "Surgeon: Hang Lunsford RN;  Location: [facility] ENDO;  Service: Endoscopy;  Laterality: N/A;    SELECTIVE INJECTION OF ANESTHETIC AGENT AROUND LUMBAR SPINAL NERVE ROOT BY TRANSFORAMINAL APPROACH Bilateral 5/6/2022    Procedure: Bilateral L5/S1 TF TEETEE with RN IV sedation;  Surgeon: Nae Paul MD;  Location: [facility] PAIN [facility]GT;  Service: Pain Management;  Laterality: Bilateral;    SELECTIVE INJECTION OF ANESTHETIC AGENT AROUND LUMBAR SPINAL NERVE ROOT BY TRANSFORAMINAL APPROACH Bilateral 12/30/2022    Procedure: Bilateral L5/S1 TF TEETEE RN IV Sedation;  Surgeon: Nae Paul MD;  Location: [facility] PAIN [facility]GT;  Service: Pain Management;  Laterality: Bilateral;    SHOULDER ARTHROSCOPY      THYROIDECTOMY, PARTIAL Right     and transplatation of right superior parathyroid gland to the sternocleidomastoid muscle     TONSILLECTOMY      TUBAL LIGATION  1984    WRIST FRACTURE SURGERY      left       Review of Systems       Objective:   Ht 5' 8" (1.727 m)   Wt (!) 138.8 kg (306 lb)   BMI 46.53 kg/m²     Physical Exam  LOWER EXTREMITY PHYSICAL EXAMINATION    VASCULAR:  The right dorsalis pedis pulse 2/4 and the right posterior tibial pulse 1/4.  The left dorsalis pedis pulse 2/4 and posterior tibial pulse on the left is 1/4.  Capillary refill is intact.  Pedal hair growth decreased.     NEUROLOGY: Protective sensation is not intact to the left and right plantar surfaces of the foot and digits, as the patient has no sensation/detection at greater than 4 distinct points of contact with 5.07 Hardy Cele monofilament. Sensation to light touch is intact on the left and right foot. Proprioception is intact, bilateral. Sensation to pin prick is reduced to absent. Vibratory sensation is diminished.    DERMATOLOGY:  Skin is supple, moist, intact.  There is no signs of callusing, ulcerations, other lesions identified to the dorsal or plantar aspect of the right or left foot.  The R1, 2, 5 and left L1,2, 5 are thickened, discolored dystrophic. "  There is subungual debris.  Nail plates have area of dark discoloration.  The remaining nails 3-4 on the right foot and the left foot are elongated but of normal color, thickness, and texture.   There is no signs of ingrowing into the medial or lateral borders.  There is no evidence of wounds or skin breakdown.  No edema or erythema.  No obvious lacerations or fissuring.  Interdigital spaces are clean, dry, intact.  No rashes or scars appreciated.    ORTHOPEDIC: Manual Muscle Testing is 5/5 in all planes on the left and right, without pains, with and without resistance. Gait pattern is non-antalgic.    Assessment:     1. Type II diabetes mellitus with neurological manifestations    2. Diabetic polyneuropathy associated with type 2 diabetes mellitus    3. Morbid obesity    4. Onychodystrophy        Plan:     Type II diabetes mellitus with neurological manifestations    Diabetic polyneuropathy associated with type 2 diabetes mellitus    Morbid obesity    Onychodystrophy        Thorough discussion is had with the patient this afternoon, concerning the diagnosis, its etiology, and the treatment algorithm at present.  Greater than 50% of this visit spent on counseling and coordination of care. Greater than 15 minutes of a 20 minute appointment spent on education about the diabetic foot, neuropathy, and prevention of limb loss.  Shoe inspection. Diabetic Foot Education. Patient reminded of the importance of good nutrition and blood sugar control to help prevent podiatric complications of diabetes. Patient instructed on proper foot hygeine. We discussed wearing proper and supportive shoe gear, daily foot inspections, never walking barefooted or sock footed, never putting sharp instruments to feet which can cause major complications associated with infection, ulcers, lacerations.      Dystrophic nail plates, as outlined above (R#1,2,5  ; L#1,2,5 ), are sharply debrided with double action nail nipper, and/or with the  assistance of a mechanical rotary carlos alberto, with removal of all offending nail and nail border(s), for reduction of pains. Nails are reduced in terms of length, width and girth with removal of subungual debris to facilitate pain free weight bearing and ambulation. The elongated nails as outlined in the objective portion of this note, were trimmed to appropriate length, with a double action nail nipper, for alleviation/reduction of pains as well. Follow up in approx. 3-4 months.      Future Appointments   Date Time Provider Department Center   9/20/2024  1:30 PM Rosemary Alonso PA-C [facility] [place]   12/12/2024 11:40 AM Oleksandr Nagel MD [facility]   12/16/2024 11:00 AM LABORATORY, [place] [facility] [place]   12/19/2024  3:00 PM Sol Mckeon NP [facility]   1/6/2025  9:45 AM Brandie Rey, [facility]

## 2024-09-20 NOTE — PROGRESS NOTES
Orthopaedic Follow-Up Visit    Last Appointment:  05/27/2024  Diagnosis:  Primary osteoarthritis of both knees, left rotator cuff tear arthropathy  Prior Procedure:  Bilateral Monovisc 05/27/2024, left shoulder CSI 05/27/2024    Zakia Price is a 64 y.o. female who is here for f/u evaluation of bilateral knee pain and left shoulder pain. The patient was last seen here by me on 05/27/2024 at which point we completed bilateral knee viscosupplementation injections and a left shoulder corticosteroid injection prior to considering further treatment options. The patient returns today reporting that the symptoms have returned in her knees and her left shoulder and she is interested in repeat injections today.    To review her history, Zakia Price is a 63 y.o. right and left-hand dominant female who initially presented to clinic on 11/27/2023 for left shoulder pain that has been present for several years but has been becoming more frequent at that time. She had no acute injury or trauma; however, she works as a  for several years and she attributes a lot of her shoulder pain that this. Her symptoms include anterior shoulder pain, limited range of motion, night pain, morning stiffness. Her pain is made worse by overhead movements and repetitive movements. Her treatment has included rest, activity modification, oral anti-inflammatories, Tylenol, home exercises, corticosteroid injection. As for her knees, she has had chronic left knee pain for several years. She has had corticosteroid injections in the past with good relief of symptoms. She also has a longstanding history of bilateral knee pain, chronic.  Generally gets good relief with intermittent corticosteroid injections and Visco.    Patient's medications, allergies, past medical, surgical, social and family histories were reviewed and updated as appropriate.    Review of Systems   All systems reviewed were negative.  Specifically, the patient  denies fever, chills, weight loss, chest pain, shortness of breath, or dyspnea on exertion.      Past Medical History:   Diagnosis Date    Acute respiratory failure due to COVID-19     COVID-19     Diabetes mellitus, type 2 1993    BS  didn't check 10/04/2023 Insulin x 2 years    Diabetic neuropathy 01/27/2014    DJD (degenerative joint disease) of knee     DVT (deep venous thrombosis) around 1990's    in leg, is on no anticoagulant therapy presently    Fatty liver     GERD (gastroesophageal reflux disease)     Hypertension associated with diabetes     HECTOR (iron deficiency anemia) 05/13/2021    Multinodular goiter     Obesity, morbid, BMI 50 or higher     Sleep apnea     has no CPAP       Objective:      Physical Exam  Patient is alert and oriented, no distress. Skin is intact. Neuro is normal with no focal motor or sensory findings.    Standing exam  stance: varus alignment, no significant leg-length discrepancy  gait: antalgic    Knee                                                  RIGHT             LEFT  Skin:                                         Intact               Intact  ROM:                                        0-110              5-110  Effusion:                                   +                      +  Medial joint line tenderness:    +                      +  Lateral joint line tenderness:    Neg                  Neg  Ash:                                Neg                  Neg  Patella crepitus:                       +                      +  Patella tenderness:                  Neg                  Neg  Patella grind:                            Neg                  Neg  Lachman:                                 Neg                  Neg  Valgus stress:                          Neg                  Neg  Varus stress:                            Neg                  Neg  Posterior drawer:                     Neg                  Neg  N-V                                          intact                intact  Hip:                                          nml                    nml              Lower extremity edema:         Negative          negative     Neurovascular exam  - motor function grossly intact bilaterally to hip flexion, knee extension and flexion, ankle dorsiflexion and plantarflexion  - sensation intact to light touch bilaterally to femoral, tibial, tibial and peroneal distributions  - symmetrical pedal pulses     Shoulder Exam:  Cervical exam is unremarkable. Intact cervical ROM. Negative Spurling's test     Physical Exam:                       RIGHT                                     LEFT     Scap Dyskinesis/Winging       (-)                                             (-)     Tenderness:                                                                              Greater Tuberosity                  (-)                                            +  Bicipital Groove                       (-)                                             (-)  AC joint                                   (-)                                             (-)  Other:      ROM:  Forward Elevation       160                                          160  Abduction                    120                                          110  ER (at side)                 70                                            60  IR                                 T8                                            T8     Strength:   Supraspinatus             5/5                                           4+/5  Infraspinatus               5/5                                           4+/5  Subscap / IR               5/5                                           4+/5      Special Tests:              Neer:                                       (-)                                             +              Todd:                                 (-)                                             +              SS Stress:                               (-)                                              +              Bear Hug:                                (-)                                             (-)              [place]'s:                                 (-)                                             +              Resisted Thrower's:                (-)                                             +              Cross Arm Abduction:             (-)                                             +     Neurovascular examination  - Motor grossly intact bilaterally to shoulder abduction, elbow flexion and extension, wrist flexion and extension, and intrinsic hand musculature  - Sensation intact to light touch bilaterally in axillary, median, radial, and ulnar distributions  - Symmetrical radial pulses    Imaging:   XR Results:  Results for orders placed during the hospital encounter of 04/23/24    X-ray Knee Ortho Bilateral with Flexion    Narrative  EXAM: XR KNEE ORTHO BILAT WITH FLEXION    CLINICAL INDICATION:   Pain in right knee.  Pain in left knee.    FINDINGS:  Comparisons are made to a right tibia/fibula x-ray from January 2022.  8 total images of the bilateral knees were submitted for interpretation.  There are multiple intra-articular loose bodies involving both knee joints with adjacent small effusions.  Largest on the left is within the suprapatellar recess measuring 3 cm in length.  The largest on the right patella recess measuring 2.5 cm in length.    Marked tricompartment joint space narrowing and osteophyte formation, most significantly involving the medial tibiofemoral compartments and patellofemoral compartments.    Alignment is satisfactory. No     fractures, dislocations, or erosive arthritic change.  Negative for radiopaque foreign bodies or air in the soft tissues.    Impression  1.  Multiple intra-articular loose bodies involving the knee joints measuring up to 1.5 cm on the right and 3 cm on the left.  Small effusions.  Negative for underlying  fractures.  2.  Marked tricompartment degenerative changes most significant involving the medial tibiofemoral compartments and patellofemoral compartments bilaterally.      Finalized on: 4/23/2024 8:50 AM By:  Ronen Fitzgerald MD  [facility]# [id number]      2024-04-23 08:52:19.441    [facility]    X-Ray Shoulder 2 or More Views Left     Narrative  EXAM:  XR SHOULDER COMPLETE 2 OR MORE VIEWS LEFT     CLINICAL HISTORY: Left shoulder pain.     FINDINGS: Glenohumeral and acromioclavicular alignment is normal.  No fracture or other acute osseous abnormality is seen.  Moderate AC joint degenerative changes with inferior spurring.  Mild degenerative changes of the lower glenoid.  Irregularity seen at the rotator cuff insertion without definite calcific tendinosis.  Findings could reflect chronic rotator cuff injury.  No evidence of rotator cuff or bursal calcium deposition.     Impression  No acute radiographic abnormality of the left shoulder.     Finalized on: 11/15/2023 12:15 PM By:  Jarvis Celestin MD  [facility]# [id number]      2023-11-15 12:17:34.238    [facility]    Physician Read: I agree with the above impression.  There is complete joint space loss present in the medial compartment as well as marginal osteophyte formation present in intra-articular loose bodies.  All these findings are consistent with a Kellgren Av grade 4       Assessment/Plan:   Assessment:  Zakia Price is a 64 y.o. female with end-stage osteoarthritis of both knees, left shoulder rotator cuff tear arthropathy    Plan:    Discussed diagnosis and treatment options with the patient today. She has known osteoarthritis of both of her knees. She also has chronic rotator cuff deficiency in the left shoulder consistent with left rotator cuff tear arthropathy  We last completed bilateral Monovisc injections about 4 months ago, she had improvement of symptoms up until about 1 month ago her pain returned.  Discussed moving forward with a repeat round of Visco versus a  referral to a knee replacement specialist for consideration of a TKA. Patient reports she is becoming more interested in a knee replacement, but she would like to do another round of Visco first.  Prior authorization placed for bilateral knee Monovisc  Plan to start around November 27th (6 months after her last round of Visco)  MEDICAL NECESSITY FOR VISCOSUPPLEMENTATION: After thorough evaluation of the patient, I have determined that visco-supplementation is medically necessary. The patient has painful DJD of the knee with failure of conservative therapy including lifestyle modifications and rehabilitation exercises. Oral analgesis/NSAIDs have not adequately controlled symptoms and there is radiographic evidence of joint space narrowing, subchondral sclerosis, and some early osteophytic changes Kellgren- Av grade 2 or greater, or in lack of radiographic evidence, there is arthroscopic or other evidence of chondrosis.  For her acute pain today, she is requesting corticosteroid injections.  It has been 5 months since her last corticosteroid injections in her knees, okay for repeat injections  Bilateral knee corticosteroid injections given in clinic, patient tolerated the procedure well with no immediate complications  As for her shoulder, she is also requesting an injection in her shoulder.  Discussed that since she received bilateral knee injections today, we should delay a shoulder injection by at least 2-3 weeks, patient voiced understanding  Follow up appointment scheduled with me in 2-3 weeks for a procedure visit for a left shoulder corticosteroid injection  Refilled Celebrex twice a day as needed  Follow-up with me in about 2 weeks for left shoulder injection, 2 months for bilateral Visco, we will plan to do lidocaine injection prior to Visco for therapeutic relief during procedure          Rosemary Alonso PA-C  Sports Medicine Physician Assistant       Disclaimer: This note was prepared using a voice  recognition system and is likely to have sound alike errors within the text.

## 2024-09-21 DIAGNOSIS — Z79.899 ENCOUNTER FOR LONG-TERM (CURRENT) USE OF MEDICATIONS: ICD-10-CM

## 2024-09-21 DIAGNOSIS — E11.65 TYPE 2 DIABETES MELLITUS WITH HYPERGLYCEMIA, WITHOUT LONG-TERM CURRENT USE OF INSULIN: Chronic | ICD-10-CM

## 2024-09-21 NOTE — PROCEDURES
Large Joint Aspiration/Injection: bilateral knee    Date/Time: 9/20/2024 1:30 PM    Performed by: Rosemary Alonso PA-C  Authorized by: Rosemary Alonso PA-C    Consent Done?:  Yes (Verbal)  Indications:  Pain  Site marked: the procedure site was marked    Timeout: prior to procedure the correct patient, procedure, and site was verified    Prep: patient was prepped and draped in usual sterile fashion      Local anesthesia used?: Yes    Anesthesia:  Local infiltration  Local anesthetic:  Lidocaine 1% without epinephrine    Details:  Needle Size:  22 G  Ultrasonic Guidance for needle placement?: No    Approach:  Anterolateral  Location:  Knee  Laterality:  Bilateral  Site:  Bilateral knee  Medications (Right):  40 mg triamcinolone acetonide 40 mg/mL  Medications (Left):  40 mg triamcinolone acetonide 40 mg/mL  Patient tolerance:  Patient tolerated the procedure well with no immediate complications    Bilateral knee CSI   Procedure Note:  We discussed the risk and benefits of injections, including pain, infection, bleeding, damage to adjacent structures, risk of reaction to injection. We discussed the steroid/cortisone injections will not heal the problem but mat help decrease inflammation and help with symptoms. We discussed the risk of repeated injections. The patient expressed understanding and wanted to proceed with the injection. We performed a timeout to verify the proper patient, proper procedure, and the proper site. The injection site was prepared in a sterile fashion. The patient tolerated it well and there were no complication. We did discuss with the patient that steroid injections can cause some increase in blood sugar and blood pressure for up to a week after the injection.

## 2024-09-21 NOTE — TELEPHONE ENCOUNTER
No care due was identified.  [facility] Embedded Care Due Messages. Reference number: [id number].   9/21/2024 10:16:55 AM CDT

## 2024-09-21 NOTE — TELEPHONE ENCOUNTER
No care due was identified.  [facility] Embedded Care Due Messages. Reference number: [id number].   9/21/2024 10:17:26 AM CDT

## 2024-09-23 RX ORDER — SEMAGLUTIDE 2.68 MG/ML
2 INJECTION, SOLUTION SUBCUTANEOUS
Qty: 9 ML | Refills: 0 | Status: SHIPPED | OUTPATIENT
Start: 2024-09-23

## 2024-09-23 RX ORDER — LEVOTHYROXINE SODIUM 100 UG/1
100 TABLET ORAL
Qty: 90 TABLET | Refills: 2 | Status: SHIPPED | OUTPATIENT
Start: 2024-09-23

## 2024-09-23 NOTE — TELEPHONE ENCOUNTER
Refill Decision Note   Zakia Price  is requesting a refill authorization.  Brief Assessment and Rationale for Refill:  Approve     Medication Therapy Plan:         Comments:     Note composed:9:47 AM 09/23/2024

## 2024-09-23 NOTE — TELEPHONE ENCOUNTER
Refill Decision Note   Zakia Price  is requesting a refill authorization.  Brief Assessment and Rationale for Refill:  Approve     Medication Therapy Plan:         Comments:     Note composed:11:53 AM 09/23/2024

## 2024-09-25 ENCOUNTER — TELEPHONE (OUTPATIENT)
Dept: FAMILY MEDICINE | Facility: CLINIC | Age: 64
End: 2024-09-25
Payer: MEDICARE

## 2024-09-25 NOTE — TELEPHONE ENCOUNTER
----- Message from Tylor Pro sent at 9/25/2024  4:00 PM CDT -----  Contact: @[facility] [phone]  Would like to receive medical advice.    Pharmacy name/number (copy/paste from chart):      Would they like a call back or a response via [facility]:  call back    Additional information:  Calling to speak with the office about if there was any order for pts meds to have been received yesterday at the office.

## 2024-09-30 ENCOUNTER — OFFICE VISIT (OUTPATIENT)
Dept: FAMILY MEDICINE | Facility: CLINIC | Age: 64
End: 2024-09-30
Payer: MEDICARE

## 2024-09-30 VITALS
HEART RATE: 75 BPM | WEIGHT: 293 LBS | SYSTOLIC BLOOD PRESSURE: 110 MMHG | HEIGHT: 68 IN | DIASTOLIC BLOOD PRESSURE: 88 MMHG | BODY MASS INDEX: 44.41 KG/M2 | OXYGEN SATURATION: 98 %

## 2024-09-30 DIAGNOSIS — Z23 IMMUNIZATION DUE: ICD-10-CM

## 2024-09-30 DIAGNOSIS — F43.23 ADJUSTMENT DISORDER WITH MIXED ANXIETY AND DEPRESSED MOOD: ICD-10-CM

## 2024-09-30 DIAGNOSIS — Z80.3 FAMILY HISTORY OF BREAST CANCER: ICD-10-CM

## 2024-09-30 DIAGNOSIS — Z87.09 HISTORY OF CHRONIC SINUSITIS: ICD-10-CM

## 2024-09-30 DIAGNOSIS — Z79.899 ENCOUNTER FOR LONG-TERM (CURRENT) USE OF MEDICATIONS: ICD-10-CM

## 2024-09-30 DIAGNOSIS — Z97.4 WEARS HEARING AID: ICD-10-CM

## 2024-09-30 DIAGNOSIS — G44.52 NEW DAILY PERSISTENT HEADACHE: Primary | ICD-10-CM

## 2024-09-30 DIAGNOSIS — M62.838 CERVICAL PARASPINAL MUSCLE SPASM: ICD-10-CM

## 2024-09-30 PROCEDURE — 3061F NEG MICROALBUMINURIA REV: CPT | Mod: CPTII,S$GLB,, | Performed by: NURSE PRACTITIONER

## 2024-09-30 PROCEDURE — 99999 PR PBB SHADOW E&M-EST. PATIENT-LVL V: CPT | Mod: PBBFAC,HCNC,, | Performed by: NURSE PRACTITIONER

## 2024-09-30 PROCEDURE — 90653 IIV ADJUVANT VACCINE IM: CPT | Mod: S$GLB,,, | Performed by: NURSE PRACTITIONER

## 2024-09-30 PROCEDURE — 3044F HG A1C LEVEL LT 7.0%: CPT | Mod: CPTII,S$GLB,, | Performed by: NURSE PRACTITIONER

## 2024-09-30 PROCEDURE — 3008F BODY MASS INDEX DOCD: CPT | Mod: CPTII,S$GLB,, | Performed by: NURSE PRACTITIONER

## 2024-09-30 PROCEDURE — 3079F DIAST BP 80-89 MM HG: CPT | Mod: CPTII,S$GLB,, | Performed by: NURSE PRACTITIONER

## 2024-09-30 PROCEDURE — G0008 ADMIN INFLUENZA VIRUS VAC: HCPCS | Mod: S$GLB,,, | Performed by: NURSE PRACTITIONER

## 2024-09-30 PROCEDURE — 1125F AMNT PAIN NOTED PAIN PRSNT: CPT | Mod: CPTII,S$GLB,, | Performed by: NURSE PRACTITIONER

## 2024-09-30 PROCEDURE — 3074F SYST BP LT 130 MM HG: CPT | Mod: CPTII,S$GLB,, | Performed by: NURSE PRACTITIONER

## 2024-09-30 PROCEDURE — 1101F PT FALLS ASSESS-DOCD LE1/YR: CPT | Mod: CPTII,S$GLB,, | Performed by: NURSE PRACTITIONER

## 2024-09-30 PROCEDURE — 3066F NEPHROPATHY DOC TX: CPT | Mod: CPTII,S$GLB,, | Performed by: NURSE PRACTITIONER

## 2024-09-30 PROCEDURE — 1159F MED LIST DOCD IN RCRD: CPT | Mod: CPTII,S$GLB,, | Performed by: NURSE PRACTITIONER

## 2024-09-30 PROCEDURE — 99214 OFFICE O/P EST MOD 30 MIN: CPT | Mod: S$GLB,,, | Performed by: NURSE PRACTITIONER

## 2024-09-30 PROCEDURE — 3288F FALL RISK ASSESSMENT DOCD: CPT | Mod: CPTII,S$GLB,, | Performed by: NURSE PRACTITIONER

## 2024-09-30 RX ORDER — TOPIRAMATE 100 MG/1
100 TABLET, FILM COATED ORAL 2 TIMES DAILY
Qty: 60 TABLET | Refills: 2 | Status: SHIPPED | OUTPATIENT
Start: 2024-09-30 | End: 2024-12-29

## 2024-09-30 RX ORDER — BUTALBITAL, ACETAMINOPHEN AND CAFFEINE 50; 325; 40 MG/1; MG/1; MG/1
1 TABLET ORAL EVERY 6 HOURS PRN
Qty: 30 TABLET | Refills: 0 | Status: SHIPPED | OUTPATIENT
Start: 2024-09-30 | End: 2024-10-30

## 2024-09-30 NOTE — ASSESSMENT & PLAN NOTE
- Continue current medications- Celebrex, tizanidine  - Discussed condition course and signs and symptoms to expect.  Patient advised take anti-inflammatories and or Tylenol for pain.    - Consider physical therapy   - Call MD or follow-up to clinic if not improving or worsening symptoms.  - ER precautions

## 2024-09-30 NOTE — ASSESSMENT & PLAN NOTE
Patient's younger sister recently passed away from breast cancer. She is having a hard time grieving. She does not take medication for anxiety/depression. She defers counseling/therapy at this time. She reports good support system at home. Denies SIHI. She will follow up if worsening or no improvement in symptoms.

## 2024-09-30 NOTE — ASSESSMENT & PLAN NOTE
She reports issues with her hearing aids, including occasional loud noises. She has an appointment scheduled next month with audiology ([facility]) to address these concerns. Follow up with audiology.

## 2024-09-30 NOTE — ASSESSMENT & PLAN NOTE
- Assessed persistent headaches, considering stress from recent loss of sister as potential contributing factor (see adjustment disorder)  - Will order MRI brain for daily headaches for the last few weeks. [facility] to [facility]  - No acute neurological deficits on physical exam  - Trial of Fioricet as needed every 6 hours for headache relief. Continue Celebrex.   - Reviewed recent labs.   - Follow up with neurology. Referral placed.   - Contact the office if headache symptoms change or worsen.  - ER precautions for severe symptoms.

## 2024-09-30 NOTE — ASSESSMENT & PLAN NOTE
She reports that she is using nasal sprays and receives allergy shots every two weeks. Followed by allergy. Reports symptoms controlled. Denies cough, congestion, runny nose, sore throat, fever, chills, ear pain.

## 2024-09-30 NOTE — PROGRESS NOTES
Assessment/Plan:  Problem List Items Addressed This Visit          Neuro    New daily persistent headache - Primary    Current Assessment & Plan     - Assessed persistent headaches, considering stress from recent loss of sister as potential contributing factor (see adjustment disorder)  - Will order MRI brain for daily headaches for the last few weeks. [facility] to [facility]  - No acute neurological deficits on physical exam  - Trial of Fioricet as needed every 6 hours for headache relief. Continue Celebrex.   - Reviewed recent labs.   - Follow up with neurology. Referral placed.   - Contact the office if headache symptoms change or worsen.  - ER precautions for severe symptoms.           Relevant Medications    butalbital-acetaminophen-caffeine -40 mg (FIORICET, ESGIC) -40 mg per tablet    Other Relevant Orders    MRI Brain Without Contrast    Ambulatory referral/consult to Neurology       Psychiatric    Adjustment disorder with mixed anxiety and depressed mood    Current Assessment & Plan     Patient's younger sister recently passed away from breast cancer. She is having a hard time grieving. She does not take medication for anxiety/depression. She defers counseling/therapy at this time. She reports good support system at home. Denies SIHI. She will follow up if worsening or no improvement in symptoms.             ENT    History of chronic sinusitis    Current Assessment & Plan     She reports that she is using nasal sprays and receives allergy shots every two weeks. Followed by allergy. Reports symptoms controlled. Denies cough, congestion, runny nose, sore throat, fever, chills, ear pain.          Wears hearing aid    Current Assessment & Plan     She reports issues with her hearing aids, including occasional loud noises. She has an appointment scheduled next month with audiology ([facility]) to address these concerns. Follow up with audiology.             ID    Immunization due    Current Assessment & Plan      Patient desires flu vaccine today. Ordered.          Relevant Medications    influenza (adjuvanted) (Fluad) 45 mcg/0.5 mL IM vaccine (> or = 64 yo) 0.5 mL (Completed)       Oncology    Family history of breast cancer    Overview     Patient's sister            Orthopedic    Cervical paraspinal muscle spasm    Current Assessment & Plan     - Continue current medications- Celebrex, tizanidine  - Discussed condition course and signs and symptoms to expect.  Patient advised take anti-inflammatories and or Tylenol for pain.    - Consider physical therapy   - Call MD or follow-up to clinic if not improving or worsening symptoms.  - ER precautions             Other    Encounter for long-term (current) use of medications (Chronic)    Overview     Sept 2024: reviewed labs.  May 2024:  Reviewed labs.  November 2023: Reviewed labs.  October 2023: Reviewed labs.  August 2023: Reviewed labs.  June 2023: Reviewed labs.  January 2023:  Reviewed labs.  CHRONIC. Stable. Compliant with medications for managed conditions. See medication list. No SE reported. Routine lab analysis is being monitored. Refills were addressed.  January 2021:CHRONIC. Stable. Compliant with medications for managed conditions. See medication list. No SE reported. Routine lab analysis is being monitored. Refills were addressed.  July 2021:  Reviewed labs.  January 2022:  Reviewed labs.  February 2022:  Reviewed labs.  July 2022:  Reviewed labs.  Lab Results   Component Value Date    WBC 4.61 09/09/2024    HGB 12.9 09/09/2024    HCT 41.1 09/09/2024    MCV 93 09/09/2024     09/09/2024         Chemistry        Component Value Date/Time     09/09/2024 1012    K 4.4 09/09/2024 1012     09/09/2024 1012    CO2 23 09/09/2024 1012    BUN 11 09/09/2024 1012    CREATININE 0.9 09/09/2024 1012    GLU 79 09/09/2024 1012        Component Value Date/Time    CALCIUM 9.6 09/09/2024 1012    ALKPHOS 77 09/09/2024 1012    AST 10 09/09/2024 1012    ALT 11  09/09/2024 1012    BILITOT 0.2 09/09/2024 1012    ESTGFRAFRICA >60.0 02/24/2022 1255    EGFRNONAA >60.0 02/24/2022 1255          Lab Results   Component Value Date    TSH 0.507 09/09/2024    FREET4 0.95 12/26/2023    T3FREE 2.5 12/26/2023            Current Assessment & Plan     Complete history and physical was completed today.  Complete and thorough medication reconciliation was performed.  Discussed risks and benefits of medications.  Advised patient on orders and health maintenance.  We discussed old records and old labs if available.  Will request any records not available through epic.  Continue current medications listed on your summary sheet.         Relevant Medications    topiramate (TOPAMAX) 100 MG tablet     Follow up if symptoms worsen or fail to improve.  ER precautions for severe or worsening symptoms.     Rose Price NP  _____________________________________________________________________________________________________________________________________________________    History of Present Illness    CHIEF COMPLAINT:  Ms. Price presents today for headaches.    HEADACHES:  She reports experiencing daily headaches for several weeks, localized to the frontal and occipital regions. She denies associated dizziness, confusion, memory problems, weakness, or blurry vision. The pain is sometimes located in temporal region. She also reports that the pain sometimes radiates into her neck. Over-the-counter medications like Tylenol have been ineffective. She attributes the onset to recent stress due to her sister's illness and subsequent passing from breast cancer. She expresses concern about cancer running in her family. Her mammogram is up to date and was negative for malignancy (Jan 2024). We did discuss BRCA genetic testing. She defers at this time. She takes allergy medicine twice daily and receives allergy shots every two weeks. She reports no cough, congestion, runny nose, sinus pressure, fever,  chills. She has a history of cervical paraspinal muscle spasms in June 2023. She reports occasional neck pain with movement, particularly when twisting. She continues Topamax, Celebrex, pregabalin (Lyrica), and tizanidine at night. She denies any history of neurological problems such as strokes or mini-strokes. There has been no recent injuries, falls, or known trauma.     Blood work done 3 weeks ago showed normal blood count, kidney and liver function, A1C, and TSH.    She reports issues with her hearing aids, including occasional loud noises. She has an appointment scheduled next month with audiology ([facility]) to address these concerns.    Mammo Digital Screening Bilat  Narrative & Impression  Result:  Mammo Digital Screening Bilat     History:  Patient is 64 y.o. and is seen for a screening mammogram.        Films Compared:  Compared to: 01/09/2023 Mammo Digital Screening Bilat and 01/06/2022 Mammo Digital Screening Bilat      Findings:  Computer-aided detection was utilized in the interpretation of this examination.     The breasts have scattered areas of fibroglandular density. There is no evidence of suspicious masses, microcalcifications or architectural distortion.     Impression:   No mammographic evidence of malignancy.     BI-RADS Category 1: Negative     Recommendation:  Routine screening mammogram in 1 year is recommended.        Exam Ended: 01/12/24 10:34 CST Last Resulted: 01/16/24 09:57 CST         Past Medical History:  Past Medical History:   Diagnosis Date    Acute respiratory failure due to COVID-19     COVID-19     Diabetes mellitus, type 2 1993    BS  didn't check 10/04/2023 Insulin x 2 years    Diabetic neuropathy 01/27/2014    DJD (degenerative joint disease) of knee     DVT (deep venous thrombosis) around 1990's    in leg, is on no anticoagulant therapy presently    Fatty liver     GERD (gastroesophageal reflux disease)     Hypertension associated with diabetes     HECTOR (iron deficiency  anemia) 05/13/2021    Multinodular goiter     Obesity, morbid, BMI 50 or higher     Sleep apnea     has no CPAP     Past Surgical History:   Procedure Laterality Date    COLONOSCOPY N/A 5/12/2021    Procedure: COLONOSCOPY;  Surgeon: Carolina Rizo MD;  Location: [facility];  Service: Endoscopy;  Laterality: N/A;    EPIDURAL STEROID INJECTION N/A 12/10/2021    Procedure: Lumbar L5/S1 IL TEETEE  Would like AM procedure, if possible;  Surgeon: Nae Paul MD;  Location: [facility];  Service: Pain Management;  Laterality: N/A;    EPIDURAL STEROID INJECTION INTO CERVICAL SPINE N/A 10/8/2021    Procedure: C7-T1 IL TEETEE-no sedation.  Needs IV-just incase;  Surgeon: Nae Paul MD;  Location: [facility];  Service: Pain Management;  Laterality: N/A;    ESOPHAGOGASTRODUODENOSCOPY N/A 7/8/2021    Procedure: EGD (ESOPHAGOGASTRODUODENOSCOPY) previous positve covid;  Surgeon: Jann Gutierrez MD;  Location: [facility];  Service: Endoscopy;  Laterality: N/A;    ESOPHAGOGASTRODUODENOSCOPY N/A 9/18/2023    Procedure: EGD (ESOPHAGOGASTRODUODENOSCOPY);  Surgeon: Gm Leon MD;  Location: [facility];  Service: Endoscopy;  Laterality: N/A;    FRACTURE SURGERY      HYSTERECTOMY      INTRALUMINAL GASTROINTESTINAL TRACT IMAGING VIA CAPSULE N/A 10/24/2022    Procedure: IMAGING PROCEDURE, GI TRACT, INTRALUMINAL, VIA CAPSULE;  Surgeon: Hang Lunsford RN;  Location: [facility];  Service: Endoscopy;  Laterality: N/A;    SELECTIVE INJECTION OF ANESTHETIC AGENT AROUND LUMBAR SPINAL NERVE ROOT BY TRANSFORAMINAL APPROACH Bilateral 5/6/2022    Procedure: Bilateral L5/S1 TF TEETEE with RN IV sedation;  Surgeon: Nae Paul MD;  Location: [facility];  Service: Pain Management;  Laterality: Bilateral;    SELECTIVE INJECTION OF ANESTHETIC AGENT AROUND LUMBAR SPINAL NERVE ROOT BY TRANSFORAMINAL APPROACH Bilateral 12/30/2022    Procedure: Bilateral L5/S1 TF TEETEE RN IV Sedation;  Surgeon: Nae Paul MD;  Location: [facility];   Service: Pain Management;  Laterality: Bilateral;    SHOULDER ARTHROSCOPY      THYROIDECTOMY, PARTIAL Right     and transplatation of right superior parathyroid gland to the sternocleidomastoid muscle     TONSILLECTOMY      TUBAL LIGATION  1984    WRIST FRACTURE SURGERY      left     Review of patient's allergies indicates:   Allergen Reactions    Codeine sulfate      Nausea^    Lisinopril Swelling     angioedema    Codeine Nausea Only and Rash     Social History     Tobacco Use    Smoking status: Never    Smokeless tobacco: Never   Substance Use Topics    Alcohol use: No    Drug use: No     Family History   Problem Relation Name Age of Onset    Diabetes Mother Heather Villanueva     Hypertension Mother Heather Villanueva     Heart disease Mother Heather Villanueva 50    Cancer Father Gurwinder Dotson     Arthritis Father Gurwinder Dotson     Breast cancer Sister      Cancer Brother      Cancer Brother Gurwinder Buchanan     Leukemia Son Rafa Price     Cancer Son Rafa Price      Current Outpatient Medications on File Prior to Visit   Medication Sig Dispense Refill    albuterol (PROVENTIL/VENTOLIN HFA) 90 mcg/actuation inhaler       azelastine (ASTELIN) 137 mcg (0.1 %) nasal spray 1 spray (137 mcg total) by Nasal route 2 (two) times daily. 30 mL 0    celecoxib (CELEBREX) 200 MG capsule Take 1 capsule (200 mg total) by mouth 2 (two) times daily. 60 capsule 2    diclofenac sodium (VOLTAREN ARTHRITIS PAIN) 1 % Gel Apply 2 g topically once daily. 100 g 0    diltiaZEM (CARDIZEM) 30 MG tablet TAKE 1 TABLET EVERY 12 HOURS 180 tablet 3    EPINEPHrine (EPIPEN) 0.3 mg/0.3 mL AtIn Inject into the muscle.      ezetimibe (ZETIA) 10 mg tablet Take 1 tablet by mouth once daily 90 tablet 1    famotidine (PEPCID) 40 MG tablet Take 1 tablet by mouth once daily 90 tablet 1    fluticasone propionate (FLONASE) 50 mcg/actuation nasal spray Use 2 sprays to each nostril daily 16 g 12    HYDROcodone-acetaminophen (NORCO) 5-325 mg per tablet Take 1 tablet by mouth  every 6 (six) hours as needed.      inhalation spacing device (BREATHERITE VALVED MDI CHAMBER) Use as directed for inhalation. 1 Device prn    levothyroxine (SYNTHROID) 100 MCG tablet Take 1 tablet (100 mcg total) by mouth before breakfast. 90 tablet 2    methocarbamoL (ROBAXIN) 500 MG Tab Take 1 tablet (500 mg total) by mouth 2 (two) times daily as needed (muscle spasms). 180 tablet 2    omeprazole (PRILOSEC) 40 MG capsule Take 1 capsule (40 mg total) by mouth once daily. 90 capsule 3    pregabalin (LYRICA) 75 MG capsule Take 1 capsule (75 mg total) by mouth 3 (three) times daily. 90 capsule 0    semaglutide (OZEMPIC) 2 mg/dose (8 mg/3 mL) PnIj Inject 2 mg into the skin every 7 days. 9 mL 0    sulfacetamide sodium 10% (BLEPH-10) 10 % ophthalmic solution Place 2 drops into the left eye 4 (four) times daily. 5 mL 0    tiZANidine (ZANAFLEX) 4 MG tablet TAKE 1/2 TO 1 TABLET TWICE DAILY AS NEEDED FOR MUSCLE SPASMS. MAY CAUSE DROWSINESS. 90 tablet 1    TRELEGY ELLIPTA 100-62.5-25 mcg DsDv Inhale 1 puff into the lungs once daily.      [DISCONTINUED] topiramate (TOPAMAX) 100 MG tablet Take 1 tablet (100 mg total) by mouth 2 (two) times daily. 60 tablet 0    rOPINIRole (REQUIP) 0.5 MG tablet Take 1 tablet (0.5 mg total) by mouth every evening. 30 tablet 2     No current facility-administered medications on file prior to visit.     Review of Systems   Constitutional:  Negative for appetite change, chills, fatigue and fever.   HENT:  Positive for hearing loss. Negative for congestion, ear pain, rhinorrhea and sore throat.    Eyes:  Negative for visual disturbance.   Respiratory:  Negative for cough and shortness of breath.    Cardiovascular:  Negative for chest pain, palpitations and leg swelling.   Gastrointestinal:  Negative for abdominal pain, diarrhea and vomiting.   Genitourinary:  Negative for difficulty urinating, dysuria and hematuria.   Musculoskeletal:  Positive for neck pain and neck stiffness. Negative for  "arthralgias and myalgias.   Skin:  Negative for rash and wound.   Neurological:  Positive for headaches. Negative for dizziness.   Psychiatric/Behavioral:  Positive for dysphoric mood. Negative for behavioral problems. The patient is nervous/anxious.      Vitals:    09/30/24 1441   BP: 110/88   Pulse: 75   SpO2: 98%   Weight: (!) 141.7 kg (312 lb 8 oz)   Height: 5' 8" (1.727 m)     Wt Readings from Last 3 Encounters:   09/30/24 (!) 141.7 kg (312 lb 8 oz)   09/20/24 (!) 138.8 kg (306 lb)   09/20/24 (!) 138.8 kg (306 lb)     Physical Exam  Vitals reviewed.   Constitutional:       General: She is not in acute distress.     Appearance: Normal appearance. She is obese. She is not ill-appearing.   HENT:      Head: Normocephalic and atraumatic.      Comments: +hearing aids     Right Ear: External ear normal.      Left Ear: External ear normal.      Nose: Nose normal. No congestion or rhinorrhea.   Eyes:      Extraocular Movements: Extraocular movements intact.      Conjunctiva/sclera: Conjunctivae normal.      Pupils: Pupils are equal, round, and reactive to light.   Cardiovascular:      Rate and Rhythm: Normal rate.      Heart sounds: Normal heart sounds.   Pulmonary:      Effort: Pulmonary effort is normal. No respiratory distress.      Breath sounds: Normal breath sounds.   Musculoskeletal:         General: Normal range of motion.      Cervical back: Normal range of motion. No signs of trauma, rigidity, torticollis or crepitus. Pain with movement and muscular tenderness present. Normal range of motion.      Right lower leg: No edema.      Left lower leg: No edema.   Skin:     General: Skin is warm and dry.      Capillary Refill: Capillary refill takes less than 2 seconds.      Coloration: Skin is not pale.      Findings: No rash.   Neurological:      General: No focal deficit present.      Mental Status: She is alert and oriented to person, place, and time. Mental status is at baseline.      GCS: GCS eye subscore is 4. " GCS verbal subscore is 5. GCS motor subscore is 6.      Sensory: Sensation is intact. No sensory deficit.      Motor: Motor function is intact. No weakness, tremor or seizure activity.      Coordination: Coordination is intact. Coordination normal. Finger-Nose-Finger Test normal.      Gait: Gait is intact. Gait normal.   Psychiatric:         Mood and Affect: Mood normal.         Speech: Speech normal. Speech is not rapid and pressured, delayed or slurred.         Behavior: Behavior normal. Behavior is not agitated, slowed, aggressive, withdrawn, hyperactive or combative. Behavior is cooperative.         Thought Content: Thought content normal.         Judgment: Judgment normal.       Health Maintenance   Topic Date Due    Low Dose Statin  Never done    Mammogram  01/12/2025    Hemoglobin A1c  03/09/2025    DEXA Scan  04/27/2025    Foot Exam  07/08/2025    Eye Exam  08/21/2025    Lipid Panel  09/09/2025    TETANUS VACCINE  10/29/2028    Colorectal Cancer Screening  05/12/2031    Hepatitis C Screening  Completed    Shingles Vaccine  Completed     DISCLAIMER: This note was compiled by using a speech recognition dictation system and therefore please be aware that typographical / speech recognition errors can and do occur.  Please contact me if you see any errors specifically.  Consent was obtained for [employer] recording system prior to the visit.    Visit today included increased complexity associated with the care of the episodic problem - see above- addressed and managing the longitudinal care of the patient due to the serious and/or complex managed problem(s) - see above.

## 2024-10-02 ENCOUNTER — TELEPHONE (OUTPATIENT)
Dept: FAMILY MEDICINE | Facility: CLINIC | Age: 64
End: 2024-10-02
Payer: MEDICARE

## 2024-10-02 NOTE — TELEPHONE ENCOUNTER
----- Message from Shari sent at 10/2/2024 10:07 AM CDT -----  .Type:  Pharmacy Calling to Clarify an RX    Name of Caller:Richard  Pharmacy Name:[facility]  Prescription Name:all active prescriptions  What do they need to clarify?:new prescriptions sent over  Best Call Back Number:[phone]  Additional Information:

## 2024-10-04 RX ORDER — PREGABALIN 75 MG/1
75 CAPSULE ORAL 3 TIMES DAILY
Qty: 90 CAPSULE | Refills: 0 | Status: SHIPPED | OUTPATIENT
Start: 2024-10-04

## 2024-10-04 NOTE — TELEPHONE ENCOUNTER
No care due was identified.  Health [facility] Embedded Care Due Messages. Reference number: [id number].   10/03/2024 10:48:53 PM CDT

## 2024-10-07 ENCOUNTER — PATIENT MESSAGE (OUTPATIENT)
Dept: FAMILY MEDICINE | Facility: CLINIC | Age: 64
End: 2024-10-07
Payer: MEDICARE

## 2024-10-07 ENCOUNTER — TELEPHONE (OUTPATIENT)
Dept: FAMILY MEDICINE | Facility: CLINIC | Age: 64
End: 2024-10-07
Payer: MEDICARE

## 2024-10-07 DIAGNOSIS — M25.551 HIP PAIN, RIGHT: ICD-10-CM

## 2024-10-07 DIAGNOSIS — M17.12 PRIMARY OSTEOARTHRITIS OF LEFT KNEE: ICD-10-CM

## 2024-10-07 DIAGNOSIS — R00.0 TACHYCARDIA: ICD-10-CM

## 2024-10-07 DIAGNOSIS — M19.049 HAND ARTHRITIS: ICD-10-CM

## 2024-10-07 DIAGNOSIS — M17.11 PRIMARY OSTEOARTHRITIS OF RIGHT KNEE: ICD-10-CM

## 2024-10-07 DIAGNOSIS — R06.09 DOE (DYSPNEA ON EXERTION): ICD-10-CM

## 2024-10-07 DIAGNOSIS — E11.65 TYPE 2 DIABETES MELLITUS WITH HYPERGLYCEMIA, WITHOUT LONG-TERM CURRENT USE OF INSULIN: Chronic | ICD-10-CM

## 2024-10-07 DIAGNOSIS — R55 SYNCOPE, UNSPECIFIED SYNCOPE TYPE: ICD-10-CM

## 2024-10-07 DIAGNOSIS — E11.59 HYPERTENSION ASSOCIATED WITH DIABETES: Chronic | ICD-10-CM

## 2024-10-07 DIAGNOSIS — E78.2 COMBINED HYPERLIPIDEMIA ASSOCIATED WITH TYPE 2 DIABETES MELLITUS: ICD-10-CM

## 2024-10-07 DIAGNOSIS — I73.9 CLAUDICATION OF BOTH LOWER EXTREMITIES: ICD-10-CM

## 2024-10-07 DIAGNOSIS — K21.00 GASTROESOPHAGEAL REFLUX DISEASE WITH ESOPHAGITIS WITHOUT HEMORRHAGE: ICD-10-CM

## 2024-10-07 DIAGNOSIS — E11.69 COMBINED HYPERLIPIDEMIA ASSOCIATED WITH TYPE 2 DIABETES MELLITUS: ICD-10-CM

## 2024-10-07 DIAGNOSIS — R05.9 COUGH: ICD-10-CM

## 2024-10-07 DIAGNOSIS — E11.42 DIABETIC POLYNEUROPATHY ASSOCIATED WITH TYPE 2 DIABETES MELLITUS: ICD-10-CM

## 2024-10-07 DIAGNOSIS — E78.5 HYPERLIPIDEMIA, UNSPECIFIED HYPERLIPIDEMIA TYPE: ICD-10-CM

## 2024-10-07 DIAGNOSIS — M54.16 LUMBAR RADICULOPATHY: ICD-10-CM

## 2024-10-07 DIAGNOSIS — R01.1 SYSTOLIC MURMUR: ICD-10-CM

## 2024-10-07 DIAGNOSIS — M46.1 SACROILIITIS: ICD-10-CM

## 2024-10-07 DIAGNOSIS — M12.812 ROTATOR CUFF ARTHROPATHY OF LEFT SHOULDER: ICD-10-CM

## 2024-10-07 DIAGNOSIS — G44.52 NEW DAILY PERSISTENT HEADACHE: ICD-10-CM

## 2024-10-07 DIAGNOSIS — Z79.899 ENCOUNTER FOR LONG-TERM (CURRENT) USE OF MEDICATIONS: ICD-10-CM

## 2024-10-07 DIAGNOSIS — I15.2 HYPERTENSION ASSOCIATED WITH DIABETES: Chronic | ICD-10-CM

## 2024-10-07 NOTE — TELEPHONE ENCOUNTER
Called pt and gave her phone number to [facility] to call and schedule her MRI.    appt with you on 6/29/22

## 2024-10-07 NOTE — TELEPHONE ENCOUNTER
No care due was identified.  [facility] Embedded Care Due Messages. Reference number: [id number].   10/07/2024 4:55:58 PM CDT

## 2024-10-07 NOTE — TELEPHONE ENCOUNTER
----- Message from Ruthann sent at 10/7/2024 12:25 PM CDT -----  Contact: Richard [facility]/ [facility]   Richard [facility]/ [facility] is calling in regards to her previous messages. Richard is asking if she can please get a call back about the pts prescriptions. Please call and advise. Thank you

## 2024-10-07 NOTE — TELEPHONE ENCOUNTER
----- Message from Laura sent at 10/7/2024  1:13 PM CDT -----  Type:  Patient Return Call   Requested    Who Called:Zakia  Who Left Message for Patient:n/a  Does the patient know what this is regarding?:MRI  Would the patient rather a call back or a response via [facility]? Callback  Best Call Back Number:[phone]  Additional Information: Patient has not been contacted to schedule appointment

## 2024-10-07 NOTE — TELEPHONE ENCOUNTER
- albuterol (PROVENTIL/VENTOLIN HFA) 90 mcg/actuation inhaler   - azelastine (ASTELIN) 137 mcg (0.1 %) nasal spray   - butalbital-acetaminophen-caffeine -40 mg (FIORICET, ESGIC) -40 mg per tablet   - celecoxib (CELEBREX) 200 MG capsule   - diclofenac sodium (VOLTAREN ARTHRITIS PAIN) 1 % Gel   - diltiaZEM (CARDIZEM) 30 MG tablet   - EPINEPHrine (EPIPEN) 0.3 mg/0.3 mL AtIn   - ezetimibe (ZETIA) 10 mg tablet   - famotidine (PEPCID) 40 MG tablet   - fluticasone propionate (FLONASE) 50 mcg/actuation nasal spray   - HYDROcodone-acetaminophen (NORCO) 5-325 mg per tablet   - inhalation spacing device (BREATHERITE VALVED MDI CHAMBER)   - levothyroxine (SYNTHROID) 100 MCG tablet   - methocarbamoL (ROBAXIN) 500 MG Tab   - omeprazole (PRILOSEC) 40 MG capsule   - pregabalin (LYRICA) 75 MG capsule   - rOPINIRole (REQUIP) 0.5 MG tablet   - semaglutide (OZEMPIC) 2 mg/dose (8 mg/3 mL) PnIj   - sulfacetamide sodium 10% (BLEPH-10) 10 % ophthalmic solution   - tiZANidine (ZANAFLEX) 4 MG tablet   - topiramate (TOPAMAX) 100 MG tablet   - TRELEGY ELLIPTA 100-62.5-25 mcg DsDv

## 2024-10-08 RX ORDER — BUTALBITAL, ACETAMINOPHEN AND CAFFEINE 50; 325; 40 MG/1; MG/1; MG/1
1 TABLET ORAL EVERY 6 HOURS PRN
Qty: 30 TABLET | Refills: 0 | Status: SHIPPED | OUTPATIENT
Start: 2024-10-08 | End: 2024-11-07

## 2024-10-08 RX ORDER — CELECOXIB 200 MG/1
200 CAPSULE ORAL 2 TIMES DAILY
Qty: 60 CAPSULE | Refills: 2 | Status: SHIPPED | OUTPATIENT
Start: 2024-10-08

## 2024-10-08 RX ORDER — DICLOFENAC SODIUM 10 MG/G
2 GEL TOPICAL DAILY
Qty: 100 G | Refills: 0 | Status: SHIPPED | OUTPATIENT
Start: 2024-10-08

## 2024-10-08 RX ORDER — ALBUTEROL SULFATE 90 UG/1
2 INHALANT RESPIRATORY (INHALATION) EVERY 6 HOURS PRN
Qty: 54 G | Refills: 2 | Status: SHIPPED | OUTPATIENT
Start: 2024-10-08

## 2024-10-08 RX ORDER — FLUTICASONE PROPIONATE 50 MCG
SPRAY, SUSPENSION (ML) NASAL
Qty: 48 G | Refills: 2 | Status: SHIPPED | OUTPATIENT
Start: 2024-10-08

## 2024-10-08 RX ORDER — TIZANIDINE 4 MG/1
TABLET ORAL
Qty: 90 TABLET | Refills: 1 | Status: SHIPPED | OUTPATIENT
Start: 2024-10-08

## 2024-10-08 RX ORDER — TOPIRAMATE 100 MG/1
100 TABLET, FILM COATED ORAL 2 TIMES DAILY
Qty: 60 TABLET | Refills: 2 | Status: SHIPPED | OUTPATIENT
Start: 2024-10-08 | End: 2025-01-06

## 2024-10-08 RX ORDER — FLUTICASONE FUROATE, UMECLIDINIUM BROMIDE AND VILANTEROL TRIFENATATE 100; 62.5; 25 UG/1; UG/1; UG/1
1 POWDER RESPIRATORY (INHALATION) DAILY
Qty: 60 EACH | Refills: 2 | Status: SHIPPED | OUTPATIENT
Start: 2024-10-08

## 2024-10-08 RX ORDER — OMEPRAZOLE 40 MG/1
40 CAPSULE, DELAYED RELEASE ORAL DAILY
Qty: 90 CAPSULE | Refills: 2 | Status: SHIPPED | OUTPATIENT
Start: 2024-10-08

## 2024-10-08 RX ORDER — PREGABALIN 75 MG/1
75 CAPSULE ORAL 3 TIMES DAILY
Qty: 90 CAPSULE | Refills: 0 | Status: SHIPPED | OUTPATIENT
Start: 2024-10-08

## 2024-10-08 RX ORDER — METHOCARBAMOL 500 MG/1
500 TABLET, FILM COATED ORAL 2 TIMES DAILY PRN
Qty: 180 TABLET | Refills: 2 | Status: SHIPPED | OUTPATIENT
Start: 2024-10-08

## 2024-10-08 RX ORDER — FAMOTIDINE 40 MG/1
40 TABLET, FILM COATED ORAL DAILY
Qty: 90 TABLET | Refills: 2 | Status: SHIPPED | OUTPATIENT
Start: 2024-10-08

## 2024-10-08 RX ORDER — EPINEPHRINE 0.3 MG/.3ML
INJECTION SUBCUTANEOUS
OUTPATIENT
Start: 2024-10-08

## 2024-10-08 RX ORDER — DILTIAZEM HYDROCHLORIDE 30 MG/1
30 TABLET, FILM COATED ORAL EVERY 12 HOURS
Qty: 180 TABLET | Refills: 2 | Status: SHIPPED | OUTPATIENT
Start: 2024-10-08

## 2024-10-08 RX ORDER — SEMAGLUTIDE 2.68 MG/ML
2 INJECTION, SOLUTION SUBCUTANEOUS
Qty: 9 ML | Refills: 1 | Status: SHIPPED | OUTPATIENT
Start: 2024-10-08

## 2024-10-08 RX ORDER — ROPINIROLE 0.5 MG/1
0.5 TABLET, FILM COATED ORAL NIGHTLY
Qty: 90 TABLET | Refills: 2 | Status: SHIPPED | OUTPATIENT
Start: 2024-10-08

## 2024-10-08 RX ORDER — EZETIMIBE 10 MG/1
10 TABLET ORAL DAILY
Qty: 90 TABLET | Refills: 2 | Status: SHIPPED | OUTPATIENT
Start: 2024-10-08

## 2024-10-08 RX ORDER — LEVOTHYROXINE SODIUM 100 UG/1
100 TABLET ORAL
Qty: 90 TABLET | Refills: 2 | Status: SHIPPED | OUTPATIENT
Start: 2024-10-08

## 2024-10-08 NOTE — TELEPHONE ENCOUNTER
Refill Routing Note   Medication(s) are not appropriate for processing by [facility] for the following reason(s):        Outside of protocol  No active prescription written by provider    [facility] action(s):  Approve  Defer  Route           Alert overridden per protocol: Yes     Appointments  past 12m or future 3m with PCP    Date Provider   Last Visit   5/29/2024 Oleksandr Nagel MD   Next Visit   12/12/2024 Oleksandr Nagel MD   ED visits in past 90 days: 0        Note composed:10:09 AM 10/08/2024

## 2024-10-11 DIAGNOSIS — Z79.899 ENCOUNTER FOR LONG-TERM (CURRENT) USE OF MEDICATIONS: ICD-10-CM

## 2024-10-11 RX ORDER — TOPIRAMATE 100 MG/1
100 TABLET, FILM COATED ORAL 2 TIMES DAILY
Qty: 60 TABLET | Refills: 2 | OUTPATIENT
Start: 2024-10-11 | End: 2025-01-09

## 2024-10-11 NOTE — TELEPHONE ENCOUNTER
----- Message from Audrey sent at 10/11/2024  3:43 PM CDT -----  Contact: [phone] Pharmacy  [email or web address]     Patient is calling for Medical Advice regarding:pt medication     Patient wants a call back or thru [facility]:Call back     Comments:[facility] would like for nurse to call her back about pt medication     Please advise patient replies from provider may take up to 48 hours.

## 2024-10-11 NOTE — TELEPHONE ENCOUNTER
Attempted to return call to [facility], the agent was not available, so I LVM telling them to call the office back.

## 2024-10-15 NOTE — PROGRESS NOTES
Received external MRI results from [facility]. Please call patient and let her know that the MRI of her brain shows no acute findings. Please follow up with neurology in a couple week as scheduled for further evaluation. ER precautions for severe symptoms.

## 2024-10-21 ENCOUNTER — OFFICE VISIT (OUTPATIENT)
Dept: SPORTS MEDICINE | Facility: CLINIC | Age: 64
End: 2024-10-21
Payer: MEDICARE

## 2024-10-21 DIAGNOSIS — M12.812 ROTATOR CUFF ARTHROPATHY OF LEFT SHOULDER: Primary | ICD-10-CM

## 2024-10-21 DIAGNOSIS — M17.12 PRIMARY OSTEOARTHRITIS OF LEFT KNEE: ICD-10-CM

## 2024-10-21 DIAGNOSIS — M17.11 PRIMARY OSTEOARTHRITIS OF RIGHT KNEE: ICD-10-CM

## 2024-10-21 PROCEDURE — 20610 DRAIN/INJ JOINT/BURSA W/O US: CPT | Mod: LT,S$GLB,, | Performed by: PHYSICIAN ASSISTANT

## 2024-10-21 PROCEDURE — 99999 PR PBB SHADOW E&M-EST. PATIENT-LVL III: CPT | Mod: PBBFAC,,, | Performed by: PHYSICIAN ASSISTANT

## 2024-10-21 PROCEDURE — 99499 UNLISTED E&M SERVICE: CPT | Mod: S$GLB,,, | Performed by: PHYSICIAN ASSISTANT

## 2024-10-21 RX ORDER — DICLOFENAC SODIUM 75 MG/1
75 TABLET, DELAYED RELEASE ORAL 2 TIMES DAILY
Qty: 60 TABLET | Refills: 2 | Status: SHIPPED | OUTPATIENT
Start: 2024-10-21

## 2024-10-21 RX ORDER — TRIAMCINOLONE ACETONIDE 40 MG/ML
40 INJECTION, SUSPENSION INTRA-ARTICULAR; INTRAMUSCULAR
Status: DISCONTINUED | OUTPATIENT
Start: 2024-10-21 | End: 2024-10-21 | Stop reason: HOSPADM

## 2024-10-21 RX ADMIN — TRIAMCINOLONE ACETONIDE 40 MG: 40 INJECTION, SUSPENSION INTRA-ARTICULAR; INTRAMUSCULAR at 02:10

## 2024-10-21 NOTE — PROCEDURES
Large Joint Aspiration/Injection: L subacromial bursa    Date/Time: 10/21/2024 2:00 PM    Performed by: Rosemary Alonso PA-C  Authorized by: Rosemary Alonso PA-C    Consent Done?:  Yes (Verbal)  Indications:  Pain  Site marked: the procedure site was marked    Timeout: prior to procedure the correct patient, procedure, and site was verified    Prep: patient was prepped and draped in usual sterile fashion      Local anesthesia used?: Yes    Anesthesia:  Local infiltration  Local anesthetic:  Lidocaine 1% without epinephrine    Details:  Needle Size:  22 G  Ultrasonic Guidance for needle placement?: No    Approach:  Posterior  Location:  Shoulder  Site:  L subacromial bursa  Medications:  40 mg triamcinolone acetonide 40 mg/mL  Patient tolerance:  Patient tolerated the procedure well with no immediate complications    Left shoulder subacromial corticosteroid injection  Procedure only visit, last seen by me on 09/20/2024 at which time we elected to return to clinic today for a shoulder injection  Follow up with me for knee gel injections in about 2 weeks

## 2024-10-22 DIAGNOSIS — R49.0 HOARSENESS: ICD-10-CM

## 2024-10-22 RX ORDER — AZELASTINE 1 MG/ML
1 SPRAY, METERED NASAL 2 TIMES DAILY
Qty: 30 ML | Refills: 0 | Status: SHIPPED | OUTPATIENT
Start: 2024-10-22 | End: 2025-10-22

## 2024-10-22 RX ORDER — EPINEPHRINE 0.3 MG/.3ML
1 INJECTION SUBCUTANEOUS ONCE
Qty: 2 EACH | Refills: 0 | Status: SHIPPED | OUTPATIENT
Start: 2024-10-22 | End: 2024-10-22

## 2024-10-22 NOTE — TELEPHONE ENCOUNTER
No care due was identified.  Health [facility] Embedded Care Due Messages. Reference number: [id number].   10/22/2024 9:54:39 AM CDT

## 2024-10-22 NOTE — TELEPHONE ENCOUNTER
----- Message from William sent at 10/22/2024  9:40 AM CDT -----  Contact: [phone]@patient  Requesting an RX refill or new RX.EPINEPHrine (EPIPEN) 0.3 mg/0.3 mL AtIn    Is this a refill or new RX: NEW    RX name and strength (copy/paste from chart):      Is this a 30 day or 90 day RX:     Pharmacy name and phone # [facility] [place] - [address]   Phone: [phone]      The doctors have asked that we provide their patients with the following 2 reminders -- prescription refills can take up to 72 hours, and a friendly reminder that in the future you can use your [facility] account to request refills:     Requesting an RX refill or new RX.azelastine (ASTELIN) 137 mcg (0.1 %) nasal spray    Is this a refill or new RX: NEW    RX name and strength (copy/paste from chart):      Is this a 30 day or 90 day RX:     Pharmacy name and phone # [facility] ([place] - [address]   Phone: [phone]    The doctors have asked that we provide their patients with the following 2 reminders -- prescription refills can take up to 72 hours, and a friendly reminder that in the future you can use your [facility] account to request refills:

## 2024-10-28 ENCOUNTER — OFFICE VISIT (OUTPATIENT)
Dept: NEUROLOGY | Facility: CLINIC | Age: 64
End: 2024-10-28
Payer: MEDICARE

## 2024-10-28 ENCOUNTER — TELEPHONE (OUTPATIENT)
Dept: FAMILY MEDICINE | Facility: CLINIC | Age: 64
End: 2024-10-28
Payer: MEDICARE

## 2024-10-28 VITALS
WEIGHT: 293 LBS | DIASTOLIC BLOOD PRESSURE: 78 MMHG | HEIGHT: 68 IN | SYSTOLIC BLOOD PRESSURE: 132 MMHG | HEART RATE: 71 BPM | BODY MASS INDEX: 44.41 KG/M2

## 2024-10-28 DIAGNOSIS — G44.52 NEW DAILY PERSISTENT HEADACHE: ICD-10-CM

## 2024-10-28 DIAGNOSIS — G43.719 INTRACTABLE CHRONIC MIGRAINE WITHOUT AURA AND WITHOUT STATUS MIGRAINOSUS: Primary | ICD-10-CM

## 2024-10-28 PROCEDURE — 99999 PR PBB SHADOW E&M-EST. PATIENT-LVL IV: CPT | Mod: PBBFAC,,, | Performed by: INTERNAL MEDICINE

## 2024-10-29 DIAGNOSIS — M25.551 HIP PAIN, RIGHT: ICD-10-CM

## 2024-10-29 RX ORDER — TIZANIDINE 4 MG/1
TABLET ORAL
Qty: 90 TABLET | Refills: 1 | Status: SHIPPED | OUTPATIENT
Start: 2024-10-29

## 2024-11-05 ENCOUNTER — PROCEDURE VISIT (OUTPATIENT)
Dept: SPORTS MEDICINE | Facility: CLINIC | Age: 64
End: 2024-11-05
Payer: MEDICARE

## 2024-11-05 VITALS — HEIGHT: 68 IN | WEIGHT: 293 LBS | RESPIRATION RATE: 17 BRPM | BODY MASS INDEX: 44.41 KG/M2

## 2024-11-05 DIAGNOSIS — M17.12 PRIMARY OSTEOARTHRITIS OF LEFT KNEE: Primary | ICD-10-CM

## 2024-11-05 DIAGNOSIS — M17.11 PRIMARY OSTEOARTHRITIS OF RIGHT KNEE: ICD-10-CM

## 2024-11-05 PROCEDURE — 99499 UNLISTED E&M SERVICE: CPT | Mod: S$GLB,,, | Performed by: PHYSICIAN ASSISTANT

## 2024-11-05 PROCEDURE — 20610 DRAIN/INJ JOINT/BURSA W/O US: CPT | Mod: 50,S$GLB,, | Performed by: PHYSICIAN ASSISTANT

## 2024-11-05 NOTE — PROCEDURES
Large Joint Aspiration/Injection: bilateral knee    Date/Time: 11/5/2024 10:00 AM    Performed by: Rosemary Alonso PA-C  Authorized by: Rosemary Alonso PA-C    Consent Done?:  Yes (Verbal)  Indications:  Joint swelling and pain  Site marked: the procedure site was marked    Timeout: prior to procedure the correct patient, procedure, and site was verified    Prep: patient was prepped and draped in usual sterile fashion      Local anesthesia used?: Yes    Local anesthetic:  Topical anesthetic and lidocaine 1% without epinephrine  Anesthetic total (ml):  3      Details:  Needle Size:  22 G  Ultrasonic Guidance for needle placement?: No    Approach:  Anterolateral  Location:  Knee  Laterality:  Bilateral  Site:  Bilateral knee  Medications (Right):  60 mg hyaluronate sodium, stabilized (DUROLANE) 60 mg/3 mL  Medications (Left):  60 mg hyaluronate sodium, stabilized (DUROLANE) 60 mg/3 mL  Patient tolerance:  Patient tolerated the procedure well with no immediate complications    Bilateral knee Durolane 1/1  3 cc lidocaine given prior for therapeutic relief during procedure  Follow up in 5 months to reorder

## 2024-11-07 DIAGNOSIS — M25.551 HIP PAIN, RIGHT: ICD-10-CM

## 2024-11-07 RX ORDER — TIZANIDINE 4 MG/1
TABLET ORAL
Qty: 90 TABLET | Refills: 1 | Status: SHIPPED | OUTPATIENT
Start: 2024-11-07

## 2024-11-07 NOTE — TELEPHONE ENCOUNTER
Refill Routing Note   Medication(s) are not appropriate for processing by [facility] for the following reason(s):        Outside of protocol    [facility] action(s):  Route               Appointments  past 12m or future 3m with PCP    Date Provider   Last Visit   5/29/2024 Oleksandr Nagel MD   Next Visit   12/12/2024 Oleksandr Nagel MD   ED visits in past 90 days: 0        Note composed:10:11 AM 11/07/2024

## 2024-11-12 ENCOUNTER — PROCEDURE VISIT (OUTPATIENT)
Dept: NEUROLOGY | Facility: CLINIC | Age: 64
End: 2024-11-12
Payer: MEDICARE

## 2024-11-12 VITALS
BODY MASS INDEX: 47.73 KG/M2 | WEIGHT: 293 LBS | HEART RATE: 80 BPM | SYSTOLIC BLOOD PRESSURE: 116 MMHG | DIASTOLIC BLOOD PRESSURE: 82 MMHG

## 2024-11-12 DIAGNOSIS — G43.719 INTRACTABLE CHRONIC MIGRAINE WITHOUT AURA AND WITHOUT STATUS MIGRAINOSUS: Primary | ICD-10-CM

## 2024-11-12 PROCEDURE — 64615 CHEMODENERV MUSC MIGRAINE: CPT | Mod: S$GLB,,, | Performed by: INTERNAL MEDICINE

## 2024-11-12 NOTE — PROCEDURES
Botulinum Toxin Injection Procedure   Pre-operative diagnosis: Chronic migraine   Post-operative diagnosis: Same   Procedure: Chemical neurolysis     After risks and benefits were explained including bleeding, infection, worsening of pain, damage to the areas being injected, weakness of muscles, loss of muscle control, dysphagia if injecting the head or neck, facial droop if injecting the facial area, painful injection, allergic or other reaction to the medications being injected, and the failure of the procedure to help the problem, a signed consent was obtained.   The patient was placed in a comfortable area and the sites to be treated were identified.The area to be treated was prepped three times with alcohol and the alcohol allowed to dry. Next, a 30 gauge needle was used to inject the medication in the area to be treated.     Area(s) injected:   Total Botox used: 155 Units   Botox unavoidable wastage: 45 Units     Injection sites:    muscle bilaterally ( a total of 10 units divided into 2 sites)   Procerus muscle (5 units)   Frontalis muscle bilaterally (a total of 20 units divided into 4 sites)   Temporalis muscle bilaterally (a total of 40 units divided into 8 sites)   Occipitalis muscle bilaterally (a total of 30 units divided into 6 sites)   Cervical paraspinal muscles (a total of 20 units divided into 4 sites)   Trapezius muscle bilaterally (a total of 30 units divided into 6 sites)     Complications: none   RTC for the next Botox injection: 3 months     Andrea Hernandez MD  Headache and Pain Management  [facility]

## 2024-11-16 DIAGNOSIS — E11.42 DIABETIC POLYNEUROPATHY ASSOCIATED WITH TYPE 2 DIABETES MELLITUS: ICD-10-CM

## 2024-11-16 DIAGNOSIS — M12.812 ROTATOR CUFF ARTHROPATHY OF LEFT SHOULDER: ICD-10-CM

## 2024-11-16 DIAGNOSIS — M62.838 CERVICAL PARASPINAL MUSCLE SPASM: Primary | ICD-10-CM

## 2024-11-17 NOTE — TELEPHONE ENCOUNTER
No care due was identified.  [facility] Embedded Care Due Messages. Reference number: [id number].   11/16/2024 8:08:29 PM CST

## 2024-11-18 RX ORDER — PREGABALIN 75 MG/1
75 CAPSULE ORAL 3 TIMES DAILY
Qty: 90 CAPSULE | Refills: 0 | Status: SHIPPED | OUTPATIENT
Start: 2024-11-18

## 2024-11-18 NOTE — TELEPHONE ENCOUNTER
Refill Routing Note   Medication(s) are not appropriate for processing by [facility] for the following reason(s):        Outside of protocol    [facility] action(s):  Route               Appointments  past 12m or future 3m with PCP    Date Provider   Last Visit   5/29/2024 Oleksandr Nagel MD   Next Visit   12/12/2024 Oleksandr Nagel MD   ED visits in past 90 days: 0        Note composed:7:29 AM 11/18/2024

## 2024-12-12 ENCOUNTER — OFFICE VISIT (OUTPATIENT)
Dept: FAMILY MEDICINE | Facility: CLINIC | Age: 64
End: 2024-12-12
Payer: MEDICARE

## 2024-12-12 ENCOUNTER — HOSPITAL ENCOUNTER (OUTPATIENT)
Dept: RADIOLOGY | Facility: HOSPITAL | Age: 64
Discharge: HOME | End: 2024-12-12
Attending: FAMILY MEDICINE
Payer: MEDICARE

## 2024-12-12 VITALS
OXYGEN SATURATION: 97 % | WEIGHT: 293 LBS | HEIGHT: 68 IN | BODY MASS INDEX: 44.41 KG/M2 | SYSTOLIC BLOOD PRESSURE: 122 MMHG | DIASTOLIC BLOOD PRESSURE: 80 MMHG | HEART RATE: 81 BPM

## 2024-12-12 DIAGNOSIS — M12.812 ROTATOR CUFF ARTHROPATHY OF LEFT SHOULDER: ICD-10-CM

## 2024-12-12 DIAGNOSIS — E11.42 DIABETIC POLYNEUROPATHY ASSOCIATED WITH TYPE 2 DIABETES MELLITUS: ICD-10-CM

## 2024-12-12 DIAGNOSIS — Z79.899 ENCOUNTER FOR LONG-TERM (CURRENT) USE OF MEDICATIONS: ICD-10-CM

## 2024-12-12 DIAGNOSIS — M62.838 CERVICAL PARASPINAL MUSCLE SPASM: ICD-10-CM
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
## 2024-12-12 PROCEDURE — 73030 X-RAY EXAM OF SHOULDER: CPT | Mod: TC,PO,LT

## 2024-12-12 PROCEDURE — 99999 PR PBB SHADOW E&M-EST. PATIENT-LVL V: CPT | Mod: PBBFAC,,, | Performed by: FAMILY MEDICINE

## 2024-12-12 PROCEDURE — 73030 X-RAY EXAM OF SHOULDER: CPT | Mod: 26,LT,, | Performed by: STUDENT IN AN ORGANIZED HEALTH CARE EDUCATION/TRAINING PROGRAM

## 2024-12-12 RX ORDER — CYCLOBENZAPRINE HCL 10 MG
10 TABLET ORAL 2 TIMES DAILY PRN
Qty: 180 TABLET | Refills: 2 | Status: SHIPPED | OUTPATIENT
Start: 2024-12-12 | End: 2025-09-08

## 2024-12-12 RX ORDER — TOPIRAMATE 100 MG/1
100 TABLET, FILM COATED ORAL 2 TIMES DAILY
Qty: 60 TABLET | Refills: 2 | Status: SHIPPED | OUTPATIENT
Start: 2024-12-12 | End: 2025-03-12

## 2024-12-12 RX ORDER — PREGABALIN 75 MG/1
75 CAPSULE ORAL 3 TIMES DAILY
Qty: 270 CAPSULE | Refills: 1 | Status: SHIPPED | OUTPATIENT
Start: 2024-12-12

## 2024-12-12 RX ORDER — CELECOXIB 200 MG/1
200 CAPSULE ORAL 2 TIMES DAILY
Qty: 60 CAPSULE | Refills: 0 | Status: SHIPPED | OUTPATIENT
Start: 2024-12-12 | End: 2025-01-11

## 2024-12-12 NOTE — PROGRESS NOTES
PLAN:      Assessment & Plan  1. Anemia.  She is currently receiving iron infusions and reports feeling better on them. She had issues with constipation when taking oral iron pills.    2. Left shoulder pain.  She fell in the shower, resulting in pain from her neck down to her left shoulder. Physical examination revealed tenderness and limited range of motion in the shoulder. Physical therapy was discussed as a potential treatment option. An x-ray of the left shoulder will be ordered to rule out any fractures. She will be prescribed Celebrex to manage the pain and inflammation. She is advised to continue taking Flexeril and Lyrica but should avoid concurrent use of tizanidine. If there is no improvement within 1 to 2 weeks, a steroid injection into the shoulder will be considered.    3. Health maintenance.  Her mammogram is due next month in January 2025. She has not had any abnormal mammograms.    Problem List Items Addressed This Visit       Diabetic neuropathy (Chronic)     Continue Lyrica 75 milligram.  We will plan to monitor hemoglobin A1c at designated intervals 3 to 6 months.  I recommend ongoing Education for diabetic diet and exercise protocol.  We will continue to monitor for side effects.    Please be advised of symptoms to monitor for and to notify me immediately if persistent or worsening.  Follow up with Ophthalmology/Optometry and Podiatry at least annually.           Relevant Medications    pregabalin (LYRICA) 75 MG capsule    Encounter for long-term (current) use of medications (Chronic)     Complete history and physical was completed today.  Complete and thorough medication reconciliation was performed.  Discussed risks and benefits of medications.  Advised patient on orders and health maintenance.  We discussed old records and old labs if available.  Will request any records not available through epic.  Continue current medications listed on your summary sheet.           Relevant Medications     topiramate (TOPAMAX) 100 MG tablet    Cervical paraspinal muscle spasm (Chronic)     Restart Celebrex.  Trial of cyclobenzaprine.  Referral to physical therapy if no improvement.         Relevant Medications    pregabalin (LYRICA) 75 MG capsule    celecoxib (CELEBREX) 200 MG capsule    Rotator cuff arthropathy of left shoulder (Chronic)     X-ray of the shoulder.  Start Celebrex.  Consider physical therapy if no improvement.  Offered intra-articular steroid injection if no improvement.         Relevant Medications    pregabalin (LYRICA) 75 MG capsule    cyclobenzaprine (FLEXERIL) 10 MG tablet    celecoxib (CELEBREX) 200 MG capsule    Fall - Primary     Fall precautions.         Relevant Orders    X-Ray Shoulder 2 or More Views Left (Completed)     Future Appointments       Date Provider Specialty Appt Notes    12/16/2024  [facility]    12/18/2024 Andrea Hernandez MD Neurology 6 wk f/u botox    12/19/2024 Sol Mckeon, NP Hematology and Oncology 3 mo f/u with lab prior/ngwv    1/6/2025 Brandie Rey DPM Podiatry 3 month DNC    4/7/2025 Rosemary Alonso PA-C Sports Medicine 5 mo S/p Bilt Durolane           Medication Management for assessment above:   Medication List with Changes/Refills   New Medications    CELECOXIB (CELEBREX) 200 MG CAPSULE    Take 1 capsule (200 mg total) by mouth 2 (two) times daily.    CYCLOBENZAPRINE (FLEXERIL) 10 MG TABLET    Take 1 tablet (10 mg total) by mouth 2 (two) times daily as needed for Muscle spasms.   Current Medications    ALBUTEROL (PROVENTIL/VENTOLIN HFA) 90 MCG/ACTUATION INHALER    Inhale 2 puffs into the lungs every 6 (six) hours as needed for Wheezing.    AZELASTINE (ASTELIN) 137 MCG (0.1 %) NASAL SPRAY    1 spray (137 mcg total) by Nasal route 2 (two) times daily.    DICLOFENAC SODIUM (VOLTAREN ARTHRITIS PAIN) 1 % GEL    Apply 2 g topically once daily.    DILTIAZEM (CARDIZEM) 30 MG TABLET    Take 1 tablet (30 mg total) by mouth every 12 (twelve) hours.     EPINEPHRINE (EPIPEN) 0.3 MG/0.3 ML ATIN    Inject 0.3 mLs (0.3 mg total) into the muscle once. for 1 dose    EZETIMIBE (ZETIA) 10 MG TABLET    Take 1 tablet (10 mg total) by mouth once daily.    FAMOTIDINE (PEPCID) 40 MG TABLET    Take 1 tablet (40 mg total) by mouth once daily.    LEVOTHYROXINE (SYNTHROID) 100 MCG TABLET    Take 1 tablet (100 mcg total) by mouth before breakfast.    OMEPRAZOLE (PRILOSEC) 40 MG CAPSULE    Take 1 capsule (40 mg total) by mouth once daily.    ROPINIROLE (REQUIP) 0.5 MG TABLET    Take 1 tablet (0.5 mg total) by mouth every evening.    SEMAGLUTIDE (OZEMPIC) 2 MG/DOSE (8 MG/3 ML) PNIJ    Inject 2 mg into the skin every 7 days.    TIZANIDINE (ZANAFLEX) 4 MG TABLET    TAKE 1/2 TO 1 TABLET TWICE DAILY AS NEEDED FOR MUSCLE SPASMS. MAY CAUSE DROWSINESS.    TRELEGY ELLIPTA 100-62.5-25 MCG DSDV    Inhale 1 puff into the lungs once daily.    UBROGEPANT (UBRELVY) 100 MG TABLET    Take 1 tablet (100 mg total) by mouth daily as needed for Migraine. If symptoms persist or return, may repeat dose after 2 hours. Maximum: 200 mg per 24 hours   Changed and/or Refilled Medications    Modified Medication Previous Medication    PREGABALIN (LYRICA) 75 MG CAPSULE pregabalin (LYRICA) 75 MG capsule       Take 1 capsule (75 mg total) by mouth 3 (three) times daily.    Take 1 capsule (75 mg total) by mouth 3 (three) times daily.    TOPIRAMATE (TOPAMAX) 100 MG TABLET topiramate (TOPAMAX) 100 MG tablet       Take 1 tablet (100 mg total) by mouth 2 (two) times daily.    Take 1 tablet (100 mg total) by mouth 2 (two) times daily.   Discontinued Medications    DICLOFENAC (VOLTAREN) 75 MG EC TABLET    Take 1 tablet (75 mg total) by mouth 2 (two) times daily.    INHALATION SPACING DEVICE (BREATHERITE VALVED MDI CHAMBER)    Use as directed for inhalation.    METHOCARBAMOL (ROBAXIN) 500 MG TAB    Take 1 tablet (500 mg total) by mouth 2 (two) times daily as needed (muscle spasms).       Oleksandr Nagel,  M.D.  ==========================================================================  Subjective:   Patient ID: Zakia Price is a 64 y.o. female.  has a past medical history of Acute respiratory failure due to COVID-19, COVID-19, Diabetes mellitus, type 2 (1993), Diabetic neuropathy (01/27/2014), DJD (degenerative joint disease) of knee, DVT (deep venous thrombosis) (around 1990's), Fatty liver, GERD (gastroesophageal reflux disease), Hypertension associated with diabetes, HECTOR (iron deficiency anemia) (05/13/2021), Multinodular goiter, Obesity, morbid, BMI 50 or higher, and Sleep apnea.   Chief Complaint: Follow-up      History of Present Illness  The patient is a pleasant 64-year-old female here for a 6-month follow-up. She had labs done in September 2024, and everything looked fine. She is here to review these labs and needs medication refills.    She has an upcoming appointment with her hematologist next week. She is currently receiving iron infusions, which she reports as beneficial. She experienced constipation while on oral iron supplements.    She experienced a fall in the shower on Saturday night, resulting in pain radiating from her neck to her left shoulder. She did not sustain any head injuries during the fall. The pain is severe enough to limit her ability to apply deodorant under her arm. She has been advised against the use of ibuprofen and has been recommended to take Tylenol and Aleve instead. She has been informed that physical therapy is not necessary. She has been managing the pain with Lyrica, pregabalin, and cyclobenzaprine. She was previously on diclofenac 75 mg twice daily but discontinued it due to lack of efficacy. She found relief with Celebrex but was advised to discontinue its use.    Her mammogram is due next month in January 2025. She has not had any abnormal mammograms.    FAMILY HISTORY  Her sister had breast cancer and passed away at the age of 64.    MEDICATIONS  Current:  Lyrica, pregabalin, cyclobenzaprine, diclofenac.  Discontinued: Celebrex, ibuprofen.    Problem List Items Addressed This Visit       Diabetic neuropathy (Chronic)    Overview     December 2024:  Diabetes Medications               semaglutide (OZEMPIC) 2 mg/dose (8 mg/3 mL) PnIj Inject 2 mg into the skin every 7 days.          July 2022:  She continues on Lyrica but now on 50 milligrams she reports no real improvement in pain.  She continues on Ozempic.  April 2022:  Patient reports that she is still having significant nerve pain.  She is currently taking Lyrica 25 milligrams twice daily.  Chronic.  She has been on gabapentin previously.  She was taking 100 milligrams 3 times daily but does not report any improvement in her burning in her feet.  She was switched to Topamax by pain management.  She reports that some of her pain is better but her in burning sensation is still present.  Reviewed nerve study from 2021:  Summary:  Nerve conduction studies were performed on the right upper and lower extremities.  Right median, ulnar, and superficial peroneal sensory responses were normal in amplitude and latency.  Right sural sensory response was absent.  Right median motor response was borderline in velocity but normal in amplitude and latency.  Right ulnar motor response was normal in amplitude, latency and velocity.  Right peroneal motor response was normal in amplitude, latency and velocity.  Right tibial motor response was prolonged with normal amplitude and velocity.  Further internal comparison studies were performed to assess the carpal tunnel.  Right median versus ulnar 2nd lumbrical interosseous comparison studies revealed a prolonged median motor latency as compared to the ulnar.  Needle EMG was performed in the right upper and lower extremities.  No active denervation was present in any muscle tested.  Motor unit morphology and recruitment patterns were normal in every muscle tested.     Impression:  This is a  "mildly abnormal EMG of the right upper and lower extremities.  There is electrophysiologic evidence of the following:  Very mild, axonal, peripheral polyneuropathy without active denervation.  Very mild, right, median mononeuropathy across the wrist (carpal tunnel syndrome) without active denervation.  There is no evidence of any other focal neuropathy, plexopathy, or radiculopathy on the study.  In addition there is no evidence of myopathy on the study.        Thank you for referring to the [facility] in [place]. Please feel free to contact the clinic if you have any further questions regarding this study or report.        _____________________________  Cady Roberts D.O., ABPN, AOBNP, ABEM     Diabetes Management Status    Statin: Not taking  ACE/ARB: Not taking    Screening or Prevention Patient's value Goal Complete/Controlled?   HgA1C Testing and Control   Lab Results   Component Value Date    HGBA1C 5.1 09/09/2024      Annually/Less than 8% Yes   Lipid profile : 09/09/2024 Annually Yes   LDL control Lab Results   Component Value Date    LDLCALC 120.6 09/09/2024    Annually/Less than 100 mg/dl  No   Nephropathy screening Lab Results   Component Value Date    LABMICR 5.0 09/09/2024     Lab Results   Component Value Date    PROTEINUA Negative 10/28/2015     No results found for: "UTPCR"   Annually Yes   Blood pressure BP Readings from Last 1 Encounters:   12/12/24 122/80    Less than 140/90 Yes   Dilated retinal exam : 08/21/2024 Annually Yes   Foot exam   : 07/08/2024 Annually Yes              Current Assessment & Plan     Continue Lyrica 75 milligram.  We will plan to monitor hemoglobin A1c at designated intervals 3 to 6 months.  I recommend ongoing Education for diabetic diet and exercise protocol.  We will continue to monitor for side effects.    Please be advised of symptoms to monitor for and to notify me immediately if persistent or worsening.  Follow up with " Ophthalmology/Optometry and Podiatry at least annually.           Encounter for long-term (current) use of medications (Chronic)    Overview     December 2024:  Reviewed labs.  Sept 2024: reviewed labs.  May 2024:  Reviewed labs.  November 2023: Reviewed labs.  October 2023: Reviewed labs.  August 2023: Reviewed labs.  June 2023: Reviewed labs.  January 2023:  Reviewed labs.  CHRONIC. Stable. Compliant with medications for managed conditions. See medication list. No SE reported. Routine lab analysis is being monitored. Refills were addressed.  January 2021:CHRONIC. Stable. Compliant with medications for managed conditions. See medication list. No SE reported. Routine lab analysis is being monitored. Refills were addressed.  July 2021:  Reviewed labs.  January 2022:  Reviewed labs.  February 2022:  Reviewed labs.  July 2022:  Reviewed labs.  Lab Results   Component Value Date    WBC 4.61 09/09/2024    HGB 12.9 09/09/2024    HCT 41.1 09/09/2024    MCV 93 09/09/2024     09/09/2024         Chemistry        Component Value Date/Time     09/09/2024 1012    K 4.4 09/09/2024 1012     09/09/2024 1012    CO2 23 09/09/2024 1012    BUN 11 09/09/2024 1012    CREATININE 0.9 09/09/2024 1012    GLU 79 09/09/2024 1012        Component Value Date/Time    CALCIUM 9.6 09/09/2024 1012    ALKPHOS 77 09/09/2024 1012    AST 10 09/09/2024 1012    ALT 11 09/09/2024 1012    BILITOT 0.2 09/09/2024 1012    ESTGFRAFRICA >60.0 02/24/2022 1255    EGFRNONAA >60.0 02/24/2022 1255          Lab Results   Component Value Date    TSH 0.507 09/09/2024    FREET4 0.95 12/26/2023    T3FREE 2.5 12/26/2023            Current Assessment & Plan     Complete history and physical was completed today.  Complete and thorough medication reconciliation was performed.  Discussed risks and benefits of medications.  Advised patient on orders and health maintenance.  We discussed old records and old labs if available.  Will request any records not  available through epic.  Continue current medications listed on your summary sheet.           Cervical paraspinal muscle spasm (Chronic)    Overview     Chronic.  Recurrent.  Patient has been out of her Celebrex.  Continues to take tizanidine.         Current Assessment & Plan     Restart Celebrex.  Trial of cyclobenzaprine.  Referral to physical therapy if no improvement.         Rotator cuff arthropathy of left shoulder (Chronic)    Overview     Chronic.  Recurrent.  Patient recently had a fall.  She fell onto her left shoulder.         Current Assessment & Plan     X-ray of the shoulder.  Start Celebrex.  Consider physical therapy if no improvement.  Offered intra-articular steroid injection if no improvement.         Fall - Primary    Overview     See HPI         Current Assessment & Plan     Fall precautions.             Review of patient's allergies indicates:   Allergen Reactions    Codeine sulfate      Nausea^    Lisinopril Swelling     angioedema    Codeine Nausea Only and Rash     Current Outpatient Medications   Medication Instructions    albuterol (PROVENTIL/VENTOLIN HFA) 90 mcg/actuation inhaler 2 puffs, Inhalation, Every 6 hours PRN    azelastine (ASTELIN) 137 mcg, Nasal, 2 times daily    celecoxib (CELEBREX) 200 mg, Oral, 2 times daily    cyclobenzaprine (FLEXERIL) 10 mg, Oral, 2 times daily PRN    diclofenac sodium (VOLTAREN ARTHRITIS PAIN) 2 g, Topical (Top), Daily    diltiaZEM (CARDIZEM) 30 mg, Oral, Every 12 hours    EPINEPHrine (EPIPEN) 0.3 mg, Intramuscular, Once    ezetimibe (ZETIA) 10 mg, Oral, Daily    famotidine (PEPCID) 40 mg, Oral, Daily    levothyroxine (SYNTHROID) 100 mcg, Oral, Before breakfast    omeprazole (PRILOSEC) 40 mg, Oral, Daily    OZEMPIC 2 mg, Subcutaneous, Every 7 days    pregabalin (LYRICA) 75 mg, Oral, 3 times daily    rOPINIRole (REQUIP) 0.5 mg, Oral, Nightly    tiZANidine (ZANAFLEX) 4 MG tablet TAKE 1/2 TO 1 TABLET TWICE DAILY AS NEEDED FOR MUSCLE SPASMS. MAY CAUSE  "DROWSINESS.    topiramate (TOPAMAX) 100 mg, Oral, 2 times daily    TRELEGY ELLIPTA 100-62.5-25 mcg DsDv 1 puff, Inhalation, Daily    ubrogepant (UBRELVY) 100 mg, Oral, Daily PRN, If symptoms persist or return, may repeat dose after 2 hours. Maximum: 200 mg per 24 hours      I have reviewed the PMH, social history, FamilyHx, surgical history, allergies and medications documented / confirmed by the patient at the time of this visit.  Review of Systems   Constitutional:  Positive for fatigue. Negative for chills, fever and unexpected weight change.   HENT:  Negative for ear pain and sore throat.    Eyes:  Negative for redness and visual disturbance.   Respiratory:  Negative for cough and shortness of breath.    Cardiovascular:  Negative for chest pain and palpitations.   Gastrointestinal:  Negative for nausea and vomiting.   Genitourinary:  Negative for difficulty urinating and hematuria.   Musculoskeletal:  Positive for arthralgias. Negative for myalgias.   Skin:  Negative for rash and wound.   Neurological:  Negative for weakness and headaches.   Psychiatric/Behavioral:  Negative for sleep disturbance. The patient is not nervous/anxious.      Objective:   /80   Pulse 81   Ht 5' 8" (1.727 m)   Wt (!) 140.6 kg (310 lb)   SpO2 97%   BMI 47.14 kg/m²   Physical Exam  Vitals and nursing note reviewed.   Constitutional:       General: She is not in acute distress.     Appearance: She is well-developed. She is obese. She is not ill-appearing, toxic-appearing or diaphoretic.   HENT:      Head: Normocephalic and atraumatic.      Right Ear: Hearing and external ear normal.      Left Ear: Hearing and external ear normal.      Nose: Nose normal. No rhinorrhea.   Eyes:      General: Lids are normal.      Extraocular Movements: Extraocular movements intact.      Conjunctiva/sclera: Conjunctivae normal.      Pupils: Pupils are equal, round, and reactive to light.   Neck:      Vascular: No carotid bruit.   Cardiovascular: "      Rate and Rhythm: Normal rate.      Pulses: Normal pulses.   Pulmonary:      Effort: Pulmonary effort is normal. No respiratory distress.      Breath sounds: Normal breath sounds.   Abdominal:      General: Bowel sounds are normal.      Palpations: Abdomen is soft.   Musculoskeletal:         General: Tenderness present. No deformity. Normal range of motion.      Cervical back: Normal range of motion and neck supple.      Right lower leg: No edema.      Left lower leg: No edema.   Lymphadenopathy:      Cervical: No cervical adenopathy.   Skin:     General: Skin is warm and dry.      Capillary Refill: Capillary refill takes less than 2 seconds.      Coloration: Skin is not pale.   Neurological:      General: No focal deficit present.      Mental Status: She is alert and oriented to person, place, and time. She is not disoriented.      Cranial Nerves: No cranial nerve deficit.      Motor: No weakness.      Gait: Gait normal.   Psychiatric:         Attention and Perception: She is attentive.         Mood and Affect: Mood normal. Mood is not anxious or depressed.         Speech: Speech is not rapid and pressured or slurred.         Behavior: Behavior normal. Behavior is not agitated, aggressive or hyperactive. Behavior is cooperative.         Thought Content: Thought content normal. Thought content is not paranoid or delusional. Thought content does not include homicidal or suicidal ideation. Thought content does not include homicidal or suicidal plan.         Cognition and Memory: Memory is not impaired.         Judgment: Judgment normal.       Physical Exam  Lungs were auscultated.  Shoulder exam was performed.    Results  Laboratory Studies  Blood counts were normal. Kidney and liver function tests were normal. Cholesterol levels were normal. A1c was normal. Thyroid function tests were normal. Urine test was normal.    Assessment:     1. Fall, initial encounter    2. Cervical paraspinal muscle spasm    3. Rotator  cuff arthropathy of left shoulder    4. Diabetic polyneuropathy associated with type 2 diabetes mellitus    5. Encounter for long-term (current) use of medications      MDM:   Moderate medical complexity.  Moderate risk.  Total time: 23 minutes.  This includes total time spent on the encounter, which includes face to face time and non-face to face time preparing to see the patient (eg, review of previous medical records, tests), Obtaining and/or reviewing separately obtained history, documenting clinical information in the electronic or other health record, independently interpreting results (not separately reported)/communicating results to the patient/family/caregiver, and/or care coordination (not separately reported).    I have Reviewed and summarized old records.  I have performed thorough medication reconciliation today and discussed risk and benefits of medications.  I have reviewed labs and discussed with patient.  All questions were answered.  I am requesting old records and will review them once they are available.  Visit today included increased complexity associated with the care of the episodic problem see above assessment addressed and managing the longitudinal care of the patient due to the serious and/or complex managed problem(s) see above.    I have signed for the following orders AND/OR meds.  Orders Placed This Encounter   Procedures    X-Ray Shoulder 2 or More Views Left     Standing Status:   Future     Number of Occurrences:   1     Standing Expiration Date:   12/12/2025     Order Specific Question:   May the Radiologist modify the order per protocol to meet the clinical needs of the patient?     Answer:   Yes     Order Specific Question:   Release to patient     Answer:   Immediate     Medications Ordered This Encounter   Medications    celecoxib (CELEBREX) 200 MG capsule     Sig: Take 1 capsule (200 mg total) by mouth 2 (two) times daily.     Dispense:  60 capsule     Refill:  0     cyclobenzaprine (FLEXERIL) 10 MG tablet     Sig: Take 1 tablet (10 mg total) by mouth 2 (two) times daily as needed for Muscle spasms.     Dispense:  180 tablet     Refill:  2    pregabalin (LYRICA) 75 MG capsule     Sig: Take 1 capsule (75 mg total) by mouth 3 (three) times daily.     Dispense:  270 capsule     Refill:  1    topiramate (TOPAMAX) 100 MG tablet     Sig: Take 1 tablet (100 mg total) by mouth 2 (two) times daily.     Dispense:  60 tablet     Refill:  2        Follow up in about 6 months (around 6/12/2025), or if symptoms worsen or fail to improve.  Future Appointments       Date Provider Specialty Appt Notes    12/16/2024  Kam mckeon    12/18/2024 Andrea Hernandez MD Neurology 6 wk f/u botox    12/19/2024 Sol Mckeon, NP Hematology and Oncology 3 mo f/u with lab prior/ngwv    1/6/2025 Brandie Rey DPM Podiatry 3 month DNC    4/7/2025 Rosemary Alonso PA-C Sports Medicine 5 mo S/p Bilt Durolane          If no improvement in symptoms or symptoms worsen, advised to call/follow-up at clinic or go to ER. Patient voiced understanding and all questions/concerns were addressed.   DISCLAIMER: This note was compiled by using a speech recognition dictation system and therefore please be aware that typographical / speech recognition errors can and do occur.  Please contact me if you see any errors specifically.  Consent was obtained for JEROME recording system prior to the visit.    This note was generated with the assistance of ambient listening technology. Verbal consent was obtained by the patient and accompanying visitor(s) for the recording of patient appointment to facilitate this note. I attest to having reviewed and edited the generated note for accuracy, though some syntax or spelling errors may persist. Please contact the author of this note for any clarification.    Oleksandr Nagel M.D.       Office: [phone] [id number] [place] [address]  FAX: [phone]

## 2024-12-12 NOTE — ASSESSMENT & PLAN NOTE
X-ray of the shoulder.  Start Celebrex.  Consider physical therapy if no improvement.  Offered intra-articular steroid injection if no improvement.

## 2024-12-12 NOTE — PATIENT INSTRUCTIONS
Follow up in about 6 months (around 6/12/2025), or if symptoms worsen or fail to improve.     Dear patient,   As a result of recent federal legislation (The Federal Cures Act), you may receive lab or pathology results from your visit in your [facility] account before your physician is able to contact you. Your physician or their representative will relay the results to you with their recommendations at their soonest availability.     If no improvement in symptoms or symptoms worsen, please be advised to call MD, follow-up at clinic and/or go to ER if becomes severe.    Oleksandr Nagel M.D.        We Offer TELEHEALTH & Same Day Appointments!   Book your Telehealth appointment with me through my nurse or   Clinic appointments on [employer]    [address] [place] [address]    Office: [phone]   FAX: [phone]    Check out my Facebook Page and Follow Me at: [email or web address]    Check out my website at [employer] by clicking on: [email or web address]    To Schedule appointments online, go to [email or web address]: [email or web address]

## 2024-12-12 NOTE — PROGRESS NOTES
1st check to see if patient has seen the results.  If not then  CALL patient with results and Document verification.  Schedule follow-up if needed.  [phone]  X-ray of the shoulder reviewed by radiology.  Compared to previous from November of 2023.  There is AC joint arthritis as well as shoulder joint arthritis.  No fracture.  I recommend that you proceed with the Celebrex as prescribed.  If no improvement I recommend physical therapy and follow-up with me to consider intra-articular steroid injection.

## 2024-12-12 NOTE — ASSESSMENT & PLAN NOTE
Continue Lyrica 75 milligram.  We will plan to monitor hemoglobin A1c at designated intervals 3 to 6 months.  I recommend ongoing Education for diabetic diet and exercise protocol.  We will continue to monitor for side effects.    Please be advised of symptoms to monitor for and to notify me immediately if persistent or worsening.  Follow up with Ophthalmology/Optometry and Podiatry at least annually.

## 2024-12-16 ENCOUNTER — LAB VISIT (OUTPATIENT)
Dept: LAB | Facility: HOSPITAL | Age: 64
End: 2024-12-16
Attending: NURSE PRACTITIONER
Payer: MEDICARE

## 2024-12-16 DIAGNOSIS — E61.1 IRON DEFICIENCY: ICD-10-CM

## 2024-12-16 LAB
ANION GAP SERPL CALC-SCNC: 10 MMOL/L (ref 8–16)
BASOPHILS # BLD AUTO: 0.02 K/UL (ref 0–0.2)
BASOPHILS NFR BLD: 0.4 % (ref 0–1.9)
BUN SERPL-MCNC: 15 MG/DL (ref 8–23)
CALCIUM SERPL-MCNC: 9.1 MG/DL (ref 8.7–10.5)
CHLORIDE SERPL-SCNC: 110 MMOL/L (ref 95–110)
CO2 SERPL-SCNC: 23 MMOL/L (ref 23–29)
CREAT SERPL-MCNC: 0.8 MG/DL (ref 0.5–1.4)
DIFFERENTIAL METHOD BLD: ABNORMAL
EOSINOPHIL # BLD AUTO: 0.2 K/UL (ref 0–0.5)
EOSINOPHIL NFR BLD: 3.7 % (ref 0–8)
ERYTHROCYTE [DISTWIDTH] IN BLOOD BY AUTOMATED COUNT: 14.4 % (ref 11.5–14.5)
EST. GFR  (NO RACE VARIABLE): >60 ML/MIN/1.73 M^2
FERRITIN SERPL-MCNC: 69 NG/ML (ref 20–300)
GLUCOSE SERPL-MCNC: 76 MG/DL (ref 70–110)
HCT VFR BLD AUTO: 37.9 % (ref 37–48.5)
HGB BLD-MCNC: 12.5 G/DL (ref 12–16)
IMM GRANULOCYTES # BLD AUTO: 0.02 K/UL (ref 0–0.04)
IMM GRANULOCYTES NFR BLD AUTO: 0.4 % (ref 0–0.5)
IRON SERPL-MCNC: 78 UG/DL (ref 30–160)
LYMPHOCYTES # BLD AUTO: 1.7 K/UL (ref 1–4.8)
LYMPHOCYTES NFR BLD: 37 % (ref 18–48)
MCH RBC QN AUTO: 31.6 PG (ref 27–31)
MCHC RBC AUTO-ENTMCNC: 33 G/DL (ref 32–36)
MCV RBC AUTO: 96 FL (ref 82–98)
MONOCYTES # BLD AUTO: 0.4 K/UL (ref 0.3–1)
MONOCYTES NFR BLD: 8.6 % (ref 4–15)
NEUTROPHILS # BLD AUTO: 2.3 K/UL (ref 1.8–7.7)
NEUTROPHILS NFR BLD: 49.9 % (ref 38–73)
NRBC BLD-RTO: 0 /100 WBC
PLATELET # BLD AUTO: 282 K/UL (ref 150–450)
PMV BLD AUTO: 9.8 FL (ref 9.2–12.9)
POTASSIUM SERPL-SCNC: 4.4 MMOL/L (ref 3.5–5.1)
RBC # BLD AUTO: 3.95 M/UL (ref 4–5.4)
SATURATED IRON: 24 % (ref 20–50)
SODIUM SERPL-SCNC: 143 MMOL/L (ref 136–145)
TOTAL IRON BINDING CAPACITY: 327 UG/DL (ref 250–450)
TRANSFERRIN SERPL-MCNC: 221 MG/DL (ref 200–375)
WBC # BLD AUTO: 4.65 K/UL (ref 3.9–12.7)

## 2024-12-16 PROCEDURE — 84466 ASSAY OF TRANSFERRIN: CPT | Performed by: NURSE PRACTITIONER

## 2024-12-16 PROCEDURE — 36415 COLL VENOUS BLD VENIPUNCTURE: CPT | Mod: PO | Performed by: NURSE PRACTITIONER

## 2024-12-16 PROCEDURE — 80048 BASIC METABOLIC PNL TOTAL CA: CPT | Performed by: NURSE PRACTITIONER

## 2024-12-16 PROCEDURE — 85025 COMPLETE CBC W/AUTO DIFF WBC: CPT | Mod: PO | Performed by: NURSE PRACTITIONER

## 2024-12-16 PROCEDURE — 82728 ASSAY OF FERRITIN: CPT | Performed by: NURSE PRACTITIONER

## 2024-12-17 ENCOUNTER — OFFICE VISIT (OUTPATIENT)
Dept: FAMILY MEDICINE | Facility: CLINIC | Age: 64
End: 2024-12-17
Payer: MEDICARE

## 2024-12-17 VITALS
DIASTOLIC BLOOD PRESSURE: 78 MMHG | SYSTOLIC BLOOD PRESSURE: 132 MMHG | WEIGHT: 293 LBS | OXYGEN SATURATION: 97 % | HEART RATE: 71 BPM | BODY MASS INDEX: 44.41 KG/M2 | HEIGHT: 68 IN

## 2024-12-17 DIAGNOSIS — L72.9 CYST OF SKIN: Primary | ICD-10-CM

## 2024-12-17 DIAGNOSIS — Z79.899 ENCOUNTER FOR LONG-TERM (CURRENT) USE OF MEDICATIONS: ICD-10-CM

## 2024-12-17 DIAGNOSIS — M12.812 ROTATOR CUFF ARTHROPATHY OF LEFT SHOULDER: ICD-10-CM

## 2024-12-17 PROCEDURE — 1101F PT FALLS ASSESS-DOCD LE1/YR: CPT | Mod: CPTII,S$GLB,, | Performed by: FAMILY MEDICINE

## 2024-12-17 PROCEDURE — 3044F HG A1C LEVEL LT 7.0%: CPT | Mod: CPTII,S$GLB,, | Performed by: FAMILY MEDICINE

## 2024-12-17 PROCEDURE — 3008F BODY MASS INDEX DOCD: CPT | Mod: CPTII,S$GLB,, | Performed by: FAMILY MEDICINE

## 2024-12-17 PROCEDURE — 99213 OFFICE O/P EST LOW 20 MIN: CPT | Mod: 25,S$GLB,, | Performed by: FAMILY MEDICINE

## 2024-12-17 PROCEDURE — 3066F NEPHROPATHY DOC TX: CPT | Mod: CPTII,S$GLB,, | Performed by: FAMILY MEDICINE

## 2024-12-17 PROCEDURE — 3075F SYST BP GE 130 - 139MM HG: CPT | Mod: CPTII,S$GLB,, | Performed by: FAMILY MEDICINE

## 2024-12-17 PROCEDURE — 3061F NEG MICROALBUMINURIA REV: CPT | Mod: CPTII,S$GLB,, | Performed by: FAMILY MEDICINE

## 2024-12-17 PROCEDURE — 1126F AMNT PAIN NOTED NONE PRSNT: CPT | Mod: CPTII,S$GLB,, | Performed by: FAMILY MEDICINE

## 2024-12-17 PROCEDURE — 1159F MED LIST DOCD IN RCRD: CPT | Mod: CPTII,S$GLB,, | Performed by: FAMILY MEDICINE

## 2024-12-17 PROCEDURE — 99999 PR PBB SHADOW E&M-EST. PATIENT-LVL V: CPT | Mod: PBBFAC,,, | Performed by: FAMILY MEDICINE

## 2024-12-17 PROCEDURE — 3288F FALL RISK ASSESSMENT DOCD: CPT | Mod: CPTII,S$GLB,, | Performed by: FAMILY MEDICINE

## 2024-12-17 PROCEDURE — 3078F DIAST BP <80 MM HG: CPT | Mod: CPTII,S$GLB,, | Performed by: FAMILY MEDICINE

## 2024-12-17 PROCEDURE — 1160F RVW MEDS BY RX/DR IN RCRD: CPT | Mod: CPTII,S$GLB,, | Performed by: FAMILY MEDICINE

## 2024-12-17 PROCEDURE — 20610 DRAIN/INJ JOINT/BURSA W/O US: CPT | Mod: LT,S$GLB,, | Performed by: FAMILY MEDICINE

## 2024-12-17 RX ORDER — BETAMETHASONE SODIUM PHOSPHATE AND BETAMETHASONE ACETATE 3; 3 MG/ML; MG/ML
12 INJECTION, SUSPENSION INTRA-ARTICULAR; INTRALESIONAL; INTRAMUSCULAR; SOFT TISSUE
Status: DISCONTINUED | OUTPATIENT
Start: 2024-12-17 | End: 2024-12-17 | Stop reason: HOSPADM

## 2024-12-17 RX ADMIN — BETAMETHASONE SODIUM PHOSPHATE AND BETAMETHASONE ACETATE 12 MG: 3; 3 INJECTION, SUSPENSION INTRA-ARTICULAR; INTRALESIONAL; INTRAMUSCULAR; SOFT TISSUE at 01:12

## 2024-12-17 NOTE — ASSESSMENT & PLAN NOTE
See procedure note tolerated well.  Continue with Celebrex and referral to physical therapy.  Avoid heavy lifting.

## 2024-12-17 NOTE — PROCEDURES
Large Joint Aspiration/Injection: L subacromial bursa    Date/Time: 12/17/2024 1:00 PM    Performed by: Oleksandr Nagel MD  Authorized by: Oleksandr Nagel MD    Consent Done?:  Yes (Verbal)  Indications:  Arthritis and pain  Timeout: prior to procedure the correct patient, procedure, and site was verified    Prep: patient was prepped and draped in usual sterile fashion    Local anesthesia used?: No    Local anesthetic:  Topical anesthetic    Details:  Needle Size:  25 G  Ultrasonic Guidance for needle placement?: No    Approach:  Posterior  Location:  Shoulder  Site:  L subacromial bursa  Medications:  12 mg betamethasone acetate-betamethasone sodium phosphate 6 mg/mL  Patient tolerance:  Patient tolerated the procedure well with no immediate complications

## 2024-12-17 NOTE — PATIENT INSTRUCTIONS
Follow up in about 6 months (around 6/17/2025), or if symptoms worsen or fail to improve.     Dear patient,   As a result of recent federal legislation (The Federal Cures Act), you may receive lab or pathology results from your visit in your [facility] account before your physician is able to contact you. Your physician or their representative will relay the results to you with their recommendations at their soonest availability.     If no improvement in symptoms or symptoms worsen, please be advised to call MD, follow-up at clinic and/or go to ER if becomes severe.    Oleksandr Nagel M.D.        We Offer TELEHEALTH & Same Day Appointments!   Book your Telehealth appointment with me through my nurse or   Clinic appointments on [employer]!    [address] [place] [address]    Office: [phone]   FAX: [phone]    Check out my Facebook Page and Follow Me at: [email or web address]    Check out my website at [employer] by clicking on: [email or web address]    To Schedule appointments online, go to [employer]: [email or web address]

## 2024-12-17 NOTE — PROGRESS NOTES
PLAN:      Assessment & Plan  1. Cyst.  The cyst appears benign. A referral to Dr. Garcia or Dr. Moore has been made for further evaluation and potential excision. She is advised not to press on the cyst.    2. Shoulder pain.  A steroid injection was administered today to alleviate the shoulder pain. She is informed that the shoulder will feel numb today, but the pain should improve within the next few days. If the pain persists, she should allow a few more days for the steroid to take effect. A repeat injection can be administered in 3 months if needed. A referral to physical therapy has been made, and she will be contacted for scheduling.    3. Moles.  A comprehensive skin examination will be conducted to assess all moles. She is advised to ensure that all moles are checked during her visit with Dr. Garcia or Dr. Moore.    4. Health maintenance.  Labs were done on 16th. Blood counts are normal. Kidney function is normal. Electrolytes are normal. Iron levels are good. She is advised to continue her current iron supplementation regimen as her iron levels have improved.    PROCEDURE  A steroid injection was administered today to alleviate the shoulder pain.    Problem List Items Addressed This Visit       Encounter for long-term (current) use of medications (Chronic)     Complete history and physical was completed today.  Complete and thorough medication reconciliation was performed.  Discussed risks and benefits of medications.  Advised patient on orders and health maintenance.  We discussed old records and old labs if available.  Will request any records not available through epic.  Continue current medications listed on your summary sheet.           Rotator cuff arthropathy of left shoulder (Chronic)     See procedure note tolerated well.  Continue with Celebrex and referral to physical therapy.  Avoid heavy lifting.         Relevant Orders    Ambulatory Referral/Consult to Physical Therapy    Cyst of skin - Primary      Appears to be a cyst versus comedone.  Referral to dermatology.         Relevant Orders    Ambulatory referral/consult to Dermatology     Future Appointments       Date Provider Specialty Appt Notes    12/18/2024 Andrea Hernandez MD Neurology 6 wk f/u botox    12/19/2024 Sol Mckeon NP Hematology and Oncology 3 mo f/u with lab prior/ngwv    1/6/2025 Brandie Rey DPM Podiatry 3 month DNC    4/7/2025 Rosemary Alonso PA-C Sports Medicine 5 mo S/p Bilt Durolane           Medication Management for assessment above:   Medication List with Changes/Refills   Current Medications    ALBUTEROL (PROVENTIL/VENTOLIN HFA) 90 MCG/ACTUATION INHALER    Inhale 2 puffs into the lungs every 6 (six) hours as needed for Wheezing.    AZELASTINE (ASTELIN) 137 MCG (0.1 %) NASAL SPRAY    1 spray (137 mcg total) by Nasal route 2 (two) times daily.    CELECOXIB (CELEBREX) 200 MG CAPSULE    Take 1 capsule (200 mg total) by mouth 2 (two) times daily.    CYCLOBENZAPRINE (FLEXERIL) 10 MG TABLET    Take 1 tablet (10 mg total) by mouth 2 (two) times daily as needed for Muscle spasms.    DICLOFENAC SODIUM (VOLTAREN ARTHRITIS PAIN) 1 % GEL    Apply 2 g topically once daily.    DILTIAZEM (CARDIZEM) 30 MG TABLET    Take 1 tablet (30 mg total) by mouth every 12 (twelve) hours.    EPINEPHRINE (EPIPEN) 0.3 MG/0.3 ML ATIN    Inject 0.3 mLs (0.3 mg total) into the muscle once. for 1 dose    EZETIMIBE (ZETIA) 10 MG TABLET    Take 1 tablet (10 mg total) by mouth once daily.    FAMOTIDINE (PEPCID) 40 MG TABLET    Take 1 tablet (40 mg total) by mouth once daily.    LEVOTHYROXINE (SYNTHROID) 100 MCG TABLET    Take 1 tablet (100 mcg total) by mouth before breakfast.    OMEPRAZOLE (PRILOSEC) 40 MG CAPSULE    Take 1 capsule (40 mg total) by mouth once daily.    PREGABALIN (LYRICA) 75 MG CAPSULE    Take 1 capsule (75 mg total) by mouth 3 (three) times daily.    ROPINIROLE (REQUIP) 0.5 MG TABLET    Take 1 tablet (0.5 mg total) by mouth  every evening.    SEMAGLUTIDE (OZEMPIC) 2 MG/DOSE (8 MG/3 ML) PNIJ    Inject 2 mg into the skin every 7 days.    TIZANIDINE (ZANAFLEX) 4 MG TABLET    TAKE 1/2 TO 1 TABLET TWICE DAILY AS NEEDED FOR MUSCLE SPASMS. MAY CAUSE DROWSINESS.    TOPIRAMATE (TOPAMAX) 100 MG TABLET    Take 1 tablet (100 mg total) by mouth 2 (two) times daily.    TRELEGY ELLIPTA 100-62.5-25 MCG DSDV    Inhale 1 puff into the lungs once daily.    UBROGEPANT (UBRELVY) 100 MG TABLET    Take 1 tablet (100 mg total) by mouth daily as needed for Migraine. If symptoms persist or return, may repeat dose after 2 hours. Maximum: 200 mg per 24 hours       Oleksandr Nagel M.D.  ==========================================================================  Subjective:   Patient ID: Zakia Price is a 64 y.o. female.  has a past medical history of Acute respiratory failure due to COVID-19, COVID-19, Diabetes mellitus, type 2 (1993), Diabetic neuropathy (01/27/2014), DJD (degenerative joint disease) of knee, DVT (deep venous thrombosis) (around 1990's), Fatty liver, GERD (gastroesophageal reflux disease), Hypertension associated with diabetes, HECTOR (iron deficiency anemia) (05/13/2021), Multinodular goiter, Obesity, morbid, BMI 50 or higher, and Sleep apnea.   Chief Complaint: Injections      History of Present Illness  The patient presents for evaluation of a cyst, shoulder pain, and moles.    She reports the presence of a cyst, which she has previously attempted to drain herself, resulting in some discharge. However, she notes that this incident occurred a significant time ago and does not believe it had any impact on the current state of the cyst.    She has not seen a pulmonologist in a long time. She does not have COPD or asthma.    She has moles.    MEDICATIONS  Current: iron    Problem List Items Addressed This Visit       Encounter for long-term (current) use of medications (Chronic)    Overview     December 2024:  Reviewed labs December 2024:   Reviewed labs.  Sept 2024: reviewed labs.  May 2024:  Reviewed labs.  November 2023: Reviewed labs.  October 2023: Reviewed labs.  August 2023: Reviewed labs.  June 2023: Reviewed labs.  January 2023:  Reviewed labs.  CHRONIC. Stable. Compliant with medications for managed conditions. See medication list. No SE reported. Routine lab analysis is being monitored. Refills were addressed.  January 2021:CHRONIC. Stable. Compliant with medications for managed conditions. See medication list. No SE reported. Routine lab analysis is being monitored. Refills were addressed.  July 2021:  Reviewed labs.  January 2022:  Reviewed labs.  February 2022:  Reviewed labs.  July 2022:  Reviewed labs.  Lab Results   Component Value Date    WBC 4.65 12/16/2024    HGB 12.5 12/16/2024    HCT 37.9 12/16/2024    MCV 96 12/16/2024     12/16/2024         Chemistry        Component Value Date/Time     12/16/2024 0932    K 4.4 12/16/2024 0932     12/16/2024 0932    CO2 23 12/16/2024 0932    BUN 15 12/16/2024 0932    CREATININE 0.8 12/16/2024 0932    GLU 76 12/16/2024 0932        Component Value Date/Time    CALCIUM 9.1 12/16/2024 0932    ALKPHOS 77 09/09/2024 1012    AST 10 09/09/2024 1012    ALT 11 09/09/2024 1012    BILITOT 0.2 09/09/2024 1012    ESTGFRAFRICA >60.0 02/24/2022 1255    EGFRNONAA >60.0 02/24/2022 1255          Lab Results   Component Value Date    TSH 0.507 09/09/2024    FREET4 0.95 12/26/2023    T3FREE 2.5 12/26/2023            Current Assessment & Plan     Complete history and physical was completed today.  Complete and thorough medication reconciliation was performed.  Discussed risks and benefits of medications.  Advised patient on orders and health maintenance.  We discussed old records and old labs if available.  Will request any records not available through epic.  Continue current medications listed on your summary sheet.           Rotator cuff arthropathy of left shoulder (Chronic)    Overview      December 2024:  Reviewed x-ray with the patient.  Patient is now wanting to try physical therapy again.  She is in agreement with proceeding with a steroid injection into her shoulder.  No recent injections.  No problems with steroids in the past.  Chronic.  Recurrent.  Patient recently had a fall.  She fell onto her left shoulder.         Current Assessment & Plan     See procedure note tolerated well.  Continue with Celebrex and referral to physical therapy.  Avoid heavy lifting.         Cyst of skin - Primary    Overview     Chronic.  Patient has a spot on her chin that is irritating her.  It has been there for several years.         Current Assessment & Plan     Appears to be a cyst versus comedone.  Referral to dermatology.             Review of patient's allergies indicates:   Allergen Reactions    Codeine sulfate      Nausea^    Lisinopril Swelling     angioedema    Codeine Nausea Only and Rash     Current Outpatient Medications   Medication Instructions    albuterol (PROVENTIL/VENTOLIN HFA) 90 mcg/actuation inhaler 2 puffs, Inhalation, Every 6 hours PRN    azelastine (ASTELIN) 137 mcg, Nasal, 2 times daily    celecoxib (CELEBREX) 200 mg, Oral, 2 times daily    cyclobenzaprine (FLEXERIL) 10 mg, Oral, 2 times daily PRN    diclofenac sodium (VOLTAREN ARTHRITIS PAIN) 2 g, Topical (Top), Daily    diltiaZEM (CARDIZEM) 30 mg, Oral, Every 12 hours    EPINEPHrine (EPIPEN) 0.3 mg, Intramuscular, Once    ezetimibe (ZETIA) 10 mg, Oral, Daily    famotidine (PEPCID) 40 mg, Oral, Daily    levothyroxine (SYNTHROID) 100 mcg, Oral, Before breakfast    omeprazole (PRILOSEC) 40 mg, Oral, Daily    OZEMPIC 2 mg, Subcutaneous, Every 7 days    pregabalin (LYRICA) 75 mg, Oral, 3 times daily    rOPINIRole (REQUIP) 0.5 mg, Oral, Nightly    tiZANidine (ZANAFLEX) 4 MG tablet TAKE 1/2 TO 1 TABLET TWICE DAILY AS NEEDED FOR MUSCLE SPASMS. MAY CAUSE DROWSINESS.    topiramate (TOPAMAX) 100 mg, Oral, 2 times daily    TRELEGY ELLIPTA  "100-62.5-25 mcg DsDv 1 puff, Inhalation, Daily    ubrogepant (UBRELVY) 100 mg, Oral, Daily PRN, If symptoms persist or return, may repeat dose after 2 hours. Maximum: 200 mg per 24 hours      I have reviewed the PMH, social history, FamilyHx, surgical history, allergies and medications documented / confirmed by the patient at the time of this visit.  Review of Systems   Constitutional:  Positive for fatigue. Negative for chills, fever and unexpected weight change.   HENT:  Negative for ear pain and sore throat.    Eyes:  Negative for redness and visual disturbance.   Respiratory:  Negative for cough and shortness of breath.    Cardiovascular:  Negative for chest pain and palpitations.   Gastrointestinal:  Negative for nausea and vomiting.   Genitourinary:  Negative for difficulty urinating and hematuria.   Musculoskeletal:  Positive for arthralgias. Negative for myalgias.   Skin:  Negative for rash and wound.   Neurological:  Negative for weakness and headaches.   Psychiatric/Behavioral:  Negative for sleep disturbance. The patient is not nervous/anxious.      Objective:   /78   Pulse 71   Ht 5' 8" (1.727 m)   Wt (!) 143.8 kg (317 lb)   SpO2 97%   BMI 48.20 kg/m²   Physical Exam  Vitals and nursing note reviewed.   Constitutional:       General: She is not in acute distress.     Appearance: She is well-developed. She is obese. She is not ill-appearing, toxic-appearing or diaphoretic.   HENT:      Head: Normocephalic and atraumatic.      Right Ear: Hearing and external ear normal.      Left Ear: Hearing and external ear normal.      Nose: Nose normal. No rhinorrhea.   Eyes:      General: Lids are normal.      Extraocular Movements: Extraocular movements intact.      Conjunctiva/sclera: Conjunctivae normal.      Pupils: Pupils are equal, round, and reactive to light.   Neck:      Vascular: No carotid bruit.   Cardiovascular:      Rate and Rhythm: Normal rate.      Pulses: Normal pulses.   Pulmonary:      " Effort: Pulmonary effort is normal. No respiratory distress.      Breath sounds: Normal breath sounds.   Abdominal:      General: Bowel sounds are normal.      Palpations: Abdomen is soft.   Musculoskeletal:         General: Tenderness present. No deformity. Normal range of motion.      Cervical back: Normal range of motion and neck supple.      Right lower leg: No edema.      Left lower leg: No edema.   Lymphadenopathy:      Cervical: No cervical adenopathy.   Skin:     General: Skin is warm and dry.      Capillary Refill: Capillary refill takes less than 2 seconds.      Coloration: Skin is not pale.      Findings: Lesion present.   Neurological:      General: No focal deficit present.      Mental Status: She is alert and oriented to person, place, and time. She is not disoriented.      Cranial Nerves: No cranial nerve deficit.      Motor: No weakness.      Gait: Gait normal.   Psychiatric:         Attention and Perception: She is attentive.         Mood and Affect: Mood normal. Mood is not anxious or depressed.         Speech: Speech is not rapid and pressured or slurred.         Behavior: Behavior normal. Behavior is not agitated, aggressive or hyperactive. Behavior is cooperative.         Thought Content: Thought content normal. Thought content is not paranoid or delusional. Thought content does not include homicidal or suicidal ideation. Thought content does not include homicidal or suicidal plan.         Cognition and Memory: Memory is not impaired.         Judgment: Judgment normal.       Physical Exam      Results  Laboratory Studies  Labs were done on 16th. Blood counts are normal. Kidney function is normal. Electrolytes are normal. Iron levels are good.    Assessment:     1. Cyst of skin    2. Encounter for long-term (current) use of medications    3. Rotator cuff arthropathy of left shoulder      MDM:   Moderate medical complexity.  Moderate risk.  Total time: 21 minutes.  This includes total time spent  on the encounter, which includes face to face time and non-face to face time preparing to see the patient (eg, review of previous medical records, tests), Obtaining and/or reviewing separately obtained history, documenting clinical information in the electronic or other health record, independently interpreting results (not separately reported)/communicating results to the patient/family/caregiver, and/or care coordination (not separately reported).    I have Reviewed and summarized old records.  I have performed thorough medication reconciliation today and discussed risk and benefits of medications.  I have reviewed labs and discussed with patient.  All questions were answered.  I am requesting old records and will review them once they are available.  Visit today included increased complexity associated with the care of the episodic problem see above assessment addressed and managing the longitudinal care of the patient due to the serious and/or complex managed problem(s) see above.    I have signed for the following orders AND/OR meds.  Orders Placed This Encounter   Procedures    Ambulatory referral/consult to Dermatology     Standing Status:   Future     Standing Expiration Date:   1/17/2026     Referral Priority:   Routine     Referral Type:   Consultation     Referral Reason:   Specialty Services Required     Referred to Provider:   Luly Cazares MD     Requested Specialty:   Dermatology     Number of Visits Requested:   1    Ambulatory Referral/Consult to Physical Therapy     Standing Status:   Future     Standing Expiration Date:   1/17/2026     Referral Priority:   Routine     Referral Type:   Physical Medicine     Referral Reason:   Specialty Services Required     Number of Visits Requested:   1           Follow up in about 6 months (around 6/17/2025), or if symptoms worsen or fail to improve.  Future Appointments       Date Provider Specialty Appt Notes    12/18/2024 Andrea Hernandez MD  Neurology 6 wk f/u botox    12/19/2024 Sol Mckeon NP Hematology and Oncology 3 mo f/u with lab prior/ngwv    1/6/2025 Brandie Rey DPM Podiatry 3 month DNC    4/7/2025 Rosemary Alonso PA-C Sports Medicine 5 mo S/p Bilt Durolane          If no improvement in symptoms or symptoms worsen, advised to call/follow-up at clinic or go to ER. Patient voiced understanding and all questions/concerns were addressed.   DISCLAIMER: This note was compiled by using a speech recognition dictation system and therefore please be aware that typographical / speech recognition errors can and do occur.  Please contact me if you see any errors specifically.  Consent was obtained for JEROME recording system prior to the visit.    This note was generated with the assistance of ambient listening technology. Verbal consent was obtained by the patient and accompanying visitor(s) for the recording of patient appointment to facilitate this note. I attest to having reviewed and edited the generated note for accuracy, though some syntax or spelling errors may persist. Please contact the author of this note for any clarification.    Oleksandr Nagel M.D.       Office: [phone] [address] [place] [address]  FAX: [phone]

## 2024-12-17 NOTE — PROGRESS NOTES
Neurology Headache [facility] - [facility]  Return Patient Visit     Subjective      REFERRAL SOURCE  No ref. provider found          CHIEF COMPLAINT/REASON FOR REFERRAL  Headache     HISTORY OF PRESENT ILLNESS  Zakia Price is a 64 y.o. woman with asthma, hypertension, hyperlipidemia,  hypothyroidism, prior diagnosis of migraine, prior ETHAN diagnosis but reportedly recovered, Body mass index is 48.34 kg/m².,  who is presenting to the [facility] for an office visit with a chief complaint of headache.     The patient states that they began to experience headaches around childhood that she thinks was related to sinus problems. No major headaches as a child or teenager though. In her 20-30's, she remembers having to lie down in a dark room to treat headache pain a few times. She had a RAYMOND. In her 40's, she says that her children used to stress her out but doesn't remember any headaches. She denies having any menopause symptoms. Headache was not worse in her 50's. She says that headache was present in her early 60's, but it wasn't until the last few months that her headaches have been bothering. Her sister passed away 2024 and she has been coming with her brother and friends, but she's quite sure that her passing is what set off her increased headache pain.      Current headache characteristics:  Headache Frequency:        Total: 30        Disablin     Duration of attacks: days  Timing to max pain: minutes   Headache location:    bilateral frontal and occipital left or right  Quality: throbbing / pulsating and aching/tight  Intensity: mild moderate moderate-to-severe severe: moderate to severe  Associated symptoms: photophobia, phonophobia, osmophobia, aggravation with routine physical activity, and nausea  Unilateral cranial autonomic features or restlessness:  none  Premonitory symptoms:  unsure  Aura symptoms:   Type:  none   Duration:  none  Headache Red Flags:         "Fevers/Chills/Unintended Weight Loss: yes - intentional        Focal weakness: no        Thunderclap onset: no        Start a headache with coughing/sneezing/bending over: no        Headache absolutely worse with certain positions (lying down vs standing up): no        Double vision: no        Loss of color vision: no        Pulsatile or whooshing tinnitus: yes - every month for a period of time  Triggers: yes - stress  Neck pain: yes - bilateral Radiation up  Jaw pain/cramping with chewing, e.g., starts/stops when chewing due to pain/fatigue, lockjaw/decreased opening or cramping (including tongue) when eating: no  Symptoms of dysautonomia: postural lightheadedness / dizziness and heart racing or skipped beats     Prior non-pharm treatments (biofeedback, CBT, PT, chiropractor, acupuncture, massage): yes - PT for neck increased pain - she did for more than 6 weeks  History of asthma, constipation, kidney stones: yes - asthma and constipation  Psychiatric comorbidities: no  History of Head Trauma: no  Hypertension, Hyperlipidemia: yes - both  Prior stroke: no  Coronary artery disease: no  Vascular malformation or aneurysm: no  Caffeine use: not anymore  Sleep comorbidities: Snoring present, witnessed apneas present, sleep study yes - supposedly cured     Family history of headaches:  grand daughter has headache that requires medication     Last dilated eye exam: within the last 3 months Any abnormalities? no     Menstrual status:  Is the patient menopausal? Using HRT? no     SOCIAL HISTORY  The patient currently lives in [place], [place]. They are able to perform all ADLs without assistance. They are retired. They currently smoke 0 packs per day. They drink 0 EtOH containing beverages per day. No illicits. Mood is described as "It's good"     ----------------  Interval Events  12/18/2024: Last seen 10/28/2024 for NPV. Plan at that time was Botox for chronic migraine for prevention and Ubrelvy for rescue. Since then, the " patient obtained Botox and states that her headache pain has improved significantly. She has had some headache free days since her procedure and when she does have headache the pain does not lasted all day. She thinks that stress has still been a trigger for her. She estimates that she had 10 days of headache total and the rest 20 days had no headache pain.   ----------------     Current Acute Headache Medications:  -- Methocarbamol 500 mg  -- Tizanidine 4 mg  -- APAP 500 mg x 3  -- Aleve 220 mg x 3  -- Ubrelvy 100 mg - $1000 when she tried to start it but then got her SSI figured out so she wants to try to get it again     Number of Days with acute medication use per week: 3     Current Prophylactic Headache Medications:  -- Diltiazem 30 mg PO BID  -- Pregabalin 75 mg PO TID  -- Topiramate 100 mg PO BID     Previous headache treatment that was ineffective or not tolerated:  Acute: None  Preventive: None  Devices: None    Procedures:   11/12/2024 Botox 155 units - 75% improvement at least with her headaches. She has been able to participate in life activities more frequently like cleaning, cooking, driving.     ----------------     Past Medical History:   Diagnosis Date    Acute respiratory failure due to COVID-19     COVID-19     Diabetes mellitus, type 2 1993    BS  didn't check 10/04/2023 Insulin x 2 years    Diabetic neuropathy 01/27/2014    DJD (degenerative joint disease) of knee     DVT (deep venous thrombosis) around 1990's    in leg, is on no anticoagulant therapy presently    Fatty liver     GERD (gastroesophageal reflux disease)     Hypertension associated with diabetes     HECTOR (iron deficiency anemia) 05/13/2021    Multinodular goiter     Obesity, morbid, BMI 50 or higher     Sleep apnea     has no CPAP        Current Outpatient Medications on File Prior to Visit   Medication Sig Dispense Refill    albuterol (PROVENTIL/VENTOLIN HFA) 90 mcg/actuation inhaler Inhale 2 puffs into the lungs every 6 (six)  hours as needed for Wheezing. 54 g 2    azelastine (ASTELIN) 137 mcg (0.1 %) nasal spray 1 spray (137 mcg total) by Nasal route 2 (two) times daily. 30 mL 0    celecoxib (CELEBREX) 200 MG capsule Take 1 capsule (200 mg total) by mouth 2 (two) times daily. 60 capsule 0    cyclobenzaprine (FLEXERIL) 10 MG tablet Take 1 tablet (10 mg total) by mouth 2 (two) times daily as needed for Muscle spasms. 180 tablet 2    diclofenac sodium (VOLTAREN ARTHRITIS PAIN) 1 % Gel Apply 2 g topically once daily. 100 g 0    diltiaZEM (CARDIZEM) 30 MG tablet Take 1 tablet (30 mg total) by mouth every 12 (twelve) hours. 180 tablet 2    ezetimibe (ZETIA) 10 mg tablet Take 1 tablet (10 mg total) by mouth once daily. 90 tablet 2    famotidine (PEPCID) 40 MG tablet Take 1 tablet (40 mg total) by mouth once daily. 90 tablet 2    levothyroxine (SYNTHROID) 100 MCG tablet Take 1 tablet (100 mcg total) by mouth before breakfast. 90 tablet 2    omeprazole (PRILOSEC) 40 MG capsule Take 1 capsule (40 mg total) by mouth once daily. 90 capsule 2    pregabalin (LYRICA) 75 MG capsule Take 1 capsule (75 mg total) by mouth 3 (three) times daily. 270 capsule 1    rOPINIRole (REQUIP) 0.5 MG tablet Take 1 tablet (0.5 mg total) by mouth every evening. 90 tablet 2    semaglutide (OZEMPIC) 2 mg/dose (8 mg/3 mL) PnIj Inject 2 mg into the skin every 7 days. 9 mL 1    tiZANidine (ZANAFLEX) 4 MG tablet TAKE 1/2 TO 1 TABLET TWICE DAILY AS NEEDED FOR MUSCLE SPASMS. MAY CAUSE DROWSINESS. 90 tablet 1    topiramate (TOPAMAX) 100 MG tablet Take 1 tablet (100 mg total) by mouth 2 (two) times daily. 60 tablet 2    TRELEGY ELLIPTA 100-62.5-25 mcg DsDv Inhale 1 puff into the lungs once daily. 60 each 2    [DISCONTINUED] ubrogepant (UBRELVY) 100 mg tablet Take 1 tablet (100 mg total) by mouth daily as needed for Migraine. If symptoms persist or return, may repeat dose after 2 hours. Maximum: 200 mg per 24 hours 8 tablet 3    EPINEPHrine (EPIPEN) 0.3 mg/0.3 mL AtIn Inject 0.3  mLs (0.3 mg total) into the muscle once. for 1 dose 2 each 0     Current Facility-Administered Medications on File Prior to Visit   Medication Dose Route Frequency Provider Last Rate Last Admin    onabotulinumtoxina injection 155 Units  155 Units Intramuscular Q90 Days    155 Units at 11/12/24 1220    [DISCONTINUED] betamethasone acetate-betamethasone sodium phosphate injection 12 mg  12 mg   Oleksandr Nagel MD   12 mg at 12/17/24 1332        REVIEW OF SYSTEMS  No fevers, chills, or unintended weight loss.     Objective      PHYSICAL EXAMINATION    Blood pressure (!) 130/93, pulse 87, weight (!) 144.2 kg (317 lb 14.5 oz).     General Exam: The patient is in no acute distress.  Psychiatric: The patient is alert, interactive, and has appropriate mood and affect.  HEENT:  No supraorbital, auriculotemporal, or occipital notch tenderness.    Temporal arterial pulses are equal and nontender to palpation.    No cervical paraspinal or trapezius muscle tenderness.    Good passive and active range of motion of the neck.       Neurologic Examination:  Mental Status: Mental status is normal to conversation. The patient is able to recall the details of their medical history without difficulty. Speech is clear.  Language is grossly intact.    Cranial Nerves: Pupils are equal, round, and reactive to light. Extraocular movements are intact. No nystagmus. Funduscopic examination reveals no evidence of papilledema. Facial symmetry and strength is intact. Facial sensation is intact and equal bilaterally. Tongue protrudes in the midline. Symmetrical palate elevation.  Motor Examination: Full strength, normal tone, normal muscle bulk in upper and lower extremities. No tremor.    Coordination: No dysmetria on finger-nose-finger  Gait: Normal gait.       ----------------     DIAGNOSTIC DATA     Laboratory Data:   9/2024  CBC/CMP - unremarkable     Imaging Data (I have personally reviewed the imaging below):   10/2024  MRI Brain wo  contrast was normal  Images will be uploaded     Assessment & Plan      Zakia Price is a 64 y.o. asthma, hypertension, hyperlipidemia,  hypothyroidism, prior diagnosis of migraine, prior ETHAN diagnosis but reportedly recovered, Body mass index is 48.34 kg/m².,  who is presenting to the [facility] for an office visit with a chief complaint of headache.    The patient had Botox 155U on 11/12/2024 and has experienced 75% benefit in her headache frequency and intensity. She has been able to participate in daily activities with much less disability compared to before such as driving, cleaning, cooking, etc. She has not yet started Ubrelvy due to cost but she says that the problem was worked out with her insurance now.     Will continue Botox for now.      1. Chronic migraine without aura, improving    -- Diagnostic studies and referrals recommended:  MRI report was reviewed; disc will be uploaded for review  -- Preventive:  Botox 155 units every 3 months. Side effects discussed.  -- Supplements: Try Magnesium (oxide, glycinate, citrate, or combination) 400 mg by mouth twice per day (Side effect: stomach upset). Fine at local [employer] or [employer]. Try Riboflavin (VitB2) 200 mg by mouth twice per day (Side effect: bright yellow urine). Find at [employer] store ([employer], etc.)  -- Abortive:  Ubrelvy 100 mg.  Can repeat 1 time after 2 hours in a 24 hour.. Side effects discussed.  -- Nausea/Vomiting:  None today.    -- Medication overuse discussed. Limit the use of as needed medications to less than 2 to 3 days per week or 10 days per month to reduce the risk of medication overuse complications.  -- Future options:  Increase Botox 200 units every 3 months, anti CGRP agents     -- Recommend lifestyle modifications including the SEEDS for success in headache management, including Sleep hygiene, Exercising regularly, Eating healthy and regular meals, Drinking water and  "maintaining a headache Diary, and Stress reduction.  -- Therapeutic education and advocacy:        -Access the Migraine Toolkit, [facility] Migraine Patient Toolkit        -Visit the American Migraine Foundation (americanmigrainefoundation.org) website and the [employer] Against Migraine Facebook group for additional patient education    Pain Psychology Resources:  -- "Harnessing the Power of your Thoughts for Pain Control" - Zoe Mckeon [place] Back Pain Education Day 2016.   -- "Pain Catastrophizing" - Zoe Mckeon [employer] education Youtube channel  -- Free 8-week Mindfulness Course - [place] Mindfulness-Based Stress Reduction  -- [id number] Free Kesha for stress reduction developed by the [employer] for [employer]       -- Follow-up recommended:  6 weeks after 3rd Botox visits     Andrea Hernandez MD  Headache and Pain Management  [facility]    The total time spent for evaluation and management on including reviewing separately obtained history, performing a medically appropriate exam and evaluation, documenting clinical information in the health record, independently interpreting results and communicating them to the patient/family/caregiver, and ordering medications/tests/procedures was 12 minutes.      "

## 2024-12-18 ENCOUNTER — TELEPHONE (OUTPATIENT)
Dept: PAIN MEDICINE | Facility: CLINIC | Age: 64
End: 2024-12-18
Payer: MEDICARE

## 2024-12-18 ENCOUNTER — OFFICE VISIT (OUTPATIENT)
Dept: NEUROLOGY | Facility: CLINIC | Age: 64
End: 2024-12-18
Payer: MEDICARE

## 2024-12-18 VITALS
HEART RATE: 87 BPM | DIASTOLIC BLOOD PRESSURE: 93 MMHG | BODY MASS INDEX: 48.34 KG/M2 | SYSTOLIC BLOOD PRESSURE: 130 MMHG | WEIGHT: 293 LBS

## 2024-12-18 DIAGNOSIS — G43.719 INTRACTABLE CHRONIC MIGRAINE WITHOUT AURA AND WITHOUT STATUS MIGRAINOSUS: Primary | ICD-10-CM

## 2024-12-18 PROCEDURE — 1125F AMNT PAIN NOTED PAIN PRSNT: CPT | Mod: CPTII,S$GLB,, | Performed by: INTERNAL MEDICINE

## 2024-12-18 PROCEDURE — 99999 PR PBB SHADOW E&M-EST. PATIENT-LVL IV: CPT | Mod: PBBFAC,,, | Performed by: INTERNAL MEDICINE

## 2024-12-18 PROCEDURE — 3061F NEG MICROALBUMINURIA REV: CPT | Mod: CPTII,S$GLB,, | Performed by: INTERNAL MEDICINE

## 2024-12-18 PROCEDURE — 1101F PT FALLS ASSESS-DOCD LE1/YR: CPT | Mod: CPTII,S$GLB,, | Performed by: INTERNAL MEDICINE

## 2024-12-18 PROCEDURE — 99212 OFFICE O/P EST SF 10 MIN: CPT | Mod: S$GLB,,, | Performed by: INTERNAL MEDICINE

## 2024-12-18 PROCEDURE — 3075F SYST BP GE 130 - 139MM HG: CPT | Mod: CPTII,S$GLB,, | Performed by: INTERNAL MEDICINE

## 2024-12-18 PROCEDURE — 1159F MED LIST DOCD IN RCRD: CPT | Mod: CPTII,S$GLB,, | Performed by: INTERNAL MEDICINE

## 2024-12-18 PROCEDURE — 3288F FALL RISK ASSESSMENT DOCD: CPT | Mod: CPTII,S$GLB,, | Performed by: INTERNAL MEDICINE

## 2024-12-18 PROCEDURE — 1160F RVW MEDS BY RX/DR IN RCRD: CPT | Mod: CPTII,S$GLB,, | Performed by: INTERNAL MEDICINE

## 2024-12-18 PROCEDURE — 3044F HG A1C LEVEL LT 7.0%: CPT | Mod: CPTII,S$GLB,, | Performed by: INTERNAL MEDICINE

## 2024-12-18 PROCEDURE — 3066F NEPHROPATHY DOC TX: CPT | Mod: CPTII,S$GLB,, | Performed by: INTERNAL MEDICINE

## 2024-12-18 PROCEDURE — 3008F BODY MASS INDEX DOCD: CPT | Mod: CPTII,S$GLB,, | Performed by: INTERNAL MEDICINE

## 2024-12-18 PROCEDURE — 3080F DIAST BP >= 90 MM HG: CPT | Mod: CPTII,S$GLB,, | Performed by: INTERNAL MEDICINE

## 2024-12-18 NOTE — PATIENT INSTRUCTIONS
-- Schedule next Botox  -- Try Ubrelvy 100 mg. Can take it early in headache and with Celebrex if you want for added benefit  -- Follow-up in 6 weeks after 3rd dose of Botox

## 2024-12-19 ENCOUNTER — OFFICE VISIT (OUTPATIENT)
Dept: HEMATOLOGY/ONCOLOGY | Facility: CLINIC | Age: 64
End: 2024-12-19
Payer: MEDICARE

## 2024-12-19 VITALS
BODY MASS INDEX: 44.41 KG/M2 | DIASTOLIC BLOOD PRESSURE: 83 MMHG | HEIGHT: 68 IN | WEIGHT: 293 LBS | HEART RATE: 71 BPM | SYSTOLIC BLOOD PRESSURE: 130 MMHG | TEMPERATURE: 98 F | OXYGEN SATURATION: 97 %

## 2024-12-19 DIAGNOSIS — Z86.2 HISTORY OF IRON DEFICIENCY ANEMIA: ICD-10-CM

## 2024-12-19 DIAGNOSIS — E61.1 IRON DEFICIENCY: Primary | ICD-10-CM

## 2024-12-19 PROBLEM — D50.0 IRON DEFICIENCY ANEMIA DUE TO CHRONIC BLOOD LOSS: Status: RESOLVED | Noted: 2021-01-11 | Resolved: 2024-12-19

## 2024-12-19 PROBLEM — D50.9 IDA (IRON DEFICIENCY ANEMIA): Status: RESOLVED | Noted: 2021-05-13 | Resolved: 2024-12-19

## 2024-12-19 PROCEDURE — 3288F FALL RISK ASSESSMENT DOCD: CPT | Mod: CPTII,S$GLB,, | Performed by: NURSE PRACTITIONER

## 2024-12-19 PROCEDURE — 3061F NEG MICROALBUMINURIA REV: CPT | Mod: CPTII,S$GLB,, | Performed by: NURSE PRACTITIONER

## 2024-12-19 PROCEDURE — 3044F HG A1C LEVEL LT 7.0%: CPT | Mod: CPTII,S$GLB,, | Performed by: NURSE PRACTITIONER

## 2024-12-19 PROCEDURE — 1126F AMNT PAIN NOTED NONE PRSNT: CPT | Mod: CPTII,S$GLB,, | Performed by: NURSE PRACTITIONER

## 2024-12-19 PROCEDURE — 3079F DIAST BP 80-89 MM HG: CPT | Mod: CPTII,S$GLB,, | Performed by: NURSE PRACTITIONER

## 2024-12-19 PROCEDURE — 99999 PR PBB SHADOW E&M-EST. PATIENT-LVL IV: CPT | Mod: PBBFAC,,, | Performed by: NURSE PRACTITIONER

## 2024-12-19 PROCEDURE — 3008F BODY MASS INDEX DOCD: CPT | Mod: CPTII,S$GLB,, | Performed by: NURSE PRACTITIONER

## 2024-12-19 PROCEDURE — 99213 OFFICE O/P EST LOW 20 MIN: CPT | Mod: S$GLB,,, | Performed by: NURSE PRACTITIONER

## 2024-12-19 PROCEDURE — 1160F RVW MEDS BY RX/DR IN RCRD: CPT | Mod: CPTII,S$GLB,, | Performed by: NURSE PRACTITIONER

## 2024-12-19 PROCEDURE — 3066F NEPHROPATHY DOC TX: CPT | Mod: CPTII,S$GLB,, | Performed by: NURSE PRACTITIONER

## 2024-12-19 PROCEDURE — 1159F MED LIST DOCD IN RCRD: CPT | Mod: CPTII,S$GLB,, | Performed by: NURSE PRACTITIONER

## 2024-12-19 PROCEDURE — 3075F SYST BP GE 130 - 139MM HG: CPT | Mod: CPTII,S$GLB,, | Performed by: NURSE PRACTITIONER

## 2024-12-19 PROCEDURE — 1101F PT FALLS ASSESS-DOCD LE1/YR: CPT | Mod: CPTII,S$GLB,, | Performed by: NURSE PRACTITIONER

## 2024-12-19 NOTE — ASSESSMENT & PLAN NOTE
Saturated iron 24%. Other iron labs normal. No anemia    -Encourage continued iron rich foods. List from up to date previously emailed patient.   -f/u 4 months with cbc, iron, ferritin  -Discussed S&S to report sooner   0 = swallows foods/liquids without difficulty

## 2024-12-19 NOTE — PROGRESS NOTES
Subjective:       Patient ID: Zakia Price is a 64 y.o. female.    Chief Complaint: review labs. Anemia    HPI: 64 y.o female presenting today for follow up of her ion deficiency anemia previously treated with Feraheme 5/2021. Most recently received Venofer weekly x 4 completed 7/31/2023. Prior EGD/Colonoscopy evaluation reviewed. EGD pathology H. Pylori positive, she completed treatment.. Repeat testing reflect eradication of H. Pylori infection. Colonoscopy unremarkable with recommended repeat in 10 years. VCE without S&S bleeding    EGD for evaluation of c/o N/V 9/2023 unremarkable.     Today she notes feeling fatigued. Recent loss of her sister.    Social History     Socioeconomic History    Marital status: Single   Tobacco Use    Smoking status: Never    Smokeless tobacco: Never   Substance and Sexual Activity    Alcohol use: No    Drug use: No    Sexual activity: Not Currently     Social Drivers of Health     Financial Resource Strain: Low Risk  (12/13/2024)    Overall Financial Resource Strain (CARDIA)     Difficulty of Paying Living Expenses: Not hard at all   Food Insecurity: No Food Insecurity (12/13/2024)    Hunger Vital Sign     Worried About Running Out of Food in the Last Year: Never true     Ran Out of Food in the Last Year: Never true   Transportation Needs: No Transportation Needs (11/14/2023)    PRAPARE - Transportation     Lack of Transportation (Medical): No     Lack of Transportation (Non-Medical): No   Physical Activity: Unknown (12/13/2024)    Exercise Vital Sign     Days of Exercise per Week: 6 days   Stress: Stress Concern Present (12/13/2024)    [employer] [place] [employer] - Occupational Stress Questionnaire     Feeling of Stress : To some extent   Housing Stability: Low Risk  (11/14/2023)    Housing Stability Vital Sign     Unable to Pay for Housing in the Last Year: No     Number of Places Lived in the Last Year: 1     Unstable Housing in the Last Year: No       Past  Medical History:   Diagnosis Date    Acute respiratory failure due to COVID-19     COVID-19     Diabetes mellitus, type 2 1993    BS  didn't check 10/04/2023 Insulin x 2 years    Diabetic neuropathy 01/27/2014    DJD (degenerative joint disease) of knee     DVT (deep venous thrombosis) around 1990's    in leg, is on no anticoagulant therapy presently    Fatty liver     GERD (gastroesophageal reflux disease)     Hypertension associated with diabetes     HECTOR (iron deficiency anemia) 05/13/2021    Iron deficiency anemia due to chronic blood loss 01/11/2021    Chronic.  Control is uncertain.  Patient recently started on iron supplement over-the-counter.  Patient reports no side effects.  Plan to recheck iron studies and blood counts.          Lab Results      Component    Value    Date           WBC    5.03    12/08/2020           HGB    11.5 (L)    12/08/2020           HCT    38.6    12/08/2020           MCV    88    12/08/2020           PLT    388 (H)    Multinodular goiter     Obesity, morbid, BMI 50 or higher     Sleep apnea     has no CPAP       Family History   Problem Relation Name Age of Onset    Diabetes Mother Heather Villanueva     Hypertension Mother Heather Villanueva     Heart disease Mother Heather Villanueva 50    Cancer Father Gurwinder Dotson     Arthritis Father Gurwinder Dotson     Breast cancer Sister      Cancer Brother      Cancer Brother Gurwinder Buchanan     Leukemia Son Rafa Price     Cancer Son Rafa Price        Past Surgical History:   Procedure Laterality Date    COLONOSCOPY N/A 5/12/2021    Procedure: COLONOSCOPY;  Surgeon: Carolina Rizo MD;  Location: [facility] ENDO;  Service: Endoscopy;  Laterality: N/A;    EPIDURAL STEROID INJECTION N/A 12/10/2021    Procedure: Lumbar L5/S1 IL TEETEE  Would like AM procedure, if possible;  Surgeon: Nae Paul MD;  Location: [facility] PAIN MGT;  Service: Pain Management;  Laterality: N/A;    EPIDURAL STEROID INJECTION INTO CERVICAL SPINE N/A 10/8/2021    Procedure: C7-T1 IL TEETEE-no  sedation.  Needs IV-just incase;  Surgeon: Nae Paul MD;  Location: [facility] PAIN MGT;  Service: Pain Management;  Laterality: N/A;    ESOPHAGOGASTRODUODENOSCOPY N/A 7/8/2021    Procedure: EGD (ESOPHAGOGASTRODUODENOSCOPY) previous positve covid;  Surgeon: Jann Gutierrez MD;  Location: [facility];  Service: Endoscopy;  Laterality: N/A;    ESOPHAGOGASTRODUODENOSCOPY N/A 9/18/2023    Procedure: EGD (ESOPHAGOGASTRODUODENOSCOPY);  Surgeon: Gm Leon MD;  Location: [facility];  Service: Endoscopy;  Laterality: N/A;    FRACTURE SURGERY      HYSTERECTOMY      INTRALUMINAL GASTROINTESTINAL TRACT IMAGING VIA CAPSULE N/A 10/24/2022    Procedure: IMAGING PROCEDURE, GI TRACT, INTRALUMINAL, VIA CAPSULE;  Surgeon: Hang Lunsford RN;  Location: [facility];  Service: Endoscopy;  Laterality: N/A;    SELECTIVE INJECTION OF ANESTHETIC AGENT AROUND LUMBAR SPINAL NERVE ROOT BY TRANSFORAMINAL APPROACH Bilateral 5/6/2022    Procedure: Bilateral L5/S1 TF TEETEE with RN IV sedation;  Surgeon: Nae Paul MD;  Location: [facility] PAIN MGT;  Service: Pain Management;  Laterality: Bilateral;    SELECTIVE INJECTION OF ANESTHETIC AGENT AROUND LUMBAR SPINAL NERVE ROOT BY TRANSFORAMINAL APPROACH Bilateral 12/30/2022    Procedure: Bilateral L5/S1 TF TEETEE RN IV Sedation;  Surgeon: Nae Paul MD;  Location: [facility] PAIN MGT;  Service: Pain Management;  Laterality: Bilateral;    SHOULDER ARTHROSCOPY      THYROIDECTOMY, PARTIAL Right     and transplatation of right superior parathyroid gland to the sternocleidomastoid muscle     TONSILLECTOMY      TUBAL LIGATION  1984    WRIST FRACTURE SURGERY      left       Review of Systems   Constitutional:  Negative for activity change, appetite change, chills, fatigue, fever and unexpected weight change.   HENT:  Negative for congestion, mouth sores, nosebleeds, sore throat, trouble swallowing and voice change.    Eyes:  Negative for visual disturbance.   Respiratory:  Negative for cough, chest tightness,  shortness of breath and wheezing.    Cardiovascular:  Negative for chest pain and leg swelling.   Gastrointestinal:  Negative for abdominal distention, abdominal pain, anal bleeding, blood in stool, diarrhea, nausea and vomiting.   Endocrine: Positive for cold intolerance.   Genitourinary:  Negative for difficulty urinating, dysuria and hematuria.   Musculoskeletal:  Negative for arthralgias, back pain and myalgias.   Skin:  Negative for pallor, rash and wound.   Neurological:  Negative for dizziness, syncope, weakness and headaches.   Hematological:  Negative for adenopathy. Does not bruise/bleed easily.   Psychiatric/Behavioral:  The patient is not nervous/anxious.          Medication List with Changes/Refills   Current Medications    ALBUTEROL (PROVENTIL/VENTOLIN HFA) 90 MCG/ACTUATION INHALER    Inhale 2 puffs into the lungs every 6 (six) hours as needed for Wheezing.    AZELASTINE (ASTELIN) 137 MCG (0.1 %) NASAL SPRAY    1 spray (137 mcg total) by Nasal route 2 (two) times daily.    CELECOXIB (CELEBREX) 200 MG CAPSULE    Take 1 capsule (200 mg total) by mouth 2 (two) times daily.    CYCLOBENZAPRINE (FLEXERIL) 10 MG TABLET    Take 1 tablet (10 mg total) by mouth 2 (two) times daily as needed for Muscle spasms.    DICLOFENAC SODIUM (VOLTAREN ARTHRITIS PAIN) 1 % GEL    Apply 2 g topically once daily.    DILTIAZEM (CARDIZEM) 30 MG TABLET    Take 1 tablet (30 mg total) by mouth every 12 (twelve) hours.    EPINEPHRINE (EPIPEN) 0.3 MG/0.3 ML ATIN    Inject 0.3 mLs (0.3 mg total) into the muscle once. for 1 dose    EZETIMIBE (ZETIA) 10 MG TABLET    Take 1 tablet (10 mg total) by mouth once daily.    FAMOTIDINE (PEPCID) 40 MG TABLET    Take 1 tablet (40 mg total) by mouth once daily.    LEVOTHYROXINE (SYNTHROID) 100 MCG TABLET    Take 1 tablet (100 mcg total) by mouth before breakfast.    OMEPRAZOLE (PRILOSEC) 40 MG CAPSULE    Take 1 capsule (40 mg total) by mouth once daily.    PREGABALIN (LYRICA) 75 MG CAPSULE    Take  1 capsule (75 mg total) by mouth 3 (three) times daily.    ROPINIROLE (REQUIP) 0.5 MG TABLET    Take 1 tablet (0.5 mg total) by mouth every evening.    SEMAGLUTIDE (OZEMPIC) 2 MG/DOSE (8 MG/3 ML) PNIJ    Inject 2 mg into the skin every 7 days.    TIZANIDINE (ZANAFLEX) 4 MG TABLET    TAKE 1/2 TO 1 TABLET TWICE DAILY AS NEEDED FOR MUSCLE SPASMS. MAY CAUSE DROWSINESS.    TOPIRAMATE (TOPAMAX) 100 MG TABLET    Take 1 tablet (100 mg total) by mouth 2 (two) times daily.    TRELEGY ELLIPTA 100-62.5-25 MCG DSDV    Inhale 1 puff into the lungs once daily.    UBROGEPANT (UBRELVY) 100 MG TABLET    Take 1 tablet (100 mg total) by mouth daily as needed for Migraine. If symptoms persist or return, may repeat dose after 2 hours. Maximum: 200 mg per 24 hours     Objective:     Vitals:    12/19/24 1450   BP: 130/83   Pulse: 71   Temp: 98.1 °F (36.7 °C)         Lab Results   Component Value Date    WBC 4.65 12/16/2024    HGB 12.5 12/16/2024    HCT 37.9 12/16/2024    MCV 96 12/16/2024     12/16/2024       BMP  Lab Results   Component Value Date     12/16/2024    K 4.4 12/16/2024     12/16/2024    CO2 23 12/16/2024    BUN 15 12/16/2024    CREATININE 0.8 12/16/2024    CALCIUM 9.1 12/16/2024    ANIONGAP 10 12/16/2024    ESTGFRAFRICA >60.0 02/24/2022    EGFRNONAA >60.0 02/24/2022     Lab Results   Component Value Date    ALT 11 09/09/2024    AST 10 09/09/2024    ALKPHOS 77 09/09/2024    BILITOT 0.2 09/09/2024     Lab Results   Component Value Date    IRON 78 12/16/2024    TIBC 327 12/16/2024    FERRITIN 69 12/16/2024       Physical Exam  Pulmonary:      Effort: Pulmonary effort is normal. No respiratory distress.   Neurological:      Mental Status: She is alert and oriented to person, place, and time.          Assessment:     Problem List Items Addressed This Visit          Oncology    History of iron deficiency anemia - Primary     Saturated iron 24%. Other iron labs normal. No anemia    -Encourage continued iron rich  foods. List from up to date previously emailed patient.   -f/u 4 months with cbc, iron, ferritin  -Discussed S&S to report sooner                Med Onc Chart Routing      Follow up with physician    Follow up with KIMBER 4 months. pt prefers morning appt   Infusion scheduling note    Injection scheduling note    Labs CBC, ferritin and iron and TIBC   Scheduling:  Preferred lab:  Lab interval:  1-2 days prior   Imaging None      Pharmacy appointment No pharmacy appointment needed      Other referrals       No additional referrals needed               Iron deficiency  -     CBC Auto Differential; Future; Expected date: 12/19/2024  -     Iron and TIBC; Future; Expected date: 12/19/2024  -     Ferritin; Future; Expected date: 12/19/2024    History of iron deficiency anemia                PRECIOUS [place]

## 2024-12-23 ENCOUNTER — PATIENT MESSAGE (OUTPATIENT)
Dept: PAIN MEDICINE | Facility: CLINIC | Age: 64
End: 2024-12-23
Payer: MEDICARE

## 2025-01-03 ENCOUNTER — CLINICAL SUPPORT (OUTPATIENT)
Dept: REHABILITATION | Facility: HOSPITAL | Age: 65
End: 2025-01-03
Attending: FAMILY MEDICINE
Payer: MEDICARE

## 2025-01-03 DIAGNOSIS — M12.812 ROTATOR CUFF ARTHROPATHY OF LEFT SHOULDER: Primary | ICD-10-CM

## 2025-01-03 PROBLEM — R29.898 IMPAIRED STRENGTH OF SHOULDER MUSCLES: Chronic | Status: ACTIVE | Noted: 2025-01-03

## 2025-01-03 PROCEDURE — 97161 PT EVAL LOW COMPLEX 20 MIN: CPT | Mod: PN | Performed by: GENERAL ACUTE CARE HOSPITAL

## 2025-01-03 PROCEDURE — 97110 THERAPEUTIC EXERCISES: CPT | Mod: PN | Performed by: GENERAL ACUTE CARE HOSPITAL

## 2025-01-03 NOTE — PLAN OF CARE
[facility]   Physical Therapy Initial Evaluation    Name: Zakia Price  Clinic Number: [id number]    Therapy Diagnosis:   Encounter Diagnosis   Name Primary?    Rotator cuff arthropathy of left shoulder      Physician: Oleksandr Nagel MD    Physician Orders: PT Eval and Treat  Medical Diagnosis from Referral: Rotator Cuff Arthropathy  Evaluation Date: 1/3/2025  Authorization Period Expiration: 12/31/25  Plan of Care Expiration: 2/28/25 (8 weeks)  Progress Note Due: 2/3/25  Visit # / Visits authorized: 1/1 Evaluation   FOTO: 1/3 Shoulder 49/100    Precautions: Standard     Date: 1/3/2025   Time In: 1010  Time Out: 1100  Total Appointment Time (timed & untimed codes): 50 minutes    Subjective   Date of onset: approx 4-5 weeks  History of current condition - Zakia reports: patient reports insidious onset L shoulder pain over the last 4-5 weeks. Patient is unable to provide a good history of symptom onset.   Surgical: [x]No []Yes (procedure:   )  Weight Bearing Status: WBAT  Dominant Extremity: Both hands   Falls: 1 fall in the last 6 month-patient reports falling out when taking a bath. She states she fell on her butt. Patient reports the shoulder was hurting prior to this fall.  Imaging: X-ray. No additional imaging.  Past Surgical History: R RTC repair in 2012    Prior Therapy: Yes - patient reports prior outpatient physical therapy   Social History: lives with their family (granddaughter and children). Single story house with stairs to enter both entrances   Occupation: Retired -    Prior Level of Function: independent  Current Level of Function: independent- except driving. Patient reports that she stopped driving a few years ago due to pain in her neck     Pain:  Current 7/10, worst 10/10, best 0/10   Location: upper trap and into the neck   Description: Aching  Aggravating Factors: push/pull, overhead reaching  Easing Factors: pain medication-patient unable to  "recall off-hand. After looking in her medical record states she is taking: gabapentin, tizanidine, Voltarin cream(all over her body) "I don't know what helps and what isn't helping, I just take them"    Patients goals: to reduce pain, improve function of the arm-specifically with overhead motions.     Medical History:   Past Medical History:   Diagnosis Date    Acute respiratory failure due to COVID-19     COVID-19     Diabetes mellitus, type 2 1993    BS  didn't check 10/04/2023 Insulin x 2 years    Diabetic neuropathy 01/27/2014    DJD (degenerative joint disease) of knee     DVT (deep venous thrombosis) around 1990's    in leg, is on no anticoagulant therapy presently    Fatty liver     GERD (gastroesophageal reflux disease)     Hypertension associated with diabetes     HECTOR (iron deficiency anemia) 05/13/2021    Iron deficiency anemia due to chronic blood loss 01/11/2021    Chronic.  Control is uncertain.  Patient recently started on iron supplement over-the-counter.  Patient reports no side effects.  Plan to recheck iron studies and blood counts.          Lab Results      Component    Value    Date           WBC    5.03    12/08/2020           HGB    11.5 (L)    12/08/2020           HCT    38.6    12/08/2020           MCV    88    12/08/2020           PLT    388 (H)    Multinodular goiter     Obesity, morbid, BMI 50 or higher     Sleep apnea     has no CPAP     Surgical History:   Zakia Price  has a past surgical history that includes Hysterectomy; Tonsillectomy; Wrist fracture surgery; Shoulder arthroscopy; Thyroidectomy, partial (Right); Fracture surgery; Tubal ligation (1984); Colonoscopy (N/A, 5/12/2021); Esophagogastroduodenoscopy (N/A, 7/8/2021); Epidural steroid injection into cervical spine (N/A, 10/8/2021); Epidural steroid injection (N/A, 12/10/2021); Selective injection of anesthetic agent around lumbar spinal nerve root by transforaminal approach (Bilateral, 5/6/2022); Intraluminal " gastrointestinal tract imaging via capsule (N/A, 10/24/2022); Selective injection of anesthetic agent around lumbar spinal nerve root by transforaminal approach (Bilateral, 12/30/2022); and Esophagogastroduodenoscopy (N/A, 9/18/2023).  Medications:   Zakia has a current medication list which includes the following prescription(s): albuterol, azelastine, celecoxib, cyclobenzaprine, diclofenac sodium, diltiazem, epinephrine, ezetimibe, famotidine, levothyroxine, omeprazole, pregabalin, ropinirole, ozempic, tizanidine, topiramate, trelegy ellipta, and ubrogepant, and the following Facility-Administered Medications: onabotulinumtoxina.  Allergies:   Review of patient's allergies indicates:   Allergen Reactions    Codeine sulfate      Nausea^    Lisinopril Swelling     angioedema    Codeine Nausea Only and Rash        Objective    NT = not tested due to pain, inability to obtain test position, or relevancy    RANGE OF MOTION:  Shoulder AROM/PROM Left  1/3/2025 Right  1/3/2025   Forward Flexion (180) 55 130   Abduction (180) 55 145   Extension (60) 45 65     Cervical Range of Motion:  Flexion : WFL pain free  Extension: WFL pain free  Side Bend R: 30 ° pain  Side Bend L: 45 °  Rotation R: 45 °   Rotation L: 45 ° pain       STRENGTH:  U/E MMT Left  1/3/2025 Right  1/3/2025 Goal   Shoulder Flexion 4-/5 4/5 5/5 B   Shoulder Abduction 4-/5 4/5 5/5 B   Shoulder IR 4+/5 4+/5 5/5 B   Shoulder ER 4/5 4+/5 5/5 B   Elbow Flexion  4/5 5/5 5/5 B     SPECIAL TESTS:  Upper Extremity  Left  1/3/2025 Right  1/3/2025   Shoulder    Peyton Patterson (impingement) [x]+ []- []NT []+ []- []NT   AC Crossover [x]+ []- []NT []+ []- []NT   Other: []+ []- []NT []+ []- []NT     Sensation:  Sensation is intact to light touch in hands  Diabetic neuropathy - bilateral feet. Patients reports that her feet and legs will go out on her and cause her to fall with prolonged standing.  Palpation: Increased tone and tenderness noted with palpation to:  superior/anterior L GH joint line   Posture:  Pt presents with postural abnormalities which include  []None  [x]Forward head  [x]Rounded shoulders  []Thoracic Kyphosis  []Lumbar Lordosis  [x]Slouched Sitting or Standing  []Other:    Limitation/Restriction for FOTO Shoulder Survey  Therapist reviewed FOTO scores for Zakia on 1/3/2025 .   FOTO documents entered into [employer] - see Media section.  Score: 48/100     Treatment   Total Treatment time (time-based codes) separate from Evaluation: 10 minutes     Zakia received following skilled interventions listed below:    PT Intervention Parameters Time   Therapeutic Exercise to develop strength, endurance, ROM, flexibility, posture, and core stabilization Home exercise program  Postural corrections  10 minutes (1)       Patient Education and Home Exercises   Education provided:   Patient was educated on the role of Physical Therapy, Plan of Care, treatment plan, discharge goals and clinic call/cancel/no show policy.  Patient educated on biomechanical justification for physical therapy and importance of compliance with Home Exercise Program in order to improve overall impairments and Quality of Life.    Clinic Policies:  Patient was provided with physical copy of [facility] Policies necessary for therapy treatment sessions including: Attendance, Clothing, Cell Phone and Visitors.   Patient was additionally provided with contact information for the clinic including phone, fax and physical address for reference.   Patient verbalizes that they have received this information and is agreeable to follow these policies.     Written Home Exercises Provided: yes.   Exercises were reviewed and Zakia  was able to demonstrate them prior to the end of the session.    Zakia  demonstrated good  understanding of the education provided.  See EMR under Patient Instructions for exercises provided during therapy sessions.    Assessment   Zakia is a 64 y.o. female referred to  outpatient Physical Therapy. Patient presents with s/s consistent with L shoulder instability vs. RTC tendionpathy. Patient has decreased active range of motion and strength abound the L shoulder with functional impairments such as lifting, pushing,pulling and reaching cross body or behind the back. Patient displays near full passive range of motion with little pain.     Patient is indicated for skilled physical therapy services to impact knowledge of condition, safety awareness and improve upon aforementioned deficits. Patient is pleasant and agreeable to physical therapy plan of care.     Patient prognosis is Fair.   Patient will benefit from skilled outpatient Physical Therapy to address the deficits stated above and in the chart below, provide patient /family education, and to maximize patientt's level of independence.     Plan of care discussed with: [x]Patient []Family []Patient/Family  Patient's spiritual, cultural and educational needs considered and patient is agreeable to the plan of care and goals as stated below:     Anticipated barriers for therapy:  []None  []Cognitive  []Emotional  []Hearing  []Learning  [x]Other: chronicity    Medical Necessity is demonstrated by the following  History  Co-morbidities and personal factors that may impact the plan of care [] LOW: no personal factors / co-morbidities  [x] MODERATE: 1-2 personal factors / co-morbidities  [] HIGH: 3+ personal factors / co-morbidities    Moderate / High Support Documentation:  Chronicity, DM II, diabetic neuropathy     Examination  Body Structures and Functions, activity limitations and participation restrictions that may impact the plan of care [] LOW: addressing 1-2 elements  [x] MODERATE: 3+ elements  [] HIGH: 4+ elements (please support below)    Moderate / High Support Documentation:  Body Region / System  Strength, range of motion, posture, pain   Participation Restrictions  Push, pull, cross body reach, overhead lifting  Activity  Limitations  Prolonged activities: driving, working, lifting, climbing. Pushing to rise from chair     Clinical Presentation [x] LOW: stable  [] MODERATE: Evolving  [] HIGH: Unstable     Decision Making/ Complexity Score: low       Goals:  Short Term Goals Status Est. Met   Patient to be independent with foundational home exercise program performance to impact knowledge of condition  [] Met  [] Not Met  [] Progressing 1/3/2025    Patient to improve active range of motion L shoulder flexion to 90 degrees to impact self-care/grooming [] Met  [] Not Met  [] Progressing 1/3/2025    Patient to lift 5lbs to eye levels to impact activities of daily living performance safely [] Met  [] Not Met  [] Progressing 1/3/2025    Patient to improve Shoulder FOTO to 53 /100 to display improved activity participation [] Met  [] Not Met  [] Progressing 1/3/2025      Long Term Goal Status Est. Met   Patient will display independent and correct performance of advanced home exercise program without cueing to impact knowledge of condition [] Met  [] Not Met  [] Progressing 1/3/2025    Patient to improve active range of motion L shoulder flexion to 115 degrees to impact self-care/grooming [] Met  [] Not Met  [] Progressing 1/3/2025    Patient to lift 5lbs to overhead to impact activities of daily living performance safely [] Met  [] Not Met  [] Progressing 1/3/2025    Patient to improve Shoulder FOTO to 58 /100 to display improved activity participation [] Met  [] Not Met  [] Progressing 1/3/2025    Patient will report confidence in managing condition upon discharge from Physical Therapy. [] Met  [] Not Met  [] Progressing 1/3/2025      Plan   Plan of care Certification: 1/3/2025 to 2/28/25.    Outpatient Physical Therapy 1-2 times weekly for 8 weeks to include the following interventions: Cervical/Lumbar Traction, Manual Therapy, Moist Heat/ Ice, Neuromuscular Re-ed, Patient Education, Self Care, Therapeutic Activities, and Therapeutic  Exercise , Electrical Stimulation (IFC, Russian), IASTM, STM, Cupping, Blood Flow Restriction as appropriate.    Luly Ruiz PT, DPT, SCS, CSCS  Board Certified Sports Clinical Specialist   Certified Dry Needling Provider  1/3/2025

## 2025-01-10 ENCOUNTER — TELEPHONE (OUTPATIENT)
Dept: FAMILY MEDICINE | Facility: CLINIC | Age: 65
End: 2025-01-10
Payer: MEDICARE

## 2025-01-10 DIAGNOSIS — Z12.31 BREAST CANCER SCREENING BY MAMMOGRAM: Primary | ICD-10-CM

## 2025-01-10 DIAGNOSIS — M62.838 CERVICAL PARASPINAL MUSCLE SPASM: ICD-10-CM

## 2025-01-10 DIAGNOSIS — M12.812 ROTATOR CUFF ARTHROPATHY OF LEFT SHOULDER: ICD-10-CM

## 2025-01-10 RX ORDER — CELECOXIB 200 MG/1
200 CAPSULE ORAL 2 TIMES DAILY
Qty: 60 CAPSULE | Refills: 0 | Status: SHIPPED | OUTPATIENT
Start: 2025-01-10

## 2025-01-10 RX ORDER — CELECOXIB 200 MG/1
200 CAPSULE ORAL 2 TIMES DAILY
Qty: 60 CAPSULE | Refills: 0 | Status: CANCELLED | OUTPATIENT
Start: 2025-01-10 | End: 2025-02-09

## 2025-01-10 NOTE — TELEPHONE ENCOUNTER
No care due was identified.  [facility] Embedded Care Due Messages. Reference number: [id number].   1/10/2025 9:53:26 AM CST

## 2025-01-10 NOTE — TELEPHONE ENCOUNTER
No care due was identified.  [facility] Embedded Care Due Messages. Reference number: [id number].   1/10/2025 9:41:17 AM CST

## 2025-01-10 NOTE — TELEPHONE ENCOUNTER
Refill Routing Note   Medication(s) are not appropriate for processing by [facility] for the following reason(s):        Outside of protocol    [facility] action(s):  Route             Appointments  past 12m or future 3m with PCP    Date Provider   Last Visit   12/17/2024 Oleksandr Nagel MD   Next Visit   1/10/2025 Oleksandr Nagel MD   ED visits in past 90 days: 0        Note composed:11:44 AM 01/10/2025

## 2025-01-10 NOTE — TELEPHONE ENCOUNTER
----- Message from Adriano sent at 1/10/2025 11:52 AM CST -----  Contact: Zakia  Type:  Patient Requesting a call back     Who Called:Zakia  What is the call back request regarding?:orders for mammogram  Would the patient rather a call back or a response via [facility]?call  Best Call Back Number:[phone]   Additional Information:

## 2025-01-14 ENCOUNTER — CLINICAL SUPPORT (OUTPATIENT)
Dept: REHABILITATION | Facility: HOSPITAL | Age: 65
End: 2025-01-14
Payer: MEDICARE

## 2025-01-14 DIAGNOSIS — R29.898 IMPAIRED STRENGTH OF SHOULDER MUSCLES: Primary | Chronic | ICD-10-CM

## 2025-01-14 PROCEDURE — 97110 THERAPEUTIC EXERCISES: CPT | Mod: PN | Performed by: GENERAL ACUTE CARE HOSPITAL

## 2025-01-14 PROCEDURE — 97112 NEUROMUSCULAR REEDUCATION: CPT | Mod: PN | Performed by: GENERAL ACUTE CARE HOSPITAL

## 2025-01-14 NOTE — PROGRESS NOTES
[facility]   Physical Therapy Treatment Note    Name: Zakia Price  Clinic Number: [id number]    Therapy Diagnosis: No diagnosis found.  Physician: Oleksandr Nagel MD  Physician Orders: PT Eval and Treat  Medical Diagnosis from Referral: Rotator Cuff Arthropathy  Evaluation Date: 1/3/2025  Authorization Period Expiration: 12/31/25  Plan of Care Expiration: 2/28/25 (8 weeks)  Progress Note Due: 2/3/25  Visit # / Visits authorized: 1/1 Evaluation   FOTO: 1/3 Shoulder 49/100    Visit Date: 1/14/2025  Time In: 0905  Time Out: 0945  Total Appointment Time: 40 minutes  PTA Visit #: 0/5     Treatment No Show #:  Subjective   Pt reports: Mild compliance with home exercise program-continued pain in bilateral shoulder. Patient states that she is unable to stand for any period of time without increased pain, loss of balance or leg giving out.     She was compliant with home exercise program.  Response to previous treatment: 1st treatment session  Functional change: 1st treatment session    Pain: 4/10  Location: bilateral shoulder      Objective      Objective Measures updated at progress report unless specified.     Treatment   Zakia received following skilled interventions listed below:      Zakia received following skilled interventions listed below:    PT Intervention Parameters Time   Therapeutic Exercise to develop strength, endurance, ROM, flexibility, posture, and core stabilization Pulleys x 5 minutes   UBE 4/4 (rest break required in between directions)  2# dowel bicep curls x30 (seated)  2# dowel chest press x30 (seated)  18 minutes (1)   Neuromuscular Re-education activities to improve: Balance, Coordination, Kinesthetic, Sense, Proprioception, and Posture  Bilateral Red Theraband ER x30 reps   Seated diagonal t's x10 R &L  Seated rows -Red Theraband x30 reps   Seated chin tucks x15 reps   Anterior/posterior shoulder rolls x15 reps each 30 minutes (2)        Patient Education  and Home Exercises     Home Exercises Provided and Patient Education Provided   Patient was educated on the role of PT, POC, treatment plan, discharge goals, HEP.  Patient educated on biomechanical justification for therapeutic exercise and importance of compliance with HEP in order to improve overall impairments and QOL   Patient was educated on all the above exercise prior/during/after for proper posture, positioning, and execution for safe performance with home exercise program.       Written Home Exercises Provided:   yes.   Exercises were reviewed and Zakia was able to demonstrate them prior to the end of the session.    Zakia demonstrated good  understanding of the education provided.   See EMR under Patient Instructions for exercises provided during therapy sessions    Assessment     Patient is educated on safe and appropriate use of rehabilitation equipment and exercises today in the clinic. Patient is able to provide appropriate and safe return demonstration of use during their first treatment session. Verbal and tactile cueing is provided when appropriate to correct technique. Patient is encouraged to maintain compliance with home exercise program.      Patient is unable to perform any exercises in standing due to report of loss of balance or leg giving out-therefore many exercises and positions are limited for this patient.     Zakia Is progressing well towards her goals.   Pt prognosis is Fair.   Pt will continue to benefit from skilled outpatient physical therapy to address the deficits listed in the problem list box on initial evaluation, provide pt/family education and to maximize pt's level of independence in the home and community environment.   Pt's spiritual, cultural and educational needs considered and pt agreeable to plan of care and goals.  Anticipated barriers to physical therapy: chronicity, standing tolerance, BMI    Goals:  Short Term Goals Status Est. Met   Patient to be independent  with foundational home exercise program performance to impact knowledge of condition  [] Met  [] Not Met  [] Progressing 1/3/2025     Patient to improve active range of motion L shoulder flexion to 90 degrees to impact self-care/grooming [] Met  [] Not Met  [] Progressing 1/3/2025     Patient to lift 5lbs to eye levels to impact activities of daily living performance safely [] Met  [] Not Met  [] Progressing 1/3/2025     Patient to improve Shoulder FOTO to 53 /100 to display improved activity participation [] Met  [] Not Met  [] Progressing 1/3/2025        Long Term Goal Status Est. Met   Patient will display independent and correct performance of advanced home exercise program without cueing to impact knowledge of condition [] Met  [] Not Met  [] Progressing 1/3/2025     Patient to improve active range of motion L shoulder flexion to 115 degrees to impact self-care/grooming [] Met  [] Not Met  [] Progressing 1/3/2025     Patient to lift 5lbs to overhead to impact activities of daily living performance safely [] Met  [] Not Met  [] Progressing 1/3/2025     Patient to improve Shoulder FOTO to 58 /100 to display improved activity participation [] Met  [] Not Met  [] Progressing 1/3/2025     Patient will report confidence in managing condition upon discharge from Physical Therapy. [] Met  [] Not Met  [] Progressing 1/3/2025        Plan   Plan of care Certification: 1/3/2025 to 2/28/25.     Outpatient Physical Therapy 1-2 times weekly for 8 weeks to include the following interventions: Cervical/Lumbar Traction, Manual Therapy, Moist Heat/ Ice, Neuromuscular Re-ed, Patient Education, Self Care, Therapeutic Activities, and Therapeutic Exercise , Electrical Stimulation (IFC, Russian), IASTM, STM, Cupping, Blood Flow Restriction as appropriate.      Luly Ruiz PT, DPT, SCS, CSCS  Board Certified Sports Clinical Specialist   Certified Dry Needling Provider  1/14/2025      Disclaimer: This note was prepared using a  voice recognition system and is likely to have sound alike errors within the text.

## 2025-01-15 DIAGNOSIS — R49.0 HOARSENESS: ICD-10-CM

## 2025-01-15 DIAGNOSIS — E11.42 DIABETIC POLYNEUROPATHY ASSOCIATED WITH TYPE 2 DIABETES MELLITUS: ICD-10-CM

## 2025-01-15 DIAGNOSIS — M62.838 CERVICAL PARASPINAL MUSCLE SPASM: ICD-10-CM

## 2025-01-15 DIAGNOSIS — M12.812 ROTATOR CUFF ARTHROPATHY OF LEFT SHOULDER: ICD-10-CM

## 2025-01-15 RX ORDER — PREGABALIN 75 MG/1
CAPSULE ORAL
Qty: 90 CAPSULE | Refills: 0 | Status: SHIPPED | OUTPATIENT
Start: 2025-01-15

## 2025-01-15 NOTE — TELEPHONE ENCOUNTER
Unable to retrieve patient chart and identify care due.  [facility] Embedded Care Due Messages. Reference number: [id number].   1/15/2025 7:47:17 AM CST

## 2025-01-15 NOTE — TELEPHONE ENCOUNTER
Refill Routing Note   Medication(s) are not appropriate for processing by [facility] for the following reason(s):        Outside of protocol    [facility] action(s):  Route               Appointments  past 12m or future 3m with PCP    Date Provider   Last Visit   12/17/2024 Oleksandr Nagel MD   Next Visit   Visit date not found Oleksandr Nagel MD   ED visits in past 90 days: 0        Note composed:8:36 AM 01/15/2025

## 2025-01-16 RX ORDER — EPINEPHRINE 0.3 MG/.3ML
INJECTION SUBCUTANEOUS
Qty: 2 EACH | Refills: 0 | Status: SHIPPED | OUTPATIENT
Start: 2025-01-16

## 2025-01-16 RX ORDER — AZELASTINE 1 MG/ML
1 SPRAY, METERED NASAL 2 TIMES DAILY
Qty: 90 ML | Refills: 3 | Status: SHIPPED | OUTPATIENT
Start: 2025-01-16 | End: 2026-01-16

## 2025-01-16 RX ORDER — EPINEPHRINE 0.3 MG/.3ML
1 INJECTION SUBCUTANEOUS ONCE
Qty: 2 EACH | Refills: 0 | Status: SHIPPED | OUTPATIENT
Start: 2025-01-16 | End: 2025-01-16

## 2025-01-16 NOTE — TELEPHONE ENCOUNTER
Refill Routing Note   Medication(s) are not appropriate for processing by [facility] for the following reason(s):        Outside of protocol    [facility] action(s):  Route             Appointments  past 12m or future 3m with PCP    Date Provider   Last Visit   12/17/2024 Oleksandr Nagel MD   Next Visit   1/15/2025 Oleksandr Nagel MD   ED visits in past 90 days: 0        Note composed:7:21 AM 01/16/2025

## 2025-01-16 NOTE — TELEPHONE ENCOUNTER
Refill Decision Note   Zakia Price  is requesting a refill authorization.  Brief Assessment and Rationale for Refill:  Approve     Medication Therapy Plan:         Comments:     Note composed:7:35 AM 01/16/2025

## 2025-01-16 NOTE — TELEPHONE ENCOUNTER
No care due was identified.  [facility] Embedded Care Due Messages. Reference number: [id number].   1/15/2025 9:07:29 PM CST

## 2025-01-16 NOTE — TELEPHONE ENCOUNTER
Refill Routing Note   Medication(s) are not appropriate for processing by [facility] for the following reason(s):        Outside of protocol    [facility] action(s):  Route               Appointments  past 12m or future 3m with PCP    Date Provider   Last Visit   12/17/2024 Oleksandr Nagel MD   Next Visit   Visit date not found Oleksandr Nagel MD   ED visits in past 90 days: 0        Note composed:6:54 PM 01/15/2025

## 2025-01-17 ENCOUNTER — CLINICAL SUPPORT (OUTPATIENT)
Dept: REHABILITATION | Facility: HOSPITAL | Age: 65
End: 2025-01-17
Payer: MEDICARE

## 2025-01-17 DIAGNOSIS — R29.898 DECREASED STRENGTH OF UPPER EXTREMITY: Primary | ICD-10-CM

## 2025-01-17 PROCEDURE — 97112 NEUROMUSCULAR REEDUCATION: CPT | Mod: PN | Performed by: GENERAL ACUTE CARE HOSPITAL

## 2025-01-17 PROCEDURE — 97110 THERAPEUTIC EXERCISES: CPT | Mod: PN | Performed by: GENERAL ACUTE CARE HOSPITAL

## 2025-01-17 RX ORDER — FLUTICASONE FUROATE, UMECLIDINIUM BROMIDE AND VILANTEROL TRIFENATATE 100; 62.5; 25 UG/1; UG/1; UG/1
1 POWDER RESPIRATORY (INHALATION)
Qty: 180 EACH | Refills: 3 | Status: SHIPPED | OUTPATIENT
Start: 2025-01-17

## 2025-01-17 NOTE — TELEPHONE ENCOUNTER
Refill Decision Note   Zakia Price  is requesting a refill authorization.  Brief Assessment and Rationale for Refill:  Approve     Medication Therapy Plan:         Comments:     Note composed:3:19 PM 01/17/2025

## 2025-01-17 NOTE — PROGRESS NOTES
[facility]   Physical Therapy Treatment Note    Name: Zakia Price  Clinic Number: [id number]    Therapy Diagnosis: No diagnosis found.  Physician: Oleksandr Nagel MD  Physician Orders: PT Eval and Treat  Medical Diagnosis from Referral: Rotator Cuff Arthropathy  Evaluation Date: 1/3/2025  Authorization Period Expiration: 12/31/25  Plan of Care Expiration: 2/28/25 (8 weeks)  Progress Note Due: 2/3/25  Visit # / Visits authorized:2/20 treatments (1/1 Evaluation)   FOTO: 1/3 Shoulder 49/100    Visit Date: 1/17/2025  Time In: 0906  Time Out: 1000  Total Appointment Time: 54 minutes  PTA Visit #: 0/5     Treatment No Show #:  Subjective   Pt reports: mild soreness in the shoulders now as well as following after last session.     She was compliant with home exercise program.  Response to previous treatment: moderate soreness in the L shoulder   Functional change: none to date  Pain: 4/10  Location: bilateral shoulder      Objective      Objective Measures updated at progress report unless specified.     Treatment   Zakia received following skilled interventions listed below:      Zakia received following skilled interventions listed below:    PT Intervention Parameters Time   Therapeutic Exercise to develop strength, endurance, ROM, flexibility, posture, and core stabilization Nustep x 10 minutes - L4  3# dowel bicep curls x30 (seated)  3# dowel chest press x30 (seated)  UBE 2.5/2.5  Towel rotation stretch cervical 3x15s bilateral  Seated upper trap stretch 3x15s bilateral  31 minutes (2)   Neuromuscular Re-education activities to improve: Balance, Coordination, Kinesthetic, Sense, Proprioception, and Posture  3# dowel external rotation bilateral x 30 reps   Supination/pronation 1# x30 reps bilateral  Rows red therband x30 reps  Seated red theraband adduction x30 reps R & L 23 minutes (2)        Patient Education and Home Exercises     Home Exercises Provided and Patient Education  Provided   Patient was educated on the role of PT, POC, treatment plan, discharge goals, HEP.  Patient educated on biomechanical justification for therapeutic exercise and importance of compliance with HEP in order to improve overall impairments and QOL   Patient was educated on all the above exercise prior/during/after for proper posture, positioning, and execution for safe performance with home exercise program.       Written Home Exercises Provided:   yes.   Exercises were reviewed and Zakia was able to demonstrate them prior to the end of the session.    Zakia demonstrated good  understanding of the education provided.   See EMR under Patient Instructions for exercises provided during therapy sessions    Assessment     All exercises are performed in the seated position.Able to incorporate wrist and elbow exercises today with fair to good return demostration Emphasis on reducing upper trap compensations on the L (weaker) side is noted throughout session.    Zakia Is progressing well towards her goals.   Pt prognosis is Fair.   Pt will continue to benefit from skilled outpatient physical therapy to address the deficits listed in the problem list box on initial evaluation, provide pt/family education and to maximize pt's level of independence in the home and community environment.   Pt's spiritual, cultural and educational needs considered and pt agreeable to plan of care and goals.  Anticipated barriers to physical therapy: chronicity, standing tolerance, BMI    Goals:  Short Term Goals Status Est. Met   Patient to be independent with foundational home exercise program performance to impact knowledge of condition  [] Met  [] Not Met  [] Progressing 1/3/2025     Patient to improve active range of motion L shoulder flexion to 90 degrees to impact self-care/grooming [] Met  [] Not Met  [] Progressing 1/3/2025     Patient to lift 5lbs to eye levels to impact activities of daily living performance safely [] Met  []  Not Met  [] Progressing 1/3/2025     Patient to improve Shoulder FOTO to 53 /100 to display improved activity participation [] Met  [] Not Met  [] Progressing 1/3/2025        Long Term Goal Status Est. Met   Patient will display independent and correct performance of advanced home exercise program without cueing to impact knowledge of condition [] Met  [] Not Met  [] Progressing 1/3/2025     Patient to improve active range of motion L shoulder flexion to 115 degrees to impact self-care/grooming [] Met  [] Not Met  [] Progressing 1/3/2025     Patient to lift 5lbs to overhead to impact activities of daily living performance safely [] Met  [] Not Met  [] Progressing 1/3/2025     Patient to improve Shoulder FOTO to 58 /100 to display improved activity participation [] Met  [] Not Met  [] Progressing 1/3/2025     Patient will report confidence in managing condition upon discharge from Physical Therapy. [] Met  [] Not Met  [] Progressing 1/3/2025        Plan   Plan of care Certification: 1/3/2025 to 2/28/25.     Outpatient Physical Therapy 1-2 times weekly for 8 weeks to include the following interventions: Cervical/Lumbar Traction, Manual Therapy, Moist Heat/ Ice, Neuromuscular Re-ed, Patient Education, Self Care, Therapeutic Activities, and Therapeutic Exercise , Electrical Stimulation (IFC, Russian), IASTM, STM, Cupping, Blood Flow Restriction as appropriate.      Luly Ruiz PT, DPT, SCS, CSCS  Board Certified Sports Clinical Specialist   Certified Dry Needling Provider  1/17/2025      Disclaimer: This note was prepared using a voice recognition system and is likely to have sound alike errors within the text.

## 2025-01-17 NOTE — TELEPHONE ENCOUNTER
No care due was identified.  [facility] Embedded Care Due Messages. Reference number: [id number].   1/17/2025 1:14:02 PM CST

## 2025-01-27 ENCOUNTER — TELEPHONE (OUTPATIENT)
Dept: SPORTS MEDICINE | Facility: CLINIC | Age: 65
End: 2025-01-27
Payer: MEDICARE

## 2025-01-27 DIAGNOSIS — M46.1 SACROILIITIS: ICD-10-CM

## 2025-01-27 DIAGNOSIS — M19.049 HAND ARTHRITIS: ICD-10-CM

## 2025-01-27 DIAGNOSIS — M17.11 PRIMARY OSTEOARTHRITIS OF RIGHT KNEE: ICD-10-CM

## 2025-01-27 DIAGNOSIS — M17.12 PRIMARY OSTEOARTHRITIS OF LEFT KNEE: Primary | ICD-10-CM

## 2025-01-27 RX ORDER — DICLOFENAC SODIUM 75 MG/1
75 TABLET, DELAYED RELEASE ORAL 2 TIMES DAILY
Qty: 60 TABLET | Refills: 2 | Status: SHIPPED | OUTPATIENT
Start: 2025-01-27

## 2025-01-27 RX ORDER — DICLOFENAC SODIUM 10 MG/G
2 GEL TOPICAL DAILY
Qty: 100 G | Refills: 5 | Status: SHIPPED | OUTPATIENT
Start: 2025-01-27

## 2025-01-27 NOTE — TELEPHONE ENCOUNTER
----- Message from Med Assistant Arriaga sent at 1/24/2025  9:03 AM CST -----  Contact: [phone]    ----- Message -----  From: Kayli Bryan  Sent: 1/24/2025   8:58 AM CST  To: [facility] Staff    Prescription refill request.    RX name and strength (copy/paste from chart):   diclofenac sodium 75 mg tablet    Pharmacy name and phone # (copy/paste from chart):       [facility] [place] - [facility] [place] [address] [address]  Phone: [phone] Fax: [phone]    Additional information:   Meghann from [facility] calling for a refill request for pt.  I do not see this in pt's chart.

## 2025-01-28 ENCOUNTER — CLINICAL SUPPORT (OUTPATIENT)
Dept: REHABILITATION | Facility: HOSPITAL | Age: 65
End: 2025-01-28
Payer: MEDICARE

## 2025-01-28 DIAGNOSIS — R29.898 IMPAIRED STRENGTH OF SHOULDER MUSCLES: Primary | ICD-10-CM

## 2025-01-28 PROCEDURE — 97112 NEUROMUSCULAR REEDUCATION: CPT | Mod: PN | Performed by: GENERAL ACUTE CARE HOSPITAL

## 2025-01-28 PROCEDURE — 97110 THERAPEUTIC EXERCISES: CPT | Mod: PN | Performed by: GENERAL ACUTE CARE HOSPITAL

## 2025-01-28 PROCEDURE — 97010 HOT OR COLD PACKS THERAPY: CPT | Mod: PN | Performed by: GENERAL ACUTE CARE HOSPITAL

## 2025-01-28 NOTE — PROGRESS NOTES
Outpatient Rehab    Physical Therapy Visit    Patient Name: Zakia Price  MRN: [id number]  YOB: 1960  Today's Date: 1/28/2025    Therapy Diagnosis: No diagnosis found.  Physician: Oleksandr Nagel MD    Physician Orders: Eval and Treat  Medical Diagnosis:  Rotator cuff arthropathy of left shoulder [M12.812]     Visit # / Visits Authorized:  3 / 20   Date of Evaluation:  1/3/2025  Insurance Authorization Period: 1/1/25 to 12/31/25  Plan of Care Certification:   to 2/28/25      Time In: 0900   Time Out: 1000  Total Time: 60   Total Billable Time: 40         Subjective   patient returns to PT reporting 8.5/10 pain in L shoulder this morning.  Pain reported as 8. pt reports pain is due to stiffness    Past Medical History/Physical Systems Review:   Zakia Pirce  has a past medical history of Acute respiratory failure due to COVID-19, COVID-19, Diabetes mellitus, type 2, Diabetic neuropathy, DJD (degenerative joint disease) of knee, DVT (deep venous thrombosis), Fatty liver, GERD (gastroesophageal reflux disease), Hypertension associated with diabetes, HECTOR (iron deficiency anemia), Iron deficiency anemia due to chronic blood loss, Multinodular goiter, Obesity, morbid, BMI 50 or higher, and Sleep apnea.    Zakia Price  has a past surgical history that includes Hysterectomy; Tonsillectomy; Wrist fracture surgery; Shoulder arthroscopy; Thyroidectomy, partial (Right); Fracture surgery; Tubal ligation (1984); Colonoscopy (N/A, 5/12/2021); Esophagogastroduodenoscopy (N/A, 7/8/2021); Epidural steroid injection into cervical spine (N/A, 10/8/2021); Epidural steroid injection (N/A, 12/10/2021); Selective injection of anesthetic agent around lumbar spinal nerve root by transforaminal approach (Bilateral, 5/6/2022); Intraluminal gastrointestinal tract imaging via capsule (N/A, 10/24/2022); Selective injection of anesthetic agent around lumbar spinal nerve root by transforaminal approach  (Bilateral, 12/30/2022); and Esophagogastroduodenoscopy (N/A, 9/18/2023).    Zakia has a current medication list which includes the following prescription(s): albuterol, azelastine, cyclobenzaprine, diclofenac, diclofenac sodium, diltiazem, epinephrine, ezetimibe, famotidine, finasteride, trelegy ellipta, ketoconazole, levothyroxine, minoxidil, omeprazole, pregabalin, ropinirole, ozempic, tizanidine, topiramate, and ubrogepant, and the following Facility-Administered Medications: onabotulinumtoxina.    Review of patient's allergies indicates:   Allergen Reactions    Codeine sulfate      Nausea^    Lisinopril Swelling     angioedema    Codeine Nausea Only and Rash        Objective            Treatment:  CPT Interventions & Parameters   H/C Hot pack x 10 minutes   TE Upper trap stretching x2 min bilateral   TE Pulleys x 5 minutes - flexion   NMR Bicep curls 3# dumbbells 3x8 bilateral   NMR Chest press 3# 3x8   NMR Seated IR/ER 3# dumbbells 2x8 bilateral   TE Rollouts x 3 minutes - seated    NMR Red theraband 3x8 seated t's                  Units Time (min) CPT  Description   timed   total billable time     1 15 Therapeutic Exercise  02732 (TE)  to develop: strength, endurance, ROM, flexibility, posture, and core stabilization   2 25 Neuromuscular Re-education  57920 (NMR)  activities to improve: Balance, Coordination, Kinesthetic, Sense, Proprioception, and Posture        Therapeutic Activities  21222 (TA)  to improve: functional performance, impact activities of daily living       Manual Therapy  15163 (MT) techniques to improve: Joint mobilizations, Manual traction, Myofacial release, Soft tissue Mobilization, and Friction Massage       Gait Training  47684(GT)     untimed             Dry Needling  20560 (DN 1-2)   to impact muscular pain, tightness, trigger points, and tissue pliability       Dry Needling  20560 (DN 3+)  to impact muscular pain, tightness, trigger points, and tissue pliability       Unattended  E-Stimulation  13204 (UES)   for muscle performance or pain modulation   X   Heat/Cold Modalities  61853 (H/C)   to impact pain, pain modulation, sensitization training        Mechanical Traction  14616 (T)  to impact pain, tissue pliability, range of motion       Patient's spiritual, cultural, and educational needs considered and patient agreeable to plan of care and goals.     Assessment & Plan   Assessment: Patient performed well during treatment session. We were able to progress from 3# therabar with both hands to 3# dumbbell with single arm  Evaluation/Treatment Tolerance: Patient tolerated treatment well        Plan:      Goals:   Active       Long Term Goals        Patient will display independent and correct performance of advanced home exercise program without cueing to impact knowledge of condition       Start:  01/28/25    Expected End:  02/28/25            Patient to improve active range of motion L shoulder flexion to 115 degrees to impact self-care/grooming       Start:  01/28/25    Expected End:  02/28/25               Short Term Goals       Patient to be independent with foundational home exercise program performance to impact knowledge of condition        Start:  01/28/25    Expected End:  01/31/25            Patient to improve active range of motion L shoulder flexion to 90 degrees to impact self-care/grooming       Start:  01/28/25    Expected End:  01/31/25            Patient to lift 5lbs to eye levels to impact activities of daily living performance safely       Start:  01/28/25    Expected End:  01/31/25            Patient to improve Shoulder FOTO to 53 /100 to display improved activity participation       Start:  01/28/25    Expected End:  01/31/25

## 2025-01-30 ENCOUNTER — HOSPITAL ENCOUNTER (OUTPATIENT)
Dept: RADIOLOGY | Facility: HOSPITAL | Age: 65
Discharge: HOME | End: 2025-01-30
Attending: FAMILY MEDICINE
Payer: MEDICARE

## 2025-01-30 DIAGNOSIS — Z12.31 BREAST CANCER SCREENING BY MAMMOGRAM: ICD-10-CM

## 2025-01-30 PROCEDURE — 77067 SCR MAMMO BI INCL CAD: CPT | Mod: 26,,, | Performed by: RADIOLOGY

## 2025-01-30 PROCEDURE — 77067 SCR MAMMO BI INCL CAD: CPT | Mod: TC,PO

## 2025-02-03 ENCOUNTER — OFFICE VISIT (OUTPATIENT)
Dept: PODIATRY | Facility: CLINIC | Age: 65
End: 2025-02-03
Payer: MEDICARE

## 2025-02-03 DIAGNOSIS — L60.3 ONYCHODYSTROPHY: ICD-10-CM

## 2025-02-03 DIAGNOSIS — E11.42 DIABETIC POLYNEUROPATHY ASSOCIATED WITH TYPE 2 DIABETES MELLITUS: Primary | ICD-10-CM

## 2025-02-03 DIAGNOSIS — R26.9 ABNORMALITY OF GAIT: ICD-10-CM

## 2025-02-03 DIAGNOSIS — E66.01 MORBID OBESITY: ICD-10-CM

## 2025-02-03 DIAGNOSIS — M54.17 LUMBOSACRAL RADICULOPATHY: ICD-10-CM

## 2025-02-03 DIAGNOSIS — G25.81 RESTLESS LEGS SYNDROME (RLS): ICD-10-CM

## 2025-02-03 PROCEDURE — 99213 OFFICE O/P EST LOW 20 MIN: CPT | Mod: 25,S$GLB,, | Performed by: PODIATRIST

## 2025-02-03 PROCEDURE — 3288F FALL RISK ASSESSMENT DOCD: CPT | Mod: CPTII,S$GLB,, | Performed by: PODIATRIST

## 2025-02-03 PROCEDURE — 99999 PR PBB SHADOW E&M-EST. PATIENT-LVL IV: CPT | Mod: PBBFAC,,, | Performed by: PODIATRIST

## 2025-02-03 PROCEDURE — 1100F PTFALLS ASSESS-DOCD GE2>/YR: CPT | Mod: CPTII,S$GLB,, | Performed by: PODIATRIST

## 2025-02-03 PROCEDURE — 1160F RVW MEDS BY RX/DR IN RCRD: CPT | Mod: CPTII,S$GLB,, | Performed by: PODIATRIST

## 2025-02-03 PROCEDURE — 1159F MED LIST DOCD IN RCRD: CPT | Mod: CPTII,S$GLB,, | Performed by: PODIATRIST

## 2025-02-03 PROCEDURE — 11721 DEBRIDE NAIL 6 OR MORE: CPT | Mod: Q9,S$GLB,, | Performed by: PODIATRIST

## 2025-02-03 PROCEDURE — 3072F LOW RISK FOR RETINOPATHY: CPT | Mod: CPTII,S$GLB,, | Performed by: PODIATRIST

## 2025-02-03 PROCEDURE — 1125F AMNT PAIN NOTED PAIN PRSNT: CPT | Mod: CPTII,S$GLB,, | Performed by: PODIATRIST

## 2025-02-03 NOTE — PROGRESS NOTES
Subjective:       Patient ID: Zakia Price is a 65 y.o. female.    Chief Complaint: Nail Care (DNC: C/o pain rate 6/10 , pt is diabetic, pt was last seen on 12.17.24 by Oleksandr Nagel, pt states of neuropathy )    HPI: Patient presents to the office today with the chief complaint of elongated, thickened and dystrophic nail plates to the B/L foot.  Reports previously having 6/10 pain to the right and left foot.  She states that pain has significantly improved.Having difficulties with ambulation and fear of falling/balance issue. Currently involved with physical therapy for rotator cuff surgery.   Neuropathy pains currently being treated with Lyrica.  This patient is a Diabetic Type II, complicated with morbid obesity and Peripheral Neuropathy. Patient does follow with Primary Care and/or Endocrinology for management of Diabetes Mellitus. This patient's PMD is Oleksandr Nagel MD. This patient last saw his/her primary care provider on 12/17/2024    Hemoglobin A1C   Date Value Ref Range Status   09/09/2024 5.1 4.0 - 5.6 % Final     Comment:     ADA Screening Guidelines:  5.7-6.4%  Consistent with prediabetes  >or=6.5%  Consistent with diabetes    High levels of fetal hemoglobin interfere with the HbA1C  assay. Heterozygous hemoglobin variants (HbS, HgC, etc)do  not significantly interfere with this assay.   However, presence of multiple variants may affect accuracy.     12/26/2023 4.8 4.0 - 5.6 % Final     Comment:     ADA Screening Guidelines:  5.7-6.4%  Consistent with prediabetes  >or=6.5%  Consistent with diabetes    High levels of fetal hemoglobin interfere with the HbA1C  assay. Heterozygous hemoglobin variants (HbS, HgC, etc)do  not significantly interfere with this assay.   However, presence of multiple variants may affect accuracy.     09/06/2023 4.9 4.0 - 5.6 % Final     Comment:     ADA Screening Guidelines:  5.7-6.4%  Consistent with prediabetes  >or=6.5%  Consistent with diabetes    High  levels of fetal hemoglobin interfere with the HbA1C  assay. Heterozygous hemoglobin variants (HbS, HgC, etc)do  not significantly interfere with this assay.   However, presence of multiple variants may affect accuracy.     .     Review of patient's allergies indicates:   Allergen Reactions    Codeine sulfate      Nausea^    Lisinopril Swelling     angioedema    Codeine Nausea Only and Rash       Past Medical History:   Diagnosis Date    Acute respiratory failure due to COVID-19     COVID-19     Diabetes mellitus, type 2 1993    BS  didn't check 10/04/2023 Insulin x 2 years    Diabetic neuropathy 01/27/2014    DJD (degenerative joint disease) of knee     DVT (deep venous thrombosis) around 1990's    in leg, is on no anticoagulant therapy presently    Fatty liver     GERD (gastroesophageal reflux disease)     Hypertension associated with diabetes     HECTOR (iron deficiency anemia) 05/13/2021    Iron deficiency anemia due to chronic blood loss 01/11/2021    Chronic.  Control is uncertain.  Patient recently started on iron supplement over-the-counter.  Patient reports no side effects.  Plan to recheck iron studies and blood counts.          Lab Results      Component    Value    Date           WBC    5.03    12/08/2020           HGB    11.5 (L)    12/08/2020           HCT    38.6    12/08/2020           MCV    88    12/08/2020           PLT    388 (H)    Multinodular goiter     Obesity, morbid, BMI 50 or higher     Sleep apnea     has no CPAP       Family History   Problem Relation Name Age of Onset    Diabetes Mother Heather Villanueva     Hypertension Mother Heather Villanueva     Heart disease Mother Heather Villanueva 50    Ovarian cancer Mother Heather Villanueva     Cancer Father Gurwinder Dotson     Arthritis Father Gurwinder Dotson     Breast cancer Sister      Cancer Brother      Cancer Brother Gurwinder Buchanan     Leukemia Son Rafa Price     Cancer Son Rafa Price        Social History     Socioeconomic History    Marital status:     Tobacco Use    Smoking status: Never    Smokeless tobacco: Never   Substance and Sexual Activity    Alcohol use: No    Drug use: No    Sexual activity: Not Currently     Social Drivers of Health     Financial Resource Strain: Low Risk  (12/13/2024)    Overall Financial Resource Strain (CARDIA)     Difficulty of Paying Living Expenses: Not hard at all   Food Insecurity: No Food Insecurity (12/13/2024)    Hunger Vital Sign     Worried About Running Out of Food in the Last Year: Never true     Ran Out of Food in the Last Year: Never true   Transportation Needs: No Transportation Needs (11/14/2023)    PRAPARE - Transportation     Lack of Transportation (Medical): No     Lack of Transportation (Non-Medical): No   Physical Activity: Unknown (12/13/2024)    Exercise Vital Sign     Days of Exercise per Week: 6 days   Stress: Stress Concern Present (12/13/2024)    [employer] - Occupational Stress Questionnaire     Feeling of Stress : To some extent   Housing Stability: Low Risk  (11/14/2023)    Housing Stability Vital Sign     Unable to Pay for Housing in the Last Year: No     Number of Places Lived in the Last Year: 1     Unstable Housing in the Last Year: No       Past Surgical History:   Procedure Laterality Date    COLONOSCOPY N/A 5/12/2021    Procedure: COLONOSCOPY;  Surgeon: Carolina Rizo MD;  Location: [facility];  Service: Endoscopy;  Laterality: N/A;    EPIDURAL STEROID INJECTION N/A 12/10/2021    Procedure: Lumbar L5/S1 IL TEETEE  Would like AM procedure, if possible;  Surgeon: Nae Paul MD;  Location: [facility];  Service: Pain Management;  Laterality: N/A;    EPIDURAL STEROID INJECTION INTO CERVICAL SPINE N/A 10/8/2021    Procedure: C7-T1 IL TEETEE-no sedation.  Needs IV-just incase;  Surgeon: Nae Paul MD;  Location: [facility];  Service: Pain Management;  Laterality: N/A;    ESOPHAGOGASTRODUODENOSCOPY N/A 7/8/2021    Procedure: EGD (ESOPHAGOGASTRODUODENOSCOPY)  previous positve covid;  Surgeon: Jann Gutierrez MD;  Location: [facility];  Service: Endoscopy;  Laterality: N/A;    ESOPHAGOGASTRODUODENOSCOPY N/A 9/18/2023    Procedure: EGD (ESOPHAGOGASTRODUODENOSCOPY);  Surgeon: Gm Leon MD;  Location: [facility];  Service: Endoscopy;  Laterality: N/A;    FRACTURE SURGERY      HYSTERECTOMY      INTRALUMINAL GASTROINTESTINAL TRACT IMAGING VIA CAPSULE N/A 10/24/2022    Procedure: IMAGING PROCEDURE, GI TRACT, INTRALUMINAL, VIA CAPSULE;  Surgeon: Hang Lunsford RN;  Location: [facility] ENDO;  Service: Endoscopy;  Laterality: N/A;    SELECTIVE INJECTION OF ANESTHETIC AGENT AROUND LUMBAR SPINAL NERVE ROOT BY TRANSFORAMINAL APPROACH Bilateral 5/6/2022    Procedure: Bilateral L5/S1 TF TEETEE with RN IV sedation;  Surgeon: Nae Paul MD;  Location: [facility] PAIN MGT;  Service: Pain Management;  Laterality: Bilateral;    SELECTIVE INJECTION OF ANESTHETIC AGENT AROUND LUMBAR SPINAL NERVE ROOT BY TRANSFORAMINAL APPROACH Bilateral 12/30/2022    Procedure: Bilateral L5/S1 TF TEETEE RN IV Sedation;  Surgeon: Nae Paul MD;  Location: [facility] PAIN [facility]GT;  Service: Pain Management;  Laterality: Bilateral;    SHOULDER ARTHROSCOPY      THYROIDECTOMY, PARTIAL Right     and transplatation of right superior parathyroid gland to the sternocleidomastoid muscle     TONSILLECTOMY      TUBAL LIGATION  1984    WRIST FRACTURE SURGERY      left       Review of Systems       Objective:   There were no vitals taken for this visit.    Physical Exam  LOWER EXTREMITY PHYSICAL EXAMINATION    VASCULAR:  The right dorsalis pedis pulse 2/4 and the right posterior tibial pulse 1/4.  The left dorsalis pedis pulse 2/4 and posterior tibial pulse on the left is 1/4.  Capillary refill is intact.  Pedal hair growth decreased.     NEUROLOGY: Protective sensation is not intact to the left and right plantar surfaces of the foot and digits, as the patient has no sensation/detection at greater than 4 distinct points of contact  with 5.07 Star Cele monofilament. Sensation to light touch is intact on the left and right foot. Proprioception is intact, bilateral. Sensation to pin prick is reduced to absent. Vibratory sensation is diminished.    DERMATOLOGY:  Skin is supple, moist, intact.  There is no signs of callusing, ulcerations, other lesions identified to the dorsal or plantar aspect of the right or left foot.  The R1, 2, 5 and left L1,2, 5 are thickened, discolored dystrophic.  There is subungual debris.  Nail plates have area of dark discoloration.  The remaining nails 3-4 on the right foot and the left foot are elongated but of normal color, thickness, and texture.   There is no signs of ingrowing into the medial or lateral borders.  There is no evidence of wounds or skin breakdown.  No edema or erythema.  No obvious lacerations or fissuring.  Interdigital spaces are clean, dry, intact.  No rashes or scars appreciated.    ORTHOPEDIC: Manual Muscle Testing is 5/5 in all planes on the left and right, without pains, with and without resistance. Gait pattern is non-antalgic.    Assessment:     1. Diabetic polyneuropathy associated with type 2 diabetes mellitus    2. Abnormality of gait    3. Lumbosacral radiculopathy    4. Restless legs syndrome (RLS)    5. Morbid obesity    6. Onychodystrophy      Plan:     Diabetic polyneuropathy associated with type 2 diabetes mellitus    Abnormality of gait  -     Ambulatory Referral/Consult to Physical Therapy/Occupational Therapy; Future; Expected date: 02/10/2025    Lumbosacral radiculopathy    Restless legs syndrome (RLS)    Morbid obesity    Onychodystrophy      Thorough discussion is had with the patient this afternoon, concerning the diagnosis, its etiology, and the treatment algorithm at present.  Greater than 50% of this visit spent on counseling and coordination of care. Greater than 15 minutes of a 20 minute appointment spent on education about the diabetic foot, neuropathy, and  prevention of limb loss.  Shoe inspection. Diabetic Foot Education. Patient reminded of the importance of good nutrition and blood sugar control to help prevent podiatric complications of diabetes. Patient instructed on proper foot hygeine. We discussed wearing proper and supportive shoe gear, daily foot inspections, never walking barefooted or sock footed, never putting sharp instruments to feet which can cause major complications associated with infection, ulcers, lacerations.      Dystrophic nail plates, as outlined above (R#1,2,5  ; L#1,2,5 ), are sharply debrided with double action nail nipper, and/or with the assistance of a mechanical rotary carlos alberto, with removal of all offending nail and nail border(s), for reduction of pains. Nails are reduced in terms of length, width and girth with removal of subungual debris to facilitate pain free weight bearing and ambulation. The elongated nails as outlined in the objective portion of this note, were trimmed to appropriate length, with a double action nail nipper, for alleviation/reduction of pains as well. Follow up in approx. 3-4 months.    Recommend PT evaluation to determine if patient would be better suited for walker/cane/Rolator to assist with safety.     Future Appointments   Date Time Provider Department Center   2/4/2025 10:00 AM Joseph, Luly, PT, DPT [facility]   2/6/2025 10:00 AM Troujeffiet, Luly, PT, DPT [facility]   2/10/2025 10:00 AM Loida Ruizecca, PT, DPT [facility]   2/11/2025  1:15 PM Andrea Hernandez MD [facility]   2/13/2025 10:00 AM Jiniet Luly, PT, DPT [facility]   2/18/2025 10:00 AM Troulliet, Luly, PT, DPT [facility]   2/20/2025 10:00 AM Troulliet, Luly, PT, DPT [facility]   3/12/2025  1:00 PM Andrea Hernandez MD [facility]   4/7/2025  9:30 AM Rosemary Alonso PA-C [facility]    4/15/2025 10:00 AM Yasmin Moore MD [facility]   4/21/2025 11:35 AM LABORATORY, [facility] [place]   4/24/2025  2:00 PM Sol Mckeon, NP [facility]   6/3/2025 10:30 AM Brandie Rey, [facility]

## 2025-02-04 ENCOUNTER — CLINICAL SUPPORT (OUTPATIENT)
Dept: REHABILITATION | Facility: HOSPITAL | Age: 65
End: 2025-02-04
Payer: MEDICARE

## 2025-02-04 DIAGNOSIS — R29.898 IMPAIRED STRENGTH OF SHOULDER MUSCLES: Primary | ICD-10-CM

## 2025-02-04 PROCEDURE — 97140 MANUAL THERAPY 1/> REGIONS: CPT | Mod: PN | Performed by: GENERAL ACUTE CARE HOSPITAL

## 2025-02-04 PROCEDURE — 97112 NEUROMUSCULAR REEDUCATION: CPT | Mod: PN | Performed by: GENERAL ACUTE CARE HOSPITAL

## 2025-02-04 PROCEDURE — 97110 THERAPEUTIC EXERCISES: CPT | Mod: PN | Performed by: GENERAL ACUTE CARE HOSPITAL

## 2025-02-04 NOTE — PROGRESS NOTES
Outpatient Rehab  Physical Therapy Visit & Reassessment    Patient Name: Zakia Price  MRN: [id number]  YOB: 1960  Today's Date: 2/4/2025    Therapy Diagnosis: No diagnosis found.  Physician: Oleksandr Nagel MD    Physician Orders: Eval and Treat  Visit # / Visits Authorized: 3 / 20   Date of Evaluation: 1/3/2025  Insurance Authorization Period: 1/1/25 to 12/31/25  Plan of Care Certification: 1/3/2025 to 2/28/25   Reassessment Date: 3/6/25     Time In: 1000   Time Out: 1100  Total Time: 60   Total Billable Time: 53       Subjective   Patient returns outpatient physical therapy, reporting moderate increase and symptoms over the weekend due to being very busy and using her arms more. Patient does have a new order from a different position regarding her foot and ankle. Long discussion was had with patient regarding additional physical therapy referrals in orders at this time. Patient reports understanding of education provided and will reach out to primary care physician regarding physical therapy orders..  Pain reported as 7/10.      Objective            Treatment:  CPT Interventions & Parameters   TE FOTO   NMR Bicep curls 3# dumbbells 3x10 bilateral   H/C Hot pack x 10 minutes   TE Upper trap stretching L x10 reps (holding length to tolerance)      NMR Chest press 3# 3x8   NMR Seated IR/ER 3# dumbbells 2x8 bilateral   TE Rollouts x 3 minutes - seated    NMR Red theraband 3x8 seated t's    MT L shoulder passive range of motion all planes in sitting  L STM - upper trap/periscapular muscle  L GH joint anterior/posterior/PA mobilizations grade II-III             Units Time (min) CPT  Description   timed  53 total billable time     1 16 Therapeutic Exercise  55169 (TE)  to develop: strength, endurance, ROM, flexibility, posture, and core stabilization   2 25 Neuromuscular Re-education  01473 (NMR)  activities to improve: Balance, Coordination, Kinesthetic, Sense, Proprioception, and Posture         Therapeutic Activities  90326 (TA)  to improve: functional performance, impact activities of daily living    1  12 Manual Therapy  10995 (MT) techniques to improve: Joint mobilizations, Manual traction, Myofacial release, Soft tissue Mobilization, and Friction Massage       Gait Training  63291(GT)     untimed             Dry Needling  20560 (DN 1-2)   to impact muscular pain, tightness, trigger points, and tissue pliability       Dry Needling  20560 (DN 3+)  to impact muscular pain, tightness, trigger points, and tissue pliability       Unattended E-Stimulation  09278 (UES)   for muscle performance or pain modulation   X   Heat/Cold Modalities  95156 (H/C)   to impact pain, pain modulation, sensitization training        Mechanical Traction  99940 (T)  to impact pain, tissue pliability, range of motion       Patient's spiritual, cultural, and educational needs considered and patient agreeable to plan of care and goals.     Assessment & Plan   Assessment: Due to limited standing tolerance back pain, treatment session is performed in the seated position again today patient able to tolerate continued performance with dumbbells and resistance with the Red the band. Mental therapy interventions for left shoulder and including passive range of motion mobilization and soft tissue massage was add an treatment program today with prepare a good response provided by patient.  Evaluation/Treatment Tolerance: Patient limited by pain      Goals are reassessed today with patient not meeting any previously established short term goals. She has also only made minimal progress with FOTO scores too. All goals, short and long, remain appropriate to work towards at this time.     Plan: Continue with current physical therapy program, increasing resistance and complexity of exercise exercises as allowed within restrictions regarding back in standing tolerance    Goals:   Active       Long Term Goals        Patient will display  independent and correct performance of advanced home exercise program without cueing to impact knowledge of condition (Progressing)       Start:  01/28/25    Expected End:  02/28/25            Patient to improve active range of motion L shoulder flexion to 115 degrees to impact self-care/grooming (Progressing)       Start:  01/28/25    Expected End:  02/28/25               Short Term Goals       Patient to be independent with foundational home exercise program performance to impact knowledge of condition  (Progressing)       Start:  01/28/25    Expected End:  02/25/25            Patient to improve active range of motion L shoulder flexion to 90 degrees to impact self-care/grooming (Progressing)       Start:  01/28/25    Expected End:  02/25/25            Patient to lift 5lbs to eye levels to impact activities of daily living performance safely (Progressing)       Start:  01/28/25    Expected End:  02/25/25            Patient to improve Shoulder FOTO to 53 /100 to display improved activity participation (Progressing)       Start:  01/28/25    Expected End:  01/31/25                Luly Ruiz PT, DPT, SCS, CSCS  Board Certified Sports Clinical Specialist   Certified Dry Needling Provider  2/4/2025

## 2025-02-06 ENCOUNTER — CLINICAL SUPPORT (OUTPATIENT)
Dept: REHABILITATION | Facility: HOSPITAL | Age: 65
End: 2025-02-06
Payer: MEDICARE

## 2025-02-06 ENCOUNTER — TELEPHONE (OUTPATIENT)
Dept: FAMILY MEDICINE | Facility: CLINIC | Age: 65
End: 2025-02-06
Payer: MEDICARE

## 2025-02-06 DIAGNOSIS — M62.838 CERVICAL PARASPINAL MUSCLE SPASM: ICD-10-CM

## 2025-02-06 DIAGNOSIS — R29.898 IMPAIRED STRENGTH OF SHOULDER MUSCLES: Primary | ICD-10-CM

## 2025-02-06 DIAGNOSIS — M12.812 ROTATOR CUFF ARTHROPATHY OF LEFT SHOULDER: ICD-10-CM

## 2025-02-06 PROCEDURE — 97112 NEUROMUSCULAR REEDUCATION: CPT | Mod: PN | Performed by: GENERAL ACUTE CARE HOSPITAL

## 2025-02-06 PROCEDURE — 97110 THERAPEUTIC EXERCISES: CPT | Mod: PN | Performed by: GENERAL ACUTE CARE HOSPITAL

## 2025-02-06 RX ORDER — CELECOXIB 200 MG/1
200 CAPSULE ORAL 2 TIMES DAILY
Qty: 60 CAPSULE | Refills: 0 | OUTPATIENT
Start: 2025-02-06

## 2025-02-06 NOTE — PROGRESS NOTES
"  Outpatient Rehab  Physical Therapy Visit    Patient Name: Zakia Price  MRN: [id number]  YOB: 1960  Today's Date: 2/6/2025    Therapy Diagnosis: No diagnosis found.  Physician: Oleksandr Nagel MD    Physician Orders: Eval and Treat  Medical Diagnosis:  Rotator cuff arthropathy of left shoulder [M12.812]      Visit # / Visits Authorized: 3 / 20   Date of Evaluation: 1/3/2025  Insurance Authorization Period: 1/1/25 to 12/31/25  Plan of Care Certification: to 2/28/25   Reassessment Date: 3/6/25     Time In: 0900   Time Out: 0950  Total Time: 50   Total Billable Time: 40         Subjective   "I was feeling good until just now" Patient reports immediately after performing 5 minutes of flexion on pulleys today. Patient did arrive 90 minutes early for her physical therapy session today due to transportation..  Pain reported as 5/10.      Objective            Treatment:  CPT Interventions & Parameters   TE Pulleys x 5 minutes - flexion only   H/C Hot pack x 8 minutes   TE L UT stretching with cervical rotations 5s on (overpressure self) 5s off x 2 minutes    NMR Seated chin tucks  x 2 minutes   Bilateral red theraband external rotation 3x10  Seated t's red theraband 3x10   Bicep curls 3# dumbbells 3x10 bilateral  Supine dowel flexion (3#) 3x10  Supine dowel chest press (3#) 3x10  Supine dowel protraction 3# 3x10  Supine dowel external rotation 3# 3x10   TA Chest press 3# 3x8   NMR Seated IR/ER 3# dumbbells 2x8 bilateral   TE Rollouts x 3 minutes - seated    NMR Red theraband 3x8 seated t's                  Units Time (min) CPT  Description   timed  40 total billable time     1 15 Therapeutic Exercise  87390 (TE)  to develop: strength, endurance, ROM, flexibility, posture, and core stabilization   2 25 Neuromuscular Re-education  62000 (NMR)  activities to improve: Balance, Coordination, Kinesthetic, Sense, Proprioception, and Posture        Therapeutic Activities  29808 (TA)  to improve: " functional performance, impact activities of daily living       Manual Therapy  88824 (MT) techniques to improve: Joint mobilizations, Manual traction, Myofacial release, Soft tissue Mobilization, and Friction Massage       Gait Training  88553(GT)     untimed             Dry Needling  20560 (DN 1-2)   to impact muscular pain, tightness, trigger points, and tissue pliability       Dry Needling  20560 (DN 3+)  to impact muscular pain, tightness, trigger points, and tissue pliability       Unattended E-Stimulation  97212 (UES)   for muscle performance or pain modulation   X   Heat/Cold Modalities  33362 (H/C)   to impact pain, pain modulation, sensitization training        Mechanical Traction  13338 (T)  to impact pain, tissue pliability, range of motion       Patient's spiritual, cultural, and educational needs considered and patient agreeable to plan of care and goals.     Assessment & Plan   Assessment: Able to progress to supine dowel exercises with fair to good return demonstration. This position is better tolerated than standing due to patients balance and fear of fall, limitations.  Evaluation/Treatment Tolerance: Patient tolerated treatment well        Plan: Continue with current physical therapy program, increasing resistance and complexity of exercise exercises as allowed within restrictions regarding back in standing tolerance    Goals:   Active       Long Term Goals        Patient will display independent and correct performance of advanced home exercise program without cueing to impact knowledge of condition (Progressing)       Start:  01/28/25    Expected End:  02/28/25            Patient to improve active range of motion L shoulder flexion to 115 degrees to impact self-care/grooming (Progressing)       Start:  01/28/25    Expected End:  02/28/25               Short Term Goals       Patient to be independent with foundational home exercise program performance to impact knowledge of condition   (Progressing)       Start:  01/28/25    Expected End:  02/25/25            Patient to improve active range of motion L shoulder flexion to 90 degrees to impact self-care/grooming (Progressing)       Start:  01/28/25    Expected End:  02/25/25            Patient to lift 5lbs to eye levels to impact activities of daily living performance safely (Progressing)       Start:  01/28/25    Expected End:  02/25/25            Patient to improve Shoulder FOTO to 53 /100 to display improved activity participation (Progressing)       Start:  01/28/25    Expected End:  01/31/25                Luly Ruiz, PT, DPT

## 2025-02-06 NOTE — TELEPHONE ENCOUNTER
Refill Decision Note   Zakia Price  is requesting a refill authorization.  Brief Assessment and Rationale for Refill:  Quick Discontinue     Medication Therapy Plan:  discontinued on 1/27/2025 by Rosemary Alonso PA-C      Comments:     Note composed:11:09 AM 02/06/2025             Appointments     Last Visit   9/30/2024 Rose Price, NP   Next Visit   Visit date not found Rose Price, NP

## 2025-02-06 NOTE — TELEPHONE ENCOUNTER
----- Message from Vitor sent at 2/6/2025 12:05 PM CST -----  Contact: KELLE CASEY [[id number]]  ..Type:  Patient Requesting Call    Who Called:KELLE CASEY [[id number]]  Does the patient know what this is regarding?:pt wants to speak with provider about physical therapy   Would the patient rather a call back or a response via [facility]? call  Best Call Back Number:.[phone] (home)     Additional Information:

## 2025-02-07 DIAGNOSIS — E11.65 TYPE 2 DIABETES MELLITUS WITH HYPERGLYCEMIA, WITHOUT LONG-TERM CURRENT USE OF INSULIN: Chronic | ICD-10-CM

## 2025-02-07 RX ORDER — SEMAGLUTIDE 2.68 MG/ML
INJECTION, SOLUTION SUBCUTANEOUS
Qty: 9 ML | Refills: 0 | Status: SHIPPED | OUTPATIENT
Start: 2025-02-07

## 2025-02-07 NOTE — TELEPHONE ENCOUNTER
No care due was identified.  [facility] Embedded Care Due Messages. Reference number: [id number].   2/07/2025 7:26:40 AM CST

## 2025-02-07 NOTE — TELEPHONE ENCOUNTER
Refill Decision Note   Zakia Price  is requesting a refill authorization.  Brief Assessment and Rationale for Refill:  Approve     Medication Therapy Plan:         Comments:     Note composed:10:31 AM 02/07/2025

## 2025-02-10 ENCOUNTER — CLINICAL SUPPORT (OUTPATIENT)
Dept: REHABILITATION | Facility: HOSPITAL | Age: 65
End: 2025-02-10
Payer: MEDICARE

## 2025-02-10 DIAGNOSIS — R29.898 IMPAIRED STRENGTH OF SHOULDER MUSCLES: Primary | ICD-10-CM

## 2025-02-10 NOTE — PROGRESS NOTES
Outpatient Rehab    Physical Therapy Visit    Patient Name: Zakia Price  MRN: [id number]  YOB: 1960  Today's Date: 2/10/2025    Therapy Diagnosis: No diagnosis found.  Physician: Oleksandr Nagel MD    Physician Orders: Eval and Treat  Medical Diagnosis:  Rotator cuff arthropathy of left shoulder [M12.812]      Visit # / Visits Authorized: 3 / 20   Date of Evaluation: 1/3/2025  Insurance Authorization Period: 1/1/25 to 12/31/25  Plan of Care Certification: to 2/28/25   Reassessment Date: 3/6/25     Time In: 1000   Time Out: 1100  Total Time: 60   Total Billable Time: 38         Subjective   Patient returns to physical therapy reporting: More achy pain in the L arm than shoulder  Pain: about 6/10, reports it not being too bad.  Pain reported as 6/10.      Objective            Treatment:  CPT Interventions & Parameters   TE Pulleys x 5 minutes - flexion only  Home exercise program discussion and review    NMR Seated chest press 4# 3x10  Bicep curls 3# 3x10 bilateral  Bilateral external rotation 4# dowel 3x10   Red theraband rows 3x10 - bilateral - seated  Red theraband extension 3x10 - bilateral - seated   Supine dowel flexion 3x10 - 4# - bilatearal   Supine dowel protraction 3x10 4# - bilateral     Seated chin tucks  x 2 minutes   Bilateral red theraband external rotation 3x10  Seated t's red theraband 3x10   Bicep curls 3# dumbbells 3x10 bilateral  Supine dowel flexion (3#) 3x10  Supine dowel chest press (3#) 3x10  Supine dowel protraction 3# 3x10  Supine dowel external rotation 3# 3x10            Units Time (min) CPT  Description   timed 38 total billable time     1 8 Therapeutic Exercise  14849 (TE)  to develop: strength, endurance, ROM, flexibility, posture, and core stabilization   2 30 Neuromuscular Re-education  96321 (NMR)  activities to improve: Balance, Coordination, Kinesthetic, Sense, Proprioception, and Posture        Therapeutic Activities  31620 (TA)  to improve: functional  performance, impact activities of daily living       Manual Therapy  69144 (MT) techniques to improve: Joint mobilizations, Manual traction, Myofacial release, Soft tissue Mobilization, and Friction Massage       Gait Training  14440(GT)     untimed             Dry Needling  20560 (DN 1-2)   to impact muscular pain, tightness, trigger points, and tissue pliability       Dry Needling  20560 (DN 3+)  to impact muscular pain, tightness, trigger points, and tissue pliability       Unattended E-Stimulation  65424 (UES)   for muscle performance or pain modulation   X   Heat/Cold Modalities  82889 (H/C)   to impact pain, pain modulation, sensitization training        Mechanical Traction  41616 (T)  to impact pain, tissue pliability, range of motion      *A portion of this treatment session is provided with the assistance of a skilled rehabilitation technician under the supervision of a licensed physical therapist  *Billing accurately reflects 1:1 treatment time per patient's insurance guidelines.     Home Exercises and Patient Education Reviewed   Patient was educated on the role of Physical Therapy, Treatment plan, Discharge Goals, Home Exercise Program.  Patient educated on biomechanical justification for therapeutic exercise and importance of compliance with Home Exercise Program in order to improve overall impairments and Quality of Life.  Patient was educated on all the above exercise prior/during/after for proper posture, positioning, and execution for safe performance with home exercise program.     Disclaimer: This note was prepared using a voice recognition system and is likely to have sound alike errors within the text.    Patient's spiritual, cultural, and educational needs considered and patient agreeable to plan of care and goals.     Assessment & Plan   Assessment: Patient continues to perform fair to well with current prescribed exercises. Increased resistance with dowel chest press. Rest breaks are required  between exercises. Verbal and visual cueing is required for correct exercise performance.  Evaluation/Treatment Tolerance: Patient tolerated treatment well        Plan: Continue with current program- progression with resistance and complexity of exercises as appropriate and tolerated by patient    Goals:   Active       Long Term Goals        Patient will display independent and correct performance of advanced home exercise program without cueing to impact knowledge of condition (Progressing)       Start:  01/28/25    Expected End:  02/28/25            Patient to improve active range of motion L shoulder flexion to 115 degrees to impact self-care/grooming (Progressing)       Start:  01/28/25    Expected End:  02/28/25               Short Term Goals       Patient to be independent with foundational home exercise program performance to impact knowledge of condition  (Progressing)       Start:  01/28/25    Expected End:  02/25/25            Patient to improve active range of motion L shoulder flexion to 90 degrees to impact self-care/grooming (Progressing)       Start:  01/28/25    Expected End:  02/25/25            Patient to lift 5lbs to eye levels to impact activities of daily living performance safely (Progressing)       Start:  01/28/25    Expected End:  02/25/25            Patient to improve Shoulder FOTO to 53 /100 to display improved activity participation (Progressing)       Start:  01/28/25    Expected End:  01/31/25                Luly Ruiz PT, DPT, SCS, CSCS  Board Certified Sports Clinical Specialist   Certified Dry Needling Provider  2/10/2025

## 2025-02-11 ENCOUNTER — PROCEDURE VISIT (OUTPATIENT)
Dept: NEUROLOGY | Facility: CLINIC | Age: 65
End: 2025-02-11
Payer: MEDICARE

## 2025-02-11 ENCOUNTER — TELEPHONE (OUTPATIENT)
Dept: FAMILY MEDICINE | Facility: CLINIC | Age: 65
End: 2025-02-11
Payer: MEDICARE

## 2025-02-11 ENCOUNTER — LAB VISIT (OUTPATIENT)
Dept: LAB | Facility: HOSPITAL | Age: 65
End: 2025-02-11
Attending: NURSE PRACTITIONER
Payer: MEDICARE

## 2025-02-11 ENCOUNTER — OFFICE VISIT (OUTPATIENT)
Dept: FAMILY MEDICINE | Facility: CLINIC | Age: 65
End: 2025-02-11
Payer: MEDICARE

## 2025-02-11 VITALS — DIASTOLIC BLOOD PRESSURE: 78 MMHG | HEART RATE: 95 BPM | SYSTOLIC BLOOD PRESSURE: 118 MMHG

## 2025-02-11 VITALS
OXYGEN SATURATION: 98 % | RESPIRATION RATE: 17 BRPM | HEIGHT: 68 IN | TEMPERATURE: 98 F | SYSTOLIC BLOOD PRESSURE: 128 MMHG | DIASTOLIC BLOOD PRESSURE: 76 MMHG | WEIGHT: 293 LBS | BODY MASS INDEX: 44.41 KG/M2 | HEART RATE: 72 BPM

## 2025-02-11 DIAGNOSIS — E66.813 CLASS 3 OBESITY WITH ALVEOLAR HYPOVENTILATION, SERIOUS COMORBIDITY, AND BODY MASS INDEX (BMI) OF 40.0 TO 44.9 IN ADULT: ICD-10-CM

## 2025-02-11 DIAGNOSIS — I15.2 HYPERTENSION ASSOCIATED WITH DIABETES: Chronic | ICD-10-CM

## 2025-02-11 DIAGNOSIS — E78.2 COMBINED HYPERLIPIDEMIA ASSOCIATED WITH TYPE 2 DIABETES MELLITUS: Chronic | ICD-10-CM

## 2025-02-11 DIAGNOSIS — Z79.899 ENCOUNTER FOR LONG-TERM (CURRENT) USE OF MEDICATIONS: ICD-10-CM

## 2025-02-11 DIAGNOSIS — Z78.0 POSTMENOPAUSAL STATE: ICD-10-CM

## 2025-02-11 DIAGNOSIS — E66.2 CLASS 3 OBESITY WITH ALVEOLAR HYPOVENTILATION, SERIOUS COMORBIDITY, AND BODY MASS INDEX (BMI) OF 40.0 TO 44.9 IN ADULT: ICD-10-CM

## 2025-02-11 DIAGNOSIS — E11.65 TYPE 2 DIABETES MELLITUS WITH HYPERGLYCEMIA, WITHOUT LONG-TERM CURRENT USE OF INSULIN: Chronic | ICD-10-CM

## 2025-02-11 DIAGNOSIS — G43.719 INTRACTABLE CHRONIC MIGRAINE WITHOUT AURA AND WITHOUT STATUS MIGRAINOSUS: Primary | ICD-10-CM

## 2025-02-11 DIAGNOSIS — E11.69 COMBINED HYPERLIPIDEMIA ASSOCIATED WITH TYPE 2 DIABETES MELLITUS: Chronic | ICD-10-CM

## 2025-02-11 DIAGNOSIS — R60.0 BILATERAL LOWER EXTREMITY EDEMA: ICD-10-CM

## 2025-02-11 DIAGNOSIS — R60.0 BILATERAL LOWER EXTREMITY EDEMA: Primary | ICD-10-CM

## 2025-02-11 DIAGNOSIS — E11.59 HYPERTENSION ASSOCIATED WITH DIABETES: Chronic | ICD-10-CM

## 2025-02-11 PROBLEM — R26.81 GAIT INSTABILITY: Status: RESOLVED | Noted: 2021-01-22 | Resolved: 2025-02-11

## 2025-02-11 PROBLEM — Z23 IMMUNIZATION DUE: Status: RESOLVED | Noted: 2024-09-30 | Resolved: 2025-02-11

## 2025-02-11 PROBLEM — E03.9 HYPOTHYROIDISM: Status: RESOLVED | Noted: 2023-01-13 | Resolved: 2025-02-11

## 2025-02-11 PROBLEM — Z12.31 ENCOUNTER FOR SCREENING MAMMOGRAM FOR BREAST CANCER: Status: RESOLVED | Noted: 2019-12-04 | Resolved: 2025-02-11

## 2025-02-11 PROBLEM — M79.641 PAIN IN BOTH HANDS: Status: RESOLVED | Noted: 2022-01-06 | Resolved: 2025-02-11

## 2025-02-11 PROBLEM — M21.161 ACQUIRED GENU VARUM OF RIGHT LOWER EXTREMITY: Status: RESOLVED | Noted: 2017-08-30 | Resolved: 2025-02-11

## 2025-02-11 PROBLEM — F43.23 ADJUSTMENT DISORDER WITH MIXED ANXIETY AND DEPRESSED MOOD: Status: RESOLVED | Noted: 2024-09-30 | Resolved: 2025-02-11

## 2025-02-11 PROBLEM — M20.12 HALLUX VALGUS, ACQUIRED, BILATERAL: Status: RESOLVED | Noted: 2017-05-30 | Resolved: 2025-02-11

## 2025-02-11 PROBLEM — L85.3 DRY SKIN DERMATITIS: Status: RESOLVED | Noted: 2020-10-03 | Resolved: 2025-02-11

## 2025-02-11 PROBLEM — Z87.09 HISTORY OF CHRONIC SINUSITIS: Status: RESOLVED | Noted: 2024-09-30 | Resolved: 2025-02-11

## 2025-02-11 PROBLEM — T78.3XXA ANGIOEDEMA: Status: RESOLVED | Noted: 2020-10-03 | Resolved: 2025-02-11

## 2025-02-11 PROBLEM — R29.898 IMPAIRED STRENGTH OF SHOULDER MUSCLES: Chronic | Status: RESOLVED | Noted: 2025-01-03 | Resolved: 2025-02-11

## 2025-02-11 PROBLEM — K21.9 GASTROESOPHAGEAL REFLUX DISEASE WITHOUT ESOPHAGITIS: Status: RESOLVED | Noted: 2023-09-18 | Resolved: 2025-02-11

## 2025-02-11 PROBLEM — M25.551 HIP PAIN, RIGHT: Status: RESOLVED | Noted: 2023-03-17 | Resolved: 2025-02-11

## 2025-02-11 PROBLEM — Z76.0 MEDICATION REFILL: Status: RESOLVED | Noted: 2023-01-13 | Resolved: 2025-02-11

## 2025-02-11 PROBLEM — M20.11 HALLUX VALGUS, ACQUIRED, BILATERAL: Status: RESOLVED | Noted: 2017-05-30 | Resolved: 2025-02-11
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
## 2025-02-11 PROBLEM — H92.09 OTALGIA: Status: RESOLVED | Noted: 2024-05-29 | Resolved: 2025-02-11

## 2025-02-11 PROBLEM — K29.30 CHRONIC SUPERFICIAL GASTRITIS WITHOUT BLEEDING: Status: RESOLVED | Noted: 2021-07-08 | Resolved: 2025-02-11

## 2025-02-11 PROBLEM — M21.162 ACQUIRED GENU VARUM OF LEFT LOWER EXTREMITY: Status: RESOLVED | Noted: 2017-08-30 | Resolved: 2025-02-11

## 2025-02-11 PROBLEM — M79.642 PAIN IN BOTH HANDS: Status: RESOLVED | Noted: 2022-01-06 | Resolved: 2025-02-11

## 2025-02-11 LAB
ALBUMIN SERPL BCP-MCNC: 3.2 G/DL (ref 3.5–5.2)
ALP SERPL-CCNC: 97 U/L (ref 40–150)
ALT SERPL W/O P-5'-P-CCNC: 9 U/L (ref 10–44)
ANION GAP SERPL CALC-SCNC: 10 MMOL/L (ref 8–16)
AST SERPL-CCNC: 11 U/L (ref 10–40)
BILIRUB SERPL-MCNC: 0.3 MG/DL (ref 0.1–1)
BNP SERPL-MCNC: 17 PG/ML (ref 0–99)
BUN SERPL-MCNC: 9 MG/DL (ref 8–23)
CALCIUM SERPL-MCNC: 9 MG/DL (ref 8.7–10.5)
CHLORIDE SERPL-SCNC: 110 MMOL/L (ref 95–110)
CHOLEST SERPL-MCNC: 204 MG/DL (ref 120–199)
CHOLEST/HDLC SERPL: 3.4 {RATIO} (ref 2–5)
CO2 SERPL-SCNC: 21 MMOL/L (ref 23–29)
CREAT SERPL-MCNC: 0.7 MG/DL (ref 0.5–1.4)
ERYTHROCYTE [DISTWIDTH] IN BLOOD BY AUTOMATED COUNT: 13.4 % (ref 11.5–14.5)
EST. GFR  (NO RACE VARIABLE): >60 ML/MIN/1.73 M^2
ESTIMATED AVG GLUCOSE: 111 MG/DL (ref 68–131)
GLUCOSE SERPL-MCNC: 72 MG/DL (ref 70–110)
HBA1C MFR BLD: 5.5 % (ref 4–5.6)
HCT VFR BLD AUTO: 42.7 % (ref 37–48.5)
HDLC SERPL-MCNC: 60 MG/DL (ref 40–75)
HDLC SERPL: 29.4 % (ref 20–50)
HGB BLD-MCNC: 12.9 G/DL (ref 12–16)
LDLC SERPL CALC-MCNC: 124.8 MG/DL (ref 63–159)
MCH RBC QN AUTO: 28.9 PG (ref 27–31)
MCHC RBC AUTO-ENTMCNC: 30.2 G/DL (ref 32–36)
MCV RBC AUTO: 96 FL (ref 82–98)
NONHDLC SERPL-MCNC: 144 MG/DL
PLATELET # BLD AUTO: 334 K/UL (ref 150–450)
PMV BLD AUTO: 10.1 FL (ref 9.2–12.9)
POTASSIUM SERPL-SCNC: 3.9 MMOL/L (ref 3.5–5.1)
PROT SERPL-MCNC: 7 G/DL (ref 6–8.4)
RBC # BLD AUTO: 4.47 M/UL (ref 4–5.4)
SODIUM SERPL-SCNC: 141 MMOL/L (ref 136–145)
TRIGL SERPL-MCNC: 96 MG/DL (ref 30–150)
TSH SERPL DL<=0.005 MIU/L-ACNC: 1.03 UIU/ML (ref 0.4–4)
WBC # BLD AUTO: 3.93 K/UL (ref 3.9–12.7)

## 2025-02-11 PROCEDURE — 84443 ASSAY THYROID STIM HORMONE: CPT | Performed by: NURSE PRACTITIONER

## 2025-02-11 PROCEDURE — 80053 COMPREHEN METABOLIC PANEL: CPT | Performed by: NURSE PRACTITIONER

## 2025-02-11 PROCEDURE — 3074F SYST BP LT 130 MM HG: CPT | Mod: CPTII,S$GLB,, | Performed by: NURSE PRACTITIONER

## 2025-02-11 PROCEDURE — 83036 HEMOGLOBIN GLYCOSYLATED A1C: CPT | Performed by: NURSE PRACTITIONER

## 2025-02-11 PROCEDURE — 83880 ASSAY OF NATRIURETIC PEPTIDE: CPT | Performed by: NURSE PRACTITIONER

## 2025-02-11 PROCEDURE — 85027 COMPLETE CBC AUTOMATED: CPT | Performed by: NURSE PRACTITIONER

## 2025-02-11 PROCEDURE — 1101F PT FALLS ASSESS-DOCD LE1/YR: CPT | Mod: CPTII,S$GLB,, | Performed by: NURSE PRACTITIONER

## 2025-02-11 PROCEDURE — 3078F DIAST BP <80 MM HG: CPT | Mod: CPTII,S$GLB,, | Performed by: NURSE PRACTITIONER

## 2025-02-11 PROCEDURE — 80061 LIPID PANEL: CPT | Performed by: NURSE PRACTITIONER

## 2025-02-11 PROCEDURE — 99999 PR PBB SHADOW E&M-EST. PATIENT-LVL V: CPT | Mod: PBBFAC,,, | Performed by: NURSE PRACTITIONER

## 2025-02-11 PROCEDURE — 1159F MED LIST DOCD IN RCRD: CPT | Mod: CPTII,S$GLB,, | Performed by: NURSE PRACTITIONER

## 2025-02-11 PROCEDURE — 36415 COLL VENOUS BLD VENIPUNCTURE: CPT | Mod: PO | Performed by: NURSE PRACTITIONER

## 2025-02-11 PROCEDURE — 3288F FALL RISK ASSESSMENT DOCD: CPT | Mod: CPTII,S$GLB,, | Performed by: NURSE PRACTITIONER

## 2025-02-11 PROCEDURE — 1160F RVW MEDS BY RX/DR IN RCRD: CPT | Mod: CPTII,S$GLB,, | Performed by: NURSE PRACTITIONER

## 2025-02-11 PROCEDURE — 1126F AMNT PAIN NOTED NONE PRSNT: CPT | Mod: CPTII,S$GLB,, | Performed by: NURSE PRACTITIONER

## 2025-02-11 PROCEDURE — 3072F LOW RISK FOR RETINOPATHY: CPT | Mod: CPTII,S$GLB,, | Performed by: NURSE PRACTITIONER

## 2025-02-11 PROCEDURE — 64615 CHEMODENERV MUSC MIGRAINE: CPT | Mod: S$GLB,,, | Performed by: INTERNAL MEDICINE

## 2025-02-11 PROCEDURE — 3008F BODY MASS INDEX DOCD: CPT | Mod: CPTII,S$GLB,, | Performed by: NURSE PRACTITIONER

## 2025-02-11 PROCEDURE — G2211 COMPLEX E/M VISIT ADD ON: HCPCS | Mod: S$GLB,,, | Performed by: NURSE PRACTITIONER

## 2025-02-11 PROCEDURE — 99214 OFFICE O/P EST MOD 30 MIN: CPT | Mod: S$GLB,,, | Performed by: NURSE PRACTITIONER

## 2025-02-11 NOTE — ASSESSMENT & PLAN NOTE
Reviewed labs. Continue zetia.  Counseled on hyperlipidemia disease course, healthy diet and increased need for exercise.  Please be advised of the risk of cardiovascular disease, increase stroke and heart attack risk with uncontrolled/untreated hyperlipidemia. Patient voiced understanding and understood the treatment plan. All questions were answered.

## 2025-02-11 NOTE — PROCEDURES
Botulinum Toxin Injection Procedure   Pre-operative diagnosis: Chronic migraine   Post-operative diagnosis: Chronic migraine     Procedure: Chemical neurolysis     After risks and benefits were explained including bleeding, infection, worsening of pain, damage to the areas being injected, weakness of muscles, loss of muscle control, dysphagia if injecting the head or neck, facial droop if injecting the facial area, painful injection, allergic or other reaction to the medications being injected, and the failure of the procedure to help the problem, a signed consent was obtained.     The patient was placed in a comfortable area and the sites to be treated were identified. The area to be treated was prepped with alcohol and the alcohol allowed to dry. Next, a 30 gauge needle was used to inject the medication in the area to be treated.     Area(s) injected:   Total Botox used: 155 Units   Unavoidable Botox wastage: 45 Units     Injection sites:    muscle bilaterally (5 units on the left and 5 units on the right)   Procerus muscle (5 units)   Frontalis muscle bilaterally (10 units on the left and 10 units on the right)   Temporalis muscle bilaterally (20 units on the left and 20 units on the right)   Occipitalis muscle bilaterally (15 units on the left and 15 units on the right)   Cervical paraspinal muscles (10 units on the left and 10 units on the right)   Trapezius muscle bilaterally (15 units on the left and 15 units on the right)     Complications: none     RTC for the next Botox injection: 12 weeks     Andrea Hernandez MD  Headache and Pain Management  [facility] [place]

## 2025-02-11 NOTE — ASSESSMENT & PLAN NOTE
Continue current medications.We will plan to monitor hemoglobin A1c at designated intervals 3 to 6 months.  I recommend ongoing Education for diabetic diet and exercise protocol.  We will continue to monitor for side effects.    Please be advised of symptoms to monitor for and to notify me immediately if persistent or worsening.  Follow up with Ophthalmology/Optometry and Podiatry at least annually.

## 2025-02-11 NOTE — PROGRESS NOTES
Assessment/Plan:  Problem List Items Addressed This Visit          Psychiatric    Encounter for long-term (current) use of medications (Chronic)    Overview     Feb 2025: reviewed labs.  December 2024:  Reviewed labs December 2024:  Reviewed labs.  Sept 2024: reviewed labs.  May 2024:  Reviewed labs.  November 2023: Reviewed labs.  October 2023: Reviewed labs.  August 2023: Reviewed labs.  June 2023: Reviewed labs.  January 2023:  Reviewed labs.  CHRONIC. Stable. Compliant with medications for managed conditions. See medication list. No SE reported. Routine lab analysis is being monitored. Refills were addressed.  January 2021:CHRONIC. Stable. Compliant with medications for managed conditions. See medication list. No SE reported. Routine lab analysis is being monitored. Refills were addressed.  July 2021:  Reviewed labs.  January 2022:  Reviewed labs.  February 2022:  Reviewed labs.  July 2022:  Reviewed labs.  Lab Results   Component Value Date    WBC 4.65 12/16/2024    HGB 12.5 12/16/2024    HCT 37.9 12/16/2024    MCV 96 12/16/2024     12/16/2024         Chemistry        Component Value Date/Time     12/16/2024 0932    K 4.4 12/16/2024 0932     12/16/2024 0932    CO2 23 12/16/2024 0932    BUN 15 12/16/2024 0932    CREATININE 0.8 12/16/2024 0932    GLU 76 12/16/2024 0932        Component Value Date/Time    CALCIUM 9.1 12/16/2024 0932    ALKPHOS 77 09/09/2024 1012    AST 10 09/09/2024 1012    ALT 11 09/09/2024 1012    BILITOT 0.2 09/09/2024 1012    ESTGFRAFRICA >60.0 02/24/2022 1255    EGFRNONAA >60.0 02/24/2022 1255          Lab Results   Component Value Date    TSH 0.507 09/09/2024    FREET4 0.95 12/26/2023    T3FREE 2.5 12/26/2023            Current Assessment & Plan     Complete history and physical was completed today.  Complete and thorough medication reconciliation was performed.  Discussed risks and benefits of medications.  Advised patient on orders and health maintenance.  We discussed  old records and old labs if available.  Will request any records not available through epic.  Continue current medications listed on your summary sheet.         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 STABLE. Lab analysis reviewed.   (-) CP, SOB, abdominal pain, N/V/D, constipation, jaundice, skin changes.  (-) Myalgias  Lab Results   Component Value Date    CHOL 199 09/09/2024    CHOL 215 (H) 12/26/2023    CHOL 166 06/14/2023     Lab Results   Component Value Date    HDL 66 09/09/2024    HDL 67 12/26/2023    HDL 55 06/14/2023     Lab Results   Component Value Date    LDLCALC 120.6 09/09/2024    LDLCALC 134.6 12/26/2023    LDLCALC 96.8 06/14/2023     Lab Results   Component Value Date    TRIG 62 09/09/2024    TRIG 67 12/26/2023    TRIG 71 06/14/2023     Lab Results   Component Value Date    CHOLHDL 33.2 09/09/2024    CHOLHDL 31.2 12/26/2023    CHOLHDL 33.1 06/14/2023     Lab Results   Component Value Date    TOTALCHOLEST 3.0 09/09/2024    TOTALCHOLEST 3.2 12/26/2023    TOTALCHOLEST 3.0 06/14/2023     Lab Results   Component Value Date    ALT 11 09/09/2024    AST 10 09/09/2024    ALKPHOS 77 09/09/2024    BILITOT 0.2 09/09/2024     ======================================================  The 10-year ASCVD risk score (Fawn DEE, et al., 2019) is: 22.3%    Values used to calculate the score:      Age: 65 years      Sex: Female      Is Non- : Yes      Diabetic: Yes      Tobacco smoker: No      Systolic Blood Pressure: 128 mmHg      Is BP treated: Yes      HDL Cholesterol: 66 mg/dL      Total Cholesterol: 199 mg/dL    Hyperlipidemia Medications               ezetimibe (ZETIA) 10 mg tablet Take 1 tablet (10 mg total) by mouth once daily.                 Current Assessment & Plan     Reviewed labs.  Continue zetia.  Counseled on hyperlipidemia disease course, healthy diet and increased need for exercise.  Please be advised of the risk of cardiovascular disease, increase stroke and heart attack risk with uncontrolled/untreated hyperlipidemia. Patient voiced understanding and understood the treatment plan. All questions were answered.          Hypertension associated with diabetes (Chronic)    Overview     CHRONIC.  Feb 2025:    Hypertension Medications               diltiaZEM (CARDIZEM) 30 MG tablet Take 1 tablet (30 mg total) by mouth every 12 (twelve) hours.    minoxidiL (LONITEN) 2.5 MG tablet Take 0.5 tablets (1.25 mg total) by mouth once daily.       October 2023:  Reviewed meds.  June 2023:  Blood pressure is doing well on current regimen.  May 2023:  A1c currently 4.9.  Patient denies any symptoms of low blood sugar.  She is continuing to take metformin twice daily.  Blood pressure is well controlled on current regimen.    February 2022:  Blood pressure remains less than 140/90.  January 2022:  Blood pressure well controlled actually on the lower end of normal today.  Patient is on diltiazem 120 milligrams daily.  She does report some constipation that is being treated with Linzess.  July 2021:  Blood pressure well controlled today.  Previous HPI  Patient recently got a new blood pressure monitor at home through [employer] however the cuff does not fit around her arm.. BP Reviewed.  Compliant with BP medications. No SE reported.  Patient taking amlodipine 5 mg.  (-) CP, SOB, palpitations, dizziness, lightheadedness, HA, arm numbness, tingling or weakness, syncope.  Creatinine   Date Value Ref Range Status   09/30/2023 0.81 0.60 - 1.10 mg/dL Final   06/14/2023 0.9 0.5 - 1.4 mg/dL Final     History of hypertension currently on amlodipine with previous reported allergy to ACE inhibitor    Last Assessment & Plan:   Blood pressure stable on current medicine regimen. Continue current medicine regimen and  follow low salt diet.  Results for orders placed or performed during the hospital encounter of 10/15/21   EKG 12-lead    Collection Time: 10/15/21  8:04 AM    Narrative    Test Reason : E11.42,E11.59,I15.2,E78.5,R01.1,    Vent. Rate : 072 BPM     Atrial Rate : 072 BPM     P-R Int : 142 ms          QRS Dur : 080 ms      QT Int : 404 ms       P-R-T Axes : 062 016 028 degrees     QTc Int : 442 ms    Normal sinus rhythm  Nonspecific T wave abnormality  Abnormal ECG  When compared with ECG of 26-JAN-2021 14:40,  No significant change was found  Confirmed by Shannon Wilson MD ([id number]) on 10/16/2021 10:13:48 PM    Referred By: RIK WEAVER           Confirmed By:Shannon Wilson MD              Current Assessment & Plan     Continue current blood pressure medication regimen.Counseled on importance of hypertension disease course, I recommend ongoing Education for DASH-diet and exercise. Counseled on medication regimen importance of treating high blood pressure. Please be advised of risk of untreated blood pressure as discussed. Please advised of ER precautions were given for symptoms of hypertensive urgency and emergency.             Renal/    Postmenopausal state    Overview     Patient has osteopenia on x-ray.  She is due for a bone density scan.          Current Assessment & Plan     Update DEXA. Weight-bearing exercise is recommended.         Relevant Orders    DXA Bone Density Axial Skeleton 1 or more sites       Endocrine    Type 2 diabetes mellitus with hyperglycemia, without long-term current use of insulin (Chronic)    Overview     Feb 2025: reviewed labs.    Diabetes Medications               OZEMPIC 2 mg/dose (8 mg/3 mL) PnIj INJECT TWO MG UNDER THE SKIN EVERY 7 DAYS     May 2024:  Patient is no longer on metformin.  She continues on Ozempic.  She is having some GI issues.  Diabetes Medications               semaglutide (OZEMPIC) 2 mg/dose (8 mg/3 mL) PnIj Inject 2 mg into the skin every 7 days.       "    November 2023:  Patient reports compliance.  Patient has lost weight on medication.  Diabetes Medications               metFORMIN (GLUCOPHAGE) 1000 MG tablet Take 1 tablet (1,000 mg total) by mouth daily with breakfast.    semaglutide (OZEMPIC) 2 mg/dose (8 mg/3 mL) PnIj INJECT 2 MG INTO THE SKIN EVERY 7 DAYS.        January 2023:  Doing well on current regimen.  BMI Readings from Last 10 Encounters:   02/11/25 48.58 kg/m²   01/15/25 48.05 kg/m²   12/19/24 48.12 kg/m²   12/18/24 48.34 kg/m²   12/17/24 48.20 kg/m²   12/12/24 47.14 kg/m²   11/12/24 47.73 kg/m²   11/05/24 47.50 kg/m²   10/28/24 47.48 kg/m²   09/30/24 47.52 kg/m²   November 2019:  Reviewed diabetes management status.  Patient was due for LDL less than 100 at that time.  Also needing foot exam.DECEMBER 2019:  Patient reports issues with ACE-inhibitor.  Currently having cough.  Only on amlodipine.Diabetes Management StatusStatin: TakingACE/ARB: Not takingMARCH 2020:  Diabetes Management StatusStatin: TakingACE/ARB: Not takingSEPTEMBER 2020:  Reviewed Diabetes Management Status.  Patient is taking statin but not Ace or Arb.  July 2021:Diabetes Management StatusStatin: TakingACE/ARB: Not taking  January 2022:  Patient reports weight loss with Ozempic.  Diabetes Management Status    Statin: Not taking  ACE/ARB: Not taking    Screening or Prevention Patient's value Goal Complete/Controlled?   HgA1C Testing and Control   Lab Results   Component Value Date    HGBA1C 5.1 09/09/2024      Annually/Less than 8% Yes   Lipid profile : 09/09/2024 Annually Yes   LDL control Lab Results   Component Value Date    LDLCALC 120.6 09/09/2024    Annually/Less than 100 mg/dl  No   Nephropathy screening Lab Results   Component Value Date    LABMICR 5.0 09/09/2024     Lab Results   Component Value Date    PROTEINUA Negative 10/28/2015     No results found for: "UTPCR"   Annually Yes   Blood pressure BP Readings from Last 1 Encounters:   02/11/25 128/76    Less than 140/90 " Yes   Dilated retinal exam : 08/21/2024 Annually Yes   Foot exam   : 07/08/2024 Annually Yes              Current Assessment & Plan     Continue current medications.We will plan to monitor hemoglobin A1c at designated intervals 3 to 6 months.  I recommend ongoing Education for diabetic diet and exercise protocol.  We will continue to monitor for side effects.    Please be advised of symptoms to monitor for and to notify me immediately if persistent or worsening.  Follow up with Ophthalmology/Optometry and Podiatry at least annually.         Class 3 obesity with alveolar hypoventilation, serious comorbidity, and body mass index (BMI) of 40.0 to 44.9 in adult    Overview     Wt Readings from Last 3 Encounters:   02/11/25 0949 (!) 144.9 kg (319 lb 8 oz)   01/15/25 0855 (!) 143.3 kg (316 lb)   12/19/24 1450 (!) 143.6 kg (316 lb 7.5 oz)            Current Assessment & Plan     Encourage increased physical activity, healthy diet choices, and weight loss for prevention of progression of comorbid conditions.             Other    Bilateral lower extremity edema - Primary    Overview     New problem. Bilateral edema non-pitting. No history of CHF or DVT. No redness, warmth, tenderness.          Current Assessment & Plan     Check labs + BNP and U/S lower extremities w/ insuff. Recommend compression socks, elevate legs, low sodium diet, and lifestyle modification for weight loss.          Relevant Orders    US Lower Extremity Veins Bilateral Insuf    B-TYPE NATRIURETIC PEPTIDE       Follow up in about 6 months (around 8/11/2025), or if symptoms worsen or fail to improve, for follow up with PCP.  ER precautions for severe or worsening symptoms.     Rose Price NP  _____________________________________________________________________________________________________________________________________________________    CC: leg swelling     HPI: Patient is a 65-year-old female who presents in clinic today as an established  patient here for swelling. She reports bilateral foot and leg swelling that began on Saturday. She denies history of swelling problems, recent travel, surgeries, chest pain, or shortness of breath. No known history of heart failure. No history of DVT. She experiences neuropathy symptoms including tingling sensation with burning in her feet. She takes Lyrica 75 mg 3 times daily for symptom management.    She is receiving Botox injections for headache management, currently on her second treatment, and reports improvement in symptoms. Plans to follow up with neurology today.     September laboratory studies showed normal A1C, complete blood count, thyroid function, kidney function, and improved cholesterol levels.    Past Medical History:  Past Medical History:   Diagnosis Date    Acute respiratory failure due to COVID-19     COVID-19     Diabetes mellitus, type 2 1993    BS  didn't check 10/04/2023 Insulin x 2 years    Diabetic neuropathy 01/27/2014    DJD (degenerative joint disease) of knee     DVT (deep venous thrombosis) around 1990's    in leg, is on no anticoagulant therapy presently    Fatty liver     GERD (gastroesophageal reflux disease)     Hypertension associated with diabetes     HECTOR (iron deficiency anemia) 05/13/2021    Iron deficiency anemia due to chronic blood loss 01/11/2021    Chronic.  Control is uncertain.  Patient recently started on iron supplement over-the-counter.  Patient reports no side effects.  Plan to recheck iron studies and blood counts.          Lab Results      Component    Value    Date           WBC    5.03    12/08/2020           HGB    11.5 (L)    12/08/2020           HCT    38.6    12/08/2020           MCV    88    12/08/2020           PLT    388 (H)    Multinodular goiter     Obesity, morbid, BMI 50 or higher     Sleep apnea     has no CPAP     Past Surgical History:   Procedure Laterality Date    COLONOSCOPY N/A 5/12/2021    Procedure: COLONOSCOPY;  Surgeon: Carolina Rizo,  MD;  Location: [facility];  Service: Endoscopy;  Laterality: N/A;    EPIDURAL STEROID INJECTION N/A 12/10/2021    Procedure: Lumbar L5/S1 IL TEETEE  Would like AM procedure, if possible;  Surgeon: Nae Paul MD;  Location: [facility];  Service: Pain Management;  Laterality: N/A;    EPIDURAL STEROID INJECTION INTO CERVICAL SPINE N/A 10/8/2021    Procedure: C7-T1 IL TEETEE-no sedation.  Needs IV-just incase;  Surgeon: Nae Paul MD;  Location: [facility];  Service: Pain Management;  Laterality: N/A;    ESOPHAGOGASTRODUODENOSCOPY N/A 7/8/2021    Procedure: EGD (ESOPHAGOGASTRODUODENOSCOPY) previous positve covid;  Surgeon: Jann Gutierrez MD;  Location: [facility];  Service: Endoscopy;  Laterality: N/A;    ESOPHAGOGASTRODUODENOSCOPY N/A 9/18/2023    Procedure: EGD (ESOPHAGOGASTRODUODENOSCOPY);  Surgeon: Gm Leon MD;  Location: [facility];  Service: Endoscopy;  Laterality: N/A;    FRACTURE SURGERY      HYSTERECTOMY      INTRALUMINAL GASTROINTESTINAL TRACT IMAGING VIA CAPSULE N/A 10/24/2022    Procedure: IMAGING PROCEDURE, GI TRACT, INTRALUMINAL, VIA CAPSULE;  Surgeon: Hang Lunsford RN;  Location: [facility];  Service: Endoscopy;  Laterality: N/A;    SELECTIVE INJECTION OF ANESTHETIC AGENT AROUND LUMBAR SPINAL NERVE ROOT BY TRANSFORAMINAL APPROACH Bilateral 5/6/2022    Procedure: Bilateral L5/S1 TF TEETEE with RN IV sedation;  Surgeon: Nae Paul MD;  Location: [facility];  Service: Pain Management;  Laterality: Bilateral;    SELECTIVE INJECTION OF ANESTHETIC AGENT AROUND LUMBAR SPINAL NERVE ROOT BY TRANSFORAMINAL APPROACH Bilateral 12/30/2022    Procedure: Bilateral L5/S1 TF TEETEE RN IV Sedation;  Surgeon: Nae Paul MD;  Location: [facility];  Service: Pain Management;  Laterality: Bilateral;    SHOULDER ARTHROSCOPY      THYROIDECTOMY, PARTIAL Right     and transplatation of right superior parathyroid gland to the sternocleidomastoid muscle     TONSILLECTOMY      TUBAL LIGATION  1984    WRIST  FRACTURE SURGERY      left     Review of patient's allergies indicates:   Allergen Reactions    Codeine sulfate      Nausea^    Lisinopril Swelling     angioedema    Codeine Nausea Only and Rash     Social History     Tobacco Use    Smoking status: Never    Smokeless tobacco: Never   Substance Use Topics    Alcohol use: No    Drug use: No     Family History   Problem Relation Name Age of Onset    Diabetes Mother Heather Villanueva     Hypertension Mother Heather Villanueva     Heart disease Mother Heather Villanueva 50    Ovarian cancer Mother Heather Villanueva     Cancer Father Gurwinder Dotson     Arthritis Father Gurwinder Dotson     Breast cancer Sister      Cancer Brother      Cancer Brother Gurwinder Buchanan     Leukemia Son Rafa Price     Cancer Son Rafa Price      Current Outpatient Medications on File Prior to Visit   Medication Sig Dispense Refill    albuterol (PROVENTIL/VENTOLIN HFA) 90 mcg/actuation inhaler Inhale 2 puffs into the lungs every 6 (six) hours as needed for Wheezing. 54 g 2    azelastine (ASTELIN) 137 mcg (0.1 %) nasal spray 1 spray (137 mcg total) by Nasal route 2 (two) times daily. 90 mL 3    cyclobenzaprine (FLEXERIL) 10 MG tablet Take 1 tablet (10 mg total) by mouth 2 (two) times daily as needed for Muscle spasms. 180 tablet 2    diclofenac (VOLTAREN) 75 MG EC tablet Take 1 tablet (75 mg total) by mouth 2 (two) times daily. 60 tablet 2    diclofenac sodium (VOLTAREN ARTHRITIS PAIN) 1 % Gel Apply 2 g topically once daily. 100 g 5    diltiaZEM (CARDIZEM) 30 MG tablet Take 1 tablet (30 mg total) by mouth every 12 (twelve) hours. 180 tablet 2    EPINEPHrine (EPIPEN) 0.3 mg/0.3 mL AtIn INJECT 0.3 MILLILITERS (0.3 MG TOTAL) INTRAMUSCULARLY ONCE. FOR ONE DOSE 2 each 0    ezetimibe (ZETIA) 10 mg tablet Take 1 tablet (10 mg total) by mouth once daily. 90 tablet 2    famotidine (PEPCID) 40 MG tablet Take 1 tablet (40 mg total) by mouth once daily. 90 tablet 2    finasteride (PROPECIA) 1 mg tablet Take 1 tablet (1 mg total) by  mouth once daily. 30 tablet 2    fluticasone-umeclidin-vilanter (TRELEGY ELLIPTA) 100-62.5-25 mcg DsDv INHALE 1 PUFF BY MOUTH INTO THE LUNGS ONCE DAILY 180 each 3    ketoconazole (NIZORAL) 2 % shampoo Lather into affected areas. Rinse off in 5-10 min. Repeat 2-3 times a week. 120 mL 3    levothyroxine (SYNTHROID) 100 MCG tablet Take 1 tablet (100 mcg total) by mouth before breakfast. 90 tablet 2    minoxidiL (LONITEN) 2.5 MG tablet Take 0.5 tablets (1.25 mg total) by mouth once daily. 30 tablet 2    omeprazole (PRILOSEC) 40 MG capsule Take 1 capsule (40 mg total) by mouth once daily. 90 capsule 2    OZEMPIC 2 mg/dose (8 mg/3 mL) PnIj INJECT TWO MG UNDER THE SKIN EVERY 7 DAYS 9 mL 0    pregabalin (LYRICA) 75 MG capsule TAKE ONE CAPSULE (75MG TOTAL) BY MOUTH THREE TIMES DAILY 90 capsule 0    rOPINIRole (REQUIP) 0.5 MG tablet Take 1 tablet (0.5 mg total) by mouth every evening. 90 tablet 2    tiZANidine (ZANAFLEX) 4 MG tablet TAKE 1/2 TO 1 TABLET TWICE DAILY AS NEEDED FOR MUSCLE SPASMS. MAY CAUSE DROWSINESS. 90 tablet 1    topiramate (TOPAMAX) 100 MG tablet Take 1 tablet (100 mg total) by mouth 2 (two) times daily. 60 tablet 2    ubrogepant (UBRELVY) 100 mg tablet Take 1 tablet (100 mg total) by mouth daily as needed for Migraine. If symptoms persist or return, may repeat dose after 2 hours. Maximum: 200 mg per 24 hours 8 tablet 3     Current Facility-Administered Medications on File Prior to Visit   Medication Dose Route Frequency Provider Last Rate Last Admin    onabotulinumtoxina injection 155 Units  155 Units Intramuscular Q90 Days    155 Units at 11/12/24 1220    onabotulinumtoxina injection 200 Units  200 Units Intramuscular Q90 Days          Review of Systems   Constitutional:  Negative for appetite change, chills, fatigue and fever.   HENT:  Negative for congestion, rhinorrhea and sore throat.    Eyes:  Negative for visual disturbance.   Respiratory:  Negative for cough and shortness of breath.   "  Cardiovascular:  Positive for leg swelling. Negative for chest pain and palpitations.   Gastrointestinal:  Negative for abdominal pain, diarrhea and vomiting.   Genitourinary:  Negative for difficulty urinating, dysuria and hematuria.   Musculoskeletal:  Positive for arthralgias. Negative for myalgias.   Skin:  Negative for rash and wound.   Neurological:  Positive for numbness (feet) and headaches. Negative for dizziness.   Psychiatric/Behavioral:  Negative for behavioral problems. The patient is not nervous/anxious.      Vitals:    02/11/25 0949   BP: 128/76   Pulse: 72   Resp: 17   Temp: 97.7 °F (36.5 °C)   TempSrc: Oral   SpO2: 98%   Weight: (!) 144.9 kg (319 lb 8 oz)   Height: 5' 8" (1.727 m)     Wt Readings from Last 3 Encounters:   02/11/25 (!) 144.9 kg (319 lb 8 oz)   01/15/25 (!) 143.3 kg (316 lb)   12/19/24 (!) 143.6 kg (316 lb 7.5 oz)     Physical Exam  Vitals reviewed.   Constitutional:       General: She is not in acute distress.     Appearance: Normal appearance. She is obese. She is not ill-appearing.   HENT:      Head: Normocephalic and atraumatic.      Right Ear: External ear normal.      Left Ear: External ear normal.      Nose: Nose normal.   Eyes:      Extraocular Movements: Extraocular movements intact.      Conjunctiva/sclera: Conjunctivae normal.   Cardiovascular:      Rate and Rhythm: Normal rate.      Heart sounds: Normal heart sounds.   Pulmonary:      Effort: Pulmonary effort is normal. No respiratory distress.      Breath sounds: Normal breath sounds.   Abdominal:      General: Abdomen is flat. There is no distension.   Musculoskeletal:         General: Normal range of motion.      Cervical back: Normal range of motion.      Right lower leg: Edema (trace, nonpitting) present.      Left lower leg: Edema (trace, nonpitting) present.   Skin:     General: Skin is warm and dry.      Capillary Refill: Capillary refill takes less than 2 seconds.      Coloration: Skin is not pale.      " Findings: No rash.   Neurological:      General: No focal deficit present.      Mental Status: She is alert and oriented to person, place, and time. Mental status is at baseline.      Sensory: Sensation is intact.      Motor: Motor function is intact.      Coordination: Coordination is intact.      Gait: Gait is intact.   Psychiatric:         Mood and Affect: Mood normal.         Speech: Speech normal. Speech is not rapid and pressured, delayed or slurred.         Behavior: Behavior normal. Behavior is not agitated, slowed, aggressive, withdrawn, hyperactive or combative. Behavior is cooperative.         Thought Content: Thought content normal.         Judgment: Judgment normal.       Health Maintenance   Topic Date Due    Low Dose Statin  Never done    Hemoglobin A1c  03/09/2025    DEXA Scan  04/27/2025    Foot Exam  07/08/2025    Diabetic Eye Exam  08/21/2025    Diabetes Urine Screening  09/09/2025    Lipid Panel  09/09/2025    Mammogram  01/30/2026    TETANUS VACCINE  10/29/2028    Colorectal Cancer Screening  05/12/2031    Hepatitis C Screening  Completed    Shingles Vaccine  Completed    Influenza Vaccine  Completed    COVID-19 Vaccine  Completed    RSV Vaccine (Age 60+ and Pregnant patients)  Completed    Pneumococcal Vaccines (Age 50+)  Completed     This note was generated with the assistance of ambient listening technology. Verbal consent was obtained by the patient and accompanying visitor(s) for the recording of patient appointment to facilitate this note. I attest to having reviewed and edited the generated note for accuracy, though some syntax or spelling errors may persist. Please contact the author of this note for any clarification.    Visit today included increased complexity associated with the care of the episodic problem - see above- addressed and managing the longitudinal care of the patient due to the serious and/or complex managed problem(s) - see above.

## 2025-02-12 ENCOUNTER — TELEPHONE (OUTPATIENT)
Dept: FAMILY MEDICINE | Facility: CLINIC | Age: 65
End: 2025-02-12
Payer: MEDICARE

## 2025-02-12 DIAGNOSIS — Z79.899 ENCOUNTER FOR LONG-TERM (CURRENT) USE OF MEDICATIONS: ICD-10-CM

## 2025-02-12 DIAGNOSIS — M12.812 ROTATOR CUFF ARTHROPATHY OF LEFT SHOULDER: ICD-10-CM

## 2025-02-12 DIAGNOSIS — M62.838 CERVICAL PARASPINAL MUSCLE SPASM: ICD-10-CM

## 2025-02-12 DIAGNOSIS — M25.551 HIP PAIN, RIGHT: ICD-10-CM

## 2025-02-12 DIAGNOSIS — E11.42 DIABETIC POLYNEUROPATHY ASSOCIATED WITH TYPE 2 DIABETES MELLITUS: ICD-10-CM

## 2025-02-12 RX ORDER — TOPIRAMATE 100 MG/1
TABLET, FILM COATED ORAL
Qty: 180 TABLET | Refills: 0 | Status: SHIPPED | OUTPATIENT
Start: 2025-02-12

## 2025-02-12 RX ORDER — PREGABALIN 75 MG/1
CAPSULE ORAL
Qty: 90 CAPSULE | Refills: 0 | Status: SHIPPED | OUTPATIENT
Start: 2025-02-12

## 2025-02-12 RX ORDER — TIZANIDINE 4 MG/1
TABLET ORAL
Qty: 90 TABLET | Refills: 1 | Status: SHIPPED | OUTPATIENT
Start: 2025-02-12

## 2025-02-12 NOTE — TELEPHONE ENCOUNTER
No care due was identified.  [facility] Embedded Care Due Messages. Reference number: [id number].   2/12/2025 11:56:56 AM CST

## 2025-02-12 NOTE — TELEPHONE ENCOUNTER
----- Message from Summer sent at 2/12/2025  1:56 PM CST -----  Contact: Zakia  Type:  Patient Returning Call    Who Called:Zakia   Who Left Message for Patient:Nurse   Does the patient know what this is regarding?:Mammo results   Would the patient rather a call back or a response via [facility]? Call back  Best Call Back Number:[phone]  Additional Information:  She needs someone to go over her results

## 2025-02-13 ENCOUNTER — CLINICAL SUPPORT (OUTPATIENT)
Dept: REHABILITATION | Facility: HOSPITAL | Age: 65
End: 2025-02-13
Payer: MEDICARE

## 2025-02-13 DIAGNOSIS — R29.898 IMPAIRED STRENGTH OF SHOULDER MUSCLES: Primary | ICD-10-CM

## 2025-02-13 PROCEDURE — 97110 THERAPEUTIC EXERCISES: CPT | Mod: PN | Performed by: GENERAL ACUTE CARE HOSPITAL

## 2025-02-13 PROCEDURE — 97112 NEUROMUSCULAR REEDUCATION: CPT | Mod: PN | Performed by: GENERAL ACUTE CARE HOSPITAL

## 2025-02-13 PROCEDURE — 97010 HOT OR COLD PACKS THERAPY: CPT | Mod: PN | Performed by: GENERAL ACUTE CARE HOSPITAL

## 2025-02-13 NOTE — PROGRESS NOTES
Outpatient Rehab    Physical Therapy Visit    Patient Name: Zakia Price  MRN: [id number]  YOB: 1960  Today's Date: 2/13/2025    Therapy Diagnosis: No diagnosis found.  Physician: Oleksandr Nagel MD    Physician Orders: Eval and Treat  Medical Diagnosis:  Rotator cuff arthropathy of left shoulder [M12.812]      Visit # / Visits Authorized: 3 / 20   Date of Evaluation: 1/3/2025  Insurance Authorization Period: 1/1/25 to 12/31/25  Plan of Care Certification: to 2/28/25   Reassessment Date: 3/6/25     Time In: 1000   Time Out: 1055  Total Time: 55   Total Billable Time: 49    FOTO:  Intake Score:  %  Survey Score 1:  %  Survey Score 2:  %         Subjective   Patient reports no subjective change since last treatment session. She does still have moderate levels of pain in the L shoulder upon arrival..  Pain reported as 6/10. pt reports pain is due to stiffness    Objective            Treatment:  CPT Interventions & Parameters   TE Pulleys x 5 minutes - flexion only  UBE 3/3   NMR Bicep curls 3# 3x10 bilateral  Seated chest press 3# 3x10  Reverse curls 3# dowel 3x10  Seated red theraband t's 3x10  Single arm diagonal t x15 R  x x15 L  Upper trap stretch 4x10s bilateral  Seated chin tucks x15 reps   Supine dowel protraction 3# 3x10  Supine dowel flexion 3# 3x10  Supine external rotation dowel 3# 3x10   H/C Moist hot pack X 5 minutes L shoulder       Units Time (min) CPT  Description   timed 49 total billable time     1 11 Therapeutic Exercise  12428 (TE)  to develop: strength, endurance, ROM, flexibility, posture, and core stabilization   2 36 Neuromuscular Re-education  89310 (NMR)  activities to improve: Balance, Coordination, Kinesthetic, Sense, Proprioception, and Posture        Therapeutic Activities  83065 (TA)  to improve: functional performance, impact activities of daily living       Manual Therapy  45795 (MT) techniques to improve: Joint mobilizations, Manual traction, Myofacial  release, Soft tissue Mobilization, and Friction Massage       Gait Training  63516(GT)     untimed             Dry Needling  20560 (DN 1-2)   to impact muscular pain, tightness, trigger points, and tissue pliability       Dry Needling  20560 (DN 3+)  to impact muscular pain, tightness, trigger points, and tissue pliability       Unattended E-Stimulation  98269 (UES)   for muscle performance or pain modulation   X   Heat/Cold Modalities  29767 (H/C)   to impact pain, pain modulation, sensitization training        Mechanical Traction  12592 (T)  to impact pain, tissue pliability, range of motion      *A portion of this treatment session is provided with the assistance of a skilled rehabilitation technician under the supervision of a licensed physical therapist  *Billing accurately reflects 1:1 treatment time per patient's insurance guidelines.     Home Exercises and Patient Education Reviewed   Patient was educated on the role of Physical Therapy, Treatment plan, Discharge Goals, Home Exercise Program.  Patient educated on biomechanical justification for therapeutic exercise and importance of compliance with Home Exercise Program in order to improve overall impairments and Quality of Life.  Patient was educated on all the above exercise prior/during/after for proper posture, positioning, and execution for safe performance with home exercise program.     Disclaimer: This note was prepared using a voice recognition system and is likely to have sound alike errors within the text.    Assessment & Plan   Assessment: Continued performance of neuromusclar re-education techinques to impact function and stability of the L shoulder. Patient does still require verbal and tacticle cueing for correct exercise performance. Improving tolerance to resistance and reps are noted.  Evaluation/Treatment Tolerance: Patient tolerated treatment well    Patient will continue to benefit from skilled outpatient physical therapy to address the  deficits listed in the problem list box on initial evaluation, provide pt/family education and to maximize pt's level of independence in the home and community environment.     Patient's spiritual, cultural, and educational needs considered and patient agreeable to plan of care and goals.           Plan: Plan to increase compelxity of exericse to include multi-planar movements for the L shoulder.    Goals:   Active       Long Term Goals        Patient will display independent and correct performance of advanced home exercise program without cueing to impact knowledge of condition (Progressing)       Start:  01/28/25    Expected End:  02/28/25            Patient to improve active range of motion L shoulder flexion to 115 degrees to impact self-care/grooming (Progressing)       Start:  01/28/25    Expected End:  02/28/25               Short Term Goals       Patient to be independent with foundational home exercise program performance to impact knowledge of condition  (Progressing)       Start:  01/28/25    Expected End:  02/25/25            Patient to improve active range of motion L shoulder flexion to 90 degrees to impact self-care/grooming (Progressing)       Start:  01/28/25    Expected End:  02/25/25            Patient to lift 5lbs to eye levels to impact activities of daily living performance safely (Progressing)       Start:  01/28/25    Expected End:  02/25/25            Patient to improve Shoulder FOTO to 53 /100 to display improved activity participation (Progressing)       Start:  01/28/25    Expected End:  01/31/25                Luly Ruiz, PT, DPT

## 2025-02-17 ENCOUNTER — HOSPITAL ENCOUNTER (OUTPATIENT)
Dept: RADIOLOGY | Facility: HOSPITAL | Age: 65
Discharge: HOME | End: 2025-02-17
Attending: NURSE PRACTITIONER
Payer: MEDICARE

## 2025-02-17 ENCOUNTER — RESULTS FOLLOW-UP (OUTPATIENT)
Dept: FAMILY MEDICINE | Facility: CLINIC | Age: 65
End: 2025-02-17
Payer: MEDICARE

## 2025-02-17 DIAGNOSIS — R60.0 BILATERAL LOWER EXTREMITY EDEMA: ICD-10-CM

## 2025-02-17 DIAGNOSIS — Z78.0 POSTMENOPAUSAL STATE: ICD-10-CM

## 2025-02-17 PROCEDURE — 93970 EXTREMITY STUDY: CPT | Mod: TC,PO

## 2025-02-17 PROCEDURE — 77080 DXA BONE DENSITY AXIAL: CPT | Mod: TC,PO

## 2025-02-18 ENCOUNTER — CLINICAL SUPPORT (OUTPATIENT)
Dept: REHABILITATION | Facility: HOSPITAL | Age: 65
End: 2025-02-18
Payer: MEDICARE

## 2025-02-18 DIAGNOSIS — R29.898 IMPAIRED STRENGTH OF SHOULDER MUSCLES: Primary | ICD-10-CM

## 2025-02-18 PROCEDURE — 97110 THERAPEUTIC EXERCISES: CPT | Mod: PN | Performed by: GENERAL ACUTE CARE HOSPITAL

## 2025-02-18 PROCEDURE — 97112 NEUROMUSCULAR REEDUCATION: CPT | Mod: PN | Performed by: GENERAL ACUTE CARE HOSPITAL

## 2025-02-18 NOTE — PROGRESS NOTES
Outpatient Rehab    Physical Therapy Visit    Patient Name: Zakia Price  MRN: [id number]  YOB: 1960  Today's Date: 2/18/2025    Therapy Diagnosis: No diagnosis found.  Physician: Oleksandr Nagel MD    Physician Orders: Eval and Treat  Visit # / Visits Authorized: 8 / 20   Date of Evaluation: 1/3/2025  Insurance Authorization Period: 1/1/25 to 12/31/25  Plan of Care Certification: to 2/28/25   Reassessment Date: 3/6/25     Time In: 1000   Time Out: 1055  Total Time: 55   Total Billable Time: 53    FOTO:  Intake Score:  %  Survey Score 1:  %  Survey Score 2:  %         Subjective   Patient reports that she is feeling okay today. Her pain is a 6/10.  Pain reported as 6/10. L shoulder pain    Objective            Treatment:  CPT Interventions & Parameters   TE Pulleys x 5 minutes - flexion only  UBE 3/3   NMR Bicep curls 3# 3x10 bilateral  Seated chest press 3# 3x10  Reverse curls 3# dowel 3x10  Seated red theraband Ts 3x10  Single arm diagonal Ts x15 R  x x15 L  Seated chin tucks x15 reps   Supine dowel protraction 3# 3x10  Supine dowel flexion 3# 3x10  Supine external rotation dowel 3# 3x10  Rows - red theraband x30 - therapist assist to hold band  Yellow theraband rotations - therapist assist to hold band x10 reps bilateral                Units Time (min) CPT  Description   timed 38 total billable time     1 11 Therapeutic Exercise  78156 (TE)  to develop: strength, endurance, ROM, flexibility, posture, and core stabilization   3 42 Neuromuscular Re-education  06647 (NMR)  activities to improve: Balance, Coordination, Kinesthetic, Sense, Proprioception, and Posture        Therapeutic Activities  11909 (TA)  to improve: functional performance, impact activities of daily living       Manual Therapy  15258 (MT) techniques to improve: Joint mobilizations, Manual traction, Myofacial release, Soft tissue Mobilization, and Friction Massage       Gait Training  60458(GT)     untimed              Dry Needling  20560 (DN 1-2)   to impact muscular pain, tightness, trigger points, and tissue pliability       Dry Needling  10485 (DN 3+)  to impact muscular pain, tightness, trigger points, and tissue pliability       Unattended E-Stimulation  19427 (UES)   for muscle performance or pain modulation       Heat/Cold Modalities  97281 (H/C)   to impact pain, pain modulation, sensitization training        Mechanical Traction  48601 (T)  to impact pain, tissue pliability, range of motion      *A portion of this treatment session is provided with the assistance of a skilled rehabilitation technician under the supervision of a licensed physical therapist  *Billing accurately reflects 1:1 treatment time per patient's insurance guidelines.     Home Exercises and Patient Education Reviewed   Patient was educated on the role of Physical Therapy, Treatment plan, Discharge Goals, Home Exercise Program.  Patient educated on biomechanical justification for therapeutic exercise and importance of compliance with Home Exercise Program in order to improve overall impairments and Quality of Life.  Patient was educated on all the above exercise prior/during/after for proper posture, positioning, and execution for safe performance with home exercise program.     Disclaimer: This note was prepared using a voice recognition system and is likely to have sound alike errors within the text.    Assessment & Plan   Assessment: Patient continues to report increasing levels of pain with resisted activity. Patient does require cueing and visual demonstration for correct form with exercise performance.  Evaluation/Treatment Tolerance: Patient tolerated treatment well    Patient will continue to benefit from skilled outpatient physical therapy to address the deficits listed in the problem list box on initial evaluation, provide pt/family education and to maximize pt's level of independence in the home and community environment.     Patient's  spiritual, cultural, and educational needs considered and patient agreeable to plan of care and goals.           Plan: Primary goal at this time is impacting postural, scapular and arm endurance to impact ADL performance.    Goals:   Active       Long Term Goals        Patient will display independent and correct performance of advanced home exercise program without cueing to impact knowledge of condition (Progressing)       Start:  01/28/25    Expected End:  02/28/25            Patient to improve active range of motion L shoulder flexion to 115 degrees to impact self-care/grooming (Progressing)       Start:  01/28/25    Expected End:  02/28/25               Short Term Goals       Patient to be independent with foundational home exercise program performance to impact knowledge of condition  (Progressing)       Start:  01/28/25    Expected End:  02/25/25            Patient to improve active range of motion L shoulder flexion to 90 degrees to impact self-care/grooming (Progressing)       Start:  01/28/25    Expected End:  02/25/25            Patient to lift 5lbs to eye levels to impact activities of daily living performance safely (Progressing)       Start:  01/28/25    Expected End:  02/25/25            Patient to improve Shoulder FOTO to 53 /100 to display improved activity participation (Progressing)       Start:  01/28/25    Expected End:  01/31/25                Luly Ruiz PT, DPT, SCS, CSCS  Board Certified Sports Clinical Specialist   Certified Dry Needling Provider  2/18/2025

## 2025-02-20 ENCOUNTER — CLINICAL SUPPORT (OUTPATIENT)
Dept: REHABILITATION | Facility: HOSPITAL | Age: 65
End: 2025-02-20
Payer: MEDICARE

## 2025-02-20 DIAGNOSIS — R29.898 IMPAIRED STRENGTH OF SHOULDER MUSCLES: Primary | ICD-10-CM

## 2025-02-20 PROCEDURE — 97110 THERAPEUTIC EXERCISES: CPT | Mod: PN | Performed by: GENERAL ACUTE CARE HOSPITAL

## 2025-02-20 PROCEDURE — 97140 MANUAL THERAPY 1/> REGIONS: CPT | Mod: PN | Performed by: GENERAL ACUTE CARE HOSPITAL

## 2025-02-20 PROCEDURE — 97112 NEUROMUSCULAR REEDUCATION: CPT | Mod: PN | Performed by: GENERAL ACUTE CARE HOSPITAL

## 2025-02-20 NOTE — PROGRESS NOTES
Outpatient Rehab  Physical Therapy Visit    Patient Name: Zakia Price  MRN: [id number]  YOB: 1960  Today's Date: 2/20/2025    Therapy Diagnosis: No diagnosis found.  Physician: Oleksandr Nagel MD    Physician Orders: Eval and Treat  Visit # / Visits Authorized: 3 / 20   Date of Evaluation: 1/3/2025  Insurance Authorization Period: 1/1/25 to 12/31/25  Plan of Care Certification: to 2/28/25   Reassessment Date: 3/6/25    Time In: 1000   Time Out: 1053  Total Time: 53   Total Billable Time: 38    FOTO:  Intake Score:  %  Survey Score 1:  %  Survey Score 2:  %         Subjective   Patient returns today reporting mild headache due to neck tightness. After discussion with physical therapist, patient ops to defer her foot physical therapy referral until after completion of her shoulder referral that she is currently participating in..  Pain reported as 6/10. L shoulder pain    Objective            Treatment:  CPT Interventions & Parameters   TE Pulleys x 5 minutes - flexion only  UBE 3/3   NMR Bicep curls 3# 3x10 bilateral  Seated chest press 3# 3x10  Reverse curls 3# dowel 3x10  Seated red theraband Ts 3x10  Single arm diagonal Ts x15 R  x x15 L  Seated chin tucks x15 reps   Supine dowel protraction 3# 3x10  Supine dowel flexion 3# 3x10  Supine external rotation dowel 3# 3x10  Rows - red theraband x30 - therapist assist to hold band  Yellow theraband rotations - therapist assist to hold band x10 reps bilateral    MT Suboccipital release   PROM with overpressure cervical rotation R &L side   Lateral cervical glides R & L grade II-III mobilization                Units Time (min) CPT  Description   timed 38 total billable time     1 8 Therapeutic Exercise  49348 (TE)  to develop: strength, endurance, ROM, flexibility, posture, and core stabilization   1 22 Neuromuscular Re-education  99504 (NMR)  activities to improve: Balance, Coordination, Kinesthetic, Sense, Proprioception, and Posture         Therapeutic Activities  38391 (TA)  to improve: functional performance, impact activities of daily living   1 8 Manual Therapy  16596 (MT) techniques to improve: Joint mobilizations, Manual traction, Myofacial release, Soft tissue Mobilization, and Friction Massage       Gait Training  35390(GT)     untimed             Dry Needling  20560 (DN 1-2)   to impact muscular pain, tightness, trigger points, and tissue pliability       Dry Needling  91562 (DN 3+)  to impact muscular pain, tightness, trigger points, and tissue pliability       Unattended E-Stimulation  24271 (UES)   for muscle performance or pain modulation       Heat/Cold Modalities  69857 (H/C)   to impact pain, pain modulation, sensitization training        Mechanical Traction  33486 (T)  to impact pain, tissue pliability, range of motion      *A portion of this treatment session is provided with the assistance of a skilled rehabilitation technician under the supervision of a licensed physical therapist  *Billing accurately reflects 1:1 treatment time per patient's insurance guidelines.     Home Exercises and Patient Education Reviewed   Patient was educated on the role of Physical Therapy, Treatment plan, Discharge Goals, Home Exercise Program.  Patient educated on biomechanical justification for therapeutic exercise and importance of compliance with Home Exercise Program in order to improve overall impairments and Quality of Life.  Patient was educated on all the above exercise prior/during/after for proper posture, positioning, and execution for safe performance with home exercise program.     Disclaimer: This note was prepared using a voice recognition system and is likely to have sound alike errors within the text.    Assessment & Plan   Assessment: treatment session continues to focus on neuromuscular, reeducation and shoulder, strength strengthening task and both seated and supine inclined position. Addition of cervical manual therapy techniques  and suboccipital release was included to impact headache and neck tightness. Patient reports reduction in neck, pain and headache following these manual therapy interventions.  Evaluation/Treatment Tolerance: Patient tolerated treatment well    Patient will continue to benefit from skilled outpatient physical therapy to address the deficits listed in the problem list box on initial evaluation, provide pt/family education and to maximize pt's level of independence in the home and community environment.     Patient's spiritual, cultural, and educational needs considered and patient agreeable to plan of care and goals.           Plan: We plan to continue physical therapy for two more weeks to visit a week with emphasis on achieving independence and compliance with home exercise program and then transition to her second referral for physical therapy.    Goals:   Active       Long Term Goals        Patient will display independent and correct performance of advanced home exercise program without cueing to impact knowledge of condition (Progressing)       Start:  01/28/25    Expected End:  02/28/25            Patient to improve active range of motion L shoulder flexion to 115 degrees to impact self-care/grooming (Progressing)       Start:  01/28/25    Expected End:  02/28/25               Short Term Goals       Patient to be independent with foundational home exercise program performance to impact knowledge of condition  (Progressing)       Start:  01/28/25    Expected End:  02/25/25            Patient to improve active range of motion L shoulder flexion to 90 degrees to impact self-care/grooming (Met)       Start:  01/28/25    Expected End:  02/25/25    Resolved:  02/20/25         Patient to lift 5lbs to eye levels to impact activities of daily living performance safely (Progressing)       Start:  01/28/25    Expected End:  02/25/25            Patient to improve Shoulder FOTO to 53 /100 to display improved activity  participation (Progressing)       Start:  01/28/25    Expected End:  01/31/25                Luly Ruiz PT, DPT, SCS, CSCS  Board Certified Sports Clinical Specialist   Certified Dry Needling Provider  2/20/2025

## 2025-02-21 DIAGNOSIS — Z00.00 ENCOUNTER FOR MEDICARE ANNUAL WELLNESS EXAM: ICD-10-CM

## 2025-02-25 ENCOUNTER — CLINICAL SUPPORT (OUTPATIENT)
Dept: REHABILITATION | Facility: HOSPITAL | Age: 65
End: 2025-02-25
Payer: MEDICARE

## 2025-02-25 DIAGNOSIS — R29.898 IMPAIRED STRENGTH OF SHOULDER MUSCLES: Primary | ICD-10-CM

## 2025-02-25 PROCEDURE — 97110 THERAPEUTIC EXERCISES: CPT | Mod: PN | Performed by: GENERAL ACUTE CARE HOSPITAL

## 2025-02-25 PROCEDURE — 97112 NEUROMUSCULAR REEDUCATION: CPT | Mod: PN | Performed by: GENERAL ACUTE CARE HOSPITAL

## 2025-02-25 NOTE — PROGRESS NOTES
Outpatient Rehab    Physical Therapy Visit    Patient Name: Zakia Price  MRN: [id number]  YOB: 1960  Encounter Date: 2/25/2025    Therapy Diagnosis: No diagnosis found.  Physician: Oleksandr Nagel MD    Physician Orders: Eval and Treat  Medical Diagnosis:  Rotator cuff arthropathy of left shoulder [M12.812]      Visit # / Visits Authorized: 3 / 20   Date of Evaluation: 1/3/2025  Insurance Authorization Period: 1/1/25 to 12/31/25  Plan of Care Certification: to 2/28/25   Reassessment Date: 3/6/25    Time In: 1010   Time Out: 1100  Total Time: 50   Total Billable Time: 46    FOTO:  Intake Score:  %  Survey Score 1:  %  Survey Score 2:  %         Subjective   patient reports no subjective change in L shoulder pain/symptoms since last treatment session..  Pain reported as 6/10. L shoulder pain    Objective            Treatment:  CPT Interventions & Parameters   TE Pulleys x 5 minutes - flexion only  UBE 4/4   NMR Seated chin tucks x15 reps   Bicep curls 4# 3x10 bilateral  Seated chest press 4# 3x10  Supine t's red theraband x 30 reps   Diagonal T's R/L x30 R/L   Supine dowel protraction 4# 3x10  Supine dowel flexion 4# 3x10  Seated external rotation bialteral -red theraband 3x10  Rows - red theraband x30 - therapist assist to hold band  Red theraband palloff press x30 reps R/L               Units Time (min) CPT  Description   timed 38 total billable time     1 11 Therapeutic Exercise  72660 (TE)  to develop: strength, endurance, ROM, flexibility, posture, and core stabilization   2 35 Neuromuscular Re-education  36077 (NMR)  activities to improve: Balance, Coordination, Kinesthetic, Sense, Proprioception, and Posture        Therapeutic Activities  00790 (TA)  to improve: functional performance, impact activities of daily living       Manual Therapy  43888 (MT) techniques to improve: Joint mobilizations, Manual traction, Myofacial release, Soft tissue Mobilization, and Friction Massage        Gait Training  13081(GT)     untimed             Dry Needling  20560 (DN 1-2)   to impact muscular pain, tightness, trigger points, and tissue pliability       Dry Needling  20560 (DN 3+)  to impact muscular pain, tightness, trigger points, and tissue pliability       Unattended E-Stimulation  11176 (UES)   for muscle performance or pain modulation       Heat/Cold Modalities  84231 (H/C)   to impact pain, pain modulation, sensitization training        Mechanical Traction  62920 (T)  to impact pain, tissue pliability, range of motion      *A portion of this treatment session is provided with the assistance of a skilled rehabilitation technician under the supervision of a licensed physical therapist  *Billing accurately reflects 1:1 treatment time per patient's insurance guidelines.     Home Exercises and Patient Education Reviewed   Patient was educated on the role of Physical Therapy, Treatment plan, Discharge Goals, Home Exercise Program.  Patient educated on biomechanical justification for therapeutic exercise and importance of compliance with Home Exercise Program in order to improve overall impairments and Quality of Life.  Patient was educated on all the above exercise prior/during/after for proper posture, positioning, and execution for safe performance with home exercise program.     Disclaimer: This note was prepared using a voice recognition system and is likely to have sound alike errors within the text.    Assessment & Plan   Assessment: Able to progress resistance to 4# therabar without significant increased difficulty. Patient does still require cueing for proper form/position with exericses-specificalyl rotator cuff stregnthening. Exercises are limited as well to seated and laying positions due to patient's previously reported back pain.  Evaluation/Treatment Tolerance: Patient tolerated treatment well    Patient will continue to benefit from skilled outpatient physical therapy to address the deficits  listed in the problem list box on initial evaluation, provide pt/family education and to maximize pt's level of independence in the home and community environment.     Patient's spiritual, cultural, and educational needs considered and patient agreeable to plan of care and goals.           Plan: Continue with progression of resistance with exericses. Encouraged HEP performance and reassess goals at next session.    Goals:   Active       Long Term Goals        Patient will display independent and correct performance of advanced home exercise program without cueing to impact knowledge of condition (Progressing)       Start:  01/28/25    Expected End:  02/28/25            Patient to improve active range of motion L shoulder flexion to 115 degrees to impact self-care/grooming (Progressing)       Start:  01/28/25    Expected End:  02/28/25               Short Term Goals       Patient to be independent with foundational home exercise program performance to impact knowledge of condition  (Progressing)       Start:  01/28/25    Expected End:  02/25/25            Patient to improve active range of motion L shoulder flexion to 90 degrees to impact self-care/grooming (Met)       Start:  01/28/25    Expected End:  02/25/25    Resolved:  02/20/25         Patient to lift 5lbs to eye levels to impact activities of daily living performance safely (Progressing)       Start:  01/28/25    Expected End:  02/25/25            Patient to improve Shoulder FOTO to 53 /100 to display improved activity participation (Progressing)       Start:  01/28/25    Expected End:  01/31/25                Luly Ruiz, PT, DPT

## 2025-02-27 ENCOUNTER — CLINICAL SUPPORT (OUTPATIENT)
Dept: REHABILITATION | Facility: HOSPITAL | Age: 65
End: 2025-02-27
Payer: MEDICARE

## 2025-02-27 DIAGNOSIS — R29.898 IMPAIRED STRENGTH OF SHOULDER MUSCLES: Primary | ICD-10-CM

## 2025-02-27 PROCEDURE — 97112 NEUROMUSCULAR REEDUCATION: CPT | Mod: PN | Performed by: GENERAL ACUTE CARE HOSPITAL

## 2025-02-27 PROCEDURE — 97110 THERAPEUTIC EXERCISES: CPT | Mod: PN | Performed by: GENERAL ACUTE CARE HOSPITAL

## 2025-02-27 NOTE — PROGRESS NOTES
Outpatient Rehab  Physical Therapy Progress Note : Updated Plan of Care    Patient Name: Zakia Price  MRN: [id number]  YOB: 1960  Encounter Date: 2/27/2025    Therapy Diagnosis: No diagnosis found.  Physician: Oleksandr Nagel MD    Physician Orders: Eval and Treat  Medical Diagnosis:  Rotator cuff arthropathy of left shoulder [M12.812]      Visit # / Visits Authorized: 3 / 20   Date of Evaluation: 1/3/2025  Insurance Authorization Period: 1/1/25 to 12/31/25  Plan of Care Certification: 2/27/25 to 3/20/25   Reassessment Date: 3/29/25     Time In: 1000   Time Out: 1100  Total Time: 60   Total Billable Time: 55    FOTO:  Intake Score: 47%  Survey Score 1: 49%  Survey Score 2: 49%         Subjective   Patient reports no change in s/s in the L shoulder this morning..  Pain reported as 6/10. L shoulder pain    Objective      Shoulder Range of Motion  Right Shoulder   Active (deg) Passive (deg) Pain   Flexion 105       Extension         Scaption         ABduction         ADduction         Horizontal ABduction         Horizontal ADduction         External Rotation (Shoulder ABducted 0 degrees)         External Rotation (Shoulder ABducted 45 degrees)         External Rotation (Shoulder ABducted 90 degrees)         Internal Rotation (Shoulder ABducted 0 degrees)         Internal Rotation (Shoulder ABducted 45 degrees)         Internal Rotation (Shoulder ABducted 90 degrees)           Left Shoulder   Active (deg) Passive (deg) Pain   Flexion 100       Extension         Scaption         ABduction         ADduction         Horizontal ABduction         Horizontal ADduction         External Rotation (Shoulder ABducted 0 degrees)         External Rotation (Shoulder ABducted 45 degrees)         External Rotation (Shoulder ABducted 90 degrees)         Internal Rotation (Shoulder ABducted 0 degrees)         Internal Rotation (Shoulder ABducted 45 degrees)         Internal Rotation (Shoulder ABducted 90  degrees)                          Treatment:  CPT Interventions & Parameters   TE Pulleys x 5 minutes - flexion only  UBE 4/4   NMR Seated chin tucks x15 reps   Bicep curls 4# 3x10 bilateral  Seated chest press 4# 3x10  Supine t's red theraband x 30 reps   Diagonal T's R/L x30 R/L   Supine dowel protraction 4# 3x10  Supine dowel flexion 4# 3x10  Seated external rotation bialteral -red theraband 3x10  Rows - red theraband x30 - therapist assist to hold band  Red theraband palloff press x30 reps R/L   TE FOTO  Goals reassessment  Plan of Care review            Units Time (min) CPT  Description   timed 55 total billable time     2 25 Therapeutic Exercise  06092 (TE)  to develop: strength, endurance, ROM, flexibility, posture, and core stabilization   2 30 Neuromuscular Re-education  02341 (NMR)  activities to improve: Balance, Coordination, Kinesthetic, Sense, Proprioception, and Posture        Therapeutic Activities  63644 (TA)  to improve: functional performance, impact activities of daily living       Manual Therapy  76115 (MT) techniques to improve: Joint mobilizations, Manual traction, Myofacial release, Soft tissue Mobilization, and Friction Massage       Gait Training  28212(GT)     untimed             Dry Needling  09614 (DN 1-2)   to impact muscular pain, tightness, trigger points, and tissue pliability       Dry Needling  80581 (DN 3+)  to impact muscular pain, tightness, trigger points, and tissue pliability       Unattended E-Stimulation  21981 (UES)   for muscle performance or pain modulation       Heat/Cold Modalities  28620 (H/C)   to impact pain, pain modulation, sensitization training        Mechanical Traction  07621 (T)  to impact pain, tissue pliability, range of motion      *A portion of this treatment session is provided with the assistance of a skilled rehabilitation technician under the supervision of a licensed physical therapist  *Billing accurately reflects 1:1 treatment time per patient's  insurance guidelines.     Home Exercises and Patient Education Reviewed   Patient was educated on the role of Physical Therapy, Treatment plan, Discharge Goals, Home Exercise Program.  Patient educated on biomechanical justification for therapeutic exercise and importance of compliance with Home Exercise Program in order to improve overall impairments and Quality of Life.  Patient was educated on all the above exercise prior/during/after for proper posture, positioning, and execution for safe performance with home exercise program.     Disclaimer: This note was prepared using a voice recognition system and is likely to have sound alike errors within the text.    Assessment & Plan        Patient will continue to benefit from skilled outpatient physical therapy to address the deficits listed in the problem list box on initial evaluation, provide pt/family education and to maximize pt's level of independence in the home and community environment.     Patient's spiritual, cultural, and educational needs considered and patient agreeable to plan of care and goals.           Goals:   Active       Long Term Goals        Patient will display independent and correct performance of advanced home exercise program without cueing to impact knowledge of condition (Progressing)       Start:  01/28/25    Expected End:  03/18/25            Patient to improve active range of motion L shoulder flexion to 115 degrees to impact self-care/grooming (Progressing)       Start:  01/28/25    Expected End:  03/18/25               Short Term Goals       Patient to be independent with foundational home exercise program performance to impact knowledge of condition  (Unable to Meet)       Start:  01/28/25    Expected End:  02/25/25            Patient to improve active range of motion L shoulder flexion to 90 degrees to impact self-care/grooming (Met)       Start:  01/28/25    Expected End:  02/25/25    Resolved:  02/20/25         Patient to lift  5lbs to eye levels to impact activities of daily living performance safely (Progressing)       Start:  01/28/25    Expected End:  03/18/25            Patient to improve Shoulder FOTO to 53 /100 to display improved activity participation (Progressing)       Start:  01/28/25    Expected End:  03/18/25                Luly Ruiz PT, DPT, SCS, CSCS  Board Certified Sports Clinical Specialist   Certified Dry Needling Provider  2/27/2025

## 2025-03-07 DIAGNOSIS — Z79.899 ENCOUNTER FOR LONG-TERM (CURRENT) USE OF MEDICATIONS: ICD-10-CM

## 2025-03-07 RX ORDER — TOPIRAMATE 100 MG/1
100 TABLET, FILM COATED ORAL 2 TIMES DAILY
Qty: 60 TABLET | Refills: 0 | Status: SHIPPED | OUTPATIENT
Start: 2025-03-07

## 2025-03-07 NOTE — TELEPHONE ENCOUNTER
Refill Routing Note   Medication(s) are not appropriate for processing by [facility] for the following reason(s):        Outside of protocol    [facility] action(s):  Route               Appointments  past 12m or future 3m with PCP    Date Provider   Last Visit   12/17/2024 Oleksandr Nagel MD   Next Visit   Visit date not found Oleksandr Nagel MD   ED visits in past 90 days: 0        Note composed:7:58 AM 03/07/2025

## 2025-03-10 ENCOUNTER — TELEPHONE (OUTPATIENT)
Dept: FAMILY MEDICINE | Facility: CLINIC | Age: 65
End: 2025-03-10
Payer: MEDICARE

## 2025-03-10 DIAGNOSIS — M12.812 ROTATOR CUFF ARTHROPATHY OF LEFT SHOULDER: ICD-10-CM

## 2025-03-10 DIAGNOSIS — E11.42 DIABETIC POLYNEUROPATHY ASSOCIATED WITH TYPE 2 DIABETES MELLITUS: ICD-10-CM

## 2025-03-10 DIAGNOSIS — M62.838 CERVICAL PARASPINAL MUSCLE SPASM: ICD-10-CM

## 2025-03-10 RX ORDER — PREGABALIN 75 MG/1
CAPSULE ORAL
Qty: 90 CAPSULE | Refills: 0 | Status: SHIPPED | OUTPATIENT
Start: 2025-03-10

## 2025-03-10 NOTE — TELEPHONE ENCOUNTER
----- Message from Diane sent at 3/10/2025 10:40 AM CDT -----  Contact: Trinity with [facility]  Type:  Patient Requesting a call back Who Called:[place] What is the call back request regarding?:verbal authorization needed for  Diabetic testing supplies and a pain cream Would the patient rather a call back or a response via [facility]?call Best Call Back Number:[phone] Information:Please call for additional information

## 2025-03-12 ENCOUNTER — PROCEDURE VISIT (OUTPATIENT)
Dept: NEUROLOGY | Facility: CLINIC | Age: 65
End: 2025-03-12
Payer: MEDICARE

## 2025-03-12 DIAGNOSIS — G43.719 INTRACTABLE CHRONIC MIGRAINE WITHOUT AURA AND WITHOUT STATUS MIGRAINOSUS: Primary | ICD-10-CM

## 2025-03-13 ENCOUNTER — DOCUMENTATION ONLY (OUTPATIENT)
Dept: REHABILITATION | Facility: HOSPITAL | Age: 65
End: 2025-03-13

## 2025-03-13 ENCOUNTER — CLINICAL SUPPORT (OUTPATIENT)
Dept: REHABILITATION | Facility: HOSPITAL | Age: 65
End: 2025-03-13
Attending: GENERAL ACUTE CARE HOSPITAL
Payer: MEDICARE

## 2025-03-13 DIAGNOSIS — R29.898 DECREASED STRENGTH OF UPPER EXTREMITY: Primary | ICD-10-CM

## 2025-03-13 PROCEDURE — 97110 THERAPEUTIC EXERCISES: CPT | Mod: PN | Performed by: GENERAL ACUTE CARE HOSPITAL

## 2025-03-13 PROCEDURE — 97112 NEUROMUSCULAR REEDUCATION: CPT | Mod: PN | Performed by: GENERAL ACUTE CARE HOSPITAL

## 2025-03-14 NOTE — PROGRESS NOTES
"  Outpatient Rehab    Physical Therapy Discharge    Patient Name: Zakia Price  MRN: [id number]  YOB: 1960  Encounter Date: 3/13/2025    Therapy Diagnosis: No diagnosis found.  Physician: Oleksandr Nagel MD    Physician Orders: Eval and Treat  Medical Diagnosis:  Rotator cuff arthropathy of left shoulder [M12.812]      Visit # / Visits Authorized: 3 / 20   Date of Evaluation: 1/3/2025  Insurance Authorization Period: 1/1/25 to 12/31/25  Plan of Care Certification: 2/27/25 to 3/20/25   Reassessment Date: 3/29/25     Time In: 1000   Time Out: 1100  Total Time: 60   Total Billable Time: 50    FOTO:  Intake Score:  %  Survey Score 1:  %  Survey Score 2:  %         Subjective   Patient reports inconsisntent pain in bilaterla shoulders "Some days its better and some days its worse." She is at a point where she is ready to "take a break" from physical therapy. Patient has a follow-up with physician next week with plans to receive another shoulder injection. Patient feel comfortable continuing her exercises at home. Patient also states that she would like to defer her second physical referal for her foot/lowerextremity/assistive device at this time..  Pain reported as 5/10. bilateral shoulders    Objective            Treatment:  CPT Interventions & Parameters   TE Pulleys x 5 minutes - flexion only  UBE 4/4   NMR Seated theraband rows - red theraband 4x10  Seated lat pull downs - red theraband 4x10  Thoracic extension in chair x 40 reps - over ball   Bicep curls 4# 4x10  Supine chest press 4# dowel 4x10  Supine flexion 4# dowel 4x10   TE Plan of Care review            Units Time (min) CPT  Description   timed 50 total billable time     1 15 Therapeutic Exercise  45774 (TE)  to develop: strength, endurance, ROM, flexibility, posture, and core stabilization   2 35 Neuromuscular Re-education  66372 (NMR)  activities to improve: Balance, Coordination, Kinesthetic, Sense, Proprioception, and Posture      "   Therapeutic Activities  85646 (TA)  to improve: functional performance, impact activities of daily living       Manual Therapy  32206 (MT) techniques to improve: Joint mobilizations, Manual traction, Myofacial release, Soft tissue Mobilization, and Friction Massage       Gait Training  85493(GT)     untimed             Dry Needling  20560 (DN 1-2)   to impact muscular pain, tightness, trigger points, and tissue pliability       Dry Needling  49331 (DN 3+)  to impact muscular pain, tightness, trigger points, and tissue pliability       Unattended E-Stimulation  46380 (UES)   for muscle performance or pain modulation       Heat/Cold Modalities  52229 (H/C)   to impact pain, pain modulation, sensitization training        Mechanical Traction  56166 (T)  to impact pain, tissue pliability, range of motion      *A portion of this treatment session is provided with the assistance of a skilled rehabilitation technician under the supervision of a licensed physical therapist  *Billing accurately reflects 1:1 treatment time per patient's insurance guidelines.     Home Exercises and Patient Education Reviewed   Patient was educated on the role of Physical Therapy, Treatment plan, Discharge Goals, Home Exercise Program.  Patient educated on biomechanical justification for therapeutic exercise and importance of compliance with Home Exercise Program in order to improve overall impairments and Quality of Life.  Patient was educated on all the above exercise prior/during/after for proper posture, positioning, and execution for safe performance with home exercise program.     Disclaimer: This note was prepared using a voice recognition system and is likely to have sound alike errors within the text.    Assessment & Plan   Assessment: Patient has reached her maximum potential for physical therapy at this time. Patient made minor gains with physical therapy to date. She is agreeable to discharge at this time.  Evaluation/Treatment  Tolerance: Patient limited by pain    Patient's spiritual, cultural, and educational needs considered and patient agreeable to plan of care and goals.           Plan: Patient will be discharged from physical therapy.    Goals:   Active       Long Term Goals        Patient will display independent and correct performance of advanced home exercise program without cueing to impact knowledge of condition (Met)       Start:  01/28/25    Expected End:  03/18/25    Resolved:  03/14/25         Patient to improve active range of motion L shoulder flexion to 115 degrees to impact self-care/grooming (Unable to Meet)       Start:  01/28/25    Expected End:  03/18/25               Short Term Goals       Patient to be independent with foundational home exercise program performance to impact knowledge of condition  (Met)       Start:  01/28/25    Expected End:  02/25/25    Resolved:  03/14/25         Patient to improve active range of motion L shoulder flexion to 90 degrees to impact self-care/grooming (Met)       Start:  01/28/25    Expected End:  02/25/25    Resolved:  02/20/25         Patient to lift 5lbs to eye levels to impact activities of daily living performance safely (Unable to Meet)       Start:  01/28/25    Expected End:  03/18/25            Patient to improve Shoulder FOTO to 53 /100 to display improved activity participation (Unable to Meet)       Start:  01/28/25    Expected End:  03/18/25                Luly Ruiz, PT, DPT

## 2025-03-28 ENCOUNTER — HOSPITAL ENCOUNTER (OUTPATIENT)
Dept: RADIOLOGY | Facility: HOSPITAL | Age: 65
Discharge: HOME | End: 2025-03-28
Attending: NURSE PRACTITIONER
Payer: MEDICARE

## 2025-03-28 ENCOUNTER — OFFICE VISIT (OUTPATIENT)
Dept: FAMILY MEDICINE | Facility: CLINIC | Age: 65
End: 2025-03-28
Payer: MEDICARE

## 2025-03-28 VITALS
SYSTOLIC BLOOD PRESSURE: 124 MMHG | HEART RATE: 85 BPM | RESPIRATION RATE: 16 BRPM | OXYGEN SATURATION: 97 % | WEIGHT: 293 LBS | HEIGHT: 68 IN | TEMPERATURE: 98 F | BODY MASS INDEX: 44.41 KG/M2 | DIASTOLIC BLOOD PRESSURE: 72 MMHG

## 2025-03-28 DIAGNOSIS — G44.86 CERVICOGENIC HEADACHE: ICD-10-CM

## 2025-03-28 DIAGNOSIS — R60.0 BILATERAL LOWER EXTREMITY EDEMA: ICD-10-CM

## 2025-03-28 DIAGNOSIS — E11.65 TYPE 2 DIABETES MELLITUS WITH HYPERGLYCEMIA, WITHOUT LONG-TERM CURRENT USE OF INSULIN: Primary | Chronic | ICD-10-CM

## 2025-03-28 DIAGNOSIS — Z79.899 ENCOUNTER FOR LONG-TERM (CURRENT) USE OF MEDICATIONS: ICD-10-CM

## 2025-03-28 DIAGNOSIS — E11.59 HYPERTENSION ASSOCIATED WITH DIABETES: Chronic | ICD-10-CM

## 2025-03-28 DIAGNOSIS — E11.69 COMBINED HYPERLIPIDEMIA ASSOCIATED WITH TYPE 2 DIABETES MELLITUS: Chronic | ICD-10-CM

## 2025-03-28 DIAGNOSIS — E11.42 DIABETIC POLYNEUROPATHY ASSOCIATED WITH TYPE 2 DIABETES MELLITUS: Chronic | ICD-10-CM

## 2025-03-28 DIAGNOSIS — E61.1 IRON DEFICIENCY: ICD-10-CM

## 2025-03-28 DIAGNOSIS — E89.0 POSTOPERATIVE HYPOTHYROIDISM: Chronic | ICD-10-CM

## 2025-03-28 DIAGNOSIS — E66.813 CLASS 3 OBESITY WITH ALVEOLAR HYPOVENTILATION, SERIOUS COMORBIDITY, AND BODY MASS INDEX (BMI) OF 40.0 TO 44.9 IN ADULT: ICD-10-CM

## 2025-03-28 DIAGNOSIS — E66.2 CLASS 3 OBESITY WITH ALVEOLAR HYPOVENTILATION, SERIOUS COMORBIDITY, AND BODY MASS INDEX (BMI) OF 40.0 TO 44.9 IN ADULT: ICD-10-CM

## 2025-03-28 DIAGNOSIS — I15.2 HYPERTENSION ASSOCIATED WITH DIABETES: Chronic | ICD-10-CM

## 2025-03-28 DIAGNOSIS — M85.80 OSTEOPENIA, UNSPECIFIED LOCATION: ICD-10-CM

## 2025-03-28 DIAGNOSIS — E78.2 COMBINED HYPERLIPIDEMIA ASSOCIATED WITH TYPE 2 DIABETES MELLITUS: Chronic | ICD-10-CM

## 2025-03-28 PROBLEM — R29.898 DECREASED STRENGTH OF UPPER EXTREMITY: Status: RESOLVED | Noted: 2025-01-17 | Resolved: 2025-03-28

## 2025-03-28 PROBLEM — R05.9 COUGH IN ADULT: Status: RESOLVED | Noted: 2019-12-04 | Resolved: 2025-03-28

## 2025-03-28 PROBLEM — E11.49 TYPE II DIABETES MELLITUS WITH NEUROLOGICAL MANIFESTATIONS: Chronic | Status: RESOLVED | Noted: 2019-12-10 | Resolved: 2025-03-28

## 2025-03-28 PROCEDURE — 99999 PR PBB SHADOW E&M-EST. PATIENT-LVL V: CPT | Mod: PBBFAC,,, | Performed by: NURSE PRACTITIONER

## 2025-03-28 PROCEDURE — 72040 X-RAY EXAM NECK SPINE 2-3 VW: CPT | Mod: TC,PO

## 2025-03-28 PROCEDURE — 72040 X-RAY EXAM NECK SPINE 2-3 VW: CPT | Mod: 26,,, | Performed by: RADIOLOGY

## 2025-03-28 RX ORDER — TOPIRAMATE 100 MG/1
100 TABLET, FILM COATED ORAL 2 TIMES DAILY
Qty: 180 TABLET | Refills: 3 | Status: SHIPPED | OUTPATIENT
Start: 2025-03-28

## 2025-03-28 RX ORDER — FUROSEMIDE 20 MG/1
20 TABLET ORAL DAILY PRN
Qty: 30 TABLET | Refills: 0 | Status: SHIPPED | OUTPATIENT
Start: 2025-03-28 | End: 2026-03-28

## 2025-03-28 NOTE — PROGRESS NOTES
Assessment/Plan:  Problem List Items Addressed This Visit          Neuro    Diabetic neuropathy (Chronic)    Overview   March 2025:  Diabetes Medications               semaglutide (OZEMPIC) 2 mg/dose (8 mg/3 mL) PnIj Inject 2 mg into the skin every 7 days.          July 2022:  She continues on Lyrica but now on 50 milligrams she reports no real improvement in pain.  She continues on Ozempic.  April 2022:  Patient reports that she is still having significant nerve pain.  She is currently taking Lyrica 25 milligrams twice daily.  Chronic.  She has been on gabapentin previously.  She was taking 100 milligrams 3 times daily but does not report any improvement in her burning in her feet.  She was switched to Topamax by pain management.  She reports that some of her pain is better but her in burning sensation is still present.  Reviewed nerve study from 2021:  Summary:  Nerve conduction studies were performed on the right upper and lower extremities.  Right median, ulnar, and superficial peroneal sensory responses were normal in amplitude and latency.  Right sural sensory response was absent.  Right median motor response was borderline in velocity but normal in amplitude and latency.  Right ulnar motor response was normal in amplitude, latency and velocity.  Right peroneal motor response was normal in amplitude, latency and velocity.  Right tibial motor response was prolonged with normal amplitude and velocity.  Further internal comparison studies were performed to assess the carpal tunnel.  Right median versus ulnar 2nd lumbrical interosseous comparison studies revealed a prolonged median motor latency as compared to the ulnar.  Needle EMG was performed in the right upper and lower extremities.  No active denervation was present in any muscle tested.  Motor unit morphology and recruitment patterns were normal in every muscle tested.     Impression:  This is a mildly abnormal EMG of the right upper and lower extremities.   "There is electrophysiologic evidence of the following:  Very mild, axonal, peripheral polyneuropathy without active denervation.  Very mild, right, median mononeuropathy across the wrist (carpal tunnel syndrome) without active denervation.  There is no evidence of any other focal neuropathy, plexopathy, or radiculopathy on the study.  In addition there is no evidence of myopathy on the study.        Thank you for referring to the [facility] in [place]. Please feel free to contact the clinic if you have any further questions regarding this study or report.        _____________________________  Cady Roberts D.O., ABPN, AOBNP, ABEM     Diabetes Management Status    Statin: Not taking  ACE/ARB: Not taking    Screening or Prevention Patient's value Goal Complete/Controlled?   HgA1C Testing and Control   Lab Results   Component Value Date    HGBA1C 5.5 02/11/2025      Annually/Less than 8% Yes   Lipid profile : 02/11/2025 Annually Yes   LDL control Lab Results   Component Value Date    LDLCALC 124.8 02/11/2025    Annually/Less than 100 mg/dl  No   Nephropathy screening Lab Results   Component Value Date    LABMICR 5.0 09/09/2024     Lab Results   Component Value Date    PROTEINUA Negative 10/28/2015     No results found for: "UTPCR"   Annually Yes   Blood pressure BP Readings from Last 1 Encounters:   03/28/25 124/72    Less than 140/90 Yes   Dilated retinal exam : 08/21/2024 Annually Yes   Foot exam   : 07/08/2024 Annually Yes              Current Assessment & Plan   Continue Lyrica 75 milligram. Follow up with podiatry. We will plan to monitor hemoglobin A1c at designated intervals 3 to 6 months.  I recommend ongoing Education for diabetic diet and exercise protocol.  We will continue to monitor for side effects.    Please be advised of symptoms to monitor for and to notify me immediately if persistent or worsening.  Follow up with Ophthalmology/Optometry and Podiatry at least " annually.              Psychiatric    Encounter for long-term (current) use of medications (Chronic)    Overview   March 2025: reviewed labs.   Feb 2025: reviewed labs.  December 2024:  Reviewed labs December 2024:  Reviewed labs.  Sept 2024: reviewed labs.  May 2024:  Reviewed labs.  November 2023: Reviewed labs.  October 2023: Reviewed labs.  August 2023: Reviewed labs.  June 2023: Reviewed labs.  January 2023:  Reviewed labs.  CHRONIC. Stable. Compliant with medications for managed conditions. See medication list. No SE reported. Routine lab analysis is being monitored. Refills were addressed.  January 2021:CHRONIC. Stable. Compliant with medications for managed conditions. See medication list. No SE reported. Routine lab analysis is being monitored. Refills were addressed.  July 2021:  Reviewed labs.  January 2022:  Reviewed labs.  February 2022:  Reviewed labs.  July 2022:  Reviewed labs.  Lab Results   Component Value Date    WBC 3.93 02/11/2025    HGB 12.9 02/11/2025    HCT 42.7 02/11/2025    MCV 96 02/11/2025     02/11/2025         Chemistry        Component Value Date/Time     02/11/2025 1024    K 3.9 02/11/2025 1024     02/11/2025 1024    CO2 21 (L) 02/11/2025 1024    BUN 9 02/11/2025 1024    CREATININE 0.7 02/11/2025 1024    GLU 72 02/11/2025 1024        Component Value Date/Time    CALCIUM 9.0 02/11/2025 1024    ALKPHOS 97 02/11/2025 1024    AST 11 02/11/2025 1024    ALT 9 (L) 02/11/2025 1024    BILITOT 0.3 02/11/2025 1024    ESTGFRAFRICA >60.0 02/24/2022 1255    EGFRNONAA >60.0 02/24/2022 1255          Lab Results   Component Value Date    TSH 1.031 02/11/2025    FREET4 0.95 12/26/2023    T3FREE 2.5 12/26/2023            Current Assessment & Plan   Complete history and physical was completed today.  Complete and thorough medication reconciliation was performed.  Discussed risks and benefits of medications.  Advised patient on orders and health maintenance.  We discussed old records  and old labs if available.  Will request any records not available through epic.  Continue current medications listed on your summary sheet.         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 STABLE. Lab analysis reviewed.   (-) CP, SOB, abdominal pain, N/V/D, constipation, jaundice, skin changes.  (-) Myalgias  Lab Results   Component Value Date    CHOL 204 (H) 02/11/2025    CHOL 199 09/09/2024    CHOL 215 (H) 12/26/2023     Lab Results   Component Value Date    HDL 60 02/11/2025    HDL 66 09/09/2024    HDL 67 12/26/2023     Lab Results   Component Value Date    LDLCALC 124.8 02/11/2025    LDLCALC 120.6 09/09/2024    LDLCALC 134.6 12/26/2023     Lab Results   Component Value Date    TRIG 96 02/11/2025    TRIG 62 09/09/2024    TRIG 67 12/26/2023     Lab Results   Component Value Date    CHOLHDL 29.4 02/11/2025    CHOLHDL 33.2 09/09/2024    CHOLHDL 31.2 12/26/2023     Lab Results   Component Value Date    TOTALCHOLEST 3.4 02/11/2025    TOTALCHOLEST 3.0 09/09/2024    TOTALCHOLEST 3.2 12/26/2023     Lab Results   Component Value Date    ALT 9 (L) 02/11/2025    AST 11 02/11/2025    ALKPHOS 97 02/11/2025    BILITOT 0.3 02/11/2025     ======================================================  The 10-year ASCVD risk score (Fawn DEE, et al., 2019) is: 21.6%    Values used to calculate the score:      Age: 65 years      Sex: Female      Is Non- : Yes      Diabetic: Yes      Tobacco smoker: No      Systolic Blood Pressure: 124 mmHg      Is BP treated: Yes      HDL Cholesterol: 60 mg/dL      Total Cholesterol: 204 mg/dL    Hyperlipidemia Medications               ezetimibe (ZETIA) 10 mg tablet Take 1 tablet (10 mg total) by  mouth once daily.                 Current Assessment & Plan   Reviewed labs. Continue zetia.  Counseled on hyperlipidemia disease course, healthy diet and increased need for exercise.  Please be advised of the risk of cardiovascular disease, increase stroke and heart attack risk with uncontrolled/untreated hyperlipidemia. Patient voiced understanding and understood the treatment plan. All questions were answered.          Hypertension associated with diabetes (Chronic)    Overview   CHRONIC.  March 2025:    Hypertension Medications               diltiaZEM (CARDIZEM) 30 MG tablet Take 1 tablet (30 mg total) by mouth every 12 (twelve) hours.    minoxidiL (LONITEN) 2.5 MG tablet Take 0.5 tablets (1.25 mg total) by mouth once daily.       October 2023:  Reviewed meds.  June 2023:  Blood pressure is doing well on current regimen.  May 2023:  A1c currently 4.9.  Patient denies any symptoms of low blood sugar.  She is continuing to take metformin twice daily.  Blood pressure is well controlled on current regimen.    February 2022:  Blood pressure remains less than 140/90.  January 2022:  Blood pressure well controlled actually on the lower end of normal today.  Patient is on diltiazem 120 milligrams daily.  She does report some constipation that is being treated with Linzess.  July 2021:  Blood pressure well controlled today.  Previous HPI  Patient recently got a new blood pressure monitor at home through digital [facility] however the cuff does not fit around her arm.. BP Reviewed.  Compliant with BP medications. No SE reported.  Patient taking amlodipine 5 mg.  (-) CP, SOB, palpitations, dizziness, lightheadedness, HA, arm numbness, tingling or weakness, syncope.  Creatinine   Date Value Ref Range Status   02/11/2025 0.7 0.5 - 1.4 mg/dL Final     History of hypertension currently on amlodipine with previous reported allergy to ACE inhibitor    Last Assessment & Plan:   Blood pressure stable on current medicine regimen.  Continue current medicine regimen and follow low salt diet.  No results found. However, due to the size of the patient record, not all encounters were searched. Please check Results Review for a complete set of results.           Current Assessment & Plan   Continue current blood pressure medication regimen.Counseled on importance of hypertension disease course, I recommend ongoing Education for DASH-diet and exercise. Counseled on medication regimen importance of treating high blood pressure. Please be advised of risk of untreated blood pressure as discussed. Please advised of ER precautions were given for symptoms of hypertensive urgency and emergency.             Oncology    Iron deficiency    Overview   Followed by heme onc. She is receiving iron infusions.    Lab Results   Component Value Date    WBC 3.93 02/11/2025    HGB 12.9 02/11/2025    HCT 42.7 02/11/2025    MCV 96 02/11/2025     02/11/2025     Lab Results   Component Value Date    IRON 78 12/16/2024    TRANSFERRIN 221 12/16/2024    TIBC 327 12/16/2024    FESATURATED 24 12/16/2024      Lab Results   Component Value Date    FERRITIN 69 12/16/2024            Current Assessment & Plan   Continue current medications and plan of care per heme onc. Follow up with specialist. Monitor labs.             Endocrine    Type 2 diabetes mellitus with hyperglycemia, without long-term current use of insulin - Primary (Chronic)    Overview   March 2025: reviewed labs.    Diabetes Medications               OZEMPIC 2 mg/dose (8 mg/3 mL) PnIj INJECT TWO MG UNDER THE SKIN EVERY 7 DAYS     May 2024:  Patient is no longer on metformin.  She continues on Ozempic.  She is having some GI issues.  Diabetes Medications               semaglutide (OZEMPIC) 2 mg/dose (8 mg/3 mL) PnIj Inject 2 mg into the skin every 7 days.          November 2023:  Patient reports compliance.  Patient has lost weight on medication.  Diabetes Medications               metFORMIN (GLUCOPHAGE) 1000  "MG tablet Take 1 tablet (1,000 mg total) by mouth daily with breakfast.    semaglutide (OZEMPIC) 2 mg/dose (8 mg/3 mL) PnIj INJECT 2 MG INTO THE SKIN EVERY 7 DAYS.        January 2023:  Doing well on current regimen.  BMI Readings from Last 10 Encounters:   03/28/25 48.90 kg/m²   02/11/25 48.58 kg/m²   01/15/25 48.05 kg/m²   12/19/24 48.12 kg/m²   12/18/24 48.34 kg/m²   12/17/24 48.20 kg/m²   12/12/24 47.14 kg/m²   11/12/24 47.73 kg/m²   11/05/24 47.50 kg/m²   10/28/24 47.48 kg/m²   November 2019:  Reviewed diabetes management status.  Patient was due for LDL less than 100 at that time.  Also needing foot exam.DECEMBER 2019:  Patient reports issues with ACE-inhibitor.  Currently having cough.  Only on amlodipine.Diabetes Management StatusStatin: TakingACE/ARB: Not takingMARCH 2020:  Diabetes Management StatusStatin: TakingACE/ARB: Not takingSEPTEMBER 2020:  Reviewed Diabetes Management Status.  Patient is taking statin but not Ace or Arb.  July 2021:Diabetes Management StatusStatin: TakingACE/ARB: Not taking  January 2022:  Patient reports weight loss with Ozempic.  Diabetes Management Status    Statin: Not taking  ACE/ARB: Not taking    Screening or Prevention Patient's value Goal Complete/Controlled?   HgA1C Testing and Control   Lab Results   Component Value Date    HGBA1C 5.5 02/11/2025      Annually/Less than 8% Yes   Lipid profile : 02/11/2025 Annually Yes   LDL control Lab Results   Component Value Date    LDLCALC 124.8 02/11/2025    Annually/Less than 100 mg/dl  No   Nephropathy screening Lab Results   Component Value Date    LABMICR 5.0 09/09/2024     Lab Results   Component Value Date    PROTEINUA Negative 10/28/2015     No results found for: "UTPCR"   Annually Yes   Blood pressure BP Readings from Last 1 Encounters:   03/28/25 124/72    Less than 140/90 Yes   Dilated retinal exam : 08/21/2024 Annually Yes   Foot exam   : 07/08/2024 Annually Yes              Current Assessment & Plan   Continue current " medications.We will plan to monitor hemoglobin A1c at designated intervals 3 to 6 months.  I recommend ongoing Education for diabetic diet and exercise protocol.  We will continue to monitor for side effects.    Please be advised of symptoms to monitor for and to notify me immediately if persistent or worsening.  Follow up with Ophthalmology/Optometry and Podiatry at least annually.         Relevant Orders    Ambulatory referral/consult to Podiatry    Postoperative hypothyroidism (Chronic)    Overview   Chronic.  Stable.  Patient reports compliance with Synthroid 100 mcg.  No side effects reported.  Symptoms are well controlled.     Lab Results   Component Value Date    TSH 1.031 02/11/2025    FREET4 0.95 12/26/2023      Lab Results   Component Value Date    T3FREE 2.5 12/26/2023    T3FREE 2.7 12/04/2019             Current Assessment & Plan   Continue synthroid. Monitor labs. Discussed hypothyroidism disease course.  Discussed risks and benefits of medication use.  ER precautions.         Class 3 obesity with alveolar hypoventilation, serious comorbidity, and body mass index (BMI) of 40.0 to 44.9 in adult    Overview   Wt Readings from Last 3 Encounters:   03/28/25 1339 (!) 145.9 kg (321 lb 9.6 oz)   02/11/25 0949 (!) 144.9 kg (319 lb 8 oz)   01/15/25 0855 (!) 143.3 kg (316 lb)            Current Assessment & Plan   Encourage increased physical activity, healthy diet choices, and weight loss for prevention of progression of comorbid conditions.             Orthopedic    Osteopenia    Overview   Narrative & Impression  EXAMINATION:  DXA BONE DENSITY AXIAL SKELETON 1 OR MORE SITES     CLINICAL HISTORY:  Asymptomatic menopausal state     TECHNIQUE:  DXA scanning was performed over the left hip and lumbar spine.  Review of the images confirms satisfactory positioning and technique.     COMPARISON:  None     FINDINGS:  The L1 to L4 vertebral bone mineral density is equal to 1.28 g/cm squared with a T score of 2.2.      The left femoral neck bone mineral density is equal to 0.70 g/cm squared with a T score of -1.3.     There is a 3.5% risk of a major osteoporotic fracture and a 0.3% risk of hip fracture in the next 10 years (FRAX).     Impression:     Osteopenia     Consider FDA approved medical therapies in postmenopausal women and men aged 50 years and older, based on the following:     *A hip or vertebral (clinical or morphometric) fracture  *T score less than or equal to -2.5 at the femoral neck or spine after appropriate evaluation to exclude secondary causes.  *Low bone mass -- also known as osteopenia (T score between -1.0 and -2.5 at the femoral neck or spine) and a 10 year probability of hip fracture greater than or equal to 3% or a 10 year probability of major osteoporosis-related fracture greater than or equal to 20% based on the [place]-adapted WHO algorithm.  *Clinicians judgment and/or patient preference may indicate treatment for people with 10 year fracture probabilities is above or below these levels.        Electronically signed by:Lobito Stover MD  Date:                                            02/17/2025  Time:                                           10:58                 Current Assessment & Plan   Start over the counter Os-Sj + D OR calcium 1200 mg daily and Vitamin D3 1000 units daily. Recommend weight bearing exercises. Fall precautions.             Other    Bilateral lower extremity edema    Overview   March 2025: she continues to have lower leg swelling.    Feb 2025: New problem. Bilateral edema non-pitting. No history of CHF or DVT. No redness, warmth, tenderness.          Current Assessment & Plan   Start lasix 20 mg once daily PRN for BLE. Monitor labs. Recommend compression socks, elevate legs.          Relevant Medications    furosemide (LASIX) 20 MG tablet     Other Visit Diagnoses         Cervicogenic headache        Relevant Orders    X-Ray Cervical Spine AP And Lateral          Follow up in  about 6 months (around 9/28/2025), or if symptoms worsen or fail to improve.  ER precautions for severe or worsening symptoms.     Rose Price, [facility]  _____________________________________________________________________________________________________________________________________________________    CC: follow up     HPI: Patient is a 65-year-old female who presents in clinic today as an established patient here for follow up.    She reports progressive worsening of foot numbness bilaterally. Symptoms sometimes limit activities. She continues Lyrica TID without symptomatic relief. She also takes Requip for restless legs. She is followed by podiatry and neurology.     She also reports headaches triggered by turning head to the left, denies nexk pain. She has chronic migraines. She takes Topamax and also receives Botox treatments per neurology.     Mammogram in January showed no malignancy. Bone scan in February showed osteopenia, she is taking calcium and vitamin D. Colonoscopy in May 2021- 10 year surveillance.     She receives iron infusions for iron deficiency treatment. She has an upcoming hematology appointment this month.    CURRENT MEDICATIONS:  She takes Cardizem (Diltiazem), Minoxidil (recently started), Zetia, Ozempic, levothyroxine, and Requip (Ropineral).     Other chronic conditions have been reviewed and remains stable. Further details as stated above.     HTN: The patient is currently being treated for essential hypertension. This condition is chronic and stable. The patient is tolerating their medication well with good compliance.  Denies any adverse effects of medications.  Counseling was offered regarding low sodium diet.  The patient has a reduced salt intake. Routine exercise recommended. The patient denies headache, vision changes, chest pain, palpitations, shortness of breath, or lower extremity edema.    Hyperlipidemia: This is a chronic problem. The current episode started more than 1 year  ago. The problem is controlled. Recent lipid tests were reviewed and are variable. Pertinent negatives include no chest pain or shortness of breath. Current antihyperlipidemic treatment includes Zetia. The current treatment provides moderate improvement of lipids. There are no compliance problems.      Hypertension Medications              diltiaZEM (CARDIZEM) 30 MG tablet Take 1 tablet (30 mg total) by mouth every 12 (twelve) hours.    minoxidiL (LONITEN) 2.5 MG tablet Take 0.5 tablets (1.25 mg total) by mouth once daily.    furosemide (LASIX) 20 MG tablet Take 1 tablet (20 mg total) by mouth daily as needed (swelling).          Hyperlipidemia Medications              ezetimibe (ZETIA) 10 mg tablet Take 1 tablet (10 mg total) by mouth once daily.          Diabetes Medications              OZEMPIC 2 mg/dose (8 mg/3 mL) PnIj INJECT TWO MG UNDER THE SKIN EVERY 7 DAYS          Past Medical History:  Past Medical History:   Diagnosis Date    Acute respiratory failure due to COVID-19     COVID-19     Diabetes mellitus, type 2 1993    BS  didn't check 10/04/2023 Insulin x 2 years    Diabetic neuropathy 01/27/2014    DJD (degenerative joint disease) of knee     DVT (deep venous thrombosis) around 1990's    in leg, is on no anticoagulant therapy presently    Fatty liver     GERD (gastroesophageal reflux disease)     Hypertension associated with diabetes     HECTOR (iron deficiency anemia) 05/13/2021    Iron deficiency anemia due to chronic blood loss 01/11/2021    Chronic.  Control is uncertain.  Patient recently started on iron supplement over-the-counter.  Patient reports no side effects.  Plan to recheck iron studies and blood counts.          Lab Results      Component    Value    Date           WBC    5.03    12/08/2020           HGB    11.5 (L)    12/08/2020           HCT    38.6    12/08/2020           MCV    88    12/08/2020           PLT    388 (H)    Multinodular goiter     Obesity, morbid, BMI 50 or higher     Sleep  apnea     has no CPAP     Past Surgical History:   Procedure Laterality Date    COLONOSCOPY N/A 5/12/2021    Procedure: COLONOSCOPY;  Surgeon: Carolina Rizo MD;  Location: [facility];  Service: Endoscopy;  Laterality: N/A;    EPIDURAL STEROID INJECTION N/A 12/10/2021    Procedure: Lumbar L5/S1 IL TEETEE  Would like AM procedure, if possible;  Surgeon: Nae Paul MD;  Location: [facility];  Service: Pain Management;  Laterality: N/A;    EPIDURAL STEROID INJECTION INTO CERVICAL SPINE N/A 10/8/2021    Procedure: C7-T1 IL TEETEE-no sedation.  Needs IV-just incase;  Surgeon: Nae Paul MD;  Location: [facility];  Service: Pain Management;  Laterality: N/A;    ESOPHAGOGASTRODUODENOSCOPY N/A 7/8/2021    Procedure: EGD (ESOPHAGOGASTRODUODENOSCOPY) previous positve covid;  Surgeon: Jann Gutierrez MD;  Location: [facility];  Service: Endoscopy;  Laterality: N/A;    ESOPHAGOGASTRODUODENOSCOPY N/A 9/18/2023    Procedure: EGD (ESOPHAGOGASTRODUODENOSCOPY);  Surgeon: Gm Leon MD;  Location: [facility];  Service: Endoscopy;  Laterality: N/A;    FRACTURE SURGERY      HYSTERECTOMY      INTRALUMINAL GASTROINTESTINAL TRACT IMAGING VIA CAPSULE N/A 10/24/2022    Procedure: IMAGING PROCEDURE, GI TRACT, INTRALUMINAL, VIA CAPSULE;  Surgeon: Hang Lunsford RN;  Location: [facility];  Service: Endoscopy;  Laterality: N/A;    SELECTIVE INJECTION OF ANESTHETIC AGENT AROUND LUMBAR SPINAL NERVE ROOT BY TRANSFORAMINAL APPROACH Bilateral 5/6/2022    Procedure: Bilateral L5/S1 TF TEETEE with RN IV sedation;  Surgeon: Nae Paul MD;  Location: [facility];  Service: Pain Management;  Laterality: Bilateral;    SELECTIVE INJECTION OF ANESTHETIC AGENT AROUND LUMBAR SPINAL NERVE ROOT BY TRANSFORAMINAL APPROACH Bilateral 12/30/2022    Procedure: Bilateral L5/S1 TF TEETEE RN IV Sedation;  Surgeon: Nae Paul MD;  [facility];  Service: Pain Management;  Laterality: Bilateral;    SHOULDER ARTHROSCOPY       "THYROIDECTOMY, PARTIAL Right     and transplatation of right superior parathyroid gland to the sternocleidomastoid muscle     TONSILLECTOMY      TUBAL LIGATION  1984    WRIST FRACTURE SURGERY      left     Review of patient's allergies indicates:   Allergen Reactions    Codeine sulfate      Nausea^    Lisinopril Swelling     angioedema    Codeine Nausea Only and Rash     Social History[1]  Family History   Problem Relation Name Age of Onset    Diabetes Mother Heather Villanueva     Hypertension Mother Heather Villanueva     Heart disease Mother Heather Villanueva 50    Ovarian cancer Mother Heather Villanueva     Cancer Father Gurwinder Dotson     Arthritis Father Gurwinder Dotson     Breast cancer Sister      Cancer Brother      Cancer Brother Gurwinder Buchanan     Leukemia Son Rafa Price     Cancer Son Rafa Price      Medications Ordered Prior to Encounter[2]    Review of Systems   Constitutional:  Negative for appetite change, chills, fatigue and fever.   HENT:  Negative for congestion, rhinorrhea and sore throat.    Eyes:  Negative for visual disturbance.   Respiratory:  Negative for cough and shortness of breath.    Cardiovascular:  Positive for leg swelling. Negative for chest pain and palpitations.   Gastrointestinal:  Negative for abdominal pain, diarrhea and vomiting.   Genitourinary:  Negative for difficulty urinating, dysuria and hematuria.   Musculoskeletal:  Negative for arthralgias and myalgias.   Skin:  Negative for rash and wound.   Neurological:  Positive for headaches. Negative for dizziness.   Psychiatric/Behavioral:  Negative for behavioral problems. The patient is not nervous/anxious.      Vitals:    03/28/25 1339   BP: 124/72   Pulse: 85   Resp: 16   Temp: 98.3 °F (36.8 °C)   TempSrc: Oral   SpO2: 97%   Weight: (!) 145.9 kg (321 lb 9.6 oz)   Height: 5' 8" (1.727 m)     Wt Readings from Last 3 Encounters:   03/28/25 (!) 145.9 kg (321 lb 9.6 oz)   02/11/25 (!) 144.9 kg (319 lb 8 oz)   01/15/25 (!) 143.3 kg (316 lb)     Physical " Exam  Vitals reviewed.   Constitutional:       General: She is not in acute distress.     Appearance: Normal appearance. She is obese. She is not ill-appearing.   HENT:      Head: Normocephalic and atraumatic.      Right Ear: External ear normal.      Left Ear: External ear normal.      Nose: Nose normal.   Eyes:      Extraocular Movements: Extraocular movements intact.      Conjunctiva/sclera: Conjunctivae normal.   Cardiovascular:      Rate and Rhythm: Normal rate.      Heart sounds: Normal heart sounds.   Pulmonary:      Effort: Pulmonary effort is normal. No respiratory distress.      Breath sounds: Normal breath sounds.   Abdominal:      General: Abdomen is flat. There is no distension.   Musculoskeletal:         General: Normal range of motion.      Cervical back: Normal range of motion. No torticollis, tenderness or bony tenderness. No pain with movement. Normal range of motion.      Right lower leg: Edema (nonpitting) present.      Left lower leg: Edema (nonpitting) present.   Skin:     General: Skin is warm and dry.      Capillary Refill: Capillary refill takes less than 2 seconds.      Coloration: Skin is not pale.      Findings: No rash.   Neurological:      General: No focal deficit present.      Mental Status: She is alert and oriented to person, place, and time. Mental status is at baseline.      Sensory: Sensation is intact.      Motor: Motor function is intact.      Coordination: Coordination is intact. Coordination normal.      Gait: Gait is intact.   Psychiatric:         Mood and Affect: Mood normal.         Speech: Speech normal. Speech is not rapid and pressured, delayed or slurred.         Behavior: Behavior normal. Behavior is not agitated, slowed, aggressive, withdrawn, hyperactive or combative. Behavior is cooperative.         Thought Content: Thought content normal.         Judgment: Judgment normal.       Health Maintenance   Topic Date Due    Low Dose Statin  Never done    Foot Exam   07/08/2025    Hemoglobin A1c  08/11/2025    Diabetic Eye Exam  08/21/2025    Diabetes Urine Screening  09/09/2025    Mammogram  01/30/2026    Lipid Panel  02/11/2026    DEXA Scan  02/17/2028    TETANUS VACCINE  10/29/2028    Colorectal Cancer Screening  05/12/2031    Hepatitis C Screening  Completed    Shingles Vaccine  Completed    Influenza Vaccine  Completed    COVID-19 Vaccine  Completed    RSV Vaccine (Age 60+ and Pregnant patients)  Completed    Pneumococcal Vaccines (Age 50+)  Completed     This note was generated with the assistance of ambient listening technology. Verbal consent was obtained by the patient and accompanying visitor(s) for the recording of patient appointment to facilitate this note. I attest to having reviewed and edited the generated note for accuracy, though some syntax or spelling errors may persist. Please contact the author of this note for any clarification.    Visit today included increased complexity associated with the care of the episodic problem - see above- addressed and managing the longitudinal care of the patient due to the serious and/or complex managed problem(s) - see above.         [1]   Social History  Tobacco Use    Smoking status: Never    Smokeless tobacco: Never   Substance Use Topics    Alcohol use: No    Drug use: No   [2]   Current Outpatient Medications on File Prior to Visit   Medication Sig Dispense Refill    albuterol (PROVENTIL/VENTOLIN HFA) 90 mcg/actuation inhaler Inhale 2 puffs into the lungs every 6 (six) hours as needed for Wheezing. 54 g 2    azelastine (ASTELIN) 137 mcg (0.1 %) nasal spray 1 spray (137 mcg total) by Nasal route 2 (two) times daily. 90 mL 3    cyclobenzaprine (FLEXERIL) 10 MG tablet Take 1 tablet (10 mg total) by mouth 2 (two) times daily as needed for Muscle spasms. 180 tablet 2    diclofenac (VOLTAREN) 75 MG EC tablet Take 1 tablet (75 mg total) by mouth 2 (two) times daily. 60 tablet 2    diclofenac sodium (VOLTAREN ARTHRITIS PAIN) 1  % Gel Apply 2 g topically once daily. 100 g 5    diltiaZEM (CARDIZEM) 30 MG tablet Take 1 tablet (30 mg total) by mouth every 12 (twelve) hours. 180 tablet 2    EPINEPHrine (EPIPEN) 0.3 mg/0.3 mL AtIn INJECT 0.3 MILLILITERS (0.3 MG TOTAL) INTRAMUSCULARLY ONCE. FOR ONE DOSE 2 each 0    ezetimibe (ZETIA) 10 mg tablet Take 1 tablet (10 mg total) by mouth once daily. 90 tablet 2    famotidine (PEPCID) 40 MG tablet Take 1 tablet (40 mg total) by mouth once daily. 90 tablet 2    finasteride (PROPECIA) 1 mg tablet Take 1 tablet (1 mg total) by mouth once daily. 30 tablet 2    fluticasone-umeclidin-vilanter (TRELEGY ELLIPTA) 100-62.5-25 mcg DsDv INHALE 1 PUFF BY MOUTH INTO THE LUNGS ONCE DAILY 180 each 3    ketoconazole (NIZORAL) 2 % shampoo Lather into affected areas. Rinse off in 5-10 min. Repeat 2-3 times a week. 120 mL 3    levothyroxine (SYNTHROID) 100 MCG tablet Take 1 tablet (100 mcg total) by mouth before breakfast. 90 tablet 2    minoxidiL (LONITEN) 2.5 MG tablet Take 0.5 tablets (1.25 mg total) by mouth once daily. 30 tablet 2    omeprazole (PRILOSEC) 40 MG capsule Take 1 capsule (40 mg total) by mouth once daily. 90 capsule 2    OZEMPIC 2 mg/dose (8 mg/3 mL) PnIj INJECT TWO MG UNDER THE SKIN EVERY 7 DAYS 9 mL 0    pregabalin (LYRICA) 75 MG capsule TAKE ONE CAPSULE (75 MG TOTAL) BY MOUTH THREE TIMES DAILY 90 capsule 0    rOPINIRole (REQUIP) 0.5 MG tablet Take 1 tablet (0.5 mg total) by mouth every evening. 90 tablet 2    tiZANidine (ZANAFLEX) 4 MG tablet TAKE 1/2 TO 1 TABLET BY MOUTH TWICE DAILY AS NEEDED FOR MUSCLE SPASMS. MAY CAUSE DROWSINESS 90 tablet 1    ubrogepant (UBRELVY) 100 mg tablet Take 1 tablet (100 mg total) by mouth daily as needed for Migraine. If symptoms persist or return, may repeat dose after 2 hours. Maximum: 200 mg per 24 hours 8 tablet 3    [DISCONTINUED] topiramate (TOPAMAX) 100 MG tablet Take 1 tablet by mouth twice daily 60 tablet 0     Current Facility-Administered Medications on File  Prior to Visit   Medication Dose Route Frequency Provider Last Rate Last Admin    onabotulinumtoxina injection 155 Units  155 Units Intramuscular Q90 Days    155 Units at 11/12/24 1220    onabotulinumtoxina injection 200 Units  200 Units Intramuscular Q90 Days    200 Units at 02/11/25 1346    onabotulinumtoxina injection 200 Units  200 Units Intramuscular Q90 Days

## 2025-03-28 NOTE — ASSESSMENT & PLAN NOTE
Continue Lyrica 75 milligram. Follow up with podiatry. We will plan to monitor hemoglobin A1c at designated intervals 3 to 6 months.  I recommend ongoing Education for diabetic diet and exercise protocol.  We will continue to monitor for side effects.    Please be advised of symptoms to monitor for and to notify me immediately if persistent or worsening.  Follow up with Ophthalmology/Optometry and Podiatry at least annually.

## 2025-03-28 NOTE — ASSESSMENT & PLAN NOTE
Continue current medications and plan of care per heme onc. Follow up with specialist. Monitor labs.

## 2025-03-28 NOTE — ASSESSMENT & PLAN NOTE
Continue synthroid. Monitor labs. Discussed hypothyroidism disease course.  Discussed risks and benefits of medication use.  ER precautions.

## 2025-03-28 NOTE — ASSESSMENT & PLAN NOTE
Start lasix 20 mg once daily PRN for BLE. Monitor labs. Recommend compression socks, elevate legs.

## 2025-03-28 NOTE — ASSESSMENT & PLAN NOTE
Start over the counter Os-Sj + D OR calcium 1200 mg daily and Vitamin D3 1000 units daily. Recommend weight bearing exercises. Fall precautions.

## 2025-03-31 ENCOUNTER — PATIENT MESSAGE (OUTPATIENT)
Dept: PHYSICAL MEDICINE AND REHAB | Facility: CLINIC | Age: 65
End: 2025-03-31
Payer: MEDICARE

## 2025-03-31 ENCOUNTER — TELEPHONE (OUTPATIENT)
Dept: PAIN MEDICINE | Facility: CLINIC | Age: 65
End: 2025-03-31
Payer: MEDICARE

## 2025-03-31 ENCOUNTER — RESULTS FOLLOW-UP (OUTPATIENT)
Dept: FAMILY MEDICINE | Facility: CLINIC | Age: 65
End: 2025-03-31

## 2025-03-31 DIAGNOSIS — M50.30 DDD (DEGENERATIVE DISC DISEASE), CERVICAL: Primary | ICD-10-CM

## 2025-03-31 NOTE — TELEPHONE ENCOUNTER
Attempted to reach patient for scheduling of Back and Spine Referral. No answer, call blocked by calling restrictions placed on patient's phone. Unable to leave VM. Sent message through patient portal for call back to schedule.  Janki Ramirez RN

## 2025-04-01 ENCOUNTER — TELEPHONE (OUTPATIENT)
Dept: PAIN MEDICINE | Facility: CLINIC | Age: 65
End: 2025-04-01
Payer: MEDICARE

## 2025-04-02 ENCOUNTER — TELEPHONE (OUTPATIENT)
Dept: FAMILY MEDICINE | Facility: CLINIC | Age: 65
End: 2025-04-02
Payer: MEDICARE

## 2025-04-02 NOTE — TELEPHONE ENCOUNTER
----- Message from Allyson sent at 4/2/2025  2:41 PM CDT -----  Contact: Trinity  TYPE: Patient Call BackWho called:Trinity with [facility] is the request in detail:  Verbal authorization for diabetic testing supplies and pain cream Can the clinic reply by [facility]?   Would the patient rather a call back or a response via [facility] call back number:[phone]

## 2025-04-03 RX ORDER — ALBUTEROL SULFATE 90 UG/1
INHALANT RESPIRATORY (INHALATION)
Qty: 20.1 G | Refills: 2 | Status: SHIPPED | OUTPATIENT
Start: 2025-04-03

## 2025-04-03 NOTE — TELEPHONE ENCOUNTER
Refill Decision Note   Zakia Price  is requesting a refill authorization.  Brief Assessment and Rationale for Refill:  Approve     Medication Therapy Plan:         Comments:     Note composed:8:18 AM 04/03/2025

## 2025-04-03 NOTE — TELEPHONE ENCOUNTER
No care due was identified.  [facility] Embedded Care Due Messages. Reference number: [id number].   4/03/2025 7:01:35 AM CDT

## 2025-04-07 ENCOUNTER — OFFICE VISIT (OUTPATIENT)
Dept: SPORTS MEDICINE | Facility: CLINIC | Age: 65
End: 2025-04-07
Payer: MEDICARE

## 2025-04-07 VITALS — BODY MASS INDEX: 44.41 KG/M2 | HEIGHT: 68 IN | RESPIRATION RATE: 17 BRPM | WEIGHT: 293 LBS

## 2025-04-07 DIAGNOSIS — M17.11 PRIMARY OSTEOARTHRITIS OF RIGHT KNEE: ICD-10-CM

## 2025-04-07 DIAGNOSIS — M17.12 PRIMARY OSTEOARTHRITIS OF LEFT KNEE: Primary | ICD-10-CM

## 2025-04-07 DIAGNOSIS — M12.812 ROTATOR CUFF ARTHROPATHY OF LEFT SHOULDER: ICD-10-CM

## 2025-04-07 PROCEDURE — 99999 PR PBB SHADOW E&M-EST. PATIENT-LVL IV: CPT | Mod: PBBFAC,,, | Performed by: PHYSICIAN ASSISTANT

## 2025-04-07 RX ORDER — TRIAMCINOLONE ACETONIDE 40 MG/ML
40 INJECTION, SUSPENSION INTRA-ARTICULAR; INTRAMUSCULAR
Status: DISCONTINUED | OUTPATIENT
Start: 2025-04-07 | End: 2025-04-07 | Stop reason: HOSPADM

## 2025-04-07 RX ORDER — TRIAMCINOLONE ACETONIDE 40 MG/ML
20 INJECTION, SUSPENSION INTRA-ARTICULAR; INTRAMUSCULAR
Status: DISCONTINUED | OUTPATIENT
Start: 2025-04-07 | End: 2025-04-07 | Stop reason: HOSPADM

## 2025-04-07 RX ADMIN — TRIAMCINOLONE ACETONIDE 20 MG: 40 INJECTION, SUSPENSION INTRA-ARTICULAR; INTRAMUSCULAR at 09:04

## 2025-04-07 RX ADMIN — TRIAMCINOLONE ACETONIDE 40 MG: 40 INJECTION, SUSPENSION INTRA-ARTICULAR; INTRAMUSCULAR at 09:04

## 2025-04-07 NOTE — PROCEDURES
Large Joint Aspiration/Injection: bilateral knee    Date/Time: 4/7/2025 9:30 AM    Performed by: Rosemary Alonso PA-C  Authorized by: Rosemary Alonso PA-C    Consent Done?:  Yes (Verbal)  Indications:  Pain  Site marked: the procedure site was marked    Timeout: prior to procedure the correct patient, procedure, and site was verified    Prep: patient was prepped and draped in usual sterile fashion      Local anesthesia used?: Yes    Anesthesia:  Local infiltration  Local anesthetic:  Lidocaine 1% without epinephrine  Anesthetic total (ml):  4      Details:  Needle Size:  22 G  Ultrasonic Guidance for needle placement?: No    Approach:  Anterolateral  Location:  Knee  Laterality:  Bilateral  Site:  Bilateral knee  Medications (Right):  20 mg triamcinolone acetonide 40 mg/mL  Medications (Left):  20 mg triamcinolone acetonide 40 mg/mL  Patient tolerance:  Patient tolerated the procedure well with no immediate complications     Procedure Note:  We discussed the risk and benefits of injections, including pain, infection, bleeding, damage to adjacent structures, risk of reaction to injection. We discussed the steroid/cortisone injections will not heal the problem but mat help decrease inflammation and help with symptoms. We discussed the risk of repeated injections. The patient expressed understanding and wanted to proceed with the injection. We performed a timeout to verify the proper patient, proper procedure, and the proper site. The injection site was prepared in a sterile fashion. The patient tolerated it well and there were no complication. We did discuss with the patient that steroid injections can cause some increase in blood sugar and blood pressure for up to a week after the injection.

## 2025-04-07 NOTE — PROCEDURES
Large Joint Aspiration/Injection: L subacromial bursa    Date/Time: 4/7/2025 9:30 AM    Performed by: Rosemary Alonso PA-C  Authorized by: Rosemary Alonso PA-C    Consent Done?:  Yes (Verbal)  Indications:  Pain  Site marked: the procedure site was marked    Timeout: prior to procedure the correct patient, procedure, and site was verified    Prep: patient was prepped and draped in usual sterile fashion      Local anesthesia used?: Yes    Anesthesia:  Local infiltration  Local anesthetic:  Lidocaine 1% without epinephrine    Details:  Needle Size:  22 G  Ultrasonic Guidance for needle placement?: No    Approach:  Posterior  Location:  Shoulder  Site:  L subacromial bursa  Medications:  40 mg triamcinolone acetonide 40 mg/mL  Patient tolerance:  Patient tolerated the procedure well with no immediate complications

## 2025-04-07 NOTE — PROGRESS NOTES
Orthopaedic Follow-Up Visit    Last Appointment:  11/05/2024  Diagnosis:  Primary osteoarthritis of both knees  Prior Procedure:  Bilateral Durolane 11/05/2024, left shoulder SAS CSI 12/17/2024 with PCP      Zakia Price is a 65 y.o. female who is here for f/u evaluation of bilateral knee and left shoulder pain. The patient was last seen here by me on on 11/05/2024 at which point we completed bilateral viscosupplementation injections, Durolane, prior to considering further treatment options. The patient returns today reporting that the symptoms have returned in her knees and her left shoulder and is interested in proceeding with further treatment options.     To review her history, Zakia Price is a 63 y.o. right and left-hand dominant female who initially presented to clinic on 11/27/2023 for left shoulder pain that has been present for several years but has been becoming more frequent at that time. She had no acute injury or trauma; however, she works as a  for several years and she attributes a lot of her shoulder pain that this. Her symptoms include anterior shoulder pain, limited range of motion, night pain, morning stiffness. Her pain is made worse by overhead movements and repetitive movements. Her treatment has included rest, activity modification, oral anti-inflammatories, Tylenol, home exercises, corticosteroid injection. As for her knees, she has had chronic left knee pain for several years. She has had corticosteroid injections in the past with good relief of symptoms. She also has a longstanding history of bilateral knee pain, chronic.  Generally gets good relief with intermittent corticosteroid injections and Visco.     Patient's medications, allergies, past medical, surgical, social and family histories were reviewed and updated as appropriate.    Review of Systems   All systems reviewed were negative.  Specifically, the patient denies fever, chills, weight loss, chest pain,  shortness of breath, or dyspnea on exertion.      Past Medical History:   Diagnosis Date    Acute respiratory failure due to COVID-19     COVID-19     Diabetes mellitus, type 2 1993    BS  didn't check 10/04/2023 Insulin x 2 years    Diabetic neuropathy 01/27/2014    DJD (degenerative joint disease) of knee     DVT (deep venous thrombosis) around 1990's    in leg, is on no anticoagulant therapy presently    Fatty liver     GERD (gastroesophageal reflux disease)     Hypertension associated with diabetes     HECTOR (iron deficiency anemia) 05/13/2021    Iron deficiency anemia due to chronic blood loss 01/11/2021    Chronic.  Control is uncertain.  Patient recently started on iron supplement over-the-counter.  Patient reports no side effects.  Plan to recheck iron studies and blood counts.          Lab Results      Component    Value    Date           WBC    5.03    12/08/2020           HGB    11.5 (L)    12/08/2020           HCT    38.6    12/08/2020           MCV    88    12/08/2020           PLT    388 (H)    Multinodular goiter     Obesity, morbid, BMI 50 or higher     Sleep apnea     has no CPAP       Objective:      Physical Exam  Patient is alert and oriented, no distress. Skin is intact. Neuro is normal with no focal motor or sensory findings.    Standing exam  stance: varus alignment, no significant leg-length discrepancy  gait: antalgic     Knee                                                  RIGHT             LEFT  Skin:                                         Intact               Intact  ROM:                                        0-110              5-110  Effusion:                                   +                      +  Medial joint line tenderness:    +                      +  Lateral joint line tenderness:    Neg                  Neg  Ash:                                Neg                  Neg  Patella crepitus:                       +                      +  Patella tenderness:                   Neg                  Neg  Patella grind:                            Neg                  Neg  Lachman:                                 Neg                  Neg  Valgus stress:                          Neg                  Neg  Varus stress:                            Neg                  Neg  Posterior drawer:                     Neg                  Neg  N-V                                          intact               intact  Hip:                                          nml                    nml              Lower extremity edema:         Negative          negative    Neurovascular exam  - motor function grossly intact bilaterally to hip flexion, knee extension and flexion, ankle dorsiflexion and plantarflexion  - sensation intact to light touch bilaterally to femoral, tibial, tibial and peroneal distributions  - symmetrical pedal pulses       Shoulder Exam:  Cervical exam is unremarkable. Intact cervical ROM. Negative Spurling's test     Physical Exam:                       RIGHT                                     LEFT     Scap Dyskinesis/Winging       (-)                                             +     Tenderness:                                                                              Greater Tuberosity                  (-)                                            +  Bicipital Groove                       (-)                                             (-)  AC joint                                   (-)                                             (-)  Other:      ROM:  Forward Elevation       170                                          140  Abduction                    120                                          110  ER (at side)                 70                                            60  IR                                 T8                                            T10     Strength:   Supraspinatus             5/5                                           4+/5  Infraspinatus                5/5                                           4+/5  Subscap / IR               5/5                                           4+/5      Special Tests:              Neer:                                       (-)                                             +              Todd:                                 (-)                                             +              SS Stress:                               (-)                                             +              Bear Hug:                                (-)                                             (-)              [place]'s:                                 (-)                                             +              Resisted Thrower's:                (-)                                             +              Cross Arm Abduction:             (-)                                             +     Neurovascular examination  - Motor grossly intact bilaterally to shoulder abduction, elbow flexion and extension, wrist flexion and extension, and intrinsic hand musculature  - Sensation intact to light touch bilaterally in axillary, median, radial, and ulnar distributions  - Symmetrical radial pulses    Imaging:   XR Results:  Results for orders placed during the hospital encounter of 04/23/24    X-ray Knee Ortho Bilateral with Flexion    Narrative  EXAM: XR KNEE ORTHO BILAT WITH FLEXION    CLINICAL INDICATION:   Pain in right knee.  Pain in left knee.    FINDINGS:  Comparisons are made to a right tibia/fibula x-ray from January 2022.  8 total images of the bilateral knees were submitted for interpretation.  There are multiple intra-articular loose bodies involving both knee joints with adjacent small effusions.  Largest on the left is within the suprapatellar recess measuring 3 cm in length.  The largest on the right patella recess measuring 2.5 cm in length.    Marked tricompartment joint space narrowing and osteophyte formation, most significantly  involving the medial tibiofemoral compartments and patellofemoral compartments.    Alignment is satisfactory. No     fractures, dislocations, or erosive arthritic change.  Negative for radiopaque foreign bodies or air in the soft tissues.    Impression  1.  Multiple intra-articular loose bodies involving the knee joints measuring up to 1.5 cm on the right and 3 cm on the left.  Small effusions.  Negative for underlying fractures.  2.  Marked tricompartment degenerative changes most significant involving the medial tibiofemoral compartments and patellofemoral compartments bilaterally.      Finalized on: 4/23/2024 8:50 AM By:  Ronen Fitzgerald MD  [facility]# [id number]      2024-04-23 08:52:[id number]    [facility]    X-Ray Shoulder 2 or More Views Left     Narrative  EXAM:  XR SHOULDER COMPLETE 2 OR MORE VIEWS LEFT     CLINICAL HISTORY: Left shoulder pain.     FINDINGS: Glenohumeral and acromioclavicular alignment is normal.  No fracture or other acute osseous abnormality is seen.  Moderate AC joint degenerative changes with inferior spurring.  Mild degenerative changes of the lower glenoid.  Irregularity seen at the rotator cuff insertion without definite calcific tendinosis.  Findings could reflect chronic rotator cuff injury.  No evidence of rotator cuff or bursal calcium deposition.     Impression  No acute radiographic abnormality of the left shoulder.     Finalized on: 11/15/2023 12:15 PM By:  Jarvis Celestin MD  [facility]# [id number]      2023-11-15 12:17:34.238    [facility]      Physician Read: I agree with the above impression. There is complete joint space loss present in the medial compartment as well as marginal osteophyte formation present in intra-articular loose bodies. All these findings are consistent with a Kellgren Av grade 4     Assessment/Plan:   Assessment:  Zakia Price is a 65 y.o. female with end-stage osteoarthritis of both knees, left shoulder rotator cuff tear arthropathy    Plan:    Discussed diagnosis and  treatment options with the patient today.  She has known osteoarthritis of both of her knees.  She also has chronic rotator cuff deficiency in the left shoulder consistent with left rotator cuff tear arthropathy  We last completed bilateral Durolane hyaluronic acid injections about 5 months ago, she had improvement of her knee pain with the injections for about 2 months following the injection.  We discussed moving forward with a repeat round of Visco versus a referral to a knee replacement specialists to consider an total knee arthroplasty.  She continues to become more interested in a knee replacement, but she would like to try another round of Visco 1st  Prior authorization placed for bilateral Synvisc-One  MEDICAL NECESSITY FOR VISCOSUPPLEMENTATION: After thorough evaluation of the patient, I have determined that visco-supplementation is medically necessary. The patient has painful DJD of the knee with failure of conservative therapy including lifestyle modifications and rehabilitation exercises. Oral analgesis/NSAIDs have not adequately controlled symptoms and there is radiographic evidence of joint space narrowing, subchondral sclerosis, and some early osteophytic changes Kellgren- Av grade 2 or greater, or in lack of radiographic evidence, there is arthroscopic or other evidence of chondrosis.  Her acute knee pain today, we will move forward with bilateral knee corticosteroid injections, half dose. , patient is in agreement with the plan  Bilateral knee half dose corticosteroid injections given in clinic, patient tolerated the procedure well with no immediate complications  Her shoulder is bothering her as well today.  It has been greater than 4 months since her last corticosteroid injection into her left shoulder.  I recommend we move forward with a left shoulder subacromial corticosteroid injection today, patient is in agreement with the plan  Left shoulder SAS CSI given in clinic, patient tolerated  the procedure well with no immediate complications  Continue anti-inflammatories and over-the-counter Tylenol as needed for pain  Follow-up with me in about 1 month for bilateral viscosupplementation injections        Rosemary Alonso PA-C  Sports Medicine Physician Assistant       Disclaimer: This note was prepared using a voice recognition system and is likely to have sound alike errors within the text.

## 2025-04-08 ENCOUNTER — TELEPHONE (OUTPATIENT)
Dept: PAIN MEDICINE | Facility: CLINIC | Age: 65
End: 2025-04-08
Payer: MEDICARE

## 2025-04-08 NOTE — TELEPHONE ENCOUNTER
Called patient to confirm appointment. Spoke with patient. Appt confirmed  Digna COVARRUBIAS

## 2025-04-09 ENCOUNTER — OFFICE VISIT (OUTPATIENT)
Dept: PAIN MEDICINE | Facility: CLINIC | Age: 65
End: 2025-04-09
Payer: MEDICARE

## 2025-04-09 VITALS
HEART RATE: 79 BPM | DIASTOLIC BLOOD PRESSURE: 75 MMHG | RESPIRATION RATE: 17 BRPM | SYSTOLIC BLOOD PRESSURE: 127 MMHG | WEIGHT: 293 LBS | HEIGHT: 68 IN | BODY MASS INDEX: 44.41 KG/M2

## 2025-04-09 DIAGNOSIS — M54.12 CERVICAL RADICULOPATHY: Chronic | ICD-10-CM

## 2025-04-09 DIAGNOSIS — E11.42 DIABETIC POLYNEUROPATHY ASSOCIATED WITH TYPE 2 DIABETES MELLITUS: ICD-10-CM

## 2025-04-09 DIAGNOSIS — M50.30 DDD (DEGENERATIVE DISC DISEASE), CERVICAL: ICD-10-CM

## 2025-04-09 DIAGNOSIS — M12.812 ROTATOR CUFF ARTHROPATHY OF LEFT SHOULDER: ICD-10-CM

## 2025-04-09 DIAGNOSIS — M48.02 SPINAL STENOSIS, CERVICAL REGION: Primary | ICD-10-CM

## 2025-04-09 DIAGNOSIS — M62.838 CERVICAL PARASPINAL MUSCLE SPASM: ICD-10-CM

## 2025-04-09 PROCEDURE — 1159F MED LIST DOCD IN RCRD: CPT | Mod: CPTII,S$GLB,, | Performed by: PHYSICIAN ASSISTANT

## 2025-04-09 PROCEDURE — 3074F SYST BP LT 130 MM HG: CPT | Mod: CPTII,S$GLB,, | Performed by: PHYSICIAN ASSISTANT

## 2025-04-09 PROCEDURE — 3044F HG A1C LEVEL LT 7.0%: CPT | Mod: CPTII,S$GLB,, | Performed by: PHYSICIAN ASSISTANT

## 2025-04-09 PROCEDURE — 3078F DIAST BP <80 MM HG: CPT | Mod: CPTII,S$GLB,, | Performed by: PHYSICIAN ASSISTANT

## 2025-04-09 PROCEDURE — 99214 OFFICE O/P EST MOD 30 MIN: CPT | Mod: S$GLB,,, | Performed by: PHYSICIAN ASSISTANT

## 2025-04-09 PROCEDURE — 3008F BODY MASS INDEX DOCD: CPT | Mod: CPTII,S$GLB,, | Performed by: PHYSICIAN ASSISTANT

## 2025-04-09 PROCEDURE — 99999 PR PBB SHADOW E&M-EST. PATIENT-LVL V: CPT | Mod: PBBFAC,,, | Performed by: PHYSICIAN ASSISTANT

## 2025-04-09 PROCEDURE — 3072F LOW RISK FOR RETINOPATHY: CPT | Mod: CPTII,S$GLB,, | Performed by: PHYSICIAN ASSISTANT

## 2025-04-09 PROCEDURE — 1125F AMNT PAIN NOTED PAIN PRSNT: CPT | Mod: CPTII,S$GLB,, | Performed by: PHYSICIAN ASSISTANT

## 2025-04-09 RX ORDER — BACLOFEN 10 MG/1
10 TABLET ORAL 3 TIMES DAILY
Qty: 90 TABLET | Refills: 0 | Status: SHIPPED | OUTPATIENT
Start: 2025-04-09 | End: 2025-05-09

## 2025-04-09 RX ORDER — PREGABALIN 75 MG/1
CAPSULE ORAL
Qty: 90 CAPSULE | Refills: 1 | Status: SHIPPED | OUTPATIENT
Start: 2025-04-09

## 2025-04-09 NOTE — PROGRESS NOTES
"Established Patient Chronic Pain Note     Referring Physician: Rose Price NP    PCP: Oleksandr Nagel MD    Chief Complaint:   Chief Complaint   Patient presents with    Neck Pain    Shoulder Pain    Headache       SUBJECTIVE:  Interval History (4/9/2025):  Zakia Price presents today for follow-up visit.  Patient was last seen on 4/27/2023. Patient reports pain as 3/10 today.  She is here today for a new issue- neck pain and left shoulder pain. She started having spasms and headaches from this pain.   At this time she has more pain on the left side. The pain is starting to radiate down the arm and into the triceps.  She also c/o tingling in both hands/fingers.    Patient admits that she had a fall a few months back (fell out of the shower). She denies head trauma or LOC. "I just fell on my behind."      Interval History (4/27/2023):  Zakia Price presents today for follow-up visit.  Patient was last seen on 01/30/2023. Last injection  bilateral L5/S1 TF TEETEE on 12/30/22 with 100% relief until the pain returned insidiously a couple of months ago. She reports same pain as previous, located in her lower lumbar spine with radiation into her bilateral lower extremities along posterior aspect and tingling in feet with prolonged standing.  She has been performing physician directed exercises at home since last injection without relief. Patient reports pain as 7/10 today. She continues to take Topamax, Lyrica, Mobic, and Zanaflex. She reports sedation with Zanaflex, gets a lot of spasms but needs something less sedating for when she has her grandkids. Pain interferes with daily activity.    Interval History (1/30/2023): Zakia Priec presents today for follow-up visit.  she underwent bilateral L5/S1 TF TEETEE on 12/30/22.  The patient reports that she is/was better following the procedure.  she reports 100% pain relief.  The changes lasted 4 weeks so far.  The changes have continued " "through this visit.  Patient reports pain as "0/10 today. She reports improvement in her neck pain as well, reports only occasional spasms. She reports improvement in burning sensation in her feet with TEETEE and Topamax and Lyrica, still gets tingling if she stands or walks for too long. Overall, improved functionality following TEETEE.    Interval History (9/16/2022):  Zakia Price presents today for follow-up visit.  Last injection bilateral L5/S1 TF TEETEE on 05/06/2022. Patient reports pain as 6/10 today. She reports she is no longer experiencing relief from the previous TEETEE. Relief lasted for 2-3 months before insidiously returning. Continues to report pain in low lumbar spine with radiation into bilateral lower extremities along posterior aspect. Reports weakness in lower extremities and decreased balance. She also reports insidious return of neck pain, but low back pain is more persistent, constant, and worse than neck pain. Pain interferes with daily activity. She has been performing physician directed exercises at home without relief. Patient has continued conservative treatments including anti-inflammatory and nerve pain medications, as well as home physical therapy exercises without relief.      Interval history 06/07/2022  Patient presents status post bilateral L5/S1 transforaminal epidural steroid injection 05/06/2022.  Patient reports 100% pain relief initially following transforaminal injection of lower extremity radicular symptoms.  Today pain is rated 0/10.  Patient reports overall 50% sustained relief in lower extremity pain.  Patient reports more significant relief with transforaminal band prior intralaminar approach.  Patient continues to report sustained relief in the neck and upper extremities following C7-T1 interlaminar epidural steroid injection October 2021. Patient is continue Topamax 100 mg twice daily.  She does report noticeable improvement in pain on this medication and denies any " side effects.  Today she denies any significant upper lower extremity weakness, bowel or bladder incontinence or saddle anesthesia.      Interval history 03/08/2022   Patient presents for MRI review- lumbar and cervical spine.  Today patient reports sustained pain relief in the neck and upper extremities following C7-T1 interlaminar epidural steroid injection October 2021. She also reports improvement in lower extremity radicular symptoms in the lower back and down the lower extremities.  Today her primary concern is burning sensation on the bottom of the feet.  Patient has continued Topamax and has reached 100 mg twice daily.  She does report noticeable improvement in her pain on this medication.  She denies any side effects.  She denies any new onset upper or lower extremity weakness, bowel or bladder incontinence or saddle anesthesia.      Interval history 01/11/2022  Patient presents status post L5-S1 interlaminar epidural steroid injection with right paramedian approach 12/10/2021.  Patient reports 80% sustained relief in lower back and bilateral lower extremity radicular symptoms following epidural steroid injection.  Today patient reports her pain as a 5/10.  Patient has continued Topamax 100 mg twice daily.  She denies any significant sedation from this medication and has enjoyed a  20 lb weight loss since initiation as well as improvement in neuropathic pain.      Interval Hx: 11/8/21  Pt is s/p C7-T1 IL TEETEE with R paramedian approach 10/08/21.  Patient reports 60% sustained relief following her cervical epidural steroid injection in both her neck and upper extremities.  Patient does report improvement in range of motion and strength.  Today patient would like to discuss her lower back and leg pain.  Today she again reports pain in the lower back which radiates down the posterior aspects of bilateral lower extremities in L5-S1 distribution to the feet.  Today pain is 5/10.  Patient is currently  participating actively in physical therapy.  She is currently reached Topamax 75 mg twice daily without side effects.  She denies any new onset lower extremity weakness, bowel or bladder incontinence or saddle anesthesia.    HPI 07/30/21  Zakia Price is a 65 y.o. female with past medical history significant for history of acute respiratory failure secondary to COVID-19, type 2 diabetes complicated by neuropathy, GERD, hypertension, morbid obesity, obstructive sleep apnea,  who presents to the clinic for the evaluation of neck and lower back and leg pain.  Patient reports that her pain began approximately 5-6 years prior without inciting accident injury or trauma.  Patient reports lower back pain which begins in a bandlike distribution at her iliac crest with radiation down the posterior aspect of bilateral legs to the feet in L5-S1 distribution.  Patient reports left side is significantly worse than the right.  Patient reports tingling and numbness in bilateral feet and associated subjective weakness in the lower extremities.  Patient does report periodic falls associated with her pain.    Patient also reports longstanding neck pain with radiation down bilateral upper extremities in no particular dermatomal distribution.  Patient reports pain is worse along the right paracervical muscles than on the left.  Patient reports compromise in hand  strength as well as in hand dexterity.  Patient denies ataxia or bowel or bladder incontinence.   Patient's neck and shoulder pain is exacerbated by cervical flexion, extension and lateral rotation as well as overhead activities..    Patient reports that lower extremity pain is exacerbated from moving from a sitting to a standing position and with ambulation.  Patient is able to ambulate approximately 10 minutes before requiring rest.  The pain is rated with a score of  7/10 on the BEST day and a score of 10/10 on the WORST day.  Symptoms interfere with daily  activity and sleeping.     Patient reports significant motor weakness.  Patient denies night fever/night sweats, urinary incontinence, bowel incontinence, significant weight loss and loss of sensations.      Pain Disability Index Review:         3/8/2022     8:47 AM 1/11/2022     9:01 AM 11/8/2021    11:59 AM   Last 3 PDI Scores   Pain Disability Index (PDI) 35 35 45       Non-Pharmacologic Treatments:  Physical Therapy/Home Exercise: yes  Ice/Heat:yes  TENS: no  Acupuncture: no  Massage: yes  Chiropractic: no    Other: no    Pain Medications:  - Adjuvant Medications: Neurontin (Gabapentin), Topamax (Topiramate), Tylenol (Acetaminophen) and Zanaflex ( Tizanidine) Diazepam    Pain Procedures:   -bilateral L5/S1 TF TEETEE on 12/30/22 with 100% relief  -05/06/2022:  Bilateral L5/S1 transforaminal epidural steroid injection  -12/10/2021:  L5-S1 intralaminar epidural steroid injection with right paramedian approach  -10/8/21: C7-T1 IL TEETEE with R paramedian approach; Dr. Paul  Prior epidural steroid injection lumbar spine, approximately 3-4 years prior at the [facility] in [place] which confirmed approximately 1 year of relief.    Past Medical History:   Diagnosis Date    Acute respiratory failure due to COVID-19     COVID-19     Diabetes mellitus, type 2 1993    BS  didn't check 10/04/2023 Insulin x 2 years    Diabetic neuropathy 01/27/2014    DJD (degenerative joint disease) of knee     DVT (deep venous thrombosis) around 1990's    in leg, is on no anticoagulant therapy presently    Fatty liver     GERD (gastroesophageal reflux disease)     Hypertension associated with diabetes     HECTOR (iron deficiency anemia) 05/13/2021    Iron deficiency anemia due to chronic blood loss 01/11/2021    Chronic.  Control is uncertain.  Patient recently started on iron supplement over-the-counter.  Patient reports no side effects.  Plan to recheck iron studies and blood counts.          Lab Results      Component    Value     Date           WBC    5.03    12/08/2020           HGB    11.5 (L)    12/08/2020           HCT    38.6    12/08/2020           MCV    88    12/08/2020           PLT    388 (H)    Multinodular goiter     Obesity, morbid, BMI 50 or higher     Sleep apnea     has no CPAP     Past Surgical History:   Procedure Laterality Date    COLONOSCOPY N/A 5/12/2021    Procedure: COLONOSCOPY;  Surgeon: Carolina Rizo MD;  Location: [facility];  Service: Endoscopy;  Laterality: N/A;    EPIDURAL STEROID INJECTION N/A 12/10/2021    Procedure: Lumbar L5/S1 IL TEETEE  Would like AM procedure, if possible;  Surgeon: Nae Paul MD;  Location: [facility];  Service: Pain Management;  Laterality: N/A;    EPIDURAL STEROID INJECTION INTO CERVICAL SPINE N/A 10/8/2021    Procedure: C7-T1 IL TEETEE-no sedation.  Needs IV-just incase;  Surgeon: Nae Paul MD;  Location: [facility];  Service: Pain Management;  Laterality: N/A;    ESOPHAGOGASTRODUODENOSCOPY N/A 7/8/2021    Procedure: EGD (ESOPHAGOGASTRODUODENOSCOPY) previous positve covid;  Surgeon: Jann Gutierrez MD;  Location: [facility];  Service: Endoscopy;  Laterality: N/A;    ESOPHAGOGASTRODUODENOSCOPY N/A 9/18/2023    Procedure: EGD (ESOPHAGOGASTRODUODENOSCOPY);  Surgeon: Gm Leon MD;  Location: [facility];  Service: Endoscopy;  Laterality: N/A;    FRACTURE SURGERY      HYSTERECTOMY      INTRALUMINAL GASTROINTESTINAL TRACT IMAGING VIA CAPSULE N/A 10/24/2022    Procedure: IMAGING PROCEDURE, GI TRACT, INTRALUMINAL, VIA CAPSULE;  Surgeon: Hang Lunsford, RN;  Location: [facility];  Service: Endoscopy;  Laterality: N/A;    SELECTIVE INJECTION OF ANESTHETIC AGENT AROUND LUMBAR SPINAL NERVE ROOT BY TRANSFORAMINAL APPROACH Bilateral 5/6/2022    Procedure: Bilateral L5/S1 TF TEETEE with RN IV sedation;  Surgeon: Nae Paul MD;  Location: [facility];  Service: Pain Management;  Laterality: Bilateral;    SELECTIVE INJECTION OF ANESTHETIC AGENT AROUND LUMBAR SPINAL NERVE  ROOT BY TRANSFORAMINAL APPROACH Bilateral 12/30/2022    Procedure: Bilateral L5/S1 TF TEETEE RN IV Sedation;  Surgeon: Nae Paul MD;  Location: [facility];  Service: Pain Management;  Laterality: Bilateral;    SHOULDER ARTHROSCOPY      THYROIDECTOMY, PARTIAL Right     and transplatation of right superior parathyroid gland to the sternocleidomastoid muscle     TONSILLECTOMY      TUBAL LIGATION  1984    WRIST FRACTURE SURGERY      left     Review of patient's allergies indicates:   Allergen Reactions    Codeine sulfate      Nausea^    Lisinopril Swelling     angioedema    Codeine Nausea Only and Rash       Current Outpatient Medications   Medication Sig    albuterol (PROVENTIL/VENTOLIN HFA) 90 mcg/actuation inhaler INHALE TWO PUFFS BY MOUTH INTO THE LUNGS EVERY 6 HOURS AS NEEDED FOR WHEEZING    azelastine (ASTELIN) 137 mcg (0.1 %) nasal spray 1 spray (137 mcg total) by Nasal route 2 (two) times daily.    cyclobenzaprine (FLEXERIL) 10 MG tablet Take 1 tablet (10 mg total) by mouth 2 (two) times daily as needed for Muscle spasms.    diclofenac (VOLTAREN) 75 MG EC tablet Take 1 tablet (75 mg total) by mouth 2 (two) times daily.    diclofenac sodium (VOLTAREN ARTHRITIS PAIN) 1 % Gel Apply 2 g topically once daily.    diltiaZEM (CARDIZEM) 30 MG tablet Take 1 tablet (30 mg total) by mouth every 12 (twelve) hours.    EPINEPHrine (EPIPEN) 0.3 mg/0.3 mL AtIn INJECT 0.3 MILLILITERS (0.3 MG TOTAL) INTRAMUSCULARLY ONCE. FOR ONE DOSE    ezetimibe (ZETIA) 10 mg tablet Take 1 tablet (10 mg total) by mouth once daily.    famotidine (PEPCID) 40 MG tablet Take 1 tablet (40 mg total) by mouth once daily.    finasteride (PROPECIA) 1 mg tablet Take 1 tablet (1 mg total) by mouth once daily.    fluticasone-umeclidin-vilanter (TRELEGY ELLIPTA) 100-62.5-25 mcg DsDv INHALE 1 PUFF BY MOUTH INTO THE LUNGS ONCE DAILY    furosemide (LASIX) 20 MG tablet Take 1 tablet (20 mg total) by mouth daily as needed (swelling).    ketoconazole (NIZORAL)  2 % shampoo Lather into affected areas. Rinse off in 5-10 min. Repeat 2-3 times a week.    levothyroxine (SYNTHROID) 100 MCG tablet Take 1 tablet (100 mcg total) by mouth before breakfast.    minoxidiL (LONITEN) 2.5 MG tablet Take 0.5 tablets (1.25 mg total) by mouth once daily.    omeprazole (PRILOSEC) 40 MG capsule Take 1 capsule (40 mg total) by mouth once daily.    OZEMPIC 2 mg/dose (8 mg/3 mL) PnIj INJECT TWO MG UNDER THE SKIN EVERY 7 DAYS    pregabalin (LYRICA) 75 MG capsule TAKE ONE CAPSULE (75MG TOTAL) BY MOUTH THREE TIMES DAILY    rOPINIRole (REQUIP) 0.5 MG tablet Take 1 tablet (0.5 mg total) by mouth every evening.    tiZANidine (ZANAFLEX) 4 MG tablet TAKE 1/2 TO 1 TABLET BY MOUTH TWICE DAILY AS NEEDED FOR MUSCLE SPASMS. MAY CAUSE DROWSINESS    topiramate (TOPAMAX) 100 MG tablet Take 1 tablet (100 mg total) by mouth 2 (two) times daily.    ubrogepant (UBRELVY) 100 mg tablet Take 1 tablet (100 mg total) by mouth daily as needed for Migraine. If symptoms persist or return, may repeat dose after 2 hours. Maximum: 200 mg per 24 hours     Current Facility-Administered Medications   Medication    onabotulinumtoxina injection 155 Units    onabotulinumtoxina injection 200 Units    onabotulinumtoxina injection 200 Units       Review of Systems     GENERAL:  No weight loss, malaise or fevers.  HEENT:   No recent changes in vision or hearing  NECK:  Negative for lumps, no difficulty with swallowing.  RESPIRATORY:  Negative for cough, wheezing or shortness of breath, patient denies any recent URI.  CARDIOVASCULAR:  Negative for chest pain, leg swelling or palpitations.  GI:  Negative for abdominal discomfort, blood in stools or black stools or change in bowel habits.  MUSCULOSKELETAL:  See HPI.  SKIN:  Negative for lesions, rash, and itching.  PSYCH:  No mood disorder or recent psychosocial stressors.  Patients sleep is not disturbed secondary to pain.  HEMATOLOGY/LYMPHOLOGY:  Negative for prolonged bleeding, bruising  "easily or swollen nodes.  Patient is not currently taking any anti-coagulants  NEURO:   No history of headaches, syncope, paralysis, seizures or tremors.  All other reviewed and negative other than HPI.    OBJECTIVE:      Vitals:    04/09/25 1108   BP: 127/75   Pulse: 79   Resp: 17   Weight: (!) 144 kg (317 lb 7.4 oz)   Height: 5' 8" (1.727 m)       Body mass index is 48.27 kg/m².   (reviewed on 4/9/2025)     Physical Exam    GENERAL: Well appearing, in no acute distress, alert and oriented x3.  PSYCH:  Mood and affect appropriate.  SKIN: Skin color, texture, turgor normal, no rashes or lesions.  HEAD/FACE:  Normocephalic, atraumatic. Cranial nerves grossly intact.    NECK: There is pain to palpation over the cervical paraspinous muscles- left side. Spurling 's is Positive on the left.  There is pain with neck flexion, extension, and lateral flexion.   Normal testing biceps, triceps and brachioradialis bilaterally.    Negative Arlene's bilaterally.    4+/5 strength testing deltoid, biceps, triceps, wrist extensor, wrist flexor and ulnar intrinsics on the left.    4+/5   strength bilaterally  PULM: No evidence of respiratory difficulty, symmetric chest rise.  GI:  Soft and non-tender.      Shoulder:Left  - Pain on abduction: Present  - ROM:  Decreased secondary to pain  - TTP over the AC and GH joint: Present  - Neer's: Positive   - Hawkin's: Positive     EXTREMITIES: Peripheral joint ROM is full and pain free without obvious instability or laxity in all four extremities. No deformities, edema, or skin discoloration. Good capillary refill.  MUSCULOSKELETAL: Able to stand on heels & toes.   hip, and knee provocative maneuvers are negative.   pain with palpation over the sacroiliac joints bilaterally.  FABERs test is negative.  FAER test is negative bilaterally.   Bilateral upper and lower extremity strength is normal and symmetric.  No atrophy or tone abnormalities are noted.  -5/5 strength testing hip flexion, " quadriceps, gastrocnemius soleus, anterior tibialis and EHL bilaterally.  RIGHT Lower extremity: Hip flexion 5/5, Hip Abduction 5/5, Hip Adduction 5/5, Knee extension 5/5, Knee flexion 5/5, Ankle dorsiflexion5/5, Extensor hallucis longus 5/5, Ankle plantarflexion 5/5  LEFT Lower extremity:  Hip flexion 5/5, Hip Abduction 5/5,Hip Adduction 5/5, Knee extension 5/5, Knee flexion 5/5, Ankle dorsiflexion 5/5, Extensor hallucis longus 5/5, Ankle plantarflexion 5/5  -Normal testing knee (patellar) jerk and ankle (achilles) jerk    NEURO: Bilateral upper and lower extremity coordination and muscle stretch reflexes are physiologic and symmetric. No loss of sensation is noted.  GAIT: normal.    Imaging/ Diagnostic Studies/ Labs (Reviewed on 4/9/2025):    X-rays Cervical Spine [id number]    EXAM:  XR CERVICAL SPINE AP LATERAL   HISTORY: Neck pain with headache   TECHNIQUE:  4 view cervical spine series     FINDINGS: Disc space narrowing at C2-3, C4-5, C5-6 and C6-7 with arthritic changes.  No fracture or subluxation is identified.      Impression:   Multilevel degenerative disc disease.     Finalized on: 3/28/2025 7:47 PM By:  Eugenio Peterson MD  [facility]# [id number]      2025-03-28 19:49:[id number]     [facility]      X-Ray Lumbar Spine Complete 5 View  FINDINGS:  Mild dextroconvex curvature of the lumbar spine.  Grade 1 anterolisthesis of L4 on L5, measuring 4 mm.  Vertebral body heights are maintained without evidence of fracture or aggressive osseous lesion.  Multilevel degenerative anterior marginal osteophyte formation, most pronounced in the visualized lower thoracic spine.  Intervertebral disc space narrowing at L5-S1.  Multilevel degenerative facet arthropathy, most pronounced at L4-5 and L5-S1.    X-Ray Cervical Spine 5 View W Flex Extxt  FINDINGS:  C7 faintly visualized on neutral lateral view.  There is heterotopic bone formation along the anterior/inferior margin C1-2 articulation.  There is smooth prominence of the overlying  prevertebral soft tissues at this level.  Pre dens space appears within upper limits normal.  Vertebral body height and alignment maintained with anterior and posterior marginal spurring most prominently at the C4-5 and C5-6 levels.  Posterior subluxation C4 on C5 noted.  There is uniform loss of disc height at C4-5 and C5-6 levels.  Heterotopic bone and positioning combine to limit evaluation of the left side of C1-2 articulation on the open mouth view.  There is suggestion of slight asymmetry of lateral masses of C1.  Flexion and extension views demonstrate stable positioning without additional evidence subluxation or instability.  No other evidence of fracture or subluxation noted.  Multilevel mild uncovertebral osteophyte formation and minimal/mild neural foraminal stenosis identified.  Surgical staples identified anteriorly in the lower neck at and just to the right of midline.  Remaining osseous structures appear intact.  Partially visualized upper apices appear clear.    Impression  Spondylosis with minimal retro listhesis or posterior subluxation C4 on C5.    Hepatopathy ache bone and degenerative change limits evaluation of the C1-2 articulation and levels particularly on the left.  Consideration should be given for CT if not previously performed to further evaluate these findings.    MRI Lumbar Spine 1/11/22  FINDINGS:  Grade 1 anterolisthesis of L4 on L5. No fracture.  Multiple hemangiomas are seen within the lower thoracic and upper lumbar spine vertebral bodies.  Mild disc height loss at the L1-L2 and L4-L5 levels.  Conus medullaris terminates at the L1 level. Distal spinal cord intensity is normal.  There is no paraspinal soft tissue abnormality.     T12-L1: Minimal diffuse disc bulge.  Mild bilateral facet arthropathy.  No neural foraminal stenosis.  No spinal canal stenosis.     L1-L2: There is a mild diffuse disc bulge.  Mild bilateral facet arthropathy.  No neural foraminal stenosis.  No spinal  canal stenosis.     L2-L3: No disc bulge.  Mild bilateral facet arthropathy.  No neural foraminal stenosis.  No spinal canal stenosis.     L3-L4: Minimal diffuse disc bulge.  Moderate bilateral facet arthropathy.  Mild bilateral neural foraminal stenosis.  No significant spinal canal stenosis.     L4-L5: Mild uncovering of the disc space secondary to listhesis.  There is a diffuse disc bulge.  Severe bilateral facet arthropathy.  Moderate bilateral neural foraminal stenosis.  Mild spinal canal stenosis secondary to listhesis, disc bulge, facet arthropathy, and ligamentum flavum hypertrophy.     L5-S1: Minimal diffuse disc bulge.  Moderate facet arthropathy, greater on the right than left.  Mild-to-moderate bilateral neural foraminal stenosis.  No spinal canal stenosis.    MRI cervical spine 1/11/22  FINDINGS:  There is mild retrolisthesis of C4 on C5. Vertebral bodies demonstrate normal signal intensity on all sequences.  No fracture.  Mild-to-moderate disc height loss and desiccation involves the C4 through C7 disc spaces, most pronounced at the C4-C5 and C5-C6 disc spaces.  The craniocervical junction is normal.  Visualized portions of the posterior fossa appear normal.  Mucosal thickening noted within the sphenoid sinus.  The spinal cord demonstrates normal signal intensity on all sequences. No paraspinal soft tissue abnormality is identified.     C2-C3: Small posterior disc osteophyte complex, best seen on the axial sequence, without spinal canal stenosis.  There is no facet arthropathy.  There is no uncovertebral joint disease.  There is no neuroforaminal stenosis.     C3-C4: No disc bulge.  Severe right facet arthropathy.  No significant uncovertebral arthropathy.  Moderate right neural foraminal stenosis.  No spinal canal stenosis.     C4-C5: Small posterior disc osteophyte complex.  Mild spinal canal stenosis secondary to retrolisthesis and disc osteophyte complex.  There is also a right lateral disc  protrusion which involves the right neural foramen, best appreciated on the T2 sagittal sequence.  Mild bilateral facet arthropathy.  Mild-to-moderate bilateral uncovertebral arthropathy.  Moderate to severe neural foraminal stenosis, greater on the right than left.     C5-C6: Small posterior disc osteophyte complex results in mild spinal canal stenosis.  Severe left facet arthropathy.  Mild-to-moderate bilateral uncovertebral arthropathy.  Mild to moderate bilateral neural foraminal stenosis.     C6-C7: Small posterior disc osteophyte complex results in mild spinal canal stenosis.  No significant facet arthropathy.  No significant uncovertebral arthropathy.  No neural foraminal stenosis.     C7-T1: No disc bulge.  No significant facet arthropathy.  No neural foraminal stenosis.  No spinal canal stenosis.    ASSESSMENT: 65 y.o. year old female with neck and shoulder and back and lower extremity pain, consistent with     1. Spinal stenosis, cervical region  MRI Cervical Spine Without Contrast    MRI Cervical Spine Without Contrast    baclofen (LIORESAL) 10 MG tablet      2. DDD (degenerative disc disease), cervical  Ambulatory referral/consult to Spine Care    MRI Cervical Spine Without Contrast    MRI Cervical Spine Without Contrast    baclofen (LIORESAL) 10 MG tablet      3. Cervical radiculopathy  MRI Cervical Spine Without Contrast    MRI Cervical Spine Without Contrast    baclofen (LIORESAL) 10 MG tablet            PLAN:   - Interventions: None at this time. Will get MRI of cervical spine first.    -s/p bilateral L5/S1 TF TEETEE on 12/30/22 with 100% relief    - Anticoagulation use: no no anticoagulation      - Medications:  -Continue Topamax 100 mg BID per PCP .  We discussed potential side effects of this medication include drowsiness, dizziness, tingling of the hands and feet, diarrhea, dysgeusia, weight loss/anorexia.      -Continue Lyrica 75 mg BID per PCP.  Has tried Robaxin, Flexeril and Zanaflex.     -  Start Baclofen to see if this is more effective for muscle pain/spasms.   Take 1 tablet (10 mg total) by mouth 3 (three) times daily.,        report:  Reviewed and consistent with medication use as prescribed.      - Therapy:   Patient recently completed outpatient physical therapy. She will continue home exercises as tolerated. .    - Imaging: Reviewed available imaging with patient and answered any questions they had regarding study.MRI cervical spine ordered for further evaluation.    - Consults/Referrals: Continue to follow up with Ortho/Sports med as needed:          - Follow up :  return to clinic after MRI to determine future plan of care      The above plan and management options were discussed at length with patient. Patient is in agreement with the above and verbalized understanding.    - I discussed the goals of interventional chronic pain management with the patient on today's visit. We discussed a multimodal and systematic approach to pain.  This includes diagnostic and therapeutic injections, adjuvant pharmacologic treatment, physical therapy, and at times psychiatry.  I emphasized the importance of regular exercise, core strengthening and stretching, diet and weight loss as a cornerstone of long-term pain management.    - This condition does not require this patient to take time off of work, and the primary goal of our Pain Management services is to improve the patient's functional capacity.  - Patient Questions: Answered all of the patient's questions regarding diagnoses, therapy, treatment and next steps      Blanca Redman PA-C  Interventional Pain Management  [facility]    Disclaimer:  This note was prepared using voice recognition system and is likely to have sound alike errors that may have been overlooked even after proof reading.  Please call me with any questions

## 2025-04-16 DIAGNOSIS — M17.11 PRIMARY OSTEOARTHRITIS OF RIGHT KNEE: ICD-10-CM

## 2025-04-16 DIAGNOSIS — M17.12 PRIMARY OSTEOARTHRITIS OF LEFT KNEE: ICD-10-CM

## 2025-04-16 RX ORDER — DICLOFENAC SODIUM 75 MG/1
TABLET, DELAYED RELEASE ORAL
Qty: 60 TABLET | Refills: 5 | Status: SHIPPED | OUTPATIENT
Start: 2025-04-16

## 2025-04-21 ENCOUNTER — LAB VISIT (OUTPATIENT)
Dept: LAB | Facility: HOSPITAL | Age: 65
End: 2025-04-21
Attending: NURSE PRACTITIONER
Payer: MEDICARE

## 2025-04-21 DIAGNOSIS — E61.1 IRON DEFICIENCY: ICD-10-CM

## 2025-04-21 LAB
ABSOLUTE EOSINOPHIL (OHS): 0.18 K/UL
ABSOLUTE MONOCYTE (OHS): 0.37 K/UL (ref 0.3–1)
ABSOLUTE NEUTROPHIL COUNT (OHS): 3.78 K/UL (ref 1.8–7.7)
BASOPHILS # BLD AUTO: 0.02 K/UL
BASOPHILS NFR BLD AUTO: 0.4 %
ERYTHROCYTE [DISTWIDTH] IN BLOOD BY AUTOMATED COUNT: 14.1 % (ref 11.5–14.5)
FERRITIN SERPL-MCNC: 196 NG/ML (ref 20–300)
HCT VFR BLD AUTO: 39.9 % (ref 37–48.5)
HGB BLD-MCNC: 12.7 GM/DL (ref 12–16)
IMM GRANULOCYTES # BLD AUTO: 0.01 K/UL (ref 0–0.04)
IMM GRANULOCYTES NFR BLD AUTO: 0.2 % (ref 0–0.5)
IRON SATN MFR SERPL: 26 % (ref 20–50)
IRON SERPL-MCNC: 85 UG/DL (ref 30–160)
LYMPHOCYTES # BLD AUTO: 1.34 K/UL (ref 1–4.8)
MCH RBC QN AUTO: 29.7 PG (ref 27–31)
MCHC RBC AUTO-ENTMCNC: 31.8 G/DL (ref 32–36)
MCV RBC AUTO: 93 FL (ref 82–98)
NUCLEATED RBC (/100WBC) (OHS): 0 /100 WBC
PLATELET # BLD AUTO: 302 K/UL (ref 150–450)
PMV BLD AUTO: 9.8 FL (ref 9.2–12.9)
RBC # BLD AUTO: 4.28 M/UL (ref 4–5.4)
RELATIVE EOSINOPHIL (OHS): 3.2 %
RELATIVE LYMPHOCYTE (OHS): 23.5 % (ref 18–48)
RELATIVE MONOCYTE (OHS): 6.5 % (ref 4–15)
RELATIVE NEUTROPHIL (OHS): 66.2 % (ref 38–73)
TIBC SERPL-MCNC: 329 UG/DL (ref 250–450)
TRANSFERRIN SERPL-MCNC: 222 MG/DL (ref 200–375)
WBC # BLD AUTO: 5.7 K/UL (ref 3.9–12.7)

## 2025-04-21 PROCEDURE — 85025 COMPLETE CBC W/AUTO DIFF WBC: CPT

## 2025-04-21 PROCEDURE — 82728 ASSAY OF FERRITIN: CPT

## 2025-04-21 PROCEDURE — 84466 ASSAY OF TRANSFERRIN: CPT

## 2025-04-21 PROCEDURE — 36415 COLL VENOUS BLD VENIPUNCTURE: CPT | Mod: PO

## 2025-04-24 ENCOUNTER — OFFICE VISIT (OUTPATIENT)
Dept: HEMATOLOGY/ONCOLOGY | Facility: CLINIC | Age: 65
End: 2025-04-24
Payer: MEDICARE

## 2025-04-24 VITALS
OXYGEN SATURATION: 97 % | WEIGHT: 293 LBS | DIASTOLIC BLOOD PRESSURE: 78 MMHG | HEIGHT: 68 IN | TEMPERATURE: 98 F | BODY MASS INDEX: 44.41 KG/M2 | HEART RATE: 77 BPM | SYSTOLIC BLOOD PRESSURE: 139 MMHG

## 2025-04-24 DIAGNOSIS — Z86.2 HISTORY OF IRON DEFICIENCY ANEMIA: Primary | ICD-10-CM

## 2025-04-24 PROBLEM — E61.1 IRON DEFICIENCY: Status: RESOLVED | Noted: 2025-03-28 | Resolved: 2025-04-24

## 2025-04-24 PROCEDURE — 99999 PR PBB SHADOW E&M-EST. PATIENT-LVL III: CPT | Mod: PBBFAC,,, | Performed by: NURSE PRACTITIONER

## 2025-04-24 NOTE — PROGRESS NOTES
Subjective:       Patient ID: Zakia Price is a 65 y.o. female.    Chief Complaint: review labs. Anemia    HPI: 65 y.o female presenting today for follow up of her h/o iron deficiency anemia previously treated with Feraheme 5/2021. Most recently received Venofer weekly x 4 completed 7/31/2023. Prior EGD/Colonoscopy evaluation reviewed. EGD pathology H. Pylori positive, she completed treatment.. Repeat testing reflect eradication of H. Pylori infection. Colonoscopy unremarkable with recommended repeat in 10 years. VCE without S&S bleeding    EGD for evaluation of c/o N/V 9/2023 unremarkable.     Today she notes feeling well overall. Denies interval clinical concerns.    Social History     Socioeconomic History    Marital status:    Tobacco Use    Smoking status: Never    Smokeless tobacco: Never   Substance and Sexual Activity    Alcohol use: No    Drug use: No    Sexual activity: Not Currently     Social Drivers of Health     Financial Resource Strain: Low Risk  (12/13/2024)    Overall Financial Resource Strain (CARDIA)     Difficulty of Paying Living Expenses: Not hard at all   Food Insecurity: No Food Insecurity (12/13/2024)    Hunger Vital Sign     Worried About Running Out of Food in the Last Year: Never true     Ran Out of Food in the Last Year: Never true   Transportation Needs: No Transportation Needs (11/14/2023)    PRAPARE - Transportation     Lack of Transportation (Medical): No     Lack of Transportation (Non-Medical): No   Physical Activity: Unknown (12/13/2024)    Exercise Vital Sign     Days of Exercise per Week: 6 days   Stress: Stress Concern Present (12/13/2024)    [employer] [place] [employer] - Occupational Stress Questionnaire     Feeling of Stress : To some extent   Housing Stability: Low Risk  (11/14/2023)    Housing Stability Vital Sign     Unable to Pay for Housing in the Last Year: No     Number of Places Lived in the Last Year: 1     Unstable  Housing in the Last Year: No       Past Medical History:   Diagnosis Date    Acute respiratory failure due to COVID-19     COVID-19     Diabetes mellitus, type 2 1993    BS  didn't check 10/04/2023 Insulin x 2 years    Diabetic neuropathy 01/27/2014    DJD (degenerative joint disease) of knee     DVT (deep venous thrombosis) around 1990's    in leg, is on no anticoagulant therapy presently    Fatty liver     GERD (gastroesophageal reflux disease)     Hypertension associated with diabetes     HECTOR (iron deficiency anemia) 05/13/2021    Iron deficiency anemia due to chronic blood loss 01/11/2021    Chronic.  Control is uncertain.  Patient recently started on iron supplement over-the-counter.  Patient reports no side effects.  Plan to recheck iron studies and blood counts.          Lab Results      Component    Value    Date           WBC    5.03    12/08/2020           HGB    11.5 (L)    12/08/2020           HCT    38.6    12/08/2020           MCV    88    12/08/2020           PLT    388 (H)    Multinodular goiter     Obesity, morbid, BMI 50 or higher     Sleep apnea     has no CPAP       Family History   Problem Relation Name Age of Onset    Diabetes Mother Heather Villanueva     Hypertension Mother Heather Villanueva     Heart disease Mother Heather Villanueva 50    Ovarian cancer Mother Heather Villanueva     Cancer Father Gurwinder Dotson     Arthritis Father Gurwinder Dotson     Breast cancer Sister      Cancer Brother      Cancer Brother Gurwinder Buchanan     Leukemia Son Rafa Price     Cancer Son Rafa Price        Past Surgical History:   Procedure Laterality Date    COLONOSCOPY N/A 5/12/2021    Procedure: COLONOSCOPY;  Surgeon: Carolina Rizo MD;  Location: [facility];  Service: Endoscopy;  Laterality: N/A;    EPIDURAL STEROID INJECTION N/A 12/10/2021    Procedure: Lumbar L5/S1 IL TEETEE  Would like AM procedure, if possible;  Surgeon: Nae Paul MD;  Location: [facility];  Service: Pain Management;   Laterality: N/A;    EPIDURAL STEROID INJECTION INTO CERVICAL SPINE N/A 10/8/2021    Procedure: C7-T1 IL TEETEE-no sedation.  Needs IV-just incase;  Surgeon: Nae Paul MD;  Location: [facility];  Service: Pain Management;  Laterality: N/A;    ESOPHAGOGASTRODUODENOSCOPY N/A 7/8/2021    Procedure: EGD (ESOPHAGOGASTRODUODENOSCOPY) previous positve covid;  Surgeon: Jann Gutierrez MD;  Location: [facility];  Service: Endoscopy;  Laterality: N/A;    ESOPHAGOGASTRODUODENOSCOPY N/A 9/18/2023    Procedure: EGD (ESOPHAGOGASTRODUODENOSCOPY);  Surgeon: Gm Leon MD;  Location: [facility];  Service: Endoscopy;  Laterality: N/A;    FRACTURE SURGERY      HYSTERECTOMY      INTRALUMINAL GASTROINTESTINAL TRACT IMAGING VIA CAPSULE N/A 10/24/2022    Procedure: IMAGING PROCEDURE, GI TRACT, INTRALUMINAL, VIA CAPSULE;  Surgeon: Hang Lunsford RN;  Location: [facility];  Service: Endoscopy;  Laterality: N/A;    SELECTIVE INJECTION OF ANESTHETIC AGENT AROUND LUMBAR SPINAL NERVE ROOT BY TRANSFORAMINAL APPROACH Bilateral 5/6/2022    Procedure: Bilateral L5/S1 TF TEETEE with RN IV sedation;  Surgeon: Nae Paul MD;  Location: [facility];  Service: Pain Management;  Laterality: Bilateral;    SELECTIVE INJECTION OF ANESTHETIC AGENT AROUND LUMBAR SPINAL NERVE ROOT BY TRANSFORAMINAL APPROACH Bilateral 12/30/2022    Procedure: Bilateral L5/S1 TF TEETEE RN IV Sedation;  Surgeon: Nae Paul MD;  Location: [facility];  Service: Pain Management;  Laterality: Bilateral;    SHOULDER ARTHROSCOPY      THYROIDECTOMY, PARTIAL Right     and transplatation of right superior parathyroid gland to the sternocleidomastoid muscle     TONSILLECTOMY      TUBAL LIGATION  1984    WRIST FRACTURE SURGERY      left       Review of Systems   Constitutional:  Negative for activity change, appetite change, chills, fatigue, fever and unexpected weight change.   HENT:  Negative for congestion, mouth sores, nosebleeds, sore throat, trouble  swallowing and voice change.    Eyes:  Negative for visual disturbance.   Respiratory:  Negative for cough, chest tightness, shortness of breath and wheezing.    Cardiovascular:  Negative for chest pain and leg swelling.   Gastrointestinal:  Negative for abdominal distention, abdominal pain, anal bleeding, blood in stool, diarrhea, nausea and vomiting.   Endocrine: Positive for cold intolerance.   Genitourinary:  Negative for difficulty urinating, dysuria and hematuria.   Musculoskeletal:  Negative for arthralgias, back pain and myalgias.   Skin:  Negative for pallor, rash and wound.   Neurological:  Negative for dizziness, syncope, weakness and headaches.   Hematological:  Negative for adenopathy. Does not bruise/bleed easily.   Psychiatric/Behavioral:  The patient is not nervous/anxious.          Medication List with Changes/Refills   Current Medications    ALBUTEROL (PROVENTIL/VENTOLIN HFA) 90 MCG/ACTUATION INHALER    INHALE TWO PUFFS BY MOUTH INTO THE LUNGS EVERY 6 HOURS AS NEEDED FOR WHEEZING    AZELASTINE (ASTELIN) 137 MCG (0.1 %) NASAL SPRAY    1 spray (137 mcg total) by Nasal route 2 (two) times daily.    BACLOFEN (LIORESAL) 10 MG TABLET    Take 1 tablet (10 mg total) by mouth 3 (three) times daily.    CLOBETASOL (TEMOVATE) 0.05 % EXTERNAL SOLUTION    Apply topically 2 (two) times daily. On wash days (leave in and allow to dry) or for any itching/irritation of scalp    CYCLOBENZAPRINE (FLEXERIL) 10 MG TABLET    Take 1 tablet (10 mg total) by mouth 2 (two) times daily as needed for Muscle spasms.    DICLOFENAC (VOLTAREN) 75 MG EC TABLET    TAKE ONE TABLET (75 MG TOTAL) BY MOUTH TWICE DAILY @ 9AM & 5PM    DICLOFENAC SODIUM (VOLTAREN ARTHRITIS PAIN) 1 % GEL    Apply 2 g topically once daily.    DILTIAZEM (CARDIZEM) 30 MG TABLET    Take 1 tablet (30 mg total) by mouth every 12 (twelve) hours.    EPINEPHRINE (EPIPEN) 0.3 MG/0.3 ML ATIN    INJECT 0.3 MILLILITERS (0.3 MG TOTAL) INTRAMUSCULARLY ONCE. FOR ONE DOSE     EZETIMIBE (ZETIA) 10 MG TABLET    Take 1 tablet (10 mg total) by mouth once daily.    FAMOTIDINE (PEPCID) 40 MG TABLET    Take 1 tablet (40 mg total) by mouth once daily.    FINASTERIDE (PROPECIA) 1 MG TABLET    Take 1 tablet (1 mg total) by mouth once daily.    FLUTICASONE PROPIONATE (FLONASE) 50 MCG/ACTUATION NASAL SPRAY        FLUTICASONE-UMECLIDIN-VILANTER (TRELEGY ELLIPTA) 100-62.5-25 MCG DSDV    INHALE 1 PUFF BY MOUTH INTO THE LUNGS ONCE DAILY    FUROSEMIDE (LASIX) 20 MG TABLET    Take 1 tablet (20 mg total) by mouth daily as needed (swelling).    KETOCONAZOLE (NIZORAL) 2 % SHAMPOO    Lather into affected areas. Rinse off in 5-10 min. Repeat 2-3 times a week.    LEVOTHYROXINE (SYNTHROID) 100 MCG TABLET    Take 1 tablet (100 mcg total) by mouth before breakfast.    MINOXIDIL (LONITEN) 2.5 MG TABLET    Take 0.5 tablets (1.25 mg total) by mouth once daily.    OMEPRAZOLE (PRILOSEC) 40 MG CAPSULE    Take 1 capsule (40 mg total) by mouth once daily.    OZEMPIC 2 MG/DOSE (8 MG/3 ML) PNIJ    INJECT TWO MG UNDER THE SKIN EVERY 7 DAYS    PREGABALIN (LYRICA) 75 MG CAPSULE    TAKE ONE CAPSULE (75MG TOTAL) BY MOUTH THREE TIMES DAILY    ROPINIROLE (REQUIP) 0.5 MG TABLET    Take 1 tablet (0.5 mg total) by mouth every evening.    TIZANIDINE (ZANAFLEX) 4 MG TABLET        TOPIRAMATE (TOPAMAX) 100 MG TABLET    Take 1 tablet (100 mg total) by mouth 2 (two) times daily.    UBROGEPANT (UBRELVY) 100 MG TABLET    Take 1 tablet (100 mg total) by mouth daily as needed for Migraine. If symptoms persist or return, may repeat dose after 2 hours. Maximum: 200 mg per 24 hours     Objective:     Vitals:    04/24/25 1350   BP: 139/78   Pulse: 77   Temp: 97.9 °F (36.6 °C)         Lab Results   Component Value Date    WBC 5.70 04/21/2025    HGB 12.7 04/21/2025    HCT 39.9 04/21/2025    MCV 93 04/21/2025     04/21/2025       BMP  Lab Results   Component Value Date     03/28/2025    K 4.2 03/28/2025     03/28/2025    CO2 20 (L)  03/28/2025    BUN 11 03/28/2025    CREATININE 0.9 03/28/2025    CALCIUM 9.1 03/28/2025    ANIONGAP 12 03/28/2025    ESTGFRAFRICA >60.0 02/24/2022    EGFRNONAA >60.0 02/24/2022     Lab Results   Component Value Date    ALT 12 03/28/2025    AST 17 03/28/2025    ALKPHOS 123 03/28/2025    BILITOT 0.3 03/28/2025     Lab Results   Component Value Date    IRON 85 04/21/2025    TIBC 329 04/21/2025    FERRITIN 196.0 04/21/2025       Physical Exam  HENT:      Right Ear: External ear normal.      Left Ear: External ear normal.   Eyes:      Conjunctiva/sclera: Conjunctivae normal.   Pulmonary:      Effort: Pulmonary effort is normal. No respiratory distress.   Neurological:      Mental Status: She is alert and oriented to person, place, and time.        Assessment:     Problem List Items Addressed This Visit          Oncology    History of iron deficiency anemia - Primary    Iron levels remain adequate. No anemia    -Encourage continued iron rich foods. List from up to date previously emailed patient.   -f/u 9 months with cbc, iron, ferritin  -Discussed S&S to report sooner         Relevant Orders    CBC Auto Differential    Iron and TIBC    Ferritin           Med Onc Chart Routing      Follow up with physician    Follow up with KIMBER Other. 9 months (prefers morning appt)   Infusion scheduling note    Injection scheduling note    Labs CBC, ferritin and iron and TIBC   Scheduling:  Preferred lab:  Lab interval:  1-2 days prior   Imaging None      Pharmacy appointment No pharmacy appointment needed      Other referrals       No additional referrals needed         History of iron deficiency anemia  -     CBC Auto Differential; Future; Expected date: 04/24/2025  -     Iron and TIBC; Future; Expected date: 04/24/2025  -     Ferritin; Future; Expected date: 04/24/2025                KARO Arroyo

## 2025-04-24 NOTE — ASSESSMENT & PLAN NOTE
Iron levels remain adequate. No anemia    -Encourage continued iron rich foods. List from up to date previously emailed patient.   -f/u 9 months with cbc, iron, ferritin  -Discussed S&S to report sooner

## 2025-04-25 DIAGNOSIS — Z79.899 ENCOUNTER FOR LONG-TERM (CURRENT) USE OF MEDICATIONS: ICD-10-CM

## 2025-04-25 DIAGNOSIS — R60.0 BILATERAL LOWER EXTREMITY EDEMA: ICD-10-CM

## 2025-04-25 RX ORDER — FUROSEMIDE 20 MG/1
TABLET ORAL
Qty: 30 TABLET | Refills: 0 | Status: SHIPPED | OUTPATIENT
Start: 2025-04-25

## 2025-04-25 NOTE — TELEPHONE ENCOUNTER
Refill Routing Note   Medication(s) are not appropriate for processing by [facility] for the following reason(s):        New or recently adjusted medication  No active prescription written by provider    [facility] action(s):  Defer               Appointments  past 12m or future 3m with PCP    Date Provider   Last Visit   12/17/2024 Oleksandr Nagel MD   Next Visit   Visit date not found Oleksandr Nagel MD   ED visits in past 90 days: 0        Note composed:4:34 PM 04/25/2025

## 2025-04-30 ENCOUNTER — TELEPHONE (OUTPATIENT)
Dept: PAIN MEDICINE | Facility: CLINIC | Age: 65
End: 2025-04-30
Payer: MEDICARE

## 2025-04-30 ENCOUNTER — TELEPHONE (OUTPATIENT)
Dept: FAMILY MEDICINE | Facility: CLINIC | Age: 65
End: 2025-04-30
Payer: MEDICARE

## 2025-04-30 NOTE — TELEPHONE ENCOUNTER
----- Message from Boby sent at 4/30/2025 11:12 AM CDT -----  Contact: pharm @ [phone]  Pharmacy is calling to clarify an RX. RX name:  diabetic testing supplies What do they need to clarify:  verbal Rx  Comments:

## 2025-04-30 NOTE — TELEPHONE ENCOUNTER
Called pt to confirm if she was trying to get diabetic testing supplied from her pharmacy. Pt stated to not worry about that and that the pharmacy wont stop calling her about this and she is not interested.

## 2025-05-01 ENCOUNTER — TELEPHONE (OUTPATIENT)
Dept: FAMILY MEDICINE | Facility: CLINIC | Age: 65
End: 2025-05-01
Payer: MEDICARE

## 2025-05-01 NOTE — TELEPHONE ENCOUNTER
Called pt yesterday reguarding ,medical supplies. Pt stated company will not stop calling her and she is not interested in the supplies.

## 2025-05-01 NOTE — TELEPHONE ENCOUNTER
----- Message from Stefanie sent at 5/1/2025  8:35 AM CDT -----  Contact: [facility]  ..Type:  Patient Requesting CallWho Called: [facility] What is the call regarding?: they are checking on the referral they faxed over for medical supplies.  Would the patient rather a call back or a response via [facility]?  callBest Call Back Number: [phone] Information:

## 2025-05-02 ENCOUNTER — TELEPHONE (OUTPATIENT)
Dept: SPORTS MEDICINE | Facility: CLINIC | Age: 65
End: 2025-05-02
Payer: MEDICARE

## 2025-05-02 DIAGNOSIS — M17.11 PRIMARY OSTEOARTHRITIS OF RIGHT KNEE: ICD-10-CM

## 2025-05-02 DIAGNOSIS — M17.12 PRIMARY OSTEOARTHRITIS OF LEFT KNEE: Primary | ICD-10-CM

## 2025-05-02 DIAGNOSIS — E11.65 TYPE 2 DIABETES MELLITUS WITH HYPERGLYCEMIA, WITHOUT LONG-TERM CURRENT USE OF INSULIN: Chronic | ICD-10-CM

## 2025-05-02 RX ORDER — SEMAGLUTIDE 2.68 MG/ML
INJECTION, SOLUTION SUBCUTANEOUS
Qty: 9 ML | Refills: 1 | Status: SHIPPED | OUTPATIENT
Start: 2025-05-02

## 2025-05-02 RX ORDER — DICLOFENAC SODIUM 75 MG/1
75 TABLET, DELAYED RELEASE ORAL 2 TIMES DAILY
Qty: 60 TABLET | Refills: 3 | Status: SHIPPED | OUTPATIENT
Start: 2025-05-02

## 2025-05-02 RX ORDER — TIZANIDINE 4 MG/1
4 TABLET ORAL DAILY
Qty: 90 TABLET | Refills: 1 | Status: SHIPPED | OUTPATIENT
Start: 2025-05-02

## 2025-05-02 NOTE — TELEPHONE ENCOUNTER
No care due was identified.  [facility] Embedded Care Due Messages. Reference number: [id number].   5/02/2025 7:27:19 AM CDT

## 2025-05-02 NOTE — TELEPHONE ENCOUNTER
Refill Decision Note   Zakia Price  is requesting a refill authorization.  Brief Assessment and Rationale for Refill:  Approve     Medication Therapy Plan:        Comments:     Note composed:9:20 AM 05/02/2025

## 2025-05-02 NOTE — TELEPHONE ENCOUNTER
Copied from CRM #[id number]. Topic: Medications - Medication Refill  >> May 2, 2025 11:02 AM German wrote:  .Type:  RX Refill Request    Who Called:  Isidro   Refill or New Rx: Refill   RX Name and Strength: tiZANidine (ZANAFLEX) 4 MG tablet  How is the patient currently taking it? (ex. 1XDay):   Is this a 30 day or 90 day RX: 90 day  Preferred Pharmacy with phone number:   Local or Mail Order: Mail Order   Ordering Provider: Allyson Price   Would the patient rather a call back or a response via [facility]?    Best Call Back Number:   Additional Information:

## 2025-05-02 NOTE — TELEPHONE ENCOUNTER
No care due was identified.  [facility] Embedded Care Due Messages. Reference number: [id number].   5/02/2025 12:02:17 PM CDT

## 2025-05-05 ENCOUNTER — TELEPHONE (OUTPATIENT)
Dept: SPORTS MEDICINE | Facility: CLINIC | Age: 65
End: 2025-05-05
Payer: MEDICARE

## 2025-05-05 NOTE — TELEPHONE ENCOUNTER
Spoke with pt regarding her message, patient wanted to let clinic know that she was in the emergency room with other pain and was unable to make it to appt       ----- Message from Jenae sent at 5/5/2025 11:37 AM CDT -----  Contact: Christiano, [phone]  .[email or web address] Patient is calling for Medical Advice regarding: Calling to speak with the nurse regarding the appointment this morning. Please call her. Thanks.How long has patient had these symptoms: N/APharmacy name and phone#: N/APatient wants a call back or thru [facility]: N/AComments: N/APlease advise patient replies from provider may take up to 48 hours.

## 2025-05-06 ENCOUNTER — TELEPHONE (OUTPATIENT)
Dept: FAMILY MEDICINE | Facility: CLINIC | Age: 65
End: 2025-05-06
Payer: MEDICARE

## 2025-05-06 NOTE — TELEPHONE ENCOUNTER
Copied from CRM #[id number]. Topic: General Inquiry - Patient Advice  >> May 6, 2025 10:30 AM German wrote:  .Type:  Needs Medical Advice    Who Called:  Zakia     Would the patient rather a call back or a response via [facility]?  Call back   Best Call Back Number:  [phone]  Additional Information: Pt is calling in regards to getting a call back to discuss questions and concerns due to an ER at [place] visit 05/04 due to side pain and she was informed that the gall bladder split and she would like to discuss being seen by an [facility] provider for her issue

## 2025-05-06 NOTE — TELEPHONE ENCOUNTER
Noted.  Patient should go to ER if she is having gallbladder rupture, this is a surgical emergency.  Otherwise if it is just gallstones with attacks she can see General surgery.

## 2025-05-06 NOTE — TELEPHONE ENCOUNTER
Called to check on patient she went to the ER on 05/04/2025 due to side pain and they informed her that her Gallbladder is split and that she will need surgery patient stated she doesn't want to have surgery at [facility]. Patient has appointment with the NP on tomorrow

## 2025-05-08 ENCOUNTER — OFFICE VISIT (OUTPATIENT)
Dept: PAIN MEDICINE | Facility: CLINIC | Age: 65
End: 2025-05-08
Payer: MEDICARE

## 2025-05-08 DIAGNOSIS — M47.812 CERVICAL SPONDYLOSIS: ICD-10-CM

## 2025-05-08 DIAGNOSIS — M50.30 DDD (DEGENERATIVE DISC DISEASE), CERVICAL: ICD-10-CM

## 2025-05-08 DIAGNOSIS — K81.0 ACUTE CHOLECYSTITIS: Primary | ICD-10-CM

## 2025-05-08 PROCEDURE — 3044F HG A1C LEVEL LT 7.0%: CPT | Mod: CPTII,95,, | Performed by: PHYSICIAN ASSISTANT

## 2025-05-08 PROCEDURE — 3072F LOW RISK FOR RETINOPATHY: CPT | Mod: CPTII,95,, | Performed by: PHYSICIAN ASSISTANT

## 2025-05-08 PROCEDURE — 98006 SYNCH AUDIO-VIDEO EST MOD 30: CPT | Mod: 95,,, | Performed by: PHYSICIAN ASSISTANT

## 2025-05-08 NOTE — PROGRESS NOTES
"Established Patient Chronic Pain Note     Referring Physician: No ref. provider found    PCP: Oleksandr Nagel MD    The patient location is: home  The chief complaint leading to consultation is: Discuss MRI findings     Visit type: audiovisual  Encounter which includes face to face time and non-face to face time preparing to see the patient (eg, review of tests), Obtaining and/or reviewing separately obtained history, Documenting clinical information in the electronic or other health record, Independently interpreting results (not separately reported) and communicating results to the patient/family/caregiver, or Care coordination (not separately reported).      Each patient to whom he or she provides medical services by telemedicine is:  (1) informed of the relationship between the physician and patient and the respective role of any other health care provider with respect to management of the patient; and (2) notified that he or she may decline to receive medical services by telemedicine and may withdraw from such care at any time.      SUBJECTIVE:    Interval History (5/8/2025):   Zakia Price presents today for follow-up visit.  Patient was last seen on 4/9/2025. At that visit, the plan was to complete MRI of cervical spine. Patient reports pain as "0/10 today (neck), however she is having issues with her gallbladder. States she was seen at [facility] recently due to back/flank pain. Ultrasound revealed sludge. She has not been scheduled to see/follow up with General surgery yet.      Interval History (4/9/2025):  Zakia Price presents today for follow-up visit.  Patient was last seen on 4/27/2023. Patient reports pain as 3/10 today.  She is here today for a new issue- neck pain and left shoulder pain. She started having spasms and headaches from this pain.   At this time she has more pain on the left side. The pain is starting to radiate down the arm and into the triceps.  She also c/o " "tingling in both hands/fingers.    Patient admits that she had a fall a few months back (fell out of the shower). She denies head trauma or LOC. "I just fell on my behind."      Interval History (4/27/2023):  Zakia Price presents today for follow-up visit.  Patient was last seen on 01/30/2023. Last injection  bilateral L5/S1 TF TEETEE on 12/30/22 with 100% relief until the pain returned insidiously a couple of months ago. She reports same pain as previous, located in her lower lumbar spine with radiation into her bilateral lower extremities along posterior aspect and tingling in feet with prolonged standing.  She has been performing physician directed exercises at home since last injection without relief. Patient reports pain as 7/10 today. She continues to take Topamax, Lyrica, Mobic, and Zanaflex. She reports sedation with Zanaflex, gets a lot of spasms but needs something less sedating for when she has her grandkids. Pain interferes with daily activity.    Interval History (1/30/2023): Zakia Price presents today for follow-up visit.  she underwent bilateral L5/S1 TF TEETEE on 12/30/22.  The patient reports that she is/was better following the procedure.  she reports 100% pain relief.  The changes lasted 4 weeks so far.  The changes have continued through this visit.  Patient reports pain as "0/10 today. She reports improvement in her neck pain as well, reports only occasional spasms. She reports improvement in burning sensation in her feet with TEETEE and Topamax and Lyrica, still gets tingling if she stands or walks for too long. Overall, improved functionality following TEETEE.    Interval History (9/16/2022):  Zakia Price presents today for follow-up visit.  Last injection bilateral L5/S1 TF TEETEE on 05/06/2022. Patient reports pain as 6/10 today. She reports she is no longer experiencing relief from the previous TEETEE. Relief lasted for 2-3 months before insidiously returning. Continues to " report pain in low lumbar spine with radiation into bilateral lower extremities along posterior aspect. Reports weakness in lower extremities and decreased balance. She also reports insidious return of neck pain, but low back pain is more persistent, constant, and worse than neck pain. Pain interferes with daily activity. She has been performing physician directed exercises at home without relief. Patient has continued conservative treatments including anti-inflammatory and nerve pain medications, as well as home physical therapy exercises without relief.      Interval history 06/07/2022  Patient presents status post bilateral L5/S1 transforaminal epidural steroid injection 05/06/2022.  Patient reports 100% pain relief initially following transforaminal injection of lower extremity radicular symptoms.  Today pain is rated 0/10.  Patient reports overall 50% sustained relief in lower extremity pain.  Patient reports more significant relief with transforaminal band prior intralaminar approach.  Patient continues to report sustained relief in the neck and upper extremities following C7-T1 interlaminar epidural steroid injection October 2021. Patient is continue Topamax 100 mg twice daily.  She does report noticeable improvement in pain on this medication and denies any side effects.  Today she denies any significant upper lower extremity weakness, bowel or bladder incontinence or saddle anesthesia.      Interval history 03/08/2022   Patient presents for MRI review- lumbar and cervical spine.  Today patient reports sustained pain relief in the neck and upper extremities following C7-T1 interlaminar epidural steroid injection October 2021. She also reports improvement in lower extremity radicular symptoms in the lower back and down the lower extremities.  Today her primary concern is burning sensation on the bottom of the feet.  Patient has continued Topamax and has reached 100 mg twice daily.  She does report noticeable  improvement in her pain on this medication.  She denies any side effects.  She denies any new onset upper or lower extremity weakness, bowel or bladder incontinence or saddle anesthesia.      Interval history 01/11/2022  Patient presents status post L5-S1 interlaminar epidural steroid injection with right paramedian approach 12/10/2021.  Patient reports 80% sustained relief in lower back and bilateral lower extremity radicular symptoms following epidural steroid injection.  Today patient reports her pain as a 5/10.  Patient has continued Topamax 100 mg twice daily.  She denies any significant sedation from this medication and has enjoyed a  20 lb weight loss since initiation as well as improvement in neuropathic pain.      Interval Hx: 11/8/21  Pt is s/p C7-T1 IL TEETEE with R paramedian approach 10/08/21.  Patient reports 60% sustained relief following her cervical epidural steroid injection in both her neck and upper extremities.  Patient does report improvement in range of motion and strength.  Today patient would like to discuss her lower back and leg pain.  Today she again reports pain in the lower back which radiates down the posterior aspects of bilateral lower extremities in L5-S1 distribution to the feet.  Today pain is 5/10.  Patient is currently participating actively in physical therapy.  She is currently reached Topamax 75 mg twice daily without side effects.  She denies any new onset lower extremity weakness, bowel or bladder incontinence or saddle anesthesia.    HPI 07/30/21  Zakia Price is a 65 y.o. female with past medical history significant for history of acute respiratory failure secondary to COVID-19, type 2 diabetes complicated by neuropathy, GERD, hypertension, morbid obesity, obstructive sleep apnea,  who presents to the clinic for the evaluation of neck and lower back and leg pain.  Patient reports that her pain began approximately 5-6 years prior without inciting accident injury or  trauma.  Patient reports lower back pain which begins in a bandlike distribution at her iliac crest with radiation down the posterior aspect of bilateral legs to the feet in L5-S1 distribution.  Patient reports left side is significantly worse than the right.  Patient reports tingling and numbness in bilateral feet and associated subjective weakness in the lower extremities.  Patient does report periodic falls associated with her pain.    Patient also reports longstanding neck pain with radiation down bilateral upper extremities in no particular dermatomal distribution.  Patient reports pain is worse along the right paracervical muscles than on the left.  Patient reports compromise in hand  strength as well as in hand dexterity.  Patient denies ataxia or bowel or bladder incontinence.   Patient's neck and shoulder pain is exacerbated by cervical flexion, extension and lateral rotation as well as overhead activities..    Patient reports that lower extremity pain is exacerbated from moving from a sitting to a standing position and with ambulation.  Patient is able to ambulate approximately 10 minutes before requiring rest.  The pain is rated with a score of  7/10 on the BEST day and a score of 10/10 on the WORST day.  Symptoms interfere with daily activity and sleeping.     Patient reports significant motor weakness.  Patient denies night fever/night sweats, urinary incontinence, bowel incontinence, significant weight loss and loss of sensations.      Pain Disability Index Review:         3/8/2022     8:47 AM 1/11/2022     9:01 AM 11/8/2021    11:59 AM   Last 3 PDI Scores   Pain Disability Index (PDI) 35 35 45       Non-Pharmacologic Treatments:  Physical Therapy/Home Exercise: yes  Ice/Heat:yes  TENS: no  Acupuncture: no  Massage: yes  Chiropractic: no    Other: no    Pain Medications:  - Adjuvant Medications: Neurontin (Gabapentin), Topamax (Topiramate), Tylenol (Acetaminophen) and Zanaflex ( Tizanidine)  Diazepam    Pain Procedures:   -bilateral L5/S1 TF TEETEE on 12/30/22 with 100% relief  -05/06/2022:  Bilateral L5/S1 transforaminal epidural steroid injection  -12/10/2021:  L5-S1 intralaminar epidural steroid injection with right paramedian approach  -10/8/21: C7-T1 IL TEETEE with R paramedian approach; Dr. Paul  Prior epidural steroid injection lumbar spine, approximately 3-4 years prior at the [facility] in [place] which confirmed approximately 1 year of relief.    Past Medical History:   Diagnosis Date    Acute respiratory failure due to COVID-19     COVID-19     Diabetes mellitus, type 2 1993    BS  didn't check 10/04/2023 Insulin x 2 years    Diabetic neuropathy 01/27/2014    DJD (degenerative joint disease) of knee     DVT (deep venous thrombosis) around 1990's    in leg, is on no anticoagulant therapy presently    Fatty liver     GERD (gastroesophageal reflux disease)     Hypertension associated with diabetes     HECTOR (iron deficiency anemia) 05/13/2021    Iron deficiency anemia due to chronic blood loss 01/11/2021    Chronic.  Control is uncertain.  Patient recently started on iron supplement over-the-counter.  Patient reports no side effects.  Plan to recheck iron studies and blood counts.          Lab Results      Component    Value    Date           WBC    5.03    12/08/2020           HGB    11.5 (L)    12/08/2020           HCT    38.6    12/08/2020           MCV    88    12/08/2020           PLT    388 (H)    Multinodular goiter     Obesity, morbid, BMI 50 or higher     Sleep apnea     has no CPAP     Past Surgical History:   Procedure Laterality Date    COLONOSCOPY N/A 5/12/2021    Procedure: COLONOSCOPY;  Surgeon: Carolina Rizo MD;  Location: [facility] ENDO;  Service: Endoscopy;  Laterality: N/A;    EPIDURAL STEROID INJECTION N/A 12/10/2021    Procedure: Lumbar L5/S1 IL TEETEE  Would like AM procedure, if possible;  Surgeon: Nae Paul MD;  [place] [facility] [place] [place]  Service: Pain  Management;  Laterality: N/A;    EPIDURAL STEROID INJECTION INTO CERVICAL SPINE N/A 10/8/2021    Procedure: C7-T1 IL TEETEE-no sedation.  Needs IV-just incase;  Surgeon: Nae Paul MD;  Location: [facility] PAIN MGT;  Service: Pain Management;  Laterality: N/A;    ESOPHAGOGASTRODUODENOSCOPY N/A 7/8/2021    Procedure: EGD (ESOPHAGOGASTRODUODENOSCOPY) previous positve covid;  Surgeon: Jann Gutierrez MD;  Location: [facility];  Service: Endoscopy;  Laterality: N/A;    ESOPHAGOGASTRODUODENOSCOPY N/A 9/18/2023    Procedure: EGD (ESOPHAGOGASTRODUODENOSCOPY);  Surgeon: Gm Leon MD;  Location: [facility];  Service: Endoscopy;  Laterality: N/A;    FRACTURE SURGERY      HYSTERECTOMY      INTRALUMINAL GASTROINTESTINAL TRACT IMAGING VIA CAPSULE N/A 10/24/2022    Procedure: IMAGING PROCEDURE, GI TRACT, INTRALUMINAL, VIA CAPSULE;  Surgeon: Hang Lunsford RN;  Location: [facility];  Service: Endoscopy;  Laterality: N/A;    SELECTIVE INJECTION OF ANESTHETIC AGENT AROUND LUMBAR SPINAL NERVE ROOT BY TRANSFORAMINAL APPROACH Bilateral 5/6/2022    Procedure: Bilateral L5/S1 TF TEETEE with RN IV sedation;  Surgeon: Nae Paul MD;  Location: [facility] PAIN MGT;  Service: Pain Management;  Laterality: Bilateral;    SELECTIVE INJECTION OF ANESTHETIC AGENT AROUND LUMBAR SPINAL NERVE ROOT BY TRANSFORAMINAL APPROACH Bilateral 12/30/2022    Procedure: Bilateral L5/S1 TF TEETEE RN IV Sedation;  Surgeon: Nae Paul MD;  Location: [facility] PAIN MGT;  Service: Pain Management;  Laterality: Bilateral;    SHOULDER ARTHROSCOPY      THYROIDECTOMY, PARTIAL Right     and transplatation of right superior parathyroid gland to the sternocleidomastoid muscle     TONSILLECTOMY      TUBAL LIGATION  1984    WRIST FRACTURE SURGERY      left     Review of patient's allergies indicates:   Allergen Reactions    Codeine sulfate      Nausea^    Lisinopril Swelling     angioedema    Codeine Nausea Only and Rash       Current Outpatient Medications   Medication Sig     albuterol (PROVENTIL/VENTOLIN HFA) 90 mcg/actuation inhaler INHALE TWO PUFFS BY MOUTH INTO THE LUNGS EVERY 6 HOURS AS NEEDED FOR WHEEZING    azelastine (ASTELIN) 137 mcg (0.1 %) nasal spray 1 spray (137 mcg total) by Nasal route 2 (two) times daily.    baclofen (LIORESAL) 10 MG tablet Take 1 tablet (10 mg total) by mouth 3 (three) times daily.    clobetasoL (TEMOVATE) 0.05 % external solution Apply topically 2 (two) times daily. On wash days (leave in and allow to dry) or for any itching/irritation of scalp    cyclobenzaprine (FLEXERIL) 10 MG tablet Take 1 tablet (10 mg total) by mouth 2 (two) times daily as needed for Muscle spasms.    diclofenac (VOLTAREN) 75 MG EC tablet Take 1 tablet (75 mg total) by mouth 2 (two) times daily.    diltiaZEM (CARDIZEM) 30 MG tablet Take 1 tablet (30 mg total) by mouth every 12 (twelve) hours.    EPINEPHrine (EPIPEN) 0.3 mg/0.3 mL AtIn INJECT 0.3 MILLILITERS (0.3 MG TOTAL) INTRAMUSCULARLY ONCE. FOR ONE DOSE    ezetimibe (ZETIA) 10 mg tablet Take 1 tablet (10 mg total) by mouth once daily.    famotidine (PEPCID) 40 MG tablet Take 1 tablet (40 mg total) by mouth once daily.    finasteride (PROPECIA) 1 mg tablet Take 1 tablet (1 mg total) by mouth once daily.    fluticasone propionate (FLONASE) 50 mcg/actuation nasal spray     fluticasone-umeclidin-vilanter (TRELEGY ELLIPTA) 100-62.5-25 mcg DsDv INHALE 1 PUFF BY MOUTH INTO THE LUNGS ONCE DAILY    furosemide (LASIX) 20 MG tablet TAKE 1 TABLET BY MOUTH ONCE DAILY AS NEEDED FOR SWELLING    ketoconazole (NIZORAL) 2 % shampoo Lather into affected areas. Rinse off in 5-10 min. Repeat 2-3 times a week.    levothyroxine (SYNTHROID) 100 MCG tablet Take 1 tablet (100 mcg total) by mouth before breakfast.    minoxidiL (LONITEN) 2.5 MG tablet Take 0.5 tablets (1.25 mg total) by mouth once daily.    omeprazole (PRILOSEC) 40 MG capsule Take 1 capsule (40 mg total) by mouth once daily.    OZEMPIC 2 mg/dose (8 mg/3 mL) PnRobby INJECT 2MG UNDER THE SKIN  EVERY 7 DAYS    pregabalin (LYRICA) 75 MG capsule TAKE ONE CAPSULE (75MG TOTAL) BY MOUTH THREE TIMES DAILY    rOPINIRole (REQUIP) 0.5 MG tablet Take 1 tablet (0.5 mg total) by mouth every evening.    tiZANidine (ZANAFLEX) 4 MG tablet Take 1 tablet (4 mg total) by mouth once daily.    topiramate (TOPAMAX) 100 MG tablet Take 1 tablet (100 mg total) by mouth 2 (two) times daily.    ubrogepant (UBRELVY) 100 mg tablet Take 1 tablet (100 mg total) by mouth daily as needed for Migraine. If symptoms persist or return, may repeat dose after 2 hours. Maximum: 200 mg per 24 hours     Current Facility-Administered Medications   Medication    onabotulinumtoxina injection 155 Units    onabotulinumtoxina injection 200 Units    onabotulinumtoxina injection 200 Units       Review of Systems     GENERAL:  No weight loss, malaise or fevers.  HEENT:   No recent changes in vision or hearing  NECK:  Negative for lumps, no difficulty with swallowing.  RESPIRATORY:  Negative for cough, wheezing or shortness of breath, patient denies any recent URI.  CARDIOVASCULAR:  Negative for chest pain, leg swelling or palpitations.  GI:  Negative for abdominal discomfort, blood in stools or black stools or change in bowel habits.  MUSCULOSKELETAL:  See HPI.  SKIN:  Negative for lesions, rash, and itching.  PSYCH:  No mood disorder or recent psychosocial stressors.  Patients sleep is not disturbed secondary to pain.  HEMATOLOGY/LYMPHOLOGY:  Negative for prolonged bleeding, bruising easily or swollen nodes.  Patient is not currently taking any anti-coagulants  NEURO:   No history of headaches, syncope, paralysis, seizures or tremors.  All other reviewed and negative other than HPI.    OBJECTIVE:  Telemedicine Physical Exam:   There were no vitals filed for this visit.  There is no height or weight on file to calculate BMI.   (reviewed on 5/8/2025)     (Physical exam performed virtually with patient guided on specific actions and diagnostic  maneuvers)  GENERAL: Well appearing, in no acute distress, alert and oriented x3.  Cooperative.  PSYCH:  Mood and affect appropriate.  SKIN: Skin color & texture with no obvious abnormalities.    HEAD/FACE:  Normocephalic, atraumatic.    PULM:  No difficulty breathing. No nasal flaring. No obvious wheezing.  EXTREMITIES: No obvious deformities. Moving all extremities well, appears to have symmetric strength throughout.  MUSCULOSKELETAL: No obvious atrophy abnormalities are noted.   NEURO: No obvious neurologic deficit.   GAIT: sitting.     Physical Exam: last in clinic visit:    Physical Exam    GENERAL: Well appearing, in no acute distress, alert and oriented x3.  PSYCH:  Mood and affect appropriate.  SKIN: Skin color, texture, turgor normal, no rashes or lesions.  HEAD/FACE:  Normocephalic, atraumatic. Cranial nerves grossly intact.    NECK: There is pain to palpation over the cervical paraspinous muscles- left side. Spurling 's is Positive on the left.  There is pain with neck flexion, extension, and lateral flexion.   Normal testing biceps, triceps and brachioradialis bilaterally.    Negative Arlene's bilaterally.    4+/5 strength testing deltoid, biceps, triceps, wrist extensor, wrist flexor and ulnar intrinsics on the left.    4+/5   strength bilaterally  PULM: No evidence of respiratory difficulty, symmetric chest rise.  GI:  Soft and non-tender.      Shoulder:Left  - Pain on abduction: Present  - ROM:  Decreased secondary to pain  - TTP over the AC and GH joint: Present  - Neer's: Positive   - Hawkin's: Positive     EXTREMITIES: Peripheral joint ROM is full and pain free without obvious instability or laxity in all four extremities. No deformities, edema, or skin discoloration. Good capillary refill.  MUSCULOSKELETAL: Able to stand on heels & toes.   hip, and knee provocative maneuvers are negative.   pain with palpation over the sacroiliac joints bilaterally.  FABERs test is negative.  FAER test is  negative bilaterally.   Bilateral upper and lower extremity strength is normal and symmetric.  No atrophy or tone abnormalities are noted.  -5/5 strength testing hip flexion, quadriceps, gastrocnemius soleus, anterior tibialis and EHL bilaterally.  RIGHT Lower extremity: Hip flexion 5/5, Hip Abduction 5/5, Hip Adduction 5/5, Knee extension 5/5, Knee flexion 5/5, Ankle dorsiflexion5/5, Extensor hallucis longus 5/5, Ankle plantarflexion 5/5  LEFT Lower extremity:  Hip flexion 5/5, Hip Abduction 5/5,Hip Adduction 5/5, Knee extension 5/5, Knee flexion 5/5, Ankle dorsiflexion 5/5, Extensor hallucis longus 5/5, Ankle plantarflexion 5/5  -Normal testing knee (patellar) jerk and ankle (achilles) jerk    NEURO: Bilateral upper and lower extremity coordination and muscle stretch reflexes are physiologic and symmetric. No loss of sensation is noted.  GAIT: normal.    Imaging/ Diagnostic Studies/ Labs (Reviewed on 5/8/2025):      US Abdomen Limited_Gallbladder 5/4/2025  Order: [id number]  Narrative    REASON FOR EXAM: abd pin    TECHNICAL FACTORS: B-mode and Doppler sonographic images were obtained of the gallbladder and common bile duct.    COMPARISON: January 31, 2021    FINDINGS: Nonshadowing echogenic material layers dependently in the gallbladder. Gallbladder wall thickening is present. The common bile duct measures 5.6 mm in diameter. Lawrence sign is elicited on real-time exam.    IMPRESSION:  Gallbladder sludge with wall thickening and Lawrence's sign suspicious for acute cholecystitis.        Electronically signed by Olvin Wolfe MD on 5/4/2025 2:19 PM  Exam End: 05/04/25 13:43    Specimen Collected: 05/04/25 13:55 Last Resulted: 05/04/25 14:19   Received From: [facility]            X-rays Cervical Spine [id number]    EXAM:  XR CERVICAL SPINE AP LATERAL   HISTORY: Neck pain with headache   TECHNIQUE:  4 view cervical spine series     FINDINGS: Disc space narrowing at C2-3, C4-5, C5-6 and C6-7 with  arthritic changes.  No fracture or subluxation is identified.      Impression:   Multilevel degenerative disc disease.     Finalized on: 3/28/2025 7:47 PM By:  Eugenio Peterson MD  [facility]# [id number]      2025-03-28 19:49:[id number]     [facility]      X-Ray Lumbar Spine Complete 5 View  FINDINGS:  Mild dextroconvex curvature of the lumbar spine.  Grade 1 anterolisthesis of L4 on L5, measuring 4 mm.  Vertebral body heights are maintained without evidence of fracture or aggressive osseous lesion.  Multilevel degenerative anterior marginal osteophyte formation, most pronounced in the visualized lower thoracic spine.  Intervertebral disc space narrowing at L5-S1.  Multilevel degenerative facet arthropathy, most pronounced at L4-5 and L5-S1.    X-Ray Cervical Spine 5 View W Flex Extxt  FINDINGS:  C7 faintly visualized on neutral lateral view.  There is heterotopic bone formation along the anterior/inferior margin C1-2 articulation.  There is smooth prominence of the overlying prevertebral soft tissues at this level.  Pre dens space appears within upper limits normal.  Vertebral body height and alignment maintained with anterior and posterior marginal spurring most prominently at the C4-5 and C5-6 levels.  Posterior subluxation C4 on C5 noted.  There is uniform loss of disc height at C4-5 and C5-6 levels.  Heterotopic bone and positioning combine to limit evaluation of the left side of C1-2 articulation on the open mouth view.  There is suggestion of slight asymmetry of lateral masses of C1.  Flexion and extension views demonstrate stable positioning without additional evidence subluxation or instability.  No other evidence of fracture or subluxation noted.  Multilevel mild uncovertebral osteophyte formation and minimal/mild neural foraminal stenosis identified.  Surgical staples identified anteriorly in the lower neck at and just to the right of midline.  Remaining osseous structures appear intact.  Partially visualized upper apices  appear clear.    Impression  Spondylosis with minimal retro listhesis or posterior subluxation C4 on C5.    Hepatopathy ache bone and degenerative change limits evaluation of the C1-2 articulation and levels particularly on the left.  Consideration should be given for CT if not previously performed to further evaluate these findings.    MRI Lumbar Spine 1/11/22  FINDINGS:  Grade 1 anterolisthesis of L4 on L5. No fracture.  Multiple hemangiomas are seen within the lower thoracic and upper lumbar spine vertebral bodies.  Mild disc height loss at the L1-L2 and L4-L5 levels.  Conus medullaris terminates at the L1 level. Distal spinal cord intensity is normal.  There is no paraspinal soft tissue abnormality.     T12-L1: Minimal diffuse disc bulge.  Mild bilateral facet arthropathy.  No neural foraminal stenosis.  No spinal canal stenosis.     L1-L2: There is a mild diffuse disc bulge.  Mild bilateral facet arthropathy.  No neural foraminal stenosis.  No spinal canal stenosis.     L2-L3: No disc bulge.  Mild bilateral facet arthropathy.  No neural foraminal stenosis.  No spinal canal stenosis.     L3-L4: Minimal diffuse disc bulge.  Moderate bilateral facet arthropathy.  Mild bilateral neural foraminal stenosis.  No significant spinal canal stenosis.     L4-L5: Mild uncovering of the disc space secondary to listhesis.  There is a diffuse disc bulge.  Severe bilateral facet arthropathy.  Moderate bilateral neural foraminal stenosis.  Mild spinal canal stenosis secondary to listhesis, disc bulge, facet arthropathy, and ligamentum flavum hypertrophy.     L5-S1: Minimal diffuse disc bulge.  Moderate facet arthropathy, greater on the right than left.  Mild-to-moderate bilateral neural foraminal stenosis.  No spinal canal stenosis.    MRI cervical spine 1/11/22  FINDINGS:  There is mild retrolisthesis of C4 on C5. Vertebral bodies demonstrate normal signal intensity on all sequences.  No fracture.  Mild-to-moderate disc height  loss and desiccation involves the C4 through C7 disc spaces, most pronounced at the C4-C5 and C5-C6 disc spaces.  The craniocervical junction is normal.  Visualized portions of the posterior fossa appear normal.  Mucosal thickening noted within the sphenoid sinus.  The spinal cord demonstrates normal signal intensity on all sequences. No paraspinal soft tissue abnormality is identified.     C2-C3: Small posterior disc osteophyte complex, best seen on the axial sequence, without spinal canal stenosis.  There is no facet arthropathy.  There is no uncovertebral joint disease.  There is no neuroforaminal stenosis.     C3-C4: No disc bulge.  Severe right facet arthropathy.  No significant uncovertebral arthropathy.  Moderate right neural foraminal stenosis.  No spinal canal stenosis.     C4-C5: Small posterior disc osteophyte complex.  Mild spinal canal stenosis secondary to retrolisthesis and disc osteophyte complex.  There is also a right lateral disc protrusion which involves the right neural foramen, best appreciated on the T2 sagittal sequence.  Mild bilateral facet arthropathy.  Mild-to-moderate bilateral uncovertebral arthropathy.  Moderate to severe neural foraminal stenosis, greater on the right than left.     C5-C6: Small posterior disc osteophyte complex results in mild spinal canal stenosis.  Severe left facet arthropathy.  Mild-to-moderate bilateral uncovertebral arthropathy.  Mild to moderate bilateral neural foraminal stenosis.     C6-C7: Small posterior disc osteophyte complex results in mild spinal canal stenosis.  No significant facet arthropathy.  No significant uncovertebral arthropathy.  No neural foraminal stenosis.     C7-T1: No disc bulge.  No significant facet arthropathy.  No neural foraminal stenosis.  No spinal canal stenosis.    ASSESSMENT: 65 y.o. year old female with neck and shoulder and back and lower extremity pain, consistent with     1. Acute cholecystitis  Ambulatory referral/consult  to General Surgery      2. Cervical spondylosis        3. DDD (degenerative disc disease), cervical                PLAN:   - Interventions: None at this time. Can consider Cervical TEETEE C6/7 with left paramedian approach in the future. Patient wants to address gallbladder issue first.    -s/p bilateral L5/S1 TF TEETEE on 12/30/22 with 100% relief    - Anticoagulation use: no no anticoagulation      - Medications:  -Continue Topamax 100 mg BID per PCP .  We discussed potential side effects of this medication include drowsiness, dizziness, tingling of the hands and feet, diarrhea, dysgeusia, weight loss/anorexia.      -Continue Lyrica 75 mg BID per PCP.  Has tried Robaxin, Flexeril and Zanaflex.     - Continue Baclofen 10 mg TID as needed:  Take 1 tablet (10 mg total) by mouth 3 (three) times daily.,        report:  Reviewed and consistent with medication use as prescribed.      - Therapy:   Patient recently completed outpatient physical therapy. She will continue home exercises as tolerated. .    - Imaging: Reviewed available imaging with patient and answered any questions they had regarding study, including MRI cervical spine .    - Consults/Referrals: Referral to Gen Surgery to discuss acute cholecystitis:     US Abdomen Limited_Gallbladder  Order: [id number]  Narrative    REASON FOR EXAM: abd pin    TECHNICAL FACTORS: B-mode and Doppler sonographic images were obtained of the gallbladder and common bile duct.    COMPARISON: January 31, 2021    FINDINGS: Nonshadowing echogenic material layers dependently in the gallbladder. Gallbladder wall thickening is present. The common bile duct measures 5.6 mm in diameter. Lawrence sign is elicited on real-time exam.    IMPRESSION:  Gallbladder sludge with wall thickening and Lawrence's sign suspicious for acute cholecystitis.        Electronically signed by Olvin Wolfe MD on 5/4/2025 2:19 PM  Exam End: 05/04/25 13:43    Specimen Collected: 05/04/25 13:55 Last Resulted:  05/04/25 14:19   Received From: [facility]        - Follow up :  return to clinic as needed.      The above plan and management options were discussed at length with patient. Patient is in agreement with the above and verbalized understanding.    - I discussed the goals of interventional chronic pain management with the patient on today's visit. We discussed a multimodal and systematic approach to pain.  This includes diagnostic and therapeutic injections, adjuvant pharmacologic treatment, physical therapy, and at times psychiatry.  I emphasized the importance of regular exercise, core strengthening and stretching, diet and weight loss as a cornerstone of long-term pain management.    - This condition does not require this patient to take time off of work, and the primary goal of our Pain Management services is to improve the patient's functional capacity.  - Patient Questions: Answered all of the patient's questions regarding diagnoses, therapy, treatment and next steps      Bethel Monet PA-C  Interventional Pain Management  [facility]

## 2025-05-09 DIAGNOSIS — M25.551 PAIN IN RIGHT HIP: ICD-10-CM

## 2025-05-09 RX ORDER — TIZANIDINE 4 MG/1
TABLET ORAL
Qty: 90 TABLET | Refills: 1 | Status: SHIPPED | OUTPATIENT
Start: 2025-05-09

## 2025-05-12 ENCOUNTER — TELEPHONE (OUTPATIENT)
Dept: FAMILY MEDICINE | Facility: CLINIC | Age: 65
End: 2025-05-12
Payer: MEDICARE

## 2025-05-12 NOTE — TELEPHONE ENCOUNTER
----- Message from Aura sent at 5/12/2025 11:16 AM CDT -----  Contact: [facility]  .Type:  Needs Medical AdviceWho Called: [facility]Would the patient rather a call back or a response via [facility]? Call Francia Call Back Number: [phone] Additional Information: [facility] is needing a call back in regard to a fax they sent over in regard to the pt diabetic testing supplies and neuropathy cream.

## 2025-05-12 NOTE — TELEPHONE ENCOUNTER
Pt stated company will not stop calling her and she is not interested in the supplies.   This is a scam

## 2025-05-21 ENCOUNTER — TELEPHONE (OUTPATIENT)
Dept: FAMILY MEDICINE | Facility: CLINIC | Age: 65
End: 2025-05-21
Payer: MEDICARE

## 2025-05-21 ENCOUNTER — OFFICE VISIT (OUTPATIENT)
Dept: SURGERY | Facility: CLINIC | Age: 65
End: 2025-05-21
Payer: MEDICARE

## 2025-05-21 VITALS
WEIGHT: 293 LBS | HEART RATE: 84 BPM | BODY MASS INDEX: 48.91 KG/M2 | DIASTOLIC BLOOD PRESSURE: 79 MMHG | SYSTOLIC BLOOD PRESSURE: 142 MMHG

## 2025-05-21 DIAGNOSIS — K83.8 BILIARY SLUDGE: Primary | ICD-10-CM

## 2025-05-21 DIAGNOSIS — K81.0 ACUTE CHOLECYSTITIS: ICD-10-CM

## 2025-05-21 DIAGNOSIS — K59.09 CHRONIC CONSTIPATION: ICD-10-CM

## 2025-05-21 DIAGNOSIS — K80.50 BILIARY COLIC: ICD-10-CM

## 2025-05-21 PROCEDURE — 99999 PR PBB SHADOW E&M-EST. PATIENT-LVL V: CPT | Mod: PBBFAC,,,

## 2025-05-21 NOTE — PATIENT INSTRUCTIONS
If you experience severe or worsening upper abdominal pain associated with fevers, chills, nausea, vomiting, or jaundice, you should let us know or seek emergent care.    Typical foods that trigger gallbladder attacks are spicy foods, greasy foods, dairy, or generally rich foods. You should try to avoid these foods or any foods that you find to trigger your pain.    Please call us if you decide you would like to proceed with gallbladder surgery.    For constipation, I recommend daily fiber supplementation with either Benefiber or Metamucil, increased water intake, twice daily stool softener such as docusate sodium, and OTC laxative such as Miralax. If you begin to have loose stools, you can discontinue the Miralax. You should also try to increase dietary fiber. Instructions on foods high in fiber are below.     [facility]  DIET RECOMMENDATIONS    Fruits:  Use all fruits and juices liberally; fresh, cooked, dried or canned. Eat fruit raw and with skins when possible. Have at least four servings of fruit daily including a citrus fruit and a stewed dried fruit. Hard seeds of fruits (berries, figs, Grapes, mangoes, tomatoes) etc. may be removed.    Vegetables: Use all vegetables liberally. Green leafy vegetables, such as cabbage, spinach, lettuce, broccoli, and other greens are particularly good.    Potato: As desired. Serve baked frequently and eat the skin. Other starchy foods such as rice, macaroni, etc., may be occasionally substituted. Chew popcorn well and do not eat hard kernels.    Meat, Fish, Poultry: One or two servings daily.    Eggs: One daily if you are not on a low cholesterol diet.    Milk: Include at least one-half pint daily. Whole milk or skimmed may be used.     Cereals: Use whole grain breads and cereals such as bran, bran flakes, grape nut flakes, puffed wheat, oatmeal, Wheaties, etc., as much as possible. Refined breaks and cereals are not restricted; however, they do not contain the  bulk necessary for this diet.     Sugars, Sweets: Use very moderately. Depend on fruit as dessert.    Fats: Use butter or margarine as desired. Oil or dressing on salads as desired. Fried foods may be used in moderation. Nuts may be eaten if you chew them well and may be ground or finely chopped for cooking.   Sample Menu                                                                                 Breakfast                          Lunch  Orange juice, 4 ounces                                                Vegetable soup                    Stewed fruit                                                                 Fresh fruit plate with cottage cheese  Shredded wheat                                                           Whole wheat toast  Scrambled eggs                                                           Butter  Whole wheat toast                                                       Coffee or tea  Dinner                                                                         Bedtime  Large glass tomato juice                                             1 glass milk  Roast beef                                                                   stewed fruit  Baked potato with skin  Buttered spinach  Raw vegetable salad  Baked apple with skin   Coffee or tea      [facility]  High Fiber Diet    15 grams of fiber per day is recommended  Fiber cereal = 5 grams (Raisin Bran, Shredded Wheat, Grape Nuts)  Konsyl 1 teaspoon = 6 grams  Metamucil 1 tablespoon = 3 grams  Citrucel 1 tablespoon = 2 grams  Fiber Choice = 3-4 per day    This diet furnishes adequate amounts of all the essential nutrients needed by the body and a very liberal fiber or roughage content. Roughage is indigestible fiber found in fruits, vegetables and whole grain cereals. It provides bulk to the large intestine and, accompanied by an adequate fluid intake, is a stimulant to elimination. Regular eating and elimination habits are  vital to good health.     How to Increase Your Fiber Intake    Drink at least 4-5 glasses of liquids per day or the fiber can be constipating rather then stimulating to your gut.  Boil and bake potatoes in their skin. Eat the skin, too.  Include fresh fruits and raw vegetables in your daily diet. Raw fruits and vegetables have more useful fiber than those that have been peeled, cooked, pureed, or otherwise processed.  Eat a wide variety of fibrous foods in reasonable amounts. Increase fiber intake slowly especially if you have been on a low-fiber diet.  Eat more legumes-peas, beans, soybeans.  For snacks, try dried fruit, whole wheat and rye crackers.  Avoid instant-cook hot cereals. Use the longer cooking cereals.  Use bran whenever possible. Sprinkle it on top of cereal, mix it into mashed potatoes or hamburger meat, or use it in combination dishes such as meat loaf.   Substitute whole grain, whole wheat and bran products for white flour products.  Eat slowly and chew your food thoroughly.

## 2025-05-21 NOTE — TELEPHONE ENCOUNTER
----- Message from Laura sent at 5/21/2025 10:11 AM CDT -----  Type:  Patient Returning CallWho Called:Thi Valdivia / Rowdy Helm Message for Patient:Does the patient know what this is regarding?:SuppliesWould the patient rather a call back or a response via [facility]? CallbackBe Call Back Number:[phone]  fax [phone] Information: Stating that they sent fax 04/30/25 with no response

## 2025-05-21 NOTE — PROGRESS NOTES
[facility]  History & Physical    Subjective     History of Present Illness:  Patient is a 65 y.o. female who presents for discussion of gallbladder sludge. She presented to the ED at [facility] approximately 2 weeks ago for an episode of severe right sided abdominal pain radiating to her back that awoke her from her sleep. She had associated nausea but no fevers, chills or vomiting. Workup in the ED with gallbladder sludge and concerns for acute cholecystitis. She did follow-up with a general surgeon and was scheduled for cholecystectomy but had to reschedule due to transportation issues. She presents now for a second opinion. She has had a few episodes of more mild pain in the RUQ since the ED visit along with some episodes of nausea. Her past abdominal surgical history is significant for hysterectomy. She does take Ozempic. She has always had issues with constipation, which is at its baseline. She did have a normal colonoscopy in 2021.    Chief Complaint   Patient presents with    Cholelithiasis       Review of patient's allergies indicates:   Allergen Reactions    Codeine sulfate      Nausea^    Lisinopril Swelling     angioedema    Codeine Nausea Only and Rash       Current Medications[1]    Past Medical History:   Diagnosis Date    Acute respiratory failure due to COVID-19     COVID-19     Diabetes mellitus, type 2 1993    BS  didn't check 10/04/2023 Insulin x 2 years    Diabetic neuropathy 01/27/2014    DJD (degenerative joint disease) of knee     DVT (deep venous thrombosis) around 1990's    in leg, is on no anticoagulant therapy presently    Fatty liver     GERD (gastroesophageal reflux disease)     Hypertension associated with diabetes     HECTOR (iron deficiency anemia) 05/13/2021    Iron deficiency anemia due to chronic blood loss 01/11/2021    Chronic.  Control is uncertain.  Patient recently started on iron supplement over-the-counter.  Patient reports no side effects.  Plan to recheck iron  studies and blood counts.          Lab Results      Component    Value    Date           WBC    5.03    12/08/2020           HGB    11.5 (L)    12/08/2020           HCT    38.6    12/08/2020           MCV    88    12/08/2020           PLT    388 (H)    Multinodular goiter     Obesity, morbid, BMI 50 or higher     Sleep apnea     has no CPAP     Past Surgical History:   Procedure Laterality Date    COLONOSCOPY N/A 5/12/2021    Procedure: COLONOSCOPY;  Surgeon: Carolina Rizo MD;  Location: [facility];  Service: Endoscopy;  Laterality: N/A;    EPIDURAL STEROID INJECTION N/A 12/10/2021    Procedure: Lumbar L5/S1 IL TEETEE  Would like AM procedure, if possible;  Surgeon: Nae Paul MD;  Location: [facility];  Service: Pain Management;  Laterality: N/A;    EPIDURAL STEROID INJECTION INTO CERVICAL SPINE N/A 10/8/2021    Procedure: C7-T1 IL TEETEE-no sedation.  Needs IV-just incase;  Surgeon: Nae Paul MD;  Location: [facility];  Service: Pain Management;  Laterality: N/A;    ESOPHAGOGASTRODUODENOSCOPY N/A 7/8/2021    Procedure: EGD (ESOPHAGOGASTRODUODENOSCOPY) previous positve covid;  Surgeon: Jann Gutierrez MD;  Location: [facility];  Service: Endoscopy;  Laterality: N/A;    ESOPHAGOGASTRODUODENOSCOPY N/A 9/18/2023    Procedure: EGD (ESOPHAGOGASTRODUODENOSCOPY);  Surgeon: Gm Leon MD;  Location: [facility];  Service: Endoscopy;  Laterality: N/A;    FRACTURE SURGERY      HYSTERECTOMY      INTRALUMINAL GASTROINTESTINAL TRACT IMAGING VIA CAPSULE N/A 10/24/2022    Procedure: IMAGING PROCEDURE, GI TRACT, INTRALUMINAL, VIA CAPSULE;  Surgeon: Hang Lunsford RN;  Location: [facility];  Service: Endoscopy;  Laterality: N/A;    SELECTIVE INJECTION OF ANESTHETIC AGENT AROUND LUMBAR SPINAL NERVE ROOT BY TRANSFORAMINAL APPROACH Bilateral 5/6/2022    Procedure: Bilateral L5/S1 TF TEETEE with RN IV sedation;  Surgeon: Nae Paul MD;  Location: [facility];  Service: Pain Management;  Laterality: Bilateral;     SELECTIVE INJECTION OF ANESTHETIC AGENT AROUND LUMBAR SPINAL NERVE ROOT BY TRANSFORAMINAL APPROACH Bilateral 12/30/2022    Procedure: Bilateral L5/S1 TF TEETEE RN IV Sedation;  Surgeon: Nae Paul MD;  Location: [facility];  Service: Pain Management;  Laterality: Bilateral;    SHOULDER ARTHROSCOPY      THYROIDECTOMY, PARTIAL Right     and transplatation of right superior parathyroid gland to the sternocleidomastoid muscle     TONSILLECTOMY      TUBAL LIGATION  1984    WRIST FRACTURE SURGERY      left     Family History   Problem Relation Name Age of Onset    Diabetes Mother Heather Villanueva     Hypertension Mother Heather Villanueva     Heart disease Mother Heather Villanueva 50    Ovarian cancer Mother Heather Villanueva     Cancer Father Gurwinder Dotson     Arthritis Father Gurwinder Dotson     Breast cancer Sister      Cancer Brother      Cancer Brother Gurwinder Buchanan     Leukemia Son Rafa Price     Cancer Son Rafa Price      Social History[2]     Review of Systems:  Review of Systems   Constitutional:  Negative for chills, fatigue, fever and unexpected weight change.   Respiratory:  Negative for cough, shortness of breath, wheezing and stridor.    Cardiovascular:  Negative for chest pain, palpitations and leg swelling.   Gastrointestinal:  Positive for abdominal pain, constipation and nausea. Negative for abdominal distention, diarrhea and vomiting.   Genitourinary:  Negative for difficulty urinating, dysuria, frequency, hematuria and urgency.   Skin:  Negative for color change, pallor, rash and wound.   Hematological:  Does not bruise/bleed easily.          Objective     Vital Signs (Most Recent)  Pulse: 84 (05/21/25 1311)  BP: (!) 142/79 (05/21/25 1311)     (!) 145.9 kg (321 lb 10.4 oz)     Physical Exam:  Physical Exam  Vitals reviewed.   Constitutional:       General: She is not in acute distress.     Appearance: She is well-developed. She is obese. She is not toxic-appearing.   HENT:      Head: Normocephalic and atraumatic.       Right Ear: External ear normal.      Left Ear: External ear normal.      Nose: Nose normal.      Mouth/Throat:      Mouth: Mucous membranes are moist.   Eyes:      Extraocular Movements: Extraocular movements intact.      Conjunctiva/sclera: Conjunctivae normal.   Cardiovascular:      Rate and Rhythm: Normal rate.   Pulmonary:      Effort: Pulmonary effort is normal. No respiratory distress.   Abdominal:      Comments: Abdomen is soft, non-tender, and non-distended on exam today, including with deep palpation of the RUQ. There are no large obvious previous scars   Musculoskeletal:      Cervical back: Neck supple.   Skin:     General: Skin is warm and dry.   Neurological:      Mental Status: She is alert and oriented to person, place, and time.   Psychiatric:         Behavior: Behavior normal.         Laboratory  CMP  Sodium   Date Value Ref Range Status   05/14/2025 143 136 - 144 mmol/L Final   02/11/2025 141 136 - 145 mmol/L Final     Potassium   Date Value Ref Range Status   05/14/2025 4.7 3.6 - 5.1 mmol/L Final   02/11/2025 3.9 3.5 - 5.1 mmol/L Final     Chloride   Date Value Ref Range Status   03/28/2025 110 95 - 110 mmol/L Final   02/11/2025 110 95 - 110 mmol/L Final     CO2   Date Value Ref Range Status   05/14/2025 18 (L) 22 - 32 mmol/L Final   02/11/2025 21 (L) 23 - 29 mmol/L Final     Glucose   Date Value Ref Range Status   03/28/2025 82 70 - 110 mg/dL Final   02/11/2025 72 70 - 110 mg/dL Final     BUN   Date Value Ref Range Status   03/28/2025 11 8 - 23 mg/dL Final     Blood Urea Nitrogen   Date Value Ref Range Status   05/14/2025 14 8 - 20 mg/dL Final     Creatinine   Date Value Ref Range Status   05/14/2025 0.78 0.60 - 1.10 mg/dL Final   03/28/2025 0.9 0.5 - 1.4 mg/dL Final     Calcium   Date Value Ref Range Status   05/14/2025 8.9 8.9 - 10.3 mg/dL Final   02/11/2025 9.0 8.7 - 10.5 mg/dL Final     Total Protein   Date Value Ref Range Status   05/14/2025 8 (H) 6.1 - 7.9 g/dL Final   02/11/2025 7.0 6.0 -  8.4 g/dL Final     Albumin   Date Value Ref Range Status   05/14/2025 3.4 (L) 3.5 - 4.8 g/dL Final   02/11/2025 3.2 (L) 3.5 - 5.2 g/dL Final     Total Bilirubin   Date Value Ref Range Status   05/14/2025 0.2 (L) 0.4 - 2.0 mg/dL Final   02/11/2025 0.3 0.1 - 1.0 mg/dL Final     Comment:     For infants and newborns, interpretation of results should be based  on gestational age, weight and in agreement with clinical  observations.    Premature Infant recommended reference ranges:  Up to 24 hours.............<8.0 mg/dL  Up to 48 hours............<12.0 mg/dL  3-5 days..................<15.0 mg/dL  6-29 days.................<15.0 mg/dL       Alkaline Phosphatase   Date Value Ref Range Status   05/14/2025 110 28 - 116 U/L Final   02/11/2025 97 40 - 150 U/L Final     AST   Date Value Ref Range Status   05/14/2025 22 10 - 34 U/L Final   02/11/2025 11 10 - 40 U/L Final     ALT   Date Value Ref Range Status   05/14/2025 17 5 - 41 U/L Final   02/11/2025 9 (L) 10 - 44 U/L Final     Anion Gap   Date Value Ref Range Status   05/14/2025 11 7 - 16 mmol/L Final   03/28/2025 12 8 - 16 mmol/L Final     eGFR   Date Value Ref Range Status   03/28/2025 >60 >60 mL/min/1.73/m2 Final     Comment:     Estimated GFR calculated using the CKD-EPI creatinine (2021) equation.   02/11/2025 >60.0 >60 mL/min/1.73 m^2 Final         Diagnostic Results:  U/S abdomen 05/2025:  FINDINGS: Nonshadowing echogenic material layers dependently in the gallbladder. Gallbladder wall thickening is present. The common bile duct measures 5.6 mm in diameter. Lawrence sign is elicited on real-time exam.     IMPRESSION:   Gallbladder sludge with wall thickening and Lawrence's sign suspicious for acute cholecystitis.     CT abdomen pelvis 05/2025  ABDOMEN FINDINGS: Evaluation for genitourinary calculi is limited due to presence of intravenous contrast. A nodule is present within the right lower lobe anteriorly near the diaphragm measuring 8 mm. Pleural calcifications are  visualized within the left   left posterior lateral hemithorax. Adjacent scarring or atelectasis is present. The liver, spleen, pancreas, adrenal glands, and kidneys are normal in appearance.  The gallbladder is distended. The bowel is unopacified limiting detail. Small bowel normal   in caliber. Large amount stool is present in the colon. There is no evidence of free fluid or lymphadenopathy.  Atheromatous changes are present within the common iliac arteries. Degenerative disc disease and facet osteoarthritis are present within the   lumbar spine.     PELVIS FINDINGS: Urinary bladder is normal in appearance. The uterus is surgically absent. The appendix is not visualized. No secondary signs of acute appendicitis are identified. There is no evidence of free fluid or lymphadenopathy. Osteoarthritis of   the hips is present.     IMPRESSION:    1.  Distended gallbladder. Consider gallbladder ultrasound for further evaluation.    2.  Right lower lobe pulmonary nodule. Fleischner Society 2017 guidelines for management of incidentally detected solid pulmonary nodules in adults (does not apply to patients younger than 35 years, CT lung cancer screening, patients with   immunosuppression or patients with known primary cancer): Single solid nodule 6-8 mm in diameter: For low and high risk patients CT at 6-12 months then consider CT at 18-24 months.   3. Possible constipation.   4. Postoperative changes of hysterectomy.   5. Limited evaluation for genitourinary calculi due to presence of intravenous contrast.        Assessment and Plan   66 y/o female with symptomatic cholelithiasis and chronic constipation.    - Discussed anatomy and pathophysiology of the biliary system and bilary diseases.  - Discussed s/s of acute cholecystitis and choledocholithiasis and gave ED precautions. The patient and her daughters voiced understanding.   - Discussed risks and benefits of robotic cholecystectomy including but not limited to pain,  bleeding, infection, damage to surrounding structures including the common bile duct, and bile leak.  - Discussed that I do believe the patient would benefit from cholecystectomy, as the gallbladder is likely the cause of her symptoms. It is a good possibility that the patient will have a recurrent severe episode of biliary colic in the future. She would like to continue to observe at this time.   - Advised to avoid any trigger foods which are typically fatty foods, greasy foods, spicy foods, and dairy. Discussed that this will not resolve the biliary sludge but may reduce the frequency and severity of symptoms.   - Regarding constipation, discussed that a minimum I would recommend behavioral, lifestyle and medication modifications to improve bowel habits. This includes a stool softener twice per day, fiber powder supplementation daily, drinking at least 64oz of water/day, eating a high fiber diet, using miralax as needed to achieve a bowel movement daily. Instructions to increase dietary fiber given to patient.  - Direct contact information given to patient. Advised to call if she would like to proceed with cholecystectomy or experiences worsening symptoms.      Ladonna Bailon PA-C  [facility]      I spent a total of 60 minutes on the day of the visit.This includes face to face time and non-face to face time preparing to see the patient (eg, review of tests), obtaining and/or reviewing separately obtained history, documenting clinical information in the electronic or other health record, independently interpreting results and communicating results to the patient/family/caregiver, or care coordinator.             [1]   Current Outpatient Medications   Medication Sig Dispense Refill    albuterol (PROVENTIL/VENTOLIN HFA) 90 mcg/actuation inhaler INHALE TWO PUFFS BY MOUTH INTO THE LUNGS EVERY 6 HOURS AS NEEDED FOR WHEEZING 20.1 g 2    azelastine (ASTELIN) 137 mcg (0.1 %) nasal spray 1 spray (137 mcg  total) by Nasal route 2 (two) times daily. 90 mL 3    clobetasoL (TEMOVATE) 0.05 % external solution Apply topically 2 (two) times daily. On wash days (leave in and allow to dry) or for any itching/irritation of scalp 50 mL 5    cyclobenzaprine (FLEXERIL) 10 MG tablet Take 1 tablet (10 mg total) by mouth 2 (two) times daily as needed for Muscle spasms. 180 tablet 2    diclofenac (VOLTAREN) 75 MG EC tablet Take 1 tablet (75 mg total) by mouth 2 (two) times daily. 60 tablet 3    diltiaZEM (CARDIZEM) 30 MG tablet Take 1 tablet (30 mg total) by mouth every 12 (twelve) hours. 180 tablet 2    EPINEPHrine (EPIPEN) 0.3 mg/0.3 mL AtIn INJECT 0.3 MILLILITERS (0.3 MG TOTAL) INTRAMUSCULARLY ONCE. FOR ONE DOSE 2 each 0    ezetimibe (ZETIA) 10 mg tablet Take 1 tablet (10 mg total) by mouth once daily. 90 tablet 2    famotidine (PEPCID) 40 MG tablet Take 1 tablet (40 mg total) by mouth once daily. 90 tablet 2    finasteride (PROPECIA) 1 mg tablet Take 1 tablet (1 mg total) by mouth once daily. 30 tablet 2    fluticasone propionate (FLONASE) 50 mcg/actuation nasal spray       fluticasone-umeclidin-vilanter (TRELEGY ELLIPTA) 100-62.5-25 mcg DsDv INHALE 1 PUFF BY MOUTH INTO THE LUNGS ONCE DAILY 180 each 3    furosemide (LASIX) 20 MG tablet TAKE 1 TABLET BY MOUTH ONCE DAILY AS NEEDED FOR SWELLING 30 tablet 0    ketoconazole (NIZORAL) 2 % shampoo Lather into affected areas. Rinse off in 5-10 min. Repeat 2-3 times a week. 120 mL 3    levothyroxine (SYNTHROID) 100 MCG tablet Take 1 tablet (100 mcg total) by mouth before breakfast. 90 tablet 2    minoxidiL (LONITEN) 2.5 MG tablet Take 0.5 tablets (1.25 mg total) by mouth once daily. 30 tablet 2    omeprazole (PRILOSEC) 40 MG capsule Take 1 capsule (40 mg total) by mouth once daily. 90 capsule 2    OZEMPIC 2 mg/dose (8 mg/3 mL) PnIj INJECT 2MG UNDER THE SKIN EVERY 7 DAYS 9 mL 1    pregabalin (LYRICA) 75 MG capsule TAKE ONE CAPSULE (75MG TOTAL) BY MOUTH THREE TIMES DAILY 90 capsule 1     rOPINIRole (REQUIP) 0.5 MG tablet Take 1 tablet (0.5 mg total) by mouth every evening. 90 tablet 2    tiZANidine (ZANAFLEX) 4 MG tablet TAKE ONE-HALF TO 1 TABLET BY MOUTH TWICE DAILY AT 9AM & 5PM AS NEEDED FOR MUSCLE SPASMS MAY CAUSE DROWSINESS 90 tablet 1    topiramate (TOPAMAX) 100 MG tablet Take 1 tablet (100 mg total) by mouth 2 (two) times daily. 180 tablet 3    ubrogepant (UBRELVY) 100 mg tablet Take 1 tablet (100 mg total) by mouth daily as needed for Migraine. If symptoms persist or return, may repeat dose after 2 hours. Maximum: 200 mg per 24 hours 8 tablet 3    baclofen (LIORESAL) 10 MG tablet Take 1 tablet (10 mg total) by mouth 3 (three) times daily. 90 tablet 0     Current Facility-Administered Medications   Medication Dose Route Frequency Provider Last Rate Last Admin    onabotulinumtoxina injection 155 Units  155 Units Intramuscular Q90 Days    155 Units at 11/12/24 1220    onabotulinumtoxina injection 200 Units  200 Units Intramuscular Q90 Days    200 Units at 02/11/25 1346    onabotulinumtoxina injection 200 Units  200 Units Intramuscular Q90 Days        [2]   Social History  Tobacco Use    Smoking status: Never    Smokeless tobacco: Never   Substance Use Topics    Alcohol use: No    Drug use: No

## 2025-06-03 ENCOUNTER — OFFICE VISIT (OUTPATIENT)
Dept: PODIATRY | Facility: CLINIC | Age: 65
End: 2025-06-03
Payer: MEDICARE

## 2025-06-03 DIAGNOSIS — E11.42 DIABETIC POLYNEUROPATHY ASSOCIATED WITH TYPE 2 DIABETES MELLITUS: Primary | ICD-10-CM

## 2025-06-03 DIAGNOSIS — L60.3 ONYCHODYSTROPHY: ICD-10-CM

## 2025-06-03 DIAGNOSIS — E66.01 MORBID OBESITY: ICD-10-CM

## 2025-06-03 PROCEDURE — 99999 PR PBB SHADOW E&M-EST. PATIENT-LVL III: CPT | Mod: PBBFAC,,, | Performed by: PODIATRIST

## 2025-06-03 PROCEDURE — 99499 UNLISTED E&M SERVICE: CPT | Mod: S$GLB,,, | Performed by: PODIATRIST

## 2025-06-03 PROCEDURE — 11721 DEBRIDE NAIL 6 OR MORE: CPT | Mod: Q9,S$GLB,, | Performed by: PODIATRIST

## 2025-06-10 ENCOUNTER — OFFICE VISIT (OUTPATIENT)
Dept: FAMILY MEDICINE | Facility: CLINIC | Age: 65
End: 2025-06-10
Payer: MEDICARE

## 2025-06-10 VITALS
RESPIRATION RATE: 17 BRPM | SYSTOLIC BLOOD PRESSURE: 132 MMHG | TEMPERATURE: 98 F | BODY MASS INDEX: 44.41 KG/M2 | WEIGHT: 293 LBS | DIASTOLIC BLOOD PRESSURE: 76 MMHG | OXYGEN SATURATION: 99 % | HEIGHT: 68 IN | HEART RATE: 82 BPM

## 2025-06-10 DIAGNOSIS — E89.0 POSTOPERATIVE HYPOTHYROIDISM: Chronic | ICD-10-CM

## 2025-06-10 DIAGNOSIS — R68.2 DRY MOUTH: ICD-10-CM

## 2025-06-10 DIAGNOSIS — R10.11 RIGHT UPPER QUADRANT PAIN: ICD-10-CM

## 2025-06-10 DIAGNOSIS — R11.2 NAUSEA AND VOMITING, UNSPECIFIED VOMITING TYPE: ICD-10-CM

## 2025-06-10 DIAGNOSIS — R49.0 HOARSENESS OF VOICE: ICD-10-CM

## 2025-06-10 DIAGNOSIS — K81.0 ACUTE CHOLECYSTITIS: Primary | ICD-10-CM

## 2025-06-10 DIAGNOSIS — K21.9 GASTROESOPHAGEAL REFLUX DISEASE, UNSPECIFIED WHETHER ESOPHAGITIS PRESENT: ICD-10-CM

## 2025-06-10 PROCEDURE — G2211 COMPLEX E/M VISIT ADD ON: HCPCS | Mod: S$GLB,,, | Performed by: NURSE PRACTITIONER

## 2025-06-10 PROCEDURE — 3288F FALL RISK ASSESSMENT DOCD: CPT | Mod: CPTII,S$GLB,, | Performed by: NURSE PRACTITIONER

## 2025-06-10 PROCEDURE — 3075F SYST BP GE 130 - 139MM HG: CPT | Mod: CPTII,S$GLB,, | Performed by: NURSE PRACTITIONER

## 2025-06-10 PROCEDURE — 3078F DIAST BP <80 MM HG: CPT | Mod: CPTII,S$GLB,, | Performed by: NURSE PRACTITIONER

## 2025-06-10 PROCEDURE — 99999 PR PBB SHADOW E&M-EST. PATIENT-LVL V: CPT | Mod: PBBFAC,,, | Performed by: NURSE PRACTITIONER

## 2025-06-10 PROCEDURE — 3008F BODY MASS INDEX DOCD: CPT | Mod: CPTII,S$GLB,, | Performed by: NURSE PRACTITIONER

## 2025-06-10 PROCEDURE — 3044F HG A1C LEVEL LT 7.0%: CPT | Mod: CPTII,S$GLB,, | Performed by: NURSE PRACTITIONER

## 2025-06-10 PROCEDURE — 1159F MED LIST DOCD IN RCRD: CPT | Mod: CPTII,S$GLB,, | Performed by: NURSE PRACTITIONER

## 2025-06-10 PROCEDURE — 1101F PT FALLS ASSESS-DOCD LE1/YR: CPT | Mod: CPTII,S$GLB,, | Performed by: NURSE PRACTITIONER

## 2025-06-10 PROCEDURE — 3072F LOW RISK FOR RETINOPATHY: CPT | Mod: CPTII,S$GLB,, | Performed by: NURSE PRACTITIONER

## 2025-06-10 PROCEDURE — 1126F AMNT PAIN NOTED NONE PRSNT: CPT | Mod: CPTII,S$GLB,, | Performed by: NURSE PRACTITIONER

## 2025-06-10 PROCEDURE — 99214 OFFICE O/P EST MOD 30 MIN: CPT | Mod: S$GLB,,, | Performed by: NURSE PRACTITIONER

## 2025-06-10 NOTE — PROGRESS NOTES
Assessment/Plan:    1. Acute cholecystitis    2. Right upper quadrant pain    3. Nausea and vomiting, unspecified vomiting type    4. Gastroesophageal reflux disease, unspecified whether esophagitis present    5. Dry mouth  -     Ambulatory referral/consult to ENT; Future; Expected date: 06/17/2025    6. Hoarseness of voice  -     Ambulatory referral/consult to ENT; Future; Expected date: 06/17/2025    7. Postoperative hypothyroidism  Overview:  Chronic.  Stable.  Patient reports compliance with Synthroid 100 mcg.  No side effects reported.  Symptoms are well controlled.     Lab Results   Component Value Date    TSH 1.031 02/11/2025    FREET4 0.95 12/26/2023      Lab Results   Component Value Date    T3FREE 2.5 12/26/2023    T3FREE 2.7 12/04/2019          - I strongly recommend she go to the ER today for further evaluation of cholecystitis and for potential cholecystectomy  - Discussed risk of potential complications if she delays treatment   - Patient verbalized understanding. VSS. No acute distress.     Rose Price NP  _____________________________________________________________________________________________________________________________________________________    CC: follow up     HPI: Patient is a 65-year-old female who presents in clinic today as an established patient here for follow up. She reports persistent pain and nausea related to gallbladder issues, with post-prandial nausea severe enough to induce emesis. Symptoms ongoing for nearly one month. Gradually worsening. She is awaiting gallbladder surgery, requiring transportation by her daughters. She expresses urgency due to ongoing discomfort. She has had followed up with general surgery. Denies fever, chills.     She reports persistent hoarseness with voice changes during talking, describing intermittent voice quality. She was evaluated by Dr. Quintana (ENT) in 2023 for hoarseness and dry mouth, with vocal cord nodules noted at that time. She has  a history of reflux and sinusitis. Omeprazole and Famotidine for reflux management. She is taking daily antihistamine and using nasal sprays. Thyroidectomy in 2016 performed by Dr. Quintana. She would like referral to see another ENT.      US Abdomen Limited_Gallbladder  Narrative  REASON FOR EXAM: abd pin    TECHNICAL FACTORS: B-mode and Doppler sonographic images were obtained of the gallbladder and common bile duct.    COMPARISON: January 31, 2021    FINDINGS: Nonshadowing echogenic material layers dependently in the gallbladder. Gallbladder wall thickening is present. The common bile duct measures 5.6 mm in diameter. Lawrence sign is elicited on real-time exam.    IMPRESSION:  Gallbladder sludge with wall thickening and Lawrence's sign suspicious for acute cholecystitis.    Electronically signed by Olvin Wolfe MD on 5/4/2025 2:19 PM  Exam End: 05/04/25 13:43     CT Abdomen Pelvis With IV Contrast  Narrative  REASON FOR EXAM: R flank pain    TECHNICAL FACTORS: Multiple contiguous axial CT images were obtained of the abdomen and pelvis after administration of intravenous contrast. 2D reformatted images were performed. Automated exposure control was utilized for radiation dose reduction.    COMPARISON: Nia 15, 2008    ABDOMEN FINDINGS: Evaluation for genitourinary calculi is limited due to presence of intravenous contrast. A nodule is present within the right lower lobe anteriorly near the diaphragm measuring 8 mm. Pleural calcifications are visualized within the left  left posterior lateral hemithorax. Adjacent scarring or atelectasis is present. The liver, spleen, pancreas, adrenal glands, and kidneys are normal in appearance.  The gallbladder is distended. The bowel is unopacified limiting detail. Small bowel normal  in caliber. Large amount stool is present in the colon. There is no evidence of free fluid or lymphadenopathy.  Atheromatous changes are present within the common iliac arteries. Degenerative disc  disease and facet osteoarthritis are present within the  lumbar spine.    PELVIS FINDINGS: Urinary bladder is normal in appearance. The uterus is surgically absent. The appendix is not visualized. No secondary signs of acute appendicitis are identified. There is no evidence of free fluid or lymphadenopathy. Osteoarthritis of  the hips is present.    IMPRESSION:   1.  Distended gallbladder. Consider gallbladder ultrasound for further evaluation.   2.  Right lower lobe pulmonary nodule. Fleischner Society 2017 guidelines for management of incidentally detected solid pulmonary nodules in adults (does not apply to patients younger than 35 years, CT lung cancer screening, patients with  immunosuppression or patients with known primary cancer): Single solid nodule 6-8 mm in diameter: For low and high risk patients CT at 6-12 months then consider CT at 18-24 months.  3. Possible constipation.  4. Postoperative changes of hysterectomy.  5. Limited evaluation for genitourinary calculi due to presence of intravenous contrast.    Electronically signed by Olvin Wolfe MD on 5/4/2025 12:19 PM    Exam End: 05/04/25 11:40     Past Medical History:  Past Medical History:   Diagnosis Date    Acute respiratory failure due to COVID-19     COVID-19     Diabetes mellitus, type 2 1993    BS  didn't check 10/04/2023 Insulin x 2 years    Diabetic neuropathy 01/27/2014    DJD (degenerative joint disease) of knee     DVT (deep venous thrombosis) around 1990's    in leg, is on no anticoagulant therapy presently    Fatty liver     GERD (gastroesophageal reflux disease)     Hypertension associated with diabetes     HECTOR (iron deficiency anemia) 05/13/2021    Iron deficiency anemia due to chronic blood loss 01/11/2021    Chronic.  Control is uncertain.  Patient recently started on iron supplement over-the-counter.  Patient reports no side effects.  Plan to recheck iron studies and blood counts.          Lab Results      Component    Value     Date           WBC    5.03    12/08/2020           HGB    11.5 (L)    12/08/2020           HCT    38.6    12/08/2020           MCV    88    12/08/2020           PLT    388 (H)    Multinodular goiter     Obesity, morbid, BMI 50 or higher     Sleep apnea     has no CPAP     Past Surgical History:   Procedure Laterality Date    COLONOSCOPY N/A 5/12/2021    Procedure: COLONOSCOPY;  Surgeon: Carolina Rizo MD;  Location: [facility];  Service: Endoscopy;  Laterality: N/A;    EPIDURAL STEROID INJECTION N/A 12/10/2021    Procedure: Lumbar L5/S1 IL TEETEE  Would like AM procedure, if possible;  Surgeon: Nae Paul MD;  Location: [facility];  Service: Pain Management;  Laterality: N/A;    EPIDURAL STEROID INJECTION INTO CERVICAL SPINE N/A 10/8/2021    Procedure: C7-T1 IL TEETEE-no sedation.  Needs IV-just incase;  Surgeon: Nae Paul MD;  Location: [facility];  Service: Pain Management;  Laterality: N/A;    ESOPHAGOGASTRODUODENOSCOPY N/A 7/8/2021    Procedure: EGD (ESOPHAGOGASTRODUODENOSCOPY) previous positve covid;  Surgeon: Jann Gutierrez MD;  Location: [facility];  Service: Endoscopy;  Laterality: N/A;    ESOPHAGOGASTRODUODENOSCOPY N/A 9/18/2023    Procedure: EGD (ESOPHAGOGASTRODUODENOSCOPY);  Surgeon: Gm Leon MD;  Location: [facility];  Service: Endoscopy;  Laterality: N/A;    FRACTURE SURGERY      HYSTERECTOMY      INTRALUMINAL GASTROINTESTINAL TRACT IMAGING VIA CAPSULE N/A 10/24/2022    Procedure: IMAGING PROCEDURE, GI TRACT, INTRALUMINAL, VIA CAPSULE;  Surgeon: Hang Lunsford, RN;  Location: [facility];  Service: Endoscopy;  Laterality: N/A;    SELECTIVE INJECTION OF ANESTHETIC AGENT AROUND LUMBAR SPINAL NERVE ROOT BY TRANSFORAMINAL APPROACH Bilateral 5/6/2022    Procedure: Bilateral L5/S1 TF TEETEE with RN IV sedation;  Surgeon: Nae Paul MD;  Location: [facility];  Service: Pain Management;  Laterality: Bilateral;    SELECTIVE INJECTION OF ANESTHETIC AGENT AROUND LUMBAR SPINAL NERVE ROOT  BY TRANSFORAMINAL APPROACH Bilateral 12/30/2022    Procedure: Bilateral L5/S1 TF TEETEE RN IV Sedation;  Surgeon: Nae Paul MD;  Location: [facility];  Service: Pain Management;  Laterality: Bilateral;    SHOULDER ARTHROSCOPY      THYROIDECTOMY, PARTIAL Right     and transplatation of right superior parathyroid gland to the sternocleidomastoid muscle     TONSILLECTOMY      TUBAL LIGATION  1984    WRIST FRACTURE SURGERY      left     Review of patient's allergies indicates:   Allergen Reactions    Codeine sulfate      Nausea^    Lisinopril Swelling     angioedema    Codeine Nausea Only and Rash     Social History[1]  Family History   Problem Relation Name Age of Onset    Diabetes Mother Heather Villanueva     Hypertension Mother Heather Villanueva     Heart disease Mother Heather Villanueva 50    Ovarian cancer Mother Heather Villanueva     Cancer Father Gurwinder Dotson     Arthritis Father Gurwinder Dotson     Breast cancer Sister      Cancer Brother      Cancer Brother Gurwinder Buchanan     Leukemia Son Rafa Price     Cancer Son Rafa Price      Medications Ordered Prior to Encounter[2]    Review of Systems   Constitutional:  Negative for appetite change, chills, fatigue and fever.   HENT:  Negative for congestion, rhinorrhea and sore throat.    Eyes:  Negative for visual disturbance.   Respiratory:  Negative for cough and shortness of breath.    Cardiovascular:  Negative for chest pain, palpitations and leg swelling.   Gastrointestinal:  Positive for abdominal pain, nausea and vomiting. Negative for diarrhea.   Genitourinary:  Negative for difficulty urinating, dysuria and hematuria.   Musculoskeletal:  Negative for arthralgias and myalgias.   Skin:  Negative for rash and wound.   Neurological:  Negative for dizziness and headaches.   Psychiatric/Behavioral:  Negative for behavioral problems. The patient is not nervous/anxious.      Vitals:    06/10/25 1225   BP: 132/76   Pulse: 82   Resp: 17   Temp: 98.3 °F (36.8 °C)   TempSrc: Oral  "  SpO2: 99%   Weight: (!) 145.9 kg (321 lb 9.6 oz)   Height: 5' 8" (1.727 m)       Wt Readings from Last 3 Encounters:   06/10/25 (!) 145.9 kg (321 lb 9.6 oz)   05/21/25 (!) 145.9 kg (321 lb 10.4 oz)   04/24/25 (!) 145.5 kg (320 lb 14.1 oz)     Physical Exam  Vitals reviewed.   Constitutional:       General: She is not in acute distress.     Appearance: Normal appearance. She is obese. She is not ill-appearing, toxic-appearing or diaphoretic.   HENT:      Head: Normocephalic and atraumatic.      Right Ear: External ear normal.      Left Ear: External ear normal.      Nose: Nose normal.   Eyes:      Extraocular Movements: Extraocular movements intact.      Conjunctiva/sclera: Conjunctivae normal.   Cardiovascular:      Rate and Rhythm: Normal rate.      Heart sounds: Normal heart sounds.   Pulmonary:      Effort: Pulmonary effort is normal. No respiratory distress.      Breath sounds: Normal breath sounds.   Abdominal:      General: Bowel sounds are normal. There is no distension.      Palpations: Abdomen is soft.      Tenderness: There is abdominal tenderness in the right upper quadrant.   Musculoskeletal:         General: Normal range of motion.      Cervical back: Normal range of motion.      Right lower leg: No edema.      Left lower leg: No edema.   Skin:     General: Skin is warm and dry.      Capillary Refill: Capillary refill takes less than 2 seconds.      Coloration: Skin is not pale.      Findings: No rash.   Neurological:      General: No focal deficit present.      Mental Status: She is alert and oriented to person, place, and time. Mental status is at baseline.      Motor: No weakness.      Gait: Gait normal.   Psychiatric:         Attention and Perception: She is attentive.         Mood and Affect: Mood normal. Mood is not anxious, depressed or elated. Affect is not labile, blunt, flat, angry or tearful.         Speech: Speech normal. She is communicative. Speech is not rapid and pressured, delayed or " slurred.         Behavior: Behavior normal. Behavior is not agitated, slowed, aggressive, withdrawn, hyperactive or combative. Behavior is cooperative.         Thought Content: Thought content normal.         Judgment: Judgment normal.       Health Maintenance   Topic Date Due    Low Dose Statin  Never done    Foot Exam  07/08/2025    Diabetic Eye Exam  08/21/2025    Hemoglobin A1c  08/11/2025    Diabetes Urine Screening  09/09/2025    Mammogram  01/30/2026    Lipid Panel  03/28/2026    DEXA Scan  02/17/2028    TETANUS VACCINE  10/29/2028    Colorectal Cancer Screening  05/12/2031    Hepatitis C Screening  Completed    Shingles Vaccine  Completed    Influenza Vaccine  Completed    COVID-19 Vaccine  Completed    RSV Vaccine (Age 60+ and Pregnant patients)  Completed    Pneumococcal Vaccines (Age 50+)  Completed     This note was generated with the assistance of ambient listening technology. Verbal consent was obtained by the patient and accompanying visitor(s) for the recording of patient appointment to facilitate this note. I attest to having reviewed and edited the generated note for accuracy, though some syntax or spelling errors may persist. Please contact the author of this note for any clarification.    Visit today included increased complexity associated with the care of the episodic problem - see above- addressed and managing the longitudinal care of the patient due to the serious and/or complex managed problem(s) - see above.         [1]   Social History  Tobacco Use    Smoking status: Never    Smokeless tobacco: Never   Substance Use Topics    Alcohol use: No    Drug use: No   [2]   Current Outpatient Medications on File Prior to Visit   Medication Sig Dispense Refill    albuterol (PROVENTIL/VENTOLIN HFA) 90 mcg/actuation inhaler INHALE TWO PUFFS BY MOUTH INTO THE LUNGS EVERY 6 HOURS AS NEEDED FOR WHEEZING 20.1 g 2    azelastine (ASTELIN) 137 mcg (0.1 %) nasal spray 1 spray (137 mcg total) by Nasal route 2  (two) times daily. 90 mL 3    clobetasoL (TEMOVATE) 0.05 % external solution Apply topically 2 (two) times daily. On wash days (leave in and allow to dry) or for any itching/irritation of scalp 50 mL 5    cyclobenzaprine (FLEXERIL) 10 MG tablet Take 1 tablet (10 mg total) by mouth 2 (two) times daily as needed for Muscle spasms. 180 tablet 2    diclofenac (VOLTAREN) 75 MG EC tablet Take 1 tablet (75 mg total) by mouth 2 (two) times daily. 60 tablet 3    diltiaZEM (CARDIZEM) 30 MG tablet Take 1 tablet (30 mg total) by mouth every 12 (twelve) hours. 180 tablet 2    EPINEPHrine (EPIPEN) 0.3 mg/0.3 mL AtIn INJECT 0.3 MILLILITERS (0.3 MG TOTAL) INTRAMUSCULARLY ONCE. FOR ONE DOSE 2 each 0    ezetimibe (ZETIA) 10 mg tablet Take 1 tablet (10 mg total) by mouth once daily. 90 tablet 2    famotidine (PEPCID) 40 MG tablet Take 1 tablet (40 mg total) by mouth once daily. 90 tablet 2    finasteride (PROPECIA) 1 mg tablet Take 1 tablet (1 mg total) by mouth once daily. 30 tablet 2    fluticasone propionate (FLONASE) 50 mcg/actuation nasal spray       fluticasone-umeclidin-vilanter (TRELEGY ELLIPTA) 100-62.5-25 mcg DsDv INHALE 1 PUFF BY MOUTH INTO THE LUNGS ONCE DAILY 180 each 3    furosemide (LASIX) 20 MG tablet TAKE 1 TABLET BY MOUTH ONCE DAILY AS NEEDED FOR SWELLING 30 tablet 0    ketoconazole (NIZORAL) 2 % shampoo Lather into affected areas. Rinse off in 5-10 min. Repeat 2-3 times a week. 120 mL 3    levothyroxine (SYNTHROID) 100 MCG tablet Take 1 tablet (100 mcg total) by mouth before breakfast. 90 tablet 2    minoxidiL (LONITEN) 2.5 MG tablet Take 0.5 tablets (1.25 mg total) by mouth once daily. 30 tablet 2    omeprazole (PRILOSEC) 40 MG capsule Take 1 capsule (40 mg total) by mouth once daily. 90 capsule 2    OZEMPIC 2 mg/dose (8 mg/3 mL) PnIj INJECT 2MG UNDER THE SKIN EVERY 7 DAYS 9 mL 1    rOPINIRole (REQUIP) 0.5 MG tablet Take 1 tablet (0.5 mg total) by mouth every evening. 90 tablet 2    tiZANidine (ZANAFLEX) 4 MG  tablet TAKE ONE-HALF TO 1 TABLET BY MOUTH TWICE DAILY AT 9AM & 5PM AS NEEDED FOR MUSCLE SPASMS MAY CAUSE DROWSINESS 90 tablet 1    topiramate (TOPAMAX) 100 MG tablet Take 1 tablet (100 mg total) by mouth 2 (two) times daily. 180 tablet 3    ubrogepant (UBRELVY) 100 mg tablet Take 1 tablet (100 mg total) by mouth daily as needed for Migraine. If symptoms persist or return, may repeat dose after 2 hours. Maximum: 200 mg per 24 hours 8 tablet 3    [DISCONTINUED] pregabalin (LYRICA) 75 MG capsule TAKE ONE CAPSULE (75MG TOTAL) BY MOUTH THREE TIMES DAILY 90 capsule 1    baclofen (LIORESAL) 10 MG tablet Take 1 tablet (10 mg total) by mouth 3 (three) times daily. 90 tablet 0     Current Facility-Administered Medications on File Prior to Visit   Medication Dose Route Frequency Provider Last Rate Last Admin    onabotulinumtoxina injection 155 Units  155 Units Intramuscular Q90 Days    155 Units at 11/12/24 1220    onabotulinumtoxina injection 200 Units  200 Units Intramuscular Q90 Days    200 Units at 02/11/25 1346    onabotulinumtoxina injection 200 Units  200 Units Intramuscular Q90 Days

## 2025-06-10 NOTE — Clinical Note
Please check on patient. I do not see where she has been to ER for gallbladder concerns. How is she feeling?

## 2025-06-12 ENCOUNTER — PATIENT OUTREACH (OUTPATIENT)
Dept: ADMINISTRATIVE | Facility: HOSPITAL | Age: 65
End: 2025-06-12
Payer: MEDICARE

## 2025-06-12 ENCOUNTER — TELEPHONE (OUTPATIENT)
Dept: FAMILY MEDICINE | Facility: CLINIC | Age: 65
End: 2025-06-12
Payer: MEDICARE

## 2025-06-12 DIAGNOSIS — M62.838 CERVICAL PARASPINAL MUSCLE SPASM: ICD-10-CM

## 2025-06-12 DIAGNOSIS — M12.812 ROTATOR CUFF ARTHROPATHY OF LEFT SHOULDER: ICD-10-CM

## 2025-06-12 DIAGNOSIS — E11.42 DIABETIC POLYNEUROPATHY ASSOCIATED WITH TYPE 2 DIABETES MELLITUS: ICD-10-CM

## 2025-06-12 RX ORDER — PREGABALIN 75 MG/1
CAPSULE ORAL
Qty: 90 CAPSULE | Refills: 1 | Status: SHIPPED | OUTPATIENT
Start: 2025-06-12

## 2025-06-12 NOTE — TELEPHONE ENCOUNTER
Copied from CRM #[id number]. Topic: Medications - Pharmacy  >> Jun 12, 2025  3:51 PM Amy wrote:  Type:  Pharmacy Calling to Clarify an RX    Name of Caller: Tequila   Pharmacy Name: [facility]   Prescription Name:tiZANidine (ZANAFLEX) 4 MG tablet  What do they need to clarify?: directions   Best Call Back Number: [phone]  Additional Information:

## 2025-06-12 NOTE — PROGRESS NOTES
VBC Program activation: Patient is not due for any topics at this time. Scheduled next due HA1c & microalbumin on day of next Podiatry Visit.       VBHM Score: 0      Patient is not due for any topics at this time.

## 2025-06-12 NOTE — PROGRESS NOTES
Called to check pt and see how she was feeling from her scheduled visit on, 06/10/2025. Per pt, her abdominal pain has subsided and she feels much better. No ER visit is needed at this time. Per did verbalize that if the pain does return that she will indeed go to the ER for further assistance.

## 2025-06-13 ENCOUNTER — TELEPHONE (OUTPATIENT)
Dept: HEMATOLOGY/ONCOLOGY | Facility: CLINIC | Age: 65
End: 2025-06-13
Payer: MEDICARE

## 2025-06-13 DIAGNOSIS — M25.551 PAIN IN RIGHT HIP: ICD-10-CM

## 2025-06-13 RX ORDER — TIZANIDINE 4 MG/1
TABLET ORAL
Qty: 90 TABLET | Refills: 1 | Status: SHIPPED | OUTPATIENT
Start: 2025-06-13

## 2025-06-13 NOTE — TELEPHONE ENCOUNTER
Copied from CRM #[id number]. Topic: Appointments - Appointment Rescheduling  >> Jun 13, 2025 10:42 AM Ernie wrote:  Type: Needs Medical Advice  Who Called:  Patient    Best Call Back Number: [phone]    Additional Information: States transportation running late. Will not be able to get to office until 12pm. Please advise.

## 2025-06-13 NOTE — TELEPHONE ENCOUNTER
Care Due:                  Date            Visit Type   Department     Provider  --------------------------------------------------------------------------------                                EP -                              PRIMARY      [facility] FAMILY  Last Visit: 12-      CARE ([facility])   MEDICINE       Oleksandr Nagel  Next Visit: None Scheduled  None         None Found                                                            Last  Test          Frequency    Reason                     Performed    Due Date  --------------------------------------------------------------------------------    HBA1C.......  6 months...  OZEMPIC..................  02- 08-    [facility] Embedded Care Due Messages. Reference number: [id number].   6/13/2025 3:06:30 PM CDT

## 2025-06-13 NOTE — TELEPHONE ENCOUNTER
Copied from CRM #[id number]. Topic: Medications - Pharmacy  >> Jun 13, 2025 11:51 AM Griselda wrote:  Type:  Pharmacy Calling to Clarify an RX    Name of Caller:Sreedhar  Pharmacy Name:[facility]  Prescription Name: tiZANidine (ZANAFLEX) 4 MG tablet  What do they need to clarify?: Need to clarify directions  Best Call Back Number: [phone]  Additional Information:  Fax: [phone]

## 2025-06-16 ENCOUNTER — TELEPHONE (OUTPATIENT)
Dept: FAMILY MEDICINE | Facility: CLINIC | Age: 65
End: 2025-06-16
Payer: MEDICARE

## 2025-06-16 NOTE — TELEPHONE ENCOUNTER
Copied from CRM #[id number]. Topic: Medications - Medication Question  >> Jun 16, 2025 11:00 AM Laura wrote:  Type:  Patient Returning Call    Who Called:[facility]  Who Left Message for Patient:Nurse  Does the patient know what this is regarding?:tiZANidine (ZANAFLEX) 4 MG tablet  Would the patient rather a call back or a response via [facility]? Callback  Best Call Back Number:[phone]  Additional Information: Verification of script

## 2025-06-16 NOTE — TELEPHONE ENCOUNTER
Copied from CRM #[id number]. Topic: Medications - Medication Question  >> Jun 16, 2025 11:02 AM Laura wrote:  Type:  Patient Returning Call    Who Called:[facility]  Who Left Message for Patient:Nurse  Does the patient know what this is regarding?:tiZANidine (ZANAFLEX) 4 MG tablet  Would the patient rather a call back or a response via [facility]? Callback  Best Call Back Number:[phone]  Additional Information: Verification of script

## 2025-06-16 NOTE — TELEPHONE ENCOUNTER
"Call returned. Directions for Zanaflex 4 mg was provided to the pharmacist with [facility];     "Sig: TAKE ONE-HALF TO 1 TABLET BY MOUTH TWICE DAILY AT 9AM & 5PM AS NEEDED FOR MUSCLE SPASMS MAY CAUSE DROWSINESS'.    "

## 2025-06-17 DIAGNOSIS — Z79.899 ENCOUNTER FOR LONG-TERM (CURRENT) USE OF MEDICATIONS: ICD-10-CM

## 2025-06-17 DIAGNOSIS — R05.9 COUGH, UNSPECIFIED: ICD-10-CM

## 2025-06-17 DIAGNOSIS — I73.9 CLAUDICATION OF BOTH LOWER EXTREMITIES: ICD-10-CM

## 2025-06-17 DIAGNOSIS — R00.0 TACHYCARDIA: ICD-10-CM

## 2025-06-17 DIAGNOSIS — E78.5 HYPERLIPIDEMIA, UNSPECIFIED HYPERLIPIDEMIA TYPE: ICD-10-CM

## 2025-06-17 DIAGNOSIS — R01.1 SYSTOLIC MURMUR: ICD-10-CM

## 2025-06-17 DIAGNOSIS — E11.42 DIABETIC POLYNEUROPATHY ASSOCIATED WITH TYPE 2 DIABETES MELLITUS: ICD-10-CM

## 2025-06-17 DIAGNOSIS — E78.2 COMBINED HYPERLIPIDEMIA ASSOCIATED WITH TYPE 2 DIABETES MELLITUS: ICD-10-CM

## 2025-06-17 DIAGNOSIS — E11.59 HYPERTENSION ASSOCIATED WITH DIABETES: Chronic | ICD-10-CM

## 2025-06-17 DIAGNOSIS — E11.69 COMBINED HYPERLIPIDEMIA ASSOCIATED WITH TYPE 2 DIABETES MELLITUS: ICD-10-CM

## 2025-06-17 DIAGNOSIS — K21.00 GASTROESOPHAGEAL REFLUX DISEASE WITH ESOPHAGITIS WITHOUT HEMORRHAGE: ICD-10-CM

## 2025-06-17 DIAGNOSIS — R06.09 DOE (DYSPNEA ON EXERTION): ICD-10-CM

## 2025-06-17 DIAGNOSIS — R55 SYNCOPE, UNSPECIFIED SYNCOPE TYPE: ICD-10-CM

## 2025-06-17 DIAGNOSIS — I15.2 HYPERTENSION ASSOCIATED WITH DIABETES: Chronic | ICD-10-CM

## 2025-06-17 RX ORDER — DILTIAZEM HYDROCHLORIDE 30 MG/1
TABLET, FILM COATED ORAL
Qty: 180 TABLET | Refills: 1 | Status: SHIPPED | OUTPATIENT
Start: 2025-06-17

## 2025-06-17 RX ORDER — FAMOTIDINE 40 MG/1
TABLET, FILM COATED ORAL
Qty: 90 TABLET | Refills: 1 | Status: SHIPPED | OUTPATIENT
Start: 2025-06-17

## 2025-06-17 RX ORDER — FLUTICASONE PROPIONATE 50 MCG
2 SPRAY, SUSPENSION (ML) NASAL
Qty: 48 G | Refills: 1 | Status: SHIPPED | OUTPATIENT
Start: 2025-06-17

## 2025-06-17 RX ORDER — LEVOTHYROXINE SODIUM 100 UG/1
TABLET ORAL
Qty: 90 TABLET | Refills: 1 | Status: SHIPPED | OUTPATIENT
Start: 2025-06-17

## 2025-06-17 RX ORDER — ROPINIROLE 0.5 MG/1
TABLET, FILM COATED ORAL
Qty: 90 TABLET | Refills: 1 | Status: SHIPPED | OUTPATIENT
Start: 2025-06-17

## 2025-06-17 RX ORDER — EZETIMIBE 10 MG/1
TABLET ORAL
Qty: 90 TABLET | Refills: 1 | Status: SHIPPED | OUTPATIENT
Start: 2025-06-17

## 2025-06-17 RX ORDER — OMEPRAZOLE 40 MG/1
CAPSULE, DELAYED RELEASE ORAL
Qty: 90 CAPSULE | Refills: 1 | Status: SHIPPED | OUTPATIENT
Start: 2025-06-17

## 2025-06-17 NOTE — TELEPHONE ENCOUNTER
Refill Routing Note   Medication(s) are not appropriate for processing by [facility] for the following reason(s):        No active prescription written by provider    [facility] action(s):  Defer             Appointments  past 12m or future 3m with PCP    Date Provider   Last Visit   12/17/2024 Oleksandr Nagel MD   Next Visit   Visit date not found Oleksandr Nagel MD   ED visits in past 90 days: 0        Note composed:7:32 AM 06/17/2025

## 2025-06-17 NOTE — TELEPHONE ENCOUNTER
Refill Decision Note   Zakia Price  is requesting a refill authorization.  Brief Assessment and Rationale for Refill:  Approve     Medication Therapy Plan:        Comments:     Note composed:10:04 AM 06/17/2025

## 2025-06-17 NOTE — TELEPHONE ENCOUNTER
No care due was identified.  [facility] Embedded Care Due Messages. Reference number: [id number].   6/17/2025 8:42:32 AM CDT

## 2025-06-17 NOTE — TELEPHONE ENCOUNTER
No care due was identified.  [facility] Embedded Care Due Messages. Reference number: [id number].   6/17/2025 6:24:14 AM CDT

## 2025-06-24 ENCOUNTER — TELEPHONE (OUTPATIENT)
Dept: NEUROLOGY | Facility: CLINIC | Age: 65
End: 2025-06-24
Payer: MEDICARE

## 2025-06-24 NOTE — TELEPHONE ENCOUNTER
Spoke with patient regarding appointment today. Informed patient that appointment needs to be rescheduled due to provider being out. Patient v/u and r/s to 07-01-25 at 10:20 am

## 2025-06-25 DIAGNOSIS — R00.0 TACHYCARDIA: ICD-10-CM

## 2025-06-25 DIAGNOSIS — I15.2 HYPERTENSION ASSOCIATED WITH DIABETES: Chronic | ICD-10-CM

## 2025-06-25 DIAGNOSIS — R06.09 DOE (DYSPNEA ON EXERTION): ICD-10-CM

## 2025-06-25 DIAGNOSIS — E11.59 HYPERTENSION ASSOCIATED WITH DIABETES: Chronic | ICD-10-CM

## 2025-06-25 DIAGNOSIS — Z79.899 ENCOUNTER FOR LONG-TERM (CURRENT) USE OF MEDICATIONS: ICD-10-CM

## 2025-06-25 DIAGNOSIS — E78.5 HYPERLIPIDEMIA, UNSPECIFIED HYPERLIPIDEMIA TYPE: ICD-10-CM

## 2025-06-25 DIAGNOSIS — I73.9 CLAUDICATION OF BOTH LOWER EXTREMITIES: ICD-10-CM

## 2025-06-25 DIAGNOSIS — E11.69 COMBINED HYPERLIPIDEMIA ASSOCIATED WITH TYPE 2 DIABETES MELLITUS: ICD-10-CM

## 2025-06-25 DIAGNOSIS — R55 SYNCOPE, UNSPECIFIED SYNCOPE TYPE: ICD-10-CM

## 2025-06-25 DIAGNOSIS — R05.9 COUGH, UNSPECIFIED: ICD-10-CM

## 2025-06-25 DIAGNOSIS — E11.42 DIABETIC POLYNEUROPATHY ASSOCIATED WITH TYPE 2 DIABETES MELLITUS: ICD-10-CM

## 2025-06-25 DIAGNOSIS — E78.2 COMBINED HYPERLIPIDEMIA ASSOCIATED WITH TYPE 2 DIABETES MELLITUS: ICD-10-CM

## 2025-06-25 DIAGNOSIS — K21.00 GASTROESOPHAGEAL REFLUX DISEASE WITH ESOPHAGITIS WITHOUT HEMORRHAGE: ICD-10-CM

## 2025-06-25 DIAGNOSIS — R01.1 SYSTOLIC MURMUR: ICD-10-CM

## 2025-06-25 NOTE — TELEPHONE ENCOUNTER
No care due was identified.  [facility] Embedded Care Due Messages. Reference number: [id number].   6/25/2025 4:19:41 PM CDT

## 2025-06-25 NOTE — TELEPHONE ENCOUNTER
Copied from CRM #[id number]. Topic: Medications - Medication Refill  >> Jun 25, 2025  4:07 PM Diane wrote:  Type:  RX Refill Request    Who Called: [facility]   Refill or New Rx:refill  RX Name and Strength:    1. famotidine (PEPCID) 40 MG tablet   2. ezetimibe (ZETIA) 10 mg tablet   3. fluticasone propionate (FLONASE) 50 mcg/actuation nasal spray   4. omeprazole (PRILOSEC) 40 MG capsule   5. diltiaZEM (CARDIZEM) 30 MG tablet   6. levothyroxine (SYNTHROID) 100 MCG tablet  7. rOPINIRole (REQUIP) 0.5 MG tablet     How is the patient currently taking it? (ex. 1XDay):   Is this a 30 day or 90 day RX:   Preferred Pharmacy with phone number:    [facility] [place] - [address] [address]  Phone: [phone] Fax: [phone]     Local or Mail Order:mail   Ordering Provider:Shona   Would the patient rather a call back or a response via [facility]? call  Best Call Back Number:[phone]    Additional Information:

## 2025-06-26 RX ORDER — EZETIMIBE 10 MG/1
10 TABLET ORAL DAILY
Qty: 90 TABLET | Refills: 1 | Status: SHIPPED | OUTPATIENT
Start: 2025-06-26

## 2025-06-26 RX ORDER — OMEPRAZOLE 40 MG/1
40 CAPSULE, DELAYED RELEASE ORAL EVERY MORNING
Qty: 90 CAPSULE | Refills: 1 | Status: SHIPPED | OUTPATIENT
Start: 2025-06-26

## 2025-06-26 RX ORDER — DILTIAZEM HYDROCHLORIDE 30 MG/1
30 TABLET, FILM COATED ORAL EVERY 12 HOURS
Qty: 180 TABLET | Refills: 1 | Status: SHIPPED | OUTPATIENT
Start: 2025-06-26

## 2025-06-26 RX ORDER — ROPINIROLE 0.5 MG/1
0.5 TABLET, FILM COATED ORAL NIGHTLY
Qty: 90 TABLET | Refills: 1 | Status: SHIPPED | OUTPATIENT
Start: 2025-06-26

## 2025-06-26 RX ORDER — FLUTICASONE PROPIONATE 50 MCG
2 SPRAY, SUSPENSION (ML) NASAL DAILY PRN
Qty: 48 G | Refills: 1 | Status: SHIPPED | OUTPATIENT
Start: 2025-06-26

## 2025-06-26 RX ORDER — LEVOTHYROXINE SODIUM 100 UG/1
100 TABLET ORAL
Qty: 90 TABLET | Refills: 1 | Status: SHIPPED | OUTPATIENT
Start: 2025-06-26

## 2025-06-26 RX ORDER — FAMOTIDINE 40 MG/1
40 TABLET, FILM COATED ORAL 2 TIMES DAILY PRN
Qty: 180 TABLET | Refills: 1 | Status: SHIPPED | OUTPATIENT
Start: 2025-06-26

## 2025-07-01 ENCOUNTER — TELEPHONE (OUTPATIENT)
Dept: OTOLARYNGOLOGY | Facility: CLINIC | Age: 65
End: 2025-07-01

## 2025-07-01 ENCOUNTER — OFFICE VISIT (OUTPATIENT)
Dept: OTOLARYNGOLOGY | Facility: CLINIC | Age: 65
End: 2025-07-01
Payer: MEDICARE

## 2025-07-01 ENCOUNTER — PROCEDURE VISIT (OUTPATIENT)
Dept: NEUROLOGY | Facility: CLINIC | Age: 65
End: 2025-07-01
Payer: MEDICARE

## 2025-07-01 VITALS — HEART RATE: 72 BPM | DIASTOLIC BLOOD PRESSURE: 92 MMHG | SYSTOLIC BLOOD PRESSURE: 142 MMHG

## 2025-07-01 VITALS — BODY MASS INDEX: 44.41 KG/M2 | HEIGHT: 68 IN | WEIGHT: 293 LBS

## 2025-07-01 DIAGNOSIS — J00 NASOPHARYNGITIS: Primary | ICD-10-CM

## 2025-07-01 DIAGNOSIS — R49.0 HOARSENESS OF VOICE: ICD-10-CM

## 2025-07-01 DIAGNOSIS — G43.719 INTRACTABLE CHRONIC MIGRAINE WITHOUT AURA AND WITHOUT STATUS MIGRAINOSUS: Primary | ICD-10-CM

## 2025-07-01 DIAGNOSIS — R68.2 DRY MOUTH: ICD-10-CM

## 2025-07-01 DIAGNOSIS — R05.3 CHRONIC COUGH: ICD-10-CM

## 2025-07-01 PROCEDURE — 99999 PR PBB SHADOW E&M-EST. PATIENT-LVL IV: CPT | Mod: PBBFAC,,, | Performed by: NURSE PRACTITIONER

## 2025-07-01 PROCEDURE — 3288F FALL RISK ASSESSMENT DOCD: CPT | Mod: CPTII,S$GLB,, | Performed by: NURSE PRACTITIONER

## 2025-07-01 PROCEDURE — 87070 CULTURE OTHR SPECIMN AEROBIC: CPT | Performed by: NURSE PRACTITIONER

## 2025-07-01 PROCEDURE — 3008F BODY MASS INDEX DOCD: CPT | Mod: CPTII,S$GLB,, | Performed by: NURSE PRACTITIONER

## 2025-07-01 PROCEDURE — 99203 OFFICE O/P NEW LOW 30 MIN: CPT | Mod: 25,S$GLB,, | Performed by: NURSE PRACTITIONER

## 2025-07-01 PROCEDURE — 1126F AMNT PAIN NOTED NONE PRSNT: CPT | Mod: CPTII,S$GLB,, | Performed by: NURSE PRACTITIONER

## 2025-07-01 PROCEDURE — 1159F MED LIST DOCD IN RCRD: CPT | Mod: CPTII,S$GLB,, | Performed by: NURSE PRACTITIONER

## 2025-07-01 PROCEDURE — 3072F LOW RISK FOR RETINOPATHY: CPT | Mod: CPTII,S$GLB,, | Performed by: NURSE PRACTITIONER

## 2025-07-01 PROCEDURE — 3044F HG A1C LEVEL LT 7.0%: CPT | Mod: CPTII,S$GLB,, | Performed by: NURSE PRACTITIONER

## 2025-07-01 PROCEDURE — 1101F PT FALLS ASSESS-DOCD LE1/YR: CPT | Mod: CPTII,S$GLB,, | Performed by: NURSE PRACTITIONER

## 2025-07-01 PROCEDURE — 31575 DIAGNOSTIC LARYNGOSCOPY: CPT | Mod: S$GLB,,, | Performed by: NURSE PRACTITIONER

## 2025-07-01 RX ORDER — MUPIROCIN 20 MG/G
OINTMENT TOPICAL
Qty: 22 G | Refills: 1 | Status: SHIPPED | OUTPATIENT
Start: 2025-07-01

## 2025-07-01 RX ORDER — DOXYCYCLINE HYCLATE 100 MG
100 TABLET ORAL 2 TIMES DAILY
Qty: 28 TABLET | Refills: 0 | Status: SHIPPED | OUTPATIENT
Start: 2025-07-01 | End: 2025-07-15

## 2025-07-01 RX ORDER — MUPIROCIN 20 MG/G
OINTMENT TOPICAL
Qty: 22 G | Refills: 1 | Status: SHIPPED | OUTPATIENT
Start: 2025-07-01 | End: 2025-07-01

## 2025-07-01 NOTE — PROGRESS NOTES
Otolaryngology Clinic Note    Subjective:       Patient ID: Zakia Price is a 65 y.o. female.    Chief Complaint: Hoarse (And dry mouth per pt /)      History of Present Illness: Zakia Price is a 65 y.o. female presenting with voice concerns    History of Present Illness    Ms. Price presents today with dry mouth, cough, and hoarseness. She reports ongoing hoarseness since 2023 related to vocal cord nodules with intermittent voice loss during speaking. She was previously evaluated by ENT and completed voice therapy without improvement. Her hoarseness remains consistent since initial diagnosis. She has a persistent daily cough that exacerbates with talking, producing green phlegm with each episode. These symptoms have been ongoing for over a year without hemoptysis or chest pain. She experiences daily acid reflux symptoms characterized by burning sensation, frequent belching, and persistent bitter taste in mouth despite current medication regimen of Pepcid at night and Prilosec 40mg in the morning on an empty stomach. She last saw gastroenterology in 2022 for reflux and constipation. Endoscopic evaluation including capsule endoscopy was performed in 2023. She has ongoing seasonal allergies with post nasal drip, currently managed with allergy shots, Flonase nasal spray, and Astelin antihistamine nasal spray. She denies sore throat, runny nose, and nasal congestion beyond post nasal drip. She denies alcohol and tobacco use. She consumes one soft drink and one tea daily, and does not drink coffee. She takes Lyrica and Baclofen daily, both for the past two years, and Ozempic for blood sugar management. She reports consistent adherence to prescribed medications.      ROS:  ROS as indicated in HPI.        Past Surgical History:   Procedure Laterality Date    COLONOSCOPY N/A 5/12/2021    Procedure: COLONOSCOPY;  Surgeon: Carolina Rizo MD;  Location: [facility];  Service: Endoscopy;  Laterality: N/A;     EPIDURAL STEROID INJECTION N/A 12/10/2021    Procedure: Lumbar L5/S1 IL TEETEE  Would like AM procedure, if possible;  Surgeon: Nae Paul MD;  Location: [facility];  Service: Pain Management;  Laterality: N/A;    EPIDURAL STEROID INJECTION INTO CERVICAL SPINE N/A 10/8/2021    Procedure: C7-T1 IL TEETEE-no sedation.  Needs IV-just incase;  Surgeon: Nae Paul MD;  Location: [facility];  Service: Pain Management;  Laterality: N/A;    ESOPHAGOGASTRODUODENOSCOPY N/A 7/8/2021    Procedure: EGD (ESOPHAGOGASTRODUODENOSCOPY) previous positve covid;  Surgeon: Jann Gutierrez MD;  Location: [facility];  Service: Endoscopy;  Laterality: N/A;    ESOPHAGOGASTRODUODENOSCOPY N/A 9/18/2023    Procedure: EGD (ESOPHAGOGASTRODUODENOSCOPY);  Surgeon: Gm Leon MD;  Location: [facility];  Service: Endoscopy;  Laterality: N/A;    FRACTURE SURGERY      HYSTERECTOMY      INTRALUMINAL GASTROINTESTINAL TRACT IMAGING VIA CAPSULE N/A 10/24/2022    Procedure: IMAGING PROCEDURE, GI TRACT, INTRALUMINAL, VIA CAPSULE;  Surgeon: Hang Lunsford RN;  Location: [facility];  Service: Endoscopy;  Laterality: N/A;    SELECTIVE INJECTION OF ANESTHETIC AGENT AROUND LUMBAR SPINAL NERVE ROOT BY TRANSFORAMINAL APPROACH Bilateral 5/6/2022    Procedure: Bilateral L5/S1 TF TEETEE with RN IV sedation;  Surgeon: Nae Paul MD;  Location: [facility];  Service: Pain Management;  Laterality: Bilateral;    SELECTIVE INJECTION OF ANESTHETIC AGENT AROUND LUMBAR SPINAL NERVE ROOT BY TRANSFORAMINAL APPROACH Bilateral 12/30/2022    Procedure: Bilateral L5/S1 TF TEETEE RN IV Sedation;  Surgeon: Nae Paul MD;  Location: [facility];  Service: Pain Management;  Laterality: Bilateral;    SHOULDER ARTHROSCOPY      THYROIDECTOMY, PARTIAL Right     and transplatation of right superior parathyroid gland to the sternocleidomastoid muscle     TONSILLECTOMY      TUBAL LIGATION  1984    WRIST FRACTURE SURGERY      left     Past Medical History:   Diagnosis  Date    Acute respiratory failure due to COVID-19     COVID-19     Diabetes mellitus, type 2 1993    BS  didn't check 10/04/2023 Insulin x 2 years    Diabetic neuropathy 01/27/2014    DJD (degenerative joint disease) of knee     DVT (deep venous thrombosis) around 1990's    in leg, is on no anticoagulant therapy presently    Fatty liver     GERD (gastroesophageal reflux disease)     Hypertension associated with diabetes     HECTOR (iron deficiency anemia) 05/13/2021    Iron deficiency anemia due to chronic blood loss 01/11/2021    Chronic.  Control is uncertain.  Patient recently started on iron supplement over-the-counter.  Patient reports no side effects.  Plan to recheck iron studies and blood counts.          Lab Results      Component    Value    Date           WBC    5.03    12/08/2020           HGB    11.5 (L)    12/08/2020           HCT    38.6    12/08/2020           MCV    88    12/08/2020           PLT    388 (H)    Multinodular goiter     Obesity, morbid, BMI 50 or higher     Sleep apnea     has no CPAP     Social Drivers of Health     Tobacco Use: Low Risk  (7/1/2025)    Patient History     Smoking Tobacco Use: Never     Smokeless Tobacco Use: Never     Passive Exposure: Not on file   Alcohol Use: Not At Risk (12/13/2024)    AUDIT-C     Frequency of Alcohol Consumption: Never     Average Number of Drinks: Patient does not drink     Frequency of Binge Drinking: Never   Financial Resource Strain: Low Risk  (12/13/2024)    Overall Financial Resource Strain (CARDIA)     Difficulty of Paying Living Expenses: Not hard at all   Food Insecurity: No Food Insecurity (12/13/2024)    Hunger Vital Sign     Worried About Running Out of Food in the Last Year: Never true     Ran Out of Food in the Last Year: Never true   Transportation Needs: No Transportation Needs (11/14/2023)    PRAPARE - Transportation     Lack of Transportation (Medical): No     Lack of Transportation (Non-Medical): No   Physical Activity: Unknown  (12/13/2024)    Exercise Vital Sign     Days of Exercise per Week: 6 days     Minutes of Exercise per Session: Not on file   Stress: Stress Concern Present (12/13/2024)    Citizen of [place] of [employer] - Occupational Stress Questionnaire     Feeling of Stress : To some extent   Housing Stability: Low Risk  (11/14/2023)    Housing Stability Vital Sign     Unable to Pay for Housing in the Last Year: No     Number of Places Lived in the Last Year: 1     Unstable Housing in the Last Year: No   Depression: Not at risk (5/14/2025)    Received from [facility]    PHQ-2     PHQ-2 Score: 0   Utilities: Not At Risk (12/13/2024)    [facility] Utilities     Threatened with loss of utilities: No   Health Literacy: Adequate Health Literacy (12/13/2024)     Health Literacy     Frequency of need for help with medical instructions: Never   Social Isolation: Not on file     Review of patient's allergies indicates:   Allergen Reactions    Codeine sulfate      Nausea^    Lisinopril Swelling     angioedema    Codeine Nausea Only and Rash     Current Outpatient Medications   Medication Instructions    albuterol (PROVENTIL/VENTOLIN HFA) 90 mcg/actuation inhaler INHALE TWO PUFFS BY MOUTH INTO THE LUNGS EVERY 6 HOURS AS NEEDED FOR WHEEZING    azelastine (ASTELIN) 137 mcg, Nasal, 2 times daily    baclofen (LIORESAL) 10 mg, Oral, 3 times daily    clobetasoL (TEMOVATE) 0.05 % external solution Topical (Top), 2 times daily, On wash days (leave in and allow to dry) or for any itching/irritation of scalp    cyclobenzaprine (FLEXERIL) 10 mg, Oral, 2 times daily PRN    diclofenac (VOLTAREN) 75 mg, Oral, 2 times daily    diltiaZEM (CARDIZEM) 30 mg, Oral, Every 12 hours    doxycycline (VIBRA-TABS) 100 mg, Oral, 2 times daily    EPINEPHrine (EPIPEN) 0.3 mg/0.3 mL AtIn INJECT 0.3 MILLILITERS (0.3 MG TOTAL) INTRAMUSCULARLY ONCE. FOR ONE DOSE    ezetimibe (ZETIA) 10 mg, Oral, Daily    famotidine (PEPCID) 40 mg, Oral, 2 times daily  PRN    finasteride (PROPECIA) 1 mg, Oral, Daily    fluticasone propionate (FLONASE) 100 mcg, Each Nostril, Daily PRN    fluticasone-umeclidin-vilanter (TRELEGY ELLIPTA) 100-62.5-25 mcg DsDv 1 puff, Inhalation    furosemide (LASIX) 20 MG tablet TAKE 1 TABLET BY MOUTH ONCE DAILY AS NEEDED FOR SWELLING    ketoconazole (NIZORAL) 2 % shampoo Lather into affected areas. Rinse off in 5-10 min. Repeat 2-3 times a week.    levothyroxine (SYNTHROID) 100 mcg, Oral, Before breakfast    minoxidiL (LONITEN) 1.25 mg, Oral, Daily    mupirocin (BACTROBAN) 2 % ointment Apply small 1in ribbon to rinse BID    omeprazole (PRILOSEC) 40 mg, Oral, Every morning    OZEMPIC 2 mg/dose (8 mg/3 mL) PnIj INJECT 2MG UNDER THE SKIN EVERY 7 DAYS    pregabalin (LYRICA) 75 MG capsule TAKE ONE CAPSULE (75MG TOTAL) BY MOUTH THREE TIMES DAILY    rOPINIRole (REQUIP) 0.5 mg, Oral, Nightly    tiZANidine (ZANAFLEX) 4 MG tablet TAKE ONE-HALF TO 1 TABLET BY MOUTH TWICE DAILY AT 9AM & 5PM AS NEEDED FOR MUSCLE SPASMS MAY CAUSE DROWSINESS    topiramate (TOPAMAX) 100 mg, Oral, 2 times daily    ubrogepant (UBRELVY) 100 mg, Oral, Daily PRN, If symptoms persist or return, may repeat dose after 2 hours. Maximum: 200 mg per 24 hours         ENT ROS negative except as stated above.     Patient answers are not available for this visit.            Objective:      There were no vitals filed for this visit.    General: NAD, well appearing, obese  Eyes: Normal conjunctiva and lids  Face: symmetric, nerve intact  Nose: The nose is without any evidence of any deformity. The nasal mucosa is moist. The septum is midline. There is no evidence of septal hematoma. The turbinates are with hypertrophy with crusting note din left nare   Ears: Hearing aids in place. The ears are with normal-appearing pinna. Examination of the canals is normal appearing bilaterally. There is no drainage or erythema noted. The tympanic membranes are normal appearing with pearly color, normal-appearing  landmarks and normal light reflex. Hearing is grossly intact.  Mouth: No obvious abnormalities to the lips. Top and bottom dentures glued in today and can't be removed The gingivae are without any obvious evidence of infection or lesion. The oral mucosa is moist and pink. There are no obvious masses to the hard or soft palate.   Oropharynx: The uvula is midline.  The tongue is midline. The posterior pharynx is without erythema or exudate.   Salivary glands: The salivary glands are symmetric and not enlarged, no masses  Neck: No lymphadenopathy, trachea midline, thryoid not enlarged.  Psych: Normal mood and affect.   Neuro: Grossly intact  Speech: fluent    Procedure: Flexible laryngoscopy  Indications: cough, dysphonia  Verbal consent obtained  Anesthesia: lidocaine and phenylephrine nasal spray  Procedure: Scope was passed into right or left nare, to nasopharynx and down to visualize the glottis. Findings below. Patient tolerated procedure well.     - Nose purulent drainage noted at adenoids   - Nasopharynx- WNL, no masses  - Oropharynx- BOT symmetric, no masses, purulent drainage noted  - Hypopharynx and piriform sinuses- no masses on trumpet maneuver; purulent drainage noted  - Supraglottis- Arytenoids intact, no masses, +edematous +erythematous,  Pachydermia   - Glottis- Bilateral vocal folds intact with full motion, no masses; purulent drainage noted  - Glottic closure- complete, omega shaped epiglottis                 Test Review: I personally reviewed ENT, GI note, upper GI endoscopy     Upper GI endoscopy   Findings:       The examined esophagus was normal.        The entire examined stomach was normal. Biopsies were taken with a        cold forceps for Helicobacter pylori testing. Verification of        patient identification for the specimen was done. Estimated blood        loss was minimal.        The examined duodenum was normal.   Impression:            - Normal esophagus.                          -  Normal stomach. Biopsied.                          - Normal examined duodenum.   Recommendation:        - Discharge patient to home.                          - Resume previous diet.                          - Continue present medications.                          - Await pathology results.                          - Return to referring physician as previously                          scheduled.   MD Gm Reynolds MD   9/18/2023 10:57:18 AM     Assessment and Plan:       1. Nasopharyngitis    2. Dry mouth    3. Hoarseness of voice    4. Chronic cough        Assessment & Plan    - Suspect nasopharyngitis based on colored mucus in throat and back of nose.  - Ms. Price to perform saline rinses 2 times daily using distilled water and salt packs.  - Started doxycycline, 2 times daily for 14-day antibiotic course.  - Discussed the importance of completing the full course of antibiotics and performing nasal rinses as directed.  - Considered potential staph infection in nose due to community prevalence.  - Ms. Price to conduct Hibiclens bath daily for 3 months.  - Started mupirocin ointment, apply thin ribbon to nasal rinse 2 times daily for 6 weeks (may need to extend x3 months based on culture report).  - Unable to clearly visualize vocal cords during exam, but no obvious nodules seen.  - Consider follow-up with Dr. Stubbs in [place] if hoarseness persists.  - Ms. Price to eliminate caffeine intake, specifically cut out soft drinks.  - Ms. Price to reduce intake of fried foods, greasy foods, and other reflux-triggering items.  - Referred to GI office for soonest available appointment for ongoing reflux management.  - Informed about the potential need for extended treatment or further evaluation if symptoms persist.  - Follow up for reevaluation if symptoms do not improve after completing antibiotic course and nasal rinses.   - Next visit do not glue down dentures for oral exam    RTC:  will call with culture report     Plan of care was discussed in detail with the patient, who agreed with the plan as above. All questions were answered in detail. 40 minutes spent in direct patient care, chart review and documentation     KARO Tao  Otolaryngology    This note was generated with the assistance of ambient listening technology. Verbal consent was obtained by the patient and accompanying visitor(s) for the recording of patient appointment to facilitate this note. I attest to having reviewed and edited the generated note for accuracy, though some syntax or spelling errors may persist. Please contact the author of this note for any clarification.

## 2025-07-01 NOTE — TELEPHONE ENCOUNTER
S/w pharmacy and advised pt is to apply a 1in ribbon of Mupirocin to her sinus rinse bottle and flush into her sinuses. Rena verbalized understanding of instructions for rx.

## 2025-07-01 NOTE — TELEPHONE ENCOUNTER
Copied from CRM #[id number]. Topic: Medications - Medication Question  >> Jul 1, 2025 12:59 PM Tima wrote:  Type:  Pharmacy Calling to Clarify an RX    Name of Caller:Moni    Pharmacy Name: [facility]     Prescription Name:mupirocin (BACTROBAN) 2 % ointment    What do they need to clarify?:Dosage and 2 Rx were sent     Best Call Back Number:[phone]    Additional Information: Dosages for clarification    Please call back to advise. Thanks!  >> Jul 1, 2025  1:39 PM KRISTIN Carbajal wrote:    ----- Message -----  From: Tima Perez  Sent: 7/1/2025   1:00 PM CDT  To: Alvarado Avila

## 2025-07-01 NOTE — PATIENT INSTRUCTIONS
Hibiclens bath daily x3 months   Rinse for 6 weeks with mupirocin twice daily      PLEASE PERFORM SINUS RINSES 2 TIMES DAILY UNTIL YOUR NEXT VISIT.          DIRECTIONS FOR SINUS SALINE RINSE To see demonstration: Enter http://[email or web address]/watch?v=[email or web address] into the browser address box, or go to You tube, and under the search box, enter sinus rinse. Click on [employer] Sinus Rinse Video    Step 1    Step 1 Please wash your hands. Fill the clean bottle with the designated volume of warm distilled water, filtered water or previously boiled water. You may warm the water in a microwave but we recommend that you warm it in increments of five seconds. This is to avoid excessive heating of the water and damage to the device or scalding your nasal passage.    Step 2    Step 2 Cut the SINUS RINSE mixture packet at the corner and pour its contents into the bottle. Tighten the cap & tube on the bottle securely, place one finger over the tip of the cap and shake the bottle gently to dissolve the mixture.      Step 3    Step 3 Standing in front of a sink, bend forward to your comfort level and tilt your head down. Keeping your mouth open without holding your breath, place cap snugly against your nasal passage and SQUEEZE BOTTLE GENTLY until the solution starts draining from the OPPOSITE nasal passage or from your mouth. Keep squeezing the bottle GENTLY until at least 1/4 to 1/2 (60 to 120 mL) of the bottle is used for a proper rinse. Do not swallow the solution.    Step 4    Blow your nose gently, without pinching your nose completely because this will apply pressure on the eardrums. If tolerable, sniff in any residual solution remaining in the nasal passage once or twice prior to blowing your nose as this may clean out the posterior nasopharyngeal area (the area at the back of your nasal passage). Some solution will reach the back of the throat, so please spit it out. To help improve drainage of any residual solution,  blow your nose gently while tilting your head to the opposite side of the nasal passage that you just rinsed.    Step 5    Now repeat steps 3 & 4 for your other nasal passage.    Step 6     Air dry the Sinus Rinse bottle, cap, and tube on a clean paper towel, a glass plate to store the bottle cap and tube. If there is any solution leftover, please discard it. We recommend you make a fresh solution each time you rinse. Rinse 5 times each day, OR as directed by your physician. Warnings: DO NOT RINSE IF NASAL PASSAGE IS COMPLETELY BLOCKED OR IF YOU HAVE AN EAR INFECTION OR BLOCKED EARS. If you have had ear surgery, please contact your physician prior to irrigation. If you experience any pressure in your ears, stop the rinse and get further directions from your physician or contact our office during regular business hours. To avoid any ear discomfort: Heat the solution to lukewarm, do not use hot, boiling or cold water. Keep your mouth open. Do not hold your breath and if possible make the sound TIFFANY....TIFFANY... Make sure to take the position as shown. Gently squeeze 1/4 of the bottle at a time (60mL / 2 ounces). Stop the rinse if you feel any solution sensation near the ears. You may rinse with a partially blocked nasal passage. Please do not use for any other purposes. Please rinse at least ONE HOUR PRIOR to bedtime, in order to avoid any residual solution dripping in the throat.    >> Before using the SINUS RINSE kit, please inspect the cap, tube and bottle carefully for wear and tear. If any of the components appear discolored or cracked, please contact [facility] to obtain a replacement. You must follow the cleaning instructions provided in this brochure or cleaning instruction card prior to each use.    >> The SINUS RINSE bottle and mixture are to be used only for nasalirrigation. Do not use for any other purposes.    >> We recommend that you use the rinse ONE HOUR PRIOR to bedtime in order to avoid any residual  solution dripping in the throat.    Tips to avoid ear discomfort while rinsing    If you have had ear surgery, please contact your physician prior to irrigation. Do not use if you have an ear infection or blocked ears. Rinse with lukewarm water. Keep your mouth open. Do not hold your breath while rinsing. While rinsing, make sure to tilt your. Gently squeeze the bottle while rinsing; do not squeeze the bottle very forcefully. Stop the rinse if you feel a sensation of fluid near your ears.    Tips to avoid unexpected drainage after rinsing    In rare situations, especially if you have had sinus surgery, the saline solution can pool in the sinus cavities and nasal passages and then drip from your nostrils hours after rinsing. To avoid this harmless but annoying inconvenience, take one extra step after rinsing: lean forward, tilt your head sideways and gently blow your nose. Then, tilt your head to the other side and blow again. You may need to repeat this several times. This will help rid your nasal passages of any excess mucus and remaining saline solution. If you find yourself experiencing delayed drainage often, do not rinse right before leaving your house or going to bed.

## 2025-07-03 ENCOUNTER — TELEPHONE (OUTPATIENT)
Dept: HEMATOLOGY/ONCOLOGY | Facility: CLINIC | Age: 65
End: 2025-07-03
Payer: MEDICARE

## 2025-07-03 ENCOUNTER — RESULTS FOLLOW-UP (OUTPATIENT)
Dept: HEMATOLOGY/ONCOLOGY | Facility: CLINIC | Age: 65
End: 2025-07-03

## 2025-07-03 NOTE — TELEPHONE ENCOUNTER
Called to review culture. Will continue on antibiotic. Sensitivity still pending. Will call again Monday once the report is back.

## 2025-07-04 LAB — BACTERIA SPEC AEROBE CULT: ABNORMAL

## 2025-07-07 RX ORDER — DOXYCYCLINE HYCLATE 100 MG
100 TABLET ORAL 2 TIMES DAILY
Qty: 56 TABLET | Refills: 0 | Status: SHIPPED | OUTPATIENT
Start: 2025-07-07 | End: 2025-08-04

## 2025-07-07 RX ORDER — DOXYCYCLINE HYCLATE 100 MG
100 TABLET ORAL 2 TIMES DAILY
Qty: 56 TABLET | Refills: 0 | Status: SHIPPED | OUTPATIENT
Start: 2025-07-07 | End: 2025-07-07

## 2025-07-09 ENCOUNTER — TELEPHONE (OUTPATIENT)
Dept: OTOLARYNGOLOGY | Facility: CLINIC | Age: 65
End: 2025-07-09
Payer: MEDICARE

## 2025-07-09 NOTE — TELEPHONE ENCOUNTER
Copied from CRM #[id number]. Topic: General Inquiry - Patient Advice  >> Jul 9, 2025  2:37 PM Hortensia wrote:  Who called:self      What is the request in detail:pt would like for you to give her a call in regards of her medication   Can the clinic reply by [facility]?     Would the patient rather a call back or a response via [facility]?callback     Best call back number:.[phone]       Additional Information:  >> Jul 9, 2025  2:44 PM KRISTIN Carbajal wrote:    ----- Message -----  From: Hortensia White  Sent: 7/9/2025   2:38 PM CDT  To: Alvarado Avila

## 2025-07-14 ENCOUNTER — NURSE TRIAGE (OUTPATIENT)
Dept: ADMINISTRATIVE | Facility: CLINIC | Age: 65
End: 2025-07-14
Payer: MEDICARE

## 2025-07-14 ENCOUNTER — TELEPHONE (OUTPATIENT)
Dept: OTOLARYNGOLOGY | Facility: CLINIC | Age: 65
End: 2025-07-14
Payer: MEDICARE

## 2025-07-14 DIAGNOSIS — E11.42 DIABETIC POLYNEUROPATHY ASSOCIATED WITH TYPE 2 DIABETES MELLITUS: ICD-10-CM

## 2025-07-14 DIAGNOSIS — M12.812 ROTATOR CUFF ARTHROPATHY OF LEFT SHOULDER: ICD-10-CM

## 2025-07-14 DIAGNOSIS — M62.838 CERVICAL PARASPINAL MUSCLE SPASM: ICD-10-CM

## 2025-07-14 RX ORDER — SULFAMETHOXAZOLE AND TRIMETHOPRIM 800; 160 MG/1; MG/1
1 TABLET ORAL 2 TIMES DAILY
Qty: 56 TABLET | Refills: 0 | Status: SHIPPED | OUTPATIENT
Start: 2025-07-14 | End: 2025-08-11

## 2025-07-14 RX ORDER — PREGABALIN 75 MG/1
75 CAPSULE ORAL 3 TIMES DAILY
Qty: 270 CAPSULE | Refills: 0 | Status: SHIPPED | OUTPATIENT
Start: 2025-07-14

## 2025-07-14 RX ORDER — ONDANSETRON 4 MG/1
4 TABLET, FILM COATED ORAL EVERY 12 HOURS PRN
Qty: 10 TABLET | Refills: 0 | Status: SHIPPED | OUTPATIENT
Start: 2025-07-14

## 2025-07-14 NOTE — TELEPHONE ENCOUNTER
Pt called to state that the abx prescribed by Alana is making her sick. Every time she takes it she vomits. Pt is eating before taking. Pt would like something else to be sent to her pharmacy, please advise.   Infliximab Counseling:  I discussed with the patient the risks of infliximab including but not limited to myelosuppression, immunosuppression, autoimmune hepatitis, demyelinating diseases, lymphoma, and serious infections.  The patient understands that monitoring is required including a PPD at baseline and must alert us or the primary physician if symptoms of infection or other concerning signs are noted.

## 2025-07-14 NOTE — TELEPHONE ENCOUNTER
Copied from CRM #[id number]. Topic: Medications - Medication Question  >> Jul 14, 2025 12:36 PM Laura wrote:  Type:  Patient Returning Call    Who Called:Zakia  Who Left Message for Patient:  Does the patient know what this is regarding?:Medication/ antibiotic  Would the patient rather a call back or a response via [facility]? Callback  Best Call Back Number:[phone]  Additional Information: patient is not toleration antibiotic and need to see what more she can do. Patient is vomiting  >> Jul 14, 2025 12:50 PM KRISTIN Carbajal wrote:    ----- Message -----  From: Laura Byrne  Sent: 7/14/2025  12:37 PM CDT  To: Alvarado Warner Staff

## 2025-07-14 NOTE — TELEPHONE ENCOUNTER
Patient reports Dr Childers has her on an antibiotic (Doxycycline) twice daily and she has been vomiting every time she takes it. Disposition provided - be seen in office today. Unable to schedule with specialty, will route message with high priority.Instructed to call back with additional questions or worsening of symptoms. Patient verbalized understanding.     Reason for Disposition   MILD to MODERATE vomiting (e.g., 1-5 times/day) and lasts > 48 hours (2 days)    Additional Information   Negative: Shock suspected (e.g., cold/pale/clammy skin, too weak to stand, low BP, rapid pulse)   Negative: Difficult to awaken or acting confused (e.g., disoriented, slurred speech)   Negative: Sounds like a life-threatening emergency to the triager   Negative: Vomiting red blood or black (coffee ground) material   Negative: Vomiting and hernia is more painful or swollen than usual   Negative: Recent head injury (within 3 days)   Negative: Recent abdominal injury (within 7 days)   Negative: Insulin-dependent diabetes and glucose > 240 mg/dL (13 mmol/L)   Negative: Severe pain in one eye   Negative: SEVERE vomiting (e.g., 6 or more times/day)  (Exception: Patient sounds well, is drinking liquids, does not sound dehydrated, and vomiting has lasted less than 24 hours.)   Negative: MODERATE vomiting (e.g., 3 - 5 times/day) and age > 60 years   Negative: Constant abdominal pain lasting > 2 hours   Negative: High-risk adult (e.g., brain tumor, V-P shunt, hernia)   Negative: Drinking very little and has signs of dehydration (e.g., no urine > 12 hours, very dry mouth, very lightheaded)   Negative: Patient sounds very sick or weak to the triager   Negative: Vomiting and abdomen looks much more swollen than usual   Negative: Vomiting contains bile (green color)   Negative: Fever > 103 F (39.4 C)   Negative: Fever > 101 F (38.3 C) and over 60 years of age   Negative: Fever > 100 F (37.8 C) and has a weak immune system (e.g., HIV positive,  cancer chemo, organ transplant, splenectomy, chronic steroids)   Negative: Fever > 100 F (37.8 C) and bedridden (e.g., CVA, chronic illness, recovering from surgery)   Negative: Taking any of the following medications: digoxin (Lanoxin), lithium, theophylline, phenytoin (Dilantin)   Negative: Severe headache and vomiting    Protocols used: Vomiting-A-OH

## 2025-07-17 ENCOUNTER — TELEPHONE (OUTPATIENT)
Dept: FAMILY MEDICINE | Facility: CLINIC | Age: 65
End: 2025-07-17

## 2025-07-17 ENCOUNTER — CLINICAL SUPPORT (OUTPATIENT)
Dept: FAMILY MEDICINE | Facility: CLINIC | Age: 65
End: 2025-07-17
Payer: MEDICARE

## 2025-07-17 VITALS — SYSTOLIC BLOOD PRESSURE: 127 MMHG | DIASTOLIC BLOOD PRESSURE: 74 MMHG | HEART RATE: 73 BPM

## 2025-07-17 DIAGNOSIS — Z01.30 BP CHECK: Primary | ICD-10-CM

## 2025-07-17 PROCEDURE — 99999 PR PBB SHADOW E&M-EST. PATIENT-LVL III: CPT | Mod: PBBFAC,,,

## 2025-07-17 NOTE — PROGRESS NOTES
Vital signs from recent nursing visit reviewed.  Vital signs are within normal limits.  I recommend that she continue current medication.  Hypertension Medications              diltiaZEM (CARDIZEM) 30 MG tablet Take 1 tablet (30 mg total) by mouth every 12 (twelve) hours.    furosemide (LASIX) 20 MG tablet TAKE 1 TABLET BY MOUTH ONCE DAILY AS NEEDED FOR SWELLING    minoxidiL (LONITEN) 2.5 MG tablet Take 0.5 tablets (1.25 mg total) by mouth once daily.          Counseled on importance of hypertension disease course, I recommend ongoing Education for DASH-diet and exercise.  Counseled on medication regimen importance of treating high blood pressure.  Please be advised of risk of untreated blood pressure as discussed.  Please advised of ER precautions were given for symptoms of hypertensive urgency and emergency.

## 2025-07-17 NOTE — TELEPHONE ENCOUNTER
Copied from CRM #[id number]. Topic: Appointments - Information Request  >> Jul 17, 2025 10:10 AM Cathie wrote:  .Type:  Needs Medical Advice    Who Called: .Zakia Price    Would the patient rather a call back or a response via [facility]? CALL BACK  Best Call Back Number: .[phone]  Additional Information: PT WILL BE LATE MAY NOT MAKE IT UNTIL 11 DUE TO TRANSPORTATION. PLEASE CALL PT IF SHE NEEDS TO RESCHEDULE BP CHECK

## 2025-07-18 DIAGNOSIS — M25.551 PAIN IN RIGHT HIP: ICD-10-CM

## 2025-07-18 RX ORDER — TIZANIDINE 4 MG/1
TABLET ORAL
Qty: 90 TABLET | Refills: 1 | Status: SHIPPED | OUTPATIENT
Start: 2025-07-18

## 2025-07-18 NOTE — TELEPHONE ENCOUNTER
Copied from CRM #[id number]. Topic: Medications - Medication Refill  >> Jul 18, 2025  2:11 PM Allyson wrote:  Please refill the medication(s) listed below.Please call the patient when the prescription(s) is ready for  at this phone KELLE Eddy [[phone]]        Medication #1tiZANidine (ZANAFLEX) 4 MG tablet          Preferred Pharmacy:,      [facility] ([place]) [address] [address]  Phone: [phone] Fax: [phone]            Would the patient rather a call back or a response via [facility]? Call     Best Call Back Number:Telephone Information:  Mobile          [phone]

## 2025-07-18 NOTE — TELEPHONE ENCOUNTER
No care due was identified.  [facility] Embedded Care Due Messages. Reference number: [id number].   7/18/2025 2:17:09 PM CDT

## 2025-08-13 ENCOUNTER — PATIENT OUTREACH (OUTPATIENT)
Dept: ADMINISTRATIVE | Facility: HOSPITAL | Age: 65
End: 2025-08-13
Payer: MEDICARE

## 2025-08-19 ENCOUNTER — LAB VISIT (OUTPATIENT)
Dept: LAB | Facility: HOSPITAL | Age: 65
End: 2025-08-19
Attending: FAMILY MEDICINE
Payer: MEDICARE

## 2025-08-19 ENCOUNTER — OFFICE VISIT (OUTPATIENT)
Dept: PODIATRY | Facility: CLINIC | Age: 65
End: 2025-08-19
Payer: MEDICARE

## 2025-08-19 VITALS — BODY MASS INDEX: 44.41 KG/M2 | HEIGHT: 68 IN | WEIGHT: 293 LBS

## 2025-08-19 DIAGNOSIS — E11.9 ENCOUNTER FOR COMPREHENSIVE DIABETIC FOOT EXAMINATION, TYPE 2 DIABETES MELLITUS: Primary | ICD-10-CM

## 2025-08-19 DIAGNOSIS — E11.49 TYPE II DIABETES MELLITUS WITH NEUROLOGICAL MANIFESTATIONS: Chronic | ICD-10-CM

## 2025-08-19 DIAGNOSIS — R60.0 EDEMA OF FOOT: ICD-10-CM

## 2025-08-19 DIAGNOSIS — E11.42 DIABETIC POLYNEUROPATHY ASSOCIATED WITH TYPE 2 DIABETES MELLITUS: ICD-10-CM

## 2025-08-19 LAB
ALBUMIN/CREAT UR: 6 UG/MG
CREAT UR-MCNC: 182 MG/DL (ref 15–325)
EAG (OHS): 94 MG/DL (ref 68–131)
HBA1C MFR BLD: 4.9 % (ref 4–5.6)
MICROALBUMIN UR-MCNC: 11 UG/ML

## 2025-08-19 PROCEDURE — 3288F FALL RISK ASSESSMENT DOCD: CPT | Mod: CPTII,S$GLB,, | Performed by: PODIATRIST

## 2025-08-19 PROCEDURE — 82043 UR ALBUMIN QUANTITATIVE: CPT

## 2025-08-19 PROCEDURE — 3008F BODY MASS INDEX DOCD: CPT | Mod: CPTII,S$GLB,, | Performed by: PODIATRIST

## 2025-08-19 PROCEDURE — 1100F PTFALLS ASSESS-DOCD GE2>/YR: CPT | Mod: CPTII,S$GLB,, | Performed by: PODIATRIST

## 2025-08-19 PROCEDURE — 83036 HEMOGLOBIN GLYCOSYLATED A1C: CPT

## 2025-08-19 PROCEDURE — 99999 PR PBB SHADOW E&M-EST. PATIENT-LVL IV: CPT | Mod: PBBFAC,,, | Performed by: PODIATRIST

## 2025-08-19 PROCEDURE — 3044F HG A1C LEVEL LT 7.0%: CPT | Mod: CPTII,S$GLB,, | Performed by: PODIATRIST

## 2025-08-19 PROCEDURE — 1126F AMNT PAIN NOTED NONE PRSNT: CPT | Mod: CPTII,S$GLB,, | Performed by: PODIATRIST

## 2025-08-19 PROCEDURE — 36415 COLL VENOUS BLD VENIPUNCTURE: CPT | Mod: PO

## 2025-08-19 PROCEDURE — 3072F LOW RISK FOR RETINOPATHY: CPT | Mod: CPTII,S$GLB,, | Performed by: PODIATRIST

## 2025-08-19 PROCEDURE — 1159F MED LIST DOCD IN RCRD: CPT | Mod: CPTII,S$GLB,, | Performed by: PODIATRIST

## 2025-08-19 PROCEDURE — 99214 OFFICE O/P EST MOD 30 MIN: CPT | Mod: 25,S$GLB,, | Performed by: PODIATRIST

## 2025-08-19 PROCEDURE — 1160F RVW MEDS BY RX/DR IN RCRD: CPT | Mod: CPTII,S$GLB,, | Performed by: PODIATRIST

## 2025-08-19 RX ORDER — DICLOFENAC SODIUM 10 MG/G
GEL TOPICAL
COMMUNITY
Start: 2025-07-22

## 2025-09-03 DIAGNOSIS — E11.65 TYPE 2 DIABETES MELLITUS WITH HYPERGLYCEMIA, WITHOUT LONG-TERM CURRENT USE OF INSULIN: Chronic | ICD-10-CM

## 2025-09-04 RX ORDER — SEMAGLUTIDE 2.68 MG/ML
2 INJECTION, SOLUTION SUBCUTANEOUS
Qty: 9 ML | Refills: 1 | Status: SHIPPED | OUTPATIENT
Start: 2025-09-04